# Patient Record
Sex: MALE | Race: WHITE
[De-identification: names, ages, dates, MRNs, and addresses within clinical notes are randomized per-mention and may not be internally consistent; named-entity substitution may affect disease eponyms.]

---

## 2017-07-23 ENCOUNTER — HOSPITAL ENCOUNTER (EMERGENCY)
Dept: HOSPITAL 76 - ED | Age: 57
Discharge: HOME | End: 2017-07-23
Payer: COMMERCIAL

## 2017-07-23 ENCOUNTER — HOSPITAL ENCOUNTER (OUTPATIENT)
Dept: HOSPITAL 76 - EMS | Age: 57
Discharge: ANOTHER HOSPITAL | End: 2017-07-23
Attending: SURGERY
Payer: COMMERCIAL

## 2017-07-23 VITALS — DIASTOLIC BLOOD PRESSURE: 59 MMHG | SYSTOLIC BLOOD PRESSURE: 127 MMHG

## 2017-07-23 DIAGNOSIS — R53.1: ICD-10-CM

## 2017-07-23 DIAGNOSIS — K52.29: Primary | ICD-10-CM

## 2017-07-23 DIAGNOSIS — R50.9: ICD-10-CM

## 2017-07-23 DIAGNOSIS — R50.9: Primary | ICD-10-CM

## 2017-07-23 DIAGNOSIS — Z79.4: ICD-10-CM

## 2017-07-23 DIAGNOSIS — E11.42: ICD-10-CM

## 2017-07-23 DIAGNOSIS — I10: ICD-10-CM

## 2017-07-23 LAB
ALBUMIN/GLOB SERPL: 1 {RATIO} (ref 1–2.2)
ANION GAP SERPL CALCULATED.4IONS-SCNC: 9 MMOL/L (ref 6–13)
BASOPHILS NFR BLD AUTO: 0 10^3/UL (ref 0–0.1)
BASOPHILS NFR BLD AUTO: 0.2 %
BILIRUB BLD-MCNC: 0.6 MG/DL (ref 0.2–1)
BUN SERPL-MCNC: 12 MG/DL (ref 6–20)
CALCIUM UR-MCNC: 8.9 MG/DL (ref 8.5–10.3)
CHLORIDE SERPL-SCNC: 102 MMOL/L (ref 101–111)
CO2 SERPL-SCNC: 25 MMOL/L (ref 21–32)
CREAT SERPLBLD-SCNC: 0.8 MG/DL (ref 0.6–1.2)
CUL URINE ADD CHARGE: (no result)
EOSINOPHIL # BLD AUTO: 0 10^3/UL (ref 0–0.7)
EOSINOPHIL NFR BLD AUTO: 0 %
ERYTHROCYTE [DISTWIDTH] IN BLOOD BY AUTOMATED COUNT: 13.4 % (ref 12–15)
GFRSERPLBLD MDRD-ARVRAT: 99 ML/MIN/{1.73_M2} (ref 89–?)
GLOBULIN SER-MCNC: 3.4 G/DL (ref 2.1–4.2)
GLUCOSE SERPL-MCNC: 156 MG/DL (ref 70–100)
HCT VFR BLD AUTO: 35.7 % (ref 42–52)
HGB UR QL STRIP: 12.3 G/DL (ref 14–18)
LIPASE SERPL-CCNC: 13 U/L (ref 22–51)
LYMPHOCYTES # SPEC AUTO: 0.4 10^3/UL (ref 1.5–3.5)
LYMPHOCYTES NFR BLD AUTO: 4.5 %
MCH RBC QN AUTO: 29.2 PG (ref 27–31)
MCHC RBC AUTO-ENTMCNC: 34.4 G/DL (ref 32–36)
MCV RBC AUTO: 84.7 FL (ref 80–94)
MONOCYTES # BLD AUTO: 0.6 10^3/UL (ref 0–1)
MONOCYTES NFR BLD AUTO: 7.4 %
NEUTROPHILS # BLD AUTO: 7.5 10^3/UL (ref 1.5–6.6)
NEUTROPHILS # SNV AUTO: 8.5 X10^3/UL (ref 4.8–10.8)
NEUTROPHILS NFR BLD AUTO: 87.9 %
NRBC # BLD AUTO: 0 /100WBC
PDW BLD AUTO: 8.6 FL (ref 7.4–11.4)
PH UR STRIP.AUTO: 5.5 PH (ref 5–7.5)
POTASSIUM SERPL-SCNC: 3.3 MMOL/L (ref 3.5–5)
PROT SPEC-MCNC: 6.8 G/DL (ref 6.7–8.2)
RBC MAR: 4.22 10^6/UL (ref 4.7–6.1)
SODIUM SERPLBLD-SCNC: 136 MMOL/L (ref 135–145)
SP GR UR STRIP.AUTO: 1.01 (ref 1–1.03)
UA CHARGE (STRIP ONLY): YES
UA W/ MICROSCOPIC CHARGE: (no result)
UR CULTURE IF IND: (no result)
UROBILINOGEN UR STRIP.AUTO-MCNC: NEGATIVE MG/DL
WBC # BLD: 8.5 X10^3/UL

## 2017-07-23 PROCEDURE — 99284 EMERGENCY DEPT VISIT MOD MDM: CPT

## 2017-07-23 PROCEDURE — 36415 COLL VENOUS BLD VENIPUNCTURE: CPT

## 2017-07-23 PROCEDURE — 81001 URINALYSIS AUTO W/SCOPE: CPT

## 2017-07-23 PROCEDURE — 71010: CPT

## 2017-07-23 PROCEDURE — 87181 SC STD AGAR DILUTION PER AGT: CPT

## 2017-07-23 PROCEDURE — 87040 BLOOD CULTURE FOR BACTERIA: CPT

## 2017-07-23 PROCEDURE — 81003 URINALYSIS AUTO W/O SCOPE: CPT

## 2017-07-23 PROCEDURE — 96375 TX/PRO/DX INJ NEW DRUG ADDON: CPT

## 2017-07-23 PROCEDURE — 96374 THER/PROPH/DIAG INJ IV PUSH: CPT

## 2017-07-23 PROCEDURE — 83690 ASSAY OF LIPASE: CPT

## 2017-07-23 PROCEDURE — 80053 COMPREHEN METABOLIC PANEL: CPT

## 2017-07-23 PROCEDURE — 87077 CULTURE AEROBIC IDENTIFY: CPT

## 2017-07-23 PROCEDURE — 85025 COMPLETE CBC W/AUTO DIFF WBC: CPT

## 2017-07-23 PROCEDURE — 87086 URINE CULTURE/COLONY COUNT: CPT

## 2017-07-23 PROCEDURE — 83605 ASSAY OF LACTIC ACID: CPT

## 2017-07-23 NOTE — ED PHYSICIAN DOCUMENTATION
PD HPI NVD





- Stated complaint


Stated Complaint: FEVER





- Chief complaint


Chief Complaint: Abd Pain





- History obtained from


History obtained from: Patient, Family





- History of Present Illness


Timing - onset: How many days ago (2-3)


Timing - duration: Days (2-3)


Timing - details: Gradual onset


Pain level max: 5


Pain level now: 4


Associated symptoms: No: Fever, Near syncope / syncope, Loss of appetite, 

Weight loss


Contributing factors: Diabetes.  No: Sick contact, Bad food, Travel, Recent 

antibiotics, Alcohol use, Anticoagulated


Improved by: Vomiting


Worsened by: Eating





- Additonal information


Additional information: 





Patient is a 57-year-old male who presents to the emergency department with 

nausea, vomiting, diarrhea as well as generalized weakness for the past 2-3 

days.  He is also diabetic and is being followed by wound care for several 

wounds on the bilateral lower extremity and great toe.  He is unable to 

tolerate p.o. today.  Has had subjective fevers at home.  Mild abdominal 

cramping.  Mild coughing.  Mild rhinorrhea.





Review of Systems


Cardiac: denies: Palpitations


Respiratory: denies: Hemoptysis, Wheezing


GI: reports: Nausea, Vomiting, Diarrhea


Skin: denies: Rash


Musculoskeletal: denies: Neck pain, Back pain


Neurologic: reports: Generalized weakness.  denies: Focal weakness, Numbness, 

Headache





PD PAST MEDICAL HISTORY





- Past Medical History


Past Medical History: Yes


Cardiovascular: Hypertension


Respiratory: None


Neuro: Peripheral neuropathy


Endocrine/Autoimmune: Type 2 diabetes


GI: Chronic diarrhea


: None


HEENT: None


Psych: Depression


Musculoskeletal: None


Derm: None





- Past Surgical History


Past Surgical History: Yes


General: Bowel surgery, Colonoscopy


Ortho: Shoulder arthroplasty, Arthroscopic surgery, Spine surgery





- Present Medications


Home Medications: 


 Ambulatory Orders











 Medication  Instructions  Recorded  Confirmed


 


Insulin Glargine [Lantus Solostar] 30 unit SUBQ HS 05/31/13 07/23/17


 


Citalopram [CeleXA] 20 mg PO DAILY 11/18/14 07/23/17


 


Losartan Potassium 25 mg ORAL DAILY 02/24/15 07/23/17


 


Multivitamin [Multi Vitamin Daily] 1 tab ORAL DAILY 02/24/15 07/23/17


 


Gabapentin 300 mg PO TID 11/24/15 07/23/17


 


Insulin Aspart [Novolog] 20 units SUBQ TID 11/24/15 07/23/17


 


Loperamide [Imodium] 1 cap PO BID 05/10/16 07/23/17


 


Colestipol HCl 2 gm BID 07/23/17 07/23/17


 


Promethazine [Phenergan] 25 mg PO Q6H PRN #10 tab 07/23/17 














- Allergies


Allergies/Adverse Reactions: 


 Allergies











Allergy/AdvReac Type Severity Reaction Status Date / Time


 


No Known Drug Allergies Allergy   Verified 05/09/16 14:54














- Social History


Does the pt smoke?: No


Smoking Status: Never smoker


Does the pt drink ETOH?: Yes


Does the pt have substance abuse?: No





- Immunizations


Immunizations are current?: No


Immunizations: Other immun not current





- POLST


Patient has POLST: No





PD ED PE NORMAL





- Vitals


Vital signs reviewed: Yes





- General


General: Alert and oriented X 3, No acute distress





- HEENT


HEENT: PERRL, Moist mucous membranes





- Neck


Neck: Supple, no meningeal sign





- Cardiac


Cardiac: RRR, Strong equal pulses





- Respiratory


Respiratory: No respiratory distress, Clear bilaterally





- Abdomen


Abdomen: Soft, Non tender, Non distended





- Back


Back: No CVA TTP, No spinal TTP





- Derm


Derm: Warm and dry, No rash





- Extremities


Extremities: Other (Well-healing bilateral lower extremity wounds without signs 

of infection)





- Neuro


Neuro: Alert and oriented X 3





- Psych


Psych: Normal mood, Normal affect





Results





- Vitals


Vitals: 


 Vital Signs - 24 hr











  07/23/17 07/23/17 07/23/17





  18:03 19:12 20:38


 


Temperature 38.1 C H 38.0 C H 38.1 C H


 


Heart Rate 78  80


 


Respiratory 18  16





Rate   


 


Blood Pressure 137/70 H  129/76


 


O2 Saturation 96  98














  07/23/17





  21:43


 


Temperature 37.2 C


 


Heart Rate 64


 


Respiratory 18





Rate 


 


Blood Pressure 127/59 L


 


O2 Saturation 95








 Oxygen











O2 Source                      Room air

















- Labs


Labs: 


 Laboratory Tests











  07/23/17 07/23/17 07/23/17





  18:29 18:29 18:38


 


WBC  8.5  


 


RBC  4.22 L  


 


Hgb  12.3 L  


 


Hct  35.7 L  


 


MCV  84.7  


 


MCH  29.2  


 


MCHC  34.4  


 


RDW  13.4  


 


Plt Count  130  


 


MPV  8.6  


 


Neut #  7.5 H  


 


Lymph #  0.4 L  


 


Mono #  0.6  


 


Eos #  0.0  


 


Baso #  0.0  


 


Absolute Nucleated RBC  0.00  


 


Nucleated RBCs  0.0  


 


Sodium   136 


 


Potassium   3.3 L 


 


Chloride   102 


 


Carbon Dioxide   25 


 


Anion Gap   9.0 


 


BUN   12 


 


Creatinine   0.8 


 


Estimated GFR (MDRD)   99 


 


Glucose   156 H 


 


Lactic Acid    1.7


 


Calcium   8.9 


 


Total Bilirubin   0.6 


 


AST   16 


 


ALT   16 


 


Alkaline Phosphatase   70 


 


Total Protein   6.8 


 


Albumin   3.4 


 


Globulin   3.4 


 


Albumin/Globulin Ratio   1.0 


 


Lipase   13 L 


 


Urine Color   


 


Urine Clarity   


 


Urine pH   


 


Ur Specific Gravity   


 


Urine Protein   


 


Urine Glucose (UA)   


 


Urine Ketones   


 


Urine Occult Blood   


 


Urine Nitrite   


 


Urine Bilirubin   


 


Urine Urobilinogen   


 


Ur Leukocyte Esterase   


 


Ur Microscopic Review   


 


Urine Culture Comments   














  07/23/17





  19:44


 


WBC 


 


RBC 


 


Hgb 


 


Hct 


 


MCV 


 


MCH 


 


MCHC 


 


RDW 


 


Plt Count 


 


MPV 


 


Neut # 


 


Lymph # 


 


Mono # 


 


Eos # 


 


Baso # 


 


Absolute Nucleated RBC 


 


Nucleated RBCs 


 


Sodium 


 


Potassium 


 


Chloride 


 


Carbon Dioxide 


 


Anion Gap 


 


BUN 


 


Creatinine 


 


Estimated GFR (MDRD) 


 


Glucose 


 


Lactic Acid 


 


Calcium 


 


Total Bilirubin 


 


AST 


 


ALT 


 


Alkaline Phosphatase 


 


Total Protein 


 


Albumin 


 


Globulin 


 


Albumin/Globulin Ratio 


 


Lipase 


 


Urine Color  YELLOW


 


Urine Clarity  CLEAR


 


Urine pH  5.5


 


Ur Specific Gravity  1.010


 


Urine Protein  NEGATIVE


 


Urine Glucose (UA)  >=1000 H


 


Urine Ketones  40 H


 


Urine Occult Blood  NEGATIVE


 


Urine Nitrite  NEGATIVE


 


Urine Bilirubin  NEGATIVE


 


Urine Urobilinogen  0.2 (NORMAL)


 


Ur Leukocyte Esterase  NEGATIVE


 


Ur Microscopic Review  NOT INDICATED


 


Urine Culture Comments  NOT INDICATED














- Rads (name of study)


  ** Chest x-ray


Radiology: Prelim report reviewed, EMP read contemporaneously, See rad report (

No acute disease)





PD MEDICAL DECISION MAKING





- ED course


Complexity details: reviewed results, re-evaluated patient, considered 

differential, d/w patient, d/w family


ED course: 





Patient is a 57-year-old male who presents to the emergency department with 

nausea, vomiting, diarrhea for the past several days.  Given IV fluids, Zofran, 

Bentyl.  Feels much better.  Tolerating p.o. without difficulty.  Able to 

ambulate well.  Given Tylenol as well.  No acute findings on chest x-ray, 

laboratory testing or urinalysis.  Appears to be a viral syndrome.  Will 

continue supportive care and follow-up with his PCP.  Patient and family 

counseled regarding signs and symptoms for which I believe and urgent re-

evaluation would be necessary. Patient with good understanding of and agreement 

to plan and is comfortable going home at this time





This document was made in part using voice recognition software. While efforts 

are made to proofread this document, sound alike and grammatical errors may 

occur.





Departure





- Departure


Disposition: 01 Home, Self Care


Clinical Impression: 


 Gastroenteritis





Fever


Qualifiers:


 Fever type: unspecified Qualified Code(s): R50.9 - Fever, unspecified





T2DM (type 2 diabetes mellitus)


Qualifiers:


 Diabetes mellitus complication status: without complication 


Condition: Good


Instructions:  ED Fever Unconf Cause, ED Gastroenteritis Viral


Follow-Up: 


Fredy Padgett MD [Primary Care Provider] - Within 3 Days (for recheck)


Prescriptions: 


Promethazine [Phenergan] 25 mg PO Q6H PRN #10 tab


 PRN Reason: Nausea / Vomiting


Comments: 


Return if you worsen.  Drink plenty of fluids and rest.  This should improve 

over the next 2-3 days.


Discharge Date/Time: 07/23/17 21:43

## 2017-07-23 NOTE — XRAY REPORT
EXAM: 

CHEST RADIOGRAPHY

 

EXAM DATE: 7/23/2017 06:41 PM.

 

CLINICAL HISTORY: Chest pain.

 

COMPARISON: None.

 

TECHNIQUE: 1 view.

 

FINDINGS:

Lungs/Pleura: No focal opacities evident. No pleural effusion. No pneumothorax. Mildly low lung volum
es.

 

Mediastinum: Within exam limitations, cardiomediastinal contour is normal.

 

 

IMPRESSION: No acute cardiopulmonary disease seen.

 

[facility]

Referring Provider Line: [phone]

 

SITE ID: [id number]

## 2017-07-23 NOTE — XRAY PRELIMINARY REPORT
Accession: [id number]

Exam: XR Chest 1 View

 

IMPRESSION: No acute cardiopulmonary disease seen.

 

[facility]

 

SITE ID: [id number]

## 2017-07-24 ENCOUNTER — HOSPITAL ENCOUNTER (INPATIENT)
Dept: HOSPITAL 76 - ED | Age: 57
LOS: 4 days | Discharge: HOME | DRG: 639 | End: 2017-07-28
Attending: INTERNAL MEDICINE | Admitting: INTERNAL MEDICINE
Payer: COMMERCIAL

## 2017-07-24 DIAGNOSIS — E11.628: Primary | ICD-10-CM

## 2017-07-24 DIAGNOSIS — K21.9: ICD-10-CM

## 2017-07-24 DIAGNOSIS — E11.65: ICD-10-CM

## 2017-07-24 DIAGNOSIS — Z87.440: ICD-10-CM

## 2017-07-24 DIAGNOSIS — Z79.899: ICD-10-CM

## 2017-07-24 DIAGNOSIS — Z79.4: ICD-10-CM

## 2017-07-24 DIAGNOSIS — B95.61: ICD-10-CM

## 2017-07-24 DIAGNOSIS — E11.621: ICD-10-CM

## 2017-07-24 DIAGNOSIS — F32.9: ICD-10-CM

## 2017-07-24 DIAGNOSIS — R11.2: ICD-10-CM

## 2017-07-24 DIAGNOSIS — Z85.048: ICD-10-CM

## 2017-07-24 DIAGNOSIS — I10: ICD-10-CM

## 2017-07-24 DIAGNOSIS — L97.519: ICD-10-CM

## 2017-07-24 DIAGNOSIS — L08.9: ICD-10-CM

## 2017-07-24 DIAGNOSIS — R15.9: ICD-10-CM

## 2017-07-24 DIAGNOSIS — E11.42: ICD-10-CM

## 2017-07-24 LAB
ANION GAP SERPL CALCULATED.4IONS-SCNC: 11 MMOL/L (ref 6–13)
BASOPHILS NFR BLD AUTO: 0 10^3/UL (ref 0–0.1)
BASOPHILS NFR BLD AUTO: 0.2 %
BUN SERPL-MCNC: 14 MG/DL (ref 6–20)
CALCIUM UR-MCNC: 8.6 MG/DL (ref 8.5–10.3)
CHLORIDE SERPL-SCNC: 102 MMOL/L (ref 101–111)
CO2 SERPL-SCNC: 23 MMOL/L (ref 21–32)
CREAT SERPLBLD-SCNC: 1 MG/DL (ref 0.6–1.2)
EOSINOPHIL # BLD AUTO: 0 10^3/UL (ref 0–0.7)
EOSINOPHIL NFR BLD AUTO: 0 %
ERYTHROCYTE [DISTWIDTH] IN BLOOD BY AUTOMATED COUNT: 13.6 % (ref 12–15)
EST. AVERAGE GLUCOSE BLD GHB EST-MCNC: 332 MG/DL (ref 70–100)
GFRSERPLBLD MDRD-ARVRAT: 76 ML/MIN/{1.73_M2} (ref 89–?)
GLUCOSE SERPL-MCNC: 352 MG/DL (ref 70–100)
HBA1C BLD-MCNC: 1.61 G/DL
HCT VFR BLD AUTO: 35.5 % (ref 42–52)
HGB UR QL STRIP: 12.2 G/DL (ref 14–18)
LYMPHOCYTES # SPEC AUTO: 0.5 10^3/UL (ref 1.5–3.5)
LYMPHOCYTES NFR BLD AUTO: 4.8 %
MCH RBC QN AUTO: 29.1 PG (ref 27–31)
MCHC RBC AUTO-ENTMCNC: 34.3 G/DL (ref 32–36)
MCV RBC AUTO: 84.8 FL (ref 80–94)
MONOCYTES # BLD AUTO: 0.7 10^3/UL (ref 0–1)
MONOCYTES NFR BLD AUTO: 7.9 %
NEUTROPHILS # BLD AUTO: 8.2 10^3/UL (ref 1.5–6.6)
NEUTROPHILS # SNV AUTO: 9.4 X10^3/UL (ref 4.8–10.8)
NEUTROPHILS NFR BLD AUTO: 87.1 %
NRBC # BLD AUTO: 0 /100WBC
PDW BLD AUTO: 8.6 FL (ref 7.4–11.4)
POTASSIUM SERPL-SCNC: 3.9 MMOL/L (ref 3.5–5)
RBC MAR: 4.18 10^6/UL (ref 4.7–6.1)
SODIUM SERPLBLD-SCNC: 136 MMOL/L (ref 135–145)
WBC # BLD: 9.4 X10^3/UL

## 2017-07-24 PROCEDURE — 74000: CPT

## 2017-07-24 PROCEDURE — 99285 EMERGENCY DEPT VISIT HI MDM: CPT

## 2017-07-24 PROCEDURE — 99283 EMERGENCY DEPT VISIT LOW MDM: CPT

## 2017-07-24 PROCEDURE — 85651 RBC SED RATE NONAUTOMATED: CPT

## 2017-07-24 PROCEDURE — 96365 THER/PROPH/DIAG IV INF INIT: CPT

## 2017-07-24 PROCEDURE — 99284 EMERGENCY DEPT VISIT MOD MDM: CPT

## 2017-07-24 PROCEDURE — 86140 C-REACTIVE PROTEIN: CPT

## 2017-07-24 PROCEDURE — 83605 ASSAY OF LACTIC ACID: CPT

## 2017-07-24 PROCEDURE — 83036 HEMOGLOBIN GLYCOSYLATED A1C: CPT

## 2017-07-24 PROCEDURE — 36415 COLL VENOUS BLD VENIPUNCTURE: CPT

## 2017-07-24 PROCEDURE — 87070 CULTURE OTHR SPECIMN AEROBIC: CPT

## 2017-07-24 PROCEDURE — 51701 INSERT BLADDER CATHETER: CPT

## 2017-07-24 PROCEDURE — 87205 SMEAR GRAM STAIN: CPT

## 2017-07-24 PROCEDURE — 87077 CULTURE AEROBIC IDENTIFY: CPT

## 2017-07-24 PROCEDURE — 96367 TX/PROPH/DG ADDL SEQ IV INF: CPT

## 2017-07-24 PROCEDURE — 80048 BASIC METABOLIC PNL TOTAL CA: CPT

## 2017-07-24 PROCEDURE — 82947 ASSAY GLUCOSE BLOOD QUANT: CPT

## 2017-07-24 PROCEDURE — 80053 COMPREHEN METABOLIC PANEL: CPT

## 2017-07-24 PROCEDURE — 85025 COMPLETE CBC W/AUTO DIFF WBC: CPT

## 2017-07-24 PROCEDURE — 96375 TX/PRO/DX INJ NEW DRUG ADDON: CPT

## 2017-07-24 PROCEDURE — 87040 BLOOD CULTURE FOR BACTERIA: CPT

## 2017-07-24 PROCEDURE — 73660 X-RAY EXAM OF TOE(S): CPT

## 2017-07-24 PROCEDURE — 99212 OFFICE O/P EST SF 10 MIN: CPT

## 2017-07-24 RX ADMIN — INSULIN ASPART SCH UNIT: 100 INJECTION, SOLUTION INTRAVENOUS; SUBCUTANEOUS at 17:19

## 2017-07-24 RX ADMIN — ONDANSETRON PRN MG: 2 INJECTION INTRAMUSCULAR; INTRAVENOUS at 18:35

## 2017-07-24 RX ADMIN — SODIUM CHLORIDE SCH MLS/HR: 9 INJECTION, SOLUTION INTRAVENOUS at 13:00

## 2017-07-24 RX ADMIN — ENOXAPARIN SODIUM SCH MG: 100 INJECTION SUBCUTANEOUS at 15:20

## 2017-07-24 RX ADMIN — SODIUM CHLORIDE, PRESERVATIVE FREE SCH: 5 INJECTION INTRAVENOUS at 16:53

## 2017-07-24 RX ADMIN — SODIUM CHLORIDE SCH MLS/HR: 9 INJECTION, SOLUTION INTRAVENOUS at 13:06

## 2017-07-24 RX ADMIN — HYDROCODONE BITARTRATE AND ACETAMINOPHEN PRN TAB: 5; 325 TABLET ORAL at 18:35

## 2017-07-24 RX ADMIN — GABAPENTIN SCH MG: 100 CAPSULE ORAL at 15:45

## 2017-07-24 RX ADMIN — HYDROCODONE BITARTRATE AND ACETAMINOPHEN PRN TAB: 5; 325 TABLET ORAL at 13:19

## 2017-07-24 RX ADMIN — INSULIN GLARGINE SCH: 100 INJECTION, SOLUTION SUBCUTANEOUS at 15:45

## 2017-07-24 RX ADMIN — LOPERAMIDE HYDROCHLORIDE SCH MG: 2 CAPSULE ORAL at 20:58

## 2017-07-24 RX ADMIN — SODIUM CHLORIDE SCH MLS/HR: 900 INJECTION INTRAVENOUS at 21:00

## 2017-07-24 RX ADMIN — GABAPENTIN SCH MG: 100 CAPSULE ORAL at 20:58

## 2017-07-24 RX ADMIN — INSULIN ASPART SCH UNIT: 100 INJECTION, SOLUTION INTRAVENOUS; SUBCUTANEOUS at 21:05

## 2017-07-24 RX ADMIN — INSULIN GLARGINE SCH UNIT: 100 INJECTION, SOLUTION SUBCUTANEOUS at 21:00

## 2017-07-24 RX ADMIN — SODIUM CHLORIDE, PRESERVATIVE FREE SCH: 5 INJECTION INTRAVENOUS at 23:30

## 2017-07-24 RX ADMIN — INSULIN ASPART SCH UNIT: 100 INJECTION, SOLUTION INTRAVENOUS; SUBCUTANEOUS at 17:20

## 2017-07-24 NOTE — CONSULTATION NOTE
Referring Provider


Name of Referring Provider:: Porter Navarrete MD


Consult Date: 07/24/17





Chief Complaint





- Chief Complaint


Chief Complaint: Diabetic Ulcer, Right Great Toe





History of Present Illness





- Admitted From


Admitted From:: ED





- History Obtained From


Records Reviewed: ED and admission notes.


History obtained from: Patient


Exam Limitations: None





- History of Present Illness


HPI Comment/Other: 


This is a 57-year-old male with a long-standing history of type II diabetes 

mellitus.  He has had a past history of other diabetic foot and toe ulcers.  

These have responded to conservative management with wound care.  The past 

several weeks he has had an ulceration on the bottom of his right great toe 

over the distal phalanx.  Over the last several days he developed a blister on 

the lateral aspect of the distal phalanx of the great toe which opened and 

began draining.  There has not been a significant amount of surrounding 

erythema.





 Patient denies fevers, chills, sweats, or other constitutional symptoms.  He 

states that he has been to diabetic education but only intermittently 

consistently checks his blood sugars.  His blood sugar on admission today was 

352.  He states that he does not know his last hemoglobin A1c.  He states his 

wife does not consistently cook diabetic meals.








Review of Systems





- Constitutional


Constitutional: reports: Malaise





- Gastrointestinal


Gastrointestinal: reports: Diarrhea





- Musculoskeletal


Musculoskeletal: reports: Joint pain





- Neurological


Neurological: reports: Other (diabetic peripheral neuropathy)





- Psychiatric


Psychiatric: reports: Depression





- All Other Systems


All Other Systems: reports: Reviewed and negative





History





- Past Medical History


Cardiovascular: reports: Hypertension


Respiratory: reports: None


Neuro: reports: Peripheral neuropathy, Fainting


Endocrine/Autoimmune: reports: Type 2 diabetes


GI: reports: Chronic diarrhea


: reports: None


HEENT: reports: None


Psych: reports: Depression


Musculoskeletal: reports: None


Derm: reports: None


MRSA Hx?: Yes





- Past Surgical History


General: reports: Bowel surgery, Colonoscopy


Ortho: reports: Shoulder arthroplasty, Arthroscopic surgery, Spine surgery





- Family & Social History


Living arrangement: At home


Living Situation: With spouse/s.o.





- Substance History


Use: Uses substance without health or social issues: NONE





- POLST


Patient has POLST: No


POLST Status: Full Code





Meds/Allgy





- Home Medications


Home Medications: 


 Ambulatory Orders











 Medication  Instructions  Recorded  Confirmed


 


Insulin Glargine [Lantus Solostar] 30 unit SUBQ  05/31/13 07/24/17


 


Citalopram [CeleXA] 20 mg PO DAILY 11/18/14 07/24/17


 


Losartan Potassium 25 mg ORAL DAILY 02/24/15 07/24/17


 


Multivitamin [Multi Vitamin Daily] 1 tab ORAL DAILY 02/24/15 07/24/17


 


Gabapentin 300 mg PO TID 11/24/15 07/24/17


 


Insulin Aspart [Novolog] 20 units SUBQ TID 11/24/15 07/24/17


 


Loperamide [Imodium] 1 cap PO BID 05/10/16 07/24/17


 


Colestipol HCl 2 gm BID 07/23/17 07/24/17


 


Promethazine [Phenergan] 25 mg PO Q6H PRN #10 tab 07/23/17 














- Allergies


Allergies/Adverse Reactions: 


 Allergies











Allergy/AdvReac Type Severity Reaction Status Date / Time


 


No Known Drug Allergies Allergy   Verified 07/24/17 10:50














Exam





- Vital Signs


Reviewed Vital Signs: Yes


Vital Signs: 





 Vital Signs x48h











  Temp Pulse Resp BP


 


 07/24/17 13:05  37.1 C  80  18  140/80 H














- Physical Exam


General Appearance: positive: No acute distress, Alert


Eyes Bilateral: positive: Normal inspection


ENT: positive: ENT inspection nml


Neck: positive: Nml inspection


Respiratory: positive: No respiratory distress, Breath sounds nml


Cardiovascular: positive: Regular rate & rhythm


Peripheral Pulses: positive: 2+


Abdomen: positive: Non-tender, Nml bowel sounds, No distention.  negative: 

Guarding


Back: positive: Nml inspection


Extremities: positive: Nml appearance (Paredes stage I diabetic foot ulcer, 

right great toe on the plantar and lateral aspect of the distal phalanx.  The 

ulcer does not appear to extend beyond the subcutaneous tissue.ssuperficial 

cultures over the past month returned polymicrobial infection.), Other (Paredes 

stage I diabetic foot ulcer, right great toe on the plantar and lateral aspect 

of the distal phalanx.  The ulcer does not appear to extend beyond the 

subcutaneous tissue.).  negative: Calf tenderness, Shirley's sign/cords


Neurologic/Psychiatric: positive: Oriented x3, Motor nml, Mood/affect nml, 

Sensory loss (bilateral stocking distribution hypoesthesia.)





Conclusion/Plan





- Diagnosis


Diagnosis: Paredes stage I diabetic foot ulcer, right great toe.





- Plan


Plan: 





RECOMMEND:


1. daily dressing changes:


   Cleanse wounds with 50:50 mixture of hydrogen peroxide and normal saline.  

Dab dry.


   Redress with silver iazine ointment and 4 x 4 gauze.  Wrap with Alice wrap.


   Postop shoe for ambulation.


2. continue MAC wound care treatment.


3. cover with broad-spectrum antibiotics.


4. diabetic education for patient and his wife.  They need to get serious about 

controlling his hemoglobin A1c and blood sugars.


5. followup with orthopedic evon one week after discharge.





- Lab Results


Fish Bones: 


 07/24/17 10:50





 07/24/17 10:50

## 2017-07-24 NOTE — XRAY PRELIMINARY REPORT
Accession: [id number]

Exam: XR Toe(s) RT

 

Impression: Degenerative changes of the great toe without definite evidence of a fracture. If osteomy
elitis is suspected clinically, a MRI may be beneficial for further characterization.

 

[facility]

 

SITE ID: [id number]

## 2017-07-24 NOTE — XRAY REPORT
EXAM:

RIGHT TOE RADIOGRAPHY

 

EXAM DATE: 7/24/2017 11:49 AM.

 

CLINICAL HISTORY: Infection, big toe.

 

COMPARISON: None.

 

TECHNIQUE: 4 views.

 

FINDINGS: 

There is cortical irregularity involving the distal phalanx of the first toe that most likely rep is 
degenerative changes and less likely a fracture. There is no evidence of cortical disruption to sugge
st the presence of osteomyelitis. Joint space narrowing and osteophyte formation is seen involving th
e first metatarsal-phalangeal joint.

 

Impression: Degenerative changes of the great toe without definite evidence of a fracture. If osteomy
elitis is suspected clinically, a MRI may be beneficial for further characterization.

 

[facility]

Referring Provider Line: [phone]

 

SITE ID: [id number]

## 2017-07-24 NOTE — MRI PRELIMINARY REPORT
Accession: [id number]

Exam: MRI Foot RT W/WO

 

IMPRESSION: 

1. Cellulitis of the first toe with abscess of the lateral subcutaneous tissues. The patient has reac
tive marrow edema/enhancement within the phalanges which does not yet meet criteria for osteomyelitis
.

 

[facility] MUSCULOSKELETAL RADIOLOGY SECTION

 

SITE ID: [id number]

## 2017-07-24 NOTE — ED PHYSICIAN DOCUMENTATION
History of Present Illness





- Stated complaint


Stated Complaint: R BIG TOE INFECTION





- Chief complaint


Chief Complaint: General





- Additonal information


Additional information: 





hx from pt


57 male


[place] [facility] [place]


seen yesterday for fever HA myalgias


had blood work UA CXR and was dced


+ blood cx X 1 gram + cocci cluster and chains - [place] [facility] [place] and 

sent back to ED for further eval


he still feels terrible





PD PAST MEDICAL HISTORY





- Past Medical History


Past Medical History: Yes


Cardiovascular: Hypertension


Respiratory: None


Neuro: Peripheral neuropathy


Endocrine/Autoimmune: Type 2 diabetes


GI: Chronic diarrhea


: None


HEENT: None


Psych: Depression


Musculoskeletal: None


Derm: None





- Past Surgical History


Past Surgical History: Yes


General: Bowel surgery, Colonoscopy


Ortho: Shoulder arthroplasty, Arthroscopic surgery, Spine surgery





- Present Medications


Home Medications: 


 Ambulatory Orders











 Medication  Instructions  Recorded  Confirmed


 


Insulin Glargine [Lantus Solostar] 30 unit SUBQ HS 05/31/13 07/24/17


 


Citalopram [CeleXA] 20 mg PO DAILY 11/18/14 07/24/17


 


Losartan Potassium 25 mg ORAL DAILY 02/24/15 07/24/17


 


Multivitamin [Multi Vitamin Daily] 1 tab ORAL DAILY 02/24/15 07/24/17


 


Gabapentin 300 mg PO TID 11/24/15 07/24/17


 


Insulin Aspart [Novolog] 20 units SUBQ TID 11/24/15 07/24/17


 


Loperamide [Imodium] 1 cap PO BID 05/10/16 07/24/17


 


Colestipol HCl 2 gm BID 07/23/17 07/24/17


 


Promethazine [Phenergan] 25 mg PO Q6H PRN #10 tab 07/23/17 














- Allergies


Allergies/Adverse Reactions: 


 Allergies











Allergy/AdvReac Type Severity Reaction Status Date / Time


 


No Known Drug Allergies Allergy   Verified 07/24/17 10:50














- Social History


Does the pt smoke?: No


Smoking Status: Never smoker


Does the pt drink ETOH?: Yes


Does the pt have substance abuse?: No





- Immunizations


Immunizations are current?: No


Immunizations: Other immun not current





- POLST


Patient has POLST: No





PD ED PE NORMAL





- Vitals


Vital signs reviewed: Yes





- General


General: Other (looks ill)





- HEENT


HEENT: Atraumatic





- Neck


Neck: Supple, no meningeal sign





- Cardiac


Cardiac: RRR





- Respiratory


Respiratory: No respiratory distress, Clear bilaterally





- Abdomen


Abdomen: Soft, Non tender





- Derm


Derm: Normal color





- Extremities


Extremities: Other (R great toe with planater ulver, skin breakdown between 

toes marked erythema and swelling and streaking to distal ant leg, MSV intact)





- Neuro


Neuro: Alert and oriented X 3





Results





- Vitals


Vitals: 


 Vital Signs - 24 hr











  07/24/17





  10:47


 


Temperature 37.4 C


 


Heart Rate 75


 


Respiratory 18





Rate 


 


Blood Pressure 142/59 H


 


O2 Saturation 97








 Oxygen











O2 Source                      Room air

















- Labs


Labs: 


 Laboratory Tests











  07/24/17 07/24/17 07/24/17





  10:50 10:50 12:10


 


WBC  9.4  


 


RBC  4.18 L  


 


Hgb  12.2 L  


 


Hct  35.5 L  


 


MCV  84.8  


 


MCH  29.1  


 


MCHC  34.3  


 


RDW  13.6  


 


Plt Count  123 L  


 


MPV  8.6  


 


Neut #  8.2 H  


 


Lymph #  0.5 L  


 


Mono #  0.7  


 


Eos #  0.0  


 


Baso #  0.0  


 


Absolute Nucleated RBC  0.00  


 


Nucleated RBCs  0.0  


 


Sodium   136 


 


Potassium   3.9 


 


Chloride   102 


 


Carbon Dioxide   23 


 


Anion Gap   11.0 


 


BUN   14 


 


Creatinine   1.0 


 


Estimated GFR (MDRD)   76 L 


 


Glucose   352 H 


 


Lactic Acid    1.3


 


Calcium   8.6 














- Rads (name of study)


  ** toe


Radiology: See rad report (degen changes, no fx, no osteo see, consider MRI)





Departure





- Departure


Disposition: 66 [facility] DC/Xfer


Clinical Impression: 


 Bacteremia





Diabetic toe ulcer


Qualifiers:


 Diabetes mellitus type: other specified (including TITO) Laterality: right Non-

pressure ulcer stage: unspecified non-pressure ulcer stage Qualified Code(s): 

E13.621 - Other specified diabetes mellitus with foot ulcer

## 2017-07-24 NOTE — HISTORY & PHYSICAL EXAMINATION
DATE OF ADMISSION:  2017

 

PRIMARY CARE PROVIDER: Dr. Stone Padgett.

 

CHIEF COMPLAINT: Big toe infection. 

 

IDENTIFYING INFORMATION: The patient is the primary source of history, appears cogent, consistent and
 thorough. Additional information is obtained in the handoff from Dr. Padilla, the emergency departmen
 physician. Personal review of past medical records summarized below were also used as well as genoveva mead in the evaluation of this person and the preparation of this document. 

 

HISTORY OF PRESENT ILLNESS: The patient has been getting wound care at the [facility] for an ulcer on
 the plantar surface of the right great toe. Last Thursday he noted that he did not feel well, had ch
ills, headache. He came to the emergency department, was seen, looked at his toe, he had an x-ray and
 was sent home. Did have blood cultures done which were positive today. He went to the [facility] tojulissa miranda for his usual appointment and red swollen toe was seen. 

 

REVIEW OF SYSTEMS: He has had fevers, chills, sweats. He has had nausea, he has had vomiting. He has 
no cough, no sore throat. He has chronic diarrhea and takes a pill for that. The patient says overall
 he just feels bad. The patient's ________ "terrible." He states he had both nausea and vomiting this
 morning and that complete review of systems is negative except as noted above. 

 

PAST MEDICAL HISTORY:

1. Diabetes.

2. Hypertension.

3. Peripheral neuropathy.

4. Multiple diabetic foot ulcers secondary to diabetes.

5. T3 N1 rectal adenocarcinoma treated with neoadjuvant radiation and subsequent colon resection of t
he sigmoid 2013, colostomy and 1 pouch procedure, he had the colostomy reversed on 2015. He 
has had occasional rectal incontinence as a result of it.

6. Gastrointestinal reflux disease.

7. Recurrent urinary tract infection, urinary retention, requiring self catheterization. He no longer
 has to do this since he had his colostomy reversed.

8. Depression. 

 

ALLERGIES: Unknown. 

 

MEDICATIONS:

1. Insulin 30 units at bedtime.

2. Lexapro 20 mg a day.

3. Losartan 25 mg a day.

4. Multivitamin every day.

5. Gabapentin 300 mg t.i.d.

6. Novolog ________ t.i.d.

7. Loperamide 1 capsule twice a day.

8. Colestipol 2 grams b.i.d.

9. Ondansetron p.r.n. nausea.

 

SOCIAL HISTORY: Lives in [place]. He was born and raised there. He lives with his wife, ________. 
He has been  20 years. Has 2 kids at the age of 17 and 20. The patient is unemployed. He used 
to work for ________ and he lost his job secondary to his illness and has been unable to get a job an
d unable to get disability or social security, and therefore, has been quite stressed as he is having
 a very difficult time with his finances. The patient also says his wife was diagnosed with ovarian c
ancer. 

 

The patient is not a smoker and never has been, rarely drinks alcohol. Denies any illicit drugs. 

 

FAMILY HISTORY: Mom  of metastatic adenocarcinoma unknown primary. Father  at 77 of pancreati
c cancer. Two brothers, one of them had throat cancer. Both children are healthy. There is also diabe
raisa on the father's side of the family. 

 

PHYSICAL EXAMINATION:

VITAL SIGNS: 37.4, 75, 18, 142/59, 97 room air saturation.

EYES: EOM's within normal limits. PERRLA. Not icteric.

GENERAL: The patient appears tired, but no acute distress. Well-developed, well-nourished.

MOUTH AND THROAT: Dry mucous membranes, otherwise no pathology of mouth or pharynx.

NECK: Supple. No lymphadenopathy. No thyromegaly. No tracheal deviation. No JVD or bruits.

CHEST: Chest wall nontender, symmetric.

HEART: Normal sinus rhythm. No murmur, rubs, clicks heard.

LUNGS: Clear with good air movement bilaterally.

ABDOMEN: Soft, nontender, normal bowel sounds. No hepatosplenomegaly noted.

RECTAL/GENITAL: Exam was not done.

EXTREMITIES: No edema. Left foot second toe has some erythema and chronic ulcer. The patient's right 
foot great toe very swollen and very dark pink, has a draining ulcer on the bottom of the foot, macer
ated skin and serous drainage.

NEURO: Cognitive intact. Cranial nerves intact. Motor intact. Sensory not tested. Gait not tested.

SKIN: No lesions or dermatitis except as noted above. 

 

DIAGNOSTICS: The patient had chest x-ray a day ago which was negative. White count is 9.4, 12 and 35 
hemoglobin and hematocrit. Platelets are 123, sodium 136, potassium 3.9, chloride 102, CO2 23, BUN 14
, creatinine is 1.0. The patient's glucose is 352. Calcium is 8.6. 

 

The patient's wound culture done on  showed mixed culture enterococcus faecalis, Enterobacter cl
oacae, staph aureus and in May he had methicillin resistant staph aureus, Enterobacter cloacae and en
terococcus faecalis. 

 

SUMMARY: This is a 57-year-old man with past medical history of diabetes complicated by ________ maxine
pheral neuropathy, multiple diabetic foot ulcers. The patient also had rectal adenocarcinoma treated 
in  through . He has had gastrointestinal reflux disease, hypertension, depression. The patie
nt had an ulcer on his great toe which is now erythematous from the base distally and is admitted to 
the hospital for IV antibiotics for a deep tissue wound infection. 

 

DIAGNOSES:

1. Infected right great toe.

2. Diabetic ulcers.

3. Diabetes out of control.

4. Peripheral neuropathy secondary to diabetes.

5. T3 N1 rectal adenocarcinoma in remission.

6. Depression. 

 

DISCUSSION/DECISION MAKIN. The right toe infection appears to be to this physician to have been a chronic infection slow grow
ing now involving tissues to the base of the toe at the very least. With his past history and the mul
tiple diabetic ulcers, as well as both feet being involved that warrants an MRI to rule out an osteoa
rthritis which would require a different course of treatment.

2. Uncontrolled diabetes. Clearly the patient does not control his diabetes. Will get an A1c to [facility]
ent and he will be on his usual insulin with sliding scale as well and attempt to improve his control
 of his glucose.

3. Neuropathy. The patient will be continued on his gabapentin. No further diagnostics at this time.

4. For patient's foot ulcers, will have a Wound consult and treat all the ulcers. Will also have Orth
opaedic consultation too.

5. The rectal cancer will be no further therapy at this time.

6. Depression. He will continue his regular medications. 

 

HOSPITAL ISSUES:

1. CODE STATUS: HE IS DO NOT RESUSCITATE.

2. VTE which will be Lovenox subcutaneous.

3. Diet will be carb controlled 4 choice diet.

4. Activity will be as tolerated.

5. Tubes and lines: Will just have a peripheral IV at this point.

6. The patient's diagnoses, as well as comorbidities warrants at least 2 nights of treatment, further
 diagnostics in order to assure a safe, appropriate discharge.

7. Estimated length of stay which is 3 nights.

8. Expected disposition is home. 

 

 

 

DD:2017 14:22:00  DT: 2017 16:51  JOB #: [id number]  EXT JOB #:[id number]

## 2017-07-25 LAB
ALBUMIN/GLOB SERPL: 0.8 {RATIO} (ref 1–2.2)
ANION GAP SERPL CALCULATED.4IONS-SCNC: 9 MMOL/L (ref 6–13)
BASOPHILS NFR BLD AUTO: 0 10^3/UL (ref 0–0.1)
BASOPHILS NFR BLD AUTO: 0.3 %
BILIRUB BLD-MCNC: 0.8 MG/DL (ref 0.2–1)
BUN SERPL-MCNC: 13 MG/DL (ref 6–20)
CALCIUM UR-MCNC: 7.7 MG/DL (ref 8.5–10.3)
CHLORIDE SERPL-SCNC: 104 MMOL/L (ref 101–111)
CO2 SERPL-SCNC: 24 MMOL/L (ref 21–32)
CREAT SERPLBLD-SCNC: 1 MG/DL (ref 0.6–1.2)
EOSINOPHIL # BLD AUTO: 0 10^3/UL (ref 0–0.7)
EOSINOPHIL NFR BLD AUTO: 0 %
ERYTHROCYTE [DISTWIDTH] IN BLOOD BY AUTOMATED COUNT: 13.5 % (ref 12–15)
EST. AVERAGE GLUCOSE BLD GHB EST-MCNC: 329 MG/DL (ref 70–100)
EST. AVERAGE GLUCOSE BLD GHB EST-MCNC: 335 MG/DL (ref 70–100)
GFRSERPLBLD MDRD-ARVRAT: 76 ML/MIN/{1.73_M2} (ref 89–?)
GLOBULIN SER-MCNC: 3.2 G/DL (ref 2.1–4.2)
GLUCOSE SERPL-MCNC: 150 MG/DL (ref 70–100)
HBA1C BLD-MCNC: 1.35 G/DL
HBA1C BLD-MCNC: 1.43 G/DL
HCT VFR BLD AUTO: 33.5 % (ref 42–52)
HGB UR QL STRIP: 11.4 G/DL (ref 14–18)
LYMPHOCYTES # SPEC AUTO: 0.4 10^3/UL (ref 1.5–3.5)
LYMPHOCYTES NFR BLD AUTO: 5.3 %
MCH RBC QN AUTO: 28.9 PG (ref 27–31)
MCHC RBC AUTO-ENTMCNC: 33.9 G/DL (ref 32–36)
MCV RBC AUTO: 85.1 FL (ref 80–94)
MONOCYTES # BLD AUTO: 0.6 10^3/UL (ref 0–1)
MONOCYTES NFR BLD AUTO: 9.6 %
NEUTROPHILS # BLD AUTO: 5.6 10^3/UL (ref 1.5–6.6)
NEUTROPHILS # SNV AUTO: 6.6 X10^3/UL (ref 4.8–10.8)
NEUTROPHILS NFR BLD AUTO: 84.8 %
NRBC # BLD AUTO: 0 /100WBC
PDW BLD AUTO: 8.2 FL (ref 7.4–11.4)
POTASSIUM SERPL-SCNC: 3.1 MMOL/L (ref 3.5–5)
PROT SPEC-MCNC: 5.9 G/DL (ref 6.7–8.2)
RBC MAR: 3.94 10^6/UL (ref 4.7–6.1)
SODIUM SERPLBLD-SCNC: 137 MMOL/L (ref 135–145)
WBC # BLD: 6.6 X10^3/UL

## 2017-07-25 RX ADMIN — INSULIN ASPART SCH: 100 INJECTION, SOLUTION INTRAVENOUS; SUBCUTANEOUS at 17:21

## 2017-07-25 RX ADMIN — LOPERAMIDE HYDROCHLORIDE SCH MG: 2 CAPSULE ORAL at 08:45

## 2017-07-25 RX ADMIN — GABAPENTIN SCH MG: 100 CAPSULE ORAL at 22:16

## 2017-07-25 RX ADMIN — POTASSIUM CHLORIDE SCH MLS/HR: 7.46 INJECTION, SOLUTION INTRAVENOUS at 23:54

## 2017-07-25 RX ADMIN — POTASSIUM CHLORIDE SCH MLS/HR: 7.46 INJECTION, SOLUTION INTRAVENOUS at 21:57

## 2017-07-25 RX ADMIN — SODIUM CHLORIDE SCH MLS/HR: 900 INJECTION INTRAVENOUS at 16:39

## 2017-07-25 RX ADMIN — INSULIN ASPART SCH: 100 INJECTION, SOLUTION INTRAVENOUS; SUBCUTANEOUS at 12:21

## 2017-07-25 RX ADMIN — INSULIN ASPART SCH: 100 INJECTION, SOLUTION INTRAVENOUS; SUBCUTANEOUS at 12:30

## 2017-07-25 RX ADMIN — INSULIN ASPART SCH: 100 INJECTION, SOLUTION INTRAVENOUS; SUBCUTANEOUS at 16:59

## 2017-07-25 RX ADMIN — ONDANSETRON PRN MG: 2 INJECTION INTRAMUSCULAR; INTRAVENOUS at 16:52

## 2017-07-25 RX ADMIN — SODIUM CHLORIDE SCH MLS/HR: 9 INJECTION, SOLUTION INTRAVENOUS at 23:54

## 2017-07-25 RX ADMIN — LOSARTAN POTASSIUM SCH MG: 50 TABLET, FILM COATED ORAL at 08:45

## 2017-07-25 RX ADMIN — LOPERAMIDE HYDROCHLORIDE SCH MG: 2 CAPSULE ORAL at 22:17

## 2017-07-25 RX ADMIN — INSULIN ASPART SCH UNIT: 100 INJECTION, SOLUTION INTRAVENOUS; SUBCUTANEOUS at 08:48

## 2017-07-25 RX ADMIN — SODIUM CHLORIDE, PRESERVATIVE FREE SCH ML: 5 INJECTION INTRAVENOUS at 16:39

## 2017-07-25 RX ADMIN — ONDANSETRON PRN MG: 2 INJECTION INTRAMUSCULAR; INTRAVENOUS at 08:39

## 2017-07-25 RX ADMIN — INSULIN ASPART SCH: 100 INJECTION, SOLUTION INTRAVENOUS; SUBCUTANEOUS at 08:26

## 2017-07-25 RX ADMIN — POTASSIUM CHLORIDE SCH MLS/HR: 7.46 INJECTION, SOLUTION INTRAVENOUS at 22:48

## 2017-07-25 RX ADMIN — SODIUM CHLORIDE SCH MLS/HR: 900 INJECTION INTRAVENOUS at 10:45

## 2017-07-25 RX ADMIN — CITALOPRAM HYDROBROMIDE SCH MG: 10 TABLET ORAL at 08:45

## 2017-07-25 RX ADMIN — INSULIN ASPART SCH UNIT: 100 INJECTION, SOLUTION INTRAVENOUS; SUBCUTANEOUS at 21:58

## 2017-07-25 RX ADMIN — SODIUM CHLORIDE, PRESERVATIVE FREE SCH ML: 5 INJECTION INTRAVENOUS at 08:40

## 2017-07-25 RX ADMIN — GABAPENTIN SCH MG: 100 CAPSULE ORAL at 14:39

## 2017-07-25 RX ADMIN — SODIUM CHLORIDE SCH MLS/HR: 900 INJECTION INTRAVENOUS at 03:40

## 2017-07-25 RX ADMIN — SODIUM CHLORIDE SCH MLS/HR: 9 INJECTION, SOLUTION INTRAVENOUS at 05:20

## 2017-07-25 RX ADMIN — HYDROCODONE BITARTRATE AND ACETAMINOPHEN PRN TAB: 10; 325 TABLET ORAL at 08:39

## 2017-07-25 RX ADMIN — ENOXAPARIN SODIUM SCH MG: 100 INJECTION SUBCUTANEOUS at 08:46

## 2017-07-25 RX ADMIN — THERA TABS SCH TAB: TAB at 08:46

## 2017-07-25 RX ADMIN — INSULIN ASPART SCH: 100 INJECTION, SOLUTION INTRAVENOUS; SUBCUTANEOUS at 17:20

## 2017-07-25 RX ADMIN — GABAPENTIN SCH MG: 100 CAPSULE ORAL at 05:46

## 2017-07-25 RX ADMIN — SODIUM CHLORIDE SCH: 9 INJECTION, SOLUTION INTRAVENOUS at 08:53

## 2017-07-25 RX ADMIN — SODIUM CHLORIDE, PRESERVATIVE FREE SCH: 5 INJECTION INTRAVENOUS at 05:40

## 2017-07-25 RX ADMIN — INSULIN GLARGINE SCH UNIT: 100 INJECTION, SOLUTION SUBCUTANEOUS at 21:59

## 2017-07-25 RX ADMIN — POLYETHYLENE GLYCOL 3350 SCH: 17 POWDER, FOR SOLUTION ORAL at 07:39

## 2017-07-25 RX ADMIN — PROCHLORPERAZINE EDISYLATE PRN MG: 5 INJECTION INTRAMUSCULAR; INTRAVENOUS at 21:32

## 2017-07-25 RX ADMIN — SODIUM CHLORIDE SCH MLS/HR: 9 INJECTION, SOLUTION INTRAVENOUS at 16:39

## 2017-07-25 RX ADMIN — SODIUM CHLORIDE, PRESERVATIVE FREE PRN ML: 5 INJECTION INTRAVENOUS at 21:33

## 2017-07-25 RX ADMIN — SODIUM CHLORIDE SCH MLS/HR: 900 INJECTION INTRAVENOUS at 22:03

## 2017-07-25 RX ADMIN — SODIUM CHLORIDE SCH MLS/HR: 9 INJECTION, SOLUTION INTRAVENOUS at 00:34

## 2017-07-25 RX ADMIN — ACETAMINOPHEN PRN MLS/HR: 10 INJECTION, SOLUTION INTRAVENOUS at 21:32

## 2017-07-25 NOTE — MRI REPORT
EXAM:

RIGHT FOREFOOT MRI WITHOUT AND WITH CONTRAST

 

EXAM DATE: 7/24/2017 06:24 PM.

 

CLINICAL HISTORY: Right big toe osteomyelitis. History of diabetes and rectal adenocarcinoma.

 

COMPARISON: 07/24/2017 radiograph.

 

TECHNIQUE: Multiplanar, multisequence T1-weighted and fluid-sensitive sequences of the forefoot befor
e and after administration of intravenous contrast. IV contrast: Gadolinium. Other: None. 

 

FINDINGS: 

Bones: There is mild osteophyte formation in the first metatarsophalangeal joint. There is abnormal e
willow within the distal phalanx of the first toe, but there is no low T1 signal to indicate osteomyeli
tis. Both phalanges demonstrate some mild enhancement postcontrast but again there is no low T1 signa
l. Minimal edema is seen within the second and third distal phalanges without evidence of low T1 sign
al. The patient has a healing fracture of the distal fifth metatarsal.

 

Joints: No subluxations. There is a mild effusion in the first metatarsophalangeal joint. The hallux-
sesamoid-phalangeal complex is unremarkable. The visualized plantar plates are unremarkable.

 

Articular Cartilage: Unremarkable.

 

Ligaments: The visualized collateral ligaments are intact.

 

Tendons: The flexor and extensor tendons are unremarkable.

 

Musculature: No edema or fatty atrophy.

 

Other: No Mortons neuroma. No intermetatarsal bursitis. The patient has a subcutaneous abscess withi
n the medial portion of the first toe which measures 0.9 x 3.0 x 1.4 cm (series 801, image 12). There
 is surrounding cellulitis. Likely there is some superficial ulceration.

 

IMPRESSION: 

1. Cellulitis of the first toe with abscess of the lateral subcutaneous tissues. The patient has reac
tive marrow edema/enhancement within the phalanges which does not yet meet criteria for osteomyelitis
.

 

[facility] MUSCULOSKELETAL RADIOLOGY SECTION

Referring Provider Line: [phone]

 

SITE ID: [id number]

## 2017-07-25 NOTE — PROVIDER PROGRESS NOTE
Assessment/Plan





- Problem List


(1) Bacteremia


Assessment/Plan: 


WBC and Neutrophils decreasing. Continue antibiotics for 10-14 days. Await 

final identification and sens. Will get F/U Wound Care consult. Appreciate 

Ortho input.








(2) Diabetic infection of right foot


Assessment/Plan: 


Toe/foot pain continues and intermittently better with narcotics. Pt nauseated 

today, poss due to narcotics, but had BM and no acute abd findings.








(3) Diabetic toe ulcer


Qualifiers: 


   Diabetes mellitus type: other specified (including TITO)   Laterality: right

   Non-pressure ulcer stage: unspecified non-pressure ulcer stage   Qualified 

Code(s): E13.621 - Other specified diabetes mellitus with foot ulcer; L97.519 - 

Non-pressure chronic ulcer of other part of right foot with unspecified 

severity   


Assessment/Plan: 


Appreciate Ortho input. Continue antibiotics and pain for neuropathy.








(4) Fever


Qualifiers: 


   Fever type: unspecified   Qualified Code(s): R50.9 - Fever, unspecified   


Assessment/Plan: 


Secondary to Diabetic foot/toe infection with bacteremia. Continue plan.








- Current Meds


Current Meds: 





 Current Medications











Generic Name Dose Route Start Last Admin





  Trade Name Freq  PRN Reason Stop Dose Admin


 


Acetaminophen/Hydrocodone Bitart  1 tab 07/24/17 12:33 07/24/17 18:35





  Norco 5/325  PO   1 tab





  Q4HR PRN   Administration





  Pain 5 to 7   


 


Acetaminophen/Hydrocodone Bitart  1 tab 07/24/17 12:33 07/25/17 08:39





  Norco 10 Mg/325 Mg  PO   1 tab





  Q4HR PRN   Administration





  Pain 8 to 10   


 


Citalopram Hydrobromide  20 mg 07/25/17 09:00 07/25/17 08:45





  Celexa  PO   20 mg





  DAILY JEIMY   Administration


 


Enoxaparin Sodium  40 mg 07/24/17 13:00 07/25/17 08:46





  Lovenox  SUBQ   40 mg





  DAILY JEIMY   Administration


 


Gabapentin  300 mg 07/24/17 14:00 07/25/17 14:39





  Neurontin  PO   300 mg





  TID JEIMY   Administration


 


Sodium Chloride  1,000 mls @ 100 mls/hr 07/24/17 13:00 07/25/17 08:53





  Normal Saline 0.9%  IV   Not Given





  .Q10H JEIMY   


 


Vancomycin HCl 1 gm/  500 mls @ 250 mls/hr 07/25/17 01:00 07/25/17 16:39





Vancomycin HCl 500 mg/ Sodium  IV   250 mls/hr





Chloride  Q12H JEIMY   Administration


 


Piperacillin Sod/Tazobactam  100 mls @ 200 mls/hr 07/24/17 22:00 07/25/17 16:39





  Sod 4.5 gm/ Sodium Chloride  IV   200 mls/hr





  Q6H JEIMY   Administration


 


Insulin Aspart  10 unit 07/24/17 17:00 07/25/17 17:21





  Novolog  SUBQ   Not Given





  TIDWM JEIMY   


 


Insulin Aspart  2 - 10 unit 07/25/17 08:00 07/25/17 17:20





  Novolog  SUBQ   Not Given





  0800,1200,1700,2100 [facility]   





  Protocol   


 


Insulin Glargine  30 unit 07/24/17 13:00 07/24/17 21:00





  Lantus Solostar  SUBQ   30 unit





  HS JEMIY   Administration


 


Loperamide HCl  2 mg 07/24/17 21:00 07/25/17 08:45





  Imodium  PO   2 mg





  BID JEIMY   Administration


 


Losartan Potassium  25 mg 07/25/17 09:00 07/25/17 08:45





  Cozaar  PO   25 mg





  DAILY JEIMY   Administration


 


Multivitamins  1 tab 07/25/17 09:00 07/25/17 08:46





  Theragran  PO   1 tab





  DAILY JEIMY   Administration


 


Ondansetron HCl  4 mg 07/24/17 12:33 07/25/17 16:52





  Zofran Inj  IVP   4 mg





  Q4HR PRN   Administration





  Nausea / Vomiting   


 


Polyethylene Glycol  17 gm 07/25/17 09:00 07/25/17 07:39





  Miralax  PO   Not Given





  DAILY JEIMY   


 


Sodium Chloride  10 ml 07/24/17 14:00 07/25/17 16:39





  Normal Saline Flush 0.9%  IVP   10 ml





  Q8HR JEIMY   Administration














- Lab Result


Lab results reviewed: Yes


Fish Bone Diagrams: 


 07/27/17 05:34





 07/27/17 05:34





- Diagnostic Imaging Results


Diagnostic Imaging Results: Final report reviewed





- Additional Planning


Condition/Complexity: Guarded


Consult/Specialty: Other (Wound Care)





Subjective





- Subjective


Patient Reports: Abdominal Pain, Headache, Nausea


Nursing Reports: Nausea, Vomitting (Pt reports he feels this way when "has 

infection".)





Objective


Vital Signs: 





 Vital Signs - 24 hr











  07/24/17 07/24/17 07/25/17





  18:42 20:51 00:35


 


Temperature 36.9 C 36.8 C 37.7 C H


 


Heart Rate [ 98 86 93





Brachial]   


 


Respiratory 16 20 18





Rate   


 


Blood Pressure   





[Left Brachial   





artery]   


 


Blood Pressure 154/69 H 148/64 H 153/73 H





[Right Brachial   





artery]   


 


O2 Saturation 98 94 92














  07/25/17 07/25/17 07/25/17





  04:54 07:47 13:47


 


Temperature 37.4 C 36.9 C 36.9 C


 


Heart Rate [ 84  71





Brachial]   


 


Respiratory 20 16 18





Rate   


 


Blood Pressure   132/63 H





[Left Brachial   





artery]   


 


Blood Pressure 124/63 146/71 H 





[Right Brachial   





artery]   


 


O2 Saturation 94 94 92














  07/25/17





  17:00


 


Temperature 37.4 C


 


Heart Rate [ 75





Brachial] 


 


Respiratory 16





Rate 


 


Blood Pressure 





[Left Brachial 





artery] 


 


Blood Pressure 130/63





[Right Brachial 





artery] 


 


O2 Saturation 95








 Oxygen











O2 Source                      Room air














I&O (Last 24 Hrs): 





 Intake and Output Totals x24h











 07/23/17 07/24/17 07/25/17





 23:59 23:59 23:59


 


Intake Total  2254 2564


 


Output Total   2200


 


Balance  2254 364











General: Alert, Mild distress (from pain and nausea)


HEENT: Mucous membr. moist/pink


Neck: Supple


Neuro: Alert


Cardiovascular: Regular rate, No murmurs


Respiratory: No respiratory distress


Abdomen: Soft, No tenderness (Diminished BS)


Extremities: Other (Bandaged R distal foot)





- Results


Results: 





 Laboratory Results











WBC  6.6 x10^3/uL (4.8-10.8)   07/25/17  05:34    


 


RBC  3.94 10^6/uL (4.70-6.10)  L  07/25/17  05:34    


 


Hgb  11.4 g/dL (14.0-18.0)  L  07/25/17  05:34    


 


Hct  33.5 % (42.0-52.0)  L  07/25/17  05:34    


 


MCV  85.1 fL (80.0-94.0)   07/25/17  05:34    


 


MCH  28.9 pg (27.0-31.0)   07/25/17  05:34    


 


MCHC  33.9 g/dL (32.0-36.0)   07/25/17  05:34    


 


RDW  13.5 % (12.0-15.0)   07/25/17  05:34    


 


Plt Count  136 10^3/uL (130-450)   07/25/17  05:34    


 


MPV  8.2 fL (7.4-11.4)   07/25/17  05:34    


 


Neut #  5.6 10^3/uL (1.5-6.6)   07/25/17  05:34    


 


Lymph #  0.4 10^3/uL (1.5-3.5)  L  07/25/17  05:34    


 


Mono #  0.6 10^3/uL (0.0-1.0)   07/25/17  05:34    


 


Eos #  0.0 10^3/uL (0.0-0.7)   07/25/17  05:34    


 


Baso #  0.0 10^3/uL (0.0-0.1)   07/25/17  05:34    


 


Absolute Nucleated RBC  0.00 x10^3/uL  07/25/17  05:34    


 


Nucleated RBCs  0.0 /100WBC  07/25/17  05:34    


 


ESR  69 mm/Hr (0-20)  H  07/25/17  05:34    


 


Sodium  137 mmol/L (135-145)   07/25/17  05:34    


 


Potassium  3.1 mmol/L (3.5-5.0)  L  07/25/17  05:34    


 


Chloride  104 mmol/L (101-111)   07/25/17  05:34    


 


Carbon Dioxide  24 mmol/L (21-32)   07/25/17  05:34    


 


Anion Gap  9.0  (6-13)   07/25/17  05:34    


 


BUN  13 mg/dL (6-20)   07/25/17  05:34    


 


Creatinine  1.0 mg/dL (0.6-1.2)   07/25/17  05:34    


 


Estimated GFR (MDRD)  76  (>89)  L  07/25/17  05:34    


 


Glucose  150 mg/dL ()  H  07/25/17  05:34    


 


POC Whole Bld Glucose  139 mg/dL (70 - 100)  H  07/25/17  16:45    


 


Glycated Hemoglobin  13.3 % (4.6-6.2)  H  07/25/17  12:48    


 


Estim Average Glucose  335  ()  H  07/25/17  12:48    


 


Lactic Acid  1.3 mmol/L (0.5-2.2)   07/24/17  12:10    


 


Calcium  7.7 mg/dL (8.5-10.3)  L  07/25/17  05:34    


 


Total Bilirubin  0.8 mg/dL (0.2-1.0)   07/25/17  05:34    


 


AST  21 IU/L (10-42)   07/25/17  05:34    


 


ALT  15 IU/L (10-60)   07/25/17  05:34    


 


Alkaline Phosphatase  75 IU/L ()   07/25/17  05:34    


 


C-Reactive Protein  20.2 mg/dL (0-1.0)  H  07/25/17  05:34    


 


Total Protein  5.9 g/dL (6.7-8.2)  L  07/25/17  05:34    


 


Albumin  2.7 g/dL (3.2-5.5)  L  07/25/17  05:34    


 


Globulin  3.2 g/dL (2.1-4.2)   07/25/17  05:34    


 


Albumin/Globulin Ratio  0.8  (1.0-2.2)  L  07/25/17  05:34    














- Procedures


Procedures: 





Procedures





CLOSED ENDOSCOPIC BIOPSY OF LARGE INTESTINE (05/31/13)


COLONOSCOPY (02/24/15)


DIVISION OF RIGHT FOOT TENDON, OPEN APPROACH (05/10/16)


ENDOSC LG BOWEL THROUGH STOMA (02/24/15)


ENDOSC POLYPECTOMY OF LG INTEST (05/31/13)


ESOPHAGOGASTRODUODENOSCOPY [EGD] W/CLOSED BIOPSY (05/31/13)


EXCISION OF RIGHT METATARSAL, OPEN APPROACH (05/10/16)


EXTRACTION OF LEFT FOOT SKIN, EXTERNAL APPROACH (10/01/15)


INJECT/INFUSE NEC (02/24/15)


INSPECTION OF LOWER INTESTINAL TRACT, ENDO (09/29/16)


RELEASE RIGHT TOE PHALANGEAL JOINT, OPEN APPROACH (05/10/16)

## 2017-07-26 LAB
ALBUMIN/GLOB SERPL: 0.8 {RATIO} (ref 1–2.2)
ANION GAP SERPL CALCULATED.4IONS-SCNC: 8 MMOL/L (ref 6–13)
BASOPHILS NFR BLD AUTO: 0 10^3/UL (ref 0–0.1)
BASOPHILS NFR BLD AUTO: 0.2 %
BILIRUB BLD-MCNC: 1.1 MG/DL (ref 0.2–1)
BUN SERPL-MCNC: 14 MG/DL (ref 6–20)
CALCIUM UR-MCNC: 7.7 MG/DL (ref 8.5–10.3)
CHLORIDE SERPL-SCNC: 106 MMOL/L (ref 101–111)
CO2 SERPL-SCNC: 26 MMOL/L (ref 21–32)
CREAT SERPLBLD-SCNC: 1.1 MG/DL (ref 0.6–1.2)
EOSINOPHIL # BLD AUTO: 0 10^3/UL (ref 0–0.7)
EOSINOPHIL NFR BLD AUTO: 0.4 %
ERYTHROCYTE [DISTWIDTH] IN BLOOD BY AUTOMATED COUNT: 13.5 % (ref 12–15)
GFRSERPLBLD MDRD-ARVRAT: 69 ML/MIN/{1.73_M2} (ref 89–?)
GLOBULIN SER-MCNC: 3.2 G/DL (ref 2.1–4.2)
GLUCOSE SERPL-MCNC: 164 MG/DL (ref 70–100)
HCT VFR BLD AUTO: 33.2 % (ref 42–52)
HGB UR QL STRIP: 11.3 G/DL (ref 14–18)
LYMPHOCYTES # SPEC AUTO: 0.4 10^3/UL (ref 1.5–3.5)
LYMPHOCYTES NFR BLD AUTO: 5.9 %
MCH RBC QN AUTO: 29.1 PG (ref 27–31)
MCHC RBC AUTO-ENTMCNC: 33.9 G/DL (ref 32–36)
MCV RBC AUTO: 85.8 FL (ref 80–94)
MONOCYTES # BLD AUTO: 0.7 10^3/UL (ref 0–1)
MONOCYTES NFR BLD AUTO: 9.7 %
NEUTROPHILS # BLD AUTO: 5.9 10^3/UL (ref 1.5–6.6)
NEUTROPHILS # SNV AUTO: 7 X10^3/UL (ref 4.8–10.8)
NEUTROPHILS NFR BLD AUTO: 83.8 %
NRBC # BLD AUTO: 0 /100WBC
PDW BLD AUTO: 7.9 FL (ref 7.4–11.4)
POTASSIUM SERPL-SCNC: 3.5 MMOL/L (ref 3.5–5)
PROT SPEC-MCNC: 5.8 G/DL (ref 6.7–8.2)
RBC MAR: 3.88 10^6/UL (ref 4.7–6.1)
SODIUM SERPLBLD-SCNC: 140 MMOL/L (ref 135–145)
WBC # BLD: 7 X10^3/UL

## 2017-07-26 RX ADMIN — LOPERAMIDE HYDROCHLORIDE SCH MG: 2 CAPSULE ORAL at 22:36

## 2017-07-26 RX ADMIN — HYDROCODONE BITARTRATE AND ACETAMINOPHEN PRN TAB: 10; 325 TABLET ORAL at 22:35

## 2017-07-26 RX ADMIN — POTASSIUM CHLORIDE SCH MLS/HR: 7.46 INJECTION, SOLUTION INTRAVENOUS at 00:53

## 2017-07-26 RX ADMIN — SODIUM CHLORIDE, PRESERVATIVE FREE PRN ML: 5 INJECTION INTRAVENOUS at 17:17

## 2017-07-26 RX ADMIN — SODIUM CHLORIDE SCH MLS/HR: 900 INJECTION INTRAVENOUS at 16:54

## 2017-07-26 RX ADMIN — PROCHLORPERAZINE EDISYLATE PRN MG: 5 INJECTION INTRAMUSCULAR; INTRAVENOUS at 19:34

## 2017-07-26 RX ADMIN — HYDROCODONE BITARTRATE AND ACETAMINOPHEN PRN TAB: 10; 325 TABLET ORAL at 12:10

## 2017-07-26 RX ADMIN — SILVER SULFADIAZINE SCH APPLIC: 10 CREAM TOPICAL at 19:17

## 2017-07-26 RX ADMIN — INSULIN ASPART SCH: 100 INJECTION, SOLUTION INTRAVENOUS; SUBCUTANEOUS at 12:15

## 2017-07-26 RX ADMIN — SODIUM CHLORIDE SCH MLS/HR: 9 INJECTION, SOLUTION INTRAVENOUS at 12:21

## 2017-07-26 RX ADMIN — SODIUM CHLORIDE, PRESERVATIVE FREE SCH: 5 INJECTION INTRAVENOUS at 22:39

## 2017-07-26 RX ADMIN — CITALOPRAM HYDROBROMIDE SCH MG: 10 TABLET ORAL at 09:28

## 2017-07-26 RX ADMIN — SODIUM CHLORIDE SCH: 9 INJECTION, SOLUTION INTRAVENOUS at 04:37

## 2017-07-26 RX ADMIN — ENOXAPARIN SODIUM SCH MG: 100 INJECTION SUBCUTANEOUS at 09:31

## 2017-07-26 RX ADMIN — SODIUM CHLORIDE SCH MLS/HR: 900 INJECTION INTRAVENOUS at 22:34

## 2017-07-26 RX ADMIN — LOPERAMIDE HYDROCHLORIDE SCH MG: 2 CAPSULE ORAL at 09:28

## 2017-07-26 RX ADMIN — SODIUM CHLORIDE SCH MLS/HR: 9 INJECTION, SOLUTION INTRAVENOUS at 02:27

## 2017-07-26 RX ADMIN — ONDANSETRON PRN MG: 2 INJECTION INTRAMUSCULAR; INTRAVENOUS at 17:16

## 2017-07-26 RX ADMIN — HYDROCODONE BITARTRATE AND ACETAMINOPHEN PRN TAB: 5; 325 TABLET ORAL at 09:27

## 2017-07-26 RX ADMIN — INSULIN ASPART SCH: 100 INJECTION, SOLUTION INTRAVENOUS; SUBCUTANEOUS at 21:24

## 2017-07-26 RX ADMIN — INSULIN ASPART SCH UNIT: 100 INJECTION, SOLUTION INTRAVENOUS; SUBCUTANEOUS at 09:29

## 2017-07-26 RX ADMIN — GABAPENTIN SCH MG: 100 CAPSULE ORAL at 06:30

## 2017-07-26 RX ADMIN — HYDROCODONE BITARTRATE AND ACETAMINOPHEN PRN TAB: 10; 325 TABLET ORAL at 16:53

## 2017-07-26 RX ADMIN — INSULIN ASPART SCH: 100 INJECTION, SOLUTION INTRAVENOUS; SUBCUTANEOUS at 21:25

## 2017-07-26 RX ADMIN — SODIUM CHLORIDE, PRESERVATIVE FREE PRN ML: 5 INJECTION INTRAVENOUS at 19:34

## 2017-07-26 RX ADMIN — INSULIN GLARGINE SCH UNIT: 100 INJECTION, SOLUTION SUBCUTANEOUS at 22:38

## 2017-07-26 RX ADMIN — SODIUM CHLORIDE, PRESERVATIVE FREE PRN ML: 5 INJECTION INTRAVENOUS at 09:31

## 2017-07-26 RX ADMIN — POLYETHYLENE GLYCOL 3350 SCH: 17 POWDER, FOR SOLUTION ORAL at 09:31

## 2017-07-26 RX ADMIN — INSULIN ASPART SCH: 100 INJECTION, SOLUTION INTRAVENOUS; SUBCUTANEOUS at 09:30

## 2017-07-26 RX ADMIN — ONDANSETRON PRN MG: 2 INJECTION INTRAMUSCULAR; INTRAVENOUS at 09:31

## 2017-07-26 RX ADMIN — SODIUM CHLORIDE, PRESERVATIVE FREE SCH ML: 5 INJECTION INTRAVENOUS at 13:34

## 2017-07-26 RX ADMIN — SODIUM CHLORIDE SCH MLS/HR: 900 INJECTION INTRAVENOUS at 04:32

## 2017-07-26 RX ADMIN — THERA TABS SCH TAB: TAB at 09:28

## 2017-07-26 RX ADMIN — SODIUM CHLORIDE, PRESERVATIVE FREE SCH: 5 INJECTION INTRAVENOUS at 05:52

## 2017-07-26 RX ADMIN — LOSARTAN POTASSIUM SCH MG: 50 TABLET, FILM COATED ORAL at 09:29

## 2017-07-26 RX ADMIN — GABAPENTIN SCH MG: 100 CAPSULE ORAL at 22:35

## 2017-07-26 RX ADMIN — INSULIN ASPART SCH: 100 INJECTION, SOLUTION INTRAVENOUS; SUBCUTANEOUS at 17:21

## 2017-07-26 RX ADMIN — INSULIN ASPART SCH UNIT: 100 INJECTION, SOLUTION INTRAVENOUS; SUBCUTANEOUS at 12:14

## 2017-07-26 RX ADMIN — SODIUM CHLORIDE SCH MLS/HR: 9 INJECTION, SOLUTION INTRAVENOUS at 13:54

## 2017-07-26 RX ADMIN — SODIUM CHLORIDE SCH MLS/HR: 900 INJECTION INTRAVENOUS at 09:31

## 2017-07-26 RX ADMIN — GABAPENTIN SCH MG: 100 CAPSULE ORAL at 13:34

## 2017-07-26 NOTE — PROVIDER PROGRESS NOTE
Assessment/Plan





- Problem List


(1) Bacteremia


Assessment/Plan: 


Per cultures, Staph aureus, on iv antibiotics.


Will transfer to po antibiotics before [facility]











(3) Diabetic toe ulcer


Qualifiers: 


   Diabetes mellitus type: other specified (including TITO)   Laterality: right

   Non-pressure ulcer stage: unspecified non-pressure ulcer stage   Qualified 

Code(s): E13.621 - Other specified diabetes mellitus with foot ulcer; L97.519 - 

Non-pressure chronic ulcer of other part of right foot with unspecified 

severity   


Assessment/Plan: 


Per Orthopedic consultant, ulcer is developing into an ischemic toe. MRI showed 

no abcess. Pt may need a future amputation.


Will begin topical silvadene dresssing changes daily.








(4) Fever


Qualifiers: 


   Fever type: unspecified   Qualified Code(s): R50.9 - Fever, unspecified   


Assessment/Plan: 


Resolved








- Current Meds


Current Meds: 





 Current Medications











Generic Name Dose Route Start Last Admin





  Trade Name Freq  PRN Reason Stop Dose Admin


 


Acetaminophen/Hydrocodone Bitart  1 tab 07/24/17 12:33 07/26/17 09:27





  Norco 5/325  PO   1 tab





  Q4HR PRN   Administration





  Pain 5 to 7   


 


Acetaminophen/Hydrocodone Bitart  1 tab 07/24/17 12:33 07/26/17 16:53





  Norco 10 Mg/325 Mg  PO   1 tab





  Q4HR PRN   Administration





  Pain 8 to 10   


 


Citalopram Hydrobromide  20 mg 07/25/17 09:00 07/26/17 09:28





  Celexa  PO   20 mg





  DAILY JEIMY   Administration


 


Enoxaparin Sodium  40 mg 07/24/17 13:00 07/26/17 09:31





  Lovenox  SUBQ   40 mg





  DAILY JEIMY   Administration


 


Gabapentin  300 mg 07/24/17 14:00 07/26/17 13:34





  Neurontin  PO   300 mg





  TID JEIMY   Administration


 


Sodium Chloride  1,000 mls @ 100 mls/hr 07/24/17 13:00 07/26/17 12:21





  Normal Saline 0.9%  IV   100 mls/hr





  .Q10H JEIMY   Administration


 


Vancomycin HCl 1 gm/  500 mls @ 250 mls/hr 07/25/17 01:00 07/26/17 13:54





Vancomycin HCl 500 mg/ Sodium  IV   250 mls/hr





Chloride  Q12H JEIMY   Administration


 


Piperacillin Sod/Tazobactam  100 mls @ 200 mls/hr 07/24/17 22:00 07/26/17 16:54





  Sod 4.5 gm/ Sodium Chloride  IV   200 mls/hr





  Q6H JEIMY   Administration


 


Acetaminophen  100 mls @ 400 mls/hr 07/25/17 20:02 07/25/17 21:32





  Ofirmev  IV   400 mls/hr





  Q6HR PRN   Administration





  PAIN   


 


Insulin Aspart  10 unit 07/24/17 17:00 07/26/17 12:14





  Novolog  SUBQ   10 unit





  TIDWM JEIMY   Administration


 


Insulin Aspart  2 - 10 unit 07/25/17 08:00 07/26/17 17:21





  Novolog  SUBQ   Not Given





  0800,1200,1700,2100 [facility]   





  Protocol   


 


Insulin Glargine  30 unit 07/24/17 13:00 07/25/17 21:59





  Lantus Solostar  SUBQ   30 unit





  HS JEIMY   Administration


 


Loperamide HCl  2 mg 07/24/17 21:00 07/26/17 09:28





  Imodium  PO   2 mg





  BID JEIMY   Administration


 


Losartan Potassium  25 mg 07/25/17 09:00 07/26/17 09:29





  Cozaar  PO   25 mg





  DAILY JEIMY   Administration


 


Multivitamins  1 tab 07/25/17 09:00 07/26/17 09:28





  Theragran  PO   1 tab





  DAILY JEIMY   Administration


 


Ondansetron HCl  4 mg 07/24/17 12:33 07/26/17 17:16





  Zofran Inj  IVP   4 mg





  Q4HR PRN   Administration





  Nausea / Vomiting   


 


Polyethylene Glycol  17 gm 07/25/17 09:00 07/26/17 09:31





  Miralax  PO   Not Given





  DAILY JEIMY   


 


Prochlorperazine Edisylate  10 mg 07/25/17 20:02 07/25/17 21:32





  Compazine Inj  IVP   10 mg





  Q4HR PRN   Administration





  Nausea / Vomiting   


 


Sodium Chloride  10 ml 07/24/17 12:33 07/26/17 17:17





  Normal Saline Flush 0.9%  IVP   10 ml





  PRN PRN   Administration





  AS NEEDED PER PROVIDER ORDERS   


 


Sodium Chloride  10 ml 07/24/17 14:00 07/26/17 13:34





  Normal Saline Flush 0.9%  IVP   10 ml





  Q8HR JEIMY   Administration














- Lab Result


Lab results reviewed: Yes


Fish Bone Diagrams: 


 07/26/17 05:25





 07/26/17 05:25





- Additional Planning


My Orders: 





My Active Orders





07/26/17


Consult [Orthopedics Consult] [CONS] Routine 





07/26/17 18:00


Silver Sulfadiazine Cream [Silvadene Cream]   1 applic TOP DAILY 














Subjective





- Subjective


Patient Reports: Feeling Better





Objective


Vital Signs: 





 Vital Signs - 24 hr











  07/25/17 07/26/17 07/26/17





  20:02 00:13 04:36


 


Temperature 37.3 C 36.5 C 36.6 C


 


Heart Rate [ 68 57 L 78





Brachial]   


 


Respiratory 14 18 18





Rate   


 


Blood Pressure 144/66 H 130/67 136/61 H





[Right Brachial   





artery]   


 


O2 Saturation  97 97














  07/26/17 07/26/17 07/26/17





  09:35 12:39 16:10


 


Temperature 37.8 C H 37.5 C 37.2 C


 


Heart Rate [ 73 70 64





Brachial]   


 


Respiratory 18 18 16





Rate   


 


Blood Pressure 140/64 H 120/60 124/63





[Right Brachial   





artery]   


 


O2 Saturation 96 94 94








 Oxygen











O2 Source                      Room air














I&O (Last 24 Hrs): 





 Intake and Output Totals x24h











 07/24/17 07/25/17 07/26/17





 23:59 23:59 23:59


 


Intake Total 2254 3742 2901


 


Output Total  2250 25


 


Balance 2254 9322 2876











General: Alert


HEENT: Atraumatic


Neuro: Alert


Cardiovascular: Regular rate


Respiratory: No respiratory distress


Abdomen: Soft


Extremities: Other (Great toe has increasing redness and larger black zone on 

medial aspect non-tender. Pt has no sensation to light touch,)





- Results


Results: 





 Laboratory Results











WBC  7.0 x10^3/uL (4.8-10.8)   07/26/17  05:25    


 


RBC  3.88 10^6/uL (4.70-6.10)  L  07/26/17  05:25    


 


Hgb  11.3 g/dL (14.0-18.0)  L  07/26/17  05:25    


 


Hct  33.2 % (42.0-52.0)  L  07/26/17  05:25    


 


MCV  85.8 fL (80.0-94.0)   07/26/17  05:25    


 


MCH  29.1 pg (27.0-31.0)   07/26/17  05:25    


 


MCHC  33.9 g/dL (32.0-36.0)   07/26/17  05:25    


 


RDW  13.5 % (12.0-15.0)   07/26/17  05:25    


 


Plt Count  132 10^3/uL (130-450)   07/26/17  05:25    


 


MPV  7.9 fL (7.4-11.4)   07/26/17  05:25    


 


Neut #  5.9 10^3/uL (1.5-6.6)   07/26/17  05:25    


 


Lymph #  0.4 10^3/uL (1.5-3.5)  L  07/26/17  05:25    


 


Mono #  0.7 10^3/uL (0.0-1.0)   07/26/17  05:25    


 


Eos #  0.0 10^3/uL (0.0-0.7)   07/26/17  05:25    


 


Baso #  0.0 10^3/uL (0.0-0.1)   07/26/17  05:25    


 


Absolute Nucleated RBC  0.00 x10^3/uL  07/26/17  05:25    


 


Nucleated RBCs  0.0 /100WBC  07/26/17  05:25    


 


ESR  69 mm/Hr (0-20)  H  07/25/17  05:34    


 


Sodium  140 mmol/L (135-145)   07/26/17  05:25    


 


Potassium  3.5 mmol/L (3.5-5.0)   07/26/17  05:25    


 


Chloride  106 mmol/L (101-111)   07/26/17  05:25    


 


Carbon Dioxide  26 mmol/L (21-32)   07/26/17  05:25    


 


Anion Gap  8.0  (6-13)   07/26/17  05:25    


 


BUN  14 mg/dL (6-20)   07/26/17  05:25    


 


Creatinine  1.1 mg/dL (0.6-1.2)   07/26/17  05:25    


 


Estimated GFR (MDRD)  69  (>89)  L  07/26/17  05:25    


 


Glucose  164 mg/dL ()  H  07/26/17  05:25    


 


POC Whole Bld Glucose  139 mg/dL (70 - 100)  H  07/25/17  16:45    


 


Glycated Hemoglobin  13.3 % (4.6-6.2)  H  07/25/17  12:48    


 


Estim Average Glucose  335  ()  H  07/25/17  12:48    


 


Lactic Acid  1.3 mmol/L (0.5-2.2)   07/24/17  12:10    


 


Calcium  7.7 mg/dL (8.5-10.3)  L  07/26/17  05:25    


 


Total Bilirubin  1.1 mg/dL (0.2-1.0)  H  07/26/17  05:25    


 


AST  22 IU/L (10-42)   07/26/17  05:25    


 


ALT  20 IU/L (10-60)   07/26/17  05:25    


 


Alkaline Phosphatase  91 IU/L ()   07/26/17  05:25    


 


C-Reactive Protein  20.2 mg/dL (0-1.0)  H  07/25/17  05:34    


 


Total Protein  5.8 g/dL (6.7-8.2)  L  07/26/17  05:25    


 


Albumin  2.6 g/dL (3.2-5.5)  L  07/26/17  05:25    


 


Globulin  3.2 g/dL (2.1-4.2)   07/26/17  05:25    


 


Albumin/Globulin Ratio  0.8  (1.0-2.2)  L  07/26/17  05:25    


 


Last Dose Date  UNK   07/26/17  12:24    


 


Last Dose Time  UNK   07/26/17  12:24    


 


Vancomycin Trough  16.5 ug/mL (5.0-15.0)  H  07/26/17  12:24    














- Procedures


Procedures: 





Procedures





CLOSED ENDOSCOPIC BIOPSY OF LARGE INTESTINE (05/31/13)


COLONOSCOPY (02/24/15)


DIVISION OF RIGHT FOOT TENDON, OPEN APPROACH (05/10/16)


ENDOSC LG BOWEL THROUGH STOMA (02/24/15)


ENDOSC POLYPECTOMY OF LG INTEST (05/31/13)


ESOPHAGOGASTRODUODENOSCOPY [EGD] W/CLOSED BIOPSY (05/31/13)


EXCISION OF RIGHT METATARSAL, OPEN APPROACH (05/10/16)


EXTRACTION OF LEFT FOOT SKIN, EXTERNAL APPROACH (10/01/15)


INJECT/INFUSE NEC (02/24/15)


INSPECTION OF LOWER INTESTINAL TRACT, ENDO (09/29/16)


RELEASE RIGHT TOE PHALANGEAL JOINT, OPEN APPROACH (05/10/16)

## 2017-07-27 LAB
ALBUMIN/GLOB SERPL: 0.8 {RATIO} (ref 1–2.2)
ANION GAP SERPL CALCULATED.4IONS-SCNC: 8 MMOL/L (ref 6–13)
BASOPHILS NFR BLD AUTO: 0 10^3/UL (ref 0–0.1)
BASOPHILS NFR BLD AUTO: 0.2 %
BILIRUB BLD-MCNC: 0.9 MG/DL (ref 0.2–1)
BUN SERPL-MCNC: 13 MG/DL (ref 6–20)
CALCIUM UR-MCNC: 7.5 MG/DL (ref 8.5–10.3)
CHLORIDE SERPL-SCNC: 108 MMOL/L (ref 101–111)
CO2 SERPL-SCNC: 25 MMOL/L (ref 21–32)
CREAT SERPLBLD-SCNC: 1.4 MG/DL (ref 0.6–1.2)
EOSINOPHIL # BLD AUTO: 0.1 10^3/UL (ref 0–0.7)
EOSINOPHIL NFR BLD AUTO: 1 %
ERYTHROCYTE [DISTWIDTH] IN BLOOD BY AUTOMATED COUNT: 13.9 % (ref 12–15)
GFRSERPLBLD MDRD-ARVRAT: 52 ML/MIN/{1.73_M2} (ref 89–?)
GLOBULIN SER-MCNC: 3 G/DL (ref 2.1–4.2)
GLUCOSE SERPL-MCNC: 82 MG/DL (ref 70–100)
HCT VFR BLD AUTO: 30.6 % (ref 42–52)
HGB UR QL STRIP: 10.4 G/DL (ref 14–18)
LYMPHOCYTES # SPEC AUTO: 0.4 10^3/UL (ref 1.5–3.5)
LYMPHOCYTES NFR BLD AUTO: 6.4 %
MCH RBC QN AUTO: 29.1 PG (ref 27–31)
MCHC RBC AUTO-ENTMCNC: 33.9 G/DL (ref 32–36)
MCV RBC AUTO: 85.8 FL (ref 80–94)
MONOCYTES # BLD AUTO: 0.6 10^3/UL (ref 0–1)
MONOCYTES NFR BLD AUTO: 9.6 %
NEUTROPHILS # BLD AUTO: 5.3 10^3/UL (ref 1.5–6.6)
NEUTROPHILS # SNV AUTO: 6.4 X10^3/UL (ref 4.8–10.8)
NEUTROPHILS NFR BLD AUTO: 82.8 %
NRBC # BLD AUTO: 0 /100WBC
PDW BLD AUTO: 7.9 FL (ref 7.4–11.4)
POTASSIUM SERPL-SCNC: 3.1 MMOL/L (ref 3.5–5)
PROT SPEC-MCNC: 5.5 G/DL (ref 6.7–8.2)
RBC MAR: 3.57 10^6/UL (ref 4.7–6.1)
SODIUM SERPLBLD-SCNC: 141 MMOL/L (ref 135–145)
WBC # BLD: 6.4 X10^3/UL

## 2017-07-27 RX ADMIN — SODIUM CHLORIDE, PRESERVATIVE FREE SCH: 5 INJECTION INTRAVENOUS at 05:44

## 2017-07-27 RX ADMIN — SODIUM CHLORIDE SCH MLS/HR: 9 INJECTION, SOLUTION INTRAVENOUS at 02:47

## 2017-07-27 RX ADMIN — LOPERAMIDE HYDROCHLORIDE SCH MG: 2 CAPSULE ORAL at 08:16

## 2017-07-27 RX ADMIN — THERA TABS SCH TAB: TAB at 10:07

## 2017-07-27 RX ADMIN — CITALOPRAM HYDROBROMIDE SCH MG: 10 TABLET ORAL at 10:06

## 2017-07-27 RX ADMIN — SODIUM CHLORIDE SCH MLS/HR: 900 INJECTION INTRAVENOUS at 05:29

## 2017-07-27 RX ADMIN — ENOXAPARIN SODIUM SCH: 100 INJECTION SUBCUTANEOUS at 10:08

## 2017-07-27 RX ADMIN — ONDANSETRON PRN MG: 2 INJECTION INTRAMUSCULAR; INTRAVENOUS at 17:51

## 2017-07-27 RX ADMIN — LOSARTAN POTASSIUM SCH MG: 50 TABLET, FILM COATED ORAL at 10:06

## 2017-07-27 RX ADMIN — SODIUM CHLORIDE SCH MLS/HR: 900 INJECTION INTRAVENOUS at 20:22

## 2017-07-27 RX ADMIN — POLYETHYLENE GLYCOL 3350 SCH: 17 POWDER, FOR SOLUTION ORAL at 10:08

## 2017-07-27 RX ADMIN — ONDANSETRON PRN MG: 2 INJECTION INTRAMUSCULAR; INTRAVENOUS at 08:17

## 2017-07-27 RX ADMIN — SODIUM CHLORIDE SCH MLS/HR: 9 INJECTION, SOLUTION INTRAVENOUS at 16:29

## 2017-07-27 RX ADMIN — GABAPENTIN SCH MG: 100 CAPSULE ORAL at 21:05

## 2017-07-27 RX ADMIN — PROCHLORPERAZINE EDISYLATE PRN MG: 5 INJECTION INTRAMUSCULAR; INTRAVENOUS at 16:47

## 2017-07-27 RX ADMIN — LOPERAMIDE HYDROCHLORIDE SCH: 2 CAPSULE ORAL at 20:47

## 2017-07-27 RX ADMIN — SILVER SULFADIAZINE SCH APPLIC: 10 CREAM TOPICAL at 10:08

## 2017-07-27 RX ADMIN — ACETAMINOPHEN PRN MLS/HR: 10 INJECTION, SOLUTION INTRAVENOUS at 17:57

## 2017-07-27 RX ADMIN — INSULIN ASPART SCH UNIT: 100 INJECTION, SOLUTION INTRAVENOUS; SUBCUTANEOUS at 12:17

## 2017-07-27 RX ADMIN — SODIUM CHLORIDE, PRESERVATIVE FREE SCH ML: 5 INJECTION INTRAVENOUS at 17:52

## 2017-07-27 RX ADMIN — GABAPENTIN SCH MG: 100 CAPSULE ORAL at 05:43

## 2017-07-27 RX ADMIN — INSULIN ASPART SCH: 100 INJECTION, SOLUTION INTRAVENOUS; SUBCUTANEOUS at 18:01

## 2017-07-27 RX ADMIN — INSULIN GLARGINE SCH: 100 INJECTION, SOLUTION SUBCUTANEOUS at 23:27

## 2017-07-27 RX ADMIN — INSULIN ASPART SCH UNIT: 100 INJECTION, SOLUTION INTRAVENOUS; SUBCUTANEOUS at 12:18

## 2017-07-27 RX ADMIN — INSULIN ASPART SCH: 100 INJECTION, SOLUTION INTRAVENOUS; SUBCUTANEOUS at 10:07

## 2017-07-27 RX ADMIN — GABAPENTIN SCH MG: 100 CAPSULE ORAL at 14:17

## 2017-07-27 RX ADMIN — ONDANSETRON PRN MG: 2 INJECTION INTRAMUSCULAR; INTRAVENOUS at 14:01

## 2017-07-27 RX ADMIN — INSULIN ASPART SCH: 100 INJECTION, SOLUTION INTRAVENOUS; SUBCUTANEOUS at 10:08

## 2017-07-27 RX ADMIN — INSULIN ASPART SCH: 100 INJECTION, SOLUTION INTRAVENOUS; SUBCUTANEOUS at 23:27

## 2017-07-27 RX ADMIN — SODIUM CHLORIDE, PRESERVATIVE FREE SCH: 5 INJECTION INTRAVENOUS at 14:12

## 2017-07-27 RX ADMIN — SODIUM CHLORIDE SCH MLS/HR: 9 INJECTION, SOLUTION INTRAVENOUS at 02:48

## 2017-07-27 NOTE — XRAY REPORT
EXAM:

ABDOMEN RADIOGRAPHY

 

EXAM DATE: 7/27/2017 07:52 PM.

 

CLINICAL HISTORY: RLQ pain, N/V.

 

COMPARISON: 12/05/2016.

 

TECHNIQUE: 1 view.

 

FINDINGS:

Bowel Gas Pattern: No direct evidence of dilated small bowel.

 

Other: There are right upper quadrant surgical sutures.

 

IMPRESSION: No acute intra-abdominal plain film abnormality.

 

[facility]

Referring Provider Line: [phone]

 

SITE ID: [id number]

## 2017-07-27 NOTE — PROVIDER PROGRESS NOTE
Assessment/Plan





- Problem List








(2) Bacteremia


Assessment/Plan: 


WBC improved, is on antibiotic treatment for MSSA.








(3) Diabetic toe ulcer


Qualifiers: 


   Diabetes mellitus type: other specified (including TITO)   Laterality: right

   Non-pressure ulcer stage: unspecified non-pressure ulcer stage   Qualified 

Code(s): E13.621 - Other specified diabetes mellitus with foot ulcer; L97.519 - 

Non-pressure chronic ulcer of other part of right foot with unspecified 

severity   


Assessment/Plan: 


Pt was told by Ortho that surgical further treatment will depend on antibiotic 

and topical treatment. Will continue dressings. Discussed with Dr Navarrete and 

wound care, [facility].








(4) Fever


Qualifiers: 


   Fever type: unspecified   Qualified Code(s): R50.9 - Fever, unspecified   


Assessment/Plan: 


Resolved








(5) Abdominal pain


Qualifiers: 


   Abdominal location: right lower quadrant   Qualified Code(s): R10.31 - Right 

lower quadrant pain   


Assessment/Plan: 


Pt had several episodes of N/V after 1st po dose of Augmentin. Will obtain KUB, 

give antiemetics, change po antibiotic to iv treatment.








- Current Meds


Current Meds: 





 Current Medications











Generic Name Dose Route Start Last Admin





  Trade Name Freq  PRN Reason Stop Dose Admin


 


Acetaminophen  650 mg 07/24/17 12:33 07/27/17 05:43





  Tylenol  PO   650 mg





  Q4HR PRN   Administration





  Pain 1 to 4   


 


Acetaminophen/Hydrocodone Bitart  1 tab 07/24/17 12:33 07/26/17 09:27





  Norco 5/325  PO   1 tab





  Q4HR PRN   Administration





  Pain 5 to 7   


 


Acetaminophen/Hydrocodone Bitart  1 tab 07/24/17 12:33 07/26/17 22:35





  Norco 10 Mg/325 Mg  PO   1 tab





  Q4HR PRN   Administration





  Pain 8 to 10   


 


Citalopram Hydrobromide  20 mg 07/25/17 09:00 07/27/17 10:06





  Celexa  PO   20 mg





  DAILY JEIMY   Administration


 


Enoxaparin Sodium  40 mg 07/24/17 13:00 07/27/17 10:08





  Lovenox  SUBQ   Not Given





  DAILY JEIMY   


 


Gabapentin  300 mg 07/24/17 14:00 07/27/17 14:17





  Neurontin  PO   300 mg





  TID JEIMY   Administration


 


Sodium Chloride  1,000 mls @ 100 mls/hr 07/24/17 13:00 07/27/17 16:29





  Normal Saline 0.9%  IV   100 mls/hr





  .Q10H JEIMY   Administration


 


Acetaminophen  100 mls @ 400 mls/hr 07/25/17 20:02 07/27/17 17:57





  Ofirmev  IV   400 mls/hr





  Q6HR PRN   Administration





  PAIN   


 


Insulin Aspart  10 unit 07/24/17 17:00 07/27/17 18:01





  Novolog  SUBQ   Not Given





  TIDWM JEIMY   


 


Insulin Aspart  2 - 10 unit 07/25/17 08:00 07/27/17 18:01





  Novolog  SUBQ   Not Given





  0800,1200,1700,[facility]   





  Protocol   


 


Insulin Glargine  30 unit 07/24/17 13:00 07/26/17 22:38





  Lantus Solostar  SUBQ   30 unit





  HS JEIMY   Administration


 


Loperamide HCl  2 mg 07/24/17 21:00 07/27/17 08:16





  Imodium  PO   2 mg





  BID JEIMY   Administration


 


Losartan Potassium  25 mg 07/25/17 09:00 07/27/17 10:06





  Cozaar  PO   25 mg





  DAILY JEIMY   Administration


 


Multivitamins  1 tab 07/25/17 09:00 07/27/17 10:07





  Theragran  PO   1 tab





  DAILY JEIMY   Administration


 


Ondansetron HCl  4 mg 07/24/17 12:33 07/27/17 17:51





  Zofran Inj  IVP   4 mg





  Q4HR PRN   Administration





  Nausea / Vomiting   


 


Polyethylene Glycol  17 gm 07/25/17 09:00 07/27/17 10:08





  Miralax  PO   Not Given





  DAILY JEIMY   


 


Prochlorperazine Edisylate  10 mg 07/25/17 20:02 07/27/17 16:47





  Compazine Inj  IVP   10 mg





  Q4HR PRN   Administration





  Nausea / Vomiting   


 


Silver Sulfadiazine  1 applic 07/26/17 18:00 07/27/17 10:08





  Silvadene Cream  TOP   1 applic





  DAILY JEIMY   Administration


 


Sodium Chloride  10 ml 07/24/17 12:33 07/26/17 19:34





  Normal Saline Flush 0.9%  IVP   10 ml





  PRN PRN   Administration





  AS NEEDED PER PROVIDER ORDERS   


 


Sodium Chloride  10 ml 07/24/17 14:00 07/27/17 17:52





  Normal Saline Flush 0.9%  IVP   10 ml





  Q8HR JEIMY   Administration














- Lab Result


Fish Bone Diagrams: 


 07/27/17 05:34





 07/27/17 05:34





- Additional Planning


My Orders: 





My Active Orders





07/27/17


KUB [Abdomen 1 View] [XR] Stat 





07/27/17 20:00


Piperacillin/Tazobactam [Zosyn] 4.5 gm   Sodium Chloride 0.9% Minibag [Normal 

Saline 0.9% Minibag] 100 ml IV Q6H 


Vancomycin Per Pharmacy [Vancomycin] 1 gm   Sodium Chloride 0.9% [Normal Saline 

0.9%] 250 ml IV Q12H 














Subjective





- Subjective


Patient Reports: Nausea (This afternoon Pt had recurrence of N/V several times 

and RLQ pain. Pt reports no BM in 1 day and no flatus.)


Nursing Reports: Vomitting





Objective


Vital Signs: 





 Vital Signs - 24 hr











  07/26/17 07/27/17 07/27/17





  20:28 00:00 05:25


 


Temperature 36.6 C 37.0 C 36.4 C L


 


Heart Rate [ 61 71 61





Brachial]   


 


Respiratory 16 18 18





Rate   


 


Blood Pressure 122/66 104/65 124/64





[Right Brachial   





artery]   


 


O2 Saturation 97 93 94














  07/27/17 07/27/17 07/27/17





  08:46 11:12 16:06


 


Temperature 36.8 C 36.6 C 37.4 C


 


Heart Rate [ 64 79 68





Brachial]   


 


Respiratory 18 18 18





Rate   


 


Blood Pressure 134/67 H 126/64 145/80 H





[Right Brachial   





artery]   


 


O2 Saturation 97 94 94








 Oxygen











O2 Source                      Room air














I&O (Last 24 Hrs): 





 Intake and Output Totals x24h











 07/25/17 07/26/17 07/27/17





 23:59 23:59 23:59


 


Intake Total 3742 4008 3268


 


Output Total 2250 25 50


 


Balance 1492 3983 3218











General: Alert


HEENT: Atraumatic (Mucosa dry)


Neuro: Alert


Cardiovascular: Regular rate


Respiratory: No respiratory distress


Abdomen: Soft, Other (Tender to deep palpation in RLQ, without guarding or 

rebound. Normal bowel sounds in every quadrant.)


Extremities: Other (Right toes bandaged, had blood tinged drainage this am.)





- Results


Results: 





 Laboratory Results











WBC  6.4 x10^3/uL (4.8-10.8)   07/27/17  05:34    


 


RBC  3.57 10^6/uL (4.70-6.10)  L  07/27/17  05:34    


 


Hgb  10.4 g/dL (14.0-18.0)  L  07/27/17  05:34    


 


Hct  30.6 % (42.0-52.0)  L  07/27/17  05:34    


 


MCV  85.8 fL (80.0-94.0)   07/27/17  05:34    


 


MCH  29.1 pg (27.0-31.0)   07/27/17  05:34    


 


MCHC  33.9 g/dL (32.0-36.0)   07/27/17  05:34    


 


RDW  13.9 % (12.0-15.0)   07/27/17  05:34    


 


Plt Count  147 10^3/uL (130-450)   07/27/17  05:34    


 


MPV  7.9 fL (7.4-11.4)   07/27/17  05:34    


 


Neut #  5.3 10^3/uL (1.5-6.6)   07/27/17  05:34    


 


Lymph #  0.4 10^3/uL (1.5-3.5)  L  07/27/17  05:34    


 


Mono #  0.6 10^3/uL (0.0-1.0)   07/27/17  05:34    


 


Eos #  0.1 10^3/uL (0.0-0.7)   07/27/17  05:34    


 


Baso #  0.0 10^3/uL (0.0-0.1)   07/27/17  05:34    


 


Absolute Nucleated RBC  0.00 x10^3/uL  07/27/17  05:34    


 


Nucleated RBCs  0.0 /100WBC  07/27/17  05:34    


 


ESR  69 mm/Hr (0-20)  H  07/25/17  05:34    


 


Sodium  141 mmol/L (135-145)   07/27/17  05:34    


 


Potassium  3.1 mmol/L (3.5-5.0)  L  07/27/17  05:34    


 


Chloride  108 mmol/L (101-111)   07/27/17  05:34    


 


Carbon Dioxide  25 mmol/L (21-32)   07/27/17  05:34    


 


Anion Gap  8.0  (6-13)   07/27/17  05:34    


 


BUN  13 mg/dL (6-20)   07/27/17  05:34    


 


Creatinine  1.4 mg/dL (0.6-1.2)  H  07/27/17  05:34    


 


Estimated GFR (MDRD)  52  (>89)  L  07/27/17  05:34    


 


Glucose  82 mg/dL ()   07/27/17  05:34    


 


POC Whole Bld Glucose  111 mg/dL (70 - 100)  H  07/27/17  17:01    


 


Glycated Hemoglobin  13.3 % (4.6-6.2)  H  07/25/17  12:48    


 


Estim Average Glucose  335  ()  H  07/25/17  12:48    


 


Lactic Acid  1.3 mmol/L (0.5-2.2)   07/24/17  12:10    


 


Calcium  7.5 mg/dL (8.5-10.3)  L  07/27/17  05:34    


 


Total Bilirubin  0.9 mg/dL (0.2-1.0)   07/27/17  05:34    


 


AST  18 IU/L (10-42)   07/27/17  05:34    


 


ALT  17 IU/L (10-60)   07/27/17  05:34    


 


Alkaline Phosphatase  99 IU/L ()   07/27/17  05:34    


 


C-Reactive Protein  20.2 mg/dL (0-1.0)  H  07/25/17  05:34    


 


Total Protein  5.5 g/dL (6.7-8.2)  L  07/27/17  05:34    


 


Albumin  2.5 g/dL (3.2-5.5)  L  07/27/17  05:34    


 


Globulin  3.0 g/dL (2.1-4.2)   07/27/17  05:34    


 


Albumin/Globulin Ratio  0.8  (1.0-2.2)  L  07/27/17  05:34    


 


Last Dose Date  UNK   07/26/17  12:24    


 


Last Dose Time  K   07/26/17  12:24    


 


Vancomycin Trough  16.5 ug/mL (5.0-15.0)  H  07/26/17  12:24    














- Procedures


Procedures: 





Procedures





CLOSED ENDOSCOPIC BIOPSY OF LARGE INTESTINE (05/31/13)


COLONOSCOPY (02/24/15)


DIVISION OF RIGHT FOOT TENDON, OPEN APPROACH (05/10/16)


ENDOSC LG BOWEL THROUGH STOMA (02/24/15)


ENDOSC POLYPECTOMY OF LG INTEST (05/31/13)


ESOPHAGOGASTRODUODENOSCOPY [EGD] W/CLOSED BIOPSY (05/31/13)


EXCISION OF RIGHT METATARSAL, OPEN APPROACH (05/10/16)


EXTRACTION OF LEFT FOOT SKIN, EXTERNAL APPROACH (10/01/15)


INJECT/INFUSE NEC (02/24/15)


INSPECTION OF LOWER INTESTINAL TRACT, ENDO (09/29/16)


RELEASE RIGHT TOE PHALANGEAL JOINT, OPEN APPROACH (05/10/16)

## 2017-07-27 NOTE — XRAY PRELIMINARY REPORT
Accession: [id number]

Exam: XR Abdomen 1 View

 

IMPRESSION: No acute intra-abdominal plain film abnormality.

 

[facility]

 

SITE ID: [id number]

## 2017-07-28 VITALS — DIASTOLIC BLOOD PRESSURE: 85 MMHG | SYSTOLIC BLOOD PRESSURE: 162 MMHG

## 2017-07-28 RX ADMIN — SODIUM CHLORIDE SCH MLS/HR: 900 INJECTION INTRAVENOUS at 14:08

## 2017-07-28 RX ADMIN — SODIUM CHLORIDE SCH MLS/HR: 900 INJECTION INTRAVENOUS at 01:45

## 2017-07-28 RX ADMIN — INSULIN ASPART SCH: 100 INJECTION, SOLUTION INTRAVENOUS; SUBCUTANEOUS at 08:06

## 2017-07-28 RX ADMIN — INSULIN ASPART SCH: 100 INJECTION, SOLUTION INTRAVENOUS; SUBCUTANEOUS at 08:07

## 2017-07-28 RX ADMIN — POLYETHYLENE GLYCOL 3350 SCH GM: 17 POWDER, FOR SOLUTION ORAL at 08:10

## 2017-07-28 RX ADMIN — SODIUM CHLORIDE SCH MLS/HR: 900 INJECTION INTRAVENOUS at 08:10

## 2017-07-28 RX ADMIN — ENOXAPARIN SODIUM SCH MG: 100 INJECTION SUBCUTANEOUS at 08:10

## 2017-07-28 RX ADMIN — SODIUM CHLORIDE SCH MLS/HR: 9 INJECTION, SOLUTION INTRAVENOUS at 05:48

## 2017-07-28 RX ADMIN — GABAPENTIN SCH MG: 100 CAPSULE ORAL at 14:08

## 2017-07-28 RX ADMIN — LOPERAMIDE HYDROCHLORIDE SCH: 2 CAPSULE ORAL at 08:10

## 2017-07-28 RX ADMIN — SODIUM CHLORIDE SCH: 9 INJECTION, SOLUTION INTRAVENOUS at 08:06

## 2017-07-28 RX ADMIN — THERA TABS SCH TAB: TAB at 08:09

## 2017-07-28 RX ADMIN — SODIUM CHLORIDE, PRESERVATIVE FREE SCH: 5 INJECTION INTRAVENOUS at 05:18

## 2017-07-28 RX ADMIN — INSULIN ASPART SCH: 100 INJECTION, SOLUTION INTRAVENOUS; SUBCUTANEOUS at 11:26

## 2017-07-28 RX ADMIN — PROCHLORPERAZINE EDISYLATE PRN MG: 5 INJECTION INTRAMUSCULAR; INTRAVENOUS at 05:40

## 2017-07-28 RX ADMIN — GABAPENTIN SCH MG: 100 CAPSULE ORAL at 05:46

## 2017-07-28 RX ADMIN — SODIUM CHLORIDE, PRESERVATIVE FREE SCH: 5 INJECTION INTRAVENOUS at 14:08

## 2017-07-28 RX ADMIN — ONDANSETRON PRN MG: 2 INJECTION INTRAMUSCULAR; INTRAVENOUS at 04:13

## 2017-07-28 RX ADMIN — SILVER SULFADIAZINE SCH: 10 CREAM TOPICAL at 09:44

## 2017-07-28 RX ADMIN — LOSARTAN POTASSIUM SCH MG: 50 TABLET, FILM COATED ORAL at 08:09

## 2017-07-28 RX ADMIN — CITALOPRAM HYDROBROMIDE SCH MG: 10 TABLET ORAL at 08:10

## 2017-07-28 NOTE — DISCHARGE PLAN
Discharge Plan


Disposition: 01 Home, Self Care


Prescriptions: 


Amox/Clav 875/125 [Augmentin] 2 each PO Q8H #84 tablet


Silver Sulfadiazine [Silvadene] 25 gm TP DAILY #1 cream..g.


Diet: Diabetic


Activity Restrictions: Activity as Tolerated


Shower Restrictions: No (Unless restricted by Ortho)


Driving Restrictions: No (Unless restricted by Ortho)


Weight Bearing: As per Ortho


Follow-Up Care: [facility] - Wound/Ostomy


No Smoking: If you smoke, Please STOP!  Call [phone] for help.


Follow-up with: 


Fredy Padgett MD [Primary Care Provider] -

## 2017-07-30 NOTE — DISCHARGE SUMMARY
DATE OF ADMISSION:  07/24/2017

 

DATE OF DISCHARGE:  07/28/2017

 

PRIMARY CARE PHYSICIAN:  Stone Padgett MD 

 

HISTORY:  This is a 57-year-old white male with history of insulin-dependent diabetes, hypertension, 
peripheral neuropathy, prior diabetic foot ulcers, adenocarcinoma of the colon with resection, anxiet
y, and depression.  

 

The patient presented with complaints of redness, swelling, and pain of the right great toe.  He had 
been seen in the [facility] for an ulcer on the plantar surface of the right great toe just days prev
iously.  On this day, the blood cultures that had been done were positive, and he was admitted.  

 

MEDICATIONS PRIOR TO ADMISSION 

1. Insulin. 

2. Lexapro. 

3. Losartan. 

4. Multivitamin. 

5. Gabapentin. 

6. Novolog insulin. 

7. Loperamide.

8. Colestipol. 

9. Zofran p.r.n. nausea. 

 

ALLERGIES:  NONE. 

 

FAMILY HISTORY:  Positive for adenocarcinoma in his mother, pancreatic cancer in his father, and diab
etes in the father's family. 

 

SOCIAL HISTORY:  Negative for alcohol or substance abuse.

 

PHYSICAL EXAMINATION

Significant findings of:

VITAL SIGNS:  Afebrile, oxygen saturation 97%, and vital signs stable.

CHEST, ABDOMEN, AND CARDIAC:  Exams were unremarkable.

EXTREMITIES:  No edema.  Left foot 2nd toe had mild erythema of a chronic ulcer.  The right foot and 
right great toe were very swollen with redness and warmth, and a very dark red-black area of a draini
ng ulcer in the medial aspect on the right great toe. 

 

LABORATORY:  White blood count 9.4, hemoglobin 12.  Normal BUN and creatinine.  Glucose 352.  

 

HOSPITAL COURSE:  The patient was placed on a diabetic diet and continuation of his diabetic medicati
ons and management.  The patient had Lovenox treatment for VTE prophylaxis.  The patient was started 
on antibiotics for treatment of the ulcer.  Blood and wound cultures were done.  Blood culture showed
 methicillin-sensitive Staphylococcus aureus.  The patient was seen by Orthopedics in consult.  There
 was no plan for surgery at that time.  The patient was seen in followup by the [facility], who
 advised continued topical treatment.  The patient had intermittent abdominal pain with nausea during
 the admission.  The day prior to admission he was changed from IV to p.o. antibiotics in anticipatio
n of transfer and to assess tolerability of the antibiotic.  His abdominal pain with nausea and vomit
ing worsened, and the p.o. antibiotic was discontinued and returned back to IV antibiotics.  

 

On the following day, his GI complaint subsided.  He was discharged in fair condition on topical marianne
tment with Silvadene for the ulcer, as well as p.o. antibiotics with Augmentin for a total of a 2-wee
k course.  Followup in [facility] wound clinic was arranged. 

 

 

 

DD:07/29/2017 9:46:00  DT: 07/30/2017 02:41  JOB #: [id number]  EXT JOB #:[id number]

## 2017-08-01 ENCOUNTER — HOSPITAL ENCOUNTER (OUTPATIENT)
Dept: HOSPITAL 76 - LAB.WCP | Age: 57
End: 2017-08-01
Attending: PHYSICIAN ASSISTANT
Payer: COMMERCIAL

## 2017-08-01 DIAGNOSIS — L97.519: Primary | ICD-10-CM

## 2017-08-01 PROCEDURE — 87070 CULTURE OTHR SPECIMN AEROBIC: CPT

## 2017-08-01 PROCEDURE — 87205 SMEAR GRAM STAIN: CPT

## 2017-08-30 ENCOUNTER — HOSPITAL ENCOUNTER (OUTPATIENT)
Dept: HOSPITAL 76 - EMS | Age: 57
Discharge: ANOTHER HOSPITAL | End: 2017-08-30
Attending: SURGERY
Payer: COMMERCIAL
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
## 2017-08-30 DIAGNOSIS — R41.0: ICD-10-CM

## 2017-08-30 DIAGNOSIS — R51: Primary | ICD-10-CM

## 2018-02-08 ENCOUNTER — HOSPITAL ENCOUNTER (OUTPATIENT)
Dept: HOSPITAL 76 - LAB.WCP | Age: 58
Discharge: HOME | End: 2018-02-08
Attending: PHYSICIAN ASSISTANT
Payer: COMMERCIAL

## 2018-02-08 DIAGNOSIS — Z12.5: Primary | ICD-10-CM

## 2018-02-08 DIAGNOSIS — R53.83: ICD-10-CM

## 2018-02-08 DIAGNOSIS — E11.65: ICD-10-CM

## 2018-02-08 LAB
ALBUMIN DIAFP-MCNC: 4.5 G/DL (ref 3.2–5.5)
ALBUMIN/GLOB SERPL: 1.4 {RATIO} (ref 1–2.2)
ALP SERPL-CCNC: 90 IU/L (ref 42–121)
ALT SERPL W P-5'-P-CCNC: 12 IU/L (ref 10–60)
ANION GAP SERPL CALCULATED.4IONS-SCNC: 12 MMOL/L (ref 6–13)
AST SERPL W P-5'-P-CCNC: 16 IU/L (ref 10–42)
BASOPHILS NFR BLD AUTO: 0 10^3/UL (ref 0–0.1)
BASOPHILS NFR BLD AUTO: 0.6 %
BILIRUB BLD-MCNC: 0.8 MG/DL (ref 0.2–1)
BUN SERPL-MCNC: 26 MG/DL (ref 6–20)
CALCIUM UR-MCNC: 9.8 MG/DL (ref 8.5–10.3)
CHLORIDE SERPL-SCNC: 98 MMOL/L (ref 101–111)
CHOLEST SERPL-MCNC: 233 MG/DL
CO2 SERPL-SCNC: 26 MMOL/L (ref 21–32)
CREAT SERPLBLD-SCNC: 1.4 MG/DL (ref 0.6–1.2)
EOSINOPHIL # BLD AUTO: 0.1 10^3/UL (ref 0–0.7)
EOSINOPHIL NFR BLD AUTO: 1.6 %
ERYTHROCYTE [DISTWIDTH] IN BLOOD BY AUTOMATED COUNT: 13.6 % (ref 12–15)
EST. AVERAGE GLUCOSE BLD GHB EST-MCNC: 301 MG/DL (ref 70–100)
GFRSERPLBLD MDRD-ARVRAT: 52 ML/MIN/{1.73_M2} (ref 89–?)
GLOBULIN SER-MCNC: 3.2 G/DL (ref 2.1–4.2)
GLUCOSE SERPL-MCNC: 231 MG/DL (ref 70–100)
HB2 TOTAL: 15.5 G/DL
HBA1C BLD-MCNC: 1.69 G/DL
HDLC SERPL-MCNC: 37 MG/DL
HDLC SERPL: 6.3 {RATIO} (ref ?–5)
HEMOGLOBIN A1C %: 12.1 % (ref 4.6–6.2)
HGB UR QL STRIP: 13.5 G/DL (ref 14–18)
LDLC SERPL-MCNC: 129 MG/DL
LDLC/HDLC SERPL: 3.5 {RATIO} (ref ?–3.6)
LYMPHOCYTES # SPEC AUTO: 0.7 10^3/UL (ref 1.5–3.5)
LYMPHOCYTES NFR BLD AUTO: 18.7 %
MCH RBC QN AUTO: 28.2 PG (ref 27–31)
MCHC RBC AUTO-ENTMCNC: 34.1 G/DL (ref 32–36)
MCV RBC AUTO: 82.7 FL (ref 80–94)
MONOCYTES # BLD AUTO: 0.2 10^3/UL (ref 0–1)
MONOCYTES NFR BLD AUTO: 5.6 %
NEUTROPHILS # BLD AUTO: 2.7 10^3/UL (ref 1.5–6.6)
NEUTROPHILS # SNV AUTO: 3.7 X10^3/UL (ref 4.8–10.8)
NEUTROPHILS NFR BLD AUTO: 73.5 %
PDW BLD AUTO: 8.9 FL (ref 7.4–11.4)
PLATELET # BLD: 176 10^3/UL (ref 130–450)
PROT SPEC-MCNC: 7.7 G/DL (ref 6.7–8.2)
RBC MAR: 4.77 10^6/UL (ref 4.7–6.1)
SODIUM SERPLBLD-SCNC: 136 MMOL/L (ref 135–145)

## 2018-02-08 PROCEDURE — 83036 HEMOGLOBIN GLYCOSYLATED A1C: CPT

## 2018-02-08 PROCEDURE — 80061 LIPID PANEL: CPT

## 2018-02-08 PROCEDURE — 85025 COMPLETE CBC W/AUTO DIFF WBC: CPT

## 2018-02-08 PROCEDURE — 84443 ASSAY THYROID STIM HORMONE: CPT

## 2018-02-08 PROCEDURE — 36415 COLL VENOUS BLD VENIPUNCTURE: CPT

## 2018-02-08 PROCEDURE — 83721 ASSAY OF BLOOD LIPOPROTEIN: CPT

## 2018-02-08 PROCEDURE — 80053 COMPREHEN METABOLIC PANEL: CPT

## 2018-02-08 PROCEDURE — 84153 ASSAY OF PSA TOTAL: CPT

## 2018-03-29 ENCOUNTER — HOSPITAL ENCOUNTER (OUTPATIENT)
Dept: HOSPITAL 76 - SDS | Age: 58
Discharge: HOME | End: 2018-03-29
Attending: SURGERY
Payer: COMMERCIAL

## 2018-03-29 VITALS — DIASTOLIC BLOOD PRESSURE: 77 MMHG | SYSTOLIC BLOOD PRESSURE: 119 MMHG

## 2018-03-29 DIAGNOSIS — Z85.038: ICD-10-CM

## 2018-03-29 DIAGNOSIS — E11.40: ICD-10-CM

## 2018-03-29 DIAGNOSIS — K21.9: ICD-10-CM

## 2018-03-29 DIAGNOSIS — K44.9: Primary | ICD-10-CM

## 2018-03-29 PROCEDURE — 43239 EGD BIOPSY SINGLE/MULTIPLE: CPT

## 2018-03-29 PROCEDURE — 0DB68ZX EXCISION OF STOMACH, VIA NATURAL OR ARTIFICIAL OPENING ENDOSCOPIC, DIAGNOSTIC: ICD-10-PCS | Performed by: SURGERY

## 2018-03-29 NOTE — HISTORY & PHYSICAL EXAMINATION
HPI





- History of Present Illness


HPI Comment/Other: 








Patient is here for chronic GERD and findings of thickened distal esophagus on 

CT.





Mr. Solis was diagnosed with rectal cancer in  and underwent surgical 

excision, chemotherapy and radiation.  [place] [facility] [place] [facility] 

[place] Dr. Jett.He recently underwent a CT scan of the chest abdomen and pelvis for 

surveillance purposes and on this CT scan he was noted to have thickening of 

his distal esophagus.Current Meds: 


GLUCOPHAGE  MG ORAL TABLET EXTENDED RELEASE 24 HOUR (METFORMIN HCL) Take 

1 tab by mouth four times daily


OMEPRAZOLE 20 MG ORAL CAPSULE DELAYED RELEASE (OMEPRAZOLE) Take one capsule by 

mouth daily


AMLODIPINE BESYLATE 10 MG ORAL TABLET (AMLODIPINE BESYLATE) Take one tablet by 

mouth daily for high blood pressure


LOSARTAN POTASSIUM 50 MG ORAL TABLET (LOSARTAN POTASSIUM) Take one tablet by 

mouth once daily (reduced 17)


HYDROCHLOROTHIAZIDE 12.5 MG ORAL CAPSULE (HYDROCHLOROTHIAZIDE) Take one tablet 

by mouth every morning


COLESTID 1 GM ORAL TABLET (COLESTIPOL HCL) Take two tablets by mouth twice 

daily to prevent diarrhea


GABAPENTIN 300 MG ORAL CAPSULE (GABAPENTIN) Take one capsule by mouth three 

times daily


CITALOPRAM HYDROBROMIDE 20 MG ORAL TABLET (CITALOPRAM HYDROBROMIDE) Take one 

tablet by mouth daily














Past Medical History:


   Reviewed history from 2018 and no changes required:


      DM


        Diabetic neuropathy, severe


        Frequent diabetic foot ulcers


      Colorectal cancer - --initially treated with diverting colostomy. 

Followed with neoadjuvant chemotherapy therapy and radiation followed by 

definitive resection.


      Depression


      Hiatal hernia


      Shortness of Breath


      Frequent Indigestion


      Heartburn


      Acid Reflux


      





Family History Summary: 


   Reviewed history Last on 10/12/2017 and no changes required:2018


Father (jesús.) - Has a  father - Pancreatic cancer - Entered On: 2014


Mother (biol.) - Has Family History of Other Cancer - Entered On: 2016


Son (biol.) - Has a son who is alive and well - Entered On: 3/31/2017


Mother (biol.) - Has a  mother - Carcinomatosis, ? primary - Entered On

: 10/12/2017


Mother (biol.) - Has Family History of Arthritis - Entered On: 10/12/2017





General Comments - FH:


Father:  of pancreatic ca at , 


Older brother: Healthy


Younger half-brother: Throat ca (no tobacco or EtOH)





Oldest daughter recovering alcoholic (biologic mom alcoholic)


Son: healthy


Daughter: alcoholic (biologic mom's side w alcoholism)


Youngest: healthy











Social History:


   Reviewed history from 2017 and no changes required:


      Patient has never smoked. 


      Alcohol Use - yes


      , wife Ana.


      3 daughters, 1 son


      








Risk Factors: 





Smoked Tobacco Use:  Never smoker


Drug use:  no


Alcohol use:  yes


   Drinks per day:  1 per week


Exercise:  no





Previous Tobacco Use: Signed On - 2018


Smoked Tobacco Use:  Never smoker


Smokeless Tobacco Use:  Never


Passive smoke exposure:  no


Drug use:  no


HIV high-risk behavior:  no


Caffeine use:  1 drinks per day








Physical Exam





General:


    well developed, well nourished, in no acute distress


Lungs:


    clear bilaterally to A & P


Heart:


    regular rate and rhythm, S1, S2 without murmurs, rubs, gallops, or clicks


Abdomen:


    bowel sounds positive; abdomen soft and non-tender without masses, 

organomegaly, or hernias noted


Pulses:


    pulses normal in all 4 extremities


Extremities:


    no clubbing, cyanosis, edema, or deformity noted with normal full range of 

motion of all joints


Cervical Nodes:


    no significant adenopathy


Psych:


    alert and cooperative; normal mood and affect; normal attention span and 

concentration











Impression & Recommendations:





Problem # 1:  GERD, distal esophageal thickening


We will proceed with esophagogastroduodenoscopy.  








PMH/PSH





- Past Medical History


Cardiovascular: positive: Hypertension, High cholesterol, Coronary artery 

disease


Respiratory: positive: None


Neuro: positive: Peripheral neuropathy, Fainting


Endocrine/Autoimmune: positive: Type 2 diabetes


GI: positive: None


: positive: None


HEENT: positive: None


Psych: positive: None


Musculoskeletal: positive: None


Derm: positive: None


MRSA Hx?: No





- Past Surgical History


General: positive: Bowel surgery


Ortho: positive: Rotator cuff repair, Spine surgery





Social & Family Hx





- Social History


Does the pt smoke?: No


Smoking Status: Never smoker


Does the pt drink ETOH?: Yes


Does the pt have substance abuse?: No





- POLST


Patient has POLST: No


POLST Status: Full Code





Meds/Allgy





- Home Medications


Home Medications: 


 Ambulatory Orders











 Medication  Instructions  Recorded  Confirmed


 


Citalopram [CeleXA] 20 mg PO DAILY 14


 


Losartan Potassium 25 mg ORAL DAILY 02/24/15 03/29/18


 


Multivitamin [Multi-Vitamin Daily] 1 tab ORAL DAILY 02/24/15 03/29/18


 


Gabapentin 300 mg PO TID 11/24/15 03/29/18


 


Loperamide [Imodium] 1 cap PO BID 05/10/16 03/29/18


 


Colestipol HCl 2 gm PO BID 17














- Allergies


Allergies/Adverse Reactions: 


 Allergies











Allergy/AdvReac Type Severity Reaction Status Date / Time


 


No Known Drug Allergies Allergy   Verified 10/26/17 08:29














Results





- Lab Results


Other Lab Results: 





 Lab Results x24hrs











  18 Range/Units





  08:26 


 


POC Whole Bld Glucose  277 H  (70 - 100)  mg/dL

## 2018-09-11 ENCOUNTER — HOSPITAL ENCOUNTER (OUTPATIENT)
Dept: HOSPITAL 76 - DI | Age: 58
Discharge: HOME | End: 2018-09-11
Attending: FAMILY MEDICINE
Payer: COMMERCIAL

## 2018-09-11 DIAGNOSIS — R32: Primary | ICD-10-CM

## 2018-09-11 PROCEDURE — 76857 US EXAM PELVIC LIMITED: CPT

## 2018-09-12 NOTE — ULTRASOUND REPORT
Reason:  URINARY INCONTINENCE   RETENTION

Procedure Date:  09/11/2018   

Accession Number:  [id number] / [id number]                    

Procedure:  US  - Bladder CPT Code:  

 

FULL RESULT:

 

 

EXAM:

BLADDER ULTRASOUND

 

EXAM DATE: 09/11/2018 05:50 PM.

 

CLINICAL HISTORY: Urinary incontinence and retention.

 

COMPARISON: Renal ultrasound (including bladder) 01/05/2014.

 

TECHNIQUE: Real-time scanning was performed with static images obtained.

 

FINDINGS: Evidence of mild diffuse bladder wall thickening. Bilateral 

ureteral jets are seen. Prevoid bladder volume 469 cc. Postvoid bladder 

volume 371 cc. Prostate measures approximately 5.0 x 3.7 x 2.6 cm.

 

IMPRESSION:

1. Postvoid bladder residual 371 cc.

2. Mild bladder wall thickening, without denise focal mass or stones 

demonstrated.

3. Prominent prostate.

 

RADIA

## 2019-10-20 ENCOUNTER — HOSPITAL ENCOUNTER (OUTPATIENT)
Dept: HOSPITAL 76 - EMS | Age: 59
Discharge: ANOTHER HOSPITAL | End: 2019-10-20
Attending: SURGERY
Payer: COMMERCIAL

## 2019-10-20 ENCOUNTER — HOSPITAL ENCOUNTER (INPATIENT)
Dept: HOSPITAL 76 - ED | Age: 59
LOS: 3 days | Discharge: NURSING, REHAB OR LONG-TERM CARE | DRG: 65 | End: 2019-10-23
Attending: NURSE PRACTITIONER | Admitting: SPECIALIST
Payer: COMMERCIAL

## 2019-10-20 DIAGNOSIS — R29.810: ICD-10-CM

## 2019-10-20 DIAGNOSIS — I63.9: Primary | ICD-10-CM

## 2019-10-20 DIAGNOSIS — Z89.411: ICD-10-CM

## 2019-10-20 DIAGNOSIS — R47.9: ICD-10-CM

## 2019-10-20 DIAGNOSIS — E11.22: ICD-10-CM

## 2019-10-20 DIAGNOSIS — R13.10: ICD-10-CM

## 2019-10-20 DIAGNOSIS — R29.703: ICD-10-CM

## 2019-10-20 DIAGNOSIS — K44.9: ICD-10-CM

## 2019-10-20 DIAGNOSIS — L97.512: ICD-10-CM

## 2019-10-20 DIAGNOSIS — N39.498: ICD-10-CM

## 2019-10-20 DIAGNOSIS — Z92.3: ICD-10-CM

## 2019-10-20 DIAGNOSIS — Z85.048: ICD-10-CM

## 2019-10-20 DIAGNOSIS — E78.2: ICD-10-CM

## 2019-10-20 DIAGNOSIS — E11.621: ICD-10-CM

## 2019-10-20 DIAGNOSIS — R35.1: ICD-10-CM

## 2019-10-20 DIAGNOSIS — K30: ICD-10-CM

## 2019-10-20 DIAGNOSIS — R35.0: ICD-10-CM

## 2019-10-20 DIAGNOSIS — Z79.84: ICD-10-CM

## 2019-10-20 DIAGNOSIS — N18.3: ICD-10-CM

## 2019-10-20 DIAGNOSIS — Z79.899: ICD-10-CM

## 2019-10-20 DIAGNOSIS — G81.91: ICD-10-CM

## 2019-10-20 DIAGNOSIS — R47.81: ICD-10-CM

## 2019-10-20 DIAGNOSIS — R47.01: ICD-10-CM

## 2019-10-20 DIAGNOSIS — F32.9: ICD-10-CM

## 2019-10-20 DIAGNOSIS — E11.51: ICD-10-CM

## 2019-10-20 DIAGNOSIS — E11.40: ICD-10-CM

## 2019-10-20 DIAGNOSIS — N40.1: ICD-10-CM

## 2019-10-20 DIAGNOSIS — R47.1: ICD-10-CM

## 2019-10-20 DIAGNOSIS — R53.1: Primary | ICD-10-CM

## 2019-10-20 DIAGNOSIS — D63.1: ICD-10-CM

## 2019-10-20 DIAGNOSIS — Z92.21: ICD-10-CM

## 2019-10-20 DIAGNOSIS — I12.9: ICD-10-CM

## 2019-10-20 LAB
ALBUMIN DIAFP-MCNC: 4 G/DL (ref 3.2–5.5)
ALBUMIN/GLOB SERPL: 1.3 {RATIO} (ref 1–2.2)
ALP SERPL-CCNC: 63 IU/L (ref 42–121)
ALT SERPL W P-5'-P-CCNC: 21 IU/L (ref 10–60)
ANION GAP SERPL CALCULATED.4IONS-SCNC: 10 MMOL/L (ref 6–13)
APTT PPP: 30.6 SECS (ref 24.9–33.3)
AST SERPL W P-5'-P-CCNC: 19 IU/L (ref 10–42)
BASOPHILS NFR BLD AUTO: 0 10^3/UL (ref 0–0.1)
BASOPHILS NFR BLD AUTO: 0.2 %
BILIRUB BLD-MCNC: 0.7 MG/DL (ref 0.2–1)
BUN SERPL-MCNC: 26 MG/DL (ref 6–20)
CALCIUM UR-MCNC: 8.9 MG/DL (ref 8.5–10.3)
CHLORIDE SERPL-SCNC: 100 MMOL/L (ref 101–111)
CO2 SERPL-SCNC: 25 MMOL/L (ref 21–32)
CREAT SERPLBLD-SCNC: 1.2 MG/DL (ref 0.6–1.2)
EOSINOPHIL # BLD AUTO: 0.1 10^3/UL (ref 0–0.7)
EOSINOPHIL NFR BLD AUTO: 1.4 %
ERYTHROCYTE [DISTWIDTH] IN BLOOD BY AUTOMATED COUNT: 14.1 % (ref 12–15)
EST. AVERAGE GLUCOSE BLD GHB EST-MCNC: 146 MG/DL (ref 70–100)
GFRSERPLBLD MDRD-ARVRAT: 62 ML/MIN/{1.73_M2} (ref 89–?)
GLOBULIN SER-MCNC: 3.2 G/DL (ref 2.1–4.2)
GLUCOSE SERPL-MCNC: 234 MG/DL (ref 70–100)
HB2 TOTAL: 11.9 G/DL
HBA1C BLD-MCNC: 0.59 G/DL
HEMOGLOBIN A1C %: 6.7 % (ref 4.6–6.2)
HGB UR QL STRIP: 11.3 G/DL (ref 14–18)
INR PPP: 1.2 (ref 0.8–1.2)
LIPASE SERPL-CCNC: 29 U/L (ref 22–51)
LYMPHOCYTES # SPEC AUTO: 0.5 10^3/UL (ref 1.5–3.5)
LYMPHOCYTES NFR BLD AUTO: 11.3 %
MCH RBC QN AUTO: 28.8 PG (ref 27–31)
MCHC RBC AUTO-ENTMCNC: 33.2 G/DL (ref 32–36)
MCV RBC AUTO: 86.7 FL (ref 80–94)
MONOCYTES # BLD AUTO: 0.3 10^3/UL (ref 0–1)
MONOCYTES NFR BLD AUTO: 6.6 %
NEUTROPHILS # BLD AUTO: 3.4 10^3/UL (ref 1.5–6.6)
NEUTROPHILS # SNV AUTO: 4.3 X10^3/UL (ref 4.8–10.8)
NEUTROPHILS NFR BLD AUTO: 80 %
PDW BLD AUTO: 9.7 FL (ref 7.4–11.4)
PLATELET # BLD: 165 10^3/UL (ref 130–450)
PROT SPEC-MCNC: 7.2 G/DL (ref 6.7–8.2)
PROTHROM ACT/NOR PPP: 13.2 SECS (ref 9.9–12.6)
RBC MAR: 3.92 10^6/UL (ref 4.7–6.1)
SODIUM SERPLBLD-SCNC: 135 MMOL/L (ref 135–145)

## 2019-10-20 PROCEDURE — 83036 HEMOGLOBIN GLYCOSYLATED A1C: CPT

## 2019-10-20 PROCEDURE — 97166 OT EVAL MOD COMPLEX 45 MIN: CPT

## 2019-10-20 PROCEDURE — 36415 COLL VENOUS BLD VENIPUNCTURE: CPT

## 2019-10-20 PROCEDURE — 70498 CT ANGIOGRAPHY NECK: CPT

## 2019-10-20 PROCEDURE — 80320 DRUG SCREEN QUANTALCOHOLS: CPT

## 2019-10-20 PROCEDURE — 99285 EMERGENCY DEPT VISIT HI MDM: CPT

## 2019-10-20 PROCEDURE — 87086 URINE CULTURE/COLONY COUNT: CPT

## 2019-10-20 PROCEDURE — 84443 ASSAY THYROID STIM HORMONE: CPT

## 2019-10-20 PROCEDURE — 97530 THERAPEUTIC ACTIVITIES: CPT

## 2019-10-20 PROCEDURE — 84484 ASSAY OF TROPONIN QUANT: CPT

## 2019-10-20 PROCEDURE — 85730 THROMBOPLASTIN TIME PARTIAL: CPT

## 2019-10-20 PROCEDURE — 81001 URINALYSIS AUTO W/SCOPE: CPT

## 2019-10-20 PROCEDURE — 80061 LIPID PANEL: CPT

## 2019-10-20 PROCEDURE — 85610 PROTHROMBIN TIME: CPT

## 2019-10-20 PROCEDURE — 71045 X-RAY EXAM CHEST 1 VIEW: CPT

## 2019-10-20 PROCEDURE — 70450 CT HEAD/BRAIN W/O DYE: CPT

## 2019-10-20 PROCEDURE — 80048 BASIC METABOLIC PNL TOTAL CA: CPT

## 2019-10-20 PROCEDURE — 97110 THERAPEUTIC EXERCISES: CPT

## 2019-10-20 PROCEDURE — 83735 ASSAY OF MAGNESIUM: CPT

## 2019-10-20 PROCEDURE — 92610 EVALUATE SWALLOWING FUNCTION: CPT

## 2019-10-20 PROCEDURE — 93005 ELECTROCARDIOGRAM TRACING: CPT

## 2019-10-20 PROCEDURE — 97162 PT EVAL MOD COMPLEX 30 MIN: CPT

## 2019-10-20 PROCEDURE — 97164 PT RE-EVAL EST PLAN CARE: CPT

## 2019-10-20 PROCEDURE — 85025 COMPLETE CBC W/AUTO DIFF WBC: CPT

## 2019-10-20 PROCEDURE — 83721 ASSAY OF BLOOD LIPOPROTEIN: CPT

## 2019-10-20 PROCEDURE — 99284 EMERGENCY DEPT VISIT MOD MDM: CPT

## 2019-10-20 PROCEDURE — 93306 TTE W/DOPPLER COMPLETE: CPT

## 2019-10-20 PROCEDURE — 70551 MRI BRAIN STEM W/O DYE: CPT

## 2019-10-20 PROCEDURE — 70496 CT ANGIOGRAPHY HEAD: CPT

## 2019-10-20 PROCEDURE — 83690 ASSAY OF LIPASE: CPT

## 2019-10-20 PROCEDURE — 80053 COMPREHEN METABOLIC PANEL: CPT

## 2019-10-20 NOTE — XRAY REPORT
Reason:  chest pain

Procedure Date:  10/20/2019   

Accession Number:  [id number] / [id number]                    

Procedure:  XR  - Chest 1 View X-Ray CPT Code:  91898

 

FULL RESULT:

 

 

EXAM:

CHEST RADIOGRAPHY

 

EXAM DATE: 10/20/2019 09:27 PM.

 

CLINICAL HISTORY: Chest pain. Right-sided weakness.

 

COMPARISON: None.

 

TECHNIQUE: 1 view.

 

FINDINGS:

Lungs/Pleura: Hypoinflated lungs No focal opacities evident. No pleural 

effusion. No pneumothorax.

 

Mediastinum: Within exam limitations, the cardiomediastinal contour is 

normal.

 

Other: None.

 

IMPRESSION: Hypoinflation, otherwise unremarkable single view chest.

 

RADIA

## 2019-10-20 NOTE — CT REPORT
Reason:  Dysarthria, R side weakness

Procedure Date:  10/20/2019   

Accession Number:  [id number] / [id number]                    

Procedure:  CT  - ANGIO NECK W CPT Code:  

 

FULL RESULT:

 

 

EXAM:

CT ANGIOGRAM NECK

 

EXAM DATE: 10/20/2019 09:31 PM.

 

CLINICAL HISTORY: Dysarthria, right side weakness.

 

COMPARISON: None.

 

TECHNIQUE: Routine axial helical imaging was performed from the skull 

base through the aortic arch. Reconstructions: Routine multiplanar 3D MIP 

reconstructions. IV Contrast: OPTI 320 80ML. Evaluation of arterial 

stenosis is based on a NASCET method of measurement.

 

In accordance with CT protocol optimization, one or more of the following 

dose reduction techniques were utilized for this exam: automated exposure 

control, adjustment of mA and/or KV based on patient size, or use of 

iterative reconstructive technique.

 

FINDINGS:

Right Carotid: The common carotid, internal carotid, and external carotid 

arteries are widely patent. There are calcifications of the proximal ICA 

without stenosis.

 

Left Carotid: The common carotid, internal carotid, and external carotid 

arteries are widely patent. There are calcifications of the proximal ICA 

without stenosis.

 

Vertebrals: The vertebrobasilar system shows no stenoses.

 

Intracranial Circulation: Normal. No stenoses or aneurysms of the 

visualized vessels.

 

Other: There are mild degenerative changes of the cervical spine. Lung 

apices clear. Soft tissues of neck unremarkable.

IMPRESSION: No significant carotid or vertebral stenosis.

 

RADIA

 

The critical test notification system was initiated by Dr. Omar Dickinson 

at 09:57 PM on 10/20/2019.

 

The above critical test findings  were discussed with Dr. Olivia by Dr. Omar Dickinson at 09:59 PM on 10/20/2019.

## 2019-10-20 NOTE — CT REPORT
Reason:  Dysarthria, R side weakness

Procedure Date:  10/20/2019   

Accession Number:  [id number] / [id number]                    

Procedure:  CT  - Head W/O Stroke Protocol CPT Code:  

 

FULL RESULT:

 

 

EXAM:

CT HEAD

 

EXAM DATE: 10/20/2019 09:11 PM.

 

CLINICAL HISTORY: 59-year-old presenting with dysarthria and increasing 

right-sided weakness. Evaluate for intracranial pathology.

 

COMPARISON: HEAD ANGIO 10/20/2019 9:11 PM.

 

TECHNIQUE: Multiaxial CT images were obtained from the foramen magnum to 

the vertex. Reformats: Sagittal and coronal. IV contrast: None.

 

In accordance with CT protocol optimization, one or more of the following 

dose reduction techniques were utilized for this exam: automated exposure 

control, adjustment of mA and/or KV based on patient size, or use of 

iterative reconstructive technique.

 

FINDINGS:

Parenchyma: No acute parenchymal hemorrhage, mass, or midline shift. 

There is mild to moderate bilateral areas of white matter attenuation 

seen that are age-indeterminate but appear chronic. There is no 

convincing CT evidence of acute infarct.

 

Extraaxial Spaces: Normal for age. No subdural or epidural collections 

identified.

 

Ventricles: Normal in size and position.

 

Sinuses and Orbits: Orbits appear normal. Moderate right maxillary 

mucosal retention cyst versus polyp. Mastoid air cells and middle ear 

cavities appear clear.

 

Bones: No evidence of fracture or calvarial defect.

 

Other: None.

IMPRESSION:

1. No definite acute infarct seen. If there is clinical concern for acute 

stroke or symptoms persist, an MR brain could be considered to evaluate 

small or subtle pathology.

 

Aspects: 10 right/10 left

 

2. No acute intracranial hemorrhage, mass, hydrocephalus, or midline 

shift.

 

3. Mild to moderate white matter changes seen that appear chronic, 

suggesting potential sequelae of chronic small vessel ischemic disease.

 

RADIA

 

The critical test notification system was initiated by Dr. Randy Cunningham at 09:40 PM on 10/20/2019.

 

The above critical test findings  were discussed with Gustavo Olivia by 

Dr. Randy Cunningham at 09:42 PM on 10/20/2019.

## 2019-10-20 NOTE — HISTORY & PHYSICAL EXAMINATION
Chief Complaint





- Chief Complaint


Chief Complaint: gibberish and right leg weakness





History of Present Illness





- Admitted From


Admitted From:: Home/ER





- History Obtained From


Records Reviewed: [facility]


History obtained from: Patient, ER MD


Exam Limitations: none





- History of Present Illness


HPI Comment/Other: 





59-year-old white male whose risk factors for cerebrovascular disease include 

male sex, uncontrolled diabetes and hyperlipidemia.  His main problems have had 

to do with his diabetes.  He has had diabetic foot ulcers of both feet/toes in 

the past.  So far he has not required amputation.





This morning he began having episodes of expressive aphasia  It was not until he

started having right leg weakness and worsening a aphasia that he decided to 

come into the hospital this evening.  That was because the right leg was getting

so weak he was starting to have his leg crumble underneath him.  He was seen by 

Dr. Rose in the emergency room.  He is afebrile, mildly hypertensive at 

159/95.  In the ER he has gone up to 173/84.  He has a normal respiratory rate 

and is oxygenating well on room air.  When he first presented for his stay in 

the emergency room, he had a fairly severe expressive a aphasia.  That has 

gotten much better as the work-up has ensued.  His right leg weakness has also 

improved. 





He delayed coming in because he thought it was just an allergic reaction to 

having beer.  He just moved into a new home in [place].  Was having friends 

over.  Had a beer last night.  He states that because he had this exact same 

reaction a year ago.  His mouth did not want to move correctly, his tongue felt 

thick, and his right leg was weak.  This was a year ago.  It went away.  It was 

in association with drinking a beer back then.  That is why he delayed coming in

today.  He really had no idea that he could possibly having a stroke.





He has received aspirin.  CT of the head does not show any acute cerebrovascular

event.  CT angiogram of neck and head also shows no lesions.





He is now placed in observation for a TIA since he seems to be improving while 

in the emergency room.





History





- Past Medical History


Cardiovascular: reports: Hypertension, High cholesterol, Other (CAD is written 

down in PMH as a problem and he denies that. Never had MI or angina)


Respiratory: reports: Shortness of breath


Endocrine/Autoimmune: reports: Type 2 diabetes (Glycosylated hemoglobin has been

consistently elevated in the electronic medical record since .  The lowest 

he is been is 7.6% in 2013.  The highest he has been has been 13.3% in 2017.  His last A1c in the EMR was 12.1% 2018.  Complications 

include peripheral vascular disease, diabetic foot ulcers, neuropathy and 

nephropathy.)


GI: reports: Hiatal hernia (with frequent indigestion, EGD with biopsy was done 

2018.  Esophagus without Childress's esophagus.  Class A mild esophagit

is.  Stomach and duodenum normal.  Biopsies negative), Other (colorectal cancer 

diagnosed : T3N1. diverting colostomy followed with neoadjuvant chemo ( 8 

cycles of XELOX), radiation and then definitive resection)


: reports: Benign prostate hypertrophy (with LUTS, Ballder US done 18 w 

 cc, prominent prostate), Other (CKD Stage III with anemia of chronic ki

dney disease)


HEENT: reports: None


Psych: reports: Depression


Musculoskeletal: reports: None


Derm: reports: None


MRSA Hx?: No





- Past Surgical History


General: reports: Bowel surgery


Ortho: reports: Rotator cuff repair, Spine surgery





- Family & Social History


Family History Comment/Other: Father  of pancreatic cancer at the age of 76.

 Mother  in her 70's of an unknown metastatic cancer, she didn't believe in 

doctors.  Brother is healthy.  Half-brother has a history of throat cancer.  3 

daughters and 1 son: 3 daughters are alcoholic, son is healthy


Living arrangement: At home


Living Situation: With spouse/s.o.


Social History Notes: Never smoked. Rarely drinks . No history of recreational 

substance abuse. Just moved into new home here in [place] this last week. 

Retired from  when he was diagosed with the colon 

cancer.  to his second wife and they live in their own home





- Substance History


Use: Uses substance without health or social issues: NONE


Abuse: Recurrent use of substance despite neg consequences: NONE


Dependence: Experiences withdrawal or developed tolerances: NONE





- POLST


Patient has POLST: No


POLST Status: Full Code





Meds/Allgy





- Home Medications


Home Medications: 


                                Ambulatory Orders











 Medication  Instructions  Recorded  Confirmed


 


Citalopram [CeleXA] 20 mg PO DAILY 11/18/14 10/20/19


 


Multivitamin [Multi-Vitamin Daily] 1 tab ORAL DAILY 02/24/15 10/20/19


 


Gabapentin 300 mg PO TID 11/24/15 10/20/19


 


Loperamide [Imodium] 1 cap PO BID 05/10/16 10/20/19


 


Colestipol HCl 2 gm PO BID 07/23/17 10/20/19


 


Glipizide 10 mg PO BID 10/20/19 10/20/19


 


Omeprazole 20 mg PO DAILY 10/20/19 10/20/19


 


metFORMIN [Glucophage] 1,000 mg PO BID 10/20/19 10/20/19














- Allergies


Allergies/Adverse Reactions: 


                                    Allergies











Allergy/AdvReac Type Severity Reaction Status Date / Time


 


No Known Drug Allergies Allergy   Verified 10/20/19 20:54














Review of Systems





- Constitutional


Constitutional: denies: Fatigue, Fever, Chills, Malaise





- Eyes


Eyes: reports: Blurred vision, Vision loss.  denies: Pain, Irritation, 

Amaurosis, Spots in vision, Field loss





- Ears, Nose & Throat


Ears, Nose & Throat: denies: Ear pain, Hearing loss, Hearing aids, Tinnitus, 

Vertigo, Sore throat, Hoarseness





- Cardiovascular


Cariovascular: reports: Exertional dyspnea (since his chemo), Other (years ago 

had a stress test and this was negative, no hx of CAD).  denies: Irregular heart

rate, Palpitations, Chest pain, Edema, Decr. exercise tolerance





- Respiratory


Respiratory: reports: SOB with exertion.  denies: Cough, Sputum production, 

Wheezing, SOB at rest





- Gastrointestinal


Gastrointestinal: reports: Diarrhea (with metformin).  denies: Abdominal pain, 

Abdominal distention, Change in bowel habits, Rectal bleeding, Nausea





- Genitourinary


Genitourinary: reports: Frequency, Incontinence, Nocturia.  denies: Dysuria, 

Urgency, Hematuria, Flank pain, Urethral discharge





- Musculoskeletal


Musculoskeletal: denies: Muscle pain, Back pain, Muscle aches, Stiffness, Joint 

pain





- Integumentary


Integumentary: denies: Rash, Pruritis, Lesions, Dryness





- Neurological


Neurological: reports: Focal weakness, Numbness, Abnormal gait, Slurred speech. 

denies: General weakness, Headache, Memory problems, Pre-existing deficit, 

Seizures





- Psychiatric


Psychiatric: reports: Depression.  denies: Anxiety, Suicidal





- Endocrine


Endocrine: denies: Polyuria, Polydypsia





- Hematologic/Lymphatic


Hematologic/Lymphatic: reports: Anemia.  denies: Bruising, Petechiae, Blood 

clots, Lymphadenopathy, Recurrent infections


Prior Level of Functionality: 


still drives and able to do his ADL's. No DME being used .





Exam





- Vital Signs


Reviewed Vital Signs: Yes


Vital Signs: 





                                Vital Signs x48h











  Temp Pulse Resp BP Pulse Ox


 


 10/20/19 23:00   73  12  173/84 H  99


 


 10/20/19 21:34   78  14  155/90 H  98


 


 10/20/19 20:54  36.9 C  75  16  159/95 H  97














- Physical Exam


General Appearance: positive: No acute distress, Alert, Other (6 ft, 83.5 Kg 

white male with beard, no glasses, who has slurred, slow speech but is able to 

give a lucid hx and answer questions)


Eyes Bilateral: positive: PERRL, EOMI


ENT: positive: Pharynx nml


Neck: positive: No JVD.  negative: Stiff neck, Carotid bruit


Respiratory: positive: Chest non-tender.  negative: Wheezes, Rales, Rhonchi


Cardiovascular: positive: Regular rate & rhythm.  negative: Systolic murmur, 

Gallop/S4, Friction rub


Peripheral Pulses: positive: 0


Abdomen: positive: Non-tender, No organomegaly, Nml bowel sounds, No distention


Skin: positive: Warm, Dry, Other (anterior shins w multiple abrasions, cuts from

where he runs into things.)


Extremities: positive: Non-tender, No pedal edema, Other (right great toe w 

partial amputation and right 2nd toe with  Stage II ulcer on DIP)


Neurologic/Psychiatric: positive: Oriented x3, Slurred/abnml speech (right leg 

3/5. speech is slow, deliberate and sl slurred but the inability to express 

himself has resolved.).  negative: CN's nml (2-12), Motor nml


Reflexes: Bicep (R): 1+, Bicep (L): 1+, Knee (R): 0, Knee (L): 1+, Ankle (R): 0,

Ankle (L): 0


Babinski Reflex: Right: Absent, Left: Down





Conclusion/Plan





- Problem List


(1) TIA (transient ischemic attack)


Conclusion/Plan: 


In a white male who has risk factors of male sex, uncontrolled DM, PVD, 

hyperlipidemia and HTN on exam. CT of head and CA of head and neck are negative 

for treatable causes.





Plan:


ASA daily


statin


Plavix for 3 weeks


MRI of head in am


ECHO in am


Tele for afib monitoring


Neuro checks








(2) Uncontrolled type 2 diabetes mellitus with complication, without long-term 

current use of insulin


Conclusion/Plan: 


complications include hyperglycemia, PVD, neuropathy, nephropathy. He recognizes

that he has never been at goal adn that he has multiple complicatons but "he 

hates insulin".  I asked if he preferred further complications or going on 

insulin.  He thinks he may speak to his PCP and see. 





Plan:


check A1c


resume glipizide


add SS insulin ac











(3) HTN (hypertension)


Conclusion/Plan: 


has been seen on previous isits. On no meds at this time





Plan:


permissive HTN to 180 systolic


ACE after 72 hours


Qualifiers: 


   Hypertension type: essential hypertension   Qualified Code(s): I10 - 

Essential (primary) hypertension   





(4) Hyperlipidemia


Conclusion/Plan: 


check fasting panel in am


Qualifiers: 


   Hyperlipidemia type: mixed hyperlipidemia   Qualified Code(s): E78.2 - Mixed 

hyperlipidemia   





(5) Toe ulcer due to DM


Conclusion/Plan: 


no redness /heat/ drainage


silver alginate


keep dry


Qualifiers: 


   Diabetes mellitus type: type 2   Laterality: right   Non-pressure ulcer 

stage: with fat layer exposed   Qualified Code(s): E11.621 - Type 2 diabetes 

mellitus with foot ulcer; L97.512 - Non-pressure chronic ulcer of other part of 

right foot with fat layer exposed   





- Lab Results


Lab results reviewed: Yes


Fish Bones: 


                                 10/20/19 20:42





                                 10/20/19 20:42





- Diagnostic Imaging Results


Diagnostic Imaging Results: positive: Final report reviewed


Diagnostic Imaging Results Comments: 


CT HEAD 





EXAM DATE: 10/20/2019 09:11 PM. 





CLINICAL HISTORY: 59-year-old presenting with dysarthria and increasing 


right-sided weakness. Evaluate for intracranial pathology. 





COMPARISON: HEAD ANGIO 10/20/2019 9:11 PM. 





TECHNIQUE: Multiaxial CT images were obtained from the foramen magnum to 


the vertex. Reformats: Sagittal and coronal. IV contrast: None. 





In accordance with CT protocol optimization, one or more of the following 


dose reduction techniques were utilized for this exam: automated exposure 


control, adjustment of mA and/or KV based on patient size, or use of 


iterative reconstructive technique. 





FINDINGS: 


Parenchyma: No acute parenchymal hemorrhage, mass, or midline shift. 


There is mild to moderate bilateral areas of white matter attenuation 


seen that are age-indeterminate but appear chronic. There is no 


convincing CT evidence of acute infarct. 





Extraaxial Spaces: Normal for age. No subdural or epidural collections 


identified. 





Ventricles: Normal in size and position. 





Sinuses and Orbits: Orbits appear normal. Moderate right maxillary 


mucosal retention cyst versus polyp. Mastoid air cells and middle ear 


cavities appear clear. 





Bones: No evidence of fracture or calvarial defect. 





Other: None. 


IMPRESSION: 


1. No definite acute infarct seen. If there is clinical concern for acute 


stroke or symptoms persist, an MR brain could be considered to evaluate 


small or subtle pathology. 





CT ANGIOGRAM HEAD. 


CT SCAN OF THE HEAD WITHOUT AND WITH CONTRAST. 





EXAM DATE: 10/20/2019 09:29 PM 





CLINICAL HISTORY: Dysarthria, right side weakness. 





COMPARISON: NECK ANGIO 10/20/2019 9:11 PM. 





TECHNIQUE: 


- CT Scan Head: Using a multidetector scanner, axial images were acquired 


from the foramen magnum to the skull vertex prior to and following 


contrast administration. 


- CT Angiogram: Using a multidetector scanner, high-resolution axial 


images were acquired from the skull base through vertex following rapid 


infusion of intravenous contrast. Reformats: Multiplanar MIP reformats 


were reconstructed. Nascet criteria used for stenosis measurement. 





IV Contrast: OPTI 320 80ML. 





In accordance with CT protocol optimization, one or more of the following 


dose reduction techniques were utilized for this exam: automated exposure 


control, adjustment of mA and/or KV based on patient size, or use of 


iterative reconstructive technique. 





FINDINGS: 


NON-CONTRAST HEAD: 


Parenchyma: No intraparenchymal hemorrhage. No evidence of mass, midline 


shift, or CT findings of infarction. There are areas of low density 


involving white matter of bilateral cerebral hemispheres. 





Extraaxial Spaces: Normal for age. No subdural or epidural collections 


identified. 





Ventricles: Normal in size and position. 





Sinuses and orbits: Imaged paranasal sinuses, orbits, and mastoids show 


no significant abnormality. 





Bones: No evidence of fracture or calvarial defect. 





Other: None. 





POST-CONTRAST HEAD: No abnormal enhancement. 





CT ANGIOGRAM HEAD: 


There is focal calcification with mild narrowing of the right V4 


vertebral segment. Otherwise intracranial vessels normal in caliber. No 


evidence of aneurysm. 





DURAL VENOUS SINUSES AND MAJOR CENTRAL VEINS: Patent. 


IMPRESSION: 


CT Head: No CT evidence of acute intracranial process. Mild microvascular 


white matter disease. No abnormal enhancement. 





CTA Head: No large vessel occlusion. Mild distal right vertebral 


stenosis. No high-grade intracranial stenosis. 








CT ANGIOGRAM NECK 





EXAM DATE: 10/20/2019 09:31 PM. 





CLINICAL HISTORY: Dysarthria, right side weakness. 





COMPARISON: None. 





TECHNIQUE: Routine axial helical imaging was performed from the skull 


base through the aortic arch. Reconstructions: Routine multiplanar 3D MIP 


reconstructions. IV Contrast: OPTI 320 80ML. Evaluation of arterial 


stenosis is based on a NASCET method of measurement. 





In accordance with CT protocol optimization, one or more of the following 


dose reduction techniques were utilized for this exam: automated exposure 


control, adjustment of mA and/or KV based on patient size, or use of 


iterative reconstructive technique. 





FINDINGS: 


Right Carotid: The common carotid, internal carotid, and external carotid 


arteries are widely patent. There are calcifications of the proximal ICA 


without stenosis. 





Left Carotid: The common carotid, internal carotid, and external carotid 


arteries are widely patent. There are calcifications of the proximal ICA 


without stenosis. 





Vertebrals: The vertebrobasilar system shows no stenoses. 





Intracranial Circulation: Normal. No stenoses or aneurysms of the 


visualized vessels. 





Other: There are mild degenerative changes of the cervical spine. Lung 


apices clear. Soft tissues of neck unremarkable. 


IMPRESSION: No significant carotid or vertebral stenosis. 














- EKG Results


EKG Interpreted Independently: No


EKG Comparison: Unchanged from prior EKG


EKG Findings: 





NSR with no acute STTW changes and nml R wave progression, no Q waves.





Core Measures





- Anticipated LOS


I expect patient to be DC'd or transferred within 96 hours.: Yes





- DVT/VTE - Prophylaxis


VTE/DVT Device ordered at admit?: Yes

## 2019-10-20 NOTE — ED PHYSICIAN DOCUMENTATION
History of Present Illness





- Stated complaint


Stated Complaint: WEAK, FEVER, UNABLE TO STAND





- Chief complaint


Chief Complaint: Neuro





- Additonal information


Additional information: 


This is a 59-year-old male With a history of rectal carcinoma treated by 

chemoradiation and surgery, none since 2014, diabetes with diabetic neuropathy 

and past right toe amputation, who presents with difficulty speaking/feeding, 

and white right-sided weakness.  Patient states this began this morning, he is 

unable to specify exactly what time.  He noted when he was eating he had 

difficulty bringing food to his mouth, and he has had some trouble speaking as 

well.  He states that his right leg will give out on him when he walks, and his 

right arm feels slightly weak but not as bad as his right leg.  He denies any 

history of stroke.  He did not fall and hit his head.  No fever - EMS reported 

the patient felt warm to them, however they did not measure fever, and his wife 

and the patient deny fever.  No chest pain or shortness of breath.EMS did not 

find any obvious focal weakness on their exam, glucose was normal.





Patient rise arrives and states that this afternoon his right side was so weak 

that he could not walk, nights of prompted her to call EMS.  He was also 

"speaking gibberish" around the same time, though this has seemed to improve. 

She notes that he has had a slightly lingering cough after a cold recently, 

otherwise has been baseline.








Review of Systems


Constitutional: denies: Fever


Eyes: denies: Loss of vision


Cardiac: denies: Chest pain / pressure


Respiratory: denies: Dyspnea


GI: denies: Abdominal Pain


: denies: Dysuria


Skin: denies: Rash


Neurologic: reports: Focal weakness, Difficulty speaking


Immunocompromised: denies: Immunocompromised





PD PAST MEDICAL HISTORY





- Past Medical History


Cardiovascular: Hypertension, High cholesterol, Coronary artery disease


Respiratory: None


Endocrine/Autoimmune: Type 2 diabetes


GI: None


: None


HEENT: None


Psych: None


Musculoskeletal: None


Derm: None





- Past Surgical History


Past Surgical History: Yes


General: Bowel surgery


Ortho: Rotator cuff repair, Spine surgery





- Present Medications


Home Medications: 


                                Ambulatory Orders











 Medication  Instructions  Recorded  Confirmed


 


Citalopram [CeleXA] 20 mg PO DAILY 11/18/14 10/20/19


 


Multivitamin [Multi-Vitamin Daily] 1 tab ORAL DAILY 02/24/15 10/20/19


 


Gabapentin 300 mg PO TID 11/24/15 10/20/19


 


Loperamide [Imodium] 1 cap PO BID 05/10/16 10/20/19


 


Colestipol HCl 2 gm PO BID 07/23/17 10/20/19


 


Glipizide 10 mg PO BID 10/20/19 10/20/19


 


Omeprazole 20 mg PO DAILY 10/20/19 10/20/19


 


metFORMIN [Glucophage] 1,000 mg PO BID 10/20/19 10/20/19














- Allergies


Allergies/Adverse Reactions: 


                                    Allergies











Allergy/AdvReac Type Severity Reaction Status Date / Time


 


No Known Drug Allergies Allergy   Verified 10/20/19 20:54














- Social History


Does the pt smoke?: No


Smoking Status: Never smoker


Does the pt drink ETOH?: Yes


Does the pt have substance abuse?: No





- Immunizations


Immunizations are current?: No


Immunizations: Other immun not current





- POLST


Patient has POLST: No


POLST Status: Full Code





PD ED PE NORMAL





- Vitals


Vital signs reviewed: Yes





- General


General: Alert and oriented X 3, No acute distress





- HEENT


HEENT: Atraumatic, PERRL





- Neck


Neck: Supple, no meningeal sign





- Cardiac


Cardiac: RRR, No murmur





- Respiratory


Respiratory: No respiratory distress, Clear bilaterally





- Abdomen


Abdomen: Normal bowel sounds, Soft, Non tender, Non distended





- Derm


Derm: Warm and dry





- Extremities


Extremities: No deformity





- Neuro


Neuro: Other (Patient is alert and oriented to self, event, place, and month.He 

has some word finding difficulty and mild word salad/expressive aphasia. He has 

slight drift on the right lower leg, no drift noted on the upper extremities or 

left leg.  Cranial nerves II through XII are intact to testing.  He has chronic 

neuropathy but no new sensory deficit.  Finger-to-nose testing is symmetric 

without dysmetria bilaterally.  He appears to have some mild ataxia with 

heel-to-shin testing on the right lower leg. No neglect)





- Psych


Psych: Normal mood, Normal affect





Results





- Vitals


Vitals: 


                               Vital Signs - 24 hr











  10/20/19 10/20/19 10/20/19





  20:54 21:34 23:00


 


Temperature 36.9 C  


 


Heart Rate 75 78 73


 


Respiratory 16 14 12





Rate   


 


Blood Pressure 159/95 H 155/90 H 173/84 H


 


O2 Saturation 97 98 99








                                     Oxygen











O2 Source                      Room air

















- EKG (time done)


  ** 20:57


Other comments: Other comments (Rate 73, Rhythm sinus, there is no ST segment 

elevation or depression, no abnormal T wave inversions.)





- Labs


Labs: 


                                Laboratory Tests











  10/20/19 10/20/19 10/20/19





  20:42 20:42 20:42


 


WBC  4.3 L  


 


RBC  3.92 L  


 


Hgb  11.3 L  


 


Hct  34.0 L  


 


MCV  86.7  


 


MCH  28.8  


 


MCHC  33.2  


 


RDW  14.1  


 


Plt Count  165  


 


MPV  9.7  


 


Neut # (Auto)  3.4  


 


Lymph # (Auto)  0.5 L  


 


Mono # (Auto)  0.3  


 


Eos # (Auto)  0.1  


 


Baso # (Auto)  0.0  


 


Absolute Nucleated RBC  0.00  


 


Nucleated RBC %  0.0  


 


PT   13.2 H 


 


INR   1.2 


 


APTT   30.6 


 


Sodium    135


 


Potassium    4.5


 


Chloride    100 L


 


Carbon Dioxide    25


 


Anion Gap    10.0


 


BUN    26 H


 


Creatinine    1.2


 


Estimated GFR (MDRD)    62 L


 


Glucose    234 H


 


Glycated Hemoglobin   


 


Estim Average Glucose   


 


Calcium    8.9


 


Total Bilirubin    0.7


 


AST    19


 


ALT    21


 


Alkaline Phosphatase    63


 


Troponin I High Sens   


 


Total Protein    7.2


 


Albumin    4.0


 


Globulin    3.2


 


Albumin/Globulin Ratio    1.3


 


Lipase    29


 


TSH   


 


Ethyl Alcohol    < 5.0














  10/20/19 10/20/19 10/20/19





  20:42 20:42 22:40


 


WBC   


 


RBC   


 


Hgb   


 


Hct   


 


MCV   


 


MCH   


 


MCHC   


 


RDW   


 


Plt Count   


 


MPV   


 


Neut # (Auto)   


 


Lymph # (Auto)   


 


Mono # (Auto)   


 


Eos # (Auto)   


 


Baso # (Auto)   


 


Absolute Nucleated RBC   


 


Nucleated RBC %   


 


PT   


 


INR   


 


APTT   


 


Sodium   


 


Potassium   


 


Chloride   


 


Carbon Dioxide   


 


Anion Gap   


 


BUN   


 


Creatinine   


 


Estimated GFR (MDRD)   


 


Glucose   


 


Glycated Hemoglobin    6.7 H


 


Estim Average Glucose    146 H


 


Calcium   


 


Total Bilirubin   


 


AST   


 


ALT   


 


Alkaline Phosphatase   


 


Troponin I High Sens   3.2 


 


Total Protein   


 


Albumin   


 


Globulin   


 


Albumin/Globulin Ratio   


 


Lipase   


 


TSH  1.18  


 


Ethyl Alcohol   














- Rads (name of study)


  ** CT head WO


Radiology: Other (No acute intracranial abnormality)





  ** CTA head and neck


Radiology: Other (No signficiant stenosis or large vessel occlusion.)





  ** CXR


Radiology: Other (Hyperinflation, otherwise no acute abnormality)





PD MEDICAL DECISION MAKING





- ED course


Complexity details: considered differential (Stroke, TIA, infection, 

encephalopathy, electrolyte abnormality, head bleed)


ED course: 





On exam patient has some mild drift of his right lower extremity and he has 

obvious dysarthria.  He was taken promptly to CT where CT head revealed no acute

intracranial abnormality.  He is out of the window for TPA given that His 

symptoms began this morning, at least 10 hours ago.  Chest x-ray is 

unremarkable, labs are unrevealing for a cause of his symptoms.  CTA of the head

and neck show no large vessel occlusion or significant stenosis.  On repeat 

examination patient's dysarthria is improving, as is his leg weakness.  I spoke 

to patient's wife who states that this evening when his weakness got worse he 

had a time when he was speaking in a complete word salad/gibberish.  His 

presentation is highly concerning for stroke which may be resolving, versus TIA.

 Patient was admitted to the hospital for further evaluation and work-up, please

refer to the admitting services notes for further hospital course. Patient and 

his wife are updated with the plan of care, they are in agreement.





Departure





- Departure


Disposition: ED Place in Observation


Clinical Impression: 


 Stroke-like symptoms





Condition: Good


Discharge Date/Time: 10/20/19 23:38





NIHSS





- Time


Time: 21:05





- Level of Consciousness


Level of consciousness: (0) Alert, Keenly responsive


LOC Questions: (0) Answers both Q's correct


LOC Commands: (0) Performs both correctly





- Gaze


Best Gaze: (0) Normal





- Visual


Visual: (0) No loss





- Facial Palsy


Facial Palsy: (0) Normal, symmetrical movement





- Motor Arms (both separate)


Motor Arm (right): (0) No drift


Motor Arm (left): (0) No drift





- Motor Legs (both separate)


Motor Leg (right): (1) Drift


Motor Leg (left): (0) No drift





- Limb Ataxia


Limb Ataxia: (1) Present in 1 limb





- Sensory


Sensory: (0) Normal





- Best Language


Best Language: (1) mild-to-aphasia





- Dysarthria


Dysarthria: (0) Normal





- Extinction and Inattention (formally neg


Extinction and inattention: (0) No abnormality





- Total Score/Results


Total Score/Result: 3

## 2019-10-20 NOTE — CT REPORT
Reason:  dysarthria, R side weakness

Procedure Date:  10/20/2019   

Accession Number:  [id number] / [id number]                    

Procedure:  CT  - ANGIO HEAD W/WO CPT Code:  

 

FULL RESULT:

 

 

EXAM:

CT ANGIOGRAM HEAD.

CT SCAN OF THE HEAD WITHOUT AND WITH CONTRAST.

 

EXAM DATE: 10/20/2019 09:29 PM

 

CLINICAL HISTORY: Dysarthria, right side weakness.

 

COMPARISON: NECK ANGIO 10/20/2019 9:11 PM.

 

TECHNIQUE:

- CT Scan Head: Using a multidetector scanner, axial images were acquired 

from the foramen magnum to the skull vertex prior to and following 

contrast administration.

- CT Angiogram: Using a multidetector scanner, high-resolution axial 

images were acquired from the skull base through vertex following rapid 

infusion of intravenous contrast. Reformats: Multiplanar MIP reformats 

were reconstructed. Nascet criteria used for stenosis measurement.

 

IV Contrast: OPTI 320 80ML.

 

In accordance with CT protocol optimization, one or more of the following 

dose reduction techniques were utilized for this exam: automated exposure 

control, adjustment of mA and/or KV based on patient size, or use of 

iterative reconstructive technique.

 

FINDINGS:

NON-CONTRAST HEAD:

Parenchyma: No intraparenchymal hemorrhage. No evidence of mass, midline 

shift, or CT findings of infarction. There are areas of low density 

involving white matter of bilateral cerebral hemispheres.

 

Extraaxial Spaces: Normal for age. No subdural or epidural collections 

identified.

 

Ventricles: Normal in size and position.

 

Sinuses and orbits: Imaged paranasal sinuses, orbits, and mastoids show 

no significant abnormality.

 

Bones: No evidence of fracture or calvarial defect.

 

Other: None.

 

POST-CONTRAST HEAD: No abnormal enhancement.

 

CT ANGIOGRAM HEAD:

There is focal calcification with mild narrowing of the right V4 

vertebral segment. Otherwise intracranial vessels normal in caliber. No 

evidence of aneurysm.

 

DURAL VENOUS SINUSES AND MAJOR CENTRAL VEINS: Patent.

IMPRESSION:

CT Head: No CT evidence of acute intracranial process. Mild microvascular 

white matter disease. No abnormal enhancement.

 

CTA Head: No large vessel occlusion. Mild distal right vertebral 

stenosis. No high-grade intracranial stenosis.

 

RADIA

 

The critical test notification system was initiated by Dr. Omar Dickinson 

at 09:57 PM on 10/20/2019.

 

The above critical test findings  were discussed with Dr. Olivia by Dr. Omar Dickinson at 09:59 PM on 10/20/2019.

## 2019-10-21 LAB
ALBUMIN DIAFP-MCNC: 4 G/DL (ref 3.2–5.5)
ALBUMIN/GLOB SERPL: 1.3 {RATIO} (ref 1–2.2)
ALP SERPL-CCNC: 66 IU/L (ref 42–121)
ALT SERPL W P-5'-P-CCNC: 22 IU/L (ref 10–60)
ANION GAP SERPL CALCULATED.4IONS-SCNC: 12 MMOL/L (ref 6–13)
AST SERPL W P-5'-P-CCNC: 19 IU/L (ref 10–42)
BILIRUB BLD-MCNC: 0.5 MG/DL (ref 0.2–1)
BUN SERPL-MCNC: 27 MG/DL (ref 6–20)
CALCIUM UR-MCNC: 8.9 MG/DL (ref 8.5–10.3)
CHLORIDE SERPL-SCNC: 102 MMOL/L (ref 101–111)
CHOLEST SERPL-MCNC: 178 MG/DL
CLARITY UR REFRACT.AUTO: CLEAR
CO2 SERPL-SCNC: 25 MMOL/L (ref 21–32)
CREAT SERPLBLD-SCNC: 1.1 MG/DL (ref 0.6–1.2)
GFRSERPLBLD MDRD-ARVRAT: 68 ML/MIN/{1.73_M2} (ref 89–?)
GLOBULIN SER-MCNC: 3.2 G/DL (ref 2.1–4.2)
GLUCOSE SERPL-MCNC: 211 MG/DL (ref 70–100)
GLUCOSE UR QL STRIP.AUTO: 250 MG/DL
HDLC SERPL-MCNC: 35 MG/DL
HDLC SERPL: 5.1 {RATIO} (ref ?–5)
KETONES UR QL STRIP.AUTO: NEGATIVE MG/DL
LDLC SERPL CALC-MCNC: 91 MG/DL
LDLC/HDLC SERPL: 2.6 {RATIO} (ref ?–3.6)
NITRITE UR QL STRIP.AUTO: NEGATIVE
PH UR STRIP.AUTO: 6.5 PH (ref 5–7.5)
PROT SPEC-MCNC: 7.2 G/DL (ref 6.7–8.2)
PROT UR STRIP.AUTO-MCNC: NEGATIVE MG/DL
RBC # UR STRIP.AUTO: NEGATIVE /UL
RBC # URNS HPF: (no result) /HPF (ref 0–5)
SODIUM SERPLBLD-SCNC: 139 MMOL/L (ref 135–145)
SP GR UR STRIP.AUTO: 1.01 (ref 1–1.03)
SQUAMOUS URNS QL MICRO: (no result)
UROBILINOGEN UR QL STRIP.AUTO: (no result) E.U./DL
UROBILINOGEN UR STRIP.AUTO-MCNC: NEGATIVE MG/DL
VLDLC SERPL-SCNC: 52 MG/DL

## 2019-10-21 RX ADMIN — POLYETHYLENE GLYCOL 3350 SCH: 17 POWDER, FOR SOLUTION ORAL at 10:32

## 2019-10-21 RX ADMIN — ATORVASTATIN CALCIUM SCH MG: 40 TABLET, FILM COATED ORAL at 20:30

## 2019-10-21 RX ADMIN — GABAPENTIN SCH MG: 300 CAPSULE ORAL at 14:06

## 2019-10-21 RX ADMIN — CLOPIDOGREL BISULFATE SCH MG: 75 TABLET ORAL at 11:28

## 2019-10-21 RX ADMIN — SODIUM CHLORIDE, PRESERVATIVE FREE SCH: 5 INJECTION INTRAVENOUS at 00:55

## 2019-10-21 RX ADMIN — ASPIRIN SCH MG: 81 TABLET, COATED ORAL at 08:33

## 2019-10-21 RX ADMIN — SODIUM CHLORIDE, PRESERVATIVE FREE SCH ML: 5 INJECTION INTRAVENOUS at 08:33

## 2019-10-21 RX ADMIN — GABAPENTIN SCH MG: 300 CAPSULE ORAL at 20:30

## 2019-10-21 RX ADMIN — SODIUM CHLORIDE, PRESERVATIVE FREE SCH ML: 5 INJECTION INTRAVENOUS at 16:13

## 2019-10-21 RX ADMIN — ATORVASTATIN CALCIUM SCH MG: 40 TABLET, FILM COATED ORAL at 00:22

## 2019-10-21 RX ADMIN — GABAPENTIN SCH MG: 300 CAPSULE ORAL at 06:41

## 2019-10-21 NOTE — PROVIDER PROGRESS NOTE
Hospitalist Cross-cover Note





- Cross-Cover Note


Cross-Cover Note: 


The patient's MRI shows evidence of a stroke in the left basal ganglia. PT 

evaluated the patient and recommending inpatient rehab at this time. Will allow 

permissive hypertension for 24 hours. He has been started on Lipitor as well as 

Aspirin and Plavix which he will need to take for three weeks. Will hope to 

discharge him tomorrow to an inpatient rehab facility.

## 2019-10-21 NOTE — PROVIDER PROGRESS NOTE
Subjective





- Prog Note Date


Prog Note Date: 10/21/19





- Subjective


Subjective: 


He reports feeling lethargic today. Continues to report right sided lower 

extremity weakness. His speech is not at his baseline and is slurred. He was 

able to work with PT today.








Current Medications





- Current Medications


Current Medications: 





Active Medications





Acetaminophen (Tylenol)  650 mg PO Q4HR PRN


   PRN Reason: Pain 1 to 4


Aspirin (Ecotrin)  81 mg PO DAILY [facility]


   Last Admin: 10/21/19 08:33 Dose:  81 mg


Atorvastatin Calcium (Lipitor)  80 mg PO QPM [facility]


   Last Admin: 10/21/19 00:22 Dose:  80 mg


Clopidogrel Bisulfate (Plavix)  75 mg PO DAILY [facility]


   Last Admin: 10/21/19 11:28 Dose:  75 mg


Gabapentin (Neurontin)  300 mg PO TID [facility]


   Last Admin: 10/21/19 14:06 Dose:  300 mg


Glipizide (Glucotrol)  10 mg PO BID [facility]


   Last Admin: 10/21/19 10:33 Dose:  Not Given


Ondansetron HCl (Zofran Inj)  4 mg IVP Q6HR PRN


   PRN Reason: Nausea / Vomiting


Oxycodone HCl (Roxicodone)  5 mg PO Q4HR PRN


   PRN Reason: Pain 5 to 7


Polyethylene Glycol (Miralax)  17 gm PO DAILY [facility]


   Last Admin: 10/21/19 10:32 Dose:  Not Given


Prochlorperazine Edisylate (Compazine Inj)  10 mg IVP Q6HR PRN


   PRN Reason: Nausea / Vomiting


Sodium Chloride (Normal Saline Flush 0.9%)  10 ml IVP PRN PRN


   PRN Reason: AS NEEDED PER PROVIDER ORDERS


Sodium Chloride (Normal Saline Flush 0.9%)  10 ml IVP 0100,0900,1700 [facility]


   Last Admin: 10/21/19 16:13 Dose:  10 ml





                                        





Citalopram [CeleXA] 20 mg PO DAILY 11/18/14 


Multivitamin [Multi-Vitamin Daily] 1 tab ORAL DAILY 02/24/15 


Gabapentin 300 mg PO TID 11/24/15 


Loperamide [Imodium] 1 cap PO BID 05/10/16 


Colestipol HCl 2 gm PO BID 07/23/17 


Glipizide 10 mg PO BID 10/20/19 


Omeprazole 20 mg PO DAILY 10/20/19 


metFORMIN [Glucophage] 1,000 mg PO BID 10/20/19 











Objective





- Vital Signs/Intake & Output


Reviewed Vital Signs: Yes


Vital Signs: 


                                Vital Signs x48h











  Temp Pulse Resp BP BP Pulse Ox


 


 10/21/19 17:00  36.3 C L  73  18   161/82 H  99


 


 10/21/19 13:00  36.4 C L  72  10 L  154/76 H   97











Intake & Output: 


                                 Intake & Output











 10/18/19 10/19/19 10/20/19 10/21/19





 23:59 23:59 23:59 23:59


 


Intake Total    640


 


Output Total    1325


 


Balance    -685














- Objective


General Appearance: positive: No acute distress, Lethargic


Eyes Bilateral: positive: Normal inspection


ENT: positive: ENT inspection nml


Neck: positive: Nml inspection


Respiratory: positive: No respiratory distress.  negative: Wheezes, Rales, 

Rhonchi


Cardiovascular: positive: Regular rate & rhythm, No murmur.  negative: Systolic 

murmur, Diastolic murmur


Abdomen: positive: Non-tender, No distention


Skin: positive: No rash, Warm, Dry


Neurologic/Psychiatric: positive: Weakness (3/5 motor strength in right lower 

extremity.), Sensory loss (Decreased sensation in lower extremities near the 

feet), Slurred/abnml speech, Depressed mood/affect (Flat affect)





- Lab Results


Fish Bones: 


                                 10/20/19 20:42





                                 10/21/19 05:07


Other Labs: 


                               Lab Results x24hrs











  10/21/19 10/21/19 10/21/19 Range/Units





  13:00 05:07 05:07 


 


WBC     (4.8-10.8)  x10^3/uL


 


RBC     (4.70-6.10)  10^6/uL


 


Hgb     (14.0-18.0)  g/dL


 


Hct     (42.0-52.0)  %


 


MCV     (80.0-94.0)  fL


 


MCH     (27.0-31.0)  pg


 


MCHC     (32.0-36.0)  g/dL


 


RDW     (12.0-15.0)  %


 


Plt Count     (130-450)  10^3/uL


 


MPV     (7.4-11.4)  fL


 


Neut # (Auto)     (1.5-6.6)  10^3/uL


 


Lymph # (Auto)     (1.5-3.5)  10^3/uL


 


Mono # (Auto)     (0.0-1.0)  10^3/uL


 


Eos # (Auto)     (0.0-0.7)  10^3/uL


 


Baso # (Auto)     (0.0-0.1)  10^3/uL


 


Absolute Nucleated RBC     x10^3/uL


 


Nucleated RBC %     /100WBC


 


PT     (9.9-12.6)  secs


 


INR     (0.8-1.2)  


 


APTT     (24.9-33.3)  secs


 


Sodium   139   (135-145)  mmol/L


 


Potassium   3.7   (3.5-5.0)  mmol/L


 


Chloride   102   (101-111)  mmol/L


 


Carbon Dioxide   25   (21-32)  mmol/L


 


Anion Gap   12.0   (6-13)  


 


BUN   27 H   (6-20)  mg/dL


 


Creatinine   1.1   (0.6-1.2)  mg/dL


 


Estimated GFR (MDRD)   68 L   (>89)  


 


Glucose   211 H   ()  mg/dL


 


Glycated Hemoglobin     (4.6-6.2)  %


 


Estim Average Glucose     ()  


 


Calcium   8.9   (8.5-10.3)  mg/dL


 


Total Bilirubin   0.5   (0.2-1.0)  mg/dL


 


AST   19   (10-42)  IU/L


 


ALT   22   (10-60)  IU/L


 


Alkaline Phosphatase   66   ()  IU/L


 


Troponin I High Sens     (2.3-19.7)  pg/mL


 


Total Protein   7.2   (6.7-8.2)  g/dL


 


Albumin   4.0   (3.2-5.5)  g/dL


 


Globulin   3.2   (2.1-4.2)  g/dL


 


Albumin/Globulin Ratio   1.3   (1.0-2.2)  


 


Triglycerides    261 H ( - 149) mg/dL


 


Cholesterol    178 ( - 199) mg/dL


 


LDL Cholesterol, Calc    91 ( - 129) mg/dL


 


VLDL Cholesterol    52  mg/dL


 


HDL Cholesterol    35 L (60 - ) mg/dL


 


LDL/HDL Ratio    2.6  (<3.6)  


 


Cholesterol/HDL Ratio    5.1  (<5.0)  


 


Lipase     (22-51)  U/L


 


TSH     (0.34-5.60)  uIU/mL


 


Urine Color  YELLOW    


 


Urine Clarity  CLEAR    (CLEAR)  


 


Urine pH  6.5    (5.0-7.5)  PH


 


Ur Specific Gravity  1.010    (1.002-1.030)  


 


Urine Protein  NEGATIVE    (NEGATIVE)  mg/dL


 


Urine Glucose (UA)  250 H    (NEGATIVE)  mg/dL


 


Urine Ketones  NEGATIVE    (NEGATIVE)  mg/dL


 


Urine Occult Blood  NEGATIVE    (NEGATIVE)  


 


Urine Nitrite  NEGATIVE    (NEGATIVE)  


 


Urine Bilirubin  NEGATIVE    (NEGATIVE)  


 


Urine Urobilinogen  0.2 (NORMAL)    (NORMAL)  E.U./dL


 


Ur Leukocyte Esterase  NEGATIVE    (NEGATIVE)  


 


Urine RBC  0-5    (0-5)  /HPF


 


Urine WBC  0-3    (0-3)  /HPF


 


Ur Squamous Epith Cells  RARE Squamous    (<= Few)  


 


Urine Bacteria  Rare    (None Seen)  /HPF


 


Urine Culture Comments  NOT INDICATED    


 


Ethyl Alcohol     mg/dL














  10/20/19 10/20/19 10/20/19 Range/Units





  22:40 20:42 20:42 


 


WBC     (4.8-10.8)  x10^3/uL


 


RBC     (4.70-6.10)  10^6/uL


 


Hgb     (14.0-18.0)  g/dL


 


Hct     (42.0-52.0)  %


 


MCV     (80.0-94.0)  fL


 


MCH     (27.0-31.0)  pg


 


MCHC     (32.0-36.0)  g/dL


 


RDW     (12.0-15.0)  %


 


Plt Count     (130-450)  10^3/uL


 


MPV     (7.4-11.4)  fL


 


Neut # (Auto)     (1.5-6.6)  10^3/uL


 


Lymph # (Auto)     (1.5-3.5)  10^3/uL


 


Mono # (Auto)     (0.0-1.0)  10^3/uL


 


Eos # (Auto)     (0.0-0.7)  10^3/uL


 


Baso # (Auto)     (0.0-0.1)  10^3/uL


 


Absolute Nucleated RBC     x10^3/uL


 


Nucleated RBC %     /100WBC


 


PT     (9.9-12.6)  secs


 


INR     (0.8-1.2)  


 


APTT     (24.9-33.3)  secs


 


Sodium     (135-145)  mmol/L


 


Potassium     (3.5-5.0)  mmol/L


 


Chloride     (101-111)  mmol/L


 


Carbon Dioxide     (21-32)  mmol/L


 


Anion Gap     (6-13)  


 


BUN     (6-20)  mg/dL


 


Creatinine     (0.6-1.2)  mg/dL


 


Estimated GFR (MDRD)     (>89)  


 


Glucose     ()  mg/dL


 


Glycated Hemoglobin  6.7 H    (4.6-6.2)  %


 


Estim Average Glucose  146 H    ()  


 


Calcium     (8.5-10.3)  mg/dL


 


Total Bilirubin     (0.2-1.0)  mg/dL


 


AST     (10-42)  IU/L


 


ALT     (10-60)  IU/L


 


Alkaline Phosphatase     ()  IU/L


 


Troponin I High Sens   3.2   (2.3-19.7)  pg/mL


 


Total Protein     (6.7-8.2)  g/dL


 


Albumin     (3.2-5.5)  g/dL


 


Globulin     (2.1-4.2)  g/dL


 


Albumin/Globulin Ratio     (1.0-2.2)  


 


Triglycerides    ( - 149) mg/dL


 


Cholesterol    ( - 199) mg/dL


 


LDL Cholesterol, Calc    ( - 129) mg/dL


 


VLDL Cholesterol     mg/dL


 


HDL Cholesterol    (60 - ) mg/dL


 


LDL/HDL Ratio     (<3.6)  


 


Cholesterol/HDL Ratio     (<5.0)  


 


Lipase     (22-51)  U/L


 


TSH    1.18  (0.34-5.60)  uIU/mL


 


Urine Color     


 


Urine Clarity     (CLEAR)  


 


Urine pH     (5.0-7.5)  PH


 


Ur Specific Gravity     (1.002-1.030)  


 


Urine Protein     (NEGATIVE)  mg/dL


 


Urine Glucose (UA)     (NEGATIVE)  mg/dL


 


Urine Ketones     (NEGATIVE)  mg/dL


 


Urine Occult Blood     (NEGATIVE)  


 


Urine Nitrite     (NEGATIVE)  


 


Urine Bilirubin     (NEGATIVE)  


 


Urine Urobilinogen     (NORMAL)  E.U./dL


 


Ur Leukocyte Esterase     (NEGATIVE)  


 


Urine RBC     (0-5)  /HPF


 


Urine WBC     (0-3)  /HPF


 


Ur Squamous Epith Cells     (<= Few)  


 


Urine Bacteria     (None Seen)  /HPF


 


Urine Culture Comments     


 


Ethyl Alcohol     mg/dL














  10/20/19 10/20/19 10/20/19 Range/Units





  20:42 20:42 20:42 


 


WBC    4.3 L  (4.8-10.8)  x10^3/uL


 


RBC    3.92 L  (4.70-6.10)  10^6/uL


 


Hgb    11.3 L  (14.0-18.0)  g/dL


 


Hct    34.0 L  (42.0-52.0)  %


 


MCV    86.7  (80.0-94.0)  fL


 


MCH    28.8  (27.0-31.0)  pg


 


MCHC    33.2  (32.0-36.0)  g/dL


 


RDW    14.1  (12.0-15.0)  %


 


Plt Count    165  (130-450)  10^3/uL


 


MPV    9.7  (7.4-11.4)  fL


 


Neut # (Auto)    3.4  (1.5-6.6)  10^3/uL


 


Lymph # (Auto)    0.5 L  (1.5-3.5)  10^3/uL


 


Mono # (Auto)    0.3  (0.0-1.0)  10^3/uL


 


Eos # (Auto)    0.1  (0.0-0.7)  10^3/uL


 


Baso # (Auto)    0.0  (0.0-0.1)  10^3/uL


 


Absolute Nucleated RBC    0.00  x10^3/uL


 


Nucleated RBC %    0.0  /100WBC


 


PT   13.2 H   (9.9-12.6)  secs


 


INR   1.2   (0.8-1.2)  


 


APTT   30.6   (24.9-33.3)  secs


 


Sodium  135    (135-145)  mmol/L


 


Potassium  4.5    (3.5-5.0)  mmol/L


 


Chloride  100 L    (101-111)  mmol/L


 


Carbon Dioxide  25    (21-32)  mmol/L


 


Anion Gap  10.0    (6-13)  


 


BUN  26 H    (6-20)  mg/dL


 


Creatinine  1.2    (0.6-1.2)  mg/dL


 


Estimated GFR (MDRD)  62 L    (>89)  


 


Glucose  234 H    ()  mg/dL


 


Glycated Hemoglobin     (4.6-6.2)  %


 


Estim Average Glucose     ()  


 


Calcium  8.9    (8.5-10.3)  mg/dL


 


Total Bilirubin  0.7    (0.2-1.0)  mg/dL


 


AST  19    (10-42)  IU/L


 


ALT  21    (10-60)  IU/L


 


Alkaline Phosphatase  63    ()  IU/L


 


Troponin I High Sens     (2.3-19.7)  pg/mL


 


Total Protein  7.2    (6.7-8.2)  g/dL


 


Albumin  4.0    (3.2-5.5)  g/dL


 


Globulin  3.2    (2.1-4.2)  g/dL


 


Albumin/Globulin Ratio  1.3    (1.0-2.2)  


 


Triglycerides    ( - 149) mg/dL


 


Cholesterol    ( - 199) mg/dL


 


LDL Cholesterol, Calc    ( - 129) mg/dL


 


VLDL Cholesterol     mg/dL


 


HDL Cholesterol    (60 - ) mg/dL


 


LDL/HDL Ratio     (<3.6)  


 


Cholesterol/HDL Ratio     (<5.0)  


 


Lipase  29    (22-51)  U/L


 


TSH     (0.34-5.60)  uIU/mL


 


Urine Color     


 


Urine Clarity     (CLEAR)  


 


Urine pH     (5.0-7.5)  PH


 


Ur Specific Gravity     (1.002-1.030)  


 


Urine Protein     (NEGATIVE)  mg/dL


 


Urine Glucose (UA)     (NEGATIVE)  mg/dL


 


Urine Ketones     (NEGATIVE)  mg/dL


 


Urine Occult Blood     (NEGATIVE)  


 


Urine Nitrite     (NEGATIVE)  


 


Urine Bilirubin     (NEGATIVE)  


 


Urine Urobilinogen     (NORMAL)  E.U./dL


 


Ur Leukocyte Esterase     (NEGATIVE)  


 


Urine RBC     (0-5)  /HPF


 


Urine WBC     (0-3)  /HPF


 


Ur Squamous Epith Cells     (<= Few)  


 


Urine Bacteria     (None Seen)  /HPF


 


Urine Culture Comments     


 


Ethyl Alcohol  < 5.0    mg/dL














ABX Reporting


Has patient been on IV antibiotics over the past 48 hours?: No





Assessment/Plan





- Problem List


(1) Infarction of left basal ganglia


Impression: 


Evident on MRI of the brain. Continues to have right sided deficits. A1c is at 

goal. Has been started on Plavix/Aspirin for 21 and Lipitor 80mg. Echo was 

unremarkable. PT recommended inpatient rehab. Will allow permissive hypertension

 for 24 hours then start on ace inhibitor.








(2) Diabetes mellitus type 2, controlled, with complications


Impression: 


His A1c is at goal and is less than <7. Continue Glipizide while inpatient and 

CC diet. Restart Metformin on discharge. Continue Gabapentin for neuropathy.








(3) Diabetic neuropathy


Impression: 


Stable. Will resume home Gabapentin.

## 2019-10-21 NOTE — MRI REPORT
Reason:  expresive aphasia, R leg weak

Procedure Date:  10/21/2019   

Accession Number:  [id number] / [id number]                    

Procedure:  MRI - Brain W/O CPT Code:  

 

FULL RESULT:

 

 

EXAM:

MRI BRAIN WITHOUT CONTRAST

 

EXAM DATE: 10/21/2019 12:33 PM.

 

CLINICAL HISTORY: Expressive aphasia, right leg weakness.

 

COMPARISON: Prior CT angiogram head and neck 10/20/2019.

 

TECHNIQUE: Multiplanar, multisequence T1-weighted and fluid-sensitive MR 

sequences of the brain were performed. Sequences optimized for routine 

evaluation. Other: None. IV Contrast: None.

 

Findings: Relevant images are indicated (image number, series number).

 

There is left basal ganglia putamen/posterior limb internal capsule 1.9 

cm subacute ischemic stroke. No hemorrhage or mass-effect. Corresponding 

increased signal axial flair, low signal intensity on ADC map. No other 

area of acute/subacute ischemic change. There is no hemosiderin 

deposition present in the brain. There is no hemorrhage, mass or midline 

shift. Orbital contents negative. Small right maxillary sinus mucus 

retention cyst. Moderate scattered periventricular, subcortical white 

matter disease present. No significant cortical atrophy. Mild ventricular 

enlargement. Mild/moderate mid brain atrophy. Craniocervical junction, 

limited upper cervical cord negative.

 

Impressions:

1. Left basal ganglia/left posterior limb internal capsule 1.9 cm 

subacute ischemic stroke. No hemorrhage or mass-effect.

2. Moderate scattered superimposed white matter disease most likely 

related to chronic small vessel ischemic disease.

 

RADIA

 

The call report notification system was initiated by Dr. Andrade Hernandez at 

12:54 PM on 10/21/2019.

## 2019-10-21 NOTE — ADVANCE CARE PLANNING NOTE
Advance Care Planning





- Planning Encounter


Date: 10/21/19


Time: 00:26


Purpose: 


Establish how aggressive he would want a resuscitative effort


Parties in Attendance: 





patient and hospitalist


Decisional Capacity of the Patient: 





intact in spite of expressive aphasia





- Encounter


Subjective/Patient's Story: 


59[place]year[place]   [place] 

second wife and he retired in  when he was diagnosed with colon cancer.  The

chemo and radiation left him so weak that he just could not work anymore.  He is

slowly regained his strength.  Feels like he has a good quality of life.  Not 

very invested in taking care of his glucose because the next step is insulin.  

He hates insulin.  They just bought a new house and moved into it in [place] 

this week.





He is able to do all of his activities of daily living.  Still drives a car.  

While there are stressors from 3 daughters being alcoholics, he feels like he 

has a good family unit.





He had an episode a year ago where he had slurred speech and right leg weakness 

in association with drinking of beer.  He thought he was having an allergic 

reaction to it.  It went away and it never came back so he did not think much of

it.  Last night he had a beer to celebrate the moving in to his new house.  

Friends were over.  So this morning, he thought he was having another allergic 

reaction when he had the expressive a aphasia, thick tongue and then progressive

right leg weakness.  As the day went on and it was not getting any better, his 

wife thought that maybe he should be evaluated.





CODE STATUS is full code.  He says that that would have wife would want as well.

 I asked him if there are any circumstances where he would not want to be 

resuscitated.  He states that he has never thought about it, but he knows that 

if he were ever to become bedbound, completely dependent on someone for bathing,

feeding, etc., he does not want to be resuscitated at that point in time.  He 

never wants to live in a nursing home.  He does not mind going home and being 

plegic, as long as he can still get out of bed.  But if he were to become so 

debilitated he cannot get out of bed, ever, he wants his wife to let him go.





Objective/Medical Story: 





59-year-old white male whose risk factors for cerebrovascular disease include 

male sex, uncontrolled diabetes and hyperlipidemia.  His main problems have had 

to do with his diabetes.  He has had diabetic foot ulcers of both feet/toes in 

the past.  So far he has not required amputation.





This morning he began having episodes of expressive aphasia  It was not until he

started having right leg weakness and worsening a aphasia that he decided to 

come into the hospital this evening.  That was because the right leg was getting

so weak he was starting to have his leg crumble underneath him.  He was seen by 

Dr. Rose in the emergency room.  He is afebrile, mildly hypertensive at 

159/95.  In the ER he has gone up to 173/84.  He has a normal respiratory rate 

and is oxygenating well on room air.  When he first presented for his stay in 

the emergency room, he had a fairly severe expressive a aphasia.  That has 

gotten much better as the work-up has ensued.  His right leg weakness has also 

improved. 





He delayed coming in because he thought it was just an allergic reaction to 

having beer.  He just moved into a new home in [place].  Was having friends 

over.  Had a beer last night.  He states that because he had this exact same 

reaction a year ago.  His mouth did not want to move correctly, his tongue felt 

thick, and his right leg was weak.  This was a year ago.  It went away.  It was 

in association with drinking a beer back then.  That is why he delayed coming in

today.  He really had no idea that he could possibly having a stroke.





He has received aspirin.  CT of the head does not show any acute cerebrovascular

event.  CT angiogram of neck and head also shows no lesions.





He is now placed in observation for a TIA since he seems to be improving while 

in the emergency room.





History





- Past Medical History


Cardiovascular: reports: Hypertension, High cholesterol, Other (CAD is written 

down in PMH as a problem and he denies that. Never had MI or angina)


Respiratory: reports: Shortness of breath


Endocrine/Autoimmune: reports: Type 2 diabetes (Glycosylated hemoglobin has been

consistently elevated in the electronic medical record since .  The lowest 

he is been is 7.6% in .  The highest he has been has been 13.3% in 2017.  His last A1c in the EMR was 12.1% 2018.  Complications 

include peripheral vascular disease, diabetic foot ulcers, neuropathy and 

nephropathy.)


GI: reports: Hiatal hernia (with frequent indigestion, EGD with biopsy was done 

2018.  Esophagus without Childress's esophagus.  Class A mild 

esophagitis.  Stomach and duodenum normal.  Biopsies negative), Other 

(colorectal cancer diagnosed : T3N1. diverting colostomy followed with 

neoadjuvant chemo ( 8 cycles of XELOX), radiation and then definitive resection)


: reports: Benign prostate hypertrophy (with LUTS, Ballder US done 18 w 

 cc, prominent prostate), Other (CKD Stage III with anemia of chronic 

kidney disease)


HEENT: reports: None


Psych: reports: Depression


Musculoskeletal: reports: None


Derm: reports: None


Goals of Care: 


1.  Stay independent living in his own home for as long as possible.  If he can 

no longer live independently in his home, he would prefer to be transition to 

alliative care.





2.  Full CODE STATUS





3.  Needs to get his glucose under controlled and reduce his risk of further 

stroke, heart attack, or diabetic foot infections





Plan: 





1.  Finish work up for TIA and start ASA and Plavix.


2. I've asked him to discuss his wishes with his wife and plan for his eventual 

disabilty as a natural progression of his uncontrolled DM. 


Code Status: Attempt Resuscitation


Time spent on advance care plannin minutes

## 2019-10-22 LAB
ANION GAP SERPL CALCULATED.4IONS-SCNC: 7 MMOL/L (ref 6–13)
BASOPHILS NFR BLD AUTO: 0 10^3/UL (ref 0–0.1)
BASOPHILS NFR BLD AUTO: 0.2 %
BUN SERPL-MCNC: 27 MG/DL (ref 6–20)
CALCIUM UR-MCNC: 8.8 MG/DL (ref 8.5–10.3)
CHLORIDE SERPL-SCNC: 104 MMOL/L (ref 101–111)
CO2 SERPL-SCNC: 28 MMOL/L (ref 21–32)
CREAT SERPLBLD-SCNC: 1.1 MG/DL (ref 0.6–1.2)
EOSINOPHIL # BLD AUTO: 0 10^3/UL (ref 0–0.7)
EOSINOPHIL NFR BLD AUTO: 0.9 %
ERYTHROCYTE [DISTWIDTH] IN BLOOD BY AUTOMATED COUNT: 13.9 % (ref 12–15)
GFRSERPLBLD MDRD-ARVRAT: 68 ML/MIN/{1.73_M2} (ref 89–?)
GLUCOSE SERPL-MCNC: 168 MG/DL (ref 70–100)
HGB UR QL STRIP: 11.5 G/DL (ref 14–18)
LYMPHOCYTES # SPEC AUTO: 0.8 10^3/UL (ref 1.5–3.5)
LYMPHOCYTES NFR BLD AUTO: 17.2 %
MAGNESIUM SERPL-MCNC: 2 MG/DL (ref 1.7–2.8)
MCH RBC QN AUTO: 28.5 PG (ref 27–31)
MCHC RBC AUTO-ENTMCNC: 32.6 G/DL (ref 32–36)
MCV RBC AUTO: 87.6 FL (ref 80–94)
MONOCYTES # BLD AUTO: 0.4 10^3/UL (ref 0–1)
MONOCYTES NFR BLD AUTO: 8 %
NEUTROPHILS # BLD AUTO: 3.2 10^3/UL (ref 1.5–6.6)
NEUTROPHILS # SNV AUTO: 4.4 X10^3/UL (ref 4.8–10.8)
NEUTROPHILS NFR BLD AUTO: 73.5 %
PDW BLD AUTO: 9.7 FL (ref 7.4–11.4)
PLATELET # BLD: 145 10^3/UL (ref 130–450)
RBC MAR: 4.03 10^6/UL (ref 4.7–6.1)
SODIUM SERPLBLD-SCNC: 139 MMOL/L (ref 135–145)

## 2019-10-22 RX ADMIN — ONDANSETRON PRN MG: 2 INJECTION INTRAMUSCULAR; INTRAVENOUS at 11:07

## 2019-10-22 RX ADMIN — CLOPIDOGREL BISULFATE SCH MG: 75 TABLET ORAL at 08:26

## 2019-10-22 RX ADMIN — SODIUM CHLORIDE, PRESERVATIVE FREE SCH ML: 5 INJECTION INTRAVENOUS at 08:28

## 2019-10-22 RX ADMIN — GABAPENTIN SCH MG: 300 CAPSULE ORAL at 05:56

## 2019-10-22 RX ADMIN — SODIUM CHLORIDE, PRESERVATIVE FREE SCH: 5 INJECTION INTRAVENOUS at 05:52

## 2019-10-22 RX ADMIN — CITALOPRAM HYDROBROMIDE SCH MG: 20 TABLET ORAL at 08:26

## 2019-10-22 RX ADMIN — GABAPENTIN SCH MG: 300 CAPSULE ORAL at 20:46

## 2019-10-22 RX ADMIN — ASPIRIN SCH MG: 81 TABLET, COATED ORAL at 08:26

## 2019-10-22 RX ADMIN — POLYETHYLENE GLYCOL 3350 SCH GM: 17 POWDER, FOR SOLUTION ORAL at 08:27

## 2019-10-22 RX ADMIN — SODIUM CHLORIDE, PRESERVATIVE FREE SCH ML: 5 INJECTION INTRAVENOUS at 16:17

## 2019-10-22 RX ADMIN — GABAPENTIN SCH MG: 300 CAPSULE ORAL at 14:04

## 2019-10-22 RX ADMIN — ATORVASTATIN CALCIUM SCH MG: 40 TABLET, FILM COATED ORAL at 20:46

## 2019-10-22 NOTE — PROVIDER PROGRESS NOTE
Subjective





- Prog Note Date


Prog Note Date: 10/22/19





- Subjective


Pt reports feeling: Improved


Subjective: 


pt still present right facial droop, slurred speech. pt report he feels slight 

better. he report he worked with PT/OT already. he still feel right side 

weakness. PT recommended pt may benefit from  inpt rehab. SW is working for 

that. 








Current Medications





- Current Medications


Current Medications: 





Active Medications





Acetaminophen (Tylenol)  650 mg PO Q4HR PRN


   PRN Reason: Pain 1 to 4


Aspirin (Ecotrin)  81 mg PO DAILY [facility]


   Last Admin: 10/22/19 08:26 Dose:  81 mg


Atorvastatin Calcium (Lipitor)  80 mg PO QPM [facility]


   Last Admin: 10/21/19 20:30 Dose:  80 mg


Citalopram Hydrobromide (Celexa)  20 mg PO DAILY [facility]


   Last Admin: 10/22/19 08:26 Dose:  20 mg


Clopidogrel Bisulfate (Plavix)  75 mg PO DAILY [facility]


   Last Admin: 10/22/19 08:26 Dose:  75 mg


Gabapentin (Neurontin)  300 mg PO TID [facility]


   Last Admin: 10/22/19 05:56 Dose:  300 mg


Glipizide (Glucotrol)  10 mg PO BID [facility]


   Last Admin: 10/22/19 08:26 Dose:  10 mg


Ondansetron HCl (Zofran Inj)  4 mg IVP Q6HR PRN


   PRN Reason: Nausea / Vomiting


   Last Admin: 10/22/19 11:07 Dose:  4 mg


Oxycodone HCl (Roxicodone)  5 mg PO Q4HR PRN


   PRN Reason: Pain 5 to 7


Polyethylene Glycol (Miralax)  17 gm PO DAILY [facility]


   Last Admin: 10/22/19 08:27 Dose:  17 gm


Prochlorperazine Edisylate (Compazine Inj)  10 mg IVP Q6HR PRN


   PRN Reason: Nausea / Vomiting


Sodium Chloride (Normal Saline Flush 0.9%)  10 ml IVP PRN PRN


   PRN Reason: AS NEEDED PER PROVIDER ORDERS


   Last Admin: 10/22/19 11:07 Dose:  10 ml


Sodium Chloride (Normal Saline Flush 0.9%)  10 ml IVP 0100,0900,1700 [facility]


   Last Admin: 10/22/19 08:28 Dose:  10 ml





                                        





Citalopram [CeleXA] 20 mg PO DAILY 11/18/14 


Multivitamin [Multi-Vitamin Daily] 1 tab ORAL DAILY 02/24/15 


Gabapentin 300 mg PO TID 11/24/15 


Loperamide [Imodium] 1 cap PO BID 05/10/16 


Colestipol HCl 2 gm PO BID 07/23/17 


Glipizide 10 mg PO BID 10/20/19 


Omeprazole 20 mg PO DAILY 10/20/19 


metFORMIN [Glucophage] 1,000 mg PO BID 10/20/19 











Objective





- Vital Signs/Intake & Output


Reviewed Vital Signs: Yes


Vital Signs: 


                                Vital Signs x48h











  Temp Pulse Resp BP BP Pulse Ox


 


 10/22/19 11:46  36.7 C  65  18   148/73 H  97


 


 10/22/19 05:45  36.7 C  58 L  16  134/83 H   97











Intake & Output: 


                                 Intake & Output











 10/19/19 10/20/19 10/21/19 10/22/19





 23:59 23:59 23:59 23:59


 


Intake Total   880 500


 


Output Total   2275 


 


Balance   -1395 500














- Objective


General Appearance: positive: No acute distress, Alert.  negative: Lethargic


Eyes Bilateral: positive: Normal inspection, PERRL, No lid inflammation, 

Conjunctivae nml


ENT: positive: ENT inspection nml, Pharynx nml, No signs of dehydration.  

negative: Purulent nasal drainage, Dry mucous membranes


Neck: positive: Nml inspection, Thyroid nml, No JVD, Trachea midline.  negative:

Thyromegaly, Lymphadenopathy (R), Lymphadenopathy (L), Stiff neck, 

Swelling/bruising, Tracheal deviation


Respiratory: positive: Chest non-tender, No respiratory distress, Breath sounds 

nml.  negative: Wheezes, Rales, Rhonchi


Cardiovascular: positive: Regular rate & rhythm, No murmur, No gallop.  

negative: Irregularly irregular, Extrasystoles, Tachycardia, Bradycardia, JVD 

present, Systolic murmur, Diastolic murmur


Peripheral Pulses: 2+ Radial (R), 2+ Radial (L), 2+ Dorsalis pedis (R), 2+ 

Dorsalis pedis (L)


Abdomen: positive: Non-tender, No organomegaly, Nml bowel sounds, No distention.

 negative: Tenderness, Guarding, Rebound


Back: positive: Nml inspection.  negative: CVA tenderness (R), CVA tenderness 

(L)


Skin: positive: Color nml, No rash, Warm, Dry.  negative: Cyanosis, Diaphoresis,

Pallor


Extremities: positive: Non-tender, Nml appearance.  negative: Full ROM, Calf 

tenderness, Joint swelling, Shirley's sign/cords


Neurologic/Psychiatric: positive: Oriented x3, Weakness, Sensory loss, Facial 

droop, Slurred/abnml speech.  negative: Motor nml, Sensation nml, Depressed 

mood/affect





- Lab Results


Fish Bones: 


                                 10/22/19 08:05





                                 10/22/19 08:05


Other Labs: 


                               Lab Results x24hrs











  10/22/19 10/22/19 10/21/19 Range/Units





  08:05 08:05 13:00 


 


WBC   4.4 L   (4.8-10.8)  x10^3/uL


 


RBC   4.03 L   (4.70-6.10)  10^6/uL


 


Hgb   11.5 L   (14.0-18.0)  g/dL


 


Hct   35.3 L   (42.0-52.0)  %


 


MCV   87.6   (80.0-94.0)  fL


 


MCH   28.5   (27.0-31.0)  pg


 


MCHC   32.6   (32.0-36.0)  g/dL


 


RDW   13.9   (12.0-15.0)  %


 


Plt Count   145   (130-450)  10^3/uL


 


MPV   9.7   (7.4-11.4)  fL


 


Neut # (Auto)   3.2   (1.5-6.6)  10^3/uL


 


Lymph # (Auto)   0.8 L   (1.5-3.5)  10^3/uL


 


Mono # (Auto)   0.4   (0.0-1.0)  10^3/uL


 


Eos # (Auto)   0.0   (0.0-0.7)  10^3/uL


 


Baso # (Auto)   0.0   (0.0-0.1)  10^3/uL


 


Absolute Nucleated RBC   0.00   x10^3/uL


 


Nucleated RBC %   0.0   /100WBC


 


Sodium  139    (135-145)  mmol/L


 


Potassium  4.0    (3.5-5.0)  mmol/L


 


Chloride  104    (101-111)  mmol/L


 


Carbon Dioxide  28    (21-32)  mmol/L


 


Anion Gap  7.0    (6-13)  


 


BUN  27 H    (6-20)  mg/dL


 


Creatinine  1.1    (0.6-1.2)  mg/dL


 


Estimated GFR (MDRD)  68 L    (>89)  


 


Glucose  168 H    ()  mg/dL


 


Calcium  8.8    (8.5-10.3)  mg/dL


 


Magnesium  2.0    (1.7-2.8)  mg/dL


 


Urine Color    YELLOW  


 


Urine Clarity    CLEAR  (CLEAR)  


 


Urine pH    6.5  (5.0-7.5)  PH


 


Ur Specific Gravity    1.010  (1.002-1.030)  


 


Urine Protein    NEGATIVE  (NEGATIVE)  mg/dL


 


Urine Glucose (UA)    250 H  (NEGATIVE)  mg/dL


 


Urine Ketones    NEGATIVE  (NEGATIVE)  mg/dL


 


Urine Occult Blood    NEGATIVE  (NEGATIVE)  


 


Urine Nitrite    NEGATIVE  (NEGATIVE)  


 


Urine Bilirubin    NEGATIVE  (NEGATIVE)  


 


Urine Urobilinogen    0.2 (NORMAL)  (NORMAL)  E.U./dL


 


Ur Leukocyte Esterase    NEGATIVE  (NEGATIVE)  


 


Urine RBC    0-5  (0-5)  /HPF


 


Urine WBC    0-3  (0-3)  /HPF


 


Ur Squamous Epith Cells    RARE Squamous  (<= Few)  


 


Urine Bacteria    Rare  (None Seen)  /HPF


 


Urine Culture Comments    NOT INDICATED  














ABX Reporting


Has patient been on IV antibiotics over the past 48 hours?: No





Assessment/Plan





- Problem List


(1) Infarction of left basal ganglia


Impression: 





pt present slowly recover, PT recommend pt for inpt rehab. SW is working for 

that.


continue aspirin, Plavix, Lipitor


pt's BP is stable now, continue vital monitor


continue PT/OT while pt is inpt now.


ST recommended for dysphagia diet, continue 








(2) Diabetes mellitus type 2, controlled, with complications


Impression: 


pt's glucose level is controlled. continue current slide scale, ACHS for glucose

check, hypoglycemia protocol





(3) Diabetic neuropathy


Impression: 


Stable. resume home Gabapentin.

## 2019-10-23 VITALS — SYSTOLIC BLOOD PRESSURE: 164 MMHG | DIASTOLIC BLOOD PRESSURE: 72 MMHG

## 2019-10-23 LAB
ANION GAP SERPL CALCULATED.4IONS-SCNC: 8 MMOL/L (ref 6–13)
BASOPHILS NFR BLD AUTO: 0 10^3/UL (ref 0–0.1)
BASOPHILS NFR BLD AUTO: 0.4 %
BUN SERPL-MCNC: 23 MG/DL (ref 6–20)
CALCIUM UR-MCNC: 9.1 MG/DL (ref 8.5–10.3)
CHLORIDE SERPL-SCNC: 104 MMOL/L (ref 101–111)
CO2 SERPL-SCNC: 28 MMOL/L (ref 21–32)
CREAT SERPLBLD-SCNC: 1.1 MG/DL (ref 0.6–1.2)
EOSINOPHIL # BLD AUTO: 0.1 10^3/UL (ref 0–0.7)
EOSINOPHIL NFR BLD AUTO: 1.8 %
ERYTHROCYTE [DISTWIDTH] IN BLOOD BY AUTOMATED COUNT: 13.8 % (ref 12–15)
GFRSERPLBLD MDRD-ARVRAT: 68 ML/MIN/{1.73_M2} (ref 89–?)
GLUCOSE SERPL-MCNC: 150 MG/DL (ref 70–100)
HGB UR QL STRIP: 11.7 G/DL (ref 14–18)
LYMPHOCYTES # SPEC AUTO: 1 10^3/UL (ref 1.5–3.5)
LYMPHOCYTES NFR BLD AUTO: 21.5 %
MCH RBC QN AUTO: 29.7 PG (ref 27–31)
MCHC RBC AUTO-ENTMCNC: 33.4 G/DL (ref 32–36)
MCV RBC AUTO: 88.8 FL (ref 80–94)
MONOCYTES # BLD AUTO: 0.4 10^3/UL (ref 0–1)
MONOCYTES NFR BLD AUTO: 7.8 %
NEUTROPHILS # BLD AUTO: 3 10^3/UL (ref 1.5–6.6)
NEUTROPHILS # SNV AUTO: 4.5 X10^3/UL (ref 4.8–10.8)
NEUTROPHILS NFR BLD AUTO: 68.1 %
PDW BLD AUTO: 9.9 FL (ref 7.4–11.4)
PLATELET # BLD: 147 10^3/UL (ref 130–450)
RBC MAR: 3.94 10^6/UL (ref 4.7–6.1)
SODIUM SERPLBLD-SCNC: 140 MMOL/L (ref 135–145)

## 2019-10-23 RX ADMIN — POLYETHYLENE GLYCOL 3350 SCH: 17 POWDER, FOR SOLUTION ORAL at 09:06

## 2019-10-23 RX ADMIN — SODIUM CHLORIDE, PRESERVATIVE FREE SCH ML: 5 INJECTION INTRAVENOUS at 09:06

## 2019-10-23 RX ADMIN — SODIUM CHLORIDE, PRESERVATIVE FREE SCH ML: 5 INJECTION INTRAVENOUS at 00:29

## 2019-10-23 RX ADMIN — GABAPENTIN SCH MG: 300 CAPSULE ORAL at 05:45

## 2019-10-23 RX ADMIN — ONDANSETRON PRN MG: 2 INJECTION INTRAMUSCULAR; INTRAVENOUS at 14:27

## 2019-10-23 RX ADMIN — ASPIRIN SCH MG: 81 TABLET, COATED ORAL at 09:06

## 2019-10-23 RX ADMIN — GABAPENTIN SCH MG: 300 CAPSULE ORAL at 14:27

## 2019-10-23 RX ADMIN — CITALOPRAM HYDROBROMIDE SCH MG: 20 TABLET ORAL at 09:06

## 2019-10-23 RX ADMIN — CLOPIDOGREL BISULFATE SCH MG: 75 TABLET ORAL at 09:06

## 2019-10-23 NOTE — DISCHARGE PLAN
Discharge Plan


Problem Reviewed?: Yes


Disposition: 02 Transfer Acute Care [facility]


Condition: Stable


Diet: Diabetic (dysphagia diet)


Activity Restrictions: Activity as Tolerated


Shower Restrictions: No (fall precaution, caregiver closely monitor)


No Smoking: If you smoke, Please STOP!  Call [phone] for help.


Follow-up with: 


Gustavo Macias MD [Primary Care Provider] -

## 2019-10-23 NOTE — DISCHARGE SUMMARY
Discharge Summary


Admit Date: 10/20/19


Discharge Date: 10/23/19


Discharging Provider: ALBINA LYONS


Primary Care Provider: Gustavo Stoner


Condition at Discharge: Stable


Discharge Disposition: 02 Transfer Acute Care Hosp


Discharge Facility Name: [facility]





- DIAGNOSES


Admission Diagnoses: 


(1) TIA (transient ischemic attack)





(2) Uncontrolled type 2 diabetes mellitus with complication, without long-term 

current use of insulin





(3) HTN (hypertension)





(4) Hyperlipidemia





(5) Toe ulcer due to DM








Discharge Diagnoses with Status of Each Condition: 


(1) Infarction of left basal ganglia


clinically pt still present slurred speech, right side weakness with special on 

right lower extremity weakness. MRI reveals infarction of left basal ganglia.


pt was treated with meds: Lipitor, aspirin and Plavix. pt was evaluated and 

treated with PT/OT, pt was recommended for inpt rehab for his stroke.


(2) Diabetes mellitus type 2, controlled, with complications


stable, controlled, A1C 6.7


(3) Diabetic neuropathy


stable


(4) Hyperlipidemia


stable








- HPI


History of Present Illness: 


refer from Dr. Nelson's HPI on 10/20/19


59-year-old white male whose risk factors for cerebrovascular disease include 

male sex, uncontrolled diabetes and hyperlipidemia.  His main problems have had 

to do with his diabetes.  He has had diabetic foot ulcers of both feet/toes in 

the past.  So far he has not required amputation.





This morning he began having episodes of expressive aphasia  It was not until he

started having right leg weakness and worsening a aphasia that he decided to 

come into the hospital this evening.  That was because the right leg was getting

so weak he was starting to have his leg crumble underneath him.  He was seen by 

Dr. Rose in the emergency room.  He is afebrile, mildly hypertensive at 

159/95.  In the ER he has gone up to 173/84.  He has a normal respiratory rate 

and is oxygenating well on room air.  When he first presented for his stay in 

the emergency room, he had a fairly severe expressive a aphasia.  That has 

gotten much better as the work-up has ensued.  His right leg weakness has also 

improved. 





He delayed coming in because he thought it was just an allergic reaction to 

having beer.  He just moved into a new home in [place].  Was having friends 

over.  Had a beer last night.  He states that because he had this exact same 

reaction a year ago.  His mouth did not want to move correctly, his tongue felt 

thick, and his right leg was weak.  This was a year ago.  It went away.  It was 

in association with drinking a beer back then.  That is why he delayed coming in

today.  He really had no idea that he could possibly having a stroke.








- HOSPITAL COURSE


Hospital Course: 


pt was admitted for slurred speech, and right side weakness. CT and CTA of head,

CTA of neck, ECHO were unremarkable. pt continued present slurred speech, right 

facial droop, and right side weakness. MRI of brain reveals infarction of left 

basal ganglia. pt was treated with meds: Lipitor, aspirin and Plavix. pt was 

evaluated and treated with PT/OT, pt was recommended for inpt rehab for his 

stroke. pt was also recommended by ST for dysphagia diet. pt denies toe ulcer or

infection. The detail of hospital course is as the below





1) Infarction of left basal ganglia


clinically pt still present slurred speech, right side weakness with special on 

right lower extremity weakness. MRI reveals infarction of left basal ganglia.


pt was treated with meds: Lipitor, aspirin and Plavix. pt was evaluated and 

treated with PT/OT, pt was recommended for inpt rehab for his stroke.


(2) Diabetes mellitus type 2, controlled, with complications


stable, controlled, A1C 6.7


(3) Diabetic neuropathy


stable


(4) Hyperlipidemia


stable








- ALLERGIES


Allergies/Adverse Reactions: 


                                    Allergies











Allergy/AdvReac Type Severity Reaction Status Date / Time


 


No Known Drug Allergies Allergy   Verified 10/20/19 20:54














- MEDICATIONS


Home Medications: 


                                Ambulatory Orders











 Medication  Instructions  Recorded  Confirmed


 


Citalopram [CeleXA] 20 mg PO DAILY 11/18/14 10/20/19


 


Multivitamin [Multi-Vitamin Daily] 1 tab ORAL DAILY 02/24/15 10/20/19


 


Gabapentin 300 mg PO TID 11/24/15 10/20/19


 


Loperamide [Imodium] 1 cap PO BID 05/10/16 10/20/19


 


Colestipol HCl 2 gm PO BID 07/23/17 10/20/19


 


Glipizide 10 mg PO BID 10/20/19 10/20/19


 


Omeprazole 20 mg PO DAILY 10/20/19 10/20/19


 


metFORMIN [Glucophage] 1,000 mg PO BID 10/20/19 10/20/19














- PHYSICAL EXAM AT DISCHARGE


General Appearance: positive: No acute distress, Alert.  negative: Lethargic


Eyes Bilateral: positive: Normal inspection, PERRL, No lid inflammation, 

Conjunctivae nml


ENT: positive: ENT inspection nml, Pharynx nml, No signs of dehydration.  

negative: Purulent nasal drainage


Neck: positive: Nml inspection, Thyroid nml, No JVD, Trachea midline.  negative:

 Thyromegaly, Lymphadenopathy (R), Lymphadenopathy (L), Stiff neck, Tracheal 

deviation


Respiratory: positive: Chest non-tender, No respiratory distress, Breath sounds 

nml.  negative: Wheezes, Rales, Rhonchi


Cardiovascular: positive: Regular rate & rhythm, No murmur, No gallop.  

negative: Irregularly irregular, Extrasystoles, Tachycardia, Bradycardia, JVD 

present, Systolic murmur, Diastolic murmur


Peripheral Pulses: positive: 2+


Abdomen: positive: Non-tender, No organomegaly, Nml bowel sounds, No distention.

  negative: Tenderness, Guarding, Rebound


Back: positive: Nml inspection.  negative: CVA tenderness (R), CVA tenderness 

(L)


Skin: positive: Color nml, No rash, Warm, Dry.  negative: Cyanosis, Diaphoresis,

 Pallor


Extremities: positive: Non-tender.  negative: Calf tenderness, Shirley's 

sign/cords


Neurologic/Psychiatric: positive: Oriented x3, Weakness, Sensory loss, Facial 

droop, Slurred/abnml speech.  negative: Depressed mood/affect





- LABS


Result Diagrams: 


                                 10/23/19 05:05





                                 10/23/19 05:05





- FOLLOW UP


Follow Up: 


[facility] 








- TIME SPENT


Time Spent in Discharge (Minutes): 50

## 2020-05-28 ENCOUNTER — HOSPITAL ENCOUNTER (OUTPATIENT)
Dept: HOSPITAL 76 - DI | Age: 60
Discharge: HOME | End: 2020-05-28
Attending: FAMILY MEDICINE
Payer: COMMERCIAL

## 2020-05-28 DIAGNOSIS — N40.1: Primary | ICD-10-CM

## 2020-05-28 DIAGNOSIS — N39.44: ICD-10-CM

## 2020-05-28 DIAGNOSIS — R35.1: ICD-10-CM

## 2020-05-28 PROCEDURE — 76857 US EXAM PELVIC LIMITED: CPT

## 2020-05-28 NOTE — ULTRASOUND REPORT
Reason:  NOCTURNAL ENURESIS

Procedure Date:  05/28/2020   

Accession Number:  [id number] / [id number]                    

Procedure:  US  - Bladder CPT Code:  

 

***Final Report***

 

 

FULL RESULT:

 

 

EXAM:

PELVIS ULTRASOUND, LIMITED

 

EXAM DATE: 5/28/2020 01:48 PM.

 

CLINICAL HISTORY: Nocturnal enuresis.

 

COMPARISON: [facility] 09/11/2018 5:24 PM.

 

TECHNIQUE: Real-time scanning was performed with static images obtained.

 

FINDINGS:

Bladder: The initial bladder volume was 300 mL. A 276 mL postvoid 

residual bladder volume is noted. No masses or stones. Bilateral ureteral 

jets are seen. Mildly thickened bladder wall.

 

Prostate: 3.5 x 2.6 x 4.9 cm. Prostate volume is 23 cc.

 

Other: None.

IMPRESSION:

 

Postvoid residual of 276 cc.

 

No bladder stone or mass. Mild bladder wall thickening.

 

Mild prostate enlargement.

 

[facility]

## 2020-10-29 ENCOUNTER — HOSPITAL ENCOUNTER (OUTPATIENT)
Dept: HOSPITAL 76 - EMS | Age: 60
End: 2020-10-29
Attending: SURGERY
Payer: COMMERCIAL

## 2020-10-29 DIAGNOSIS — M54.9: Primary | ICD-10-CM

## 2021-01-16 ENCOUNTER — HOSPITAL ENCOUNTER (EMERGENCY)
Dept: HOSPITAL 76 - ED | Age: 61
Discharge: HOME | End: 2021-01-16
Payer: COMMERCIAL

## 2021-01-16 ENCOUNTER — HOSPITAL ENCOUNTER (OUTPATIENT)
Dept: HOSPITAL 76 - EMS | Age: 61
Discharge: ANOTHER HOSPITAL | End: 2021-01-16
Attending: SURGERY
Payer: COMMERCIAL

## 2021-01-16 VITALS — DIASTOLIC BLOOD PRESSURE: 99 MMHG | SYSTOLIC BLOOD PRESSURE: 159 MMHG

## 2021-01-16 DIAGNOSIS — I10: ICD-10-CM

## 2021-01-16 DIAGNOSIS — Z20.822: ICD-10-CM

## 2021-01-16 DIAGNOSIS — Z85.048: ICD-10-CM

## 2021-01-16 DIAGNOSIS — E11.65: ICD-10-CM

## 2021-01-16 DIAGNOSIS — E11.42: ICD-10-CM

## 2021-01-16 DIAGNOSIS — R19.7: ICD-10-CM

## 2021-01-16 DIAGNOSIS — Z79.02: ICD-10-CM

## 2021-01-16 DIAGNOSIS — R10.31: Primary | ICD-10-CM

## 2021-01-16 DIAGNOSIS — R11.2: ICD-10-CM

## 2021-01-16 DIAGNOSIS — N12: Primary | ICD-10-CM

## 2021-01-16 DIAGNOSIS — Z79.84: ICD-10-CM

## 2021-01-16 LAB
ALBUMIN DIAFP-MCNC: 4.2 G/DL (ref 3.2–5.5)
ALBUMIN/GLOB SERPL: 1.3 {RATIO} (ref 1–2.2)
ALP SERPL-CCNC: 77 IU/L (ref 42–121)
ALT SERPL W P-5'-P-CCNC: 25 IU/L (ref 10–60)
ANION GAP SERPL CALCULATED.4IONS-SCNC: 13 MMOL/L (ref 6–13)
AST SERPL W P-5'-P-CCNC: 22 IU/L (ref 10–42)
BASOPHILS NFR BLD AUTO: 0 10^3/UL (ref 0–0.1)
BASOPHILS NFR BLD AUTO: 0.2 %
BILIRUB BLD-MCNC: 1 MG/DL (ref 0.2–1)
BUN SERPL-MCNC: 27 MG/DL (ref 6–20)
C PNEUM DNA NPH QL NAA+NON-PROBE: NOT DETECTED
CALCIUM UR-MCNC: 10 MG/DL (ref 8.5–10.3)
CHLORIDE SERPL-SCNC: 101 MMOL/L (ref 101–111)
CLARITY UR REFRACT.AUTO: (no result)
CO2 SERPL-SCNC: 23 MMOL/L (ref 21–32)
CREAT SERPLBLD-SCNC: 1.2 MG/DL (ref 0.6–1.2)
EOSINOPHIL # BLD AUTO: 0 10^3/UL (ref 0–0.7)
EOSINOPHIL NFR BLD AUTO: 0 %
ERYTHROCYTE [DISTWIDTH] IN BLOOD BY AUTOMATED COUNT: 14.7 % (ref 12–15)
GLOBULIN SER-MCNC: 3.3 G/DL (ref 2.1–4.2)
GLUCOSE SERPL-MCNC: 244 MG/DL (ref 70–100)
GLUCOSE UR QL STRIP.AUTO: NEGATIVE MG/DL
HGB UR QL STRIP: 10.8 G/DL (ref 14–18)
KETONES UR QL STRIP.AUTO: (no result) MG/DL
LIPASE SERPL-CCNC: 26 U/L (ref 22–51)
LYMPHOCYTES # SPEC AUTO: 0.3 10^3/UL (ref 1.5–3.5)
LYMPHOCYTES NFR BLD AUTO: 5.4 %
MCH RBC QN AUTO: 29 PG (ref 27–31)
MCHC RBC AUTO-ENTMCNC: 32.5 G/DL (ref 32–36)
MCV RBC AUTO: 89.2 FL (ref 80–94)
MONOCYTES # BLD AUTO: 0.4 10^3/UL (ref 0–1)
MONOCYTES NFR BLD AUTO: 8.9 %
NEUTROPHILS # BLD AUTO: 4.1 10^3/UL (ref 1.5–6.6)
NEUTROPHILS # SNV AUTO: 4.8 X10^3/UL (ref 4.8–10.8)
NEUTROPHILS NFR BLD AUTO: 85.3 %
NITRITE UR QL STRIP.AUTO: NEGATIVE
PDW BLD AUTO: 9.7 FL (ref 7.4–11.4)
PH UR STRIP.AUTO: 5.5 PH (ref 5–7.5)
PLATELET # BLD: 143 10^3/UL (ref 130–450)
PROT SPEC-MCNC: 7.5 G/DL (ref 6.7–8.2)
PROT UR STRIP.AUTO-MCNC: NEGATIVE MG/DL
RBC # UR STRIP.AUTO: NEGATIVE /UL
RBC # URNS HPF: (no result) /HPF (ref 0–5)
RBC MAR: 3.72 10^6/UL (ref 4.7–6.1)
SODIUM SERPLBLD-SCNC: 137 MMOL/L (ref 135–145)
SP GR UR STRIP.AUTO: 1.02 (ref 1–1.03)
SQUAMOUS URNS QL MICRO: (no result)
UROBILINOGEN UR QL STRIP.AUTO: (no result) E.U./DL
UROBILINOGEN UR STRIP.AUTO-MCNC: NEGATIVE MG/DL

## 2021-01-16 PROCEDURE — 74177 CT ABD & PELVIS W/CONTRAST: CPT

## 2021-01-16 PROCEDURE — 96374 THER/PROPH/DIAG INJ IV PUSH: CPT

## 2021-01-16 PROCEDURE — 85025 COMPLETE CBC W/AUTO DIFF WBC: CPT

## 2021-01-16 PROCEDURE — 0202U NFCT DS 22 TRGT SARS-COV-2: CPT

## 2021-01-16 PROCEDURE — 81001 URINALYSIS AUTO W/SCOPE: CPT

## 2021-01-16 PROCEDURE — 87086 URINE CULTURE/COLONY COUNT: CPT

## 2021-01-16 PROCEDURE — 96376 TX/PRO/DX INJ SAME DRUG ADON: CPT

## 2021-01-16 PROCEDURE — 80053 COMPREHEN METABOLIC PANEL: CPT

## 2021-01-16 PROCEDURE — 36415 COLL VENOUS BLD VENIPUNCTURE: CPT

## 2021-01-16 PROCEDURE — 83690 ASSAY OF LIPASE: CPT

## 2021-01-16 PROCEDURE — 87635 SARS-COV-2 COVID-19 AMP PRB: CPT

## 2021-01-16 PROCEDURE — 87181 SC STD AGAR DILUTION PER AGT: CPT

## 2021-01-16 PROCEDURE — 96361 HYDRATE IV INFUSION ADD-ON: CPT

## 2021-01-16 PROCEDURE — 96375 TX/PRO/DX INJ NEW DRUG ADDON: CPT

## 2021-01-16 PROCEDURE — 87077 CULTURE AEROBIC IDENTIFY: CPT

## 2021-01-16 PROCEDURE — 81003 URINALYSIS AUTO W/O SCOPE: CPT

## 2021-01-16 PROCEDURE — 99284 EMERGENCY DEPT VISIT MOD MDM: CPT

## 2021-01-16 NOTE — CT REPORT
PROCEDURE:  Abdomen/Pelvis W

 

INDICATIONS:  IV and PO if able, abd pain, vomiting, ?sbo

 

CONTRAST:  IV CONTRAST: Optiray 320 ml: 100 PO CONTRAST: Optiray 320 ml50

 

TECHNIQUE:  

After the administration of oral and IV contrast, 5 mm thick sections acquired from the diaphragms to
 the symphysis.  5 mm thick coronal and sagittal reformats were acquired.  For radiation dose reducti
on, the following was used:  automated exposure control, adjustment of mA and/or kV according to rodrigo
ent size.  

 

COMPARISON:  CT abdomen pelvis 12/5/2016

 

FINDINGS:  

Image quality:  Excellent.  

 

ABDOMEN:  

Lung bases:  Lung bases are clear.  Heart size is normal.  

 

Solid organs:  Liver and spleen are normal in size and enhancement.  Gallbladder is unremarkable.  Bi
liary system is non dilated.  Pancreas enhances normally.  No adrenal nodules.  Kidneys demonstrate n
ormal size and enhancement, without hydronephrosis. 3 mm nonobstructing right renal calculus, unchang
ed.

 

Peritoneum and bowel:  Bowel loops demonstrate normal wall thickness and caliber.  No free fluid or a
ir.  Mild scattered stool. Appendix is unremarkable. There is an unchanged appearance of thickening i
n the presacral region possibly secondary to prior surgery and/or treatment.

 

Nodes and vessels:  No retroperitoneal or mesenteric adenopathy by size criteria.  Aorta and inferior
 vena cava are normal in size.  

 

Miscellaneous:  No ventral hernias.  

 

 

PELVIS:  

Genitourinary:  Bladder wall thickness is normal.  

 

Miscellaneous: Fat-containing inguinal hernias are present.

 

Bones: Unchanged right iliac posterior focus of sclerosis, likely bone island. No vertebral body comp
ression fractures.  

 

IMPRESSION:  

 

 

1. No acute intra-abdominal or pelvic process identified.

 

 

2. Mild scattered stool.

 

Reviewed by: Klaudia Gaines MD on 1/16/2021 8:54 PM PST

Approved by: Klaudia Gaines MD on 1/16/2021 8:54 PM PST

 

 

Station ID:  [id number]

## 2021-01-16 NOTE — ED PHYSICIAN DOCUMENTATION
PD HPI ABD PAIN





- Stated complaint


Stated Complaint: ABD PX/N/V/D





- Chief complaint


Chief Complaint: Abd Pain





- History obtained from


History obtained from: Patient, EMS





- Additional information


Additional information: 





60-year-old gentleman with diabetes on Metformin and history of rectal cancer 

status post resection and also history of bowel obstruction presents with 3 to 4

days of right-sided abdominal pain which is moderate to severe associated with 

vomiting and diarrhea, the vomit is blood-tinged.  He denies fevers, sick 

contacts.





Review of Systems


Ten Systems: 10 systems reviewed and negative


Constitutional: reports: Reviewed and negative


Nose: reports: Reviewed and negative


Throat: reports: Reviewed and negative


Cardiac: reports: Reviewed and negative





PD PAST MEDICAL HISTORY





- Past Medical History


Past Medical History: Yes


Cardiovascular: Hypertension, High cholesterol, Coronary artery disease


Respiratory: None


Neuro: Peripheral neuropathy


Endocrine/Autoimmune: Type 2 diabetes


GI: None


: None


HEENT: None


Psych: Depression


Musculoskeletal: None


Derm: None





- Past Surgical History


Past Surgical History: Yes


General: Bowel surgery


Ortho: Rotator cuff repair, Spine surgery





- Present Medications


Home Medications: 


                                Ambulatory Orders











 Medication  Instructions  Recorded  Confirmed


 


Citalopram [CeleXA] 40 mg PO DAILY 11/18/14 01/16/21


 


Multivitamin [Multi-Vitamin Daily] 1 tab ORAL DAILY 02/24/15 01/16/21


 


Gabapentin 300 mg PO BID 11/24/15 01/16/21


 


Glipizide 10 mg PO BID 10/20/19 01/16/21


 


Omeprazole 20 mg PO DAILY 10/20/19 01/16/21


 


metFORMIN [Glucophage] 1,000 mg PO BID 10/20/19 01/16/21


 


Atorvastatin [Lipitor] 40 mg PO DAILY 01/16/21 01/16/21


 


Ciprofloxacin HCl [Cipro] 500 mg PO BID #20 tablet 01/16/21 


 


Clopidogrel [Plavix] 0.5 tab PO DAILY 01/16/21 01/16/21


 


HYDROcod/ACETAM 5/325 [Norco 5/325] 1 - 2 tab PO Q6H PRN #10 tablet 01/16/21 


 


Losartan [Cozaar] 0.5 tab PO DAILY 01/16/21 01/16/21


 


Promethazine [Phenergan] 25 mg PO Q6H PRN #10 tab 01/16/21 


 


Trospium Chloride 1 tab PO BID 01/16/21 01/16/21














- Allergies


Allergies/Adverse Reactions: 


                                    Allergies











Allergy/AdvReac Type Severity Reaction Status Date / Time


 


No Known Drug Allergies Allergy   Verified 01/16/21 17:39














- Social History


Does the pt smoke?: No


Smoking Status: Never smoker


Does the pt drink ETOH?: Yes


Does the pt have substance abuse?: No





- Immunizations


Immunizations are current?: No


Immunizations: Other immun not current





- POLST


Patient has POLST: No


POLST Status: Full Code





PD ED PE NORMAL





- Vitals


Vital signs reviewed: Yes





- General


General: Alert and oriented X 3, No acute distress





- HEENT


HEENT: PERRL, EOMI





- Neck


Neck: Supple, no meningeal sign, No bony TTP





- Cardiac


Cardiac: RRR, No murmur





- Respiratory


Respiratory: No respiratory distress, Clear bilaterally





- Abdomen


Abdomen: Other (I do not appreciate any bowel tones on initial examination.  He 

is nontender though.  He has well-healed midline laparotomy scar and right-sided

 scar c/w ostomy/takedown)





- Back


Back: No CVA TTP, No spinal TTP





- Derm


Derm: Normal color, Warm and dry





- Extremities


Extremities: No edema, No calf tenderness / cord





- Neuro


Neuro: Alert and oriented X 3, Normal speech





Results





- Vitals


Vitals: 


                               Vital Signs - 24 hr











  01/16/21 01/16/21 01/16/21





  17:39 17:46 18:46


 


Temperature 36.7 C  


 


Heart Rate 105 H 106 H 107 H


 


Respiratory 16 22 18





Rate   


 


Blood Pressure 168/94 H  177/89 H


 


O2 Saturation 95 96 92














  01/16/21





  20:00


 


Temperature 


 


Heart Rate 106 H


 


Respiratory 16





Rate 


 


Blood Pressure 159/99 H


 


O2 Saturation 98








                                     Oxygen











O2 Source                      Room air

















- Labs


Labs: 


                                Laboratory Tests











  01/16/21 01/16/21 01/16/21





  17:44 17:44 18:35


 


WBC  4.8  


 


RBC  3.72 L  


 


Hgb  10.8 L  


 


Hct  33.2 L  


 


MCV  89.2  


 


MCH  29.0  


 


MCHC  32.5  


 


RDW  14.7  


 


Plt Count  143  


 


MPV  9.7  


 


Neut # (Auto)  4.1  


 


Lymph # (Auto)  0.3 L  


 


Mono # (Auto)  0.4  


 


Eos # (Auto)  0.0  


 


Baso # (Auto)  0.0  


 


Absolute Nucleated RBC  0.00  


 


Nucleated RBC %  0.0  


 


Sodium   137 


 


Potassium   4.2 


 


Chloride   101 


 


Carbon Dioxide   23 


 


Anion Gap   13.0 


 


BUN   27 H 


 


Creatinine   1.2 


 


Estimated GFR (MDRD)   61 L 


 


Glucose   244 H 


 


Calcium   10.0 


 


Total Bilirubin   1.0 


 


AST   22 


 


ALT   25 


 


Alkaline Phosphatase   77 


 


Total Protein   7.5 


 


Albumin   4.2 


 


Globulin   3.3 


 


Albumin/Globulin Ratio   1.3 


 


Lipase   26 


 


Urine Color   


 


Urine Clarity   


 


Urine pH   


 


Ur Specific Gravity   


 


Urine Protein   


 


Urine Glucose (UA)   


 


Urine Ketones   


 


Urine Occult Blood   


 


Urine Nitrite   


 


Urine Bilirubin   


 


Urine Urobilinogen   


 


Ur Leukocyte Esterase   


 


Urine RBC   


 


Urine WBC   


 


Ur Squamous Epith Cells   


 


Urine Bacteria   


 


Ur Microscopic Review   


 


Urine Culture Comments   


 


Nasal Adenovirus (PCR)    NOT DETECTED


 


Nasal B. parapertussis DNA (PCR)    NOT DETECTED


 


Nasal Coronavir 229E PCR    NOT DETECTED


 


Nasal Coronavir HKU1 PCR    NOT DETECTED


 


Nasal Coronavir NL63 PCR    NOT DETECTED


 


Nasal Coronavir OC43 PCR    NOT DETECTED


 


Nasal Enterovir/Rhinovir PCR    NOT DETECTED


 


Nasal Influenza B PCR    NOT DETECTED


 


Nasal Influenza A PCR    NOT DETECTED


 


Nasal Parainfluen 1 PCR    NOT DETECTED


 


Nasal Parainfluen 2 PCR    NOT DETECTED


 


Nasal Parainfluen 3 PCR    NOT DETECTED


 


Nasal Parainfluen 4 PCR    NOT DETECTED


 


Nasal RSV (PCR)    NOT DETECTED


 


Nasal B.pertussis DNA PCR    NOT DETECTED


 


Nasal C.pneumoniae (PCR)    NOT DETECTED


 


Frank Human Metapneumo PCR    NOT DETECTED


 


Nasal M.pneumoniae (PCR)    NOT DETECTED


 


Nasal SARS-CoV-2 (PCR)    NOT DETECTED














  01/16/21





  21:00


 


WBC 


 


RBC 


 


Hgb 


 


Hct 


 


MCV 


 


MCH 


 


MCHC 


 


RDW 


 


Plt Count 


 


MPV 


 


Neut # (Auto) 


 


Lymph # (Auto) 


 


Mono # (Auto) 


 


Eos # (Auto) 


 


Baso # (Auto) 


 


Absolute Nucleated RBC 


 


Nucleated RBC % 


 


Sodium 


 


Potassium 


 


Chloride 


 


Carbon Dioxide 


 


Anion Gap 


 


BUN 


 


Creatinine 


 


Estimated GFR (MDRD) 


 


Glucose 


 


Calcium 


 


Total Bilirubin 


 


AST 


 


ALT 


 


Alkaline Phosphatase 


 


Total Protein 


 


Albumin 


 


Globulin 


 


Albumin/Globulin Ratio 


 


Lipase 


 


Urine Color  YELLOW


 


Urine Clarity  HAZY


 


Urine pH  5.5


 


Ur Specific Gravity  1.020


 


Urine Protein  NEGATIVE


 


Urine Glucose (UA)  NEGATIVE


 


Urine Ketones  TRACE


 


Urine Occult Blood  NEGATIVE


 


Urine Nitrite  NEGATIVE


 


Urine Bilirubin  NEGATIVE


 


Urine Urobilinogen  0.2 (NORMAL)


 


Ur Leukocyte Esterase  SMALL H


 


Urine RBC  0-5


 


Urine WBC  11-25 H


 


Ur Squamous Epith Cells  NONE SEEN


 


Urine Bacteria  Many H


 


Ur Microscopic Review  INDICATED


 


Urine Culture Comments  INDICATED


 


Nasal Adenovirus (PCR) 


 


Nasal B. parapertussis DNA (PCR) 


 


Nasal Coronavir 229E PCR 


 


Nasal Coronavir HKU1 PCR 


 


Nasal Coronavir NL63 PCR 


 


Nasal Coronavir OC43 PCR 


 


Nasal Enterovir/Rhinovir PCR 


 


Nasal Influenza B PCR 


 


Nasal Influenza A PCR 


 


Nasal Parainfluen 1 PCR 


 


Nasal Parainfluen 2 PCR 


 


Nasal Parainfluen 3 PCR 


 


Nasal Parainfluen 4 PCR 


 


Nasal RSV (PCR) 


 


Nasal B.pertussis DNA PCR 


 


Nasal C.pneumoniae (PCR) 


 


Frank Human Metapneumo PCR 


 


Nasal M.pneumoniae (PCR) 


 


Nasal SARS-CoV-2 (PCR) 














- Rads (name of study)


  ** CT abdomen and pelvis with oral and IV contrast


Radiology: EMP read contemporaneously (Radiology read as no acute intra-

abdominal or pelvic process identified with mild scattered stool.  I do note on 

my opinion that he has a small amount of air in the bladder with a dilated 

urinary bladder.)





PD MEDICAL DECISION MAKING





- ED course


ED course: 





Gentleman presents with abdominal pain, vomiting and diarrhea.  Pain radiates to

 the right back.  Of note he does self catheterize nightly for the last year or 

so for I guess BPH.


After CT he had 1000 mL of urine out and post void residual by bladder scan was 

only 13 mL.


He does have evidence of pyuria and given the back pain And vomiting, this could

 be consistent with pyelonephritis.  Received 1 g of Rocephin here.





Departure





- Departure


Disposition: 01 Home, Self Care


Clinical Impression: 


 Pyelonephritis





T2DM (type 2 diabetes mellitus)


Qualifiers:


 Diabetes mellitus long term insulin use: unspecified long term insulin use 

status Diabetes mellitus complication status: with hyperglycemia Qualified Code

(s): E11.65 - Type 2 diabetes mellitus with hyperglycemia





Condition: Good


Record reviewed to determine appropriate education?: Yes


Instructions:  Pyelonephritis Dc


Prescriptions: 


Ciprofloxacin HCl [Cipro] 500 mg PO BID #20 tablet


HYDROcod/ACETAM 5/325 [Norco 5/325] 1 - 2 tab PO Q6H PRN #10 tablet


 PRN Reason: Pain


Promethazine [Phenergan] 25 mg PO Q6H PRN #10 tab


 PRN Reason: Nausea / Vomiting


Comments: 


Start taking your Metformin again on Monday night.  Return if worsening.  Drink 

plenty of fluids.





We will culture your urine, the results should be done in 48-72 hours.  If an 

antibiotic change is necessary we will call you.  Return if worse in the heath

ntime, especially if you develop increasing flank pain, fevers, or cannot keep 

down the medication.





Followup with your doctor on around Tuesday for recheck.

## 2021-01-19 NOTE — ED PHYSICIAN DOCUMENTATION
ED Addendum





- Addendum


Addendum: 





01/19/21 12:38


Culture results reviewed.  Resistant to ciprofloxacin.  Change to cefdinir 300 

mg by mouth twice daily for 10 days

## 2021-04-10 ENCOUNTER — HOSPITAL ENCOUNTER (EMERGENCY)
Dept: HOSPITAL 76 - ED | Age: 61
LOS: 1 days | Discharge: HOME | End: 2021-04-11
Payer: COMMERCIAL

## 2021-04-10 ENCOUNTER — HOSPITAL ENCOUNTER (OUTPATIENT)
Dept: HOSPITAL 76 - EMS | Age: 61
Discharge: ANOTHER HOSPITAL | End: 2021-04-10
Payer: COMMERCIAL

## 2021-04-10 DIAGNOSIS — K21.9: ICD-10-CM

## 2021-04-10 DIAGNOSIS — R11.10: Primary | ICD-10-CM

## 2021-04-10 DIAGNOSIS — M48.54XA: ICD-10-CM

## 2021-04-10 DIAGNOSIS — N30.80: Primary | ICD-10-CM

## 2021-04-10 DIAGNOSIS — K40.20: ICD-10-CM

## 2021-04-10 DIAGNOSIS — R51.9: ICD-10-CM

## 2021-04-10 DIAGNOSIS — I10: ICD-10-CM

## 2021-04-10 DIAGNOSIS — R53.1: ICD-10-CM

## 2021-04-10 DIAGNOSIS — Z85.038: ICD-10-CM

## 2021-04-10 DIAGNOSIS — E11.42: ICD-10-CM

## 2021-04-10 DIAGNOSIS — M47.816: ICD-10-CM

## 2021-04-10 DIAGNOSIS — Z79.02: ICD-10-CM

## 2021-04-10 DIAGNOSIS — Z79.84: ICD-10-CM

## 2021-04-10 DIAGNOSIS — Z20.822: ICD-10-CM

## 2021-04-10 DIAGNOSIS — K44.9: ICD-10-CM

## 2021-04-10 LAB
BASOPHILS NFR BLD AUTO: 0 10^3/UL (ref 0–0.1)
BASOPHILS NFR BLD AUTO: 0.1 %
EOSINOPHIL # BLD AUTO: 0 10^3/UL (ref 0–0.7)
EOSINOPHIL NFR BLD AUTO: 0.4 %
ERYTHROCYTE [DISTWIDTH] IN BLOOD BY AUTOMATED COUNT: 14 % (ref 12–15)
HCT VFR BLD AUTO: 30.7 % (ref 42–52)
HGB UR QL STRIP: 10.4 G/DL (ref 14–18)
LYMPHOCYTES # SPEC AUTO: 0.3 10^3/UL (ref 1.5–3.5)
LYMPHOCYTES NFR BLD AUTO: 5 %
MCH RBC QN AUTO: 29.6 PG (ref 27–31)
MCHC RBC AUTO-ENTMCNC: 33.9 G/DL (ref 32–36)
MCV RBC AUTO: 87.5 FL (ref 80–94)
MONOCYTES # BLD AUTO: 0.6 10^3/UL (ref 0–1)
MONOCYTES NFR BLD AUTO: 8.7 %
NEUTROPHILS # BLD AUTO: 5.8 10^3/UL (ref 1.5–6.6)
NEUTROPHILS # SNV AUTO: 6.8 X10^3/UL (ref 4.8–10.8)
NEUTROPHILS NFR BLD AUTO: 85.5 %
NRBC # BLD AUTO: 0 /100WBC
NRBC # BLD AUTO: 0 X10^3/UL
PDW BLD AUTO: 10.4 FL (ref 7.4–11.4)
PLATELET # BLD: 135 10^3/UL (ref 130–450)
RBC MAR: 3.51 10^6/UL (ref 4.7–6.1)

## 2021-04-10 PROCEDURE — 96375 TX/PRO/DX INJ NEW DRUG ADDON: CPT

## 2021-04-10 PROCEDURE — 87077 CULTURE AEROBIC IDENTIFY: CPT

## 2021-04-10 PROCEDURE — 83690 ASSAY OF LIPASE: CPT

## 2021-04-10 PROCEDURE — 87040 BLOOD CULTURE FOR BACTERIA: CPT

## 2021-04-10 PROCEDURE — 74177 CT ABD & PELVIS W/CONTRAST: CPT

## 2021-04-10 PROCEDURE — 0202U NFCT DS 22 TRGT SARS-COV-2: CPT

## 2021-04-10 PROCEDURE — 85025 COMPLETE CBC W/AUTO DIFF WBC: CPT

## 2021-04-10 PROCEDURE — 81001 URINALYSIS AUTO W/SCOPE: CPT

## 2021-04-10 PROCEDURE — 87086 URINE CULTURE/COLONY COUNT: CPT

## 2021-04-10 PROCEDURE — 71046 X-RAY EXAM CHEST 2 VIEWS: CPT

## 2021-04-10 PROCEDURE — 99284 EMERGENCY DEPT VISIT MOD MDM: CPT

## 2021-04-10 PROCEDURE — 96374 THER/PROPH/DIAG INJ IV PUSH: CPT

## 2021-04-10 PROCEDURE — 87181 SC STD AGAR DILUTION PER AGT: CPT

## 2021-04-10 PROCEDURE — 83605 ASSAY OF LACTIC ACID: CPT

## 2021-04-10 PROCEDURE — 81003 URINALYSIS AUTO W/O SCOPE: CPT

## 2021-04-10 PROCEDURE — 80053 COMPREHEN METABOLIC PANEL: CPT

## 2021-04-10 PROCEDURE — 36415 COLL VENOUS BLD VENIPUNCTURE: CPT

## 2021-04-10 PROCEDURE — 51701 INSERT BLADDER CATHETER: CPT

## 2021-04-10 NOTE — ED PHYSICIAN DOCUMENTATION
PD HPI NVD





- Stated complaint


Stated Complaint: VOMITING/ HA





- Chief complaint


Chief Complaint: Abd Pain





- History obtained from


History obtained from: Patient





- History of Present Illness


Timing - onset: Today (n/v), Chronic (abdominal cramping)


Pain level now: 3


Associated symptoms: Fever (febrile in ED but patient was unaware of fevers at 

home), Abdominal pain


Contributing factors: Recent antibiotics


Improved by: No: Eating, Laying still, Vomiting, BM, Position, Meds





- Additonal information


Additional information: 





c/o nausea, vomiting since earlier today. noted be febrile in ED but unaware of 

fevers at home. he has been having episodic abdominal cramping over past few 

weeks. he is receiving cipro via RUE PICC x 4 weeks for right middle toe 

infection.





Review of Systems


Constitutional: reports: Fever.  denies: Chills, Fatigue, Sweats


Cardiac: reports: Reviewed and negative


Respiratory: reports: Reviewed and negative


GI: reports: Abdominal Pain, Nausea, Vomiting.  denies: Abdominal Swelling, 

Constipation, Diarrhea


: reports: Incontinent


Skin: denies: Rash


Musculoskeletal: reports: Back pain





PD PAST MEDICAL HISTORY





- Past Medical History


Past Medical History: Yes


Cardiovascular: Hypertension, High cholesterol, Coronary artery disease


Respiratory: None


Neuro: Peripheral neuropathy


Endocrine/Autoimmune: Type 2 diabetes


GI: None


: None


HEENT: None


Psych: Depression


Musculoskeletal: None


Derm: None





- Past Surgical History


Past Surgical History: Yes


General: Bowel surgery


Ortho: Rotator cuff repair, Spine surgery





- Present Medications


Home Medications: 


                                Ambulatory Orders











 Medication  Instructions  Recorded  Confirmed


 


Citalopram [CeleXA] 40 mg PO DAILY 11/18/14 04/10/21


 


Multivitamin [Multi-Vitamin Daily] 1 tab ORAL DAILY 02/24/15 04/10/21


 


Gabapentin 300 mg PO BID 11/24/15 04/10/21


 


Glipizide 10 mg PO BID 10/20/19 04/10/21


 


Omeprazole 20 mg PO DAILY 10/20/19 04/10/21


 


metFORMIN [Glucophage] 1,000 mg PO BID 10/20/19 04/10/21


 


Atorvastatin [Lipitor] 40 mg PO DAILY 01/16/21 04/10/21


 


Ciprofloxacin HCl [Cipro] 500 mg PO BID #20 tablet 01/16/21 04/10/21


 


Clopidogrel [Plavix] 0.5 tab PO DAILY 01/16/21 04/10/21


 


Losartan [Cozaar] 0.5 tab PO DAILY 01/16/21 04/10/21


 


Promethazine [Phenergan] 25 mg PO Q6H PRN #10 tab 01/16/21 04/10/21


 


Trospium Chloride 1 tab PO BID 01/16/21 04/10/21


 


Cefdinir 300 mg PO BID #20 cap 04/11/21 














- Allergies


Allergies/Adverse Reactions: 


                                    Allergies











Allergy/AdvReac Type Severity Reaction Status Date / Time


 


No Known Drug Allergies Allergy   Verified 04/10/21 23:20














- Social History


Does the pt smoke?: No


Smoking Status: Never smoker


Does the pt drink ETOH?: Yes


Does the pt have substance abuse?: No





- Immunizations


Immunizations are current?: No


Immunizations: Other immun not current





- POLST


Patient has POLST: No


POLST Status: Full Code





PD ED PE NORMAL





- Vitals


Vital signs reviewed: Yes





- General


General: Alert and oriented X 3, No acute distress, Well developed/nourished





- Cardiac


Cardiac: RRR, No murmur





- Respiratory


Respiratory: No respiratory distress, Clear bilaterally





- Abdomen


Abdomen: Soft, Non tender, Non distended





- Back


Back: No CVA TTP





- Derm


Derm: Normal color, Warm and dry





- Extremities


Extremities: No edema





- Neuro


Neuro: Alert and oriented X 3





Results





- Vitals


Vitals: 


                                     Oxygen











O2 Source                      Room air

















- Labs


Labs: 


                                  Microbiology











 04/11/21 00:45 Urine Culture - Preliminary





 Urine,Catheterized 


 


 04/10/21 00:06 Blood Culture - Preliminary





 Blood - Left Hand    NO GROWTH AFTER 1 DAY


 


 04/10/21 23:56 Blood Culture - Preliminary





 Blood - Right Hand    NO GROWTH AFTER 1 DAY








                                Laboratory Tests











  04/10/21 04/10/21 04/10/21





  23:18 23:18 23:56


 


WBC  6.8  


 


RBC  3.51 L  


 


Hgb  10.4 L  


 


Hct  30.7 L  


 


MCV  87.5  


 


MCH  29.6  


 


MCHC  33.9  


 


RDW  14.0  


 


Plt Count  135  


 


MPV  10.4  


 


Neut # (Auto)  5.8  


 


Lymph # (Auto)  0.3 L  


 


Mono # (Auto)  0.6  


 


Eos # (Auto)  0.0  


 


Baso # (Auto)  0.0  


 


Absolute Nucleated RBC  0.00  


 


Nucleated RBC %  0.0  


 


Sodium   134 L 


 


Potassium   3.5 


 


Chloride   100 L 


 


Carbon Dioxide   23 


 


Anion Gap   11.0 


 


BUN   28 H 


 


Creatinine   1.2 


 


Estimated GFR (MDRD)   61 L 


 


Glucose   250 H 


 


Lactic Acid    1.0


 


Calcium   9.3 


 


Total Bilirubin   1.0 


 


AST   29 


 


ALT   26 


 


Alkaline Phosphatase   68 


 


Total Protein   7.5 


 


Albumin   4.2 


 


Globulin   3.3 


 


Albumin/Globulin Ratio   1.3 


 


Lipase   24 


 


Urine Color   


 


Urine Clarity   


 


Urine pH   


 


Ur Specific Gravity   


 


Urine Protein   


 


Urine Glucose (UA)   


 


Urine Ketones   


 


Urine Occult Blood   


 


Urine Nitrite   


 


Urine Bilirubin   


 


Urine Urobilinogen   


 


Ur Leukocyte Esterase   


 


Urine RBC   


 


Urine WBC   


 


Ur Squamous Epith Cells   


 


Urine Bacteria   


 


Ur Microscopic Review   


 


Urine Culture Comments   


 


Nasal Adenovirus (PCR)   


 


Nasal B. parapertussis DNA (PCR)   


 


Nasal Coronavir 229E PCR   


 


Nasal Coronavir HKU1 PCR   


 


Nasal Coronavir NL63 PCR   


 


Nasal Coronavir OC43 PCR   


 


Nasal Enterovir/Rhinovir PCR   


 


Nasal Influenza B PCR   


 


Nasal Influenza A PCR   


 


Nasal Parainfluen 1 PCR   


 


Nasal Parainfluen 2 PCR   


 


Nasal Parainfluen 3 PCR   


 


Nasal Parainfluen 4 PCR   


 


Nasal RSV (PCR)   


 


Nasal B.pertussis DNA PCR   


 


Nasal C.pneumoniae (PCR)   


 


Frank Human Metapneumo PCR   


 


Nasal M.pneumoniae (PCR)   


 


Nasal SARS-CoV-2 (PCR)   














  04/11/21 04/11/21





  00:08 00:45


 


WBC  


 


RBC  


 


Hgb  


 


Hct  


 


MCV  


 


MCH  


 


MCHC  


 


RDW  


 


Plt Count  


 


MPV  


 


Neut # (Auto)  


 


Lymph # (Auto)  


 


Mono # (Auto)  


 


Eos # (Auto)  


 


Baso # (Auto)  


 


Absolute Nucleated RBC  


 


Nucleated RBC %  


 


Sodium  


 


Potassium  


 


Chloride  


 


Carbon Dioxide  


 


Anion Gap  


 


BUN  


 


Creatinine  


 


Estimated GFR (MDRD)  


 


Glucose  


 


Lactic Acid  


 


Calcium  


 


Total Bilirubin  


 


AST  


 


ALT  


 


Alkaline Phosphatase  


 


Total Protein  


 


Albumin  


 


Globulin  


 


Albumin/Globulin Ratio  


 


Lipase  


 


Urine Color   YELLOW


 


Urine Clarity   CLEAR


 


Urine pH   5.5


 


Ur Specific Gravity   1.025


 


Urine Protein   NEGATIVE


 


Urine Glucose (UA)   500 H


 


Urine Ketones   NEGATIVE


 


Urine Occult Blood   TRACE-INTA


 


Urine Nitrite   NEGATIVE


 


Urine Bilirubin   NEGATIVE


 


Urine Urobilinogen   0.2 (NORMAL)


 


Ur Leukocyte Esterase   TRACE H


 


Urine RBC   0-5


 


Urine WBC   4-5


 


Ur Squamous Epith Cells   RARE Squamous


 


Urine Bacteria   Many H


 


Ur Microscopic Review   INDICATED


 


Urine Culture Comments   INDICATED


 


Nasal Adenovirus (PCR)  NOT DETECTED 


 


Nasal B. parapertussis DNA (PCR)  NOT DETECTED 


 


Nasal Coronavir 229E PCR  NOT DETECTED 


 


Nasal Coronavir HKU1 PCR  NOT DETECTED 


 


Nasal Coronavir NL63 PCR  NOT DETECTED 


 


Nasal Coronavir OC43 PCR  NOT DETECTED 


 


Nasal Enterovir/Rhinovir PCR  NOT DETECTED 


 


Nasal Influenza B PCR  NOT DETECTED 


 


Nasal Influenza A PCR  NOT DETECTED 


 


Nasal Parainfluen 1 PCR  NOT DETECTED 


 


Nasal Parainfluen 2 PCR  NOT DETECTED 


 


Nasal Parainfluen 3 PCR  NOT DETECTED 


 


Nasal Parainfluen 4 PCR  NOT DETECTED 


 


Nasal RSV (PCR)  NOT DETECTED 


 


Nasal B.pertussis DNA PCR  NOT DETECTED 


 


Nasal C.pneumoniae (PCR)  NOT DETECTED 


 


Frank Human Metapneumo PCR  NOT DETECTED 


 


Nasal M.pneumoniae (PCR)  NOT DETECTED 


 


Nasal SARS-CoV-2 (PCR)  NOT DETECTED 














- Rads (name of study)


  ** chest xray


Radiology: Prelim report reviewed, See rad report





  ** CT A/P


Radiology: Prelim report reviewed, See rad report





PD MEDICAL DECISION MAKING





- ED course


Complexity details: reviewed old records, reviewed results, re-evaluated 

patient, considered differential, d/w patient


ED course: 





low-grade fever when triaged. vague c/o on my HPI, offers mostly his chronic 

problems such as back pain and recurrent abdominal cramping before mentioning 

nausea, vomiting. he is on cipro via PICC for right middle toe infection but 

there is no discharge nor surrounding erythema or swelling. his abdomen is 

nontender but given his fever and GI c/o (abdominal pain, n/v), and considering 

previous surgical GI history (colon CA with resection, colostomy, and then sharla

[place]), CT A/P performed. this has findings c/w GERD as well as cystitis 

(emphasematous cystitis with air in bladder wall; this was also noted on CT on 

ED visit few months ago). other findings on CT are chronic such as thoracic 

compression fractures. He is in NAD on initial eval as well as at time of 

discharge. will add cefdinir to outpatient regimen for cystitis with dose of 

rocephin given in ED. the cefdinir is based on his most recent urine culture 

which grew e.coli with multiple abx resistance including cipro.





Departure





- Departure


Disposition: 01 Home, Self Care


Clinical Impression: 


 Cystitis





Condition: Good


Instructions:  ED UTI Cystitis Male


Follow-Up: 


Gustavo Macias MD [Primary Care Provider] - Within 3 Days


Prescriptions: 


Cefdinir 300 mg PO BID #20 cap


Comments: 


Continue with your intravenous antibiotic. In addition, take the cefdinir for 10

days (this is the antibiotic prescribed tonight). 


Discharge Date/Time: 04/11/21 03:55

## 2021-04-10 NOTE — EXTERNAL MEDICAL SUMMARY RPT
Continuity of Care Document

                            Created on:April 10, 2021



Patient:Bret Esquivel

Sex:Male

:1960

External Reference #:[id number]





Demographics







                          Phone                     Unavailable

 

                          Preferred Language        English

 

                          Marital Status            Unknown

 

                          [employer] Affiliation     Unknown

 

                          Race                      Unknown

 

                          Ethnic Group              Unknown









Author







                          Organization              [facility]

 

                          Address                    [place] [id number]

 

                          Phone                     [phone]









Care Team Providers







                    Name                Role                Phone

 

                    Macias              Unavailable         Unavailable









Problems







                     date                description         facility

 

                     [id number]            Osteomyelitis, unspecified  [facility]

ital

 

                     [id number]            Cellulitis of right toe  [facility]

l

 

                     [id number]            Non-pressure chronic ulcer of other par

t of right foot  [facility]



                                        with                

 

                     [id number]            Other acute osteomyelitis, right ankle 

and foot  [facility]

 

                     [id number]            Type 2 diabetes mellitus with foot [facility]

 

                     [id number]            Osteomyelitis, unspecified  [facility]

ital

 

                     [id number]            Other specified symptoms and signs invo

ing [facility]



                                        circulatory         







Social History







                     date                description         facility

 

                     [id number]

## 2021-04-11 VITALS — SYSTOLIC BLOOD PRESSURE: 135 MMHG | DIASTOLIC BLOOD PRESSURE: 72 MMHG

## 2021-04-11 LAB
ALBUMIN DIAFP-MCNC: 4.2 G/DL (ref 3.2–5.5)
ALBUMIN/GLOB SERPL: 1.3 {RATIO} (ref 1–2.2)
ALP SERPL-CCNC: 68 IU/L (ref 42–121)
ALT SERPL W P-5'-P-CCNC: 26 IU/L (ref 10–60)
ANION GAP SERPL CALCULATED.4IONS-SCNC: 11 MMOL/L (ref 6–13)
AST SERPL W P-5'-P-CCNC: 29 IU/L (ref 10–42)
B PARAPERT DNA SPEC QL NAA+PROBE: NOT DETECTED
B PERT DNA SPEC QL NAA+PROBE: NOT DETECTED
BILIRUB BLD-MCNC: 1 MG/DL (ref 0.2–1)
BUN SERPL-MCNC: 28 MG/DL (ref 6–20)
C PNEUM DNA NPH QL NAA+NON-PROBE: NOT DETECTED
CALCIUM UR-MCNC: 9.3 MG/DL (ref 8.5–10.3)
CHLORIDE SERPL-SCNC: 100 MMOL/L (ref 101–111)
CLARITY UR REFRACT.AUTO: CLEAR
CO2 SERPL-SCNC: 23 MMOL/L (ref 21–32)
CREAT SERPLBLD-SCNC: 1.2 MG/DL (ref 0.6–1.2)
FLUAV RNA RESP QL NAA+PROBE: NOT DETECTED
GFRSERPLBLD MDRD-ARVRAT: 61 ML/MIN/{1.73_M2} (ref 89–?)
GLOBULIN SER-MCNC: 3.3 G/DL (ref 2.1–4.2)
GLUCOSE SERPL-MCNC: 250 MG/DL (ref 70–100)
GLUCOSE UR QL STRIP.AUTO: 500 MG/DL
HAEM INFLU B DNA SPEC QL NAA+PROBE: NOT DETECTED
HCOV 229E RNA SPEC QL NAA+PROBE: NOT DETECTED
HCOV HKU1 RNA UPPER RESP QL NAA+PROBE: NOT DETECTED
HCOV NL63 RNA ASPIRATE QL NAA+PROBE: NOT DETECTED
HCOV OC43 RNA SPEC QL NAA+PROBE: NOT DETECTED
HMPV AG SPEC QL: NOT DETECTED
HPIV1 RNA NPH QL NAA+PROBE: NOT DETECTED
HPIV2 SPEC QL CULT: NOT DETECTED
HPIV3 AB TITR SER CF: NOT DETECTED {TITER}
HPIV4 RNA SPEC QL NAA+PROBE: NOT DETECTED
KETONES UR QL STRIP.AUTO: NEGATIVE MG/DL
LIPASE SERPL-CCNC: 24 U/L (ref 22–51)
M PNEUMO DNA SPEC QL NAA+PROBE: NOT DETECTED
NITRITE UR QL STRIP.AUTO: NEGATIVE
PH UR STRIP.AUTO: 5.5 PH (ref 5–7.5)
POTASSIUM SERPL-SCNC: 3.5 MMOL/L (ref 3.5–5)
PROT SPEC-MCNC: 7.5 G/DL (ref 6.7–8.2)
PROT UR STRIP.AUTO-MCNC: NEGATIVE MG/DL
RBC # UR STRIP.AUTO: (no result) /UL
RBC # URNS HPF: (no result) /HPF (ref 0–5)
RSV RNA RESP QL NAA+PROBE: NOT DETECTED
RV+EV RNA SPEC QL NAA+PROBE: NOT DETECTED
SARS-COV-2 RNA PNL SPEC NAA+PROBE: NOT DETECTED
SODIUM SERPLBLD-SCNC: 134 MMOL/L (ref 135–145)
SP GR UR STRIP.AUTO: 1.02 (ref 1–1.03)
SQUAMOUS URNS QL MICRO: (no result)
UROBILINOGEN UR QL STRIP.AUTO: (no result) E.U./DL
UROBILINOGEN UR STRIP.AUTO-MCNC: NEGATIVE MG/DL
WBC # UR MANUAL: (no result) /HPF (ref 0–3)

## 2021-04-11 NOTE — CT REPORT
PROCEDURE:  Abdomen/Pelvis W

 

INDICATIONS:  abdominal pain, fever

 

CONTRAST:  IV CONTRAST: Isovue 300 ml: 100 PO CONTRAST: *NO PO CONTRAST 

 

TECHNIQUE:  

After the administration of nonionic IV contrast, 5 mm thick sections acquired from the diaphragms to
 the symphysis.  5 mm thick coronal and sagittal reformats were acquired.  For radiation dose reducti
on, the following was used:  automated exposure control, adjustment of mA and/or kV according to rodrigo
ent size.  

 

COMPARISON:  1/16/2021. Correlation is also made with the accompanying history

 

, 4/11/2021.

 

FINDINGS:  

Image quality:  Excellent.  

 

ABDOMEN:  

Lung bases:  Lung bases are clear.  Heart size is normal.  A small hiatal hernia is seen. Fluid mater
ial is seen within a thick-walled distal esophagus.

 

Solid organs: Liver demonstrates normal size and demonstrates no focal lesions. The spleen is mildly 
enlarged, measuring 14.8 cm AP. No focal splenic lesions are seen. Gallbladder wall does not appear t
hickened.    Biliary system is non dilated.  Pancreas enhances normally.  No adrenal nodules.  Kidney
s demonstrate normal size and enhancement, without hydronephrosis.  A nonobstructing right-sided kidn
ey stone is seen measuring 2 mm, as on series 6 image 34.

 

Peritoneum and bowel: Postoperative change is seen of the rectum, with anastomotic staple line. There
 is presacral irregularity and thickening and fluid, which is similar to the prior CT examinations. N
o dilated loops of small bowel are seen. No significant bowel wall thickening can be seen. A normal a
ppendix is incidentally noted.  

 

Nodes and vessels:  No retroperitoneal or mesenteric adenopathy by size criteria.  Aorta and inferior
 vena cava are normal in size.   Incidental note is made of a retroaortic left renal vein.    

 

Miscellaneous:  No ventral hernias.  

 

 

PELVIS:  

Genitourinary: Mild lateral thickening is seen anteriorly. Several small bubbles of gas can be seen w
ithin the bladder wall.

 

Miscellaneous:  No inguinal adenopathy.  Bilateral fat-containing inguinal hernias are seen, right la
rger than left.

 

Bones:  No suspicious bony lesions. Stable T8 and T12 anterior wedge deformities are seen. No acute v
ertebral body compression fractures.  Focal L4-L5 and L5-S1 degenerative change can be seen, with mil
julio cesar degenerative changes elsewhere. Minimal to mild dextroconvex sclerotic curvature is seen.

 

 

 

IMPRESSION:  

 

Bubbles of gas seen within the bladder wall. Emphysematous cystitis is suspected. Similar findings ca
n be seen on the prior CT, however.

 

Small hiatal hernia with associated gastroesophageal reflux. The distal esophagus is thick-walled. 

 

Rectal anastomic supine, with stable presacral fluid and thickening. The appearance is similar to 201
6.

 

 

 

Incidental note is made of:

Mild splenomegaly

Nonobstructive right-sided kidney stone

Normal appendix

Stable T8 and T12 anterior wedge deformities

Focal lower lumbar spine degenerative change

Bilateral fat-containing inguinal hernias

 

 

Note: No significant discrepancy from the preliminary report.

 

Reviewed by: Rc Haas MD on 4/11/2021 6:32 AM BRYCE

Approved by: Rc Haas MD on 4/11/2021 6:32 AM BRYCE

 

 

Station ID:  [id number]

## 2021-04-11 NOTE — XRAY REPORT
PROCEDURE:  Chest 2 View X-Ray

 

INDICATIONS:  fever

 

TECHNIQUE:  2 view(s) of the chest.  

 

COMPARISON:  10/20/2019, 7/23/2017. Prior chest CT, 12/5/2016 Correlation is also made with the accom
panying CT examination, 4/11/2021.

 

FINDINGS:  

 

Surgical changes and devices:  None.  

 

Lungs and pleura:  An incomplete inspiratory result is noted, with low lung volumes and crowding of t
he vascular markings. No focal infiltrates are seen. No large pneumothorax or large pleural effusion 
can be seen.

 

Mediastinum:  Mediastinal contours are normal.  Heart size is at the upper limits of normal.  

 

Bones and chest wall:  No suspicious bony abnormalities.  Soft tissues appear unremarkable.  

 

 

 

IMPRESSION:  Low lung volumes, without a focal infiltrate or other acute abnormality.

 

 

Note: No significant discrepancy from the preliminary report.

 

Reviewed by: Rc Haas MD on 4/11/2021 6:34 AM BRYCE

Approved by: Rc Haas MD on 4/11/2021 6:34 AM BRYCE

 

 

Station ID:  [id number]

## 2021-05-17 ENCOUNTER — HOSPITAL ENCOUNTER (EMERGENCY)
Dept: HOSPITAL 76 - ED | Age: 61
Discharge: HOME | End: 2021-05-17
Payer: COMMERCIAL

## 2021-05-17 ENCOUNTER — HOSPITAL ENCOUNTER (OUTPATIENT)
Dept: HOSPITAL 76 - EMS | Age: 61
Discharge: ANOTHER HOSPITAL | End: 2021-05-17
Payer: COMMERCIAL

## 2021-05-17 VITALS — SYSTOLIC BLOOD PRESSURE: 141 MMHG | DIASTOLIC BLOOD PRESSURE: 81 MMHG

## 2021-05-17 DIAGNOSIS — Z79.84: ICD-10-CM

## 2021-05-17 DIAGNOSIS — Z85.048: ICD-10-CM

## 2021-05-17 DIAGNOSIS — M54.9: ICD-10-CM

## 2021-05-17 DIAGNOSIS — R42: ICD-10-CM

## 2021-05-17 DIAGNOSIS — E11.42: ICD-10-CM

## 2021-05-17 DIAGNOSIS — R53.1: Primary | ICD-10-CM

## 2021-05-17 DIAGNOSIS — N30.00: Primary | ICD-10-CM

## 2021-05-17 DIAGNOSIS — I10: ICD-10-CM

## 2021-05-17 DIAGNOSIS — G89.29: ICD-10-CM

## 2021-05-17 LAB
ALBUMIN DIAFP-MCNC: 4.2 G/DL (ref 3.2–5.5)
ALBUMIN/GLOB SERPL: 1.3 {RATIO} (ref 1–2.2)
ALP SERPL-CCNC: 57 IU/L (ref 42–121)
ALT SERPL W P-5'-P-CCNC: 21 IU/L (ref 10–60)
ANION GAP SERPL CALCULATED.4IONS-SCNC: 11 MMOL/L (ref 6–13)
AST SERPL W P-5'-P-CCNC: 20 IU/L (ref 10–42)
BASOPHILS NFR BLD AUTO: 0 10^3/UL (ref 0–0.1)
BASOPHILS NFR BLD AUTO: 0.1 %
BILIRUB BLD-MCNC: 0.9 MG/DL (ref 0.2–1)
BUN SERPL-MCNC: 27 MG/DL (ref 6–20)
CALCIUM UR-MCNC: 9.1 MG/DL (ref 8.5–10.3)
CHLORIDE SERPL-SCNC: 99 MMOL/L (ref 101–111)
CLARITY UR REFRACT.AUTO: CLEAR
CO2 SERPL-SCNC: 25 MMOL/L (ref 21–32)
CREAT SERPLBLD-SCNC: 1.4 MG/DL (ref 0.6–1.2)
EOSINOPHIL # BLD AUTO: 0 10^3/UL (ref 0–0.7)
EOSINOPHIL NFR BLD AUTO: 0.2 %
ERYTHROCYTE [DISTWIDTH] IN BLOOD BY AUTOMATED COUNT: 14.2 % (ref 12–15)
GFRSERPLBLD MDRD-ARVRAT: 51 ML/MIN/{1.73_M2} (ref 89–?)
GLOBULIN SER-MCNC: 3.3 G/DL (ref 2.1–4.2)
GLUCOSE SERPL-MCNC: 224 MG/DL (ref 70–100)
GLUCOSE UR QL STRIP.AUTO: 250 MG/DL
HCT VFR BLD AUTO: 32.1 % (ref 42–52)
HGB UR QL STRIP: 10.9 G/DL (ref 14–18)
KETONES UR QL STRIP.AUTO: NEGATIVE MG/DL
LYMPHOCYTES # SPEC AUTO: 0.3 10^3/UL (ref 1.5–3.5)
LYMPHOCYTES NFR BLD AUTO: 3 %
MCH RBC QN AUTO: 29.3 PG (ref 27–31)
MCHC RBC AUTO-ENTMCNC: 34 G/DL (ref 32–36)
MCV RBC AUTO: 86.3 FL (ref 80–94)
MONOCYTES # BLD AUTO: 0.7 10^3/UL (ref 0–1)
MONOCYTES NFR BLD AUTO: 7.4 %
NEUTROPHILS # BLD AUTO: 8.2 10^3/UL (ref 1.5–6.6)
NEUTROPHILS # SNV AUTO: 9.4 X10^3/UL (ref 4.8–10.8)
NEUTROPHILS NFR BLD AUTO: 87.7 %
NITRITE UR QL STRIP.AUTO: POSITIVE
NRBC # BLD AUTO: 0 /100WBC
NRBC # BLD AUTO: 0 X10^3/UL
PDW BLD AUTO: 9.9 FL (ref 7.4–11.4)
PH UR STRIP.AUTO: 5 PH (ref 5–7.5)
PLATELET # BLD: 151 10^3/UL (ref 130–450)
POTASSIUM SERPL-SCNC: 3.8 MMOL/L (ref 3.5–5)
PROT SPEC-MCNC: 7.5 G/DL (ref 6.7–8.2)
PROT UR STRIP.AUTO-MCNC: NEGATIVE MG/DL
RBC # UR STRIP.AUTO: NEGATIVE /UL
RBC # URNS HPF: (no result) /HPF (ref 0–5)
RBC MAR: 3.72 10^6/UL (ref 4.7–6.1)
SODIUM SERPLBLD-SCNC: 135 MMOL/L (ref 135–145)
SP GR UR STRIP.AUTO: 1.01 (ref 1–1.03)
SQUAMOUS URNS QL MICRO: (no result)
UROBILINOGEN UR QL STRIP.AUTO: (no result) E.U./DL
UROBILINOGEN UR STRIP.AUTO-MCNC: NEGATIVE MG/DL
WBC # UR MANUAL: (no result) /HPF (ref 0–3)

## 2021-05-17 PROCEDURE — 36415 COLL VENOUS BLD VENIPUNCTURE: CPT

## 2021-05-17 PROCEDURE — 99284 EMERGENCY DEPT VISIT MOD MDM: CPT

## 2021-05-17 PROCEDURE — 85025 COMPLETE CBC W/AUTO DIFF WBC: CPT

## 2021-05-17 PROCEDURE — 96372 THER/PROPH/DIAG INJ SC/IM: CPT

## 2021-05-17 PROCEDURE — 81001 URINALYSIS AUTO W/SCOPE: CPT

## 2021-05-17 PROCEDURE — 81003 URINALYSIS AUTO W/O SCOPE: CPT

## 2021-05-17 PROCEDURE — 87181 SC STD AGAR DILUTION PER AGT: CPT

## 2021-05-17 PROCEDURE — 87086 URINE CULTURE/COLONY COUNT: CPT

## 2021-05-17 PROCEDURE — 87077 CULTURE AEROBIC IDENTIFY: CPT

## 2021-05-17 PROCEDURE — 51701 INSERT BLADDER CATHETER: CPT

## 2021-05-17 PROCEDURE — 80053 COMPREHEN METABOLIC PANEL: CPT

## 2021-05-17 NOTE — ED PHYSICIAN DOCUMENTATION
History of Present Illness





- Stated complaint


Stated Complaint: BACK PX





- Chief complaint


Chief Complaint: Back Pain





- History obtained from


History obtained from: Patient, EMS





- History of Present Illness


Timing: Chronic


Pain level max: 6


Pain level now: 6





- Additonal information


Additional information: 





61-year-old male with a history of rectal cancer presents to the emergency 

department stating that he has felt weaker today than usual.  Wife states that 

this is similar to when he normally gets a UTI.  He has chronic back pain.  

States that this is unchanged.  No fall.  No trauma.  He has residual right-

sided weakness from a prior stroke.  No fevers.  No chills.  No abdominal pain. 

No nausea or vomiting.  No diarrhea.  History of recurrent UTIs. No new 

neurological changes.  No loss of bowel or bladder control





Review of Systems


Ten Systems: 10 systems reviewed and negative


Constitutional: denies: Fever, Chills


Nose: denies: Rhinorrhea / runny nose, Congestion


GI: denies: Vomiting, Diarrhea


: denies: Dysuria, Frequency, Hesitancy, Incontinent


Skin: denies: Rash


Musculoskeletal: reports: Back pain (chronic, unchanged. No loss of bowel or 

bladder control.  No trauma).  denies: Neck pain


Neurologic: denies: Focal weakness, Numbness, Headache





PD PAST MEDICAL HISTORY





- Past Medical History


Cardiovascular: Hypertension, High cholesterol, Coronary artery disease


Respiratory: None


Neuro: Peripheral neuropathy


Endocrine/Autoimmune: Type 2 diabetes


GI: None


: None


HEENT: None


Psych: Depression


Musculoskeletal: None


Derm: None





- Past Surgical History


Past Surgical History: Yes


General: Bowel surgery


Ortho: Rotator cuff repair, Spine surgery





- Present Medications


Home Medications: 


                                Ambulatory Orders











 Medication  Instructions  Recorded  Confirmed


 


Citalopram [CeleXA] 40 mg PO DAILY 11/18/14 04/10/21


 


Multivitamin [Multi-Vitamin Daily] 1 tab ORAL DAILY 02/24/15 04/10/21


 


Gabapentin 300 mg PO BID 11/24/15 04/10/21


 


Glipizide 10 mg PO BID 10/20/19 04/10/21


 


Omeprazole 20 mg PO DAILY 10/20/19 04/10/21


 


metFORMIN [Glucophage] 1,000 mg PO BID 10/20/19 04/10/21


 


Atorvastatin [Lipitor] 40 mg PO DAILY 01/16/21 04/10/21


 


Ciprofloxacin HCl [Cipro] 500 mg PO BID #20 tablet 01/16/21 04/10/21


 


Clopidogrel [Plavix] 0.5 tab PO DAILY 01/16/21 04/10/21


 


Losartan [Cozaar] 0.5 tab PO DAILY 01/16/21 04/10/21


 


Promethazine [Phenergan] 25 mg PO Q6H PRN #10 tab 01/16/21 04/10/21


 


Trospium Chloride 1 tab PO BID 01/16/21 04/10/21


 


Cefdinir 300 mg PO BID #20 cap 04/11/21 


 


Cefdinir 300 mg PO BID #20 cap 05/17/21 














- Allergies


Allergies/Adverse Reactions: 


                                    Allergies











Allergy/AdvReac Type Severity Reaction Status Date / Time


 


No Known Drug Allergies Allergy   Verified 05/17/21 14:37














- Social History


Does the pt smoke?: No


Smoking Status: Never smoker


Does the pt drink ETOH?: Yes


Does the pt have substance abuse?: No





- Immunizations


Immunizations are current?: No


Immunizations: Other immun not current





- POLST


Patient has POLST: No


POLST Status: Full Code





PD ED PE NORMAL





- Vitals


Vital signs reviewed: Yes





- General


General: Alert and oriented X 3, No acute distress, Well developed/nourished





- HEENT


HEENT: PERRL, Moist mucous membranes





- Neck


Neck: Supple, no meningeal sign





- Cardiac


Cardiac: RRR, Strong equal pulses





- Respiratory


Respiratory: No respiratory distress, Clear bilaterally





- Abdomen


Abdomen: Soft, Non tender, Non distended





- Back


Back: No spinal TTP (no step off or deformity.)





- Derm


Derm: Warm and dry





- Extremities


Extremities: No deformity, Normal ROM s pain, Other





- Neuro


Neuro: Alert and oriented X 3, No motor deficit (mild R sided weakness, no 

changes from baseline. ), No sensory deficit, Other (No saddle anesthesia.)





- Psych


Psych: Normal mood, Normal affect





Results





- Vitals


Vitals: 


                               Vital Signs - 24 hr











  05/17/21 05/17/21 05/17/21





  14:37 14:42 16:41


 


Temperature 37.0 C 37.0 C 37.8 C


 


Heart Rate 88 88 80


 


Respiratory 16 16 16





Rate   


 


Blood Pressure 143/63 H 143/63 H 133/77 H


 


O2 Saturation 100 100 98














  05/17/21 05/17/21 05/17/21





  17:32 19:10 19:43


 


Temperature 37.2 C  1930 C H


 


Heart Rate 79 79 97


 


Respiratory 16 18 18





Rate   


 


Blood Pressure 153/79 H 142/80 H 141/81 H


 


O2 Saturation 96 98 98








                                     Oxygen











O2 Source                      Room air

















- Labs


Labs: 


                                Laboratory Tests











  05/17/21 05/17/21 05/17/21





  15:00 15:00 17:35


 


WBC  9.4  


 


RBC  3.72 L  


 


Hgb  10.9 L  


 


Hct  32.1 L  


 


MCV  86.3  


 


MCH  29.3  


 


MCHC  34.0  


 


RDW  14.2  


 


Plt Count  151  


 


MPV  9.9  


 


Neut # (Auto)  8.2 H  


 


Lymph # (Auto)  0.3 L  


 


Mono # (Auto)  0.7  


 


Eos # (Auto)  0.0  


 


Baso # (Auto)  0.0  


 


Absolute Nucleated RBC  0.00  


 


Nucleated RBC %  0.0  


 


Sodium   135 


 


Potassium   3.8 


 


Chloride   99 L 


 


Carbon Dioxide   25 


 


Anion Gap   11.0 


 


BUN   27 H 


 


Creatinine   1.4 H 


 


Estimated GFR (MDRD)   51 L 


 


Glucose   224 H 


 


Calcium   9.1 


 


Total Bilirubin   0.9 


 


AST   20 


 


ALT   21 


 


Alkaline Phosphatase   57 


 


Total Protein   7.5 


 


Albumin   4.2 


 


Globulin   3.3 


 


Albumin/Globulin Ratio   1.3 


 


Urine Color    YELLOW


 


Urine Clarity    CLEAR


 


Urine pH    5.0


 


Ur Specific Gravity    1.015


 


Urine Protein    NEGATIVE


 


Urine Glucose (UA)    250 H


 


Urine Ketones    NEGATIVE


 


Urine Occult Blood    NEGATIVE


 


Urine Nitrite    POSITIVE H


 


Urine Bilirubin    NEGATIVE


 


Urine Urobilinogen    0.2 (NORMAL)


 


Ur Leukocyte Esterase    NEGATIVE


 


Urine RBC    0-5


 


Urine WBC    4-5


 


Ur Squamous Epith Cells    FEW Squamous


 


Urine Bacteria    Moderate H


 


Ur Microscopic Review    INDICATED


 


Urine Culture Comments    INDICATED














PD MEDICAL DECISION MAKING





- ED course


Complexity details: reviewed results, re-evaluated patient, considered 

differential, d/w patient


ED course: 





Back pain improved with oxycodone.  No significant lab abnormalities.  Does have

 a UTI.  Given Rocephin for this male placed on antibiotics for home.  Declines 

pain medications for home.  No cauda equina, no spinal epidural abscess, no 

fracture, no aortic dissection or evidence of aneursym rupture patient counseled

 regarding signs and symptoms for which I believe and urgent re-evaluation would

 be necessary. Patient with good understanding of and agreement to plan and is 

comfortable going home at this time





This document was made in part using voice recognition software. While efforts 

are made to proofread this document, sound alike and grammatical errors may 

occur.





Departure





- Departure


Disposition: 01 Home, Self Care


Clinical Impression: 


UTI (urinary tract infection)


Qualifiers:


 Urinary tract infection type: acute cystitis Hematuria presence: without 

hematuria Qualified Code(s): N30.00 - Acute cystitis without hematuria





Condition: Good


Instructions:  ED UTI Cystitis Male


Follow-Up: 


Gustavo Macias MD [Primary Care Provider] - Within 1 week


Prescriptions: 


Cefdinir 300 mg PO BID #20 cap


Comments: 


Take all antibiotics until gone.  Follow-up with your doctor for further care.  

Return if you worsen.


Discharge Date/Time: 05/17/21 19:43

## 2021-05-17 NOTE — EXTERNAL MEDICAL SUMMARY RPT
Continuity of Care Document

                             Created on:May 17, 2021



Patient:Bret Esquivel

Sex:Male

:1960

External Reference #:[id number]





Demographics







                          Phone                     Unavailable

 

                          Preferred Language        English

 

                          Marital Status            Unknown

 

                          Amish Affiliation     Unknown

 

                          Race                      Unknown

 

                          Ethnic Group              Unknown









Author







                          Organization              [facility]

 

                          Address                    [place] [id number]

 

                          Phone                     [phone]









Care Team Providers







                    Name                Role                Phone

 

                    Macias              Unavailable         Unavailable









Problems







                     date                description         facility

 

                     [id number]            Type 2 diabetes mellitus with unspecifi

ed complications  [facility]

 

                     [id number]            Personal history of transient ischemic 

attack (TIA),  [facility]



                                        and cer             

 

                     [id number]            Other specified abnormalities of plasma

 proteins  [facility]

 

                     [id number]            Encounter for preprocedural laboratory 

examination  [facility]

 

                     [id number]            Contact with and (suspected) exposure t

o COVID-[address] [place] [address]

 

                     [id number]            Other specified symptoms and signs invo

[facility]



                                        circulatory         

 

                     [id number]            Osteomyelitis, unspecified  [facility]

ital

 

                     [id number]            Type 2 diabetes mellitus with foot ulce

r  [facility]

 

                     [id number]            Other acute osteomyelitis, right ankle 

and foot  [facility]

 

                     [id number]            Non-pressure chronic ulcer of other par

t of right foot  [facility]



                                        with                

 

                     [id number]            Cellulitis of right toe  [facility]

l

 

                     [id number]            Osteomyelitis, unspecified  [facility]

ital







Procedures







                     date                description         facility

 

                     [id number]            [facility]

 

                     [id number]            [facility]

 

                     [id number]            [facility]

 

                     [id number]            [facility]

 

                     [id number]            [facility]

 

                     [id number]            [facility]

 

                     [id number]            [facility]

 

                     [id number]            [facility]

 

                     [id number]            [facility]

 

                     [id number]            [facility]

 

                     [id number]            [facility]

 

                     [id number]            [facility]

 

                     [id number]            [facility]

 

                     [id number]            [facility]

 

                     [id number]            [facility]

 

                     [id number]            [facility]

 

                     [id number]            [facility]

 

                     [id number]            [facility]

 

                     [id number]            [facility]

 

                     [id number]            [facility]

 

                     [id number]            [facility]

 

                     [id number]            [facility]

 

                     [id number]            [facility]

 

                     [id number]            [facility]

 

                     [id number]            [facility]

 

                     [id number]            [facility]

 

                     [id number]            [facility]

 

                     [id number]            [facility]

 

                     [id number]            [facility]

 

                     [id number]            [facility]

 

                     [id number]            [facility]

 

                     [id number]            [facility]

 

                     [id number]            [facility]







Vital Signs







                 date            measurement     value           source

 

                 [id number]        temperature_standard  97.6            F

 

                 2021        temperature_metric  36.44           C

 

                 2021        respiration_rate  18              /min

 

                 2021        heart_rate      76              /min

 

                 2021        BP_systolic     145             mm[Hg]

 

                 2021        BP_diastolic    87              mm[Hg]

## 2021-10-01 ENCOUNTER — HOSPITAL ENCOUNTER (EMERGENCY)
Dept: HOSPITAL 76 - ED | Age: 61
Discharge: HOME | End: 2021-10-01
Payer: COMMERCIAL

## 2021-10-01 VITALS — DIASTOLIC BLOOD PRESSURE: 74 MMHG | SYSTOLIC BLOOD PRESSURE: 126 MMHG

## 2021-10-01 DIAGNOSIS — Z79.02: ICD-10-CM

## 2021-10-01 DIAGNOSIS — E11.42: ICD-10-CM

## 2021-10-01 DIAGNOSIS — I10: ICD-10-CM

## 2021-10-01 DIAGNOSIS — Z79.84: ICD-10-CM

## 2021-10-01 DIAGNOSIS — I25.10: ICD-10-CM

## 2021-10-01 DIAGNOSIS — Z90.49: ICD-10-CM

## 2021-10-01 DIAGNOSIS — Z85.038: ICD-10-CM

## 2021-10-01 DIAGNOSIS — N30.00: Primary | ICD-10-CM

## 2021-10-01 LAB
ALBUMIN DIAFP-MCNC: 3.6 G/DL (ref 3.2–5.5)
ALBUMIN/GLOB SERPL: 0.9 {RATIO} (ref 1–2.2)
ALP SERPL-CCNC: 78 IU/L (ref 42–121)
ALT SERPL W P-5'-P-CCNC: 18 IU/L (ref 10–60)
ANION GAP SERPL CALCULATED.4IONS-SCNC: 10 MMOL/L (ref 6–13)
AST SERPL W P-5'-P-CCNC: 14 IU/L (ref 10–42)
BASOPHILS NFR BLD AUTO: 0 10^3/UL (ref 0–0.1)
BASOPHILS NFR BLD AUTO: 0.4 %
BILIRUB BLD-MCNC: 1 MG/DL (ref 0.2–1)
BUN SERPL-MCNC: 24 MG/DL (ref 6–20)
CALCIUM UR-MCNC: 9.3 MG/DL (ref 8.5–10.3)
CHLORIDE SERPL-SCNC: 97 MMOL/L (ref 101–111)
CLARITY UR REFRACT.AUTO: (no result)
CO2 SERPL-SCNC: 29 MMOL/L (ref 21–32)
CREAT SERPLBLD-SCNC: 1.4 MG/DL (ref 0.6–1.2)
EOSINOPHIL # BLD AUTO: 0 10^3/UL (ref 0–0.7)
EOSINOPHIL NFR BLD AUTO: 0.6 %
ERYTHROCYTE [DISTWIDTH] IN BLOOD BY AUTOMATED COUNT: 14.3 % (ref 12–15)
GFRSERPLBLD MDRD-ARVRAT: 51 ML/MIN/{1.73_M2} (ref 89–?)
GLOBULIN SER-MCNC: 3.9 G/DL (ref 2.1–4.2)
GLUCOSE SERPL-MCNC: 185 MG/DL (ref 70–100)
GLUCOSE UR QL STRIP.AUTO: 500 MG/DL
HCT VFR BLD AUTO: 31.7 % (ref 42–52)
HGB UR QL STRIP: 10.3 G/DL (ref 14–18)
KETONES UR QL STRIP.AUTO: NEGATIVE MG/DL
LIPASE SERPL-CCNC: 22 U/L (ref 22–51)
LYMPHOCYTES # SPEC AUTO: 0.4 10^3/UL (ref 1.5–3.5)
LYMPHOCYTES NFR BLD AUTO: 7 %
MCH RBC QN AUTO: 28.5 PG (ref 27–31)
MCHC RBC AUTO-ENTMCNC: 32.5 G/DL (ref 32–36)
MCV RBC AUTO: 87.8 FL (ref 80–94)
MONOCYTES # BLD AUTO: 0.4 10^3/UL (ref 0–1)
MONOCYTES NFR BLD AUTO: 7.7 %
NEUTROPHILS # BLD AUTO: 4.6 10^3/UL (ref 1.5–6.6)
NEUTROPHILS # SNV AUTO: 5.5 X10^3/UL (ref 4.8–10.8)
NEUTROPHILS NFR BLD AUTO: 83.9 %
NITRITE UR QL STRIP.AUTO: NEGATIVE
NRBC # BLD AUTO: 0 /100WBC
NRBC # BLD AUTO: 0 X10^3/UL
PDW BLD AUTO: 9.4 FL (ref 7.4–11.4)
PH UR STRIP.AUTO: 5.5 PH (ref 5–7.5)
PLATELET # BLD: 135 10^3/UL (ref 130–450)
POTASSIUM SERPL-SCNC: 4.1 MMOL/L (ref 3.5–5)
PROT SPEC-MCNC: 7.5 G/DL (ref 6.7–8.2)
PROT UR STRIP.AUTO-MCNC: NEGATIVE MG/DL
RBC # UR STRIP.AUTO: (no result) /UL
RBC # URNS HPF: (no result) /HPF (ref 0–5)
RBC MAR: 3.61 10^6/UL (ref 4.7–6.1)
SODIUM SERPLBLD-SCNC: 136 MMOL/L (ref 135–145)
SP GR UR STRIP.AUTO: 1.01 (ref 1–1.03)
SQUAMOUS URNS QL MICRO: (no result)
UROBILINOGEN UR QL STRIP.AUTO: (no result) E.U./DL
UROBILINOGEN UR STRIP.AUTO-MCNC: NEGATIVE MG/DL
WBC # UR MANUAL: (no result) /HPF (ref 0–3)

## 2021-10-01 PROCEDURE — 96376 TX/PRO/DX INJ SAME DRUG ADON: CPT

## 2021-10-01 PROCEDURE — 81003 URINALYSIS AUTO W/O SCOPE: CPT

## 2021-10-01 PROCEDURE — 96361 HYDRATE IV INFUSION ADD-ON: CPT

## 2021-10-01 PROCEDURE — 85025 COMPLETE CBC W/AUTO DIFF WBC: CPT

## 2021-10-01 PROCEDURE — 74177 CT ABD & PELVIS W/CONTRAST: CPT

## 2021-10-01 PROCEDURE — 51701 INSERT BLADDER CATHETER: CPT

## 2021-10-01 PROCEDURE — 83690 ASSAY OF LIPASE: CPT

## 2021-10-01 PROCEDURE — 99285 EMERGENCY DEPT VISIT HI MDM: CPT

## 2021-10-01 PROCEDURE — 87086 URINE CULTURE/COLONY COUNT: CPT

## 2021-10-01 PROCEDURE — 51798 US URINE CAPACITY MEASURE: CPT

## 2021-10-01 PROCEDURE — 81001 URINALYSIS AUTO W/SCOPE: CPT

## 2021-10-01 PROCEDURE — 87181 SC STD AGAR DILUTION PER AGT: CPT

## 2021-10-01 PROCEDURE — 80053 COMPREHEN METABOLIC PANEL: CPT

## 2021-10-01 PROCEDURE — 87077 CULTURE AEROBIC IDENTIFY: CPT

## 2021-10-01 PROCEDURE — 99284 EMERGENCY DEPT VISIT MOD MDM: CPT

## 2021-10-01 PROCEDURE — 36415 COLL VENOUS BLD VENIPUNCTURE: CPT

## 2021-10-01 PROCEDURE — 96374 THER/PROPH/DIAG INJ IV PUSH: CPT

## 2021-10-01 PROCEDURE — 96375 TX/PRO/DX INJ NEW DRUG ADDON: CPT

## 2021-10-01 NOTE — ED PHYSICIAN DOCUMENTATION
PD HPI ABD PAIN





- Stated complaint


Stated Complaint: MALE 





- History obtained from


History obtained from: Patient





- History of Present Illness


Timing - onset: Today, Yesterday


Timing - duration: Days (2)


Timing - details: Gradual onset, Still present


Quality: Cramping, Aching, Pain


Location: Periumbilical


Radiation: No: Left flank, Right flank


Improved by: No: Vomiting


Associated symptoms: Nausea, Vomiting, Other (he does self caths at home, so is 

not in retention.).  No: Fever, Diarrhea, Constipation, Dysuria


Similar symptoms before: Has not had sx before (has had UTIs with kidney 

infections in the past, but pain was lower and to flank. The mid abd current 

pain is unusual.)


Recently seen: Emergency Dept ([facility] ER visit May 2021 for flank pain due to 

UTI.), Surgery (lumbar surgery over a month ago at [facility] without 

complications. Was not on regular narcotics for it. No infections. Has been back

to light activity.)





Review of Systems


Constitutional: denies: Fever, Chills


Nose: denies: Rhinorrhea / runny nose, Congestion


Throat: denies: Sore throat


Cardiac: denies: Chest pain / pressure


Respiratory: denies: Dyspnea, Cough


GI: reports: Abdominal Pain (mid abd), Nausea, Vomiting.  denies: Abdominal 

Swelling (but feeling his stomach has some distention.), Constipation, Diarrhea


: reports: Unable to Void (so regularly does self cathing at home without 

complications.).  denies: Dysuria, Frequency


Skin: denies: Rash, Lesions


Musculoskeletal: denies: Back pain


Neurologic: denies: Generalized weakness, Near syncope, Altered mental status


Immunocompromised: denies: Immunocompromised





PD PAST MEDICAL HISTORY





- Past Medical History


Cardiovascular: Hypertension, High cholesterol, Coronary artery disease


Respiratory: None


Neuro: Peripheral neuropathy


Endocrine/Autoimmune: Type 2 diabetes


GI: None, Other (colon cancer with surgical resection and is clear of tumor. )


: None


HEENT: None


Psych: Depression


Musculoskeletal: None


Derm: None





- Past Surgical History


Past Surgical History: Yes


General: Bowel surgery


Ortho: Rotator cuff repair, Spine surgery (a month ago lumbar area without 

complications. Healing well. )





- Present Medications


Home Medications: 


                                Ambulatory Orders











 Medication  Instructions  Recorded  Confirmed


 


Citalopram [CeleXA] 40 mg PO DAILY 11/18/14 04/10/21


 


Multivitamin [Multi-Vitamin Daily] 1 tab ORAL DAILY 02/24/15 04/10/21


 


Gabapentin 300 mg PO BID 11/24/15 04/10/21


 


Omeprazole 20 mg PO DAILY 10/20/19 04/10/21


 


glipiZIDE [Glipizide] 10 mg PO BID 10/20/19 04/10/21


 


metFORMIN [Glucophage] 1,000 mg PO BID 10/20/19 04/10/21


 


Atorvastatin [Lipitor] 40 mg PO DAILY 01/16/21 04/10/21


 


Ciprofloxacin HCl [Cipro] 500 mg PO BID #20 tablet 01/16/21 04/10/21


 


Clopidogrel [Plavix] 0.5 tab PO DAILY 01/16/21 04/10/21


 


Losartan [Cozaar] 0.5 tab PO DAILY 01/16/21 04/10/21


 


Promethazine [Phenergan] 25 mg PO Q6H PRN #10 tab 01/16/21 04/10/21


 


Trospium Chloride 1 tab PO BID 01/16/21 04/10/21


 


Cefdinir 300 mg PO BID #20 cap 04/11/21 


 


Cefdinir 300 mg PO BID #20 cap 05/17/21 


 


Docusate Sodium 100Mg Capsule 100 mg PO DAILY #15 cap 10/01/21 





[Colace 100Mg Capsule]   


 


HYDROcod/ACETAM 5/325 [Norco 5/325] 1 ea PO Q6H PRN #14 tablet 10/01/21 


 


Ondansetron Odt [Zofran] 4 mg TL Q6H PRN #10 tablet 10/01/21 


 


cephALEXin [Keflex] 500 mg PO TID #20 cap 10/01/21 














- Allergies


Allergies/Adverse Reactions: 


                                    Allergies











Allergy/AdvReac Type Severity Reaction Status Date / Time


 


No Known Drug Allergies Allergy   Verified 10/01/21 17:17














- Social History


Does the pt smoke?: No


Smoking Status: Never smoker


Does the pt drink ETOH?: Yes


Does the pt have substance abuse?: No





- Immunizations


Immunizations are current?: No


Immunizations: Other immun not current





- POLST


Patient has POLST: No


POLST Status: Full Code





PD ED PE NORMAL





- Vitals


Vital signs reviewed: Yes





- General


General: Alert and oriented X 3, Well developed/nourished, Other (appears 

uncomfortable ue to mid abd pain. )





- HEENT


HEENT: Pharynx benign





- Neck


Neck: Supple, no meningeal sign, No adenopathy





- Cardiac


Cardiac: RRR, No murmur





- Respiratory


Respiratory: Clear bilaterally





- Abdomen


Abdomen: Soft, Non distended, No organomegaly, Other (multiple prior scars. 

Tender mid abdomen to upper abd. No percussion nor rebound tenderness. ).  No: 

Normal bowel sounds (diminished)





- Male 


Male : Deferred





- Rectal


Rectal: Deferred





- Back


Back: No CVA TTP





- Derm


Derm: Normal color, Warm and dry





- Extremities


Extremities: No tenderness to palpate, No edema, No calf tenderness / cord, 

Other (brace on right posterior ankle. )





- Neuro


Neuro: Alert and oriented X 3, No motor deficit, Normal speech





Results





- Vitals


Vitals: 


                               Vital Signs - 24 hr











  10/01/21 10/01/21





  17:17 19:24


 


Temperature 37.0 C 


 


Heart Rate 100 93


 


Respiratory 18 18





Rate  


 


Blood Pressure 164/95 H 151/75 H


 


O2 Saturation 99 94








                                     Oxygen











O2 Source                      Room air

















- Labs


Labs: 


                                Laboratory Tests











  10/01/21 10/01/21 10/01/21





  17:57 17:57 18:12


 


WBC  5.5  


 


RBC  3.61 L  


 


Hgb  10.3 L  


 


Hct  31.7 L  


 


MCV  87.8  


 


MCH  28.5  


 


MCHC  32.5  


 


RDW  14.3  


 


Plt Count  135  


 


MPV  9.4  


 


Neut # (Auto)  4.6  


 


Lymph # (Auto)  0.4 L  


 


Mono # (Auto)  0.4  


 


Eos # (Auto)  0.0  


 


Baso # (Auto)  0.0  


 


Absolute Nucleated RBC  0.00  


 


Nucleated RBC %  0.0  


 


Sodium   136 


 


Potassium   4.1 


 


Chloride   97 L 


 


Carbon Dioxide   29 


 


Anion Gap   10.0 


 


BUN   24 H 


 


Creatinine   1.4 H 


 


Estimated GFR (MDRD)   51 L 


 


Glucose   185 H 


 


Calcium   9.3 


 


Total Bilirubin   1.0 


 


AST   14 


 


ALT   18 


 


Alkaline Phosphatase   78 


 


Total Protein   7.5 


 


Albumin   3.6 


 


Globulin   3.9 


 


Albumin/Globulin Ratio   0.9 L 


 


Lipase   22 


 


Urine Color    YELLOW


 


Urine Clarity    SL. CLOUDY


 


Urine pH    5.5


 


Ur Specific Gravity    1.015


 


Urine Protein    NEGATIVE


 


Urine Glucose (UA)    500 H


 


Urine Ketones    NEGATIVE


 


Urine Occult Blood    TRACE-INTA


 


Urine Nitrite    NEGATIVE


 


Urine Bilirubin    NEGATIVE


 


Urine Urobilinogen    0.2 (NORMAL)


 


Ur Leukocyte Esterase    TRACE H


 


Urine RBC    0-5


 


Urine WBC    0-3


 


Ur Squamous Epith Cells    RARE Squamous


 


Urine Bacteria    Many H


 


Ur Microscopic Review    INDICATED


 


Urine Culture Comments    INDICATED














- Rads (name of study)


  ** abd/pelvic CT


Radiology: Prelim report reviewed (bladder wall thickening c/w infection. No 

intestinal problem. ), See rad report





PD MEDICAL DECISION MAKING





- ED course


Complexity details: reviewed results (UA in and out cath does show likely 

infection. Does not account for mid to upper abd pain. CT scan.), re-evaluated 

patient, considered differential, d/w patient





Departure





- Departure


Disposition: 01 Home, Self Care


Clinical Impression: 


UTI (urinary tract infection)


Qualifiers:


 Urinary tract infection type: acute cystitis Hematuria presence: without 

hematuria Qualified Code(s): N30.00 - Acute cystitis without hematuria





Abdominal pain


Qualifiers:


 Abdominal location: periumbilical Qualified Code(s): R10.33 - Periumbilical 

pain





Condition: Stable


Record reviewed to determine appropriate education?: Yes


Instructions:  ED Abdominal Pain Unkn Cause, ED UTI Cystitis Male


Follow-Up: 


QUINTIN FAULKNER PA-C [Primary Care Provider] - 


Prescriptions: 


Docusate Sodium 100Mg Capsule [Colace 100Mg Capsule] 100 mg PO DAILY #15 cap


cephALEXin [Keflex] 500 mg PO TID #20 cap


HYDROcod/ACETAM 5/325 [Norco 5/325] 1 ea PO Q6H PRN #14 tablet


 PRN Reason: Pain


Ondansetron Odt [Zofran] 4 mg TL Q6H PRN #10 tablet


 PRN Reason: Nausea / Vomiting


Comments: 


Your urine shows some bacteria and white cells and the CT scan shows wall 

inflammation of the bladder both consistent with likely infection.  No signs of 

kidney infection.





Your CT scan and blood test do not show any other obvious process causing your 

mid abdominal pain.  There may be some stretching and irritation of the mid 

intestines.





Continue usual medications.  Add docusate stool softener to decrease stool 

burden.  Even though you are not constipated this can still be helpful for some 

of the intestinal pain.





Add Tylenol or hydrocodone as needed for pain in the short-term over the next 

day or 2.  Ondansetron if needed for nausea.





Cephalexin for the apparent urinary infection.  Urine culture will result in a 

couple of days to decide if that needs changing antibiotics.  The cephalexin 

would be appropriate for your prior several infections.





Recheck if not improved well over the next couple of days and return if 

persistent or worsening.





Your usual pharmacy [facility], closes already this evening.  I transmitted these 

current prescriptions to [facility] in [place].

## 2021-10-01 NOTE — CT REPORT
PROCEDURE:  Abdomen/Pelvis W

 

INDICATIONS:  Abdominal pain, acute, nonlocalized

 

CONTRAST:  IV CONTRAST: Optiray 320 ml: 100 PO CONTRAST: *NO PO CONTRAST 

 

TECHNIQUE:  

After the administration of intravenous contrast, 5 mm thick sections acquired from the diaphragms to
 the symphysis.  5 mm thick coronal and sagittal reformats were acquired.  For radiation dose reducti
on, the following was used:  automated exposure control, adjustment of mA and/or kV according to rodrigo
ent size.  

 

COMPARISON:  4/11/2021, 1/16/2021, 12/5/2016, 11/16/2015, 11/10/2014, 6/8/2013, 1/4/2014.

 

FINDINGS:  

Image quality:  Excellent.  

 

ABDOMEN:  

Lung bases:  Lung bases are clear.  Heart size is normal.  

 

Solid organs:  Liver and spleen are normal in size and enhancement.  Gallbladder is within normal guzman
its  Biliary system is non dilated.  Pancreas enhances normally.  No adrenal nodules.  Kidneys demons
trate normal size and enhancement, without hydronephrosis.  

 

Peritoneum and bowel:  Small hiatal hernia noted. Bowel loops demonstrate normal wall thickness and c
aliber. Anastomotic sutures noted in the rectum, loops of small bowel in the right lower quadrant and
 in right colon which are stable compared to prior examination. Presacral soft tissue thickening with
 central hypodensity possibly related representing fluid is stable compared to prior exams.  No free 
fluid or air. Appendix is normal. 

 

Nodes and vessels:  No retroperitoneal or mesenteric adenopathy by size criteria.  Aorta and inferior
 vena cava are normal in size.  

 

Miscellaneous:  No ventral hernias.  

 

 

PELVIS:  

 

Genitourinary: Bladder wall is diffusely thickened and irregular. Air locule noted in the urinary narcisa
dder lumen which could be related to catheterization, infection with gas-forming organism or fistula 
connection with bowel. 

 

Miscellaneous:  No inguinal adenopathy. Small fat-containing bilateral inguinal hernias. 

 

Bones:  No suspicious bony lesions. L4-L5 fixation hardware is stable. No vertebral body compression 
fractures.  

 

 

IMPRESSION:  

 

1. Diffuse, irregular urinary bladder wall thickening with air in the urinary bladder. Findings per
tible with cystitis possibly related to infection. Recommend correlation with urinalysis data.

 

2. Otherwise, stable examination compared to 4/11/2021.

 

Reviewed by: Qiana Clifton MD, PhD on 10/1/2021 7:28 PM PDT

Approved by: Qiana Clifton MD, PhD on 10/1/2021 7:28 PM PDT

 

 

Station ID:  [id number]

## 2021-10-02 ENCOUNTER — HOSPITAL ENCOUNTER (OUTPATIENT)
Dept: HOSPITAL 76 - EMS | Age: 61
End: 2021-10-02
Payer: COMMERCIAL

## 2021-10-02 DIAGNOSIS — Z04.3: Primary | ICD-10-CM

## 2021-10-02 DIAGNOSIS — R42: ICD-10-CM

## 2021-10-04 ENCOUNTER — HOSPITAL ENCOUNTER (EMERGENCY)
Dept: HOSPITAL 76 - ED | Age: 61
Discharge: HOME | End: 2021-10-04
Payer: COMMERCIAL

## 2021-10-04 VITALS — DIASTOLIC BLOOD PRESSURE: 83 MMHG | SYSTOLIC BLOOD PRESSURE: 161 MMHG

## 2021-10-04 DIAGNOSIS — K44.9: Primary | ICD-10-CM

## 2021-10-04 DIAGNOSIS — N30.00: ICD-10-CM

## 2021-10-04 DIAGNOSIS — K20.90: ICD-10-CM

## 2021-10-04 DIAGNOSIS — E11.42: ICD-10-CM

## 2021-10-04 DIAGNOSIS — Z79.84: ICD-10-CM

## 2021-10-04 DIAGNOSIS — I10: ICD-10-CM

## 2021-10-04 LAB
ALBUMIN DIAFP-MCNC: 3.5 G/DL (ref 3.2–5.5)
ALBUMIN/GLOB SERPL: 1 {RATIO} (ref 1–2.2)
ALP SERPL-CCNC: 82 IU/L (ref 42–121)
ALT SERPL W P-5'-P-CCNC: 24 IU/L (ref 10–60)
ANION GAP SERPL CALCULATED.4IONS-SCNC: 12 MMOL/L (ref 6–13)
AST SERPL W P-5'-P-CCNC: 23 IU/L (ref 10–42)
BASOPHILS NFR BLD AUTO: 0 10^3/UL (ref 0–0.1)
BASOPHILS NFR BLD AUTO: 0.2 %
BILIRUB BLD-MCNC: 1 MG/DL (ref 0.2–1)
BUN SERPL-MCNC: 20 MG/DL (ref 6–20)
CALCIUM UR-MCNC: 8.8 MG/DL (ref 8.5–10.3)
CHLORIDE SERPL-SCNC: 101 MMOL/L (ref 101–111)
CO2 SERPL-SCNC: 27 MMOL/L (ref 21–32)
CREAT SERPLBLD-SCNC: 1.2 MG/DL (ref 0.6–1.2)
EOSINOPHIL # BLD AUTO: 0 10^3/UL (ref 0–0.7)
EOSINOPHIL NFR BLD AUTO: 1 %
ERYTHROCYTE [DISTWIDTH] IN BLOOD BY AUTOMATED COUNT: 13.8 % (ref 12–15)
GFRSERPLBLD MDRD-ARVRAT: 61 ML/MIN/{1.73_M2} (ref 89–?)
GLOBULIN SER-MCNC: 3.6 G/DL (ref 2.1–4.2)
GLUCOSE SERPL-MCNC: 183 MG/DL (ref 70–100)
HCT VFR BLD AUTO: 31.2 % (ref 42–52)
HGB UR QL STRIP: 10.4 G/DL (ref 14–18)
LIPASE SERPL-CCNC: 22 U/L (ref 22–51)
LYMPHOCYTES # SPEC AUTO: 0.6 10^3/UL (ref 1.5–3.5)
LYMPHOCYTES NFR BLD AUTO: 13.9 %
MCH RBC QN AUTO: 28.4 PG (ref 27–31)
MCHC RBC AUTO-ENTMCNC: 33.3 G/DL (ref 32–36)
MCV RBC AUTO: 85.2 FL (ref 80–94)
MONOCYTES # BLD AUTO: 0.4 10^3/UL (ref 0–1)
MONOCYTES NFR BLD AUTO: 10.8 %
NEUTROPHILS # BLD AUTO: 3 10^3/UL (ref 1.5–6.6)
NEUTROPHILS # SNV AUTO: 4.1 X10^3/UL (ref 4.8–10.8)
NEUTROPHILS NFR BLD AUTO: 73.6 %
NRBC # BLD AUTO: 0 /100WBC
NRBC # BLD AUTO: 0 X10^3/UL
PDW BLD AUTO: 9.5 FL (ref 7.4–11.4)
PLATELET # BLD: 184 10^3/UL (ref 130–450)
POTASSIUM SERPL-SCNC: 3.5 MMOL/L (ref 3.5–5)
PROT SPEC-MCNC: 7.1 G/DL (ref 6.7–8.2)
RBC MAR: 3.66 10^6/UL (ref 4.7–6.1)
SODIUM SERPLBLD-SCNC: 140 MMOL/L (ref 135–145)

## 2021-10-04 PROCEDURE — 85025 COMPLETE CBC W/AUTO DIFF WBC: CPT

## 2021-10-04 PROCEDURE — 99284 EMERGENCY DEPT VISIT MOD MDM: CPT

## 2021-10-04 PROCEDURE — 83690 ASSAY OF LIPASE: CPT

## 2021-10-04 PROCEDURE — 96365 THER/PROPH/DIAG IV INF INIT: CPT

## 2021-10-04 PROCEDURE — 36415 COLL VENOUS BLD VENIPUNCTURE: CPT

## 2021-10-04 PROCEDURE — 74177 CT ABD & PELVIS W/CONTRAST: CPT

## 2021-10-04 PROCEDURE — 80053 COMPREHEN METABOLIC PANEL: CPT

## 2021-10-04 PROCEDURE — 96375 TX/PRO/DX INJ NEW DRUG ADDON: CPT

## 2021-10-04 NOTE — ED PHYSICIAN DOCUMENTATION
History of Present Illness





- Stated complaint


Stated Complaint: waist px,vomiting,joint px





- Chief complaint


Chief Complaint: Abd Pain





- History obtained from


History obtained from: Patient





- History of Present Illness


Timing: How many days ago (3)


Pain level max: 5


Pain level now: 5





- Additonal information


Additional information: 





61-year-old male with nausea and vomiting for the past 3 days. Patient was 

recently seen here and started on cephalexin for a UTI.  He states has had 

continued vomiting.  He states that he did injure his back when he fell as well 

and at the wheel of his walker.  Concerned that he may have injured his back.  

No numbness or tingling.  Did have recent spine surgery.  No loss of bowel or 

bladder control.  He does self catheterize.  No fevers but has felt warm.  

Generalized crampy abdominal pain.  Has had several abdominal surgeries in the 

past.





Review of Systems


Ten Systems: 10 systems reviewed and negative


Constitutional: reports: Fever (Subjective).  denies: Chills


Ears: denies: Ear pain


Nose: denies: Rhinorrhea / runny nose, Congestion


Respiratory: denies: Cough


GI: reports: Nausea, Vomiting.  denies: Constipation, Diarrhea


Musculoskeletal: denies: Neck pain, Back pain


Neurologic: denies: Headache





PD PAST MEDICAL HISTORY





- Past Medical History


Cardiovascular: Hypertension, High cholesterol, Coronary artery disease


Respiratory: None


Neuro: Peripheral neuropathy


Endocrine/Autoimmune: Type 2 diabetes


GI: None, Other (colon cancer with surgical resection and is clear of tumor. )


: None


HEENT: None


Psych: Depression


Musculoskeletal: None


Derm: None





- Past Surgical History


Past Surgical History: Yes


General: Bowel surgery


Ortho: Rotator cuff repair, Spine surgery (a month ago lumbar area without 

complications. Healing well. )





- Present Medications


Home Medications: 


                                Ambulatory Orders











 Medication  Instructions  Recorded  Confirmed


 


Citalopram [CeleXA] 40 mg PO DAILY 11/18/14 04/10/21


 


Multivitamin [Multi-Vitamin Daily] 1 tab ORAL DAILY 02/24/15 04/10/21


 


Gabapentin 300 mg PO BID 11/24/15 04/10/21


 


Omeprazole 20 mg PO DAILY 10/20/19 04/10/21


 


glipiZIDE [Glipizide] 10 mg PO BID 10/20/19 04/10/21


 


metFORMIN [Glucophage] 1,000 mg PO BID 10/20/19 04/10/21


 


Atorvastatin [Lipitor] 40 mg PO DAILY 01/16/21 04/10/21


 


Ciprofloxacin HCl [Cipro] 500 mg PO BID #20 tablet 01/16/21 04/10/21


 


Clopidogrel [Plavix] 0.5 tab PO DAILY 01/16/21 04/10/21


 


Losartan [Cozaar] 0.5 tab PO DAILY 01/16/21 04/10/21


 


Promethazine [Phenergan] 25 mg PO Q6H PRN #10 tab 01/16/21 04/10/21


 


Trospium Chloride 1 tab PO BID 01/16/21 04/10/21


 


Cefdinir 300 mg PO BID #20 cap 04/11/21 


 


Cefdinir 300 mg PO BID #20 cap 05/17/21 


 


Docusate Sodium 100Mg Capsule 100 mg PO DAILY #15 cap 10/01/21 





[Colace 100Mg Capsule]   


 


HYDROcod/ACETAM 5/325 [Norco 5/325] 1 ea PO Q6H PRN #14 tablet 10/01/21 


 


Ondansetron Odt [Zofran] 4 mg TL Q6H PRN #10 tablet 10/01/21 


 


cephALEXin [Keflex] 500 mg PO TID #20 cap 10/01/21 


 


Cefdinir 300 mg PO BID #20 cap 10/04/21 


 


Famotidine [Pepcid] 20 mg PO BID #60 tablet 10/04/21 


 


Promethazine [Phenergan] 25 mg PO Q6H PRN #14 tab 10/04/21 














- Allergies


Allergies/Adverse Reactions: 


                                    Allergies











Allergy/AdvReac Type Severity Reaction Status Date / Time


 


No Known Drug Allergies Allergy   Verified 10/04/21 18:22














- Social History


Does the pt smoke?: No


Smoking Status: Never smoker


Does the pt drink ETOH?: Yes


Does the pt have substance abuse?: No





- Immunizations


Immunizations are current?: No


Immunizations: Other immun not current





- POLST


Patient has POLST: No


POLST Status: Full Code





PD ED PE NORMAL





- Vitals


Vital signs reviewed: Yes





- General


General: Alert and oriented X 3, No acute distress





- HEENT


HEENT: Moist mucous membranes





- Neck


Neck: Supple, no meningeal sign





- Cardiac


Cardiac: RRR, Strong equal pulses





- Respiratory


Respiratory: No respiratory distress, Clear bilaterally





- Abdomen


Abdomen: Soft, Non tender, Non distended





- Back


Back: No CVA TTP, No spinal TTP





- Derm


Derm: Warm and dry





- Extremities


Extremities: No edema





- Neuro


Neuro: Alert and oriented X 3





- Psych


Psych: Normal mood, Normal affect





Results





- Vitals


Vitals: 


                               Vital Signs - 24 hr











  10/04/21 10/04/21 10/04/21





  18:20 18:31 19:06


 


Temperature 36.4 C L  


 


Heart Rate 88 83 72


 


Respiratory 20 20 16





Rate   


 


Blood Pressure 132/68 H 155/93 H 174/92 H


 


O2 Saturation 100 100 99














  10/04/21





  21:19


 


Temperature 37.3 C


 


Heart Rate 77


 


Respiratory 13





Rate 


 


Blood Pressure 161/83 H


 


O2 Saturation 97








                                     Oxygen











O2 Source                      Room air

















- Labs


Labs: 


                                Laboratory Tests











  10/04/21 10/04/21





  18:40 18:40


 


WBC  4.1 L 


 


RBC  3.66 L 


 


Hgb  10.4 L 


 


Hct  31.2 L 


 


MCV  85.2 


 


MCH  28.4 


 


MCHC  33.3 


 


RDW  13.8 


 


Plt Count  184 


 


MPV  9.5 


 


Neut # (Auto)  3.0 


 


Lymph # (Auto)  0.6 L 


 


Mono # (Auto)  0.4 


 


Eos # (Auto)  0.0 


 


Baso # (Auto)  0.0 


 


Absolute Nucleated RBC  0.00 


 


Nucleated RBC %  0.0 


 


Sodium   140


 


Potassium   3.5


 


Chloride   101


 


Carbon Dioxide   27


 


Anion Gap   12.0


 


BUN   20


 


Creatinine   1.2


 


Estimated GFR (MDRD)   61 L


 


Glucose   183 H


 


Calcium   8.8


 


Total Bilirubin   1.0


 


AST   23


 


ALT   24


 


Alkaline Phosphatase   82


 


Total Protein   7.1


 


Albumin   3.5


 


Globulin   3.6


 


Albumin/Globulin Ratio   1.0


 


Lipase   22














- Rads (name of study)


  ** CT abdomen pelvis


Radiology: Final report received, EMP read contemporaneously, See rad report





PD MEDICAL DECISION MAKING





- ED course


Complexity details: reviewed old records, reviewed results, re-evaluated 

patient, considered differential, d/w patient


ED course: 





61-year-old male with a small hiatal hernia and a fluid-filled distal esophagus.

 We will start on an H2 blocker for home as this could be contributing to 

symptoms.  Pain well controlled.  Nausea well controlled.  Feels better after IV

fluids.  His urinalysis and urine culture grew out Klebsiella on his last visit,

we will change his antibiotic to cefdinir.  Given Rocephin here.  Patient is 

well-appearing, nontoxic.  Afebrile.  No evidence of sepsis.  Tolerating p.o. 

without difficulty.  Patient counseled regarding signs and symptoms for which I 

believe and urgent re-evaluation would be necessary. Patient with good 

understanding of and agreement to plan and is comfortable going home at this 

time





This document was made in part using voice recognition software. While efforts 

are made to proofread this document, sound alike and grammatical errors may 

occur.











IMPRESSION: 


1. Redemonstration of small hiatal hernia with fluid-filled distal esophagus and

circumferential wall thickening. Findings may be related to gastroesophageal 

reflux/esophagitis. 


2. Interval resolution of air within the urinary bladder. There is persistent 

irregular urinary bladder wall thickening which may be in part due to cystitis 

versus sequela of chronic urinary bladder outlet obstruction given enlarged 

prostate gland. Recommend clinical correlation. 3. Stable postsurgical changes 

involving the distal colon/rectum and right lower quadrant. No evidence for 

bowel obstruction. 4. Normal appendix. 5. Fat-containing bilateral inguinal 

hernias without evidence for acute inflammation. 





Departure





- Departure


Disposition: 01 Home, Self Care


Clinical Impression: 


 Esophagitis, Hiatal hernia





Vomiting


Qualifiers:


 Vomiting type: unspecified Vomiting Intractability: non-intractable Nausea 

presence: with nausea Qualified Code(s): R11.2 - Nausea with vomiting, 

unspecified





UTI (urinary tract infection)


Qualifiers:


 Urinary tract infection type: acute cystitis Hematuria presence: without 

hematuria Qualified Code(s): N30.00 - Acute cystitis without hematuria





Condition: Good


Instructions:  Hiatal Hernia, ED Gastritis, ED UTI Cystitis Male, ED Nausea 

Vomiting


Follow-Up: 


QUINTIN FAULKNER PA-C [Primary Care Provider] - Within 1 week


Prescriptions: 


Cefdinir 300 mg PO BID #20 cap


Famotidine [Pepcid] 20 mg PO BID #60 tablet


Promethazine [Phenergan] 25 mg PO Q6H PRN #14 tab


 PRN Reason: Nausea / Vomiting


Comments: 


Your prescriptions were sent to [facility] in [place].  Take all antibiotics until

 gone.  Follow-up with your doctor for further care.  Return if you worsen.








CT scan


IMPRESSION: 


1. Redemonstration of small hiatal hernia with fluid-filled distal esophagus and

 circumferential wall thickening. Findings may be related to gastroesophageal 

reflux/esophagitis. 


2. Interval resolution of air within the urinary bladder. There is persistent 

irregular urinary bladder wall thickening which may be in part due to cystitis 

versus sequela of chronic urinary bladder outlet obstruction given enlarged 

prostate gland. Recommend clinical correlation. 3. Stable postsurgical changes 

involving the distal colon/rectum and right lower quadrant. No evidence for 

bowel obstruction. . Normal appendix. 5. Fat-containing bilateral inguinal 

hernias without evidence for acute inflammation. 


Discharge Date/Time: 10/04/21 22:24

## 2021-10-04 NOTE — CT REPORT
PROCEDURE:  Abdomen/Pelvis W

 

INDICATIONS:  diffuse abd pain, vomiting

 

CONTRAST:  IV CONTRAST: Optiray 320 ml: 100 PO CONTRAST: *NO PO CONTRAST 

 

TECHNIQUE:  

After the administration of weight appropriate dose of intravenous contrast contrast, 5 mm thick sect
ions acquired from the diaphragms to the symphysis.  5 mm thick coronal and sagittal reformats were a
cquired.  For radiation dose reduction, the following was used:  automated exposure control, adjustme
nt of mA and/or kV according to patient size.  

 

COMPARISON:  10/1/2021 and 4/11/2021

 

FINDINGS:  

Image quality:  Excellent.  

 

ABDOMEN:  

Lung bases:  Lung bases are clear.  Heart size is normal.  Small hiatal hernia with fluid-filled dist
al esophagus and mild circumferential wall thickening.

 

Solid organs:  Liver and spleen are normal in size and enhancement.  Gallbladder is mildly distended.
  Biliary system is non dilated.  Pancreas enhances normally.  No adrenal nodules.  Kidneys demonstra
te normal size and enhancement, without hydronephrosis.  

 

Peritoneum and bowel:  Bowel loops demonstrate normal wall thickness and caliber.  No free fluid or a
ir.  Stable postsurgical changes from prior partial colectomy. Anastomotic suture line noted near the
 rectum. Surgical suture line noted in the right lower quadrant. Appendix is normal.

 

Nodes and vessels:  No retroperitoneal or mesenteric adenopathy by size criteria.  Aorta and inferior
 vena cava are normal in size. 

 

Miscellaneous:  No ventral hernias.  

 

 

PELVIS:  

Genitourinary: Redemonstration of mild circumferential urinary bladder wall irregular thickening. Pre
viously seen small locules of gas have resolved. Suggestion of small urinary bladder wall diverticula
. Prostate is enlarged.

 

Miscellaneous:  No pelvic adenopathy. Small fat-containing bilateral inguinal hernias larger on the r
ight. No acute inflammatory changes. 

 

Bones:  No suspicious bony lesions.  No acute vertebral body compression fractures. Stable postsurgic
al changes of prior L4/L5 fusion. 

 

IMPRESSION:  

1. Redemonstration of small hiatal hernia with fluid-filled distal esophagus and circumferential wall
 thickening. Findings may be related to gastroesophageal reflux/esophagitis.

2. Interval resolution of air within the urinary bladder. There is persistent irregular urinary bladd
er wall thickening which may be in part due to cystitis versus sequela of chronic urinary bladder out
let obstruction given enlarged prostate gland. Recommend clinical correlation.

3. Stable postsurgical changes involving the distal colon/rectum and right lower quadrant. No evidenc
e for bowel obstruction.

4. Normal appendix.

5. Fat-containing bilateral inguinal hernias without evidence for acute inflammation.

 

Reviewed by: Tato Doan MD on 10/4/2021 9:53 PM PDT

Approved by: Tato Doan MD on 10/4/2021 9:53 PM PDT

 

 

Station ID:  [id number]

## 2021-11-03 ENCOUNTER — HOSPITAL ENCOUNTER (EMERGENCY)
Dept: HOSPITAL 76 - ED | Age: 61
Discharge: HOME | End: 2021-11-03
Payer: COMMERCIAL

## 2021-11-03 ENCOUNTER — HOSPITAL ENCOUNTER (OUTPATIENT)
Dept: HOSPITAL 76 - EMS | Age: 61
Discharge: ANOTHER HOSPITAL | End: 2021-11-03
Payer: COMMERCIAL

## 2021-11-03 VITALS — SYSTOLIC BLOOD PRESSURE: 135 MMHG | DIASTOLIC BLOOD PRESSURE: 80 MMHG

## 2021-11-03 DIAGNOSIS — R42: Primary | ICD-10-CM
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
## 2021-11-03 DIAGNOSIS — I10: ICD-10-CM

## 2021-11-03 DIAGNOSIS — R11.2: ICD-10-CM

## 2021-11-03 DIAGNOSIS — N39.0: ICD-10-CM

## 2021-11-03 DIAGNOSIS — S09.90XA: Primary | ICD-10-CM

## 2021-11-03 DIAGNOSIS — N28.9: ICD-10-CM
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
## 2021-11-03 DIAGNOSIS — E11.42: ICD-10-CM

## 2021-11-03 DIAGNOSIS — S00.81XA: ICD-10-CM

## 2021-11-03 DIAGNOSIS — Z79.02: ICD-10-CM

## 2021-11-03 DIAGNOSIS — S00.211A: ICD-10-CM
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
## 2021-11-03 DIAGNOSIS — Z79.84: ICD-10-CM

## 2021-11-03 DIAGNOSIS — M50.30: ICD-10-CM

## 2021-11-03 DIAGNOSIS — E86.0: ICD-10-CM

## 2021-11-03 LAB
ALBUMIN DIAFP-MCNC: 4 G/DL (ref 3.2–5.5)
ALBUMIN/GLOB SERPL: 1.1 {RATIO} (ref 1–2.2)
ALP SERPL-CCNC: 89 IU/L (ref 42–121)
ALT SERPL W P-5'-P-CCNC: 27 IU/L (ref 10–60)
ANION GAP SERPL CALCULATED.4IONS-SCNC: 9 MMOL/L (ref 6–13)
AST SERPL W P-5'-P-CCNC: 22 IU/L (ref 10–42)
BASOPHILS NFR BLD AUTO: 0 10^3/UL (ref 0–0.1)
BASOPHILS NFR BLD AUTO: 0.3 %
BILIRUB BLD-MCNC: 1 MG/DL (ref 0.2–1)
BUN SERPL-MCNC: 35 MG/DL (ref 6–20)
CALCIUM UR-MCNC: 11.7 MG/DL (ref 8.5–10.3)
CHLORIDE SERPL-SCNC: 93 MMOL/L (ref 101–111)
CLARITY UR REFRACT.AUTO: CLEAR
CO2 SERPL-SCNC: 28 MMOL/L (ref 21–32)
CREAT SERPLBLD-SCNC: 1.8 MG/DL (ref 0.6–1.2)
EOSINOPHIL # BLD AUTO: 0 10^3/UL (ref 0–0.7)
EOSINOPHIL NFR BLD AUTO: 0.5 %
ERYTHROCYTE [DISTWIDTH] IN BLOOD BY AUTOMATED COUNT: 14.6 % (ref 12–15)
GFRSERPLBLD MDRD-ARVRAT: 38 ML/MIN/{1.73_M2} (ref 89–?)
GLOBULIN SER-MCNC: 3.7 G/DL (ref 2.1–4.2)
GLUCOSE SERPL-MCNC: 202 MG/DL (ref 70–100)
GLUCOSE UR QL STRIP.AUTO: NEGATIVE MG/DL
HCT VFR BLD AUTO: 32.5 % (ref 42–52)
HGB UR QL STRIP: 10.7 G/DL (ref 14–18)
KETONES UR QL STRIP.AUTO: NEGATIVE MG/DL
LIPASE SERPL-CCNC: 32 U/L (ref 22–51)
LYMPHOCYTES # SPEC AUTO: 0.7 10^3/UL (ref 1.5–3.5)
LYMPHOCYTES NFR BLD AUTO: 11.9 %
MCH RBC QN AUTO: 28.8 PG (ref 27–31)
MCHC RBC AUTO-ENTMCNC: 32.9 G/DL (ref 32–36)
MCV RBC AUTO: 87.4 FL (ref 80–94)
MONOCYTES # BLD AUTO: 0.3 10^3/UL (ref 0–1)
MONOCYTES NFR BLD AUTO: 5.4 %
NEUTROPHILS # BLD AUTO: 5 10^3/UL (ref 1.5–6.6)
NEUTROPHILS # SNV AUTO: 6.2 X10^3/UL (ref 4.8–10.8)
NEUTROPHILS NFR BLD AUTO: 80.8 %
NITRITE UR QL STRIP.AUTO: NEGATIVE
NRBC # BLD AUTO: 0 /100WBC
NRBC # BLD AUTO: 0 X10^3/UL
PDW BLD AUTO: 9.3 FL (ref 7.4–11.4)
PH UR STRIP.AUTO: 6 PH (ref 5–7.5)
PLATELET # BLD: 190 10^3/UL (ref 130–450)
POTASSIUM SERPL-SCNC: 4.8 MMOL/L (ref 3.5–5)
PROT SPEC-MCNC: 7.7 G/DL (ref 6.7–8.2)
PROT UR STRIP.AUTO-MCNC: NEGATIVE MG/DL
RBC # UR STRIP.AUTO: NEGATIVE /UL
RBC MAR: 3.72 10^6/UL (ref 4.7–6.1)
SODIUM SERPLBLD-SCNC: 130 MMOL/L (ref 135–145)
SP GR UR STRIP.AUTO: 1.02 (ref 1–1.03)
SQUAMOUS URNS QL MICRO: (no result)
UROBILINOGEN UR QL STRIP.AUTO: (no result) E.U./DL
UROBILINOGEN UR STRIP.AUTO-MCNC: NEGATIVE MG/DL
WBC # UR MANUAL: (no result) /HPF (ref 0–3)
YEAST URNS QL MICRO: PRESENT

## 2021-11-03 PROCEDURE — 96375 TX/PRO/DX INJ NEW DRUG ADDON: CPT

## 2021-11-03 PROCEDURE — 70486 CT MAXILLOFACIAL W/O DYE: CPT

## 2021-11-03 PROCEDURE — 96361 HYDRATE IV INFUSION ADD-ON: CPT

## 2021-11-03 PROCEDURE — 87086 URINE CULTURE/COLONY COUNT: CPT

## 2021-11-03 PROCEDURE — 81001 URINALYSIS AUTO W/SCOPE: CPT

## 2021-11-03 PROCEDURE — 72125 CT NECK SPINE W/O DYE: CPT

## 2021-11-03 PROCEDURE — 80053 COMPREHEN METABOLIC PANEL: CPT

## 2021-11-03 PROCEDURE — 83690 ASSAY OF LIPASE: CPT

## 2021-11-03 PROCEDURE — 84484 ASSAY OF TROPONIN QUANT: CPT

## 2021-11-03 PROCEDURE — 99284 EMERGENCY DEPT VISIT MOD MDM: CPT

## 2021-11-03 PROCEDURE — 93005 ELECTROCARDIOGRAM TRACING: CPT

## 2021-11-03 PROCEDURE — 70450 CT HEAD/BRAIN W/O DYE: CPT

## 2021-11-03 PROCEDURE — 36415 COLL VENOUS BLD VENIPUNCTURE: CPT

## 2021-11-03 PROCEDURE — 96365 THER/PROPH/DIAG IV INF INIT: CPT

## 2021-11-03 PROCEDURE — 85025 COMPLETE CBC W/AUTO DIFF WBC: CPT

## 2021-11-03 PROCEDURE — 81003 URINALYSIS AUTO W/O SCOPE: CPT

## 2021-11-03 NOTE — CT REPORT
PROCEDURE:  CERVICAL SPINE WO

 

INDICATIONS:  fall, head/neck/face pain

 

TECHNIQUE:  

Noncontrast 3 mm thick sections acquired from the skull base to the T4 level.  Sagittal and coronal r
eformats were then constructed.  For radiation dose reduction, the following was used:  automated exp
osure control, adjustment of mA and/or kV according to patient size.

 

COMPARISON:  None.

 

FINDINGS:  

Image quality:  Excellent.  

 

Bones:  No fractures or dislocations.  Visualized superior ribs are intact.  Spine degenerative disc 
disease and facet arthropathy are noted. 

 

Soft tissues:  Prevertebral soft tissues are normal in thickness.  No paravertebral hematomas.  No ap
ical pneumothoraces.  

 

 

IMPRESSION:  

 

No fracture. No acute osseous lesion. If there is continued clinical concern for pathology, then MRI 
should be considered for further evaluation.

 

Reviewed by: Qiana Clifton MD, PhD on 11/3/2021 4:28 PM AKTRAV[facility]

Approved by: Qiana Clifton MD, PhD on 11/3/2021 4:28 PM AKTRAVIS

 

 

Station ID:  [id number]

## 2021-11-03 NOTE — CT REPORT
PROCEDURE:  MAXILLOFACIAL WO

 

INDICATIONS:  fall, head/neck/face pain

 

TECHNIQUE:  

Noncontrast 1.5 mm thick axial images acquired from the mandible through the frontal sinuses, with co
duc and sagittal reformatting.  For radiation dose reduction, the following was used:  automated ex
posure control, adjustment of mA and/or kV according to patient size.

 

COMPARISON:  None.

 

FINDINGS:  

Image quality:  Excellent.  

 

Bones and teeth:  Orbital walls are intact.  Sinus walls show no fracture or deformity.  Nasal bones 
and septum are intact.  Visualized portions of the mandible demonstrate no fractures or subluxation. 
 Zygomatic arches are intact.  Pterygoid plates are intact.  Visualized portions of the skull base an
d auditory canals are intact.  

 

Sinuses: Polyp noted in the right maxillary sinus. Small mucous retention cyst noted in the right max
illary sinus. Mastoid air cells are aerated.  

 

Soft tissues:  No edema, masses, or fluid collections.  No enlarged lymph nodes.  No soft tissue lace
rations or debris.  

 

Vascular:  Visualized vascular structures appear normal in the absence of contrast.  Bony vascular fo
ramina and canals are intact.  

 

 

IMPRESSION:  

 

No fracture.

 

Reviewed by: Qiana Clifton MD, PhD on 11/3/2021 4:35 PM AKDT

Approved by: Qiana Clifton MD, PhD on 11/3/2021 4:35 PM AKDT

 

 

Station ID:  [id number]

## 2021-11-03 NOTE — ED PHYSICIAN DOCUMENTATION
History of Present Illness





- Stated complaint


Stated Complaint: FALL





- Chief complaint


Chief Complaint: Trauma Hd/Nk





- History obtained from


History obtained from: Patient





- History of Present Illness


Timing: Today


Pain level max: 5


Pain level now: 5





- Additonal information


Additional information: 





61-year-old male presents to the emergency department after a trip fall and head

injury on carpet today.  Placed in a c-collar by EMS.  Patient does not believe 

he lost consciousness.  No neck or back pain.  He states that he has had 

vomiting since starting the Bactrim for a UTI.  No abdominal pain.  Nothing 

makes it better or worse.  No chest pain.  No palpitations.  Denies any 

shoulder, back, hip, knee pain, elbow pain or any other major joint pain. 

Patient states that he is not on blood thinners.





Review of Systems


Ten Systems: 10 systems reviewed and negative


Constitutional: denies: Fever, Chills


Nose: denies: Rhinorrhea / runny nose


Respiratory: denies: Cough


GI: reports: Nausea, Vomiting.  denies: Abdominal Pain, Diarrhea


Skin: denies: Rash


Musculoskeletal: denies: Neck pain, Back pain


Neurologic: denies: Headache





PD PAST MEDICAL HISTORY





- Past Medical History


Past Medical History: Yes


Cardiovascular: Hypertension, High cholesterol, Coronary artery disease


Respiratory: None


Neuro: Peripheral neuropathy


Endocrine/Autoimmune: Type 2 diabetes


GI: None, Other


: None


HEENT: None


Psych: Depression


Musculoskeletal: None


Derm: None





- Past Surgical History


Past Surgical History: Yes


General: Bowel surgery


Ortho: Rotator cuff repair, Spine surgery





- Present Medications


Home Medications: 


                                Ambulatory Orders











 Medication  Instructions  Recorded  Confirmed


 


Citalopram [CeleXA] 40 mg PO DAILY 11/18/14 04/10/21


 


Multivitamin [Multi-Vitamin Daily] 1 tab ORAL DAILY 02/24/15 04/10/21


 


Gabapentin 300 mg PO BID 11/24/15 04/10/21


 


Omeprazole 20 mg PO DAILY 10/20/19 04/10/21


 


glipiZIDE [Glipizide] 10 mg PO BID 10/20/19 04/10/21


 


metFORMIN [Glucophage] 1,000 mg PO BID 10/20/19 04/10/21


 


Atorvastatin [Lipitor] 40 mg PO DAILY 01/16/21 04/10/21


 


Ciprofloxacin HCl [Cipro] 500 mg PO BID #20 tablet 01/16/21 04/10/21


 


Clopidogrel [Plavix] 0.5 tab PO DAILY 01/16/21 04/10/21


 


Losartan [Cozaar] 0.5 tab PO DAILY 01/16/21 04/10/21


 


Promethazine [Phenergan] 25 mg PO Q6H PRN #10 tab 01/16/21 04/10/21


 


Trospium Chloride 1 tab PO BID 01/16/21 04/10/21


 


Cefdinir 300 mg PO BID #20 cap 04/11/21 


 


Cefdinir 300 mg PO BID #20 cap 05/17/21 


 


Docusate Sodium 100Mg Capsule 100 mg PO DAILY #15 cap 10/01/21 





[Colace 100Mg Capsule]   


 


HYDROcod/ACETAM 5/325 [Norco 5/325] 1 ea PO Q6H PRN #14 tablet 10/01/21 


 


Ondansetron Odt [Zofran] 4 mg TL Q6H PRN #10 tablet 10/01/21 


 


cephALEXin [Keflex] 500 mg PO TID #20 cap 10/01/21 


 


Cefdinir 300 mg PO BID #20 cap 10/04/21 


 


Famotidine [Pepcid] 20 mg PO BID #60 tablet 10/04/21 


 


Promethazine [Phenergan] 25 mg PO Q6H PRN #14 tab 10/04/21 


 


Promethazine [Phenergan] 25 mg PO Q6H PRN #10 tab 11/03/21 














- Allergies


Allergies/Adverse Reactions: 


                                    Allergies











Allergy/AdvReac Type Severity Reaction Status Date / Time


 


No Known Drug Allergies Allergy   Verified 11/03/21 15:41














- Social History


Does the pt smoke?: No


Smoking Status: Never smoker


Does the pt drink ETOH?: Yes


Does the pt have substance abuse?: No





- Immunizations


Immunizations are current?: No


Immunizations: Other immun not current





- POLST


Patient has POLST: No


POLST Status: Full Code





PD ED PE NORMAL





- Vitals


Vital signs reviewed: Yes





- General


General: Alert and oriented X 3, No acute distress





- HEENT


HEENT: PERRL, EOMI (No hyphema.), Ears normal (Abrasion to the forehead slight 

swelling to the forehead as well.  No lacerations.  No palpable skull fractures 

or hematomas.  Mild tenderness over the supraorbital ridge, left orbit.), Moist 

mucous membranes





- Neck


Neck: Supple, no meningeal sign, No bony TTP





- Cardiac


Cardiac: RRR, Strong equal pulses





- Respiratory


Respiratory: No respiratory distress, Clear bilaterally





- Abdomen


Abdomen: Soft, Non tender, Non distended





- Back


Back: No spinal TTP





- Derm


Derm: Warm and dry, No rash





- Extremities


Extremities: No edema





- Neuro


Neuro: Alert and oriented X 3, CNs 2-12 intact, No motor deficit, No sensory 

deficit, Normal speech


Eye Opening: Spontaneous


Motor: Obeys Commands


Verbal: Oriented


GCS Score: 15





- Psych


Psych: Normal mood, Normal affect





Results





- Vitals


Vitals: 


                               Vital Signs - 24 hr











  11/03/21 11/03/21 11/03/21





  15:34 16:17 20:25


 


Temperature 36.7 C  


 


Heart Rate 64 70 69


 


Respiratory 14 13 14





Rate   


 


Blood Pressure 135/84 H 135/84 H 135/80 H


 


O2 Saturation 100 95 98








                                     Oxygen











O2 Source                      Room air

















- EKG (time done)


  ** 1601


Rate: Rate (enter#) (65)


Rhythm: NSR


Axis: Normal


Intervals: Normal [place]


QRS: Normal


Ischemia: Normal ST segments





- Labs


Labs: 


                                Laboratory Tests











  11/03/21 11/03/21 11/03/21





  15:56 15:56 15:56


 


WBC  6.2  


 


RBC  3.72 L  


 


Hgb  10.7 L  


 


Hct  32.5 L  


 


MCV  87.4  


 


MCH  28.8  


 


MCHC  32.9  


 


RDW  14.6  


 


Plt Count  190  


 


MPV  9.3  


 


Neut # (Auto)  5.0  


 


Lymph # (Auto)  0.7 L  


 


Mono # (Auto)  0.3  


 


Eos # (Auto)  0.0  


 


Baso # (Auto)  0.0  


 


Absolute Nucleated RBC  0.00  


 


Nucleated RBC %  0.0  


 


Sodium   130 L 


 


Potassium   4.8 


 


Chloride   93 L 


 


Carbon Dioxide   28 


 


Anion Gap   9.0 


 


BUN   35 H 


 


Creatinine   1.8 H 


 


Estimated GFR (MDRD)   38 L 


 


Glucose   202 H 


 


Calcium   11.7 H 


 


Total Bilirubin   1.0 


 


AST   22 


 


ALT   27 


 


Alkaline Phosphatase   89 


 


Troponin I High Sens    4.4


 


Total Protein   7.7 


 


Albumin   4.0 


 


Globulin   3.7 


 


Albumin/Globulin Ratio   1.1 


 


Lipase   32 


 


Urine Color   


 


Urine Clarity   


 


Urine pH   


 


Ur Specific Gravity   


 


Urine Protein   


 


Urine Glucose (UA)   


 


Urine Ketones   


 


Urine Occult Blood   


 


Urine Nitrite   


 


Urine Bilirubin   


 


Urine Urobilinogen   


 


Ur Leukocyte Esterase   


 


Urine RBC   


 


Urine WBC   


 


Ur Squamous Epith Cells   


 


Urine Bacteria   


 


Urine Yeast   


 


Ur Microscopic Review   


 


Urine Culture Comments   














  11/03/21





  19:33


 


WBC 


 


RBC 


 


Hgb 


 


Hct 


 


MCV 


 


MCH 


 


MCHC 


 


RDW 


 


Plt Count 


 


MPV 


 


Neut # (Auto) 


 


Lymph # (Auto) 


 


Mono # (Auto) 


 


Eos # (Auto) 


 


Baso # (Auto) 


 


Absolute Nucleated RBC 


 


Nucleated RBC % 


 


Sodium 


 


Potassium 


 


Chloride 


 


Carbon Dioxide 


 


Anion Gap 


 


BUN 


 


Creatinine 


 


Estimated GFR (MDRD) 


 


Glucose 


 


Calcium 


 


Total Bilirubin 


 


AST 


 


ALT 


 


Alkaline Phosphatase 


 


Troponin I High Sens 


 


Total Protein 


 


Albumin 


 


Globulin 


 


Albumin/Globulin Ratio 


 


Lipase 


 


Urine Color  YELLOW


 


Urine Clarity  CLEAR


 


Urine pH  6.0


 


Ur Specific Gravity  1.020


 


Urine Protein  NEGATIVE


 


Urine Glucose (UA)  NEGATIVE


 


Urine Ketones  NEGATIVE


 


Urine Occult Blood  NEGATIVE


 


Urine Nitrite  NEGATIVE


 


Urine Bilirubin  NEGATIVE


 


Urine Urobilinogen  0.2 (NORMAL)


 


Ur Leukocyte Esterase  TRACE H


 


Urine RBC  None Seen


 


Urine WBC  4-5


 


Ur Squamous Epith Cells  RARE Squamous


 


Urine Bacteria  Rare


 


Urine Yeast  PRESENT


 


Ur Microscopic Review  INDICATED


 


Urine Culture Comments  INDICATED














- Rads (name of study)


  ** Head CT


Radiology: Final report received, EMP read contemporaneously, See rad report





  ** Cervical spine CT


Radiology: Final report received, EMP read contemporaneously, See rad report





  ** Maxillofacial CT


Radiology: Final report received, EMP read contemporaneously, See rad report





PD MEDICAL DECISION MAKING





- ED course


Complexity details: reviewed results, re-evaluated patient, considered 

differential, d/w patient


ED course: 





No significant acute findings on CT scan of the head, maxillofacial or cervical 

spine.  C-collar removed.  Wounds were cleansed and bandaged.  No lacerations to

 repair.  Phenergan given for the nausea and nausea resolved.  Ambulating 

without difficulty here.  IV fluids given for the small acute renal insuffici

ency.  This may be secondary to vomiting and or the Bactrim.  Now that the 

Bactrim has been discontinued, expect the nausea to improve as well.  Will 

prescribe nausea medication for home and have him follow-up with his doctor.  

Patient counseled regarding signs and symptoms for which I believe and urgent 

re-evaluation would be necessary. Patient with good understanding of and 

agreement to plan and is comfortable going home at this time





This document was made in part using voice recognition software. While efforts 

are made to proofread this document, sound alike and grammatical errors may occu

r.





Departure





- Departure


Disposition: 01 Home, Self Care


Clinical Impression: 


 Dehydration





Nausea & vomiting


Qualifiers:


 Vomiting type: unspecified Vomiting Intractability: non-intractable Qualified 

Code(s): R11.2 - Nausea with vomiting, unspecified





Closed head injury


Qualifiers:


 Encounter type: initial encounter Qualified Code(s): S09.90XA - Unspecified 

injury of head, initial encounter





Condition: Good


Instructions:  ED Dehydration, ED Head Injury Closed, ED Nausea Vomiting


Follow-Up: 


QUINTIN FAULKNER PA-C [Primary Care Provider] - 


Prescriptions: 


Promethazine [Phenergan] 25 mg PO Q6H PRN #10 tab


 PRN Reason: Nausea / Vomiting


Comments: 


You are likely experiencing nausea from the Bactrim that you are taking.  Now 

that this has stopped, your nausea should improve.  We have prescribed Phenergan

 for you and sent this to [facility] in [place].  Your CAT scans did not show any 

acute abnormalities today.  Make sure you are drinking plenty of water at home. 

 Follow-up with your doctor for recheck in 3 to 4 days.  Return if you worsen


Discharge Date/Time: 11/03/21 21:02

## 2021-11-03 NOTE — CT REPORT
PROCEDURE:  HEAD WO

 

INDICATIONS:  fall, head/neck/face pain

 

TECHNIQUE:  

Noncontrast 4.5 mm thick angled axial sections acquired from the foramen magnum to the vertex.  For r
adiation dose reduction, the following was used:  automated exposure control, adjustment of mA and/or
 kV according to patient size.

 

COMPARISON:  10/20/2019.

 

FINDINGS:  

Image quality:  Excellent.  

 

CSF spaces:  Basal cisterns are patent.  No extra-axial fluid collections.  The ventricles are symmet
katiana in size and shape.  

 

Brain:  No intracranial bleeds or masses.  There is cerebral volume loss for age, with resultant vent
ricular and sulcal prominence.  There are periventricular and deep white matter chronic small vessel 
ischemic changes. Small chronic left basal ganglia lacunar infarcts. There is intracranial internal c
arotid artery and vertebral artery atherosclerosis.  

 

Skull and face:  Calvarium and visualized facial bones appear intact, without suspicious lesions.  

 

Sinuses: Polyp noted in the right maxillary sinus. Small mucous retention cyst in the right maxillary
 sinus. The mastoids are clear.  

 

 

IMPRESSION: 

 

No acute intracranial disease process.

 

Reviewed by: Qiana Clifton MD, PhD on 11/3/2021 4:08 PM BRYCE

Approved by: Qiana Clifton MD, PhD on 11/3/2021 4:08 PM AKTRAVIS

 

 

Station ID:  [id number]

## 2021-12-08 ENCOUNTER — HOSPITAL ENCOUNTER (EMERGENCY)
Dept: HOSPITAL 76 - ED | Age: 61
Discharge: HOME | End: 2021-12-08
Payer: COMMERCIAL

## 2021-12-08 ENCOUNTER — HOSPITAL ENCOUNTER (OUTPATIENT)
Dept: HOSPITAL 76 - EMS | Age: 61
Discharge: ANOTHER HOSPITAL | End: 2021-12-08
Payer: COMMERCIAL

## 2021-12-08 VITALS — DIASTOLIC BLOOD PRESSURE: 72 MMHG | SYSTOLIC BLOOD PRESSURE: 152 MMHG

## 2021-12-08 DIAGNOSIS — R11.2: Primary | ICD-10-CM

## 2021-12-08 DIAGNOSIS — R53.1: ICD-10-CM

## 2021-12-08 DIAGNOSIS — Z79.84: ICD-10-CM

## 2021-12-08 DIAGNOSIS — R53.1: Primary | ICD-10-CM

## 2021-12-08 DIAGNOSIS — I10: ICD-10-CM

## 2021-12-08 DIAGNOSIS — R19.7: ICD-10-CM

## 2021-12-08 DIAGNOSIS — T50.Z95A: ICD-10-CM

## 2021-12-08 DIAGNOSIS — E11.42: ICD-10-CM

## 2021-12-08 DIAGNOSIS — R11.2: ICD-10-CM

## 2021-12-08 LAB
ALBUMIN DIAFP-MCNC: 3.4 G/DL (ref 3.2–5.5)
ALBUMIN/GLOB SERPL: 0.9 {RATIO} (ref 1–2.2)
ALP SERPL-CCNC: 93 IU/L (ref 42–121)
ALT SERPL W P-5'-P-CCNC: 21 IU/L (ref 10–60)
ANION GAP SERPL CALCULATED.4IONS-SCNC: 14 MMOL/L (ref 6–13)
AST SERPL W P-5'-P-CCNC: 20 IU/L (ref 10–42)
BASOPHILS NFR BLD AUTO: 0 10^3/UL (ref 0–0.1)
BASOPHILS NFR BLD AUTO: 0.2 %
BILIRUB BLD-MCNC: 1.1 MG/DL (ref 0.2–1)
BUN SERPL-MCNC: 24 MG/DL (ref 6–20)
CALCIUM UR-MCNC: 8.7 MG/DL (ref 8.5–10.3)
CHLORIDE SERPL-SCNC: 98 MMOL/L (ref 101–111)
CLARITY UR REFRACT.AUTO: (no result)
CO2 SERPL-SCNC: 25 MMOL/L (ref 21–32)
CREAT SERPLBLD-SCNC: 1.2 MG/DL (ref 0.6–1.2)
EOSINOPHIL # BLD AUTO: 0.1 10^3/UL (ref 0–0.7)
EOSINOPHIL NFR BLD AUTO: 1.2 %
ERYTHROCYTE [DISTWIDTH] IN BLOOD BY AUTOMATED COUNT: 14.6 % (ref 12–15)
GFRSERPLBLD MDRD-ARVRAT: 61 ML/MIN/{1.73_M2} (ref 89–?)
GLOBULIN SER-MCNC: 3.6 G/DL (ref 2.1–4.2)
GLUCOSE SERPL-MCNC: 330 MG/DL (ref 70–100)
GLUCOSE UR QL STRIP.AUTO: >=1000 MG/DL
HCT VFR BLD AUTO: 28.6 % (ref 42–52)
HGB UR QL STRIP: 9.5 G/DL (ref 14–18)
KETONES UR QL STRIP.AUTO: 15 MG/DL
LIPASE SERPL-CCNC: 19 U/L (ref 22–51)
LYMPHOCYTES # SPEC AUTO: 0.2 10^3/UL (ref 1.5–3.5)
LYMPHOCYTES NFR BLD AUTO: 2 %
MAGNESIUM SERPL-MCNC: 1 MG/DL (ref 1.7–2.8)
MCH RBC QN AUTO: 27.9 PG (ref 27–31)
MCHC RBC AUTO-ENTMCNC: 33.2 G/DL (ref 32–36)
MCV RBC AUTO: 84.1 FL (ref 80–94)
MONOCYTES # BLD AUTO: 0.7 10^3/UL (ref 0–1)
MONOCYTES NFR BLD AUTO: 6.1 %
NEUTROPHILS # BLD AUTO: 10.2 10^3/UL (ref 1.5–6.6)
NEUTROPHILS # SNV AUTO: 11.4 X10^3/UL (ref 4.8–10.8)
NEUTROPHILS NFR BLD AUTO: 89.8 %
NITRITE UR QL STRIP.AUTO: NEGATIVE
NRBC # BLD AUTO: 0 /100WBC
NRBC # BLD AUTO: 0 X10^3/UL
PDW BLD AUTO: 9.1 FL (ref 7.4–11.4)
PH UR STRIP.AUTO: 5.5 PH (ref 5–7.5)
PLATELET # BLD: 182 10^3/UL (ref 130–450)
POTASSIUM SERPL-SCNC: 3.8 MMOL/L (ref 3.5–5)
PROT SPEC-MCNC: 7 G/DL (ref 6.7–8.2)
PROT UR STRIP.AUTO-MCNC: NEGATIVE MG/DL
RBC # UR STRIP.AUTO: (no result) /UL
RBC # URNS HPF: (no result) /HPF (ref 0–5)
RBC MAR: 3.4 10^6/UL (ref 4.7–6.1)
SODIUM SERPLBLD-SCNC: 137 MMOL/L (ref 135–145)
SP GR UR STRIP.AUTO: 1.02 (ref 1–1.03)
SQUAMOUS URNS QL MICRO: (no result)
UROBILINOGEN UR QL STRIP.AUTO: (no result) E.U./DL
UROBILINOGEN UR STRIP.AUTO-MCNC: NEGATIVE MG/DL
WBC # UR MANUAL: >25 /HPF (ref 0–3)

## 2021-12-08 PROCEDURE — 81001 URINALYSIS AUTO W/SCOPE: CPT

## 2021-12-08 PROCEDURE — 80053 COMPREHEN METABOLIC PANEL: CPT

## 2021-12-08 PROCEDURE — 87181 SC STD AGAR DILUTION PER AGT: CPT

## 2021-12-08 PROCEDURE — 84484 ASSAY OF TROPONIN QUANT: CPT

## 2021-12-08 PROCEDURE — 74177 CT ABD & PELVIS W/CONTRAST: CPT

## 2021-12-08 PROCEDURE — 87077 CULTURE AEROBIC IDENTIFY: CPT

## 2021-12-08 PROCEDURE — 83735 ASSAY OF MAGNESIUM: CPT

## 2021-12-08 PROCEDURE — 85025 COMPLETE CBC W/AUTO DIFF WBC: CPT

## 2021-12-08 PROCEDURE — 87086 URINE CULTURE/COLONY COUNT: CPT

## 2021-12-08 PROCEDURE — 36415 COLL VENOUS BLD VENIPUNCTURE: CPT

## 2021-12-08 PROCEDURE — 96361 HYDRATE IV INFUSION ADD-ON: CPT

## 2021-12-08 PROCEDURE — 83690 ASSAY OF LIPASE: CPT

## 2021-12-08 PROCEDURE — 93005 ELECTROCARDIOGRAM TRACING: CPT

## 2021-12-08 PROCEDURE — 81003 URINALYSIS AUTO W/O SCOPE: CPT

## 2021-12-08 PROCEDURE — 96375 TX/PRO/DX INJ NEW DRUG ADDON: CPT

## 2021-12-08 PROCEDURE — 96365 THER/PROPH/DIAG IV INF INIT: CPT

## 2021-12-08 PROCEDURE — 99284 EMERGENCY DEPT VISIT MOD MDM: CPT

## 2021-12-08 NOTE — CT REPORT
PROCEDURE:  Abdomen/Pelvis W

 

INDICATIONS:  vomiting/diarrhea, lower abd pain

 

CONTRAST:  IV CONTRAST: Optiray 320 ml: 100 PO CONTRAST: *NO PO CONTRAST 

 

TECHNIQUE:  

After the administration of IV contrast, 5 mm thick sections acquired from the diaphragms to the symp
hysis.  5 mm thick coronal and sagittal reformats were acquired.  For radiation dose reduction, the f
ollowing was used:  automated exposure control, adjustment of mA and/or kV according to patient size.
  

 

COMPARISON:  10/4/2021.

 

FINDINGS:  

Image quality:  Excellent.  

 

ABDOMEN:  

Lung bases: Mild bibasilar dependent atelectasis are seen posteriorly..  Heart size is normal.  No pe
ricardial effusion. Small to moderate size hiatal hernia is seen.

 

Solid organs:  Liver and spleen are normal in size and enhancement.  Gallbladder is within normal guzman
its.  Biliary system is non dilated.  Pancreas enhances normally.  No adrenal nodules.  Kidneys demon
strate normal size and enhancement, without hydronephrosis.  

 

Peritoneum and bowel:  Bowel loops demonstrate normal wall thickness and caliber.  No free fluid or a
ir.  Postsurgical changes are noted in right lower quadrant abdomen. Appendix is surgically absent. P
ostsurgical changes are also seen in rectosigmoid region with intact surgical anastomosis. Stable pre
sacral soft tissue swelling and internal hypodensity is again seen with peripheral calcification like
ly represent chronic post surgical changes.

 

Nodes and vessels:  No retroperitoneal or mesenteric adenopathy by size criteria.  Aorta and inferior
 vena cava are normal in size.  

 

Miscellaneous:  No ventral hernias.  

 

 

PELVIS:  

Genitourinary: There is mild diffuse bladder wall thickening, no discrete bladder wall mass is seen.

 

Miscellaneous: Right greater than left bilateral inguinal hernia is seen containing fat only. No ingu
inal lymphadenopathy.

 

Bones:  No suspicious bony lesions.  No vertebral body compression fractures.  Post changes are again
 seen at L4-5 level unchanged from prior study.

 

IMPRESSION:  

1. Stable postsurgical changes in right lower quadrant and rectosigmoid region with stable postsurgic
al fluid and fibrosis in presacral soft tissue. No evidence of bowel obstruction. No free fluid or fr
ee air. No abnormal bowel wall thickening.

2. Mild diffuse bladder wall thickening concerning for cystitis suggest clinical correlation. No disc
rete bladder wall mass is seen. No renal stone or hydronephrosis.

3. Stable appearing small to moderate size hiatal hernia with distal esophageal/proximal gastric wall
 thickening concerning for esophagitis from possible reflux disease. This is also unchanged from prio
r studies.

 

Reviewed by: Codey Canseco MD on 12/8/2021 12:39 PM PST

Approved by: Codey Canseco MD on 12/8/2021 12:39 PM PST

 

 

Station ID:  [id number]

## 2021-12-08 NOTE — ED PHYSICIAN DOCUMENTATION
PD HPI NVD





- Stated complaint


Stated Complaint: GLF





- Chief complaint


Chief Complaint: Abd Pain





- History obtained from


History obtained from: Patient, EMS





- History of Present Illness


Timing - onset: How many days ago (2)


Timing - duration: Days (2)


Timing - details: Abrupt onset, Still present (he got COVID and Flu vaccines 2 

days ago (MOnday) and had onset of malaise and nausea/vomiting later that day 

and persists into today.)


Associated symptoms: Loss of appetite.  No: Fever, Abdominal pain, Chest pain


Contributing factors: Other (flu and COVID booster vaccines 2 days ago.).  No: 

Sick contact, Bad food


Similar symptoms before: Has not had sx before


Recently seen: Clinic





Review of Systems


Constitutional: reports: Myalgias.  denies: Fever, Chills


Nose: denies: Rhinorrhea / runny nose, Congestion


Throat: denies: Sore throat


Respiratory: denies: Cough


GI: reports: Abdominal Pain, Nausea, Vomiting, Diarrhea


Skin: denies: Rash, Lesions





PD PAST MEDICAL HISTORY





- Past Medical History


Cardiovascular: Hypertension, High cholesterol, Coronary artery disease


Respiratory: None


Neuro: Peripheral neuropathy


Endocrine/Autoimmune: Type 2 diabetes


GI: None, Other


: None


HEENT: None


Psych: Depression


Musculoskeletal: None


Derm: None





- Past Surgical History


Past Surgical History: Yes


General: Bowel surgery


Ortho: Rotator cuff repair, Spine surgery





- Present Medications


Home Medications: 


                                Ambulatory Orders











 Medication  Instructions  Recorded  Confirmed


 


Citalopram [CeleXA] 40 mg PO DAILY 11/18/14 04/10/21


 


Multivitamin [Multi-Vitamin Daily] 1 tab ORAL DAILY 02/24/15 04/10/21


 


Gabapentin 300 mg PO BID 11/24/15 04/10/21


 


Omeprazole 20 mg PO DAILY 10/20/19 04/10/21


 


glipiZIDE [Glipizide] 10 mg PO BID 10/20/19 04/10/21


 


metFORMIN [Glucophage] 1,000 mg PO BID 10/20/19 04/10/21


 


Atorvastatin [Lipitor] 40 mg PO DAILY 01/16/21 04/10/21


 


Ciprofloxacin HCl [Cipro] 500 mg PO BID #20 tablet 01/16/21 04/10/21


 


Clopidogrel [Plavix] 0.5 tab PO DAILY 01/16/21 04/10/21


 


Losartan [Cozaar] 0.5 tab PO DAILY 01/16/21 04/10/21


 


Promethazine [Phenergan] 25 mg PO Q6H PRN #10 tab 01/16/21 04/10/21


 


Trospium Chloride 1 tab PO BID 01/16/21 04/10/21


 


Cefdinir 300 mg PO BID #20 cap 04/11/21 


 


Cefdinir 300 mg PO BID #20 cap 05/17/21 


 


Docusate Sodium 100Mg Capsule 100 mg PO DAILY #15 cap 10/01/21 





[Colace 100Mg Capsule]   


 


HYDROcod/ACETAM 5/325 [Norco 5/325] 1 ea PO Q6H PRN #14 tablet 10/01/21 


 


Ondansetron Odt [Zofran] 4 mg TL Q6H PRN #10 tablet 10/01/21 


 


cephALEXin [Keflex] 500 mg PO TID #20 cap 10/01/21 


 


Cefdinir 300 mg PO BID #20 cap 10/04/21 


 


Famotidine [Pepcid] 20 mg PO BID #60 tablet 10/04/21 


 


Promethazine [Phenergan] 25 mg PO Q6H PRN #14 tab 10/04/21 


 


Promethazine [Phenergan] 25 mg PO Q6H PRN #10 tab 11/03/21 


 


Famotidine [Pepcid] 20 mg PO BID #14 tablet 12/08/21 


 


Ondansetron Odt [Zofran] 4 mg TL Q6H PRN #10 tablet 12/08/21 














- Allergies


Allergies/Adverse Reactions: 


                                    Allergies











Allergy/AdvReac Type Severity Reaction Status Date / Time


 


No Known Drug Allergies Allergy   Verified 12/08/21 10:48














- Social History


Does the pt smoke?: No


Smoking Status: Never smoker


Does the pt drink ETOH?: Yes


Does the pt have substance abuse?: No





- Immunizations


Immunizations are current?: No


Immunizations: Other immun not current





- POLST


Patient has POLST: No


POLST Status: Full Code





PD ED PE NORMAL





- Vitals


Vital signs reviewed: Yes





- General


General: Alert and oriented X 3, No acute distress, Well developed/nourished





- HEENT


HEENT: Pharynx benign.  No: Moist mucous membranes





- Neck


Neck: Supple, no meningeal sign, No adenopathy





- Cardiac


Cardiac: RRR, No murmur





- Respiratory


Respiratory: Clear bilaterally





- Abdomen


Abdomen: Normal bowel sounds, Soft, Non tender, Other (some distended with 

tenderness lower abd/periumbilical. Prior scars noted. )





- Male 


Male : Deferred





- Rectal


Rectal: Deferred





- Back


Back: No CVA TTP





- Derm


Derm: Normal color, Warm and dry





Results





- Vitals


Vitals: 


                               Vital Signs - 24 hr











  12/08/21 12/08/21 12/08/21





  10:45 11:23 13:48


 


Temperature 37.1 C  


 


Heart Rate 93 95 84


 


Respiratory 20 16 14





Rate   


 


Blood Pressure 142/76 H 155/80 H 152/72 H


 


O2 Saturation 99 98 97








                                     Oxygen











O2 Source                      Room air

















- Labs


Labs: 


                                Laboratory Tests











  12/08/21 12/08/21 12/08/21





  11:18 11:18 11:18


 


WBC  11.4 H  


 


RBC  3.40 L  


 


Hgb  9.5 L  


 


Hct  28.6 L  


 


MCV  84.1  


 


MCH  27.9  


 


MCHC  33.2  


 


RDW  14.6  


 


Plt Count  182  


 


MPV  9.1  


 


Neut # (Auto)  10.2 H  


 


Lymph # (Auto)  0.2 L  


 


Mono # (Auto)  0.7  


 


Eos # (Auto)  0.1  


 


Baso # (Auto)  0.0  


 


Absolute Nucleated RBC  0.00  


 


Nucleated RBC %  0.0  


 


Sodium   137 


 


Potassium   3.8 


 


Chloride   98 L 


 


Carbon Dioxide   25 


 


Anion Gap   14.0 H 


 


BUN   24 H 


 


Creatinine   1.2 


 


Estimated GFR (MDRD)   61 L 


 


Glucose   330 H 


 


Calcium   8.7 


 


Magnesium   1.0 L* 


 


Total Bilirubin   1.1 H 


 


AST   20 


 


ALT   21 


 


Alkaline Phosphatase   93 


 


Troponin I High Sens    11.6


 


Total Protein   7.0 


 


Albumin   3.4 


 


Globulin   3.6 


 


Albumin/Globulin Ratio   0.9 L 


 


Lipase   19 L 


 


Urine Color   


 


Urine Clarity   


 


Urine pH   


 


Ur Specific Gravity   


 


Urine Protein   


 


Urine Glucose (UA)   


 


Urine Ketones   


 


Urine Occult Blood   


 


Urine Nitrite   


 


Urine Bilirubin   


 


Urine Urobilinogen   


 


Ur Leukocyte Esterase   


 


Urine RBC   


 


Urine WBC   


 


Ur Squamous Epith Cells   


 


Urine Bacteria   


 


Ur Microscopic Review   


 


Urine Culture Comments   














  12/08/21





  14:00


 


WBC 


 


RBC 


 


Hgb 


 


Hct 


 


MCV 


 


MCH 


 


MCHC 


 


RDW 


 


Plt Count 


 


MPV 


 


Neut # (Auto) 


 


Lymph # (Auto) 


 


Mono # (Auto) 


 


Eos # (Auto) 


 


Baso # (Auto) 


 


Absolute Nucleated RBC 


 


Nucleated RBC % 


 


Sodium 


 


Potassium 


 


Chloride 


 


Carbon Dioxide 


 


Anion Gap 


 


BUN 


 


Creatinine 


 


Estimated GFR (MDRD) 


 


Glucose 


 


Calcium 


 


Magnesium 


 


Total Bilirubin 


 


AST 


 


ALT 


 


Alkaline Phosphatase 


 


Troponin I High Sens 


 


Total Protein 


 


Albumin 


 


Globulin 


 


Albumin/Globulin Ratio 


 


Lipase 


 


Urine Color  YELLOW


 


Urine Clarity  CLOUDY


 


Urine pH  5.5


 


Ur Specific Gravity  1.020


 


Urine Protein  NEGATIVE


 


Urine Glucose (UA)  >=1000 H


 


Urine Ketones  15 H


 


Urine Occult Blood  MODERATE H


 


Urine Nitrite  NEGATIVE


 


Urine Bilirubin  NEGATIVE


 


Urine Urobilinogen  0.2 (NORMAL)


 


Ur Leukocyte Esterase  SMALL H


 


Urine RBC  0-5


 


Urine WBC  >25 H


 


Ur Squamous Epith Cells  RARE Squamous


 


Urine Bacteria  Many H


 


Ur Microscopic Review  INDICATED


 


Urine Culture Comments  INDICATED














- Rads (name of study)


  ** abd/pelvic CT


Radiology: Prelim report reviewed (no acute process. ), See rad report





PD MEDICAL DECISION MAKING





- ED course


Complexity details: reviewed results, considered differential (seems likely side

 effects of vaccine, given close timing. Improving with fhouds and meds here. 

Taking PO afterward.), d/w patient





Departure





- Departure


Disposition: 01 Home, Self Care


Clinical Impression: 


 Nausea vomiting and diarrhea, Generalized weakness





Immunization reaction


Qualifiers:


 Encounter type: initial encounter Qualified Code(s): T50.Z95A - Adverse effect 

of other vaccines and biological substances, initial encounter





Condition: Stable


Instructions:  ED Nausea Vomiting


Follow-Up: 


Gustavo Macias MD [Primary Care Provider] - 


Prescriptions: 


Famotidine [Pepcid] 20 mg PO BID #14 tablet


Ondansetron Odt [Zofran] 4 mg TL Q6H PRN #10 tablet


 PRN Reason: Nausea / Vomiting


Comments: 


More frequent fluids and bland food for the next day or 2.  Ondansetron if 

needed for nausea.  I would suggest an acid reducing medicine such as famotidine

 twice daily for the next week.  Tylenol if needed for pains or aches.





I would anticipate improvement in the next day or 2.  Return if further 

problems.


Discharge Date/Time: 12/08/21 15:56

## 2022-09-01 ENCOUNTER — HOSPITAL ENCOUNTER (EMERGENCY)
Dept: HOSPITAL 76 - ED | Age: 62
LOS: 1 days | Discharge: HOME | End: 2022-09-02
Payer: COMMERCIAL

## 2022-09-01 ENCOUNTER — HOSPITAL ENCOUNTER (OUTPATIENT)
Dept: HOSPITAL 76 - EMS | Age: 62
Discharge: ANOTHER HOSPITAL | End: 2022-09-01
Payer: COMMERCIAL

## 2022-09-01 DIAGNOSIS — D64.9: ICD-10-CM

## 2022-09-01 DIAGNOSIS — E86.0: ICD-10-CM

## 2022-09-01 DIAGNOSIS — Z91.81: ICD-10-CM

## 2022-09-01 DIAGNOSIS — I12.9: ICD-10-CM

## 2022-09-01 DIAGNOSIS — I69.951: ICD-10-CM

## 2022-09-01 DIAGNOSIS — E11.22: ICD-10-CM

## 2022-09-01 DIAGNOSIS — Z79.84: ICD-10-CM

## 2022-09-01 DIAGNOSIS — I25.10: ICD-10-CM

## 2022-09-01 DIAGNOSIS — R53.1: Primary | ICD-10-CM

## 2022-09-01 DIAGNOSIS — E83.42: ICD-10-CM

## 2022-09-01 DIAGNOSIS — Z96.0: ICD-10-CM

## 2022-09-01 DIAGNOSIS — N13.9: ICD-10-CM

## 2022-09-01 DIAGNOSIS — R53.83: ICD-10-CM

## 2022-09-01 DIAGNOSIS — R29.6: ICD-10-CM

## 2022-09-01 DIAGNOSIS — N18.9: ICD-10-CM

## 2022-09-01 DIAGNOSIS — Z79.899: ICD-10-CM

## 2022-09-01 DIAGNOSIS — F32.A: ICD-10-CM

## 2022-09-01 DIAGNOSIS — E78.00: ICD-10-CM

## 2022-09-01 LAB
ALBUMIN DIAFP-MCNC: 4 G/DL (ref 3.2–5.5)
ALBUMIN/GLOB SERPL: 1.2 {RATIO} (ref 1–2.2)
ALP SERPL-CCNC: 82 IU/L (ref 42–121)
ALT SERPL W P-5'-P-CCNC: 26 IU/L (ref 10–60)
ANION GAP SERPL CALCULATED.4IONS-SCNC: 12 MMOL/L (ref 6–13)
AST SERPL W P-5'-P-CCNC: 21 IU/L (ref 10–42)
BASOPHILS NFR BLD AUTO: 0 10^3/UL (ref 0–0.1)
BASOPHILS NFR BLD AUTO: 0.2 %
BILIRUB BLD-MCNC: 0.9 MG/DL (ref 0.2–1)
BUN SERPL-MCNC: 23 MG/DL (ref 6–20)
CALCIUM UR-MCNC: 9.9 MG/DL (ref 8.5–10.3)
CHLORIDE SERPL-SCNC: 95 MMOL/L (ref 101–111)
CLARITY UR REFRACT.AUTO: (no result)
CO2 SERPL-SCNC: 28 MMOL/L (ref 21–32)
CREAT SERPLBLD-SCNC: 1.7 MG/DL (ref 0.6–1.2)
EOSINOPHIL # BLD AUTO: 0.1 10^3/UL (ref 0–0.7)
EOSINOPHIL NFR BLD AUTO: 1.2 %
ERYTHROCYTE [DISTWIDTH] IN BLOOD BY AUTOMATED COUNT: 14.8 % (ref 12–15)
GFRSERPLBLD MDRD-ARVRAT: 41 ML/MIN/{1.73_M2} (ref 89–?)
GLOBULIN SER-MCNC: 3.4 G/DL (ref 2.1–4.2)
GLUCOSE SERPL-MCNC: 156 MG/DL (ref 70–100)
GLUCOSE UR QL STRIP.AUTO: NEGATIVE MG/DL
HCT VFR BLD AUTO: 31.8 % (ref 42–52)
HGB UR QL STRIP: 11 G/DL (ref 14–18)
KETONES UR QL STRIP.AUTO: NEGATIVE MG/DL
LIPASE SERPL-CCNC: 24 U/L (ref 22–51)
LYMPHOCYTES # SPEC AUTO: 0.9 10^3/UL (ref 1.5–3.5)
LYMPHOCYTES NFR BLD AUTO: 17.6 %
MAGNESIUM SERPL-MCNC: 1.1 MG/DL (ref 1.7–2.8)
MCH RBC QN AUTO: 28.1 PG (ref 27–31)
MCHC RBC AUTO-ENTMCNC: 34.6 G/DL (ref 32–36)
MCV RBC AUTO: 81.3 FL (ref 80–94)
MONOCYTES # BLD AUTO: 0.3 10^3/UL (ref 0–1)
MONOCYTES NFR BLD AUTO: 6.6 %
MUCOUS THREADS URNS QL MICRO: (no result)
NEUTROPHILS # BLD AUTO: 3.6 10^3/UL (ref 1.5–6.6)
NEUTROPHILS # SNV AUTO: 4.9 X10^3/UL (ref 4.8–10.8)
NEUTROPHILS NFR BLD AUTO: 74 %
NITRITE UR QL STRIP.AUTO: NEGATIVE
NRBC # BLD AUTO: 0 /100WBC
NRBC # BLD AUTO: 0 X10^3/UL
PDW BLD AUTO: 9.2 FL (ref 7.4–11.4)
PH UR STRIP.AUTO: 6 PH (ref 5–7.5)
PHOSPHATE BLD-MCNC: 3.6 MG/DL (ref 2.5–4.6)
PLATELET # BLD: 182 10^3/UL (ref 130–450)
POTASSIUM SERPL-SCNC: 3.7 MMOL/L (ref 3.5–5)
PROT SPEC-MCNC: 7.4 G/DL (ref 6.7–8.2)
PROT UR STRIP.AUTO-MCNC: 30 MG/DL
RBC # UR STRIP.AUTO: (no result) /UL
RBC # URNS HPF: (no result) /HPF (ref 0–5)
RBC MAR: 3.91 10^6/UL (ref 4.7–6.1)
SODIUM SERPLBLD-SCNC: 135 MMOL/L (ref 135–145)
SP GR UR STRIP.AUTO: 1.01 (ref 1–1.03)
SQUAMOUS URNS QL MICRO: (no result)
UROBILINOGEN UR QL STRIP.AUTO: (no result) E.U./DL
UROBILINOGEN UR STRIP.AUTO-MCNC: NEGATIVE MG/DL
WBC # UR MANUAL: >25 /HPF (ref 0–3)
WBC CLUMPS URNS QL MICRO: PRESENT

## 2022-09-01 PROCEDURE — 84100 ASSAY OF PHOSPHORUS: CPT

## 2022-09-01 PROCEDURE — 96375 TX/PRO/DX INJ NEW DRUG ADDON: CPT

## 2022-09-01 PROCEDURE — 87077 CULTURE AEROBIC IDENTIFY: CPT

## 2022-09-01 PROCEDURE — 99284 EMERGENCY DEPT VISIT MOD MDM: CPT

## 2022-09-01 PROCEDURE — 96365 THER/PROPH/DIAG IV INF INIT: CPT

## 2022-09-01 PROCEDURE — 84443 ASSAY THYROID STIM HORMONE: CPT

## 2022-09-01 PROCEDURE — 83735 ASSAY OF MAGNESIUM: CPT

## 2022-09-01 PROCEDURE — 81003 URINALYSIS AUTO W/O SCOPE: CPT

## 2022-09-01 PROCEDURE — 85025 COMPLETE CBC W/AUTO DIFF WBC: CPT

## 2022-09-01 PROCEDURE — 96361 HYDRATE IV INFUSION ADD-ON: CPT

## 2022-09-01 PROCEDURE — 87181 SC STD AGAR DILUTION PER AGT: CPT

## 2022-09-01 PROCEDURE — 83690 ASSAY OF LIPASE: CPT

## 2022-09-01 PROCEDURE — 87086 URINE CULTURE/COLONY COUNT: CPT

## 2022-09-01 PROCEDURE — 80053 COMPREHEN METABOLIC PANEL: CPT

## 2022-09-01 PROCEDURE — 81001 URINALYSIS AUTO W/SCOPE: CPT

## 2022-09-01 PROCEDURE — 36415 COLL VENOUS BLD VENIPUNCTURE: CPT

## 2022-09-01 NOTE — CT REPORT
PROCEDURE:  Abdomen/Pelvis WO

 

INDICATIONS:  increased general weakness, indwelling trinh, obst

 

TECHNIQUE:  

Noncontrast 5 mm thick sections acquired from the diaphragms to the symphysis.  5 mm coronal and sagi
ttal reformats were then performed.  For radiation dose reduction, the following was used:  automated
 exposure control, adjustment of mA and/or kV according to patient size.

 

COMPARISON:  CT abdomen pelvis 12/8/2021, 10/4/2021.

 

FINDINGS:  

Image quality:  Excellent.  

 

Lung bases: Unremarkable.

Heart: Heart is normal in size. There is mild fluid distention of the visualized distal esophagus.

 

ABDOMEN:

Liver:Noncontrast evaluation of the liver demonstrates no discrete mass.

Gallbladder:The gallbladder is mildly distended without calcified gallstones or wall thickening. The
re is indistinct mildly hyperdense dependent filling defects which may represent biliary sludge or no
ncalcified stones.

Biliary ducts: No biliary ductal dilatation.

Pancreas: Unremarkable.

Spleen: Normal in size.

Adrenal Glands: No adrenal nodules.

Kidneys and Ureters: No hydronephrosis. There is mild nonspecific perinephric stranding bilaterally.


 

Stomach and Bowel: Stomach, small bowel loops, and colon are normal in caliber and wall thickness. T
here are postsurgical changes within the small bowel in the right lower quadrant and within the recto
sigmoid colon. Appendix is normal in appearance. There is moderate fluid distention throughout the co
teto suggestive of constipation or obstipation. 

Peritoneum: No abnormal intraperitoneal fluid. No free air.

 

Ventral Wall:  No hernia.

Abdominal Nodes: No retroperitoneal or mesenteric adenopathy by size criteria.

Vessels: Aorta and inferior vena cava are normal in size.

 

PELVIS:

Pelvic Organs: Unremarkable.

Bladder:There is a Trinh catheter within a partially distended urinary bladder. There is suggestion 
of bladder wall thickening but evaluation is limited due to nondistention..

Pelvic Nodes: No enlarged lymph nodes.

Miscellaneous: Presacral soft tissue thickening and loculated fluid consistent with sequelae of prior
 surgery appear similar to the prior studies. There is a small fat-containing right inguinal hernia.

 

Bones: Visualized osseous structures demonstrate no suspicious focal lesions. Postsurgical changes a
re redemonstrated status post posterior fixation at L4-5.

 

IMPRESSION:  

 

1. Moderate colonic stool distention may reflect constipation or obstipation. No definite evidence of
 bowel obstruction.

 

2. Postsurgical changes of the distal small bowel and sigmoid colon redemonstrated. Presacral soft ti
ssue thickening and loculated fluid consistent with sequelae of prior surgery appears similar to the 
prior studies.

 

3. Partially distended urinary bladder with a Trinh catheter present. Mild concentric bladder wall th
ickening is suggestive of a cystitis and correlation is recommended with urinalysis.

 

Reviewed by: Neptali Parker MD on 9/1/2022 8:12 PM PDT

Approved by: Neptali Parker MD on 9/1/2022 8:12 PM PDT

 

 

Station ID:  [id number]

## 2022-09-01 NOTE — XRAY REPORT
PROCEDURE:  Chest 1 View X-Ray

 

INDICATIONS:  chest pain

 

TECHNIQUE:  One view of the chest was acquired.  

 

COMPARISON:  4/11/2021

 

FINDINGS:  

 

Surgical changes and devices:  None.  

 

Lungs and pleura:  No pleural effusions or pneumothorax.  Lungs are clear.  

 

Mediastinum:  Mediastinal contours appear normal.  Heart size is normal.  

 

Bones and chest wall:  No suspicious bony lesions.  Overlying soft tissues appear unremarkable.  

 

 

IMPRESSION:  

 

1. No acute cardiopulmonary disease.

 

Reviewed by: Neptali Parker MD on 9/1/2022 9:24 PM PDT

Approved by: Neptali Parker MD on 9/1/2022 9:24 PM PDT

 

 

Station ID:  [id number]

## 2022-09-01 NOTE — ED PHYSICIAN DOCUMENTATION
History of Present Illness





- Stated complaint


Stated Complaint: GLF/L ELBOW INJ





- Chief complaint


Chief Complaint: General





- Additonal information


Additional information: 


62-year-old male who has a past medical history that includes hypertension, CVA 

with residual right sided weakness, diabetes and urinary obstruction presents to

the emergency department for evaluation of increased general weakness.  This has

been ongoing now for about 1 month.  He was previously ambulatory but is at this

point unable to get out of bed.  He can only transfer from bed to wheelchair and

is unable to ambulate even short distances.





Patient fell 2 times today while his wife was at work and he was trying to 

transfer out of bed into a wheelchair.





He does have a history of previous CVA in 2019 that did result in some residual 

right arm weakness.  Due to the increasing weakness and difficulty with self-

catheterization he did have an indwelling Trinh catheter placed about 1 month 

ago.  He is followed by Dr. Torres through [facility]. Patient's 

wife reports that his [place] and 

they have been unable to get a timely appointment to establish with a new 

provider.  He had been started on Trulicity a few months ago.  His wife feels 

that the Trulicity may have caused increased weakness.





He has intermittently had some vomiting but no diarrhea.  Denies chest pain or 

dyspnea. No fevers.





Patient reports he fell out of bed this morning trying to transfer to his 

wheelchair.  Did not strike his head or lose consciousness.  He does have a 

simple abrasion on his left elbow














Review of Systems


Constitutional: reports: Fatigue.  denies: Fever, Chills


Eyes: reports: Reviewed and negative


Ears: reports: Reviewed and negative


Nose: reports: Reviewed and negative


Throat: reports: Reviewed and negative


Cardiac: reports: Reviewed and negative


Respiratory: reports: Reviewed and negative


GI: reports: Vomiting.  denies: Abdominal Pain


: reports: Unable to Void, Other (Indwelling Trinh catheter chronic)


Skin: reports: Abrasion (s) (Left elbow abrasion)


Neurologic: reports: Generalized weakness, Focal weakness (Focal weakness right 

arm at baseline)





PD PAST MEDICAL HISTORY





- Past Medical History


Cardiovascular: Hypertension, High cholesterol, Coronary artery disease


Respiratory: None


Neuro: Peripheral neuropathy


Endocrine/Autoimmune: Type 2 diabetes


GI: None, Other


: None


HEENT: None


Psych: Depression


Musculoskeletal: None


Derm: None





- Past Surgical History


Past Surgical History: Yes


General: Bowel surgery


Ortho: Rotator cuff repair, Spine surgery





- Present Medications


Home Medications: 


                                Ambulatory Orders











 Medication  Instructions  Recorded  Confirmed


 


Citalopram [CeleXA] 40 mg PO DAILY 11/18/14 04/10/21


 


Multivitamin [Multi-Vitamin Daily] 1 tab ORAL DAILY 02/24/15 04/10/21


 


Gabapentin 300 mg PO BID 11/24/15 04/10/21


 


Omeprazole 20 mg PO DAILY 10/20/19 04/10/21


 


glipiZIDE [Glipizide] 10 mg PO BID 10/20/19 04/10/21


 


metFORMIN [Glucophage] 1,000 mg PO BID 10/20/19 04/10/21


 


Atorvastatin [Lipitor] 40 mg PO DAILY 01/16/21 04/10/21


 


Ciprofloxacin HCl [Cipro] 500 mg PO BID #20 tablet 01/16/21 04/10/21


 


Clopidogrel [Plavix] 0.5 tab PO DAILY 01/16/21 04/10/21


 


Losartan [Cozaar] 0.5 tab PO DAILY 01/16/21 04/10/21


 


Promethazine [Phenergan] 25 mg PO Q6H PRN #10 tab 01/16/21 04/10/21


 


Trospium Chloride 1 tab PO BID 01/16/21 04/10/21


 


Cefdinir 300 mg PO BID #20 cap 04/11/21 


 


Cefdinir 300 mg PO BID #20 cap 05/17/21 


 


Docusate Sodium 100Mg Capsule 100 mg PO DAILY #15 cap 10/01/21 





[Colace 100Mg Capsule]   


 


HYDROcod/ACETAM 5/325 [Norco 5/325] 1 ea PO Q6H PRN #14 tablet 10/01/21 


 


Ondansetron Odt [Zofran] 4 mg TL Q6H PRN #10 tablet 10/01/21 


 


cephALEXin [Keflex] 500 mg PO TID #20 cap 10/01/21 


 


Cefdinir 300 mg PO BID #20 cap 10/04/21 


 


Famotidine [Pepcid] 20 mg PO BID #60 tablet 10/04/21 


 


Promethazine [Phenergan] 25 mg PO Q6H PRN #14 tab 10/04/21 


 


Promethazine [Phenergan] 25 mg PO Q6H PRN #10 tab 11/03/21 


 


Famotidine [Pepcid] 20 mg PO BID #14 tablet 12/08/21 


 


Ondansetron Odt [Zofran] 4 mg TL Q6H PRN #10 tablet 12/08/21 














- Allergies


Allergies/Adverse Reactions: 


                                    Allergies











Allergy/AdvReac Type Severity Reaction Status Date / Time


 


No Known Drug Allergies Allergy   Verified 09/01/22 18:50














- Social History


Does the pt smoke?: No


Smoking Status: Never smoker


Does the pt drink ETOH?: Yes


Does the pt have substance abuse?: No





- Immunizations


Immunizations are current?: No


Immunizations: Other immun not current





- POLST


Patient has POLST: No


POLST Status: Full Code





PD ED PE EXPANDED





- General


General: Alert, No acute distress, Other (Appears chronically ill and 

debilitated)





- HEENT


HEENT: PERRL





- Cardiac


Cardiac: Regular Rate, Radial strong equal, Pedal strong equal, Cap refill < 2 

sec





- Respiratory


Respiratory: Clear to ausultation diana.  No: Distress, Labored





- Abdomen


Abdomen: Normal Bowel sounds.  No: Tender to palpation





- Neuro


Neuro: Alert and Oriented X 3, CNII-XII intact, Normal speech, Other (Patient is

 able to elevate both legs off the bed against gravity but not to resistance.  5

 of 5 motor strength on the left upper arm.  3-5 on the right arm.  Mild contr

acture noted right hand/wrist)





- GCS


Eye Opening: Spontaneous


Motor: Obeys Commands


Verbal: Oriented


Total: 15





Results





- Vitals


Vitals: 


                               Vital Signs - 24 hr











  09/01/22





  18:50


 


Temperature 36.5 C


 


Heart Rate 71


 


Respiratory 16





Rate 


 


Blood Pressure 164/97 H


 


O2 Saturation 98








                                     Oxygen











O2 Source                      Room air

















- Labs


Labs: 


                                Laboratory Tests











  09/01/22 09/01/22 09/01/22





  19:02 19:02 19:02


 


WBC  4.9  


 


RBC  3.91 L  


 


Hgb  11.0 L  


 


Hct  31.8 L  


 


MCV  81.3  


 


MCH  28.1  


 


MCHC  34.6  


 


RDW  14.8  


 


Plt Count  182  


 


MPV  9.2  


 


Neut # (Auto)  3.6  


 


Lymph # (Auto)  0.9 L  


 


Mono # (Auto)  0.3  


 


Eos # (Auto)  0.1  


 


Baso # (Auto)  0.0  


 


Absolute Nucleated RBC  0.00  


 


Nucleated RBC %  0.0  


 


Sodium   135 


 


Potassium   3.7 


 


Chloride   95 L 


 


Carbon Dioxide   28 


 


Anion Gap   12.0 


 


BUN   23 H 


 


Creatinine   1.7 H 


 


Estimated GFR (MDRD)   41 L 


 


Glucose   156 H 


 


Calcium   9.9 


 


Phosphorus   


 


Magnesium   


 


Total Bilirubin   0.9 


 


AST   21 


 


ALT   26 


 


Alkaline Phosphatase   82 


 


Total Protein   7.4 


 


Albumin   4.0 


 


Globulin   3.4 


 


Albumin/Globulin Ratio   1.2 


 


Lipase   24 


 


TSH    1.65


 


Urine Color   


 


Urine Clarity   


 


Urine pH   


 


Ur Specific Gravity   


 


Urine Protein   


 


Urine Glucose (UA)   


 


Urine Ketones   


 


Urine Occult Blood   


 


Urine Nitrite   


 


Urine Bilirubin   


 


Urine Urobilinogen   


 


Ur Leukocyte Esterase   


 


Urine RBC   


 


Urine WBC   


 


Urine WBC Clumps   


 


Ur Squamous Epith Cells   


 


Urine Bacteria   


 


Urine Mucus   


 


Ur Microscopic Review   


 


Urine Culture Comments   














  09/01/22 09/01/22





  19:02 19:44


 


WBC  


 


RBC  


 


Hgb  


 


Hct  


 


MCV  


 


MCH  


 


MCHC  


 


RDW  


 


Plt Count  


 


MPV  


 


Neut # (Auto)  


 


Lymph # (Auto)  


 


Mono # (Auto)  


 


Eos # (Auto)  


 


Baso # (Auto)  


 


Absolute Nucleated RBC  


 


Nucleated RBC %  


 


Sodium  


 


Potassium  


 


Chloride  


 


Carbon Dioxide  


 


Anion Gap  


 


BUN  


 


Creatinine  


 


Estimated GFR (MDRD)  


 


Glucose  


 


Calcium  


 


Phosphorus  3.6 


 


Magnesium  1.1 L 


 


Total Bilirubin  


 


AST  


 


ALT  


 


Alkaline Phosphatase  


 


Total Protein  


 


Albumin  


 


Globulin  


 


Albumin/Globulin Ratio  


 


Lipase  


 


TSH  


 


Urine Color   YELLOW


 


Urine Clarity   SL. CLOUDY


 


Urine pH   6.0


 


Ur Specific Gravity   1.015


 


Urine Protein   30 H


 


Urine Glucose (UA)   NEGATIVE


 


Urine Ketones   NEGATIVE


 


Urine Occult Blood   SMALL H


 


Urine Nitrite   NEGATIVE


 


Urine Bilirubin   NEGATIVE


 


Urine Urobilinogen   0.2 (NORMAL)


 


Ur Leukocyte Esterase   LARGE H


 


Urine RBC   6-10 H


 


Urine WBC   >25 H


 


Urine WBC Clumps   PRESENT


 


Ur Squamous Epith Cells   NONE SEEN


 


Urine Bacteria   Few


 


Urine Mucus   Moderate Strands


 


Ur Microscopic Review   INDICATED


 


Urine Culture Comments   INDICATED














- Rads (name of study)


  ** CT abd


Radiology: Final report received (Moderate colonic stool distention may reflect 

constipation or obstipation.  Partially distended urinary bladder with Trinh 

catheter present.  Mild concentric bladder wall thickening suggestive of 

cystitis)





  ** CT head


Radiology: Final report received (No acute intracranial abnormality.  Patchy 

white matter hypodensities are nonspecific but likely represent moderate small 

vessel ischemic changes)





  ** cxr


Radiology: Final report received (No acute cardiopulmonary process)





PD MEDICAL DECISION MAKING





- ED course


Complexity details: reviewed results, re-evaluated patient, considered 

differential, d/w patient, d/w family


ED course: 





62-year-old male was brought to the emergency department for evaluation of 

progressive generalized weakness over the last month.  He was previously 

ambulatory but is now unable to get out of bed without assistance.  Even then he

 is very frail and often falls trying to get into the wheelchair.





Does have a history of hypertension, diabetes and previous CVA in 2019 that did 

result in mild right-sided weakness.  He also recently developed some urinary 

obstruction for which she has a chronic indwelling Trinh catheter.





His wife was at work today and when she got home from work he had told her that 

he had fallen twice thus she presents him to the ER for further evaluation.





Laboratory work today included CBC that showed no leukocytosis.  He has a 

baseline but unchanged anemia with a hemoglobin of 7.  His electrolytes show a 

mild dehydration with a mildly elevated BUN and creatinine at 23 and 1.7 

respectively..  He was administered a liter of saline here in the ER.  We also 

note that his magnesium was low and he was given 2 g of magnesium.





CT imaging of the head showed no acute findings.  A CT of the abdomen showed a 

mildly distended bladder with some inflammatory changes likely indicating acute 

cystitis.  Given the presence of an indwelling trinh catheter he was 

administered ceftriaxone.





I did ask nursing staff to attempt a road test with the patient.  He was at 

least 1 person assist in order to sit up in bed and stand.  He was able to only 

take 1 or 2 steps and could go no further.





Patient's wife reports that she is struggling with his care at home.  She would 

like her  admitted for generalized weakness and to receive a PT/OT 

consult that we discussed at that was not an admittable diagnosis.  She is 

interested in obtaining home health and in-home care support therefore the 

patient will board overnight in the emergency department to see social work in 

the morning.  I have also requested a PT/OT consult.  I discussed with the wife 

that once these consults were obtained he would be stable for discharge home.  

She will plan to be present here in the emergency department about 9 AM so that 

she can interact with social work and physical therapy.  





Patient will be signed out to my nighttime colleague Dr. Farias to follow-up on 

any acute overnight events.





Departure





- Departure


Clinical Impression: 


 Weakness generalized





Condition: Stable


Record reviewed to determine appropriate education?: Yes

## 2022-09-01 NOTE — CT REPORT
PROCEDURE:  HEAD WO

 

INDICATIONS:  increased general weakness

 

TECHNIQUE:  

Noncontrast 4.5 mm thick angled axial sections acquired from the foramen magnum to the vertex.  For r
adiation dose reduction, the following was used:  automated exposure control, adjustment of mA and/or
 kV according to patient size.

 

COMPARISON:  11/3/2021.

 

FINDINGS:  

Image quality:  Excellent.  

 

CSF spaces:  There is mild cerebral volume loss with prominence of the ventricles and sulci. Basal ci
sterns are patent.  No extra-axial fluid collections.  

 

Brain:  No intracranial hemorrhage, mass, or mass effect. Gray-white matter interface is preserved. T
here are patchy subcortical and periventricular white matter hypodensities which are nonspecific but 
likely reflect moderate chronic small vessel ischemic changes. 

 

 Skull and face:  Calvarium and visualized facial bones are intact, without suspicious lesions.  

 

Sinuses:  Visualized sinuses and mastoids are clear.  

 

IMPRESSION:  

 

1. No acute intracranial abnormality.

 

2. Patchy white matter hypodensities are nonspecific but likely represent moderate small vessel ische
dominick changes.

 

Reviewed by: Neptali Parker MD on 9/1/2022 8:04 PM PDT

Approved by: Neptali Parker MD on 9/1/2022 8:04 PM PDT

 

 

Station ID:  [id number]

## 2022-09-01 NOTE — EXTERNAL MEDICAL SUMMARY RPT
Continuity of Care Document

                          Created on:2022



Patient:KASSANDRA LEON

Sex:Male

:1960

External Reference #:[id number]





Demographics







                          Address                   [address] [place] [id number]

 

                          Phone                     Unavailable

 

                          Preferred Language        English

 

                          Marital Status            

 

                          Sikh Affiliation     Unknown

 

                          Race                      Unknown

 

                          Ethnic Group              Unknown









Author







                          Organization              [facility]

 

                          Address                   [address] [place] [address]

 

                          Phone                     [phone]









Allergies and Intolerances







                 date            description     facility        type

 

                 (no date)       No Known Drug Allergies  [facility]  (unkn

own)







Encounters

No information.



Functional Status

No information.



Immunizations

No information.



Medications







                     date                description         facility

 

                     [id number]  Ciprofloxacin 500 MG Oral Tablet  [facility]







Problems

No information.



Procedures







                     date                description         facility

 

                     [id number]+0000  [facility]

 

                     [id number]  [facility]

 

                     [id number]+0000  [facility]







Results/Labs







           test      date      author    facility  value     unit      interpret

ation









                                         Result panel 1









           (unknown)  (no       (unknown)  (unknown)  (no value)  (units    (unk

nown)



                    date)                                   unknown)  

 

           (unknown)  (no       (unknown)  (unknown)  (no value)  (units    (unk

nown)



                    date)                                   unknown)  

 

           (unknown)  (no       (unknown)  (unknown)  [facility]  (units    (

unknown)



                    date)                         [id number]     unknown)  

 

           (unknown)  (no       (unknown)  (unknown)  [facility]     (units    (unkno

wn)



                    date)                                   unknown)  

 

           (unknown)  (no       (unknown)  (unknown)  [facility]  (units   

 (unknown)



                    date)                                   unknown)  

 

           (unknown)  (no       (unknown)  (unknown)  Nurse Office  (units    (u

nknown)



                    date)                         Visit     unknown)  

 

           (unknown)  (no       (unknown)  (unknown)  (no value)  (units    (unk

nown)



                    date)                                   unknown)  

 

           (unknown)  (no       (unknown)  (unknown)  **COVID-19**  (units    (u

nknown)



                    date)                                   unknown)  

 

           (unknown)  (no       (unknown)  (unknown)  [id number]   (units    (unkno

wn)



                    date)                                   unknown)  

 

           (unknown)  (no       (unknown)  (unknown)  06/10/22  (units    (unkno

wn)



                    date)                                   unknown)  

 

           (unknown)  (no       (unknown)  (unknown)  Age/Sex: 62 / M  (units   

 (unknown)



                    date)                           Date of unknown)  



                                                  Service:            

 

           (unknown)  (no       (unknown)  (unknown)  Allergies  (units    (unkn

own)



                    date)                                   unknown)  

 

           (unknown)  (no       (unknown)  (unknown)  Attending Dr:  (units    (

unknown)



                    date)                         Gustavo Wilson MD unknown)  

 

           (unknown)  (no       (unknown)  (unknown)  : 1960  (units   

 (unknown)



                    date)                           Acct:[id number] unknown)  

 

           (unknown)  (no       (unknown)  (unknown)  Dept at   (units    (unkno

wn)



                    date)                         [phone] unknown)  

 

           (unknown)  (no       (unknown)  (unknown)  Documented By:  (units    

(unknown)



                    date)                         Gustavo Wilson MD unknown)  



                                                     06/10/22 1053           

 

           (unknown)  (no       (unknown)  (unknown)  Evaluation/Scree  (units  

  (unknown)



                    date)                         sabine for possible unknown)  



                                                  COVID-19            



                                                  completed?: Yes-           



                                                  COVID-19 CPT           

 

           (unknown)  (no       (unknown)  (unknown)  Intake    (units    (unkno

wn)



                    date)                                   unknown)  

 

           (unknown)  (no       (unknown)  (unknown)  Intake Note:  (units    (u

nknown)



                    date)                                   unknown)  

 

           (unknown)  (no       (unknown)  (unknown)  Loc: [facility]  (units    (unkno

wn)



                    date)                                   unknown)  

 

           (unknown)  (no       (unknown)  (unknown)  No Known Drug  (units    (

unknown)



                    date)                         Allergies Allergy unknown)  



                                                  (Verified           



                                                  22 13:40)           

 

           (unknown)  (no       (unknown)  (unknown)  Note      (units    (unkno

wn)



                    date)                                   unknown)  

 

           (unknown)  (no       (unknown)  (unknown)  PRE-COVID TEST.  (units   

 (unknown)



                    date)                         PT. DENIES ANY unknown)  



                                                  SYMPTOMS.  TEST           



                                                  EXPLAINED AND PT.           



                                                  TOLERATED           

 

           (unknown)  (no       (unknown)  (unknown)  Patient:  (units    (unkno

wn)



                    date)                         Kassandra Leon R unknown)  



                                                   MR#: [id number]           

 

           (unknown)  (no       (unknown)  (unknown)  Reason For Visit  (units  

  (unknown)



                    date)                                   unknown)  

 

           (unknown)  (no       (unknown)  (unknown)  Signed By:  (units    (unk

nown)



                    date)                                   unknown)  

 

           (unknown)  (no       (unknown)  (unknown)  Smoking Status:  (units   

 (unknown)



                    date)                         Never smoker unknown)  

 

           (unknown)  (no       (unknown)  (unknown)  This note may  (units    (

unknown)



                    date)                         have been all or unknown)  



                                                  partially           



                                                  generated using           



                                                  voice recognition           

 

           (unknown)  (no       (unknown)  (unknown)  Tobacco Status  (units    

(unknown)



                    date)                                   unknown)  

 

           (unknown)  (no       (unknown)  (unknown)  Visit Reasons:  (units    

(unknown)



                    date)                         [facility]      unknown)  

 

           (unknown)  (no       (unknown)  (unknown)  WELL.     (units    (unkno

wn)



                    date)                                   unknown)  

 

           (unknown)  (no       (unknown)  (unknown)  have occurred.  (units    

(unknown)



                    date)                         If there are any unknown)  



                                                  questions, please           



                                                  contact the           



                                                  Medical Records           

 

           (unknown)  (no       (unknown)  (unknown)  may occur.  (units    (unk

nown)



                    date)                         Occasional unknown)  



                                                  wrong-word or           



                                                  'sound-alike'           



                                                  substitutions may           



                                                  have                

 

           (unknown)  (no       (unknown)  (unknown)  occurred due to  (units   

 (unknown)



                    date)                         the inherent unknown)  



                                                  limitations of           



                                                  voice recognition           



                                                  software. Please           

 

           (unknown)  (no       (unknown)  (unknown)  read the note  (units    (

unknown)



                    date)                         carefully and unknown)  



                                                  recognize, using           



                                                  context, where           



                                                  these               



                                                  substitutions           

 

           (unknown)  (no       (unknown)  (unknown)  software.  (units    (unkn

own)



                    date)                         Although every unknown)  



                                                  effort is made to           



                                                  edit content,           



                                                  transcription           



                                                  errors              









                                         Result panel 2









           (unknown)  (no date)  (unknown)  (unknown)  Negative  (units    (unkn

own)



                                                            unknown)  









                                         Result panel 3









           (unknown)  (no date)  (unknown)  (unknown)  (no value)  (units    226

37-3



                                                            unknown)  

 

           (unknown)  (no date)  (unknown)  (unknown)  (no value)  (units    495

60-6



                                                            unknown)  

 

           (unknown)  (no date)  (unknown)  (unknown)  (no value)  (units    191

39-5



                                                            unknown)  

 

           (unknown)  (no date)  (unknown)  (unknown)  (no value)  (units    226

34-0



                                                            unknown)  

 

           (unknown)  (no date)  (unknown)  (unknown)  (no value)  (units    226

33-2



                                                            unknown)  

 

           (unknown)  (no date)  (unknown)  (unknown)  (no value)  (units    226

35-7



                                                            unknown)  

 

           (unknown)  (no date)  (unknown)  (unknown)  (no value)  (units    527

97-8



                                                            unknown)  









                                         Result panel 4









           (unknown)  (no       (unknown)  (unknown)  (no value)  (units    (unk

nown)



                    date)                                   unknown)  

 

           (unknown)  (no       (unknown)  (unknown)  (no value)  (units    (unk

nown)



                    date)                                   unknown)  

 

           (unknown)  (no       (unknown)  (unknown)  22 1257  (units    (

unknown)



                    date)                                   unknown)  

 

           (unknown)  (no       (unknown)  (unknown)  [facility]  (units    (

unknown)



                    date)                         [id number]     unknown)  

 

           (unknown)  (no       (unknown)  (unknown)  [facility]  (units   

 (unknown)



                    date)                                   unknown)  

 

           (unknown)  (no       (unknown)  (unknown)  Nurse Office  (units    (u

nknown)



                    date)                         Visit     unknown)  

 

           (unknown)  (no       (unknown)  (unknown)  Signed    (units    (unkno

wn)



                    date)                                   unknown)  

 

           (unknown)  (no       (unknown)  (unknown)  (no value)  (units    (unk

nown)



                    date)                                   unknown)  

 

           (unknown)  (no       (unknown)  (unknown)  **COVID-19**  (units    (u

nknown)



                    date)                                   unknown)  

 

           (unknown)  (no       (unknown)  (unknown)  [id number]   (units    (unkno

wn)



                    date)                                   unknown)  

 

           (unknown)  (no       (unknown)  (unknown)  06/10/22  (units    (unkno

wn)



                    date)                                   unknown)  

 

           (unknown)  (no       (unknown)  (unknown)  Age/Sex: 62 / M  (units   

 (unknown)



                    date)                           Date of unknown)  



                                                  Service:            

 

           (unknown)  (no       (unknown)  (unknown)  Allergies  (units    (unkn

own)



                    date)                                   unknown)  

 

           (unknown)  (no       (unknown)  (unknown)  Attending Dr:  (units    (

unknown)



                    date)                         Gustavo Wilson MD unknown)  

 

           (unknown)  (no       (unknown)  (unknown)  : 1960  (units   

 (unknown)



                    date)                           Acct:[id number] unknown)  

 

           (unknown)  (no       (unknown)  (unknown)  Dept at   (units    (unkno

wn)



                    date)                         [phone]. unknown)  

 

           (unknown)  (no       (unknown)  (unknown)  Documented By:  (units    

(unknown)



                    date)                         Gustavo Wilson MD unknown)  



                                                     06/10/22 1053           

 

           (unknown)  (no       (unknown)  (unknown)  Evaluation/Scree  (units  

  (unknown)



                    date)                         sabine for possible unknown)  



                                                  COVID-19            



                                                  completed?: Yes-           



                                                  COVID-19 CPT           

 

           (unknown)  (no       (unknown)  (unknown)  Intake    (units    (unkno

wn)



                    date)                                   unknown)  

 

           (unknown)  (no       (unknown)  (unknown)  Intake Note:  (units    (u

nknown)



                    date)                                   unknown)  

 

           (unknown)  (no       (unknown)  (unknown)  Loc: [facility]  (units    (unkno

wn)



                    date)                                   unknown)  

 

           (unknown)  (no       (unknown)  (unknown)  No Known Drug  (units    (

unknown)



                    date)                         Allergies Allergy unknown)  



                                                  (Verified           



                                                  22 13:40)           

 

           (unknown)  (no       (unknown)  (unknown)  Note      (units    (unkno

wn)



                    date)                                   unknown)  

 

           (unknown)  (no       (unknown)  (unknown)  PRE-COVID TEST.  (units   

 (unknown)



                    date)                         PT. DENIES ANY unknown)  



                                                  SYMPTOMS.  TEST           



                                                  EXPLAINED AND PT.           



                                                  TOLERATED           

 

           (unknown)  (no       (unknown)  (unknown)  Patient:  (units    (unkno

wn)



                    date)                         Kassandra Leon R unknown)  



                                                   MR#: [id number]           

 

           (unknown)  (no       (unknown)  (unknown)  Reason For Visit  (units  

  (unknown)



                    date)                                   unknown)  

 

           (unknown)  (no       (unknown)  (unknown)  Signed By:  (units    (unk

nown)



                    date)                         <Electronically unknown)  



                                                  signed by Gustavo Wilson MD>           

 

           (unknown)  (no       (unknown)  (unknown)  Smoking Status:  (units   

 (unknown)



                    date)                         Never smoker unknown)  

 

           (unknown)  (no       (unknown)  (unknown)  This note may  (units    (

unknown)



                    date)                         have been all or unknown)  



                                                  partially           



                                                  generated using           



                                                  voice recognition           

 

           (unknown)  (no       (unknown)  (unknown)  Tobacco Status  (units    

(unknown)



                    date)                                   unknown)  

 

           (unknown)  (no       (unknown)  (unknown)  Visit Reasons:  (units    

(unknown)



                    date)                         [facility]      unknown)  

 

           (unknown)  (no       (unknown)  (unknown)  WELL.     (units    (unkno

wn)



                    date)                                   unknown)  

 

           (unknown)  (no       (unknown)  (unknown)  have occurred.  (units    

(unknown)



                    date)                         If there are any unknown)  



                                                  questions, please           



                                                  contact the           



                                                  Medical Records           

 

           (unknown)  (no       (unknown)  (unknown)  may occur.  (units    (unk

nown)



                    date)                         Occasional unknown)  



                                                  wrong-word or           



                                                  'sound-alike'           



                                                  substitutions may           



                                                  have                

 

           (unknown)  (no       (unknown)  (unknown)  occurred due to  (units   

 (unknown)



                    date)                         the inherent unknown)  



                                                  limitations of           



                                                  voice recognition           



                                                  software. Please           

 

           (unknown)  (no       (unknown)  (unknown)  read the note  (units    (

unknown)



                    date)                         carefully and unknown)  



                                                  recognize, using           



                                                  context, where           



                                                  these               



                                                  substitutions           

 

           (unknown)  (no       (unknown)  (unknown)  software.  (units    (unkn

own)



                    date)                         Although every unknown)  



                                                  effort is made to           



                                                  edit content,           



                                                  transcription           



                                                  errors              









                                         Result panel 5









           (unknown)  (no       (unknown)  (unknown)  (no value)  (units    (unk

nown)



                    date)                                   unknown)  

 

           (unknown)  (no       (unknown)  (unknown)  (no value)  (units    (unk

nown)



                    date)                                   unknown)  

 

           (unknown)  (no       (unknown)  (unknown)  Date of Service:  (units  

  (unknown)



                    date)                         22  unknown)  

 

           (unknown)  (no       (unknown)  (unknown)  (no value)  (units    (unk

nown)



                    date)                                   unknown)  

 

           (unknown)  (no       (unknown)  (unknown)  -         (units    (unkno

wn)



                    date)                                   unknown)  

 

           (unknown)  (no       (unknown)  (unknown)  22 1426  (units    (

unknown)



                    date)                                   unknown)  

 

           (unknown)  (no       (unknown)  (unknown)  Allergies  (units    (unkn

own)



                    date)                                   unknown)  

 

           (unknown)  (no       (unknown)  (unknown)  History +  (units    (unkn

own)



                    date)                         Physical Report unknown)  

 

           (unknown)  (no       (unknown)  (unknown)  Home Medications  (units  

  (unknown)



                    date)                                   unknown)  

 

           (unknown)  (no       (unknown)  (unknown)  [facility]  (units   

 (unknown)



                    date)                         [address] [place] [address] unknown)  



                                                  [place], [place]           



                                                  [address]               

 

           (unknown)  (no       (unknown)  (unknown)  (no value)  (units    (unk

nown)



                    date)                                   unknown)  

 

           (unknown)  (no       (unknown)  (unknown)  22  (units    (unkno

wn)



                    date)                                   unknown)  

 

           (unknown)  (no       (unknown)  (unknown)  Medication  (units    (unk

nown)



                    date)                         Instructions unknown)  



                                                  Recorded            



                                                  Confirmed  Type           

 

           (unknown)  (no       (unknown)  (unknown)  (Zofran) vomiting  (units 

   (unknown)



                    date)                         #14 tabs  unknown)  

 

           (unknown)  (no       (unknown)  (unknown)  (past 8 hours):  (units   

 (unknown)



                    date)                                   unknown)  

 

           (unknown)  (no       (unknown)  (unknown)  [id number]   (units    (unkno

wn)



                    date)                                   unknown)  

 

           (unknown)  (no       (unknown)  (unknown)  14:13     (units    (unkno

wn)



                    date)                                   unknown)  

 

           (unknown)  (no       (unknown)  (unknown)  2-3 bowel  (units    (unkn

own)



                    date)                         movements per week unknown)  



                                                  they tend to be on           



                                                  the loose side.           



                                                  He denies           

 

           (unknown)  (no       (unknown)  (unknown)  62-year-old male  (units  

  (unknown)



                    date)                         with an iron unknown)  



                                                  deficiency anemia           



                                                  nausea vomiting           



                                                  weight loss           

 

           (unknown)  (no       (unknown)  (unknown)  Age/Sex: 62 / M  (units   

 (unknown)



                    date)                                   unknown)  

 

           (unknown)  (no       (unknown)  (unknown)  Allergy/AdvReac  (units   

 (unknown)



                    date)                         Type Severity unknown)  



                                                  Reaction Status           



                                                  Date / Time           

 

           (unknown)  (no       (unknown)  (unknown)  Appearance:  (units    (un

known)



                    date)                         grossly normal unknown)  

 

           (unknown)  (no       (unknown)  (unknown)  Assessment + Plan  (units 

   (unknown)



                    date)                                   unknown)  

 

           (unknown)  (no       (unknown)  (unknown)  Assessment + Plan  (units 

   (unknown)



                    date)                         narrative: unknown)  

 

           (unknown)  (no       (unknown)  (unknown)  Blood Pressure  (units    

(unknown)



                    date)                         150/75 H  unknown)  

 

           (unknown)  (no       (unknown)  (unknown)  CVA (cerebral  (units    (

unknown)



                    date)                         vascular accident) unknown)  



                                                  (2018)           

 

           (unknown)  (no       (unknown)  (unknown)  Cardio    (units    (unkno

wn)



                    date)                                   unknown)  

 

           (unknown)  (no       (unknown)  (unknown)  Chest     (units    (unkno

wn)



                    date)                                   unknown)  

 

           (unknown)  (no       (unknown)  (unknown)  Chest: normal  (units    (

unknown)



                    date)                         inspection of the unknown)  



                                                  chest               

 

           (unknown)  (no       (unknown)  (unknown)  Chief complaint:  (units  

  (unknown)



                    date)                         SDC       unknown)  

 

           (unknown)  (no       (unknown)  (unknown)  Colon cancer  (units    (u

nknown)



                    date)                         (2013)    unknown)  

 

           (unknown)  (no       (unknown)  (unknown)  Const     (units    (unkno

wn)



                    date)                                   unknown)  

 

           (unknown)  (no       (unknown)  (unknown)  Critical Care  (units    (

unknown)



                    date)                         time:     unknown)  

 

           (unknown)  (no       (unknown)  (unknown)  : 1960  (units   

 (unknown)



                    date)                          Acct:[id number] unknown)  

 

           (unknown)  (no       (unknown)  (unknown)  Date Patient  (units    (u

nknown)



                    date)                         Seen: 22 unknown)  

 

           (unknown)  (no       (unknown)  (unknown)  Depression  (units    (unk

nown)



                    date)                                   unknown)  

 

           (unknown)  (no       (unknown)  (unknown)  Diabetes  (units    (unkno

wn)



                    date)                                   unknown)  

 

           (unknown)  (no       (unknown)  (unknown)  Diabetic  (units    (unkno

wn)



                    date)                         neuropathy unknown)  

 

           (unknown)  (no       (unknown)  (unknown)  Effort +  (units    (unkno

wn)



                    date)                         Inspection: normal unknown)  



                                                  respiratory effort           

 

           (unknown)  (no       (unknown)  (unknown)  Exam      (units    (unkno

wn)



                    date)                                   unknown)  

 

           (unknown)  (no       (unknown)  (unknown)  Extrem    (units    (unkno

wn)



                    date)                                   unknown)  

 

           (unknown)  (no       (unknown)  (unknown)  Eyes      (units    (unkno

wn)



                    date)                                   unknown)  

 

           (unknown)  (no       (unknown)  (unknown)  Family + Social  (units   

 (unknown)



                    date)                         History   unknown)  

 

           (unknown)  (no       (unknown)  (unknown)  Family History  (units    

(unknown)



                    date)                         (Reviewed 22 unknown)  



                                                  @ 14:21 by Will Hansen MD)           

 

           (unknown)  (no       (unknown)  (unknown)  Father     (units 

   (unknown)



                    date)                           Cancer  unknown)  

 

           (unknown)  (no       (unknown)  (unknown)  GERD      (units    (unkno

wn)



                    date)                         (gastroesophageal unknown)  



                                                  reflux disease)           

 

           (unknown)  (no       (unknown)  (unknown)  GI        (units    (unkno

wn)



                    date)                                   unknown)  

 

           (unknown)  (no       (unknown)  (unknown)  General:  (units    (unkno

wn)



                    date)                         appearance normal, unknown)  



                                                  both eyes and all           



                                                  related structures           

 

           (unknown)  (no       (unknown)  (unknown)  General:  (units    (unkno

wn)



                    date)                         cooperative unknown)  

 

           (unknown)  (no       (unknown)  (unknown)  General: no  (units    (un

known)



                    date)                         rashes or lesions unknown)  



                                                  noted               

 

           (unknown)  (no       (unknown)  (unknown)  General: normal  (units   

 (unknown)



                    date)                         to inspection and unknown)  



                                                  no pedal edema           

 

           (unknown)  (no       (unknown)  (unknown)  General: patient  (units  

  (unknown)



                    date)                         alert and patient unknown)  



                                                  awake               

 

           (unknown)  (no       (unknown)  (unknown)  H/O colectomy  (units    (

unknown)



                    date)                                   unknown)  

 

           (unknown)  (no       (unknown)  (unknown)  HENMT     (units    (unkno

wn)



                    date)                                   unknown)  

 

           (unknown)  (no       (unknown)  (unknown)  Head: normal to  (units   

 (unknown)



                    date)                         inspection unknown)  

 

           (unknown)  (no       (unknown)  (unknown)  History of  (units    (unk

nown)



                    date)                         Present Illness unknown)  

 

           (unknown)  (no       (unknown)  (unknown)  History of lumbar  (units 

   (unknown)



                    date)                         surgery () unknown)  

 

           (unknown)  (no       (unknown)  (unknown)  Home Medications  (units  

  (unknown)



                    date)                         and Allergies unknown)  

 

           (unknown)  (no       (unknown)  (unknown)  Hx of foot  (units    (unk

nown)



                    date)                         surgery (2017) unknown)  

 

           (unknown)  (no       (unknown)  (unknown)  Hx of shoulder  (units    

(unknown)



                    date)                         surgery () unknown)  

 

           (unknown)  (no       (unknown)  (unknown)  Hypertension  (units    (u

nknown)



                    date)                                   unknown)  

 

           (unknown)  (no       (unknown)  (unknown) I reviewed the  (units    (

unknown)



                    date)                         note from Yasmine unknownIrena Celaya on May 13,           



                                                  2022.  Patient           



                                                  indicates he has           

 

           (unknown)  (no       (unknown)  (unknown)  I spent a total  (units   

 (unknown)



                    date)                         of [] minutes of unknown)  



                                                  critical care time           



                                                  on this patient's           



                                                  care                

 

           (unknown)  (no       (unknown)  (unknown)  Inspection:  (units    (un

known)



                    date)                         normal to unknown)  



                                                  inspection           

 

           (unknown)  (no       (unknown)  (unknown)  Medical History  (units   

 (unknown)



                    date)                         (Reviewed 22 unknown)  



                                                  @ 14:21 by Will Hansen MD)           

 

           (unknown)  (no       (unknown)  (unknown)  Meds      (units    (unkno

wn)



                    date)                                   unknown)  

 

           (unknown)  (no       (unknown)  (unknown)  Mother     (units 

   (unknown)



                    date)                           Cancer  unknown)  

 

           (unknown)  (no       (unknown)  (unknown)  Narrative:  (units    (unk

nown)



                    date)                                   unknown)  

 

           (unknown)  (no       (unknown)  (unknown)  Neck      (units    (unkno

wn)



                    date)                                   unknown)  

 

           (unknown)  (no       (unknown)  (unknown)  Neck: normal  (units    (u

nknown)



                    date)                         visual inspection unknown)  

 

           (unknown)  (no       (unknown)  (unknown)  Neuro     (units    (unkno

wn)



                    date)                                   unknown)  

 

           (unknown)  (no       (unknown)  (unknown)  No Known Drug  (units    (

unknown)



                    date)                         Allergies Allergy unknown)  



                                                   Verified 22           



                                                  09:54               

 

           (unknown)  (no       (unknown)  (unknown)  Osteoarthritis  (units    

(unknown)



                    date)                                   unknown)  

 

           (unknown)  (no       (unknown)  (unknown)  Oxygen Delivery  (units   

 (unknown)



                    date)                         Method    Room Air unknown)  

 

           (unknown)  (no       (unknown)  (unknown)  Oxygen Delivery  (units   

 (unknown)



                    date)                         Method Room Air unknown)  

 

           (unknown)  (no       (unknown)  (unknown)  Patient History  (units   

 (unknown)



                    date)                                   unknown)  

 

           (unknown)  (no       (unknown)  (unknown)  Patient:  (units    (unkno

wn)



                    date)                         Kassandra Leon unknown)  



                                                  MR#: [id number]            

 

           (unknown)  (no       (unknown)  (unknown)  Postlaminectomy  (units   

 (unknown)



                    date)                         syndrome  unknown)  

 

           (unknown)  (no       (unknown)  (unknown)  Provider:  (units    (unkn

own)



                    date)                         Will Hansen MD unknown)  

 

           (unknown)  (no       (unknown)  (unknown)  Psych     (units    (unkno

wn)



                    date)                                   unknown)  

 

           (unknown)  (no       (unknown)  (unknown)  Pulse Oximetry  (units    

(unknown)



                    date)                         100       unknown)  

 

           (unknown)  (no       (unknown)  (unknown)  Pulse Rate 65  (units    (

unknown)



                    date)                                   unknown)  

 

           (unknown)  (no       (unknown)  (unknown)  ROS: Yes All  (units    (u

nknown)



                    date)                         systems reviewed unknown)  



                                                  with the patient           



                                                  and are negative           



                                                  except as           

 

           (unknown)  (no       (unknown)  (unknown)  Rate: regular  (units    (

unknown)



                    date)                         rate      unknown)  

 

           (unknown)  (no       (unknown)  (unknown)  Resp      (units    (unkno

wn)



                    date)                                   unknown)  

 

           (unknown)  (no       (unknown)  (unknown)  Respiratory Rate  (units  

  (unknown)



                    date)                         12        unknown)  

 

           (unknown)  (no       (unknown)  (unknown)  Review of Systems  (units 

   (unknown)



                    date)                                   unknown)  

 

           (unknown)  (no       (unknown)  (unknown)  Sciatica  (units    (unkno

wn)



                    date)                                   unknown)  

 

           (unknown)  (no       (unknown)  (unknown)  Signed    (units    (unkno

wn)



                    date)                         By:<Electronically unknown)  



                                                  signed by Will Hansen MD>           

 

           (unknown)  (no       (unknown)  (unknown)  Skin      (units    (unkno

wn)



                    date)                                   unknown)  

 

           (unknown)  (no       (unknown)  (unknown)  Smoking Status  (units    

(unknown)



                    date)                         Never smoker unknown)  

 

           (unknown)  (no       (unknown)  (unknown)  Social History:  (units   

 (unknown)



                    date)                                   unknown)  

 

           (unknown)  (no       (unknown)  (unknown)  Spinal stenosis  (units   

 (unknown)



                    date)                                   unknown)  

 

           (unknown)  (no       (unknown)  (unknown)  Substance Use  (units    (

unknown)



                    date)                         Type    does not unknown)  



                                                  use                 

 

           (unknown)  (no       (unknown)  (unknown)  Surgical History  (units  

  (unknown)



                    date)                         (Reviewed 22 unknown)  



                                                  @ 14:21 by Will Hansen MD)           

 

           (unknown)  (no       (unknown)  (unknown)  Temperature 97.8  (units  

  (unknown)



                    date)                         F         unknown)  

 

           (unknown)  (no       (unknown)  (unknown)  Time Patient  (units    (u

nknown)



                    date)                         Seen: 14:19 unknown)  

 

           (unknown)  (no       (unknown)  (unknown)  Time Spent With  (units   

 (unknown)



                    date)                         Patient   unknown)  

 

           (unknown)  (no       (unknown)  (unknown)  Tobacco +  (units    (unkn

own)



                    date)                         Substance use: unknown)  

 

           (unknown)  (no       (unknown)  (unknown)  Vital Signs  (units    (un

known)



                    date)                                   unknown)  

 

           (unknown)  (no       (unknown)  (unknown)  alcohol intake  (units    

(unknown)



                    date)                         current   unknown)  

 

           (unknown)  (no       (unknown)  (unknown)  alcohol intake  (units    

(unknown)



                    date)                         frequency    a few unknown)  



                                                  times a month           

 

           (unknown)  (no       (unknown)  (unknown)  ascorbic acid  (units    (

unknown)



                    date)                         (vitamin C) 500 mg unknown)  



                                                  500 mg PO BID #60           



                                                  tabs 22  Rx           

 

           (unknown)  (no       (unknown)  (unknown)  atorvastatin 80  (units   

 (unknown)



                    date)                         mg tablet 40 mg PO unknown)  



                                                  QPM 20 History           

 

           (unknown)  (no       (unknown)  (unknown)  buspirone 5 mg  (units    

(unknown)



                    date)                         tablet 5 mg PO BID unknown)  



                                                  #60 tabs 22           



                                                  Rx                  

 

           (unknown)  (no       (unknown)  (unknown)  clopidogrel 75 mg  (units 

   (unknown)



                    date)                         tablet 75 mg PO unknown)  



                                                  DAILY 22 History           

 

           (unknown)  (no       (unknown)  (unknown)  constipation.  He  (units 

   (unknown)



                    date)                         had a very unknown)  



                                                  difficult time           



                                                  with the bowel           



                                                  prep.  (nausea           

 

           (unknown)  (no       (unknown)  (unknown)  cyanocobalamin  (units    

(unknown)



                    date)                         (vitamin B-12) 500 unknown)  



                                                  1,000 mcg PO DAILY           



                                                  #60 tabs 22           



                                                  Rx                  

 

           (unknown)  (no       (unknown)  (unknown)  ferrous sulfate  (units   

 (unknown)



                    date)                         325 mg (65 mg 325 unknown)  



                                                  mg PO BIDWM #60           



                                                  tabs 22  Rx           

 

           (unknown)  (no       (unknown)  (unknown)  gabapentin 300 mg  (units 

   (unknown)



                    date)                         capsule 300 mg PO unknown)  



                                                  BID 18 History           

 

           (unknown)  (no       (unknown)  (unknown)  glipizide 10 mg  (units   

 (unknown)



                    date)                         tablet 10 mg PO unknown)  



                                                  BID 18 History           

 

           (unknown)  (no       (unknown)  (unknown)  household members  (units 

   (unknown)



                    date)                            spouse unknown)  

 

           (unknown)  (no       (unknown)  (unknown)  iron) tablet  (units    (u

nknown)



                    date)                                   unknown)  

 

           (unknown)  (no       (unknown)  (unknown)  lives     (units    (unkno

wn)



                    date)                         independently unknown)  



                                                  Yes                 

 

           (unknown)  (no       (unknown)  (unknown)  losartan 50 mg  (units    

(unknown)



                    date)                         tablet 25 mg PO unknown)  



                                                  QPM 20 History           

 

           (unknown)  (no       (unknown)  (unknown)  mcg tablet  (units    (unk

nown)



                    date)                                   unknown)  

 

           (unknown)  (no       (unknown)  (unknown)  mcg tablet  (units    (unk

nown)



                    date)                         (Tab-A-Lucy) unknown)  

 

           (unknown)  (no       (unknown)  (unknown)  metformin 500 mg  (units  

  (unknown)



                    date)                         tablet,extended unknown)  



                                                  500 mg PO BID           



                                                  18           



                                                  History             

 

           (unknown)  (no       (unknown)  (unknown)  metoprolol  (units    (unk

nown)



                    date)                         succinate 25 mg 25 unknown)  



                                                  mg PO DAILY           



                                                  21           



                                                  History             

 

           (unknown)  (no       (unknown)  (unknown)  multivitamin with  (units 

   (unknown)



                    date)                         folic acid 400 1 unknown)  



                                                  tab PO DAILY #90           



                                                  tabs 22  Rx           

 

           (unknown)  (no       (unknown)  (unknown)  ondansetron HCl 4  (units 

   (unknown)



                    date)                         mg tablet 4 mg PO unknown)  



                                                  Q8H PRN nausea and           



                                                  21           



                                                  Rx                  

 

           (unknown)  (no       (unknown)  (unknown)  oral powder  (units    (un

known)



                    date)                         packet    unknown)  

 

           (unknown)  (no       (unknown)  (unknown)  otherwise  (units    (unkn

own)



                    date)                         documented unknown)  

 

           (unknown)  (no       (unknown)  (unknown)  personal history  (units  

  (unknown)



                    date)                         of colon cancer. unknown)  



                                                  EGD and             



                                                  colonoscopy are           



                                                  pursued today.           

 

           (unknown)  (no       (unknown)  (unknown)  polyethylene  (units    (u

nknown)



                    date)                         glycol 3350 17 unknown)  



                                                  gram 17 gm PO           



                                                  DAILY #90 ea           



                                                  22  Rx           

 

           (unknown)  (no       (unknown)  (unknown)  release 24 hr  (units    (

unknown)



                    date)                                   unknown)  

 

           (unknown)  (no       (unknown)  (unknown)  sennosides 8.6 mg  (units 

   (unknown)



                    date)                         tablet (senna) unknown)  



                                                  17.2 mg PO BEDTIME           



                                                  #60 tabs 22           



                                                  Rx                  

 

           (unknown)  (no       (unknown)  (unknown)  tablet (Vitamin  (units   

 (unknown)



                    date)                         C)        unknown)  

 

           (unknown)  (no       (unknown)  (unknown)  tablet,extended  (units   

 (unknown)



                    date)                         release 24 hr unknown)  

 

           (unknown)  (no       (unknown)  (unknown)  today; this time  (units  

  (unknown)



                    date)                         is exclusive of unknown)  



                                                  procedural time.           

 

           (unknown)  (no       (unknown)  (unknown)  trospium 20 mg  (units    

(unknown)



                    date)                         tablet 20 mg PO unknown)  



                                                  BID urinary           



                                                  retention 22 History           

 

           (unknown)  (no       (unknown)  (unknown)  vomiting).  He  (units    

(unknown)



                    date)                         estimates he has unknown)  



                                                  lost about 15 lb           



                                                  in the last 6           



                                                  months.             









                                         Result panel 6









           (unknown)  (no date)  (unknown)  (unknown)  (no value)  (units    (un

known)



                                                            unknown)  

 

           (unknown)  (no date)  (unknown)  (unknown)  Date of   (units    (unkn

own)



                                                  Service:  unknown)  



                                                  22            

 

           (unknown)  (no date)  (unknown)  (unknown)  22 1426  (units    

(unknown)



                                                            unknown)  

 

           (unknown)  (no date)  (unknown)  (unknown)  [place]    (units    (unkn

own)



                                                  [facility] [address] unknown)  



                                                  [address] [place] [address]           



                                                  [id number]               

 

           (unknown)  (no date)  (unknown)  (unknown)  Pre-operative  (units    

(unknown)



                                                  Note      unknown)  

 

           (unknown)  (no date)  (unknown)  (unknown)  (no value)  (units    (un

known)



                                                            unknown)  

 

           (unknown)  (no date)  (unknown)  (unknown)  [id number]   (units    (unkn

own)



                                                            unknown)  

 

           (unknown)  (no date)  (unknown)  (unknown)  ASA Class (for  (units   

 (unknown)



                                                  procedural unknown)  



                                                  sedation): III           

 

           (unknown)  (no date)  (unknown)  (unknown)  Age/Sex: 62 /  (units    

(unknown)



                                                  M         unknown)  

 

           (unknown)  (no date)  (unknown)  (unknown)  COVID-19  (units    (unkn

own)



                                                            unknown)  

 

           (unknown)  (no date)  (unknown)  (unknown)  COVID-19  (units    (unkn

own)



                                                  status:   unknown)  



                                                  Negative            

 

           (unknown)  (no date)  (unknown)  (unknown)  Changes to  (units    (un

known)



                                                  H+P: No   unknown)  

 

           (unknown)  (no date)  (unknown)  (unknown)  Criteria for  (units    (

unknown)



                                                  continued unknown)  



                                                  procedure:           



                                                  Possibility           



                                                  delay results           



                                                  in more complex           

 

           (unknown)  (no date)  (unknown)  (unknown)  :      (units    (unkn

own)



                                                  1960 unknown)  



                                                  Acct:[id number]           

 

           (unknown)  (no date)  (unknown)  (unknown)  History +  (units    (unk

nown)



                                                  Physical  unknown)  



                                                  reviewed/Exam           



                                                  performed by           



                                                  Physician: Yes           

 

           (unknown)  (no date)  (unknown)  (unknown)  Interval Note  (units    

(unknown)



                                                            unknown)  

 

           (unknown)  (no date)  (unknown)  (unknown)  Patient:  (units    (unkn

own)



                                                  Kassandra Leon unknown)  



                                                     MR#: [id number]           

 

           (unknown)  (no date)  (unknown)  (unknown)  Pre-operative  (units    

(unknown)



                                                  Note      unknown)  

 

           (unknown)  (no date)  (unknown)  (unknown)  Provider:  (units    (unk

nown)



                                                  Will Hansen unknown)  



                                                  MD                  

 

           (unknown)  (no date)  (unknown)  (unknown)  Result    (units    (unkn

own)



                                                  date/Date unknown)  



                                                  tested (Pos,           



                                                  Neg/Pending):           



                                                  06/10/22            

 

           (unknown)  (no date)  (unknown)  (unknown)  Signed    (units    (unkn

own)



                                                  By:<Electronica unknown)  



                                                  lly signed by           



                                                  Will Hansen MD>                 

 

           (unknown)  (no date)  (unknown)  (unknown)  future surgery  (units   

 (unknown)



                                                  or treatment unknown)  









                                         Result panel 7









           (unknown)  (no       (unknown)  (unknown)  malignancy.  (units    (un

known)



                    date)                                   unknown)  

 

           (unknown)  (no       (unknown)  (unknown)  Antral mucosa with  (units

    (unknown)



                    date)                         mild chronic unknown)  



                                                  gastritis.           

 

           (unknown)  (no       (unknown)  (unknown)  Duodenal mucosa with  (uni

ts    (unknown)



                    date)                         no diagnostic unknown)  



                                                  abnormality.           

 

           (unknown)  (no       (unknown)  (unknown)  Negative for  (units    (u

nknown)



                    date)                         Helicobacter by unknown)  



                                                  immunohistochemistry.         

  

 

           (unknown)  (no       (unknown)  (unknown)  Negative for active  (unit

s    (unknown)



                    date)                         inflammation, unknown)  



                                                  features of sprue,           



                                                  dysplasia, or           

 

           (unknown)  (no       (unknown)  (unknown)  Negative for  (units    (u

nknown)



                    date)                         dysplasia and unknown)  



                                                  malignancy.           

 

           (unknown)  (no       (unknown)  (unknown)  Negative for  (units    (u

nknown)



                    date)                         intestinal unknown)  



                                                  metaplasia.           

 

           (unknown)  (no       (unknown)  (unknown)  [address] [place] [address]  (units   

 (unknown)



                    date)                         [address], [place], unknown)  



                                                  [place]  [phone]           

 

           (unknown)  (no       (unknown)  (unknown)  [facility]  (units

    (unknown)



                    date)                         Cytology  unknown)  

 

           (unknown)  (no       (unknown)  (unknown)  MD Daniel Toweill MD  (uni

ts    (unknown)



                    date)                         Phone:  [phone] unknown)  

 

           (unknown)  (no       (unknown)  (unknown)  (no value)  (units    (unk

nown)



                    date)                                   unknown)  

 

           (unknown)  (no       (unknown)  (unknown)  [address] [place] [address]  (units  

  (unknown)



                    date)                                   unknown)  

 

           (unknown)  (no       (unknown)  (unknown)  [place] [address]  (unit

s    (unknown)



                    date)                                   unknown)  

 

           (unknown)  (no       (unknown)  (unknown)  [facility]  (units   

 (unknown)



                    date)                                   unknown)  

 

           (unknown)  (no       (unknown)  (unknown)  [facility]  (uni

ts    (unknown)



                    date)                         Report    unknown)  

 

           (unknown)  (no       (unknown)  (unknown)  Signed    (units    (unkno

wn)



                    date)                                   unknown)  

 

           (unknown)  (no       (unknown)  (unknown)  (no value)  (units    (unk

nown)



                    date)                                   unknown)  

 

           (unknown)  (no       (unknown)  (unknown)  * This test was  (units   

 (unknown)



                    date)                         developed and its unknown)  



                                                  performance           



                                                  characteristics           



                                                  determined           

 

           (unknown)  (no       (unknown)  (unknown)  ********************  (uni

ts    (unknown)



                    date)                         ********************* unknown)

  



                                                  *********************         

  



                                                  ********            

 

           (unknown)  (no       (unknown)  (unknown)  ***Performed at:  01  (uni

ts    (unknown)



                    date)                                   unknown)  

 

           (unknown)  (no       (unknown)  (unknown)  .    01   (units    (unkno

wn)



                    date)                                   unknown)  

 

           (unknown)  (no       (unknown)  (unknown)  .         (units    (unkno

wn)



                    date)                                   unknown)  

 

           (unknown)  (no       (unknown)  (unknown)  /CPE  2022  (units  

  (unknown)



                    date)                         0558 Local unknown)  

 

           (unknown)  (no       (unknown)  (unknown)  0.3 x 0.2 x 0.1 cm  (units

    (unknown)



                    date)                         submitted entirely in unknown)

  



                                                  1 cassette(s)           

 

           (unknown)  (no       (unknown)  (unknown)  0.4 x 0.2 x 0.2 cm  (units

    (unknown)



                    date)                         to 0.1 x 0.1 x 0.1 cm unknown)

  



                                                  submitted entirely in         

  



                                                  1                   

 

           (unknown)  (no       (unknown)  (unknown)  [id number], [id number],  (units   

 (unknown)



                    date)                         [id number]    unknown)  

 

           (unknown)  (no       (unknown)  (unknown)  A. Duodenum, Biopsy:  (uni

ts    (unknown)



                    date)                                   unknown)  

 

           (unknown)  (no       (unknown)  (unknown)  Administration. The  (unit

s    (unknown)



                    date)                         FDA has determined unknown)  



                                                  that such clearance           



                                                  or approval is           

 

           (unknown)  (no       (unknown)  (unknown)  B.  An    (units    (unkno

wn)



                    date)                         immunohistochemical unknown)  



                                                  stain was performed           



                                                  to evaluate for           

 

           (unknown)  (no       (unknown)  (unknown)  B. Stomach, Antrum,  (unit

s    (unknown)



                    date)                         Biopsy:   unknown)  

 

           (unknown)  (no       (unknown)  (unknown)  CPT    .  (units    (unkno

wn)



                    date)                                   unknown)  

 

           (unknown)  (no       (unknown)  (unknown)  Diagnosis:  (units    (unk

nown)



                    date)                                   unknown)  

 

           (unknown)  (no       (unknown)  (unknown)  Electronically  (units    

(unknown)



                    date)                         signed:    . unknown)  

 

           (unknown)  (no       (unknown)  (unknown)  Gross description:  (units

    (unknown)



                    date)                          .        unknown)  

 

           (unknown)  (no       (unknown)  (unknown)  Helicobacter  (units    (u

nknown)



                    date)                         organisms and is unknown)  



                                                  negative.  The           



                                                  control stain showed          

 

 

           (unknown)  (no       (unknown)  (unknown)  Kitty Smart,  (uni

ts    (unknown)



                    date)                         MD, Pathologist unknown)  

 

           (unknown)  (no       (unknown)  (unknown)  [facility] Accession  (units    (

unknown)



                    date)                         Number: [id number] unknown)  

 

           (unknown)  (no       (unknown)  (unknown)  MRV  2022  (units   

 (unknown)



                    date)                         1617 Local unknown)  

 

           (unknown)  (no       (unknown)  (unknown)  Material submitted:  (unit

s    (unknown)



                    date)                           .       unknown)  

 

           (unknown)  (no       (unknown)  (unknown)  Microscopic:    .  (units 

   (unknown)



                    date)                                   unknown)  

 

           (unknown)  (no       (unknown)  (unknown)  NPI- [id number]  (units   

 (unknown)



                    date)                                   unknown)  

 

           (unknown)  (no       (unknown)  (unknown)  PART A: duodenum -  (units

    (unknown)



                    date)                         DUODENUM  unknown)  

 

           (unknown)  (no       (unknown)  (unknown)  PART B: stomach -  (units 

   (unknown)



                    date)                         ANTRUM    unknown)  

 

           (unknown)  (no       (unknown)  (unknown)  Part A: DUODENUM:  (units 

   (unknown)



                    date)                                   unknown)  

 

           (unknown)  (no       (unknown)  (unknown)  Part B: ANTRUM:  (units   

 (unknown)



                    date)                                   unknown)  

 

           (unknown)  (no       (unknown)  (unknown)  Pathologist provided  (uni

ts    (unknown)



                    date)                         ICD-10:   unknown)  

 

           (unknown)  (no       (unknown)  (unknown)  R19.7     (units    (unkno

wn)



                    date)                                   unknown)  

 

           (unknown)  (no       (unknown)  (unknown)  Received in formalin  (uni

ts    (unknown)



                    date)                         are 3 fragment(s) of unknown) 

 



                                                  tan, soft tissue           



                                                  measuring           

 

           (unknown)  (no       (unknown)  (unknown)  Received in formalin  (uni

ts    (unknown)



                    date)                         is 1 fragment(s) of unknown)  



                                                  tan, soft tissue           



                                                  measuring           

 

           (unknown)  (no       (unknown)  (unknown)  Specimen Comment: A  (unit

s    (unknown)



                    date)                         courtesy copy of this unknown)

  



                                                  report has been sent          

 



                                                  to [phone]           

 

           (unknown)  (no       (unknown)  (unknown)  appropriate  (units    (un

known)



                    date)                         reactivity. unknown)  

 

           (unknown)  (no       (unknown)  (unknown)  [facility] It has  (units

    (unknown)



                    date)                         not been cleared or unknown)  



                                                  approved by the [place]          

 



                                                  Food and Drug           

 

           (unknown)  (no       (unknown)  (unknown)  cassette(s)  (units    (un

known)



                    date)                                   unknown)  

 

           (unknown)  (no       (unknown)  (unknown)  not necessary. This  (unit

s    (unknown)



                    date)                         test is used for unknown)  



                                                  clinical purposes. It         

  



                                                  should not be           

 

           (unknown)  (no       (unknown)  (unknown)  regarded as  (units    (un

known)



                    date)                         investigational or unknown)  



                                                  for research.           

 

           (unknown)  (no       (unknown)  (unknown)  Collection Date:  (units  

  (unknown)



                    date)                         22  unknown)  

 

           (unknown)  (no       (unknown)  (unknown)  DD/DT: 22 0000  (uni

ts    (unknown)



                    date)                                   unknown)  

 

           (unknown)  (no       (unknown)  (unknown)  Date of Birth:  (units    

(unknown)



                    date)                         1960    Admit unknown)  



                                                  Date: 22           

 

           (unknown)  (no       (unknown)  (unknown)  Dictated By:  (units    (u

nknown)



                    date)                         Kitty Smart MD unknown) 

 

 

           (unknown)  (no       (unknown)  (unknown)  MRN: [id number]  (units   

 (unknown)



                    date)                         Dictating Dr: unknown)  



                                                  Kitty Smart MD          

 

 

           (unknown)  (no       (unknown)  (unknown)  Ordering Physician:  (unit

s    (unknown)



                    date)                         Will Hansen MD unknown)  

 

           (unknown)  (no       (unknown)  (unknown)  Patient name:  (units    (

unknown)



                    date)                         Kassandra Leon unknown)  



                                                  Account#: [id number]          

 

 

           (unknown)  (no       (unknown)  (unknown)  Signed By:  (units    (unk

nown)



                    date)                         22 unknown)  

 

           (unknown)  (no       (unknown)  (unknown)  TD/TT: 22  (uni

ts    (unknown)



                    date)                                   unknown)  









                                         Result panel 8









           (unknown)  (no       (unknown)  (unknown)  (no value)  (units    (unk

nown)



                    date)                                   unknown)  

 

           (unknown)  (no       (unknown)  (unknown)  Date of Service:  (units  

  (unknown)



                    date)                         22  unknown)  

 

           (unknown)  (no       (unknown)  (unknown)  22 1459  (units    (

unknown)



                    date)                                   unknown)  

 

           (unknown)  (no       (unknown)  (unknown)  EGD +     (units    (unkno

wn)



                    date)                         Colonoscopy Note unknown)  

 

           (unknown)  (no       (unknown)  (unknown)  [facility]  (units   

 (unknown)



                    date)                         [address] unknown)  



                                                  [place], [place]           



                                                  [address]               

 

           (unknown)  (no       (unknown)  (unknown)  (no value)  (units    (unk

nown)



                    date)                                   unknown)  

 

           (unknown)  (no       (unknown)  (unknown)  [id number]   (units    (unkno

wn)



                    date)                                   unknown)  

 

           (unknown)  (no       (unknown)  (unknown)  1. Await  (units    (unkno

wn)



                    date)                         histopathology. unknown)  



                                                  2. Continue           



                                                  anti-reflux           



                                                  therapy with           



                                                  over-the-counter           

 

           (unknown)  (no       (unknown)  (unknown)  1. Duodenum:  (units    (u

nknown)



                    date)                         This was visually unknown)  



                                                  unremarkable from           



                                                  the bulb through           



                                                  the 2nd             

 

           (unknown)  (no       (unknown)  (unknown)  1. Mild   (units    (unkno

wn)



                    date)                         gastropathy unknown)  

 

           (unknown)  (no       (unknown)  (unknown)  2. Esophagitis  (units    

(unknown)



                    date)                                   unknown)  

 

           (unknown)  (no       (unknown)  (unknown)  2. Stomach:  No  (units   

 (unknown)



                    date)                         outlet    unknown)  



                                                  obstruction no           



                                                  ulcers no mass           



                                                  lesions.  No           



                                                  retained            

 

           (unknown)  (no       (unknown)  (unknown)  3. Esophagus:  (units    (

unknown)



                    date)                         The       unknown)  



                                                  squamocolumnar           



                                                  junction            



                                                  correlated with           



                                                  the top of the           

 

           (unknown)  (no       (unknown)  (unknown)  3. Patent  (units    (unkn

own)



                    date)                         rectosigmoid unknown)  



                                                  colonic             



                                                  anastomosis           

 

           (unknown)  (no       (unknown)  (unknown)  30 cm after the  (units   

 (unknown)



                    date)                         anastomosis. unknown)  

 

           (unknown)  (no       (unknown)  (unknown)  4. Colon:  There  (units  

  (unknown)



                    date)                         was a side to end unknown)  



                                                  anastomosis at           



                                                  approximately 12           



                                                  cm from the           

 

           (unknown)  (no       (unknown)  (unknown)  4. Suboptimal  (units    (

unknown)



                    date)                         bowel prep unknown)  

 

           (unknown)  (no       (unknown)  (unknown)  Adult     (units    (o

wn)



                    date)                         colonoscope unknown)  

 

           (unknown)  (no       (unknown)  (unknown) After the risks  (units    

(unknown)



                    date)                         and benefits were unknown)  



                                                  explained,           



                                                  written and           



                                                  verbal informed           



                                                  consent             

 

           (unknown)  (no       (unknown)  (unknown)  Age/Sex: 62 / M  (units   

 (unknown)



                    date)                                   unknown)  

 

           (unknown)  (no       (unknown)  (unknown)  Bowel prep poor  (units   

 (unknown)



                    date)                                   unknown)  

 

           (unknown)  (no       (unknown)  (unknown)  Complications:  (units    

(unknown)



                    date)                         none      unknown)  

 

           (unknown)  (no       (unknown)  (unknown)  : 1960  (units   

 (unknown)



                    date)                           Acct:[id number] unknown)  

 

           (unknown)  (no       (unknown)  (unknown)  Date of   (units    (o

wn)



                    date)                         procedure: unknown)  



                                                  20            

 

           (unknown)  (no       (unknown)  (unknown)  Disposition:  (units    (u

nknown)



                    date)                         PACU      unknown)  

 

           (unknown)  (no       (unknown)  (unknown)  EGD with  (units    (o

wn)



                    date)                         biopsies and a unknown)  



                                                  flexible            



                                                  sigmoidoscopy           



                                                  (aborted            



                                                  colonoscopy)           

 

           (unknown)  (no       (unknown)  (unknown)  Endoscopic  (units    (unk

nown)



                    date)                         diagnosis unknown)  

 

           (unknown)  (no       (unknown)  (unknown)  Impression:  (units    (un

known)



                    date)                                   unknown)  

 

           (unknown)  (no       (unknown)  (unknown)  Indications:  (units    (u

nknown)



                    date)                                   unknown)  

 

           (unknown)  (no       (unknown)  (unknown)  Nausea vomiting  (units   

 (unknown)



                    date)                         hematemesis unknown)  



                                                  (2022)           



                                                  iron deficiency           



                                                  anemia altered           



                                                  bowel               

 

           (unknown)  (no       (unknown)  (unknown)  Operative  (units    (unkn

own)



                    date)                         Date/Time/Diagnos unknown)  



                                                  es                  

 

           (unknown)  (no       (unknown)  (unknown)  Patient:  (units    (unkno

wn)



                    date)                         LeonKassandra R unknown)  



                                                   MR#: [id number]           

 

           (unknown)  (no       (unknown)  (unknown)  Plan for  (units    (unkno

wn)



                    date)                         aftercare: unknown)  

 

           (unknown)  (no       (unknown)  (unknown)  Post-op   (units    (unkno

wn)



                    date)                         diagnosis: same unknown)  

 

           (unknown)  (no       (unknown)  (unknown)  Post-procedure  (units    

(unknown)



                    date)                                   unknown)  

 

           (unknown)  (no       (unknown)  (unknown)  Pre-op    (units    (unkno

wn)



                    date)                         diagnosis: Nausea unknown)  



                                                  vomiting            



                                                  hematemesis           



                                                  (2022)           



                                                  iron deficiency           

 

           (unknown)  (no       (unknown)  (unknown)  Procedure +  (units    (un

known)



                    date)                         Clinicians unknown)  

 

           (unknown)  (no       (unknown)  (unknown)  Procedure Notes  (units   

 (unknown)



                    date)                                   unknown)  

 

           (unknown)  (no       (unknown)  (unknown)  Procedure in  (units    (u

nknown)



                    date)                         detail:   unknown)  

 

           (unknown)  (no       (unknown)  (unknown)  Provider:  (units    (unkn

own)



                    date)                         Will Hansen MD unknown)  

 

           (unknown)  (no       (unknown)  (unknown)  SCOAP/Timeout:  (units    

(unknown)



                    date)                         Done      unknown)  

 

           (unknown)  (no       (unknown)  (unknown)  Same procedure  (units    

(unknown)



                    date)                         as scheduled: No unknown)  

 

           (unknown)  (no       (unknown)  (unknown)  Scope withdrawal  (units  

  (unknown)



                    date)                         time: Not unknown)  



                                                  applicable           

 

           (unknown)  (no       (unknown)  (unknown)  Sedation  (units    (unkno

wn)



                    date)                         minutes: 19 unknown)  

 

           (unknown)  (no       (unknown)  (unknown)  Signed    (units    (unkno

wn)



                    date)                         By:<Electronicall unknown)  



                                                  y signed by           



                                                  Will Hansen MD>                 

 

           (unknown)  (no       (unknown)  (unknown)  Study performed:  (units  

  (unknown)



                    date)                                   unknown)  

 

           (unknown)  (no       (unknown)  (unknown)  Surgeon: Will  (units   

 (unknown)



                    date)                         [place]one   unknown)  

 

           (unknown)  (no       (unknown)  (unknown)  The patient was  (units   

 (unknown)



                    date)                         then turned unknown)  



                                                  around a digital           



                                                  rectal              



                                                  examination was           

 

           (unknown)  (no       (unknown)  (unknown)  Time of   (units    (unkno

wn)



                    date)                         procedure: 14:51 unknown)  

 

           (unknown)  (no       (unknown)  (unknown)  accomplished.  (units    (

unknown)



                    date)                         The scope was unknown)  



                                                  introduced into           



                                                  the rectum and           



                                                  advanced past the           

 

           (unknown)  (no       (unknown)  (unknown)  acquired for  (units    (u

nknown)



                    date)                         exclusion of unknown)  



                                                  sprue.              

 

           (unknown)  (no       (unknown)  (unknown) adequate mucosal  (units   

 (unknown)



                    date)                         exam.  I  unknown)  



                                                  abandoned the           



                                                  navigation to           



                                                  cecum by perhaps           



                                                  about 20-           

 

           (unknown)  (no       (unknown)  (unknown)  anal verge.  (units    (un

known)



                    date)                         This was widely unknown)  



                                                  patent and did           



                                                  not demonstrate           



                                                  any obvious           

 

           (unknown)  (no       (unknown)  (unknown)  anemia altered  (units    

(unknown)



                    date)                         bowel habit unknown)  



                                                  personal history           



                                                  of colon cancer           

 

           (unknown)  (no       (unknown)  (unknown)  attempt at  (units    (unk

nown)



                    date)                         colonoscopy unknown)  



                                                  following a 2 day           



                                                  prep in the next           



                                                  month or so.  5.           



                                                  I                   

 

           (unknown)  (no       (unknown)  (unknown)  bezoar.  Minimal  (units  

  (unknown)



                    date)                         gastropathy was unknown)  



                                                  noted.  Random           



                                                  antral biopsy was           



                                                  acquired for           

 

           (unknown)  (no       (unknown)  (unknown)  block and  (units    (unkn

own)



                    date)                         advanced under unknown)  



                                                  direct              



                                                  visualization to           



                                                  the 2nd portion           



                                                  of the              

 

           (unknown)  (no       (unknown)  (unknown)  decompressed  (units    (u

nknown)



                    date)                         scope removed unknown)  



                                                  from the patient           



                                                  who tolerated the           



                                                  procedure well.           

 

           (unknown)  (no       (unknown)  (unknown)  defects or  (units    (unk

nown)



                    date)                         lesions.  unknown)  



                                                  Retroflexed views           



                                                  were accomplished           



                                                  in the stomach.           



                                                  The                 

 

           (unknown)  (no       (unknown)  (unknown) duodenum.  The  (units    (

unknown)



                    date)                         scope was slowly unknown)  



                                                  withdrawn           



                                                  carefully           



                                                  examining the           



                                                  mucosa for any           

 

           (unknown)  (no       (unknown)  (unknown)  esophagus there  (units   

 (unknown)



                    date)                         was streaky areas unknown)  



                                                  of erythema           



                                                  consistent with           



                                                  subtle erosion.           

 

           (unknown)  (no       (unknown)  (unknown) exclusion of  (units    (un

known)



                    date)                         Helicobacter or unknown)  



                                                  other pathology.           



                                                  Retroflexed views           



                                                  of the LES were           

 

           (unknown)  (no       (unknown)  (unknown)  gastric folds.  (units    

(unknown)



                    date)                         GEJ was at unknown)  



                                                  approximately 41           



                                                  cm from the           



                                                  incisors.  At the           



                                                  level               

 

           (unknown)  (no       (unknown)  (unknown)  habit personal  (units    

(unknown)



                    date)                         history of colon unknown)  



                                                  cancer              

 

           (unknown)  (no       (unknown)  (unknown)  irrigation we  (units    (

unknown)



                    date)                         would not have unknown)  



                                                  been able to           



                                                  sufficiently           



                                                  cleanse the colon           



                                                  for an              

 

           (unknown)  (no       (unknown)  (unknown)  mucosal   (units    (unkno

wn)



                    date)                         visualization unknown)  



                                                  impossible in           



                                                  most areas.  The           



                                                  colon was           



                                                  therefore           

 

           (unknown)  (no       (unknown)  (unknown)  neoplasia.  The  (units   

 (unknown)



                    date)                         prep was  unknown)  



                                                  suboptimal as           



                                                  described above           



                                                  and even with           



                                                  copious             

 

           (unknown)  (no       (unknown)  (unknown)  of GEJ there was  (units  

  (unknown)



                    date)                         no evidence of unknown)  



                                                  any esophagitis.           



                                                  However in the           



                                                  mid to distal           

 

           (unknown)  (no       (unknown)  (unknown)  omeprazole 3.  (units    (

unknown)



                    date)                         Okay to resume unknown)  



                                                  Plavix in the           



                                                  next couple of           



                                                  days.  4.  Repeat           

 

           (unknown)  (no       (unknown)  (unknown)  portion.  In the  (units  

  (unknown)



                    date)                         context of an unknown)  



                                                  iron deficiency           



                                                  anemia, random D2           



                                                  biopsies were           

 

           (unknown)  (no       (unknown)  (unknown)  recommend a  (units    (un

known)



                    date)                         daily dose of unknown)  



                                                  MiraLax 17 g           



                                                  moving forward.           

 

           (unknown)  (no       (unknown)  (unknown)  rectosigmoid  (units    (u

nknown)



                    date)                         anastomosis into unknown)  



                                                  the left colon.           



                                                  The prep was           



                                                  inadequate.           



                                                  There               

 

           (unknown)  (no       (unknown)  (unknown)  related diarrhea  (units  

  (unknown)



                    date)                         from fecal unknown)  



                                                  loading and           



                                                  obstipation.  I           



                                                  therefore would           

 

           (unknown)  (no       (unknown)  (unknown)  sedation  (units    (unkno

wn)



                    date)                         details.  The unknown)  



                                                  scope was           



                                                  introduced into           



                                                  the mouth through           



                                                  the bite            

 

           (unknown)  (no       (unknown)  (unknown)  stomach was  (units    (un

known)



                    date)                         decompressed, the unknown)  



                                                  scope was then           



                                                  removed from the           



                                                  patient who           

 

           (unknown)  (no       (unknown)  (unknown)  suspect the  (units    (un

known)



                    date)                         patient's altered unknown)  



                                                  bowel habit is           



                                                  likely a function           



                                                  of overflow           

 

           (unknown)  (no       (unknown)  (unknown)  the left lateral  (units  

  (unknown)



                    date)                         decubitus unknown)  



                                                  position.  Please           



                                                  see nurse           



                                                  anesthetist notes           



                                                  for                 

 

           (unknown)  (no       (unknown)  (unknown)  tolerated the  (units    (

unknown)



                    date)                         procedure well. unknown)  

 

           (unknown)  (no       (unknown)  (unknown)  unremarkable.  (units    (

unknown)



                    date)                                   unknown)  

 

           (unknown)  (no       (unknown)  (unknown)  was copious  (units    (un

known)



                    date)                         amounts of green unknown)  



                                                  formed stool and           



                                                  liquid debris           



                                                  that rendered           

 

           (unknown)  (no       (unknown)  (unknown)  was obtained.  (units    (

unknown)



                    date)                         The patient was unknown)  



                                                  brought into the           



                                                  procedure room           



                                                  and placed into           









                                         Result panel 9









           (unknown)  (no       (unknown)  (unknown)  (no value)  (units    (unk

nown)



                    date)                                   unknown)  

 

           (unknown)  (no       (unknown)  (unknown)  [address] [place] [address]  (units  

  (unknown)



                    date)                                   unknown)  

 

           (unknown)  (no       (unknown)  (unknown)  [place]  (units    (

unknown)



                    date)                         [id number]     unknown)  

 

           (unknown)  (no       (unknown)  (unknown)  [facility]  (units   

 (unknown)



                    date)                                   unknown)  

 

           (unknown)  (no       (unknown)  (unknown)  Signed    (units    (unkno

wn)



                    date)                                   unknown)  

 

           (unknown)  (no       (unknown)  (unknown)  XRay Report  (units    (un

known)



                    date)                                   unknown)  

 

           (unknown)  (no       (unknown)  (unknown)  (no value)  (units    (unk

nown)



                    date)                                   unknown)  

 

           (unknown)  (no       (unknown)  (unknown)  22  (units    (unkno

wn)



                    date)                                   unknown)  

 

           (unknown)  (no       (unknown)  (unknown)  Approved by: Garfield  (units 

   (unknown)



                    date)                         SHANNAN Lopez on unknown)  



                                                  2022 at 18:02           

 

           (unknown)  (no       (unknown)  (unknown)  Bones and chest  (units   

 (unknown)



                    date)                         wall:  No unknown)  



                                                  suspicious bony           



                                                  lesions.            



                                                  Overlying soft           



                                                  tissues             

 

           (unknown)  (no       (unknown)  (unknown)  COMPARISON:  (units    (un

known)



                    date)                         [facility], unknown)  



                                                  CR, XR CHEST 2V,           



                                                  3/18/2021, 10:19.           

 

           (unknown)  (no       (unknown)  (unknown)  FINDINGS:  (units    (unkn

own)



                    date)                                   unknown)  

 

           (unknown)  (no       (unknown)  (unknown)  IMPRESSION:  No  (units   

 (unknown)



                    date)                         acute     unknown)  



                                                  cardiopulmonary           



                                                  findings            

 

           (unknown)  (no       (unknown)  (unknown)  INDICATIONS:  (units    (u

nknown)



                    date)                         chest pain unknown)  

 

           (unknown)  (no       (unknown)  (unknown)  Lungs and pleura:  (units 

   (unknown)



                    date)                          Lungs are clear. unknown)  



                                                  No pleural           



                                                  effusions or           



                                                  pneumothorax.  Low           

 

           (unknown)  (no       (unknown)  (unknown)  Mediastinum:  (units    (u

nknown)



                    date)                         Mediastinal unknown)  



                                                  contours appear           



                                                  normal.  Heart           



                                                  size is normal.           

 

           (unknown)  (no       (unknown)  (unknown)  Surgical changes  (units  

  (unknown)



                    date)                         and devices: unknown)  



                                                  None.               

 

           (unknown)  (no       (unknown)  (unknown)  TECHNIQUE:  One  (units   

 (unknown)



                    date)                         view of the chest unknown)  



                                                  was acquired.           

 

           (unknown)  (no       (unknown)  (unknown)  unremarkable.  (units    (

unknown)



                    date)                                   unknown)  

 

           (unknown)  (no       (unknown)  (unknown)  volumes   (units    (unkno

wn)



                    date)                         accentuate unknown)  



                                                  pulmonary           



                                                  interstitium and           



                                                  heart size.           

 

           (unknown)  (no       (unknown)  (unknown)  [id number]  (units    (unkno

wn)



                    date)                                   unknown)  

 

           (unknown)  (no       (unknown)  (unknown)  Accession Number:  (units 

   (unknown)



                    date)                         [id number] unknown)  

 

           (unknown)  (no       (unknown)  (unknown)  Age/Sex: 62 / M  (units   

 (unknown)



                    date)                          Date of Service: unknown)  

 

           (unknown)  (no       (unknown)  (unknown)  : 1960  (units   

 (unknown)



                    date)                          Acct:[id number] unknown)  

 

           (unknown)  (no       (unknown)  (unknown)  Loc: ED   (units    (unkno

wn)



                    date)                                   unknown)  

 

           (unknown)  (no       (unknown)  (unknown)  Ordering  (units    (unkno

wn)



                    date)                         Provider: unknown)  



                                                  Hue Dubose D.O.                

 

           (unknown)  (no       (unknown)  (unknown)  PROCEDURE:  XR  (units    

(unknown)



                    date)                         CHEST 1V  unknown)  

 

           (unknown)  (no       (unknown)  (unknown)  Patient:  (units    (unkno

wn)



                    date)                         Kassandra Leon R unknown)  



                                                  MR#: [id number]             

 

           (unknown)  (no       (unknown)  (unknown)  Procedure: XR  (units    (

unknown)



                    date)                         chest 1V  unknown)  

 

           (unknown)  (no       (unknown)  (unknown)  appear    (units    (unkno

wn)



                    date)                                   unknown)  

 

           (unknown)  (no       (unknown)  (unknown)  lung      (units    (unkno

wn)



                    date)                                   unknown)  









                                         Result panel 10









           (unknown)  (no date)  (unknown)  (unknown)  0         /uL       (unkn

own)

 

           (unknown)  (no date)  (unknown)  (unknown)  0.2       %         (unkn

own)

 

           (unknown)  (no date)  (unknown)  (unknown)  0.8       %         (unkn

own)

 

           (unknown)  (no date)  (unknown)  (unknown)  100       /uL       (unkn

own)

 

           (unknown)  (no date)  (unknown)  (unknown)  16.1      %         (unkn

own)

 

           (unknown)  (no date)  (unknown)  (unknown)  219       X10 3/uL  (unkn

own)

 

           (unknown)  (no date)  (unknown)  (unknown)  26.7      %         (unkn

own)

 

           (unknown)  (no date)  (unknown)  (unknown)  26.9      PG        (unkn

own)

 

           (unknown)  (no date)  (unknown)  (unknown)  3.37      X10 6/uL  (unkn

own)

 

           (unknown)  (no date)  (unknown)  (unknown)  34.0      %         (unkn

own)

 

           (unknown)  (no date)  (unknown)  (unknown)  400       /uL       (unkn

own)

 

           (unknown)  (no date)  (unknown)  (unknown)  5900      /uL       (unkn

own)

 

           (unknown)  (no date)  (unknown)  (unknown)  6.4       %         (unkn

own)

 

           (unknown)  (no date)  (unknown)  (unknown)  600       /uL       (unkn

own)

 

           (unknown)  (no date)  (unknown)  (unknown)  7.0       X10 3/uL  (unkn

own)

 

           (unknown)  (no date)  (unknown)  (unknown)  7.9       %         (unkn

own)

 

           (unknown)  (no date)  (unknown)  (unknown)  79.1      fL        (unkn

own)

 

           (unknown)  (no date)  (unknown)  (unknown)  84.7      %         (unkn

own)

 

           (unknown)  (no date)  (unknown)  (unknown)  9.1       g/dL      (unkn

own)









                                         Result panel 11









           (unknown)  (no date)  (unknown)  (unknown)  0.6       mg/dL     (unkn

own)

 

           (unknown)  (no date)  (unknown)  (unknown)  1.0       mg/dL     (unkn

own)

 

           (unknown)  (no date)  (unknown)  (unknown)  1.1       (units    (unkn

own)



                                                            unknown)  

 

           (unknown)  (no date)  (unknown)  (unknown)  1.42      mg/dL     (unkn

own)

 

           (unknown)  (no date)  (unknown)  (unknown)  105       U/L       (unkn

own)

 

           (unknown)  (no date)  (unknown)  (unknown)  135       mmol/L    (unkn

own)

 

           (unknown)  (no date)  (unknown)  (unknown)  14.8      (units    (unkn

own)



                                                            unknown)  

 

           (unknown)  (no date)  (unknown)  (unknown)  16        IU/L      (unkn

own)

 

           (unknown)  (no date)  (unknown)  (unknown)  19        IU/L      (unkn

own)

 

           (unknown)  (no date)  (unknown)  (unknown)  21        mg/dL     (unkn

own)

 

           (unknown)  (no date)  (unknown)  (unknown)  262       mg/dL     (unkn

own)

 

           (unknown)  (no date)  (unknown)  (unknown)  29        mmol/L    (unkn

own)

 

           (unknown)  (no date)  (unknown)  (unknown)  3.6       g/dL      (unkn

own)

 

           (unknown)  (no date)  (unknown)  (unknown)  3.7       mmol/L    (unkn

own)

 

           (unknown)  (no date)  (unknown)  (unknown)  3.9       g/dL      (unkn

own)

 

           (unknown)  (no date)  (unknown)  (unknown)  32        U/L       (unkn

own)

 

           (unknown)  (no date)  (unknown)  (unknown)  54        U/L       (unkn

own)

 

           (unknown)  (no date)  (unknown)  (unknown)  55        mL/min    (unkn

own)

 

           (unknown)  (no date)  (unknown)  (unknown)  7.5       g/dL      (unkn

own)

 

           (unknown)  (no date)  (unknown)  (unknown)  9.3       mg/dL     (unkn

own)

 

           (unknown)  (no date)  (unknown)  (unknown)  96        mmol/L    (unkn

own)

 

           (unknown)  (no date)  (unknown)  (unknown)  Test not  %         (unkn

own)



                                                  performed           

 

           (unknown)  (no date)  (unknown)  (unknown)  Test not  ng/mL     (unkn

own)



                                                  performed           









                                         Result panel 12









           (unknown)  (no date)  (unknown)  (unknown)  < 0.012   ng/mL     (unkn

own)

 

           (unknown)  (no date)  (unknown)  (unknown)  0.6       mg/dL     (unkn

own)

 

           (unknown)  (no date)  (unknown)  (unknown)  1.0       mg/dL     (unkn

own)

 

           (unknown)  (no date)  (unknown)  (unknown)  1.1       (units    (unkn

own)



                                                            unknown)  

 

           (unknown)  (no date)  (unknown)  (unknown)  1.42      mg/dL     (unkn

own)

 

           (unknown)  (no date)  (unknown)  (unknown)  105       U/L       (unkn

own)

 

           (unknown)  (no date)  (unknown)  (unknown)  135       mmol/L    (unkn

own)

 

           (unknown)  (no date)  (unknown)  (unknown)  14.8      (units    (unkn

own)



                                                            unknown)  

 

           (unknown)  (no date)  (unknown)  (unknown)  16        IU/L      (unkn

own)

 

           (unknown)  (no date)  (unknown)  (unknown)  19        IU/L      (unkn

own)

 

           (unknown)  (no date)  (unknown)  (unknown)  21        mg/dL     (unkn

own)

 

           (unknown)  (no date)  (unknown)  (unknown)  262       mg/dL     (unkn

own)

 

           (unknown)  (no date)  (unknown)  (unknown)  29        mmol/L    (unkn

own)

 

           (unknown)  (no date)  (unknown)  (unknown)  3.6       g/dL      (unkn

own)

 

           (unknown)  (no date)  (unknown)  (unknown)  3.7       mmol/L    (unkn

own)

 

           (unknown)  (no date)  (unknown)  (unknown)  3.9       g/dL      (unkn

own)

 

           (unknown)  (no date)  (unknown)  (unknown)  32        U/L       (unkn

own)

 

           (unknown)  (no date)  (unknown)  (unknown)  54        U/L       (unkn

own)

 

           (unknown)  (no date)  (unknown)  (unknown)  55        mL/min    (unkn

own)

 

           (unknown)  (no date)  (unknown)  (unknown)  7.5       g/dL      (unkn

own)

 

           (unknown)  (no date)  (unknown)  (unknown)  9.3       mg/dL     (unkn

own)

 

           (unknown)  (no date)  (unknown)  (unknown)  96        mmol/L    (unkn

own)

 

           (unknown)  (no date)  (unknown)  (unknown)  Test not  %         (unkn

own)



                                                  performed           

 

           (unknown)  (no date)  (unknown)  (unknown)  Test not  ng/mL     (unkn

own)



                                                  performed           









                                         Result panel 13









           (unknown)  (no date)  (unknown)  (unknown)  Negative  (units    (unkn

own)



                                                            unknown)  









                                         Result panel 14









           (unknown)  (no       (unknown)  (unknown)  (no value)  (units    (unk

nown)



                    date)                                   unknown)  

 

           (unknown)  (no       (unknown)  (unknown)  (no value)  (units    (unk

nown)



                    date)                                   unknown)  

 

           (unknown)  (no       (unknown)  (unknown)  [address] [place] [address]  (units  

  (unknown)



                    date)                                   unknown)  

 

           (unknown)  (no       (unknown)  (unknown)  [place], [place]  (units    (

unknown)



                    date)                         [id number]     unknown)  

 

           (unknown)  (no       (unknown)  (unknown)  CT Scan Report  (units    

(unknown)



                    date)                                   unknown)  

 

           (unknown)  (no       (unknown)  (unknown)  [facility]  (units   

 (unknown)



                    date)                                   unknown)  

 

           (unknown)  (no       (unknown)  (unknown)  Signed    (units    (unkno

wn)



                    date)                                   unknown)  

 

           (unknown)  (no       (unknown)  (unknown)  (no value)  (units    (unk

nown)



                    date)                                   unknown)  

 

           (unknown)  (no       (unknown)  (unknown)  22  (units    (unkno

wn)



                    date)                                   unknown)  

 

           (unknown)  (no       (unknown)  (unknown)  1. Bilateral  (units    (u

nknown)



                    date)                         heterogeneous unknown)  



                                                  enhancement of the           



                                                  kidneys with           



                                                  perinephric           

 

           (unknown)  (no       (unknown)  (unknown)  2. Multiple short  (units 

   (unknown)



                    date)                         segments of small unknown)  



                                                  bowel               



                                                  intussusception in           



                                                  the left upper           

 

           (unknown)  (no       (unknown)  (unknown)  3. Mild fluid  (units    (

unknown)



                    date)                         distention of the unknown)  



                                                  visualized distal           



                                                  esophagus is           



                                                  nonspecific and           

 

           (unknown)  (no       (unknown)  (unknown)  4.  Gas   (units    (unkno

wn)



                    date)                         demonstrated within unknown)  



                                                  the bladder which           



                                                  may may reflect           



                                                  sequelae of           

 

           (unknown)  (no       (unknown)  (unknown)  ABDOMEN:  (units    (unkno

wn)



                    date)                                   unknown)  

 

           (unknown)  (no       (unknown)  (unknown)  Abdominal Nodes:  (units  

  (unknown)



                    date)                         No retroperitoneal unknown)  



                                                  or mesenteric           



                                                  adenopathy by size           



                                                  criteria.           

 

           (unknown)  (no       (unknown)  (unknown)  Adrenal Glands:  (units   

 (unknown)



                    date)                         No adrenal nodules. unknown)  

 

           (unknown)  (no       (unknown)  (unknown)  After the  (units    (unkn

own)



                    date)                         administration of unknown)  



                                                  IV contrast, axial           



                                                  sections were           



                                                  acquired from the           

 

           (unknown)  (no       (unknown)  (unknown)  Approved by:  (units    (u

nknown)



                    date)                         Neptali Colin unknown)  



                                                  M.D. on 2022           



                                                  at 20:34            

 

           (unknown)  (no       (unknown)  (unknown)  Biliary ducts:  No  (units

    (unknown)



                    date)                         biliary ductal unknown)  



                                                  dilatation.           

 

           (unknown)  (no       (unknown)  (unknown)  Bladder:  The  (units    (

unknown)



                    date)                         urinary bladder is unknown)  



                                                  distended.  There           



                                                  is mild             



                                                  trabeculation of           



                                                  the                 

 

           (unknown)  (no       (unknown)  (unknown)  Bones:  Visualized  (units

    (unknown)



                    date)                         osseous structures unknown)  



                                                  demonstrate no           



                                                  suspicious focal           



                                                  lesions.            

 

           (unknown)  (no       (unknown)  (unknown)  COMPARISON:  [facility]  (units  

  (unknown)



                    date)                         Outside Film, CT, unknown)  



                                                  CT ABDOMEN PELVIS           



                                                  WITH CONTRAST,           



                                                  2021,           

 

           (unknown)  (no       (unknown)  (unknown)  Dictated by:  (units    (u

nknown)



                    date)                         Neptali Colin unknown)  



                                                  M.D. on 2022           



                                                  at 20:23            

 

           (unknown)  (no       (unknown)  (unknown)  FINDINGS:  (units    (unkn

own)



                    date)                                   unknown)  

 

           (unknown)  (no       (unknown)  (unknown)  Gallbladder:  (units    (u

nknown)



                    date)                         Within normal unknown)  



                                                  limits without           



                                                  calcified           



                                                  gallstones.           

 

           (unknown)  (no       (unknown)  (unknown)  Heart:  Heart is  (units  

  (unknown)



                    date)                         normal in size. unknown)  



                                                  There is mild           



                                                  distention of the           



                                                  visualized           

 

           (unknown)  (no       (unknown)  (unknown)  IMPRESSION:  (units    (un

known)



                    date)                                   unknown)  

 

           (unknown)  (no       (unknown)  (unknown)  INDICATIONS:  (units    (u

nknown)



                    date)                         complicated unknown)  



                                                  surgical abd, RLQ           



                                                  pain                

 

           (unknown)  (no       (unknown)  (unknown)  Image quality:  (units    

(unknown)



                    date)                         Excellent. unknown)  

 

           (unknown)  (no       (unknown)  (unknown)  [facility],  (units  

  (unknown)



                    date)                         CT, CT ABDOMEN unknown)  



                                                  [facility] W CON,           



                                                  2020, 22:32.           

 

           (unknown)  (no       (unknown)  (unknown)  Kidneys and  (units    (un

known)



                    date)                         Ureters:  There is unknown)  



                                                  a small             



                                                  nonobstructing           



                                                  stone within the           



                                                  right               

 

           (unknown)  (no       (unknown)  (unknown)  Liver:  No mass  (units   

 (unknown)



                    date)                         lesion.   unknown)  

 

           (unknown)  (no       (unknown)  (unknown)  Lung bases:  There  (units

    (unknown)



                    date)                         is mild dependent unknown)  



                                                  atelectasis           



                                                  bilaterally.           

 

           (unknown)  (no       (unknown)  (unknown)  Miscellaneous:  (units    

(unknown)



                    date)                         There is presacral unknown)  



                                                  soft tissue           



                                                  thickening with a           



                                                  thick-walled           

 

           (unknown)  (no       (unknown)  (unknown)  No discrete  (units    (un

known)



                    date)                         associated mass unknown)  



                                                  identified.  The           



                                                  remainder of the           



                                                  small bowel           

 

           (unknown)  (no       (unknown)  (unknown)  PELVIS:   (units    (unkno

wn)



                    date)                                   unknown)  

 

           (unknown)  (no       (unknown)  (unknown)  Pancreas:  (units    (unkn

own)



                    date)                         Unremarkable. unknown)  

 

           (unknown)  (no       (unknown)  (unknown)  Pelvic Nodes: No  (units  

  (unknown)



                    date)                         enlarged lymph unknown)  



                                                  nodes.              

 

           (unknown)  (no       (unknown)  (unknown)  Pelvic Organs:  (units    

(unknown)



                    date)                         There is moderate unknown)  



                                                  enlargement of the           



                                                  prostate..           

 

           (unknown)  (no       (unknown)  (unknown)  Peritoneum:  No  (units   

 (unknown)



                    date)                         abnormal  unknown)  



                                                  intraperitoneal           



                                                  fluid.  No free           



                                                  air.                

 

           (unknown)  (no       (unknown)  (unknown)  Postsurgical  (units    (u

nknown)



                    date)                         changes are unknown)  



                                                  demonstrated status           



                                                  post posterior           



                                                  fixation at L4-5.           

 

           (unknown)  (no       (unknown)  (unknown)  Spleen:  The  (units    (u

nknown)



                    date)                         spleen is enlarged, unknown)  



                                                  measuring up to           



                                                  15.4 cm.            

 

           (unknown)  (no       (unknown)  (unknown)  Stomach and Bowel:  (units

    (unknown)



                    date)                          There are 3 short unknown)  



                                                  segments of small           



                                                  bowel               



                                                  intussusception           

 

           (unknown)  (no       (unknown)  (unknown)  TECHNIQUE:  (units    (unk

nown)



                    date)                                   unknown)  

 

           (unknown)  (no       (unknown)  (unknown)  Ventral Wall:   No  (units

    (unknown)



                    date)                         hernia.   unknown)  

 

           (unknown)  (no       (unknown)  (unknown)  Vessels:  Aorta  (units   

 (unknown)



                    date)                         and inferior vena unknown)  



                                                  cava are normal in           



                                                  size.               

 

           (unknown)  (no       (unknown)  (unknown)  and/or kV  (units    (unkn

own)



                    date)                         according to unknown)  



                                                  patient size.           

 

           (unknown)  (no       (unknown)  (unknown)  associated  (units    (unk

nown)



                    date)                         perinephric unknown)  



                                                  stranding.  The           



                                                  findings are           



                                                  suggestive of           

 

           (unknown)  (no       (unknown)  (unknown)  catheterization or  (units

    (unknown)



                    date)                         infection from a unknown)  



                                                  gas-forming           



                                                  organism.           

 

           (unknown)  (no       (unknown)  (unknown)  collection  (units    (unk

nown)



                    date)                         measuring unknown)  



                                                  approximately 4.4 x           



                                                  2.1 cm in           



                                                  transverse           



                                                  dimension which           

 

           (unknown)  (no       (unknown)  (unknown)  decreased in size  (units 

   (unknown)



                    date)                         compared to the unknown)  



                                                  prior study.  There           



                                                  are associated           

 

           (unknown)  (no       (unknown)  (unknown)  definite  (units    (unkno

wn)



                    date)                         hydronephrosis. unknown)  

 

           (unknown)  (no       (unknown)  (unknown)  dose reduction,  (units   

 (unknown)



                    date)                         the following was unknown)  



                                                  used:  automated           



                                                  exposure control,           



                                                  adjustment           

 

           (unknown)  (no       (unknown)  (unknown)  esophagus with  (units    

(unknown)



                    date)                         heterogeneous unknown)  



                                                  filling defects           



                                                  likely representing           



                                                  complex fluid.           

 

           (unknown)  (no       (unknown)  (unknown)  intrarenal abscess  (units

    (unknown)



                    date)                         collections.  No unknown)  



                                                  hydronephrosis.           

 

           (unknown)  (no       (unknown)  (unknown)  left upper  (units    (unk

nown)



                    date)                         quadrant involving unknown)  



                                                  the jejunum.  No           



                                                  evidence of           



                                                  associated bowel           

 

           (unknown)  (no       (unknown)  (unknown)  measuring up to  (units   

 (unknown)



                    date)                         0.3 cm.  The unknown)  



                                                  kidneys demonstrate           



                                                  heterogeneous           



                                                  enhancement           

 

           (unknown)  (no       (unknown)  (unknown)  normal caliber and  (units

    (unknown)



                    date)                         wall thickness. unknown)  



                                                  There are surgical           



                                                  sutures within the           



                                                  small               

 

           (unknown)  (no       (unknown)  (unknown)  obstruction.  (units    (u

nknown)



                    date)                         There is a gas unknown)  



                                                  within the urinary           



                                                  bladder consistent           



                                                  with                

 

           (unknown)  (no       (unknown)  (unknown)  perinephric or  (units    

(unknown)



                    date)                         intrarenal abscess unknown)  



                                                  collections.  There           



                                                  is a right           



                                                  extrarenal           

 

           (unknown)  (no       (unknown)  (unknown)  recent    (units    (unkno

wn)



                    date)                         catheterization or unknown)  



                                                  infection from a           



                                                  gas-forming           



                                                  organism.           

 

           (unknown)  (no       (unknown)  (unknown)  reflect sequelae  (units  

  (unknown)



                    date)                         of a stricture at unknown)  



                                                  the                 



                                                  gastroesophageal           



                                                  junction or           

 

           (unknown)  (no       (unknown)  (unknown)  reflux.   (units    (unkno

wn)



                    date)                                   unknown)  

 

           (unknown)  (no       (unknown)  (unknown)  sigmoid colon.  (units    

(unknown)



                    date)                         The appendix is unknown)  



                                                  normal in           



                                                  appearance.           



                                                  Colonic             



                                                  diverticulosis           

 

           (unknown)  (no       (unknown)  (unknown)  suggestive of  (units    (

unknown)



                    date)                         pyelonephritis. unknown)  



                                                  Recommend           



                                                  correlation           



                                                  clinically.  No           

 

           (unknown)  (no       (unknown)  (unknown)  surgery.  There is  (units

    (unknown)



                    date)                         a small   unknown)  



                                                  fat-containing           



                                                  right inguinal           



                                                  hernia.             

 

           (unknown)  (no       (unknown)  (unknown)  the wall of the  (units   

 (unknown)



                    date)                         collection.  The unknown)  



                                                  findings are           



                                                  consistent with           



                                                  scarring related           

 

           (unknown)  (no       (unknown)  (unknown)  to the pubic  (units    (u

nknown)



                    date)                         symphysis.  Coronal unknown)  



                                                  and sagittal           



                                                  reformats were           



                                                  performed.  For           

 

           (unknown)  (no       (unknown)  (unknown)  wall and a few  (units    

(unknown)



                    date)                         small diverticula unknown)  



                                                  compatible with           



                                                  sequelae of chronic           



                                                  bladder             

 

           (unknown)  (no       (unknown)  (unknown)  with indistinct  (units   

 (unknown)



                    date)                         areas of  unknown)  



                                                  hypoenhancement,           



                                                  more pronounced on           



                                                  the left.  There           

 

           (unknown)  (no       (unknown)  (unknown)  without acute  (units    (

unknown)



                    date)                         diverticulitis. unknown)  

 

           (unknown)  (no       (unknown)  (unknown)  without evidence  (units  

  (unknown)



                    date)                         of associated unknown)  



                                                  obstruction or           



                                                  discrete mass.           

 

           (unknown)  (no       (unknown)  (unknown)  [id number]  (units    (unkno

wn)



                    date)                                   unknown)  

 

           (unknown)  (no       (unknown)  (unknown)  20:13.    (units    (unkno

wn)



                    date)                                   unknown)  

 

           (unknown)  (no       (unknown)  (unknown)  Accession Number:  (units 

   (unknown)



                    date)                         [id number] unknown)  

 

           (unknown)  (no       (unknown)  (unknown)  Age/Sex: 62 / M  (units   

 (unknown)



                    date)                         Date of Service: unknown)  

 

           (unknown)  (no       (unknown)  (unknown)  : 1960  (units   

 (unknown)



                    date)                         Acct:[id number] unknown)  

 

           (unknown)  (no       (unknown)  (unknown)  Loc: ED   (units    (unkno

wn)



                    date)                                   unknown)  

 

           (unknown)  (no       (unknown)  (unknown)  Ordering Provider:  (units

    (unknown)



                    date)                         Kitty Costello unknown)  



                                                  ARNP                

 

           (unknown)  (no       (unknown)  (unknown)  PROCEDURE:  CT  (units    

(unknown)



                    date)                         ABDOMEN PELVIS W unknown)  



                                                  CON                 

 

           (unknown)  (no       (unknown)  (unknown)  Patient:  (units    (unkno

wn)



                    date)                         Kassandra Leon R unknown)  



                                                  MR#: [id number]             

 

           (unknown)  (no       (unknown)  (unknown)  Procedure: CT  (units    (

unknown)



                    date)                         abdomen pelvis w unknown)  



                                                  con                 

 

           (unknown)  (no       (unknown)  (unknown)  appears   (units    (unkno

wn)



                    date)                                   unknown)  

 

           (unknown)  (no       (unknown)  (unknown)  bilaterally  (units    (un

known)



                    date)                                   unknown)  

 

           (unknown)  (no       (unknown)  (unknown)  bladder   (units    (unkno

wn)



                    date)                                   unknown)  

 

           (unknown)  (no       (unknown)  (unknown)  bowel and  (units    (unkn

own)



                    date)                                   unknown)  

 

           (unknown)  (no       (unknown)  (unknown)  calcifications  (units    

(unknown)



                    date)                         along     unknown)  

 

           (unknown)  (no       (unknown)  (unknown)  demonstrates  (units    (u

nknown)



                    date)                                   unknown)  

 

           (unknown)  (no       (unknown)  (unknown)  distal    (units    (unkno

wn)



                    date)                                   unknown)  

 

           (unknown)  (no       (unknown)  (unknown)  gastroesophageal  (units  

  (unknown)



                    date)                                   unknown)  

 

           (unknown)  (no       (unknown)  (unknown)  is        (units    (unkno

wn)



                    date)                                   unknown)  

 

           (unknown)  (no       (unknown)  (unknown)  is present  (units    (unk

nown)



                    date)                                   unknown)  

 

           (unknown)  (no       (unknown)  (unknown)  kidney    (units    (unkno

wn)



                    date)                                   unknown)  

 

           (unknown)  (no       (unknown)  (unknown)  loculated  (units    (unkn

own)



                    date)                                   unknown)  

 

           (unknown)  (no       (unknown)  (unknown)  lung bases  (units    (unk

nown)



                    date)                                   unknown)  

 

           (unknown)  (no       (unknown)  (unknown)  may       (units    (unkno

wn)



                    date)                                   unknown)  

 

           (unknown)  (no       (unknown)  (unknown)  obstruction.  (units    (u

nknown)



                    date)                                   unknown)  

 

           (unknown)  (no       (unknown)  (unknown)  of mA     (units    (unkno

wn)



                    date)                                   unknown)  

 

           (unknown)  (no       (unknown)  (unknown)  outlet    (units    (unkno

wn)



                    date)                                   unknown)  

 

           (unknown)  (no       (unknown)  (unknown)  pelvis.  No  (units    (un

known)



                    date)                                   unknown)  

 

           (unknown)  (no       (unknown)  (unknown)  perinephric or  (units    

(unknown)



                    date)                                   unknown)  

 

           (unknown)  (no       (unknown)  (unknown)  pyelonephritis.  (units   

 (unknown)



                    date)                         No        unknown)  

 

           (unknown)  (no       (unknown)  (unknown)  quadrant  (units    (unkno

wn)



                    date)                                   unknown)  

 

           (unknown)  (no       (unknown)  (unknown)  radiation  (units    (unkn

own)



                    date)                                   unknown)  

 

           (unknown)  (no       (unknown)  (unknown)  recent    (units    (unkno

wn)



                    date)                                   unknown)  

 

           (unknown)  (no       (unknown)  (unknown)  sequelae of  (units    (un

known)



                    date)                                   unknown)  

 

           (unknown)  (no       (unknown)  (unknown)  stranding  (units    (unkn

own)



                    date)                                   unknown)  

 

           (unknown)  (no       (unknown)  (unknown)  to prior  (units    (unkno

wn)



                    date)                                   unknown)  

 

           (unknown)  (no       (unknown)  (unknown)  within the  (units    (unk

nown)



                    date)                                   unknown)  









                                         Result panel 15









           (unknown)  (no       (unknown)  (unknown)  (no value)  (units    (unk

nown)



                    date)                                   unknown)  

 

           (unknown)  (no       (unknown)  (unknown)  Date of Service:  (units  

  (unknown)



                    date)                         22  unknown)  

 

           (unknown)  (no       (unknown)  (unknown)  (no value)  (units    (unk

nown)



                    date)                                   unknown)  

 

           (unknown)  (no       (unknown)  (unknown)  22 17:45  (units    

(unknown)



                    date)                                   unknown)  

 

           (unknown)  (no       (unknown)  (unknown)  1 tab PO DAILY  (units    

(unknown)



                    date)                         Qty: 90 0RF unknown)  

 

           (unknown)  (no       (unknown)  (unknown)  1,000 mcg PO DAILY  (units

    (unknown)



                    date)                         Qty: 60 0RF unknown)  

 

           (unknown)  (no       (unknown)  (unknown)  10 mg PO BID  (units    (u

nknown)



                    date)                                   unknown)  

 

           (unknown)  (no       (unknown)  (unknown)  17 gm PO DAILY  (units    

(unknown)



                    date)                         Qty: 90 0RF unknown)  

 

           (unknown)  (no       (unknown)  (unknown)  17.2 mg PO BEDTIME  (units

    (unknown)



                    date)                         Qty: 60 0RF unknown)  

 

           (unknown)  (no       (unknown)  (unknown)  20 mg PO BID  (units    (u

nknown)



                    date)                                   unknown)  

 

           (unknown)  (no       (unknown)  (unknown)  25 mg PO DAILY  (units    

(unknown)



                    date)                                   unknown)  

 

           (unknown)  (no       (unknown)  (unknown)  25 mg PO QPM  (units    (u

nknown)



                    date)                                   unknown)  

 

           (unknown)  (no       (unknown)  (unknown)  300 mg PO BID  (units    (

unknown)



                    date)                                   unknown)  

 

           (unknown)  (no       (unknown)  (unknown)  325 mg PO BIDWM  (units   

 (unknown)



                    date)                         Qty: 60 0RF unknown)  

 

           (unknown)  (no       (unknown)  (unknown)  4 mg PO Q8H PRN  (units   

 (unknown)



                    date)                         (Reason: nausea and unknown)  



                                                  vomiting) Qty: 14           



                                                  0RF                 

 

           (unknown)  (no       (unknown)  (unknown)  40 mg PO QPM  (units    (u

nknown)



                    date)                                   unknown)  

 

           (unknown)  (no       (unknown)  (unknown)  5 mg PO BID Qty:  (units  

  (unknown)



                    date)                         60 0RF    unknown)  

 

           (unknown)  (no       (unknown)  (unknown)  500 mg PO BID  (units    (

unknown)



                    date)                                   unknown)  

 

           (unknown)  (no       (unknown)  (unknown)  500 mg PO BID Qty:  (units

    (unknown)



                    date)                         60 0RF    unknown)  

 

           (unknown)  (no       (unknown)  (unknown)  75 mg PO DAILY  (units    

(unknown)



                    date)                                   unknown)  

 

           (unknown)  (no       (unknown)  (unknown)  Allergies  (units    (unkn

own)



                    date)                                   unknown)  

 

           (unknown)  (no       (unknown)  (unknown)  Documented By: [facility]  (units

    (unknown)



                    date)                                   unknown)  

 

           (unknown)  (no       (unknown)  (unknown)  ED Orders  (units    (unkn

own)



                    date)                                   unknown)  

 

           (unknown)  (no       (unknown)  (unknown)  Emergency Report  (units  

  (unknown)



                    date)                                   unknown)  

 

           (unknown)  (no       (unknown)  (unknown)  Home Medications  (units  

  (unknown)



                    date)                                   unknown)  

 

           (unknown)  (no       (unknown)  (unknown)  [facility]  (units   

 (unknown)



                    date)                         [address] unknown)  



                                                  [place] [address]           

 

           (unknown)  (no       (unknown)  (unknown)  Lab Results  (units    (un

known)



                    date)                                   unknown)  

 

           (unknown)  (no       (unknown)  (unknown)  Label Comments:  (units   

 (unknown)



                    date)                                   unknown)  

 

           (unknown)  (no       (unknown)  (unknown)  Last Admin:  (units    (un

known)



                    date)                         22 18:30 unknown)  



                                                  Dose: 4 mg           

 

           (unknown)  (no       (unknown)  (unknown)  Previous Rx's  (units    (

unknown)



                    date)                                   unknown)  

 

           (unknown)  (no       (unknown)  (unknown)  Stop: 22  (units    

(unknown)



                    date)                         18:16     unknown)  

 

           (unknown)  (no       (unknown)  (unknown)  Stop: 22  (units    

(unknown)



                    date)                         20:25     unknown)  

 

           (unknown)  (no       (unknown)  (unknown)  Stop: 22  (units    

(unknown)



                    date)                         20:27     unknown)  

 

           (unknown)  (no       (unknown)  (unknown)  Vital Signs - 8 hr  (units

    (unknown)



                    date)                                   unknown)  

 

           (unknown)  (no       (unknown)  (unknown)  has not been  (units    (u

nknown)



                    date)                         eating so hasn't unknown)  



                                                  taken recently.           



                                                  spouse states           



                                                  thinks it might           

 

           (unknown)  (no       (unknown)  (unknown)  stopped taking  (units    

(unknown)



                    date)                         prior to back unknown)  



                                                  surgery and did not           



                                                  restart             

 

           (unknown)  (no       (unknown)  (unknown)  (no value)  (units    (unk

nown)



                    date)                                   unknown)  

 

           (unknown)  (no       (unknown)  (unknown)  22  (units 

   (unknown)



                    date)                         22  unknown)  



                                                  Range/Units           

 

           (unknown)  (no       (unknown)  (unknown)  17:40 17:45 17:45  (units 

   (unknown)



                    date)                                   unknown)  

 

           (unknown)  (no       (unknown)  (unknown)  ascorbic acid  (units    (

unknown)



                    date)                         (vitamin C) unknown)  



                                                  [Vitamin C] 500 mg           



                                                  Tablet              

 

           (unknown)  (no       (unknown)  (unknown)  atorvastatin 80 mg  (units

    (unknown)



                    date)                         tablet    unknown)  

 

           (unknown)  (no       (unknown)  (unknown)  buspirone 5 mg  (units    

(unknown)



                    date)                         Tablet    unknown)  

 

           (unknown)  (no       (unknown)  (unknown)  clopidogrel 75 mg  (units 

   (unknown)



                    date)                         tablet    unknown)  

 

           (unknown)  (no       (unknown)  (unknown)  cyanocobalamin  (units    

(unknown)



                    date)                         (vitamin B-12) 500 unknown)  



                                                  mcg Tablet           

 

           (unknown)  (no       (unknown)  (unknown)  ferrous sulfate  (units   

 (unknown)



                    date)                         325 mg (65 mg iron) unknown)  



                                                  Tablet              

 

           (unknown)  (no       (unknown)  (unknown)  gabapentin 300 mg  (units 

   (unknown)



                    date)                         capsule   unknown)  

 

           (unknown)  (no       (unknown)  (unknown)  glipizide 10 mg  (units   

 (unknown)



                    date)                         tablet    unknown)  

 

           (unknown)  (no       (unknown)  (unknown)  losartan 50 mg  (units    

(unknown)



                    date)                         tablet    unknown)  

 

           (unknown)  (no       (unknown)  (unknown)  metformin 500 mg  (units  

  (unknown)



                    date)                         tablet extended unknown)  



                                                  release 24 hr           

 

           (unknown)  (no       (unknown)  (unknown)  metoprolol  (units    (unk

nown)



                    date)                         succinate 25 mg unknown)  



                                                  tablet extended           



                                                  release 24 hr           

 

           (unknown)  (no       (unknown)  (unknown)  multivitamin with  (units 

   (unknown)



                    date)                         folic acid unknown)  



                                                  [Tab-A-Lucy] 400           



                                                  mcg Tablet           

 

           (unknown)  (no       (unknown)  (unknown)  ondansetron HCl  (units   

 (unknown)



                    date)                         [Zofran] 4 mg unknown)  



                                                  tablet              

 

           (unknown)  (no       (unknown)  (unknown)  polyethylene  (units    (u

nknown)



                    date)                         glycol 3350 17 gram unknown)  



                                                  Powder In Packet           

 

           (unknown)  (no       (unknown)  (unknown)  sennosides [senna]  (units

    (unknown)



                    date)                         8.6 mg Tablet unknown)  

 

           (unknown)  (no       (unknown)  (unknown)  trospium 20 mg  (units    

(unknown)



                    date)                         tablet    unknown)  

 

           (unknown)  (no       (unknown)  (unknown)  22  (units    (unkno

wn)



                    date)                                   unknown)  

 

           (unknown)  (no       (unknown)  (unknown)  Medication  (units    (unk

nown)



                    date)                         Instructions unknown)  



                                                  Recorded            

 

           (unknown)  (no       (unknown)  (unknown)  Medication  (units    (unk

nown)



                    date)                         Instructions unknown)  



                                                  Recorded  Confirmed           

 

           (unknown)  (no       (unknown)  (unknown)  (Zofran) vomiting  (units 

   (unknown)



                    date)                         #14 tabs  unknown)  

 

           (unknown)  (no       (unknown)  (unknown)  [id number]   (units    (unkno

wn)



                    date)                                   unknown)  

 

           (unknown)  (no       (unknown)  (unknown)  22 17:39  (units    

(unknown)



                    date)                                   unknown)  

 

           (unknown)  (no       (unknown)  (unknown)  22 17:40  (units    

(unknown)



                    date)                                   unknown)  

 

           (unknown)  (no       (unknown)  (unknown)  22 17:45  (units    

(unknown)



                    date)                                   unknown)  

 

           (unknown)  (no       (unknown)  (unknown)  22 19:26  (units    

(unknown)



                    date)                                   unknown)  

 

           (unknown)  (no       (unknown)  (unknown)  22 19:28  (units    

(unknown)



                    date)                                   unknown)  

 

           (unknown)  (no       (unknown)  (unknown)  17:34     (units    (unkno

wn)



                    date)                                   unknown)  

 

           (unknown)  (no       (unknown)  (unknown) 2018, He does not  (units  

  (unknown)



                    date)                         drink alcohol.? unknown)  



                                                  Patient and his           



                                                  wife state that he           



                                                  was seen in           

 

           (unknown)  (no       (unknown)  (unknown)  62-year-old  (units    (un

known)



                    date)                         gentleman with a unknown)  



                                                  history of multiple           



                                                  sclerosis,           



                                                  diabetes,           

 

           (unknown)  (no       (unknown)  (unknown)  ALT    16  (<50)  (units  

  (unknown)



                    date)                         IU/L      unknown)  

 

           (unknown)  (no       (unknown)  (unknown)  AST    19  (17-59)  (units

    (unknown)



                    date)                          IU/L     unknown)  

 

           (unknown)  (no       (unknown)  (unknown)  Age/Sex: 62 / M  (units   

 (unknown)



                    date)                                   unknown)  

 

           (unknown)  (no       (unknown)  (unknown)  Albumin    3.9  (units    

(unknown)



                    date)                         (3.5-5.0)  g/dL unknown)  

 

           (unknown)  (no       (unknown)  (unknown)  Albumin/Globulin  (units  

  (unknown)



                    date)                         Ratio    1.1 unknown)  



                                                  (1.0-2.8)           

 

           (unknown)  (no       (unknown)  (unknown)  Alkaline  (units    (unkno

wn)



                    date)                         Phosphatase    105 unknown)  



                                                  ()  U/L           

 

           (unknown)  (no       (unknown)  (unknown)  Allergy/AdvReac  (units   

 (unknown)



                    date)                         Type Severity unknown)  



                                                  Reaction Status           



                                                  Date / Time           

 

           (unknown)  (no       (unknown)  (unknown)  BUN    21 H  (units    (un

known)



                    date)                         (9-20)  mg/dL unknown)  

 

           (unknown)  (no       (unknown)  (unknown)  BUN/Creatinine  (units    

(unknown)



                    date)                         Ratio    14.8 unknown)  



                                                  (6-22)              

 

           (unknown)  (no       (unknown)  (unknown)  Baso # (Auto)   0  (units 

   (unknown)



                    date)                          (0-100)  /uL unknown)  

 

           (unknown)  (no       (unknown)  (unknown)  Baso % (Auto)  (units    (

unknown)



                    date)                         0.2   (0-2)  % unknown)  

 

           (unknown)  (no       (unknown)  (unknown)  Blood Pressure  (units    

(unknown)



                    date)                         124/66   22 unknown)  



                                                  17:34               

 

           (unknown)  (no       (unknown)  (unknown)  Blood Pressure  (units    

(unknown)



                    date)                         124/66    unknown)  

 

           (unknown)  (no       (unknown)  (unknown)  CK-MB (CK-2)  (units    (u

nknown)



                    date)                         TNP       unknown)  

 

           (unknown)  (no       (unknown)  (unknown)  CK-MB (CK-2) Rel  (units  

  (unknown)



                    date)                         Index    TNP unknown)  

 

           (unknown)  (no       (unknown)  (unknown)  COVID19 -Nasal  (units    

(unknown)



                    date)                         RAPID/Pre-Proc Stat unknown)  

 

           (unknown)  (no       (unknown)  (unknown)  CT abdomen pelvis  (units 

   (unknown)



                    date)                         w con Stat unknown)  

 

           (unknown)  (no       (unknown)  (unknown)  CVA (cerebral  (units    (

unknown)



                    date)                         vascular accident) unknown)  



                                                  (2018)           

 

           (unknown)  (no       (unknown)  (unknown)  Calcium    9.3  (units    

(unknown)



                    date)                         (8.4-10.2)  mg/dL unknown)  

 

           (unknown)  (no       (unknown)  (unknown)  Carbon Dioxide  (units    

(unknown)



                    date)                         29  (22-32)  mmol/L unknown)  

 

           (unknown)  (no       (unknown)  (unknown)  Chief complaint:  (units  

  (unknown)



                    date)                         Weakness  unknown)  

 

           (unknown)  (no       (unknown)  (unknown)  Chloride    96 L  (units  

  (unknown)



                    date)                         ()  mmol/L unknown)  

 

           (unknown)  (no       (unknown)  (unknown)  Colon cancer  (units    (u

nknown)



                    date)                         ()    unknown)  

 

           (unknown)  (no       (unknown)  (unknown)  Complete Blood  (units    

(unknown)



                    date)                         Count AUTO DIFF unknown)  



                                                  Stat                

 

           (unknown)  (no       (unknown)  (unknown)  Comprehensive  (units    (

unknown)



                    date)                         Metabolic Panel unknown)  



                                                  Stat                

 

           (unknown)  (no       (unknown)  (unknown)  Course    (units    (unkno

wn)



                    date)                                   unknown)  

 

           (unknown)  (no       (unknown)  (unknown)  Creatinine    1.42  (units

    (unknown)



                    date)                         H  (0.66-1.25) unknown)  



                                                  mg/dL               

 

           (unknown)  (no       (unknown)  (unknown)  : 1960  (units   

 (unknown)



                    date)                         Acct:[id number] unknown)  

 

           (unknown)  (no       (unknown)  (unknown)  Departure  (units    (unkn

own)



                    date)                                   unknown)  

 

           (unknown)  (no       (unknown)  (unknown)  Depression  (units    (unk

nown)



                    date)                                   unknown)  

 

           (unknown)  (no       (unknown)  (unknown)  Diabetes  (units    (unkno

wn)



                    date)                                   unknown)  

 

           (unknown)  (no       (unknown)  (unknown)  Diabetic  (units    (unkno

wn)



                    date)                         neuropathy unknown)  

 

           (unknown)  (no       (unknown)  (unknown)  Discharge Plan  (units    

(unknown)



                    date)                                   unknown)  

 

           (unknown)  (no       (unknown)  (unknown)  Discontinued  (units    (u

nknown)



                    date)                         Medications unknown)  

 

           (unknown)  (no       (unknown)  (unknown)  Gustavo Macias MD  (units   

 (unknown)



                    date)                         [Primary Care unknown)  



                                                  Provider] -           

 

           (unknown)  (no       (unknown)  (unknown)  EKG-12 Lead Stat  (units  

  (unknown)



                    date)                                   unknown)  

 

           (unknown)  (no       (unknown)  (unknown)  ER Physician:  (units    (

unknown)



                    date)                         Kitty Costello unknown)  



                                                  ARNP                

 

           (unknown)  (no       (unknown)  (unknown)  Eos # (Auto)   100  (units

    (unknown)



                    date)                           (0-450)  /uL unknown)  

 

           (unknown)  (no       (unknown)  (unknown)  Eos % (Auto)   0.8  (units

    (unknown)



                    date)                         L   (2-4)  % unknown)  

 

           (unknown)  (no       (unknown)  (unknown)  Estimated GFR  (units    (

unknown)



                    date)                         55 L  (>60)  mL/min unknown)  

 

           (unknown)  (no       (unknown)  (unknown)  Exam      (units    (unkno

wn)



                    date)                                   unknown)  

 

           (unknown)  (no       (unknown)  (unknown)  Family History  (units    

(unknown)



                    date)                         (Reviewed 22 unknown)  



                                                  @ 14:21 by Will Hansen MD)           

 

           (unknown)  (no       (unknown)  (unknown)  Father     (units 

   (unknown)



                    date)                          Cancer   unknown)  

 

           (unknown)  (no       (unknown)  (unknown)  GERD      (units    (unkno

wn)



                    date)                         (gastroesophageal unknown)  



                                                  reflux disease)           

 

           (unknown)  (no       (unknown)  (unknown)  General   (units    (unkno

wn)



                    date)                                   unknown)  

 

           (unknown)  (no       (unknown)  (unknown)  GenericComposite[P  (units

    (unknown)



                    date)                         lt Count   219 unknown)  



                                                  (150-400)  X10^3/uL           



                                                   ]                  

 

           (unknown)  (no       (unknown)  (unknown)  GenericComposite[R  (units

    (unknown)



                    date)                         BC   3.37 L unknown)  



                                                  (4.5-5.9)  X10^6/uL           



                                                   ]                  

 

           (unknown)  (no       (unknown)  (unknown)  GenericComposite[W  (units

    (unknown)



                    date)                         BC   7.0  unknown)  



                                                  (4.5-11.0)           



                                                  X10^3/uL  ]           

 

           (unknown)  (no       (unknown)  (unknown)  Globulin    3.6  (units   

 (unknown)



                    date)                         (1.7-4.1)  g/dL unknown)  

 

           (unknown)  (no       (unknown)  (unknown)  Glucose    262 H  (units  

  (unknown)



                    date)                         ()  mg/dL unknown)  

 

           (unknown)  (no       (unknown)  (unknown)  H/O colectomy  (units    (

unknown)



                    date)                                   unknown)  

 

           (unknown)  (no       (unknown)  (unknown)  HPI - Weakness  (units    

(unknown)



                    date)                                   unknown)  

 

           (unknown)  (no       (unknown)  (unknown)  HPI Narrative:  (units    

(unknown)



                    date)                                   unknown)  

 

           (unknown)  (no       (unknown)  (unknown)  Hct   26.7 L  (units    (u

nknown)



                    date)                         (41-53)  % unknown)  

 

           (unknown)  (no       (unknown)  (unknown)  Hgb   9.1 L  (units    (un

known)



                    date)                         (13.5-17.5)  g/dL unknown)  

 

           (unknown)  (no       (unknown)  (unknown)  History of Present  (units

    (unknown)



                    date)                         Illness   unknown)  

 

           (unknown)  (no       (unknown)  (unknown)  History of lumbar  (units 

   (unknown)



                    date)                         surgery () unknown)  

 

           (unknown)  (no       (unknown)  (unknown)  Hx of foot surgery  (units

    (unknown)



                    date)                         (2017)    unknown)  

 

           (unknown)  (no       (unknown)  (unknown)  Hx of shoulder  (units    

(unknown)



                    date)                         surgery () unknown)  

 

           (unknown)  (no       (unknown)  (unknown)  Hypertension  (units    (u

nknown)



                    date)                                   unknown)  

 

           (unknown)  (no       (unknown)  (unknown)  Initial Vital  (units    (

unknown)



                    date)                         Signs     unknown)  

 

           (unknown)  (no       (unknown)  (unknown)  Initial Vital  (units    (

unknown)



                    date)                         Signs:    unknown)  

 

           (unknown)  (no       (unknown)  (unknown)  Lab Data  (units    (unkno

wn)



                    date)                                   unknown)  

 

           (unknown)  (no       (unknown)  (unknown)  Labs:     (units    (unkno

wn)



                    date)                                   unknown)  

 

           (unknown)  (no       (unknown)  (unknown)  Lactate (Lactic  (units   

 (unknown)



                    date)                         Acid) Stat unknown)  

 

           (unknown)  (no       (unknown)  (unknown)  KIMMIE Rocha  (units    (

unknown)



                    date)                         there.  He reports unknown)  



                                                  that he had an           



                                                  upper endoscopy           



                                                  completed here           

 

           (unknown)  (no       (unknown)  (unknown)  Lipase    32  (units    (u

nknown)



                    date)                         ()  U/L unknown)  

 

           (unknown)  (no       (unknown)  (unknown)  Lipase Stat  (units    (un

known)



                    date)                                   unknown)  

 

           (unknown)  (no       (unknown)  (unknown)  Lymph # (Auto)  (units    

(unknown)



                    date)                         400 L   (9928-6127) unknown)  



                                                   /uL                

 

           (unknown)  (no       (unknown)  (unknown)  Lymph % (Auto)  (units    

(unknown)



                    date)                         6.4 L   (25-40)  % unknown)  

 

           (unknown)  (no       (unknown)  (unknown)  MCH   26.9  (units    (unk

nown)



                    date)                         (26-34)  PG unknown)  

 

           (unknown)  (no       (unknown)  (unknown)  MCHC   34.0  (units    (un

known)



                    date)                         (30-36)  % unknown)  

 

           (unknown)  (no       (unknown)  (unknown)  MCV   79.1 L  (units    (u

nknown)



                    date)                         ()  fL unknown)  

 

           (unknown)  (no       (unknown)  (unknown)  MDM - Weakness  (units    

(unknown)



                    date)                                   unknown)  

 

           (unknown)  (no       (unknown)  (unknown)  Magnesium    1.0 L  (units

    (unknown)



                    date)                          (1.6-2.3)  mg/dL unknown)  

 

           (unknown)  (no       (unknown)  (unknown)  Magnesium Stat  (units    

(unknown)



                    date)                                   unknown)  

 

           (unknown)  (no       (unknown)  (unknown)  Magnesium Sulfate  (units 

   (unknown)



                    date)                         (Magnesium Sulfate) unknown)  



                                                   2 gm in 50 mls @           



                                                  50 mls/hr IV NOW           



                                                  ONE                 

 

           (unknown)  (no       (unknown)  (unknown)  Medical History  (units   

 (unknown)



                    date)                         (Reviewed 22 unknown)  



                                                  @ 14:21 by Will Hansen MD)           

 

           (unknown)  (no       (unknown)  (unknown)  Mode of arrival:  (units  

  (unknown)



                    date)                         Wheelchair unknown)  

 

           (unknown)  (no       (unknown)  (unknown)  Mono # (Auto)  (units    (

unknown)



                    date)                         600   (0-900)  /uL unknown)  

 

           (unknown)  (no       (unknown)  (unknown)  Mono % (Auto)  (units    (

unknown)



                    date)                         7.9   (3-14)  % unknown)  

 

           (unknown)  (no       (unknown)  (unknown)  Mother     (units 

   (unknown)



                    date)                          Cancer   unknown)  

 

           (unknown)  (no       (unknown)  (unknown)  Neut # (Auto)  (units    (

unknown)



                    date)                         5900   (6002-8600) unknown)  



                                                  /uL                 

 

           (unknown)  (no       (unknown)  (unknown)  Neut % (Auto)  (units    (

unknown)



                    date)                         84.7 H   (50-75)  % unknown)  

 

           (unknown)  (no       (unknown)  (unknown)  No Action  (units    (unkn

own)



                    date)                                   unknown)  

 

           (unknown)  (no       (unknown)  (unknown)  No Known Drug  (units    (

unknown)



                    date)                         Allergies Allergy unknown)  



                                                  Verified 22           



                                                  17:38               

 

           (unknown)  (no       (unknown)  (unknown)  Ondansetron HCl  (units   

 (unknown)



                    date)                         (Ondansetron 4 Mg/2 unknown)  



                                                  Ml Inj)  4 mg IV           



                                                  NOW ONE             

 

           (unknown)  (no       (unknown)  (unknown)  Ordered:  (units    (unkno

wn)



                    date)                                   unknown)  

 

           (unknown)  (no       (unknown)  (unknown)  Orders    (units    (unkno

wn)



                    date)                                   unknown)  

 

           (unknown)  (no       (unknown)  (unknown)  Osteoarthritis  (units    

(unknown)



                    date)                                   unknown)  

 

           (unknown)  (no       (unknown)  (unknown)  Oxygen Delivery  (units   

 (unknown)



                    date)                         Method    22 unknown)  



                                                  17:34               

 

           (unknown)  (no       (unknown)  (unknown)  Oxygen Delivery  (units   

 (unknown)



                    date)                         Method Room Air unknown)  

 

           (unknown)  (no       (unknown)  (unknown)  Patient History  (units   

 (unknown)



                    date)                                   unknown)  

 

           (unknown)  (no       (unknown)  (unknown)  Patient:  (units    (unkno

wn)



                    date)                         AlvinKassandra SANTOS unknown)  



                                                  MR#: [id number]            

 

           (unknown)  (no       (unknown)  (unknown)  Postlaminectomy  (units   

 (unknown)



                    date)                         syndrome  unknown)  

 

           (unknown)  (no       (unknown)  (unknown)  Potassium    3.7  (units  

  (unknown)



                    date)                         (3.4-5.1)  mmol/L unknown)  

 

           (unknown)  (no       (unknown)  (unknown)  Prescriptions:  (units    

(unknown)



                    date)                                   unknown)  

 

           (unknown)  (no       (unknown)  (unknown)  Procalcitonin Stat  (units

    (unknown)



                    date)                                   unknown)  

 

           (unknown)  (no       (unknown)  (unknown)  Pulse Oximetry  98  (units

    (unknown)



                    date)                           22 17:34 unknown)  

 

           (unknown)  (no       (unknown)  (unknown)  Pulse Oximetry 98  (units 

   (unknown)



                    date)                                   unknown)  

 

           (unknown)  (no       (unknown)  (unknown)  Pulse Rate  77  (units    

(unknown)



                    date)                         22 17:34 unknown)  

 

           (unknown)  (no       (unknown)  (unknown)  Pulse Rate 77  (units    (

unknown)



                    date)                                   unknown)  

 

           (unknown)  (no       (unknown)  (unknown)  RDW   16.1 H  (units    (u

nknown)



                    date)                         (11.6-14.8)  % unknown)  

 

           (unknown)  (no       (unknown)  (unknown)  Referrals:  (units    (unk

nown)



                    date)                                   unknown)  

 

           (unknown)  (no       (unknown)  (unknown)  Related Data  (units    (u

nknown)



                    date)                                   unknown)  

 

           (unknown)  (no       (unknown)  (unknown)  Respiratory Rate  (units  

  (unknown)



                    date)                         18   22 17:34 unknown)  

 

           (unknown)  (no       (unknown)  (unknown)  Respiratory Rate  (units  

  (unknown)



                    date)                         18        unknown)  

 

           (unknown)  (no       (unknown)  (unknown)  Result diagrams:  (units  

  (unknown)



                    date)                                   unknown)  

 

           (unknown)  (no       (unknown)  (unknown)  SARS-CoV-2 (PCR)  (units  

  (unknown)



                    date)                         Negative  unknown)  



                                                  (Negative)           

 

           (unknown)  (no       (unknown)  (unknown)  Sciatica  (units    (unkno

wn)



                    date)                                   unknown)  

 

           (unknown)  (no       (unknown)  (unknown)  Signed By:  (units    (unk

nown)



                    date)                                   unknown)  

 

           (unknown)  (no       (unknown)  (unknown)  Smoking Status:  (units   

 (unknown)



                    date)                         Never smoker unknown)  

 

           (unknown)  (no       (unknown)  (unknown)  Smoking Status:  (units   

 (unknown)



                    date)                         Never smoker unknown)  

 

           (unknown)  (no       (unknown)  (unknown)  Social History  (units    

(unknown)



                    date)                         (Reviewed 22 unknown)  



                                                  @ 13:31 by Tanya Rocha MD)           

 

           (unknown)  (no       (unknown)  (unknown)  Sodium    135 L  (units   

 (unknown)



                    date)                         (137-145)  mmol/L unknown)  

 

           (unknown)  (no       (unknown)  (unknown)  Sodium Chloride  (units   

 (unknown)



                    date)                         (Normal Saline unknown)  



                                                  0.9%)  1,000 mls @           



                                                  1,000 mls/hr IV           



                                                  BOLUS ONE           

 

           (unknown)  (no       (unknown)  (unknown)  Source: patient  (units   

 (unknown)



                    date)                         and family unknown)  

 

           (unknown)  (no       (unknown)  (unknown)  Spinal stenosis  (units   

 (unknown)



                    date)                                   unknown)  

 

           (unknown)  (no       (unknown)  (unknown)  Stated complaint:  (units 

   (unknown)



                    date)                         WEAKNESS UNABLE TO unknown)  



                                                  HOLD ANYTHING DOWN           

 

           (unknown)  (no       (unknown)  (unknown)  Substance Use  (units    (

unknown)



                    date)                         Type: does not use unknown)  

 

           (unknown)  (no       (unknown)  (unknown)  Surgical History  (units  

  (unknown)



                    date)                         (Reviewed 22 unknown)  



                                                  @ 14:21 by Will Hansen MD)           

 

           (unknown)  (no       (unknown)  (unknown)  Temperature  98.3  (units 

   (unknown)



                    date)                         F   22 17:34 unknown)  

 

           (unknown)  (no       (unknown)  (unknown)  Temperature 98.3 F  (units

    (unknown)



                    date)                                   unknown)  

 

           (unknown)  (no       (unknown)  (unknown)  Time Seen by  (units    (u

nknown)



                    date)                         Provider: 22 unknown)  



                                                  19:11               

 

           (unknown)  (no       (unknown)  (unknown)  Total Bilirubin  (units   

 (unknown)



                    date)                         0.6  (0.2-1.3) unknown)  



                                                  mg/dL               

 

           (unknown)  (no       (unknown)  (unknown)  Total Creatine  (units    

(unknown)



                    date)                         Kinase    54 L unknown)  



                                                  ()  U/L           

 

           (unknown)  (no       (unknown)  (unknown)  Total Protein  (units    (

unknown)



                    date)                         7.5  (6.3-8.2) unknown)  



                                                  g/dL                

 

           (unknown)  (no       (unknown)  (unknown)  Troponin + CK  (units    (

unknown)



                    date)                         Cardiac Panel Stat unknown)  

 

           (unknown)  (no       (unknown)  (unknown)  Troponin I    <  (units   

 (unknown)



                    date)                         0.012  (0.01-0.034) unknown)  



                                                   ng/mL              

 

           (unknown)  (no       (unknown)  (unknown)  Urine Microscopic  (units 

   (unknown)



                    date)                         Stat      unknown)  

 

           (unknown)  (no       (unknown)  (unknown)  Vital Signs  (units    (un

known)



                    date)                                   unknown)  

 

           (unknown)  (no       (unknown)  (unknown)  Vital signs:  (units    (u

nknown)



                    date)                                   unknown)  

 

           (unknown)  (no       (unknown)  (unknown)  XR chest 1V Stat  (units  

  (unknown)



                    date)                                   unknown)  

 

           (unknown)  (no       (unknown)  (unknown)  [Embedded Image  (units   

 (unknown)



                    date)                         Not Available] unknown)  

 

           (unknown)  (no       (unknown)  (unknown)  admitted, went to  (units 

   (unknown)



                    date)                         sound view for unknown)  



                                                  rehab following his           



                                                  admission and has           



                                                  followed            

 

           (unknown)  (no       (unknown)  (unknown)  alcohol intake  (units    

(unknown)



                    date)                         frequency: a few unknown)  



                                                  times a month           

 

           (unknown)  (no       (unknown)  (unknown)  alcohol intake:  (units   

 (unknown)



                    date)                         current   unknown)  

 

           (unknown)  (no       (unknown)  (unknown)  ascorbic acid  (units    (

unknown)



                    date)                         (vitamin C) 500 mg unknown)  



                                                  500 mg PO BID #60           



                                                  tabs 22           

 

           (unknown)  (no       (unknown)  (unknown)  at this hospital  (units  

  (unknown)



                    date)                                   unknown)  

 

           (unknown)  (no       (unknown)  (unknown)  atorvastatin 80 mg  (units

    (unknown)



                    date)                         tablet 40 mg PO QPM unknown)  



                                                  20           

 

           (unknown)  (no       (unknown)  (unknown)  be causing a rash  (units 

   (unknown)



                    date)                                   unknown)  

 

           (unknown)  (no       (unknown)  (unknown)  buspirone 5 mg  (units    

(unknown)



                    date)                         tablet 5 mg PO BID unknown)  



                                                  #60 tabs 22           

 

           (unknown)  (no       (unknown)  (unknown)  clopidogrel 75 mg  (units 

   (unknown)



                    date)                         tablet 75 mg PO unknown)  



                                                  DAILY 22            

 

           (unknown)  (no       (unknown)  (unknown)  cyanocobalamin  (units    

(unknown)



                    date)                         (vitamin B-12) 500 unknown)  



                                                  1,000 mcg PO DAILY           



                                                  #60 tabs 22           

 

           (unknown)  (no       (unknown)  (unknown)  ferrous sulfate  (units   

 (unknown)



                    date)                         325 mg (65 mg 325 unknown)  



                                                  mg PO BIDWM #60           



                                                  tabs 22           

 

           (unknown)  (no       (unknown)  (unknown)  gabapentin 300 mg  (units 

   (unknown)



                    date)                         capsule 300 mg PO unknown)  



                                                  BID 18            

 

           (unknown)  (no       (unknown)  (unknown)  glipizide 10 mg  (units   

 (unknown)



                    date)                         tablet 10 mg PO BID unknown)  



                                                  18           

 

           (unknown)  (no       (unknown)  (unknown)  household members:  (units

    (unknown)



                    date)                          spouse   unknown)  

 

           (unknown)  (no       (unknown)  (unknown)  hypertension,  (units    (

unknown)



                    date)                         colorectal cancer unknown)  



                                                  in 2013 with a           



                                                  reverse             



                                                  colonoscopy, a           



                                                  stroke in           

 

           (unknown)  (no       (unknown)  (unknown)  iron) tablet  (units    (u

nknown)



                    date)                                   unknown)  

 

           (unknown)  (no       (unknown)  (unknown)  lives     (units    (unkno

wn)



                    date)                         independently:  Yes unknown)  

 

           (unknown)  (no       (unknown)  (unknown)  losartan 50 mg  (units    

(unknown)



                    date)                         tablet 25 mg PO QPM unknown)  



                                                  20           

 

           (unknown)  (no       (unknown)  (unknown)  mcg tablet  (units    (unk

nown)



                    date)                                   unknown)  

 

           (unknown)  (no       (unknown)  (unknown)  mcg tablet  (units    (unk

nown)



                    date)                         (Tab-A-Lucy) unknown)  

 

           (unknown)  (no       (unknown)  (unknown)  metformin 500 mg  (units  

  (unknown)



                    date)                         tablet,extended 500 unknown)  



                                                  mg PO BID 18            

 

           (unknown)  (no       (unknown)  (unknown)  metoprolol  (units    (unk

nown)



                    date)                         succinate 25 mg 25 unknown)  



                                                  mg PO DAILY           



                                                  21           

 

           (unknown)  (no       (unknown)  (unknown)  multivitamin with  (units 

   (unknown)



                    date)                         folic acid 400 1 unknown)  



                                                  tab PO DAILY #90           



                                                  tabs 22           

 

           (unknown)  (no       (unknown)  (unknown)  ondansetron HCl 4  (units 

   (unknown)



                    date)                         mg tablet 4 mg PO unknown)  



                                                  Q8H PRN nausea and           



                                                  21            

 

           (unknown)  (no       (unknown)  (unknown)  oral powder packet  (units

    (unknown)



                    date)                                   unknown)  

 

           (unknown)  (no       (unknown)  (unknown)  polyethylene  (units    (u

nknown)



                    date)                         glycol 3350 17 gram unknown)  



                                                  17 gm PO DAILY #90           



                                                  ea 22           

 

           (unknown)  (no       (unknown)  (unknown)  release 24 hr  (units    (

unknown)



                    date)                                   unknown)  

 

           (unknown)  (no       (unknown)  (unknown)  sennosides 8.6 mg  (units 

   (unknown)



                    date)                         tablet (senna) 17.2 unknown)  



                                                  mg PO BEDTIME #60           



                                                  tabs 22           

 

           (unknown)  (no       (unknown)  (unknown)  substance use  (units    (

unknown)



                    date)                         type:  does not use unknown)  

 

           (unknown)  (no       (unknown)  (unknown)  tablet (Vitamin C)  (units

    (unknown)



                    date)                                   unknown)  

 

           (unknown)  (no       (unknown)  (unknown)  tablet,extended  (units   

 (unknown)



                    date)                         release 24 hr unknown)  

 

           (unknown)  (no       (unknown)  (unknown)  the emergency  (units    (

unknown)



                    date)                         department in unknown)  



                                                  January and           



                                                  diagnosed with a GI           



                                                  bleed, he was           

 

           (unknown)  (no       (unknown)  (unknown)  trospium 20 mg  (units    

(unknown)



                    date)                         tablet 20 mg PO BID unknown)  



                                                  urinary retention           



                                                  22           

 

           (unknown)  (no       (unknown)  (unknown)  up with   (units    (unkno

wn)



                    date)                         Gastroenterology unknown)  



                                                  from [place],           



                                                  patient reports           



                                                  that he sees Yasmine           









                                         Result panel 16









           (unknown)  (no       (unknown)  (unknown)  (no value)  (units    (unk

nown)



                    date)                                   unknown)  

 

           (unknown)  (no       (unknown)  (unknown)  Radiologist  (units    (un

known)



                    date)                         Impression: unknown)  

 

           (unknown)  (no       (unknown)  (unknown)  Date of Service:  (units  

  (unknown)



                    date)                         22  unknown)  

 

           (unknown)  (no       (unknown)  (unknown)  (no value)  (units    (unk

nown)



                    date)                                   unknown)  

 

           (unknown)  (no       (unknown)  (unknown)  22 17:45  (units    

(unknown)



                    date)                                   unknown)  

 

           (unknown)  (no       (unknown)  (unknown)  1 tab PO DAILY  (units    

(unknown)



                    date)                         Qty: 90 0RF unknown)  

 

           (unknown)  (no       (unknown)  (unknown)  1,000 mcg PO DAILY  (units

    (unknown)



                    date)                         Qty: 60 0RF unknown)  

 

           (unknown)  (no       (unknown)  (unknown)  10 mg PO BID  (units    (u

nknown)



                    date)                                   unknown)  

 

           (unknown)  (no       (unknown)  (unknown)  17 gm PO DAILY  (units    

(unknown)



                    date)                         Qty: 90 0RF unknown)  

 

           (unknown)  (no       (unknown)  (unknown)  17.2 mg PO BEDTIME  (units

    (unknown)



                    date)                         Qty: 60 0RF unknown)  

 

           (unknown)  (no       (unknown)  (unknown)  20 mg PO BID  (units    (u

nknown)



                    date)                                   unknown)  

 

           (unknown)  (no       (unknown)  (unknown)  25 mg PO DAILY  (units    

(unknown)



                    date)                                   unknown)  

 

           (unknown)  (no       (unknown)  (unknown)  25 mg PO QPM  (units    (u

nknown)



                    date)                                   unknown)  

 

           (unknown)  (no       (unknown)  (unknown)  300 mg PO BID  (units    (

unknown)



                    date)                                   unknown)  

 

           (unknown)  (no       (unknown)  (unknown)  325 mg PO BIDWM  (units   

 (unknown)



                    date)                         Qty: 60 0RF unknown)  

 

           (unknown)  (no       (unknown)  (unknown)  4 mg PO Q8H PRN  (units   

 (unknown)



                    date)                         (Reason: nausea and unknown)  



                                                  vomiting) Qty: 14           



                                                  0RF                 

 

           (unknown)  (no       (unknown)  (unknown)  40 mg PO QPM  (units    (u

nknown)



                    date)                                   unknown)  

 

           (unknown)  (no       (unknown)  (unknown)  5 mg PO BID Qty:  (units  

  (unknown)



                    date)                         60 0RF    unknown)  

 

           (unknown)  (no       (unknown)  (unknown)  500 mg PO BID  (units    (

unknown)



                    date)                                   unknown)  

 

           (unknown)  (no       (unknown)  (unknown)  500 mg PO BID Qty:  (units

    (unknown)



                    date)                         60 0RF    unknown)  

 

           (unknown)  (no       (unknown)  (unknown)  75 mg PO DAILY  (units    

(unknown)



                    date)                                   unknown)  

 

           (unknown)  (no       (unknown)  (unknown)  Allergies  (units    (unkn

own)



                    date)                                   unknown)  

 

           (unknown)  (no       (unknown)  (unknown)  Documented By: [facility]  (units

    (unknown)



                    date)                                   unknown)  

 

           (unknown)  (no       (unknown)  (unknown)  ED Orders  (units    (unkn

own)



                    date)                                   unknown)  

 

           (unknown)  (no       (unknown)  (unknown)  Emergency Report  (units  

  (unknown)



                    date)                                   unknown)  

 

           (unknown)  (no       (unknown)  (unknown)  Home Medications  (units  

  (unknown)



                    date)                                   unknown)  

 

           (unknown)  (no       (unknown)  (unknown)  [facility]  (units   

 (unknown)



                    date)                         [address] unknown)  



                                                  [place] [address]           

 

           (unknown)  (no       (unknown)  (unknown)  Lab Results  (units    (un

known)



                    date)                                   unknown)  

 

           (unknown)  (no       (unknown)  (unknown)  Label Comments:  (units   

 (unknown)



                    date)                                   unknown)  

 

           (unknown)  (no       (unknown)  (unknown)  Last Admin:  (units    (un

known)



                    date)                         22 18:30 unknown)  



                                                  Dose: 4 mg           

 

           (unknown)  (no       (unknown)  (unknown)  Previous Rx's  (units    (

unknown)



                    date)                                   unknown)  

 

           (unknown)  (no       (unknown)  (unknown)  Stop: 22  (units    

(unknown)



                    date)                         18:16     unknown)  

 

           (unknown)  (no       (unknown)  (unknown)  Stop: 22  (units    

(unknown)



                    date)                         20:25     unknown)  

 

           (unknown)  (no       (unknown)  (unknown)  Stop: 22  (units    

(unknown)



                    date)                         20:27     unknown)  

 

           (unknown)  (no       (unknown)  (unknown)  Vital Signs - 8 hr  (units

    (unknown)



                    date)                                   unknown)  

 

           (unknown)  (no       (unknown)  (unknown)  has not been  (units    (u

nknown)



                    date)                         eating so hasn't unknown)  



                                                  taken recently.           



                                                  spouse states           



                                                  thinks it might           

 

           (unknown)  (no       (unknown)  (unknown)  stopped taking  (units    

(unknown)



                    date)                         prior to back unknown)  



                                                  surgery and did not           



                                                  restart             

 

           (unknown)  (no       (unknown)  (unknown)  (no value)  (units    (unk

nown)



                    date)                                   unknown)  

 

           (unknown)  (no       (unknown)  (unknown)  22  (units 

   (unknown)



                    date)                         22  unknown)  



                                                  Range/Units           

 

           (unknown)  (no       (unknown)  (unknown)  17:40 17:45 17:45  (units 

   (unknown)



                    date)                                   unknown)  

 

           (unknown)  (no       (unknown)  (unknown)  ascorbic acid  (units    (

unknown)



                    date)                         (vitamin C) unknown)  



                                                  [Vitamin C] 500 mg           



                                                  Tablet              

 

           (unknown)  (no       (unknown)  (unknown)  atorvastatin 80 mg  (units

    (unknown)



                    date)                         tablet    unknown)  

 

           (unknown)  (no       (unknown)  (unknown)  buspirone 5 mg  (units    

(unknown)



                    date)                         Tablet    unknown)  

 

           (unknown)  (no       (unknown)  (unknown)  clopidogrel 75 mg  (units 

   (unknown)



                    date)                         tablet    unknown)  

 

           (unknown)  (no       (unknown)  (unknown)  cyanocobalamin  (units    

(unknown)



                    date)                         (vitamin B-12) 500 unknown)  



                                                  mcg Tablet           

 

           (unknown)  (no       (unknown)  (unknown)  ferrous sulfate  (units   

 (unknown)



                    date)                         325 mg (65 mg iron) unknown)  



                                                  Tablet              

 

           (unknown)  (no       (unknown)  (unknown)  gabapentin 300 mg  (units 

   (unknown)



                    date)                         capsule   unknown)  

 

           (unknown)  (no       (unknown)  (unknown)  glipizide 10 mg  (units   

 (unknown)



                    date)                         tablet    unknown)  

 

           (unknown)  (no       (unknown)  (unknown)  losartan 50 mg  (units    

(unknown)



                    date)                         tablet    unknown)  

 

           (unknown)  (no       (unknown)  (unknown)  metformin 500 mg  (units  

  (unknown)



                    date)                         tablet extended unknown)  



                                                  release 24 hr           

 

           (unknown)  (no       (unknown)  (unknown)  metoprolol  (units    (unk

nown)



                    date)                         succinate 25 mg unknown)  



                                                  tablet extended           



                                                  release 24 hr           

 

           (unknown)  (no       (unknown)  (unknown)  multivitamin with  (units 

   (unknown)



                    date)                         folic acid unknown)  



                                                  [Tab-A-Lucy] 400           



                                                  mcg Tablet           

 

           (unknown)  (no       (unknown)  (unknown)  ondansetron HCl  (units   

 (unknown)



                    date)                         [Zofran] 4 mg unknown)  



                                                  tablet              

 

           (unknown)  (no       (unknown)  (unknown)  polyethylene  (units    (u

nknown)



                    date)                         glycol 3350 17 gram unknown)  



                                                  Powder In Packet           

 

           (unknown)  (no       (unknown)  (unknown)  sennosides [senna]  (units

    (unknown)



                    date)                         8.6 mg Tablet unknown)  

 

           (unknown)  (no       (unknown)  (unknown)  trospium 20 mg  (units    

(unknown)



                    date)                         tablet    unknown)  

 

           (unknown)  (no       (unknown)  (unknown)  22  (units    (unkno

wn)



                    date)                                   unknown)  

 

           (unknown)  (no       (unknown)  (unknown)  Medication  (units    (unk

nown)



                    date)                         Instructions unknown)  



                                                  Recorded            

 

           (unknown)  (no       (unknown)  (unknown)  Medication  (units    (unk

nown)



                    date)                         Instructions unknown)  



                                                  Recorded  Confirmed           

 

           (unknown)  (no       (unknown)  (unknown)  (Zofran) vomiting  (units 

   (unknown)



                    date)                         #14 tabs  unknown)  

 

           (unknown)  (no       (unknown)  (unknown)  [id number]   (units    (unkno

wn)



                    date)                                   unknown)  

 

           (unknown)  (no       (unknown)  (unknown)  2022.  (units    (un

known)



                    date)                         Patient's preop unknown)  



                                                  diagnosis was           



                                                  nausea and vomiting           



                                                  from hematemesis           

 

           (unknown)  (no       (unknown)  (unknown)  22 17:39  (units    

(unknown)



                    date)                                   unknown)  

 

           (unknown)  (no       (unknown)  (unknown)  22 17:40  (units    

(unknown)



                    date)                                   unknown)  

 

           (unknown)  (no       (unknown)  (unknown)  22 17:45  (units    

(unknown)



                    date)                                   unknown)  

 

           (unknown)  (no       (unknown)  (unknown)  22 19:26  (units    

(unknown)



                    date)                                   unknown)  

 

           (unknown)  (no       (unknown)  (unknown)  22 19:28  (units    

(unknown)



                    date)                                   unknown)  

 

           (unknown)  (no       (unknown)  (unknown)  17:34     (units    (unkno

wn)



                    date)                                   unknown)  

 

           (unknown)  (no       (unknown)  (unknown) 2018, He does not  (units  

  (unknown)



                    date)                         drink alcohol.? unknown)  



                                                  Patient and his           



                                                  wife state that he           



                                                  was seen in           

 

           (unknown)  (no       (unknown)  (unknown)  62-year-old  (units    (un

known)



                    date)                         gentleman with a unknown)  



                                                  history of multiple           



                                                  sclerosis,           



                                                  diabetes,           

 

           (unknown)  (no       (unknown)  (unknown)  ?         (units    (unkno

wn)



                    date)                                   unknown)  

 

           (unknown)  (no       (unknown)  (unknown)  ALT    16  (<50)  (units  

  (unknown)



                    date)                         IU/L      unknown)  

 

           (unknown)  (no       (unknown)  (unknown)  AST    19  (17-59)  (units

    (unknown)



                    date)                          IU/L     unknown)  

 

           (unknown)  (no       (unknown)  (unknown)  Age/Sex: 62 / M  (units   

 (unknown)



                    date)                                   unknown)  

 

           (unknown)  (no       (unknown)  (unknown)  Albumin    3.9  (units    

(unknown)



                    date)                         (3.5-5.0)  g/dL unknown)  

 

           (unknown)  (no       (unknown)  (unknown)  Albumin/Globulin  (units  

  (unknown)



                    date)                         Ratio    1.1 unknown)  



                                                  (1.0-2.8)           

 

           (unknown)  (no       (unknown)  (unknown)  Alkaline  (units    (unkno

wn)



                    date)                         Phosphatase    105 unknown)  



                                                  ()  U/L           

 

           (unknown)  (no       (unknown)  (unknown)  Allergy/AdvReac  (units   

 (unknown)



                    date)                         Type Severity unknown)  



                                                  Reaction Status           



                                                  Date / Time           

 

           (unknown)  (no       (unknown)  (unknown)  Approved by: [place]  (units 

   (unknown)



                    date)                         SHANNAN Lopez on unknown)  



                                                  2022 at 18:02?           

 

           (unknown)  (no       (unknown)  (unknown)  BUN    21 H  (units    (un

known)



                    date)                         (9-20)  mg/dL unknown)  

 

           (unknown)  (no       (unknown)  (unknown)  BUN/Creatinine  (units    

(unknown)



                    date)                         Ratio    14.8 unknown)  



                                                  (6-22)              

 

           (unknown)  (no       (unknown)  (unknown)  Baso # (Auto)   0  (units 

   (unknown)



                    date)                          (0-100)  /uL unknown)  

 

           (unknown)  (no       (unknown)  (unknown)  Baso % (Auto)  (units    (

unknown)



                    date)                         0.2   (0-2)  % unknown)  

 

           (unknown)  (no       (unknown)  (unknown)  Blood Pressure  (units    

(unknown)



                    date)                         124/66   22 unknown)  



                                                  17:34               

 

           (unknown)  (no       (unknown)  (unknown)  Blood Pressure  (units    

(unknown)



                    date)                         124/66    unknown)  

 

           (unknown)  (no       (unknown)  (unknown)  Bones and chest  (units   

 (unknown)



                    date)                         wall:? No unknown)  



                                                  suspicious bony           



                                                  lesions.? Overlying           



                                                  soft tissues           

 

           (unknown)  (no       (unknown)  (unknown)  CK-MB (CK-2)  (units    (u

nknown)



                    date)                         TNP       unknown)  

 

           (unknown)  (no       (unknown)  (unknown)  CK-MB (CK-2) Rel  (units  

  (unknown)



                    date)                         Index    TNP unknown)  

 

           (unknown)  (no       (unknown)  (unknown)  COMPARISON:?  (units    (u

nknown)



                    date)                         [facility], unknown)  



                                                  CR, XR CHEST 2V,           



                                                  3/18/2021, 10:19.           

 

           (unknown)  (no       (unknown)  (unknown)  COVID19 -Nasal  (units    

(unknown)



                    date)                         RAPID/Pre-Proc Stat unknown)  

 

           (unknown)  (no       (unknown)  (unknown)  CT abdomen pelvis  (units 

   (unknown)



                    date)                         w con Stat unknown)  

 

           (unknown)  (no       (unknown)  (unknown)  CVA (cerebral  (units    (

unknown)



                    date)                         vascular accident) unknown)  



                                                  (2018)           

 

           (unknown)  (no       (unknown)  (unknown)  Calcium    9.3  (units    

(unknown)



                    date)                         (8.4-10.2)  mg/dL unknown)  

 

           (unknown)  (no       (unknown)  (unknown)  Carbon Dioxide  (units    

(unknown)



                    date)                         29  (22-32)  mmol/L unknown)  

 

           (unknown)  (no       (unknown)  (unknown)  Cardio: denies  (units    

(unknown)



                    date)                         chest pain, unknown)  



                                                  palpitations           

 

           (unknown)  (no       (unknown)  (unknown)  Chest x-ray:  (units    (u

nknown)



                    date)                                   unknown)  

 

           (unknown)  (no       (unknown)  (unknown)  Chief complaint:  (units  

  (unknown)



                    date)                         Weakness  unknown)  

 

           (unknown)  (no       (unknown)  (unknown)  Chloride    96 L  (units  

  (unknown)



                    date)                         ()  mmol/L unknown)  

 

           (unknown)  (no       (unknown)  (unknown)  Colon cancer  (units    (u

nknown)



                    date)                         (2013)    unknown)  

 

           (unknown)  (no       (unknown)  (unknown)  Complete Blood  (units    

(unknown)



                    date)                         Count AUTO DIFF unknown)  



                                                  Stat                

 

           (unknown)  (no       (unknown)  (unknown)  Comprehensive  (units    (

unknown)



                    date)                         Metabolic Panel unknown)  



                                                  Stat                

 

           (unknown)  (no       (unknown)  (unknown)  Course    (units    (unkno

wn)



                    date)                                   unknown)  

 

           (unknown)  (no       (unknown)  (unknown)  Creatinine    1.42  (units

    (unknown)



                    date)                         H  (0.66-1.25) unknown)  



                                                  mg/dL               

 

           (unknown)  (no       (unknown)  (unknown)  : 1960  (units   

 (unknown)



                    date)                         Acct:[id number] unknown)  

 

           (unknown)  (no       (unknown)  (unknown)  Departure  (units    (unkn

own)



                    date)                                   unknown)  

 

           (unknown)  (no       (unknown)  (unknown)  Depression  (units    (unk

nown)



                    date)                                   unknown)  

 

           (unknown)  (no       (unknown)  (unknown)  Diabetes  (units    (unkno

wn)



                    date)                                   unknown)  

 

           (unknown)  (no       (unknown)  (unknown)  Diabetic  (units    (unkno

wn)



                    date)                         neuropathy unknown)  

 

           (unknown)  (no       (unknown)  (unknown)  Discharge Plan  (units    

(unknown)



                    date)                                   unknown)  

 

           (unknown)  (no       (unknown)  (unknown)  Discontinued  (units    (u

nknown)



                    date)                         Medications unknown)  

 

           (unknown)  (no       (unknown)  (unknown)  Gustavo Macias MD  (units   

 (unknown)



                    date)                         [[facility] Care unknown)  



                                                  Provider] -           

 

           (unknown)  (no       (unknown)  (unknown)  EKG-12 Lead Stat  (units  

  (unknown)



                    date)                                   unknown)  

 

           (unknown)  (no       (unknown)  (unknown)  ER Physician:  (units    (

unknown)



                    date)                         Kitty Costello unknown)  



                                                  ARNP                

 

           (unknown)  (no       (unknown)  (unknown)  Eos # (Auto)   100  (units

    (unknown)



                    date)                           (0-450)  /uL unknown)  

 

           (unknown)  (no       (unknown)  (unknown)  Eos % (Auto)   0.8  (units

    (unknown)



                    date)                         L   (2-4)  % unknown)  

 

           (unknown)  (no       (unknown)  (unknown)  Estimated GFR  (units    (

unknown)



                    date)                         55 L  (>60)  mL/min unknown)  

 

           (unknown)  (no       (unknown)  (unknown)  Exam      (units    (unkno

wn)



                    date)                                   unknown)  

 

           (unknown)  (no       (unknown)  (unknown)  Eyes: denies  (units    (u

nknown)



                    date)                         visual changes, eye unknown)  



                                                  pain                

 

           (unknown)  (no       (unknown)  (unknown)  FINDINGS:?  (units    (unk

nown)



                    date)                                   unknown)  

 

           (unknown)  (no       (unknown)  (unknown)  Family History  (units    

(unknown)



                    date)                         (Reviewed 22 unknown)  



                                                  @ 14:21 by Will Hansen MD)           

 

           (unknown)  (no       (unknown)  (unknown)  Father     (units 

   (unknown)



                    date)                          Cancer   unknown)  

 

           (unknown)  (no       (unknown)  (unknown)  GERD      (units    (unkno

wn)



                    date)                         (gastroesophageal unknown)  



                                                  reflux disease)           

 

           (unknown)  (no       (unknown)  (unknown)  GI: denies  (units    (unk

nown)



                    date)                         abdominal pain, unknown)  



                                                  nausea, vomiting,           



                                                  or diarrhea           

 

           (unknown)  (no       (unknown)  (unknown)  : denies  (units    (unk

nown)



                    date)                         dysuria, hematuria unknown)  



                                                  or flank pain           

 

           (unknown)  (no       (unknown)  (unknown)  General   (units    (unkno

wn)



                    date)                                   unknown)  

 

           (unknown)  (no       (unknown)  (unknown)  General: denies  (units   

 (unknown)



                    date)                         fever, chills, unknown)  



                                                  endorses weakness,           



                                                  right upper           



                                                  quadrant pain           

 

           (unknown)  (no       (unknown)  (unknown)  GenericComposite[P  (units

    (unknown)



                    date)                         lt Count   219 unknown)  



                                                  (150-400)  X10^3/uL           



                                                   ]                  

 

           (unknown)  (no       (unknown)  (unknown)  GenericComposite[R  (units

    (unknown)



                    date)                         BC   3.37 L unknown)  



                                                  (4.5-5.9)  X10^6/uL           



                                                   ]                  

 

           (unknown)  (no       (unknown)  (unknown)  GenericComposite[W  (units

    (unknown)



                    date)                         BC   7.0  unknown)  



                                                  (4.5-11.0)           



                                                  X10^3/uL  ]           

 

           (unknown)  (no       (unknown)  (unknown)  Globulin    3.6  (units   

 (unknown)



                    date)                         (1.7-4.1)  g/dL unknown)  

 

           (unknown)  (no       (unknown)  (unknown)  Glucose    262 H  (units  

  (unknown)



                    date)                         ()  mg/dL unknown)  

 

           (unknown)  (no       (unknown)  (unknown)  H/O colectomy  (units    (

unknown)



                    date)                                   unknown)  

 

           (unknown)  (no       (unknown)  (unknown)  HPI - Weakness  (units    

(unknown)



                    date)                                   unknown)  

 

           (unknown)  (no       (unknown)  (unknown)  HPI Narrative:  (units    

(unknown)



                    date)                                   unknown)  

 

           (unknown)  (no       (unknown)  (unknown)  Hct   26.7 L  (units    (u

nknown)



                    date)                         (41-53)  % unknown)  

 

           (unknown)  (no       (unknown)  (unknown)  Head/Neck: denies  (units 

   (unknown)



                    date)                         headache, neck pain unknown)  

 

           (unknown)  (no       (unknown)  (unknown)  Hgb   9.1 L  (units    (un

known)



                    date)                         (13.5-17.5)  g/dL unknown)  

 

           (unknown)  (no       (unknown)  (unknown)  History of Present  (units

    (unknown)



                    date)                         Illness   unknown)  

 

           (unknown)  (no       (unknown)  (unknown)  History of lumbar  (units 

   (unknown)



                    date)                         surgery () unknown)  

 

           (unknown)  (no       (unknown)  (unknown)  Hx of foot surgery  (units

    (unknown)



                    date)                         (2017)    unknown)  

 

           (unknown)  (no       (unknown)  (unknown)  Hx of shoulder  (units    

(unknown)



                    date)                         surgery (2010) unknown)  

 

           (unknown)  (no       (unknown)  (unknown)  Hypertension  (units    (u

nknown)



                    date)                                   unknown)  

 

           (unknown)  (no       (unknown)  (unknown)  IMPRESSION:? No  (units   

 (unknown)



                    date)                         acute     unknown)  



                                                  cardiopulmonary           



                                                  findings            

 

           (unknown)  (no       (unknown)  (unknown)  INDICATIONS:?  (units    (

unknown)



                    date)                         chest pain unknown)  

 

           (unknown)  (no       (unknown)  (unknown)  Imaging Data  (units    (u

nknown)



                    date)                                   unknown)  

 

           (unknown)  (no       (unknown)  (unknown)  Initial Vital  (units    (

unknown)



                    date)                         Signs     unknown)  

 

           (unknown)  (no       (unknown)  (unknown)  Initial Vital  (units    (

unknown)



                    date)                         Signs:    unknown)  

 

           (unknown)  (no       (unknown)  (unknown)  Lab Data  (units    (unkno

wn)



                    date)                                   unknown)  

 

           (unknown)  (no       (unknown)  (unknown)  Labs:     (units    (unkno

wn)



                    date)                                   unknown)  

 

           (unknown)  (no       (unknown)  (unknown)  Lactate (Lactic  (units   

 (unknown)



                    date)                         Acid) Stat unknown)  

 

           (unknown)  (no       (unknown)  (unknown)  KIMMIE Rocha  (units    (

unknown)



                    date)                         there.  He reports unknown)  



                                                  that he had an           



                                                  upper endoscopy           



                                                  completed here           

 

           (unknown)  (no       (unknown)  (unknown)  Lipase    32  (units    (u

nknown)



                    date)                         ()  U/L unknown)  

 

           (unknown)  (no       (unknown)  (unknown)  Lipase Stat  (units    (un

known)



                    date)                                   unknown)  

 

           (unknown)  (no       (unknown)  (unknown)  Lungs and pleura:?  (units

    (unknown)



                    date)                         Lungs are clear.? unknown)  



                                                  No pleural           



                                                  effusions or           



                                                  pneumothorax.? Low           

 

           (unknown)  (no       (unknown)  (unknown)  Lymph # (Auto)  (units    

(unknown)



                    date)                         400 L   (4847-2002) unknown)  



                                                   /uL                

 

           (unknown)  (no       (unknown)  (unknown)  Lymph % (Auto)  (units    

(unknown)



                    date)                         6.4 L   (25-40)  % unknown)  

 

           (unknown)  (no       (unknown)  (unknown)  MCH   26.9  (units    (unk

nown)



                    date)                         (26-34)  PG unknown)  

 

           (unknown)  (no       (unknown)  (unknown)  MCHC   34.0  (units    (un

known)



                    date)                         (30-36)  % unknown)  

 

           (unknown)  (no       (unknown)  (unknown)  MCV   79.1 L  (units    (u

nknown)



                    date)                         ()  fL unknown)  

 

           (unknown)  (no       (unknown)  (unknown)  MDM - Weakness  (units    

(unknown)



                    date)                                   unknown)  

 

           (unknown)  (no       (unknown)  (unknown)  MSK: denies new  (units   

 (unknown)



                    date)                         joint pain, muscle unknown)  



                                                  weakness or           



                                                  swelling            

 

           (unknown)  (no       (unknown)  (unknown)  Magnesium    1.0 L  (units

    (unknown)



                    date)                          (1.6-2.3)  mg/dL unknown)  

 

           (unknown)  (no       (unknown)  (unknown)  Magnesium Stat  (units    

(unknown)



                    date)                                   unknown)  

 

           (unknown)  (no       (unknown)  (unknown)  Magnesium Sulfate  (units 

   (unknown)



                    date)                         (Magnesium Sulfate) unknown)  



                                                   2 gm in 50 mls @           



                                                  50 mls/hr IV NOW           



                                                  ONE                 

 

           (unknown)  (no       (unknown)  (unknown)  Mediastinum:?  (units    (

unknown)



                    date)                         Mediastinal unknown)  



                                                  contours appear           



                                                  normal.? Heart size           



                                                  is normal.?           

 

           (unknown)  (no       (unknown)  (unknown)  Medical History  (units   

 (unknown)



                    date)                         (Reviewed 22 unknown)  



                                                  @ 14:21 by Will Hansen MD)           

 

           (unknown)  (no       (unknown)  (unknown)  Mode of arrival:  (units  

  (unknown)



                    date)                         Wheelchair unknown)  

 

           (unknown)  (no       (unknown)  (unknown)  Mono # (Auto)  (units    (

unknown)



                    date)                         600   (0-900)  /uL unknown)  

 

           (unknown)  (no       (unknown)  (unknown)  Mono % (Auto)  (units    (

unknown)



                    date)                         7.9   (3-14)  % unknown)  

 

           (unknown)  (no       (unknown)  (unknown)  Mother     (units 

   (unknown)



                    date)                          Cancer   unknown)  

 

           (unknown)  (no       (unknown)  (unknown)  Narrative:  (units    (unk

nown)



                    date)                                   unknown)  

 

           (unknown)  (no       (unknown)  (unknown)  Neuro: denies  (units    (

unknown)



                    date)                         numbness, tingling, unknown)  



                                                  dizziness           

 

           (unknown)  (no       (unknown)  (unknown)  Neut # (Auto)  (units    (

unknown)



                    date)                         5900   (2827-6463) unknown)  



                                                  /uL                 

 

           (unknown)  (no       (unknown)  (unknown)  Neut % (Auto)  (units    (

unknown)



                    date)                         84.7 H   (50-75)  % unknown)  

 

           (unknown)  (no       (unknown)  (unknown)  No Action  (units    (unkn

own)



                    date)                                   unknown)  

 

           (unknown)  (no       (unknown)  (unknown)  No Known Drug  (units    (

unknown)



                    date)                         Allergies Allergy unknown)  



                                                  Verified 22           



                                                  17:38               

 

           (unknown)  (no       (unknown)  (unknown)  Ondansetron HCl  (units   

 (unknown)



                    date)                         (Ondansetron 4 Mg/2 unknown)  



                                                  Ml Inj)  4 mg IV           



                                                  NOW ONE             

 

           (unknown)  (no       (unknown)  (unknown)  Ordered:  (units    (unkno

wn)



                    date)                                   unknown)  

 

           (unknown)  (no       (unknown)  (unknown)  Orders    (units    (unkno

wn)



                    date)                                   unknown)  

 

           (unknown)  (no       (unknown)  (unknown)  Osteoarthritis  (units    

(unknown)



                    date)                                   unknown)  

 

           (unknown)  (no       (unknown)  (unknown)  Oxygen Delivery  (units   

 (unknown)



                    date)                         Method    22 unknown)  



                                                  17:34               

 

           (unknown)  (no       (unknown)  (unknown)  Oxygen Delivery  (units   

 (unknown)



                    date)                         Method Room Air unknown)  

 

           (unknown)  (no       (unknown)  (unknown)  PROCEDURE:? XR  (units    

(unknown)



                    date)                         CHEST 1V  unknown)  

 

           (unknown)  (no       (unknown)  (unknown)  Patient History  (units   

 (unknown)



                    date)                                   unknown)  

 

           (unknown)  (no       (unknown)  (unknown)  Patient is  (units    (unk

nown)



                    date)                         currently unknown)  



                                                  anticoagulated on           



                                                  Plavix 37.5 mg           



                                                  daily.  Dr. Gómez           

 

           (unknown)  (no       (unknown)  (unknown) Patient reports  (units    

(unknown)



                    date)                         that when he has a unknown)  



                                                  bowel movement, he           



                                                  typically has hard           



                                                  pellets             

 

           (unknown)  (no       (unknown)  (unknown)  Patient:  (units    (unkno

wn)



                    date)                         Kassandra Leon R unknown)  



                                                  MR#: [id number]            

 

           (unknown)  (no       (unknown)  (unknown)  Postlaminectomy  (units   

 (unknown)



                    date)                         syndrome  unknown)  

 

           (unknown)  (no       (unknown)  (unknown)  Potassium    3.7  (units  

  (unknown)



                    date)                         (3.4-5.1)  mmol/L unknown)  

 

           (unknown)  (no       (unknown)  (unknown)  Prescriptions:  (units    

(unknown)



                    date)                                   unknown)  

 

           (unknown)  (no       (unknown)  (unknown)  Procalcitonin Stat  (units

    (unknown)



                    date)                                   unknown)  

 

           (unknown)  (no       (unknown)  (unknown)  Pulse Oximetry  98  (units

    (unknown)



                    date)                           22 17:34 unknown)  

 

           (unknown)  (no       (unknown)  (unknown)  Pulse Oximetry 98  (units 

   (unknown)



                    date)                                   unknown)  

 

           (unknown)  (no       (unknown)  (unknown)  Pulse Rate  77  (units    

(unknown)



                    date)                         22 17:34 unknown)  

 

           (unknown)  (no       (unknown)  (unknown)  Pulse Rate 77  (units    (

unknown)



                    date)                                   unknown)  

 

           (unknown)  (no       (unknown)  (unknown)  RDW   16.1 H  (units    (u

nknown)



                    date)                         (11.6-14.8)  % unknown)  

 

           (unknown)  (no       (unknown)  (unknown)  Referrals:  (units    (unk

nown)



                    date)                                   unknown)  

 

           (unknown)  (no       (unknown)  (unknown)  Related Data  (units    (u

nknown)



                    date)                                   unknown)  

 

           (unknown)  (no       (unknown)  (unknown)  Respiratory Rate  (units  

  (unknown)



                    date)                         18   22 17:34 unknown)  

 

           (unknown)  (no       (unknown)  (unknown)  Respiratory Rate  (units  

  (unknown)



                    date)                         18        unknown)  

 

           (unknown)  (no       (unknown)  (unknown)  Respiratory:  (units    (u

nknown)



                    date)                         denies shortness of unknown)  



                                                  breath, cough           

 

           (unknown)  (no       (unknown)  (unknown)  Result diagrams:  (units  

  (unknown)



                    date)                                   unknown)  

 

           (unknown)  (no       (unknown)  (unknown)  Review of Systems  (units 

   (unknown)



                    date)                                   unknown)  

 

           (unknown)  (no       (unknown)  (unknown)  SARS-CoV-2 (PCR)  (units  

  (unknown)



                    date)                         Negative  unknown)  



                                                  (Negative)           

 

           (unknown)  (no       (unknown)  (unknown)  Sciatica  (units    (unkno

wn)



                    date)                                   unknown)  

 

           (unknown)  (no       (unknown)  (unknown)  Signed By:  (units    (unk

nown)



                    date)                                   unknown)  

 

           (unknown)  (no       (unknown)  (unknown)  Skin: denies rash,  (units

    (unknown)



                    date)                         itching or wound unknown)  

 

           (unknown)  (no       (unknown)  (unknown)  Smoking Status:  (units   

 (unknown)



                    date)                         Never smoker unknown)  

 

           (unknown)  (no       (unknown)  (unknown)  Smoking Status:  (units   

 (unknown)



                    date)                         Never smoker unknown)  

 

           (unknown)  (no       (unknown)  (unknown)  Social History  (units    

(unknown)



                    date)                         (Reviewed 22 unknown)  



                                                  @ 13:31 by Tanya Rocha MD)           

 

           (unknown)  (no       (unknown)  (unknown)  Sodium    135 L  (units   

 (unknown)



                    date)                         (137-145)  mmol/L unknown)  

 

           (unknown)  (no       (unknown)  (unknown)  Sodium Chloride  (units   

 (unknown)



                    date)                         (Normal Saline unknown)  



                                                  0.9%)  1,000 mls @           



                                                  1,000 mls/hr IV           



                                                  BOLUS ONE           

 

           (unknown)  (no       (unknown)  (unknown)  Source: patient  (units   

 (unknown)



                    date)                         and family unknown)  

 

           (unknown)  (no       (unknown)  (unknown)  Spinal stenosis  (units   

 (unknown)



                    date)                                   unknown)  

 

           (unknown)  (no       (unknown)  (unknown)  Stated complaint:  (units 

   (unknown)



                    date)                         WEAKNESS UNABLE TO unknown)  



                                                  HOLD ANYTHING DOWN           

 

           (unknown)  (no       (unknown)  (unknown)  Substance Use  (units    (

unknown)



                    date)                         Type: does not use unknown)  

 

           (unknown)  (no       (unknown)  (unknown)  Surgical History  (units  

  (unknown)



                    date)                         (Reviewed 22 unknown)  



                                                  @ 14:21 by Will Hansen MD)           

 

           (unknown)  (no       (unknown)  (unknown)  Surgical changes  (units  

  (unknown)



                    date)                         and devices:? unknown)  



                                                  None.?              

 

           (unknown)  (no       (unknown)  (unknown)  TECHNIQUE:? One  (units   

 (unknown)



                    date)                         view of the chest unknown)  



                                                  was acquired.?           

 

           (unknown)  (no       (unknown)  (unknown)  Temperature  98.3  (units 

   (unknown)



                    date)                         F   22 17:34 unknown)  

 

           (unknown)  (no       (unknown)  (unknown)  Temperature 98.3 F  (units

    (unknown)



                    date)                                   unknown)  

 

           (unknown)  (no       (unknown)  (unknown)  Time Seen by  (units    (u

nknown)



                    date)                         Provider: 22 unknown)  



                                                  19:11               

 

           (unknown)  (no       (unknown)  (unknown)  Total Bilirubin  (units   

 (unknown)



                    date)                         0.6  (0.2-1.3) unknown)  



                                                  mg/dL               

 

           (unknown)  (no       (unknown)  (unknown)  Total Creatine  (units    

(unknown)



                    date)                         Kinase    54 L unknown)  



                                                  ()  U/L           

 

           (unknown)  (no       (unknown)  (unknown)  Total Protein  (units    (

unknown)



                    date)                         7.5  (6.3-8.2) unknown)  



                                                  g/dL                

 

           (unknown)  (no       (unknown)  (unknown)  Troponin + CK  (units    (

unknown)



                    date)                         Cardiac Panel Stat unknown)  

 

           (unknown)  (no       (unknown)  (unknown)  Troponin I    <  (units   

 (unknown)



                    date)                         0.012  (0.01-0.034) unknown)  



                                                   ng/mL              

 

           (unknown)  (no       (unknown)  (unknown)  Urine Microscopic  (units 

   (unknown)



                    date)                         Stat      unknown)  

 

           (unknown)  (no       (unknown)  (unknown)  Vital Signs  (units    (un

known)



                    date)                                   unknown)  

 

           (unknown)  (no       (unknown)  (unknown)  Vital signs:  (units    (u

nknown)



                    date)                                   unknown)  

 

           (unknown)  (no       (unknown)  (unknown)  XR chest 1V Stat  (units  

  (unknown)



                    date)                                   unknown)  

 

           (unknown)  (no       (unknown)  (unknown)  [Embedded Image  (units   

 (unknown)



                    date)                         Not Available] unknown)  

 

           (unknown)  (no       (unknown)  (unknown)  admitted, went to  (units 

   (unknown)



                    date)                         sound view for unknown)  



                                                  rehab following his           



                                                  admission and has           



                                                  followed            

 

           (unknown)  (no       (unknown)  (unknown)  alcohol intake  (units    

(unknown)



                    date)                         frequency: a few unknown)  



                                                  times a month           

 

           (unknown)  (no       (unknown)  (unknown)  alcohol intake:  (units   

 (unknown)



                    date)                         current   unknown)  

 

           (unknown)  (no       (unknown)  (unknown)  appear    (units    (unkno

wn)



                    date)                                   unknown)  

 

           (unknown)  (no       (unknown)  (unknown)  ascorbic acid  (units    (

unknown)



                    date)                         (vitamin C) 500 mg unknown)  



                                                  500 mg PO BID #60           



                                                  tabs 22           

 

           (unknown)  (no       (unknown)  (unknown)  at this hospital  (units  

  (unknown)



                    date)                         and on chart review unknown)  



                                                  it appears that it           



                                                  was completed on           

 

           (unknown)  (no       (unknown)  (unknown)  atorvastatin 80 mg  (units

    (unknown)



                    date)                         tablet 40 mg PO QPM unknown)  



                                                  20           

 

           (unknown)  (no       (unknown)  (unknown)  be causing a rash  (units 

   (unknown)



                    date)                                   unknown)  

 

           (unknown)  (no       (unknown)  (unknown)  buspirone 5 mg  (units    

(unknown)



                    date)                         tablet 5 mg PO BID unknown)  



                                                  #60 tabs 22           

 

           (unknown)  (no       (unknown)  (unknown)  clopidogrel 75 mg  (units 

   (unknown)



                    date)                         tablet 75 mg PO unknown)  



                                                  DAILY 22            

 

           (unknown)  (no       (unknown)  (unknown)  cyanocobalamin  (units    

(unknown)



                    date)                         (vitamin B-12) 500 unknown)  



                                                  1,000 mcg PO DAILY           



                                                  #60 tabs 22           

 

           (unknown)  (no       (unknown)  (unknown)  ferrous sulfate  (units   

 (unknown)



                    date)                         325 mg (65 mg 325 unknown)  



                                                  mg PO BIDWM #60           



                                                  tabs 22           

 

           (unknown)  (no       (unknown)  (unknown)  followed by loose  (units 

   (unknown)



                    date)                         stool.    unknown)  

 

           (unknown)  (no       (unknown)  (unknown)  gabapentin 300 mg  (units 

   (unknown)



                    date)                         capsule 300 mg PO unknown)  



                                                  BID 18            

 

           (unknown)  (no       (unknown)  (unknown)  glipizide 10 mg  (units   

 (unknown)



                    date)                         tablet 10 mg PO BID unknown)  



                                                  18           

 

           (unknown)  (no       (unknown)  (unknown)  history of colon  (units  

  (unknown)



                    date)                         cancer.  Postop unknown)  



                                                  diagnosis from this           



                                                  upper endoscopy was           



                                                  the                 

 

           (unknown)  (no       (unknown)  (unknown)  household members:  (units

    (unknown)



                    date)                          spouse   unknown)  

 

           (unknown)  (no       (unknown)  (unknown)  hypertension,  (units    (

unknown)



                    date)                         colorectal cancer unknown)  



                                                  in  with a           



                                                  reverse             



                                                  colonoscopy, a           



                                                  stroke in           

 

           (unknown)  (no       (unknown)  (unknown)  in 2022  (units   

 (unknown)



                    date)                         with iron unknown)  



                                                  deficiency anemia,           



                                                  altered bowel           



                                                  habit, personal           

 

           (unknown)  (no       (unknown)  (unknown)  iron) tablet  (units    (u

nknown)



                    date)                                   unknown)  

 

           (unknown)  (no       (unknown)  (unknown)  lives     (units    (unkno

wn)



                    date)                         independently:  Yes unknown)  

 

           (unknown)  (no       (unknown)  (unknown)  losartan 50 mg  (units    

(unknown)



                    date)                         tablet 25 mg PO QPM unknown)  



                                                  20           

 

           (unknown)  (no       (unknown)  (unknown)  lung      (units    (unkno

wn)



                    date)                                   unknown)  

 

           (unknown)  (no       (unknown)  (unknown)  mcg tablet  (units    (unk

nown)



                    date)                                   unknown)  

 

           (unknown)  (no       (unknown)  (unknown)  mcg tablet  (units    (unk

nown)



                    date)                         (Tab-A-Lucy) unknown)  

 

           (unknown)  (no       (unknown)  (unknown)  metformin 500 mg  (units  

  (unknown)



                    date)                         tablet,extended 500 unknown)  



                                                  mg PO BID 18            

 

           (unknown)  (no       (unknown)  (unknown)  metoprolol  (units    (unk

nown)



                    date)                         succinate 25 mg 25 unknown)  



                                                  mg PO DAILY           



                                                  21           

 

           (unknown)  (no       (unknown)  (unknown)  moving forward.  (units   

 (unknown)



                    date)                                   unknown)  

 

           (unknown)  (no       (unknown)  (unknown)  multivitamin with  (units 

   (unknown)



                    date)                         folic acid 400 1 unknown)  



                                                  tab PO DAILY #90           



                                                  tabs 22           

 

           (unknown)  (no       (unknown)  (unknown)  ondansetron HCl 4  (units 

   (unknown)



                    date)                         mg tablet 4 mg PO unknown)  



                                                  Q8H PRN nausea and           



                                                  21            

 

           (unknown)  (no       (unknown)  (unknown)  oral powder packet  (units

    (unknown)



                    date)                                   unknown)  

 

           (unknown)  (no       (unknown)  (unknown)  polyethylene  (units    (u

nknown)



                    date)                         glycol 3350 17 gram unknown)  



                                                  17 gm PO DAILY #90           



                                                  ea 22           

 

           (unknown)  (no       (unknown)  (unknown)  related diarrhea  (units  

  (unknown)



                    date)                         from fecal loading unknown)  



                                                  and obstipation.           



                                                  Recommended MiraLax           



                                                  17 g                

 

           (unknown)  (no       (unknown)  (unknown)  release 24 hr  (units    (

unknown)



                    date)                                   unknown)  

 

           (unknown)  (no       (unknown)  (unknown)  reported that  (units    (

unknown)



                    date)                         patient's altered unknown)  



                                                  bowel habit is           



                                                  likely a function           



                                                  of overflow           

 

           (unknown)  (no       (unknown)  (unknown)  retrosigmoid  (units    (u

nknown)



                    date)                         colonic   unknown)  



                                                  anastomosis, and a           



                                                  suboptimal bowel           



                                                  prep.               

 

           (unknown)  (no       (unknown)  (unknown)  same.  His  (units    (unk

nown)



                    date)                         endoscopic unknown)  



                                                  diagnosis includes           



                                                  mild gastropathy,           



                                                  esophagitis, patent           

 

           (unknown)  (no       (unknown)  (unknown)  sennosides 8.6 mg  (units 

   (unknown)



                    date)                         tablet (senna) 17.2 unknown)  



                                                  mg PO BEDTIME #60           



                                                  tabs 22           

 

           (unknown)  (no       (unknown)  (unknown)  substance use  (units    (

unknown)



                    date)                         type:  does not use unknown)  

 

           (unknown)  (no       (unknown)  (unknown)  tablet (Vitamin C)  (units

    (unknown)



                    date)                                   unknown)  

 

           (unknown)  (no       (unknown)  (unknown)  tablet,extended  (units   

 (unknown)



                    date)                         release 24 hr unknown)  

 

           (unknown)  (no       (unknown)  (unknown)  the emergency  (units    (

unknown)



                    date)                         department in unknown)  



                                                  January and           



                                                  diagnosed with a GI           



                                                  bleed, he was           

 

           (unknown)  (no       (unknown)  (unknown)  trospium 20 mg  (units    

(unknown)



                    date)                         tablet 20 mg PO BID unknown)  



                                                  urinary retention           



                                                  22           

 

           (unknown)  (no       (unknown)  (unknown)  unremarkable.?  (units    

(unknown)



                    date)                                   unknown)  

 

           (unknown)  (no       (unknown)  (unknown)  up with   (units    (unkno

wn)



                    date)                         Gastroenterology unknown)  



                                                  from [place],           



                                                  patient reports           



                                                  that he sees Yasmine           

 

           (unknown)  (no       (unknown)  (unknown)  volumes accentuate  (units

    (unknown)



                    date)                         pulmonary unknown)  



                                                  interstitium and           



                                                  heart size.           









                                         Result panel 17









           (unknown)  (no date)  (unknown)  (unknown)  2.6       mmol/L    (unkn

own)









                                         Result panel 18









           (unknown)  (no       (unknown)  (unknown)  (no value)  (units    (unk

nown)



                    date)                                   unknown)  

 

           (unknown)  (no       (unknown)  (unknown)  Radiologist  (units    (un

known)



                    date)                         Impression: unknown)  

 

           (unknown)  (no       (unknown)  (unknown)  Date of Service:  (units  

  (unknown)



                    date)                         22  unknown)  

 

           (unknown)  (no       (unknown)  (unknown)  (no value)  (units    (unk

nown)



                    date)                                   unknown)  

 

           (unknown)  (no       (unknown)  (unknown)  22 17:45  (units    

(unknown)



                    date)                                   unknown)  

 

           (unknown)  (no       (unknown)  (unknown)  1 tab PO DAILY  (units    

(unknown)



                    date)                         Qty: 90 0RF unknown)  

 

           (unknown)  (no       (unknown)  (unknown)  1,000 mcg PO DAILY  (units

    (unknown)



                    date)                         Qty: 60 0RF unknown)  

 

           (unknown)  (no       (unknown)  (unknown)  10 mg PO BID  (units    (u

nknown)



                    date)                                   unknown)  

 

           (unknown)  (no       (unknown)  (unknown)  17 gm PO DAILY  (units    

(unknown)



                    date)                         Qty: 90 0RF unknown)  

 

           (unknown)  (no       (unknown)  (unknown)  17.2 mg PO BEDTIME  (units

    (unknown)



                    date)                         Qty: 60 0RF unknown)  

 

           (unknown)  (no       (unknown)  (unknown)  20 mg PO BID  (units    (u

nknown)



                    date)                                   unknown)  

 

           (unknown)  (no       (unknown)  (unknown)  25 mg PO DAILY  (units    

(unknown)



                    date)                                   unknown)  

 

           (unknown)  (no       (unknown)  (unknown)  25 mg PO QPM  (units    (u

nknown)



                    date)                                   unknown)  

 

           (unknown)  (no       (unknown)  (unknown)  300 mg PO BID  (units    (

unknown)



                    date)                                   unknown)  

 

           (unknown)  (no       (unknown)  (unknown)  325 mg PO BIDWM  (units   

 (unknown)



                    date)                         Qty: 60 0RF unknown)  

 

           (unknown)  (no       (unknown)  (unknown)  4 mg PO Q8H PRN  (units   

 (unknown)



                    date)                         (Reason: nausea and unknown)  



                                                  vomiting) Qty: 14           



                                                  0RF                 

 

           (unknown)  (no       (unknown)  (unknown)  40 mg PO QPM  (units    (u

nknown)



                    date)                                   unknown)  

 

           (unknown)  (no       (unknown)  (unknown)  5 mg PO BID Qty:  (units  

  (unknown)



                    date)                         60 0RF    unknown)  

 

           (unknown)  (no       (unknown)  (unknown)  500 mg PO BID  (units    (

unknown)



                    date)                                   unknown)  

 

           (unknown)  (no       (unknown)  (unknown)  500 mg PO BID Qty:  (units

    (unknown)



                    date)                         60 0RF    unknown)  

 

           (unknown)  (no       (unknown)  (unknown)  75 mg PO DAILY  (units    

(unknown)



                    date)                                   unknown)  

 

           (unknown)  (no       (unknown)  (unknown)  Allergies  (units    (unkn

own)



                    date)                                   unknown)  

 

           (unknown)  (no       (unknown)  (unknown)  Documented By: [facility]  (units

    (unknown)



                    date)                                   unknown)  

 

           (unknown)  (no       (unknown)  (unknown)  ED Orders  (units    (unkn

own)



                    date)                                   unknown)  

 

           (unknown)  (no       (unknown)  (unknown)  Emergency Report  (units  

  (unknown)



                    date)                                   unknown)  

 

           (unknown)  (no       (unknown)  (unknown)  Home Medications  (units  

  (unknown)



                    date)                                   unknown)  

 

           (unknown)  (no       (unknown)  (unknown)  [facility]  (units   

 (unknown)



                    date)                         [address] unknown)  



                                                  [place] [address]           

 

           (unknown)  (no       (unknown)  (unknown)  Lab Results  (units    (un

known)



                    date)                                   unknown)  

 

           (unknown)  (no       (unknown)  (unknown)  Label Comments:  (units   

 (unknown)



                    date)                                   unknown)  

 

           (unknown)  (no       (unknown)  (unknown)  Last Admin:  (units    (un

known)



                    date)                         22 18:30 unknown)  



                                                  Dose: 4 mg           

 

           (unknown)  (no       (unknown)  (unknown)  Previous Rx's  (units    (

unknown)



                    date)                                   unknown)  

 

           (unknown)  (no       (unknown)  (unknown)  Stop: 22  (units    

(unknown)



                    date)                         18:16     unknown)  

 

           (unknown)  (no       (unknown)  (unknown)  Stop: 22  (units    

(unknown)



                    date)                         20:25     unknown)  

 

           (unknown)  (no       (unknown)  (unknown)  Stop: 22  (units    

(unknown)



                    date)                         20:27     unknown)  

 

           (unknown)  (no       (unknown)  (unknown)  Vital Signs - 8 hr  (units

    (unknown)



                    date)                                   unknown)  

 

           (unknown)  (no       (unknown)  (unknown)  has not been  (units    (u

nknown)



                    date)                         eating so hasn't unknown)  



                                                  taken recently.           



                                                  spouse states           



                                                  thinks it might           

 

           (unknown)  (no       (unknown)  (unknown)  stopped taking  (units    

(unknown)



                    date)                         prior to back unknown)  



                                                  surgery and did not           



                                                  restart             

 

           (unknown)  (no       (unknown)  (unknown)  (no value)  (units    (unk

nown)



                    date)                                   unknown)  

 

           (unknown)  (no       (unknown)  (unknown)  22  (units 

   (unknown)



                    date)                         22  unknown)  



                                                  Range/Units           

 

           (unknown)  (no       (unknown)  (unknown)  17:40 17:45 17:45  (units 

   (unknown)



                    date)                                   unknown)  

 

           (unknown)  (no       (unknown)  (unknown)  ascorbic acid  (units    (

unknown)



                    date)                         (vitamin C) unknown)  



                                                  [Vitamin C] 500 mg           



                                                  Tablet              

 

           (unknown)  (no       (unknown)  (unknown)  atorvastatin 80 mg  (units

    (unknown)



                    date)                         tablet    unknown)  

 

           (unknown)  (no       (unknown)  (unknown)  buspirone 5 mg  (units    

(unknown)



                    date)                         Tablet    unknown)  

 

           (unknown)  (no       (unknown)  (unknown)  clopidogrel 75 mg  (units 

   (unknown)



                    date)                         tablet    unknown)  

 

           (unknown)  (no       (unknown)  (unknown)  cyanocobalamin  (units    

(unknown)



                    date)                         (vitamin B-12) 500 unknown)  



                                                  mcg Tablet           

 

           (unknown)  (no       (unknown)  (unknown)  ferrous sulfate  (units   

 (unknown)



                    date)                         325 mg (65 mg iron) unknown)  



                                                  Tablet              

 

           (unknown)  (no       (unknown)  (unknown)  gabapentin 300 mg  (units 

   (unknown)



                    date)                         capsule   unknown)  

 

           (unknown)  (no       (unknown)  (unknown)  glipizide 10 mg  (units   

 (unknown)



                    date)                         tablet    unknown)  

 

           (unknown)  (no       (unknown)  (unknown)  losartan 50 mg  (units    

(unknown)



                    date)                         tablet    unknown)  

 

           (unknown)  (no       (unknown)  (unknown)  metformin 500 mg  (units  

  (unknown)



                    date)                         tablet extended unknown)  



                                                  release 24 hr           

 

           (unknown)  (no       (unknown)  (unknown)  metoprolol  (units    (unk

nown)



                    date)                         succinate 25 mg unknown)  



                                                  tablet extended           



                                                  release 24 hr           

 

           (unknown)  (no       (unknown)  (unknown)  multivitamin with  (units 

   (unknown)



                    date)                         folic acid unknown)  



                                                  [Tab-A-Lucy] 400           



                                                  mcg Tablet           

 

           (unknown)  (no       (unknown)  (unknown)  ondansetron HCl  (units   

 (unknown)



                    date)                         [Zofran] 4 mg unknown)  



                                                  tablet              

 

           (unknown)  (no       (unknown)  (unknown)  polyethylene  (units    (u

nknown)



                    date)                         glycol 3350 17 gram unknown)  



                                                  Powder In Packet           

 

           (unknown)  (no       (unknown)  (unknown)  sennosides [senna]  (units

    (unknown)



                    date)                         8.6 mg Tablet unknown)  

 

           (unknown)  (no       (unknown)  (unknown)  trospium 20 mg  (units    

(unknown)



                    date)                         tablet    unknown)  

 

           (unknown)  (no       (unknown)  (unknown)  22  (units    (unkno

wn)



                    date)                                   unknown)  

 

           (unknown)  (no       (unknown)  (unknown)  Medication  (units    (unk

nown)



                    date)                         Instructions unknown)  



                                                  Recorded            

 

           (unknown)  (no       (unknown)  (unknown)  Medication  (units    (unk

nown)



                    date)                         Instructions unknown)  



                                                  Recorded  Confirmed           

 

           (unknown)  (no       (unknown)  (unknown)  (Zofran) vomiting  (units 

   (unknown)



                    date)                         #14 tabs  unknown)  

 

           (unknown)  (no       (unknown)  (unknown)  [id number]   (units    (unkno

wn)



                    date)                                   unknown)  

 

           (unknown)  (no       (unknown)  (unknown)  22 17:39  (units    

(unknown)



                    date)                                   unknown)  

 

           (unknown)  (no       (unknown)  (unknown)  22 17:40  (units    

(unknown)



                    date)                                   unknown)  

 

           (unknown)  (no       (unknown)  (unknown)  22 17:45  (units    

(unknown)



                    date)                                   unknown)  

 

           (unknown)  (no       (unknown)  (unknown)  22 19:26  (units    

(unknown)



                    date)                                   unknown)  

 

           (unknown)  (no       (unknown)  (unknown)  22 19:28  (units    

(unknown)



                    date)                                   unknown)  

 

           (unknown)  (no       (unknown)  (unknown)  17:34     (units    (unkno

wn)



                    date)                                   unknown)  

 

           (unknown)  (no       (unknown)  (unknown)  with iron  (units    (

unknown)



                    date)                         deficiency anemia, unknown)  



                                                  altered bowel           



                                                  habit, personal           



                                                  history of colon           

 

           (unknown)  (no       (unknown)  (unknown)  62-year-old  (units    (un

known)



                    date)                         gentleman with a unknown)  



                                                  history of multiple           



                                                  sclerosis, CVA in           



                                                  2019,               

 

           (unknown)  (no       (unknown)  (unknown)  ?         (units    (unkno

wn)



                    date)                                   unknown)  

 

           (unknown)  (no       (unknown)  (unknown)  ALT    16  (<50)  (units  

  (unknown)



                    date)                         IU/L      unknown)  

 

           (unknown)  (no       (unknown)  (unknown)  AST    19  (17-59)  (units

    (unknown)



                    date)                          IU/L     unknown)  

 

           (unknown)  (no       (unknown)  (unknown)  Age/Sex: 62 / M  (units   

 (unknown)



                    date)                                   unknown)  

 

           (unknown)  (no       (unknown)  (unknown)  Albumin    3.9  (units    

(unknown)



                    date)                         (3.5-5.0)  g/dL unknown)  

 

           (unknown)  (no       (unknown)  (unknown)  Albumin/Globulin  (units  

  (unknown)



                    date)                         Ratio    1.1 unknown)  



                                                  (1.0-2.8)           

 

           (unknown)  (no       (unknown)  (unknown)  Alkaline  (units    (unkno

wn)



                    date)                         Phosphatase    105 unknown)  



                                                  ()  U/L           

 

           (unknown)  (no       (unknown)  (unknown)  Allergy/AdvReac  (units   

 (unknown)



                    date)                         Type Severity unknown)  



                                                  Reaction Status           



                                                  Date / Time           

 

           (unknown)  (no       (unknown)  (unknown)  Approved by: Garfield Mcdanielsunits 

   (unknown)



                    date)                         Lopez, M.D. on unknown)  



                                                  2022 at 18:02?           

 

           (unknown)  (no       (unknown)  (unknown)  BUN    21 H  (units    (un

known)



                    date)                         (9-20)  mg/dL unknown)  

 

           (unknown)  (no       (unknown)  (unknown)  BUN/Creatinine  (units    

(unknown)



                    date)                         Ratio    14.8 unknown)  



                                                  (6-22)              

 

           (unknown)  (no       (unknown)  (unknown)  Baso # (Auto)   0  (units 

   (unknown)



                    date)                          (0-100)  /uL unknown)  

 

           (unknown)  (no       (unknown)  (unknown)  Baso % (Auto)  (units    (

unknown)



                    date)                         0.2   (0-2)  % unknown)  

 

           (unknown)  (no       (unknown)  (unknown)  Blood Pressure  (units    

(unknown)



                    date)                         124/66   22 unknown)  



                                                  17:34               

 

           (unknown)  (no       (unknown)  (unknown)  Blood Pressure  (units    

(unknown)



                    date)                         124    unknown)  

 

           (unknown)  (no       (unknown)  (unknown)  Bones and chest  (units   

 (unknown)



                    date)                         wall:? No unknown)  



                                                  suspicious bony           



                                                  lesions.? Overlying           



                                                  soft tissues           

 

           (unknown)  (no       (unknown)  (unknown)  CK-MB (CK-2)  (units    (u

nknown)



                    date)                         TNP       unknown)  

 

           (unknown)  (no       (unknown)  (unknown)  CK-MB (CK-2) Rel  (units  

  (unknown)



                    date)                         Index    TNP unknown)  

 

           (unknown)  (no       (unknown)  (unknown)  COMPARISON:?  (units    (u

nknown)



                    date)                         [facility], unknown)  



                                                  CR, XR CHEST 2V,           



                                                  3/18/2021, 10:19.           

 

           (unknown)  (no       (unknown)  (unknown)  COVID19 -Nasal  (units    

(unknown)



                    date)                         RAPID/Pre-Proc Stat unknown)  

 

           (unknown)  (no       (unknown)  (unknown)  CT abdomen pelvis  (units 

   (unknown)



                    date)                         w con Stat unknown)  

 

           (unknown)  (no       (unknown)  (unknown)  CVA (cerebral  (units    (

unknown)



                    date)                         vascular accident) unknown)  



                                                  (2018)           

 

           (unknown)  (no       (unknown)  (unknown)  Calcium    9.3  (units    

(unknown)



                    date)                         (8.4-10.2)  mg/dL unknown)  

 

           (unknown)  (no       (unknown)  (unknown)  Carbon Dioxide  (units    

(unknown)



                    date)                         29  (22-32)  mmol/L unknown)  

 

           (unknown)  (no       (unknown)  (unknown)  Cardio: denies  (units    

(unknown)



                    date)                         chest pain, unknown)  



                                                  palpitations           

 

           (unknown)  (no       (unknown)  (unknown)  Cardiovascular:  (units   

 (unknown)



                    date)                         regular rate and unknown)  



                                                  rhythm, no           



                                                  peripheral edema,           



                                                  warm extremities           

 

           (unknown)  (no       (unknown)  (unknown)  Chest x-ray:  (units    (u

nknown)



                    date)                                   unknown)  

 

           (unknown)  (no       (unknown)  (unknown)  Chief complaint:  (units  

  (unknown)



                    date)                         Weakness  unknown)  

 

           (unknown)  (no       (unknown)  (unknown)  Chloride    96 L  (units  

  (unknown)



                    date)                         ()  mmol/L unknown)  

 

           (unknown)  (no       (unknown)  (unknown)  Colon cancer  (units    (u

nknown)



                    date)                         (2013)    unknown)  

 

           (unknown)  (no       (unknown)  (unknown)  Complete Blood  (units    

(unknown)



                    date)                         Count AUTO DIFF unknown)  



                                                  Stat                

 

           (unknown)  (no       (unknown)  (unknown)  Comprehensive  (units    (

unknown)



                    date)                         Metabolic Panel unknown)  



                                                  Stat                

 

           (unknown)  (no       (unknown)  (unknown)  Course    (units    (unkno

wn)



                    date)                                   unknown)  

 

           (unknown)  (no       (unknown)  (unknown)  Creatinine    1.42  (units

    (unknown)



                    date)                         H  (0.66-1.25) unknown)  



                                                  mg/dL               

 

           (unknown)  (no       (unknown)  (unknown)  : 1960  (units   

 (unknown)



                    date)                         Acct:[id number] unknown)  

 

           (unknown)  (no       (unknown)  (unknown)  Departure  (units    (unkn

own)



                    date)                                   unknown)  

 

           (unknown)  (no       (unknown)  (unknown)  Depression  (units    (unk

nown)



                    date)                                   unknown)  

 

           (unknown)  (no       (unknown)  (unknown)  Diabetes  (units    (unkno

wn)



                    date)                                   unknown)  

 

           (unknown)  (no       (unknown)  (unknown)  Diabetic  (units    (unkno

wn)



                    date)                         neuropathy unknown)  

 

           (unknown)  (no       (unknown)  (unknown)  Discharge Plan  (units    

(unknown)



                    date)                                   unknown)  

 

           (unknown)  (no       (unknown)  (unknown)  Discontinued  (units    (u

nknown)



                    date)                         Medications unknown)  

 

           (unknown)  (no       (unknown)  (unknown)  Gustavo Macias MD  (units   

 (unknown)



                    date)                         [Primary Care unknown)  



                                                  Provider] -           

 

           (unknown)  (no       (unknown)  (unknown)  EKG-12 Lead Stat  (units  

  (unknown)



                    date)                                   unknown)  

 

           (unknown)  (no       (unknown)  (unknown)  ER Physician:  (units    (

unknown)



                    date)                         Kitty Costello unknown)  



                                                  ARNP                

 

           (unknown)  (no       (unknown)  (unknown)  Eos # (Auto)   100  (units

    (unknown)



                    date)                           (0-450)  /uL unknown)  

 

           (unknown)  (no       (unknown)  (unknown)  Eos % (Auto)   0.8  (units

    (unknown)



                    date)                         L   (2-4)  % unknown)  

 

           (unknown)  (no       (unknown)  (unknown)  Estimated GFR  (units    (

unknown)



                    date)                         55 L  (>60)  mL/min unknown)  

 

           (unknown)  (no       (unknown)  (unknown)  Exam      (units    (unkno

wn)



                    date)                                   unknown)  

 

           (unknown)  (no       (unknown)  (unknown)  Exam Narrative:  (units   

 (unknown)



                    date)                                   unknown)  

 

           (unknown)  (no       (unknown)  (unknown)  Eyes: denies  (units    (u

nknown)



                    date)                         visual changes, eye unknown)  



                                                  pain                

 

           (unknown)  (no       (unknown)  (unknown)  Eyes: equal round  (units 

   (unknown)



                    date)                         and reactive, EOMI, unknown)  



                                                  conjunctiva normal           

 

           (unknown)  (no       (unknown)  (unknown)  FINDINGS:?  (units    (unk

nown)



                    date)                                   unknown)  

 

           (unknown)  (no       (unknown)  (unknown)  Family History  (units    

(unknown)



                    date)                         (Reviewed 22 unknown)  



                                                  @ 19:57 by ZAIRA Amezcua)           

 

           (unknown)  (no       (unknown)  (unknown)  Father     (units 

   (unknown)



                    date)                          Cancer   unknown)  

 

           (unknown)  (no       (unknown)  (unknown)  GERD      (units    (unkno

wn)



                    date)                         (gastroesophageal unknown)  



                                                  reflux disease)           

 

           (unknown)  (no       (unknown)  (unknown)  GI:  Right lower  (units  

  (unknown)



                    date)                         quadrant tenderness unknown)  



                                                  to palpation           

 

           (unknown)  (no       (unknown)  (unknown)  GI: abdomen mildly  (units

    (unknown)



                    date)                         guarded, tender to unknown)  



                                                  palpation in the           



                                                  right lower           



                                                  quadrant,           

 

           (unknown)  (no       (unknown)  (unknown)  :  Straight cath  (units

    (unknown)



                    date)                         for urine, no rash unknown)  



                                                  ingrown, no penile           



                                                  discharge, no           



                                                  scrotal             

 

           (unknown)  (no       (unknown)  (unknown)  : denies  (units    (unk

nown)



                    date)                         dysuria, hematuria unknown)  



                                                  or flank pain,           



                                                  reports oliguria,           



                                                  patient self           

 

           (unknown)  (no       (unknown)  (unknown)  Gastroenterology  (units  

  (unknown)



                    date)                         from [place], unknown)  



                                                  patient reports           



                                                  that he sees Yasmine Rocha PA-C           

 

           (unknown)  (no       (unknown)  (unknown)  General   (units    (unkno

wn)



                    date)                                   unknown)  

 

           (unknown)  (no       (unknown)  (unknown)  General:  (units    (unkno

wn)



                    date)                         cooperative, unknown)  



                                                  comfortable, in no           



                                                  acute distress,           



                                                  well groomed,           

 

           (unknown)  (no       (unknown)  (unknown)  General: denies  (units   

 (unknown)



                    date)                         fever, chills, unknown)  



                                                  endorses weakness,           



                                                  right lower           



                                                  quadrant pain,           

 

           (unknown)  (no       (unknown)  (unknown)  GenericComposite[P  (units

    (unknown)



                    date)                         lt Count   219 unknown)  



                                                  (150-400)  X10^3/uL           



                                                   ]                  

 

           (unknown)  (no       (unknown)  (unknown)  GenericComposite[R  (units

    (unknown)



                    date)                         BC   3.37 L unknown)  



                                                  (4.5-5.9)  X10^6/uL           



                                                   ]                  

 

           (unknown)  (no       (unknown)  (unknown)  GenericComposite[W  (units

    (unknown)



                    date)                         BC   7.0  unknown)  



                                                  (4.5-11.0)           



                                                  X10^3/uL  ]           

 

           (unknown)  (no       (unknown)  (unknown)  Globulin    3.6  (units   

 (unknown)



                    date)                         (1.7-4.1)  g/dL unknown)  

 

           (unknown)  (no       (unknown)  (unknown)  Glucose    262 H  (units  

  (unknown)



                    date)                         ()  mg/dL unknown)  

 

           (unknown)  (no       (unknown)  (unknown)  Guaiac stool:  (units    (

unknown)



                    date)                                   unknown)  

 

           (unknown)  (no       (unknown)  (unknown)  H/O colectomy  (units    (

unknown)



                    date)                                   unknown)  

 

           (unknown)  (no       (unknown)  (unknown)  HPI - Weakness  (units    

(unknown)



                    date)                                   unknown)  

 

           (unknown)  (no       (unknown)  (unknown)  HPI Narrative:  (units    

(unknown)



                    date)                                   unknown)  

 

           (unknown)  (no       (unknown)  (unknown)  Hct   26.7 L  (units    (u

nknown)



                    date)                         (41-53)  % unknown)  

 

           (unknown)  (no       (unknown)  (unknown)  Head/Neck:  (units    (unk

nown)



                    date)                         Endorses having a unknown)  



                                                  headache, denies           



                                                  any neck pain           

 

           (unknown)  (no       (unknown)  (unknown)  Head: atraumatic,  (units 

   (unknown)



                    date)                         symmetrical facial unknown)  



                                                  expressions           

 

           (unknown)  (no       (unknown)  (unknown)  Hgb   9.1 L  (units    (un

known)



                    date)                         (13.5-17.5)  g/dL unknown)  

 

           (unknown)  (no       (unknown)  (unknown)  History of Present  (units

    (unknown)



                    date)                         Illness   unknown)  

 

           (unknown)  (no       (unknown)  (unknown)  History of lumbar  (units 

   (unknown)



                    date)                         surgery () unknown)  

 

           (unknown)  (no       (unknown)  (unknown)  Hx of foot surgery  (units

    (unknown)



                    date)                         (2017)    unknown)  

 

           (unknown)  (no       (unknown)  (unknown)  Hx of shoulder  (units    

(unknown)



                    date)                         surgery () unknown)  

 

           (unknown)  (no       (unknown)  (unknown)  Hypertension  (units    (u

nknown)



                    date)                                   unknown)  

 

           (unknown)  (no       (unknown)  (unknown)  IMPRESSION:? No  (units   

 (unknown)



                    date)                         acute     unknown)  



                                                  cardiopulmonary           



                                                  findings            

 

           (unknown)  (no       (unknown)  (unknown)  INDICATIONS:?  (units    (

unknown)



                    date)                         chest pain unknown)  

 

           (unknown)  (no       (unknown)  (unknown)  Imaging Data  (units    (u

nknown)



                    date)                                   unknown)  

 

           (unknown)  (no       (unknown)  (unknown)  Independently  (units    (

unknown)



                    date)                         reviewed vitals unknown)  



                                                  signs and nursing           



                                                  notes.              

 

           (unknown)  (no       (unknown)  (unknown)  Initial Vital  (units    (

unknown)



                    date)                         Signs     unknown)  

 

           (unknown)  (no       (unknown)  (unknown)  Initial Vital  (units    (

unknown)



                    date)                         Signs:    unknown)  

 

           (unknown)  (no       (unknown)  (unknown)  Lab Data  (units    (unkno

wn)



                    date)                                   unknown)  

 

           (unknown)  (no       (unknown)  (unknown)  Labs:     (units    (unkno

wn)



                    date)                                   unknown)  

 

           (unknown)  (no       (unknown)  (unknown)  Lactate (Lactic  (units   

 (unknown)



                    date)                         Acid) Stat unknown)  

 

           (unknown)  (no       (unknown)  (unknown)  Lipase    32  (units    (u

nknown)



                    date)                         ()  U/L unknown)  

 

           (unknown)  (no       (unknown)  (unknown)  Lipase Stat  (units    (un

known)



                    date)                                   unknown)  

 

           (unknown)  (no       (unknown)  (unknown)  Lungs and pleura:?  (units

    (unknown)



                    date)                         Lungs are clear.? unknown)  



                                                  No pleural           



                                                  effusions or           



                                                  pneumothorax.? Low           

 

           (unknown)  (no       (unknown)  (unknown)  Lymph # (Auto)  (units    

(unknown)



                    date)                         400 L   (2931-4030) unknown)  



                                                   /uL                

 

           (unknown)  (no       (unknown)  (unknown)  Lymph % (Auto)  (units    

(unknown)



                    date)                         6.4 L   (25-40)  % unknown)  

 

           (unknown)  (no       (unknown)  (unknown)  MCH   26.9  (units    (unk

nown)



                    date)                         (26-34)  PG unknown)  

 

           (unknown)  (no       (unknown)  (unknown)  MCHC   34.0  (units    (un

known)



                    date)                         (30-36)  % unknown)  

 

           (unknown)  (no       (unknown)  (unknown)  MCV   79.1 L  (units    (u

nknown)



                    date)                         ()  fL unknown)  

 

           (unknown)  (no       (unknown)  (unknown)  MDM - Weakness  (units    

(unknown)



                    date)                                   unknown)  

 

           (unknown)  (no       (unknown)  (unknown)  MSK: denies new  (units   

 (unknown)



                    date)                         joint pain, muscle unknown)  



                                                  weakness or           



                                                  swelling            

 

           (unknown)  (no       (unknown)  (unknown) MSK: moves all  (units    (

unknown)



                    date)                         extremities, unknown)  



                                                  neurovascularly           



                                                  intact, generalized           



                                                  weakness, normal           

 

           (unknown)  (no       (unknown)  (unknown)  Magnesium    1.0 L  (units

    (unknown)



                    date)                          (1.6-2.3)  mg/dL unknown)  

 

           (unknown)  (no       (unknown)  (unknown)  Magnesium Stat  (units    

(unknown)



                    date)                                   unknown)  

 

           (unknown)  (no       (unknown)  (unknown)  Magnesium Sulfate  (units 

   (unknown)



                    date)                         (Magnesium Sulfate) unknown)  



                                                   2 gm in 50 mls @           



                                                  50 mls/hr IV NOW           



                                                  ONE                 

 

           (unknown)  (no       (unknown)  (unknown)  Mediastinum:?  (units    (

unknown)



                    date)                         Mediastinal unknown)  



                                                  contours appear           



                                                  normal.? Heart size           



                                                  is normal.?           

 

           (unknown)  (no       (unknown)  (unknown)  Medical History  (units   

 (unknown)



                    date)                         (Reviewed 22 unknown)  



                                                  @ 19:57 by Kitty Costello [facility])           

 

           (unknown)  (no       (unknown)  (unknown)  MiraL[employer] since this  (units

    (unknown)



                    date)                         happened. unknown)  

 

           (unknown)  (no       (unknown)  (unknown)  Mode of arrival:  (units  

  (unknown)



                    date)                         Wheelchair unknown)  

 

           (unknown)  (no       (unknown)  (unknown)  Mono # (Auto)  (units    (

unknown)



                    date)                         600   (0-900)  /uL unknown)  

 

           (unknown)  (no       (unknown)  (unknown)  Mono % (Auto)  (units    (

unknown)



                    date)                         7.9   (3-14)  % unknown)  

 

           (unknown)  (no       (unknown)  (unknown)  Mother     (units 

   (unknown)



                    date)                          Cancer   unknown)  

 

           (unknown)  (no       (unknown)  (unknown)  Mouth/Throat:  (units    (

unknown)



                    date)                         moist mucus unknown)  



                                                  membranes           

 

           (unknown)  (no       (unknown)  (unknown)  Narrative  (units    (unkn

own)



                    date)                                   unknown)  

 

           (unknown)  (no       (unknown)  (unknown)  Narrative:  (units    (unk

nown)



                    date)                                   unknown)  

 

           (unknown)  (no       (unknown)  (unknown)  Neck: supple  (units    (u

nknown)



                    date)                                   unknown)  

 

           (unknown)  (no       (unknown)  (unknown)  Neuro: denies  (units    (

unknown)



                    date)                         numbness, tingling, unknown)  



                                                  dizziness           

 

           (unknown)  (no       (unknown)  (unknown)  Neuro: normal  (units    (

unknown)



                    date)                         speech and unknown)  



                                                  cognition, A+O x3           

 

           (unknown)  (no       (unknown)  (unknown)  Neut # (Auto)  (units    (

unknown)



                    date)                         5900   (1265-7503) unknown)  



                                                  /uL                 

 

           (unknown)  (no       (unknown)  (unknown)  Neut % (Auto)  (units    (

unknown)



                    date)                         84.7 H   (50-75)  % unknown)  

 

           (unknown)  (no       (unknown)  (unknown)  No Action  (units    (unkn

own)



                    date)                                   unknown)  

 

           (unknown)  (no       (unknown)  (unknown)  No Known Drug  (units    (

unknown)



                    date)                         Allergies Allergy unknown)  



                                                  Verified 22           



                                                  17:38               

 

           (unknown)  (no       (unknown)  (unknown)  Nose: nares  (units    (un

known)



                    date)                         patent, no unknown)  



                                                  rhinorrhea           

 

           (unknown)  (no       (unknown)  (unknown)  Ondansetron HCl  (units   

 (unknown)



                    date)                         (Ondansetron 4 Mg/2 unknown)  



                                                  Ml Inj)  4 mg IV           



                                                  NOW ONE             

 

           (unknown)  (no       (unknown)  (unknown)  Ordered:  (units    (unkno

wn)



                    date)                                   unknown)  

 

           (unknown)  (no       (unknown)  (unknown)  Orders    (units    (unkno

wn)



                    date)                                   unknown)  

 

           (unknown)  (no       (unknown)  (unknown)  Osteoarthritis  (units    

(unknown)



                    date)                                   unknown)  

 

           (unknown)  (no       (unknown)  (unknown)  Oxygen Delivery  (units   

 (unknown)



                    date)                         Method    22 unknown)  



                                                  17:34               

 

           (unknown)  (no       (unknown)  (unknown)  Oxygen Delivery  (units   

 (unknown)



                    date)                         Method Room Air unknown)  

 

           (unknown)  (no       (unknown)  (unknown)  PROCEDURE:? XR  (units    

(unknown)



                    date)                         CHEST 1V  unknown)  

 

           (unknown)  (no       (unknown)  (unknown)  Patient History  (units   

 (unknown)



                    date)                                   unknown)  

 

           (unknown)  (no       (unknown)  (unknown)  Patient is  (units    (unk

nown)



                    date)                         currently unknown)  



                                                  anticoagulated on           



                                                  Plavix 37.5 mg           



                                                  daily.  Dr. Gómez           

 

           (unknown)  (no       (unknown)  (unknown) Patient reports  (units    

(unknown)



                    date)                         that when he has a unknown)  



                                                  bowel movement, he           



                                                  typically has hard           



                                                  pellets             

 

           (unknown)  (no       (unknown)  (unknown)  Patient self caths  (units

    (unknown)



                    date)                         for urine output, unknown)  



                                                  reports that he has           



                                                  not had much urine           

 

           (unknown)  (no       (unknown)  (unknown) Patient's  (units    (unkno

wn)



                    date)                         colorectal cancer unknown)  



                                                  oncologist was Dr. Jett, patient was           



                                                  referred to him           

 

           (unknown)  (no       (unknown)  (unknown)  Patient's preop  (units   

 (unknown)



                    date)                         diagnosis was unknown)  



                                                  nausea and vomiting           



                                                  from hematemesis in           



                                                  January             

 

           (unknown)  (no       (unknown)  (unknown)  Patient:  (units    (unkno

wn)



                    date)                         Kassandra Leon R unknown)  



                                                  MR#: [id number]            

 

           (unknown)  (no       (unknown)  (unknown)  Postlaminectomy  (units   

 (unknown)



                    date)                         syndrome  unknown)  

 

           (unknown)  (no       (unknown)  (unknown)  Potassium    3.7  (units  

  (unknown)



                    date)                         (3.4-5.1)  mmol/L unknown)  

 

           (unknown)  (no       (unknown)  (unknown)  Prescriptions:  (units    

(unknown)



                    date)                                   unknown)  

 

           (unknown)  (no       (unknown)  (unknown)  Procalcitonin Stat  (units

    (unknown)



                    date)                                   unknown)  

 

           (unknown)  (no       (unknown)  (unknown)  Psych: mental  (units    (

unknown)



                    date)                         status is grossly unknown)  



                                                  normal, congruent           



                                                  mood, normal           



                                                  affect, pleasant           

 

           (unknown)  (no       (unknown)  (unknown)  Pulse Oximetry  98  (units

    (unknown)



                    date)                           22 17:34 unknown)  

 

           (unknown)  (no       (unknown)  (unknown)  Pulse Oximetry 98  (units 

   (unknown)



                    date)                                   unknown)  

 

           (unknown)  (no       (unknown)  (unknown)  Pulse Rate  77  (units    

(unknown)



                    date)                         22 17:34 unknown)  

 

           (unknown)  (no       (unknown)  (unknown)  Pulse Rate 77  (units    (

unknown)



                    date)                                   unknown)  

 

           (unknown)  (no       (unknown)  (unknown)  RDW   16.1 H  (units    (u

nknown)



                    date)                         (11.6-14.8)  % unknown)  

 

           (unknown)  (no       (unknown)  (unknown)  Referrals:  (units    (unk

nown)



                    date)                                   unknown)  

 

           (unknown)  (no       (unknown)  (unknown)  Related Data  (units    (u

nknown)



                    date)                                   unknown)  

 

           (unknown)  (no       (unknown)  (unknown)  Respiratory Rate  (units  

  (unknown)



                    date)                         18   22 17:34 unknown)  

 

           (unknown)  (no       (unknown)  (unknown)  Respiratory Rate  (units  

  (unknown)



                    date)                         18        unknown)  

 

           (unknown)  (no       (unknown)  (unknown)  Respiratory:  (units    (u

nknown)



                    date)                         denies shortness of unknown)  



                                                  breath, or new           



                                                  cough               

 

           (unknown)  (no       (unknown)  (unknown)  Respiratory:  (units    (u

nknown)



                    date)                         normal effort, able unknown)  



                                                  to speak in           



                                                  complete sentences,           



                                                  no audible           

 

           (unknown)  (no       (unknown)  (unknown)  Result diagrams:  (units  

  (unknown)



                    date)                                   unknown)  

 

           (unknown)  (no       (unknown)  (unknown)  Review of Systems  (units 

   (unknown)



                    date)                                   unknown)  

 

           (unknown)  (no       (unknown)  (unknown)  SARS-CoV-2 (PCR)  (units  

  (unknown)



                    date)                         Negative  unknown)  



                                                  (Negative)           

 

           (unknown)  (no       (unknown)  (unknown)  Sciatica  (units    (unkno

wn)



                    date)                                   unknown)  

 

           (unknown)  (no       (unknown)  (unknown)  Signed By:  (units    (unk

nown)



                    date)                                   unknown)  

 

           (unknown)  (no       (unknown)  (unknown)  Skin: brisk  (units    (un

known)



                    date)                         capillary refill, unknown)  



                                                  no rash, no           



                                                  erythema            

 

           (unknown)  (no       (unknown)  (unknown)  Skin: denies rash,  (units

    (unknown)



                    date)                         itching or wound unknown)  

 

           (unknown)  (no       (unknown)  (unknown)  Smoking Status:  (units   

 (unknown)



                    date)                         Never smoker unknown)  

 

           (unknown)  (no       (unknown)  (unknown)  Smoking Status:  (units   

 (unknown)



                    date)                         Never smoker unknown)  

 

           (unknown)  (no       (unknown)  (unknown)  Social History  (units    

(unknown)



                    date)                         (Reviewed 22 unknown)  



                                                  @ 19:57 by ZAIRA Amezcua)           

 

           (unknown)  (no       (unknown)  (unknown)  Sodium    135 L  (units   

 (unknown)



                    date)                         (137-145)  mmol/L unknown)  

 

           (unknown)  (no       (unknown)  (unknown)  Sodium Chloride  (units   

 (unknown)



                    date)                         (Normal Saline unknown)  



                                                  0.9%)  1,000 mls @           



                                                  1,000 mls/hr IV           



                                                  BOLUS ONE           

 

           (unknown)  (no       (unknown)  (unknown)  Source: patient  (units   

 (unknown)



                    date)                         and family unknown)  

 

           (unknown)  (no       (unknown)  (unknown)  Spinal stenosis  (units   

 (unknown)



                    date)                                   unknown)  

 

           (unknown)  (no       (unknown)  (unknown)  Stated complaint:  (units 

   (unknown)



                    date)                         WEAKNESS UNABLE TO unknown)  



                                                  HOLD ANYTHING DOWN           

 

           (unknown)  (no       (unknown)  (unknown)  Substance Use  (units    (

unknown)



                    date)                         Type: does not use unknown)  

 

           (unknown)  (no       (unknown)  (unknown)  Surgical History  (units  

  (unknown)



                    date)                         (Reviewed 22 unknown)  



                                                  @ 19:57 by ZAIRA Amezcua)           

 

           (unknown)  (no       (unknown)  (unknown)  Surgical changes  (units  

  (unknown)



                    date)                         and devices:? unknown)  



                                                  None.?              

 

           (unknown)  (no       (unknown)  (unknown)  TECHNIQUE:? One  (units   

 (unknown)



                    date)                         view of the chest unknown)  



                                                  was acquired.?           

 

           (unknown)  (no       (unknown)  (unknown)  Temperature  98.3  (units 

   (unknown)



                    date)                         F   22 17:34 unknown)  

 

           (unknown)  (no       (unknown)  (unknown)  Temperature 98.3 F  (units

    (unknown)



                    date)                                   unknown)  

 

           (unknown)  (no       (unknown)  (unknown)  Time Seen by  (units    (u

nknown)



                    date)                         Provider: 22 unknown)  



                                                  19:11               

 

           (unknown)  (no       (unknown)  (unknown)  Total Bilirubin  (units   

 (unknown)



                    date)                         0.6  (0.2-1.3) unknown)  



                                                  mg/dL               

 

           (unknown)  (no       (unknown)  (unknown)  Total Creatine  (units    

(unknown)



                    date)                         Kinase    54 L unknown)  



                                                  ()  U/L           

 

           (unknown)  (no       (unknown)  (unknown)  Total Protein  (units    (

unknown)



                    date)                         7.5  (6.3-8.2) unknown)  



                                                  g/dL                

 

           (unknown)  (no       (unknown)  (unknown)  Troponin + CK  (units    (

unknown)



                    date)                         Cardiac Panel Stat unknown)  

 

           (unknown)  (no       (unknown)  (unknown)  Troponin I    <  (units   

 (unknown)



                    date)                         0.012  (0.01-0.034) unknown)  



                                                   ng/mL              

 

           (unknown)  (no       (unknown)  (unknown)  Urine Microscopic  (units 

   (unknown)



                    date)                         Stat      unknown)  

 

           (unknown)  (no       (unknown)  (unknown)  Vital Signs  (units    (un

known)



                    date)                                   unknown)  

 

           (unknown)  (no       (unknown)  (unknown)  Vital signs:  (units    (u

nknown)



                    date)                                   unknown)  

 

           (unknown)  (no       (unknown)  (unknown)  Linette Jett MD  (units  

  (unknown)



                    date)                         [Physician] - As unknown)  



                                                  soon as possible           

 

           (unknown)  (no       (unknown)  (unknown)  XR chest 1V Stat  (units  

  (unknown)



                    date)                                   unknown)  

 

           (unknown)  (no       (unknown)  (unknown)  [Embedded Image  (units   

 (unknown)



                    date)                         Not Available] unknown)  

 

           (unknown)  (no       (unknown)  (unknown)  alcohol intake  (units    

(unknown)



                    date)                         frequency: a few unknown)  



                                                  times a month           

 

           (unknown)  (no       (unknown)  (unknown)  alcohol intake:  (units   

 (unknown)



                    date)                         current   unknown)  

 

           (unknown)  (no       (unknown)  (unknown)  and cooperative  (units   

 (unknown)



                    date)                                   unknown)  

 

           (unknown)  (no       (unknown)  (unknown)  appear    (units    (unkno

wn)



                    date)                                   unknown)  

 

           (unknown)  (no       (unknown)  (unknown)  ascorbic acid  (units    (

unknown)



                    date)                         (vitamin C) 500 mg unknown)  



                                                  500 mg PO BID #60           



                                                  tabs 22           

 

           (unknown)  (no       (unknown)  (unknown)  atorvastatin 80 mg  (units

    (unknown)



                    date)                         tablet 40 mg PO QPM unknown)  



                                                  20           

 

           (unknown)  (no       (unknown)  (unknown)  be causing a rash  (units 

   (unknown)



                    date)                                   unknown)  

 

           (unknown)  (no       (unknown)  (unknown)  blood in his  (units    (u

nknown)



                    date)                         emesis or in his unknown)  



                                                  stool.  He reports           



                                                  that he did a stool           



                                                  test one            

 

           (unknown)  (no       (unknown)  (unknown)  buspirone 5 mg  (units    

(unknown)



                    date)                         tablet 5 mg PO BID unknown)  



                                                  #60 tabs 22           

 

           (unknown)  (no       (unknown)  (unknown)  cancer.  Postop  (units   

 (unknown)



                    date)                         diagnosis from this unknown)  



                                                  upper endoscopy was           



                                                  the same.  His           

 

           (unknown)  (no       (unknown)  (unknown)  caths at baseline  (units 

   (unknown)



                    date)                                   unknown)  

 

           (unknown)  (no       (unknown)  (unknown)  clopidogrel 75 mg  (units 

   (unknown)



                    date)                         tablet 75 mg PO unknown)  



                                                  DAILY 22            

 

           (unknown)  (no       (unknown)  (unknown)  colonic   (units    (unkno

wn)



                    date)                         anastomosis, and a unknown)  



                                                  suboptimal bowel           



                                                  prep.               

 

           (unknown)  (no       (unknown)  (unknown)  cyanocobalamin  (units    

(unknown)



                    date)                         (vitamin B-12) 500 unknown)  



                                                  1,000 mcg PO DAILY           



                                                  #60 tabs 22           

 

           (unknown)  (no       (unknown)  (unknown)  diabetes,  (units    (unkn

own)



                    date)                         hypertension, unknown)  



                                                  colorectal cancer           



                                                  in  with a           



                                                  reverse colectomy,           



                                                  He                  

 

           (unknown)  (no       (unknown)  (unknown)  does not drink  (units    

(unknown)



                    date)                         alcohol.? Patient unknown)  



                                                  and his wife state           



                                                  that he was seen in           



                                                  the                 

 

           (unknown)  (no       (unknown)  (unknown)  edema     (units    (unkno

wn)



                    date)                                   unknown)  

 

           (unknown)  (no       (unknown)  (unknown)  emergency  (units    (unkn

own)



                    date)                         department in unknown)  



                                                  January and           



                                                  diagnosed with a GI           



                                                  bleed, he was           



                                                  admitted,           

 

           (unknown)  (no       (unknown)  (unknown)  endorses fatigue  (units  

  (unknown)



                    date)                                   unknown)  

 

           (unknown)  (no       (unknown)  (unknown) endoscopic  (units    (unkn

own)



                    date)                         diagnosis includes unknown)  



                                                  mild gastropathy,           



                                                  esophagitis, patent           



                                                  retrosigmoid           

 

           (unknown)  (no       (unknown)  (unknown)  ferrous sulfate  (units   

 (unknown)



                    date)                         325 mg (65 mg 325 unknown)  



                                                  mg PO BIDWM #60           



                                                  tabs 22           

 

           (unknown)  (no       (unknown)  (unknown)  followed by loose  (units 

   (unknown)



                    date)                         stool.    unknown)  

 

           (unknown)  (no       (unknown)  (unknown)  gabapentin 300 mg  (units 

   (unknown)



                    date)                         capsule 300 mg PO unknown)  



                                                  BID 18            

 

           (unknown)  (no       (unknown)  (unknown)  generalized  (units    (un

known)



                    date)                         weakness and unknown)  



                                                  fatigue             

 

           (unknown)  (no       (unknown)  (unknown)  glipizide 10 mg  (units   

 (unknown)



                    date)                         tablet 10 mg PO BID unknown)  



                                                  18           

 

           (unknown)  (no       (unknown)  (unknown)  hospital and on  (units   

 (unknown)



                    date)                         chart review it unknown)  



                                                  appears that it was           



                                                  completed on           



                                                  2022.           

 

           (unknown)  (no       (unknown)  (unknown)  household members:  (units

    (unknown)



                    date)                          spouse   unknown)  

 

           (unknown)  (no       (unknown)  (unknown)  iron) tablet  (units    (u

nknown)



                    date)                                   unknown)  

 

           (unknown)  (no       (unknown)  (unknown)  lives     (units    (unkno

wn)



                    date)                         independently:  Yes unknown)  

 

           (unknown)  (no       (unknown)  (unknown)  losartan 50 mg  (units    

(unknown)



                    date)                         tablet 25 mg PO QPM unknown)  



                                                  20           

 

           (unknown)  (no       (unknown)  (unknown)  lung      (units    (unkno

wn)



                    date)                                   unknown)  

 

           (unknown)  (no       (unknown)  (unknown)  mcg tablet  (units    (unk

nown)



                    date)                                   unknown)  

 

           (unknown)  (no       (unknown)  (unknown)  mcg tablet  (units    (unk

nown)



                    date)                         (Tab-A-Lucy) unknown)  

 

           (unknown)  (no       (unknown)  (unknown)  metformin 500 mg  (units  

  (unknown)



                    date)                         tablet,extended 500 unknown)  



                                                  mg PO BID 18            

 

           (unknown)  (no       (unknown)  (unknown)  metoprolol  (units    (unk

nown)



                    date)                         succinate 25 mg 25 unknown)  



                                                  mg PO DAILY           



                                                  21           

 

           (unknown)  (no       (unknown)  (unknown)  moving forward.  (units   

 (unknown)



                    date)                         Patient reports unknown)  



                                                  that he has been           



                                                  taking fiber but           



                                                  not any             

 

           (unknown)  (no       (unknown)  (unknown)  multivitamin with  (units 

   (unknown)



                    date)                         folic acid 400 1 unknown)  



                                                  tab PO DAILY #90           



                                                  tabs 22           

 

           (unknown)  (no       (unknown)  (unknown)  nondistended, no  (units  

  (unknown)



                    date)                         masses, no unknown)  



                                                  exquisite           



                                                  tenderness with           



                                                  exam, no rebound           

 

           (unknown)  (no       (unknown)  (unknown)  not heard it was  (units  

  (unknown)



                    date)                         positive or not. unknown)  



                                                  Patient reports           



                                                  that his primary           



                                                  care                

 

           (unknown)  (no       (unknown)  (unknown)  ondansetron HCl 4  (units 

   (unknown)



                    date)                         mg tablet 4 mg PO unknown)  



                                                  Q8H PRN nausea and           



                                                  21            

 

           (unknown)  (no       (unknown)  (unknown)  oral powder packet  (units

    (unknown)



                    date)                                   unknown)  

 

           (unknown)  (no       (unknown)  (unknown)  output for three  (units  

  (unknown)



                    date)                         days, three days unknown)  



                                                  ago he had nausea           



                                                  and vomiting,           



                                                  denies any           

 

           (unknown)  (no       (unknown)  (unknown)  polyethylene  (units    (u

nknown)



                    date)                         glycol 3350 17 gram unknown)  



                                                  17 gm PO DAILY #90           



                                                  ea 22           

 

           (unknown)  (no       (unknown)  (unknown)  provider has left,  (units

    (unknown)



                    date)                         he reports that he unknown)  



                                                  has seen Dr. Macias           



                                                  recently as well.           

 

           (unknown)  (no       (unknown)  (unknown)  recently for his  (units  

  (unknown)



                    date)                         anemia.   unknown)  

 

           (unknown)  (no       (unknown)  (unknown)  related diarrhea  (units  

  (unknown)



                    date)                         from fecal loading unknown)  



                                                  and obstipation.           



                                                  Recommended MiraLax           



                                                  17 g                

 

           (unknown)  (no       (unknown)  (unknown)  release 24 hr  (units    (

unknown)



                    date)                                   unknown)  

 

           (unknown)  (no       (unknown)  (unknown)  reported that  (units    (

unknown)



                    date)                         patient's altered unknown)  



                                                  bowel habit is           



                                                  likely a function           



                                                  of overflow           

 

           (unknown)  (no       (unknown)  (unknown)  sennosides 8.6 mg  (units 

   (unknown)



                    date)                         tablet (senna) 17.2 unknown)  



                                                  mg PO BEDTIME #60           



                                                  tabs 22           

 

           (unknown)  (no       (unknown)  (unknown)  substance use  (units    (

unknown)



                    date)                         type:  does not use unknown)  

 

           (unknown)  (no       (unknown)  (unknown)  tablet (Vitamin C)  (units

    (unknown)



                    date)                                   unknown)  

 

           (unknown)  (no       (unknown)  (unknown)  tablet,extended  (units   

 (unknown)



                    date)                         release 24 hr unknown)  

 

           (unknown)  (no       (unknown)  (unknown)  tenderness  (units    (unk

nown)



                    date)                                   unknown)  

 

           (unknown)  (no       (unknown)  (unknown)  there.  He reports  (units

    (unknown)



                    date)                         that he had an unknown)  



                                                  upper endoscopy           



                                                  completed here at           



                                                  this                

 

           (unknown)  (no       (unknown)  (unknown)  tone      (units    (unkno

wn)



                    date)                                   unknown)  

 

           (unknown)  (no       (unknown)  (unknown)  trospium 20 mg  (units    

(unknown)



                    date)                         tablet 20 mg PO BID unknown)  



                                                  urinary retention           



                                                  22           

 

           (unknown)  (no       (unknown)  (unknown)  unremarkable.?  (units    

(unknown)



                    date)                                   unknown)  

 

           (unknown)  (no       (unknown)  (unknown)  volumes accentuate  (units

    (unknown)



                    date)                         pulmonary unknown)  



                                                  interstitium and           



                                                  heart size.           

 

           (unknown)  (no       (unknown)  (unknown)  week ago and  (units    (u

nknown)



                    date)                         dropped it off at unknown)  



                                                  [facility] which was           



                                                  testing for blood           



                                                  but he has           

 

           (unknown)  (no       (unknown)  (unknown)  went to [facility]  (units 

   (unknown)



                    date)                         for rehab following unknown)  



                                                  his admission and           



                                                  has followed up           



                                                  with                

 

           (unknown)  (no       (unknown)  (unknown)  wheezing, stridor,  (units

    (unknown)



                    date)                         or rales. No unknown)  



                                                  retractions or           



                                                  tachypnea.           









                                         Result panel 19









           (unknown)  (no date)  (unknown)  (unknown)  0.72      ng/mL     (unkn

own)









                                         Result panel 20









           (unknown)  (no       (unknown)  (unknown)  (no value)  (units    (unk

nown)



                    date)                                   unknown)  

 

           (unknown)  (no       (unknown)  (unknown)  Radiologist  (units    (un

known)



                    date)                         Impression: unknown)  

 

           (unknown)  (no       (unknown)  (unknown)  Date of Service:  (units  

  (unknown)



                    date)                         22  unknown)  

 

           (unknown)  (no       (unknown)  (unknown)  (no value)  (units    (unk

nown)



                    date)                                   unknown)  

 

           (unknown)  (no       (unknown)  (unknown)  22 17:45  (units    

(unknown)



                    date)                                   unknown)  

 

           (unknown)  (no       (unknown)  (unknown)  1 tab PO DAILY  (units    

(unknown)



                    date)                         Qty: 90 0RF unknown)  

 

           (unknown)  (no       (unknown)  (unknown)  1,000 mcg PO DAILY  (units

    (unknown)



                    date)                         Qty: 60 0RF unknown)  

 

           (unknown)  (no       (unknown)  (unknown)  10 mg PO BID  (units    (u

nknown)



                    date)                                   unknown)  

 

           (unknown)  (no       (unknown)  (unknown)  17 gm PO DAILY  (units    

(unknown)



                    date)                         Qty: 90 0RF unknown)  

 

           (unknown)  (no       (unknown)  (unknown)  17.2 mg PO BEDTIME  (units

    (unknown)



                    date)                         Qty: 60 0RF unknown)  

 

           (unknown)  (no       (unknown)  (unknown)  20 mg PO BID  (units    (u

nknown)



                    date)                                   unknown)  

 

           (unknown)  (no       (unknown)  (unknown)  25 mg PO DAILY  (units    

(unknown)



                    date)                                   unknown)  

 

           (unknown)  (no       (unknown)  (unknown)  25 mg PO QPM  (units    (u

nknown)



                    date)                                   unknown)  

 

           (unknown)  (no       (unknown)  (unknown)  300 mg PO BID  (units    (

unknown)



                    date)                                   unknown)  

 

           (unknown)  (no       (unknown)  (unknown)  325 mg PO BIDWM  (units   

 (unknown)



                    date)                         Qty: 60 0RF unknown)  

 

           (unknown)  (no       (unknown)  (unknown)  4 mg PO Q8H PRN  (units   

 (unknown)



                    date)                         (Reason: nausea and unknown)  



                                                  vomiting) Qty: 14           



                                                  0RF                 

 

           (unknown)  (no       (unknown)  (unknown)  40 mg PO QPM  (units    (u

nknown)



                    date)                                   unknown)  

 

           (unknown)  (no       (unknown)  (unknown)  5 mg PO BID Qty:  (units  

  (unknown)



                    date)                         60 0RF    unknown)  

 

           (unknown)  (no       (unknown)  (unknown)  500 mg PO BID  (units    (

unknown)



                    date)                                   unknown)  

 

           (unknown)  (no       (unknown)  (unknown)  500 mg PO BID Qty:  (units

    (unknown)



                    date)                         60 0RF    unknown)  

 

           (unknown)  (no       (unknown)  (unknown)  75 mg PO DAILY  (units    

(unknown)



                    date)                                   unknown)  

 

           (unknown)  (no       (unknown)  (unknown)  Allergies  (units    (unkn

own)



                    date)                                   unknown)  

 

           (unknown)  (no       (unknown)  (unknown)  Documented By: [facility]  (units

    (unknown)



                    date)                                   unknown)  

 

           (unknown)  (no       (unknown)  (unknown)  ED Orders  (units    (unkn

own)



                    date)                                   unknown)  

 

           (unknown)  (no       (unknown)  (unknown)  Emergency Report  (units  

  (unknown)



                    date)                                   unknown)  

 

           (unknown)  (no       (unknown)  (unknown)  Home Medications  (units  

  (unknown)



                    date)                                   unknown)  

 

           (unknown)  (no       (unknown)  (unknown)  [facility]  (units   

 (unknown)



                    date)                         [address] unknown)  



                                                  [place] [address]           

 

           (unknown)  (no       (unknown)  (unknown)  Lab Results  (units    (un

known)



                    date)                                   unknown)  

 

           (unknown)  (no       (unknown)  (unknown)  Label Comments:  (units   

 (unknown)



                    date)                                   unknown)  

 

           (unknown)  (no       (unknown)  (unknown)  Last Admin:  (units    (un

known)



                    date)                         22 18:30 unknown)  



                                                  Dose: 4 mg           

 

           (unknown)  (no       (unknown)  (unknown)  Previous Rx's  (units    (

unknown)



                    date)                                   unknown)  

 

           (unknown)  (no       (unknown)  (unknown)  Stop: 22  (units    

(unknown)



                    date)                         18:16     unknown)  

 

           (unknown)  (no       (unknown)  (unknown)  Stop: 22  (units    

(unknown)



                    date)                         20:25     unknown)  

 

           (unknown)  (no       (unknown)  (unknown)  Stop: 22  (units    

(unknown)



                    date)                         20:27     unknown)  

 

           (unknown)  (no       (unknown)  (unknown)  Vital Signs - 8 hr  (units

    (unknown)



                    date)                                   unknown)  

 

           (unknown)  (no       (unknown)  (unknown)  has not been  (units    (u

nknown)



                    date)                         eating so hasn't unknown)  



                                                  taken recently.           



                                                  spouse states           



                                                  thinks it might           

 

           (unknown)  (no       (unknown)  (unknown)  stopped taking  (units    

(unknown)



                    date)                         prior to back unknown)  



                                                  surgery and did not           



                                                  restart             

 

           (unknown)  (no       (unknown)  (unknown)  (no value)  (units    (unk

nown)



                    date)                                   unknown)  

 

           (unknown)  (no       (unknown)  (unknown)  22  (units 

   (unknown)



                    date)                         22  unknown)  



                                                  Range/Units           

 

           (unknown)  (no       (unknown)  (unknown)  17:40 17:45 17:45  (units 

   (unknown)



                    date)                                   unknown)  

 

           (unknown)  (no       (unknown)  (unknown)  ascorbic acid  (units    (

unknown)



                    date)                         (vitamin C) unknown)  



                                                  [Vitamin C] 500 mg           



                                                  Tablet              

 

           (unknown)  (no       (unknown)  (unknown)  atorvastatin 80 mg  (units

    (unknown)



                    date)                         tablet    unknown)  

 

           (unknown)  (no       (unknown)  (unknown)  buspirone 5 mg  (units    

(unknown)



                    date)                         Tablet    unknown)  

 

           (unknown)  (no       (unknown)  (unknown)  clopidogrel 75 mg  (units 

   (unknown)



                    date)                         tablet    unknown)  

 

           (unknown)  (no       (unknown)  (unknown)  cyanocobalamin  (units    

(unknown)



                    date)                         (vitamin B-12) 500 unknown)  



                                                  mcg Tablet           

 

           (unknown)  (no       (unknown)  (unknown)  ferrous sulfate  (units   

 (unknown)



                    date)                         325 mg (65 mg iron) unknown)  



                                                  Tablet              

 

           (unknown)  (no       (unknown)  (unknown)  gabapentin 300 mg  (units 

   (unknown)



                    date)                         capsule   unknown)  

 

           (unknown)  (no       (unknown)  (unknown)  glipizide 10 mg  (units   

 (unknown)



                    date)                         tablet    unknown)  

 

           (unknown)  (no       (unknown)  (unknown)  losartan 50 mg  (units    

(unknown)



                    date)                         tablet    unknown)  

 

           (unknown)  (no       (unknown)  (unknown)  metformin 500 mg  (units  

  (unknown)



                    date)                         tablet extended unknown)  



                                                  release 24 hr           

 

           (unknown)  (no       (unknown)  (unknown)  metoprolol  (units    (unk

nown)



                    date)                         succinate 25 mg unknown)  



                                                  tablet extended           



                                                  release 24 hr           

 

           (unknown)  (no       (unknown)  (unknown)  multivitamin with  (units 

   (unknown)



                    date)                         folic acid unknown)  



                                                  [Tab-A-Lucy] 400           



                                                  mcg Tablet           

 

           (unknown)  (no       (unknown)  (unknown)  ondansetron HCl  (units   

 (unknown)



                    date)                         [Zofran] 4 mg unknown)  



                                                  tablet              

 

           (unknown)  (no       (unknown)  (unknown)  polyethylene  (units    (u

nknown)



                    date)                         glycol 3350 17 gram unknown)  



                                                  Powder In Packet           

 

           (unknown)  (no       (unknown)  (unknown)  sennosides [senna]  (units

    (unknown)



                    date)                         8.6 mg Tablet unknown)  

 

           (unknown)  (no       (unknown)  (unknown)  trospium 20 mg  (units    

(unknown)



                    date)                         tablet    unknown)  

 

           (unknown)  (no       (unknown)  (unknown)  22  (units    (unkno

wn)



                    date)                                   unknown)  

 

           (unknown)  (no       (unknown)  (unknown)  Medication  (units    (unk

nown)



                    date)                         Instructions unknown)  



                                                  Recorded            

 

           (unknown)  (no       (unknown)  (unknown)  Medication  (units    (unk

nown)



                    date)                         Instructions unknown)  



                                                  Recorded  Confirmed           

 

           (unknown)  (no       (unknown)  (unknown)  (Zofran) vomiting  (units 

   (unknown)



                    date)                         #14 tabs  unknown)  

 

           (unknown)  (no       (unknown)  (unknown)  [id number]   (units    (unkno

wn)



                    date)                                   unknown)  

 

           (unknown)  (no       (unknown)  (unknown)  22 17:39  (units    

(unknown)



                    date)                                   unknown)  

 

           (unknown)  (no       (unknown)  (unknown)  22 17:40  (units    

(unknown)



                    date)                                   unknown)  

 

           (unknown)  (no       (unknown)  (unknown)  22 17:45  (units    

(unknown)



                    date)                                   unknown)  

 

           (unknown)  (no       (unknown)  (unknown)  22 19:26  (units    

(unknown)



                    date)                                   unknown)  

 

           (unknown)  (no       (unknown)  (unknown)  22 19:28  (units    

(unknown)



                    date)                                   unknown)  

 

           (unknown)  (no       (unknown)  (unknown)  17:34     (units    (unkno

wn)



                    date)                                   unknown)  

 

           (unknown)  (no       (unknown)  (unknown)  with iron  (units    (

unknown)



                    date)                         deficiency anemia, unknown)  



                                                  altered bowel           



                                                  habit, personal           



                                                  history of colon           

 

           (unknown)  (no       (unknown)  (unknown)  62-year-old  (units    (un

known)



                    date)                         gentleman with a unknown)  



                                                  history of multiple           



                                                  sclerosis, CVA in           



                                                  2019,               

 

           (unknown)  (no       (unknown)  (unknown)  ?         (units    (unkno

wn)



                    date)                                   unknown)  

 

           (unknown)  (no       (unknown)  (unknown)  ALT    16  (<50)  (units  

  (unknown)



                    date)                         IU/L      unknown)  

 

           (unknown)  (no       (unknown)  (unknown)  AST    19  (17-59)  (units

    (unknown)



                    date)                          IU/L     unknown)  

 

           (unknown)  (no       (unknown)  (unknown)  Age/Sex: 62 / M  (units   

 (unknown)



                    date)                                   unknown)  

 

           (unknown)  (no       (unknown)  (unknown)  Albumin    3.9  (units    

(unknown)



                    date)                         (3.5-5.0)  g/dL unknown)  

 

           (unknown)  (no       (unknown)  (unknown)  Albumin/Globulin  (units  

  (unknown)



                    date)                         Ratio    1.1 unknown)  



                                                  (1.0-2.8)           

 

           (unknown)  (no       (unknown)  (unknown)  Alkaline  (units    (unkno

wn)



                    date)                         Phosphatase    105 unknown)  



                                                  ()  U/L           

 

           (unknown)  (no       (unknown)  (unknown)  Allergy/AdvReac  (units   

 (unknown)



                    date)                         Type Severity unknown)  



                                                  Reaction Status           



                                                  Date / Time           

 

           (unknown)  (no       (unknown)  (unknown)  Approved by: Garfield Mcdanielsunits 

   (unknown)



                    date)                         SHANNAN Lopez on unknown)  



                                                  2022 at 18:02?           

 

           (unknown)  (no       (unknown)  (unknown)  BUN    21 H  (units    (un

known)



                    date)                         (9-20)  mg/dL unknown)  

 

           (unknown)  (no       (unknown)  (unknown)  BUN/Creatinine  (units    

(unknown)



                    date)                         Ratio    14.8 unknown)  



                                                  (6-22)              

 

           (unknown)  (no       (unknown)  (unknown)  Baso # (Auto)   0  (units 

   (unknown)



                    date)                          (0-100)  /uL unknown)  

 

           (unknown)  (no       (unknown)  (unknown)  Baso % (Auto)  (units    (

unknown)



                    date)                         0.2   (0-2)  % unknown)  

 

           (unknown)  (no       (unknown)  (unknown)  Blood Pressure  (units    

(unknown)



                    date)                         124/66   22 unknown)  



                                                  17:34               

 

           (unknown)  (no       (unknown)  (unknown)  Blood Pressure  (units    

(unknown)



                    date)                         124    unknown)  

 

           (unknown)  (no       (unknown)  (unknown)  Bones and chest  (units   

 (unknown)



                    date)                         wall:? No unknown)  



                                                  suspicious bony           



                                                  lesions.? Overlying           



                                                  soft tissues           

 

           (unknown)  (no       (unknown)  (unknown)  CK-MB (CK-2)  (units    (u

nknown)



                    date)                         TNP       unknown)  

 

           (unknown)  (no       (unknown)  (unknown)  CK-MB (CK-2) Rel  (units  

  (unknown)



                    date)                         Index    TNP unknown)  

 

           (unknown)  (no       (unknown)  (unknown)  COMPARISON:?  (units    (u

nknown)



                    date)                         [facility], unknown)  



                                                  CR, XR CHEST 2V,           



                                                  3/18/2021, 10:19.           

 

           (unknown)  (no       (unknown)  (unknown)  COVID19 -Nasal  (units    

(unknown)



                    date)                         RAPID/Pre-Proc Stat unknown)  

 

           (unknown)  (no       (unknown)  (unknown)  CT abdomen pelvis  (units 

   (unknown)



                    date)                         w con Stat unknown)  

 

           (unknown)  (no       (unknown)  (unknown)  CVA (cerebral  (units    (

unknown)



                    date)                         vascular accident) unknown)  



                                                  (2018)           

 

           (unknown)  (no       (unknown)  (unknown)  Calcium    9.3  (units    

(unknown)



                    date)                         (8.4-10.2)  mg/dL unknown)  

 

           (unknown)  (no       (unknown)  (unknown)  Carbon Dioxide  (units    

(unknown)



                    date)                         29  (22-32)  mmol/L unknown)  

 

           (unknown)  (no       (unknown)  (unknown)  Cardio: denies  (units    

(unknown)



                    date)                         chest pain, unknown)  



                                                  palpitations           

 

           (unknown)  (no       (unknown)  (unknown)  Cardiovascular:  (units   

 (unknown)



                    date)                         regular rate and unknown)  



                                                  rhythm, no           



                                                  peripheral edema,           



                                                  warm extremities           

 

           (unknown)  (no       (unknown)  (unknown)  Chest x-ray:  (units    (u

nknown)



                    date)                                   unknown)  

 

           (unknown)  (no       (unknown)  (unknown)  Chief complaint:  (units  

  (unknown)



                    date)                         Weakness  unknown)  

 

           (unknown)  (no       (unknown)  (unknown)  Chloride    96 L  (units  

  (unknown)



                    date)                         ()  mmol/L unknown)  

 

           (unknown)  (no       (unknown)  (unknown)  Colon cancer  (units    (u

nknown)



                    date)                         ()    unknown)  

 

           (unknown)  (no       (unknown)  (unknown)  Complete Blood  (units    

(unknown)



                    date)                         Count AUTO DIFF unknown)  



                                                  Stat                

 

           (unknown)  (no       (unknown)  (unknown)  Comprehensive  (units    (

unknown)



                    date)                         Metabolic Panel unknown)  



                                                  Stat                

 

           (unknown)  (no       (unknown)  (unknown)  Course    (units    (unkno

wn)



                    date)                                   unknown)  

 

           (unknown)  (no       (unknown)  (unknown)  Creatinine    1.42  (units

    (unknown)



                    date)                         H  (0.66-1.25) unknown)  



                                                  mg/dL               

 

           (unknown)  (no       (unknown)  (unknown)  : 1960  (units   

 (unknown)



                    date)                         Acct:[id number] unknown)  

 

           (unknown)  (no       (unknown)  (unknown)  Departure  (units    (unkn

own)



                    date)                                   unknown)  

 

           (unknown)  (no       (unknown)  (unknown)  Depression  (units    (unk

nown)



                    date)                                   unknown)  

 

           (unknown)  (no       (unknown)  (unknown)  Diabetes  (units    (unkno

wn)



                    date)                                   unknown)  

 

           (unknown)  (no       (unknown)  (unknown)  Diabetic  (units    (unkno

wn)



                    date)                         neuropathy unknown)  

 

           (unknown)  (no       (unknown)  (unknown)  Discharge Plan  (units    

(unknown)



                    date)                                   unknown)  

 

           (unknown)  (no       (unknown)  (unknown)  Discontinued  (units    (u

nknown)



                    date)                         Medications unknown)  

 

           (unknown)  (no       (unknown)  (unknown)  Gustavo Macias MD  (units   

 (unknown)



                    date)                         [Primary Care unknown)  



                                                  Provider] -           

 

           (unknown)  (no       (unknown)  (unknown)  EKG-12 Lead Stat  (units  

  (unknown)



                    date)                                   unknown)  

 

           (unknown)  (no       (unknown)  (unknown)  ER Physician:  (units    (

unknown)



                    date)                         Kitty Costello unknown)  



                                                  ARNP                

 

           (unknown)  (no       (unknown)  (unknown)  Eos # (Auto)   100  (units

    (unknown)



                    date)                           (0-450)  /uL unknown)  

 

           (unknown)  (no       (unknown)  (unknown)  Eos % (Auto)   0.8  (units

    (unknown)



                    date)                         L   (2-4)  % unknown)  

 

           (unknown)  (no       (unknown)  (unknown)  Estimated GFR  (units    (

unknown)



                    date)                         55 L  (>60)  mL/min unknown)  

 

           (unknown)  (no       (unknown)  (unknown)  Exam      (units    (unkno

wn)



                    date)                                   unknown)  

 

           (unknown)  (no       (unknown)  (unknown)  Exam Narrative:  (units   

 (unknown)



                    date)                                   unknown)  

 

           (unknown)  (no       (unknown)  (unknown)  Eyes: denies  (units    (u

nknown)



                    date)                         visual changes, eye unknown)  



                                                  pain                

 

           (unknown)  (no       (unknown)  (unknown)  Eyes: equal round  (units 

   (unknown)



                    date)                         and reactive, EOMI, unknown)  



                                                  conjunctiva normal           

 

           (unknown)  (no       (unknown)  (unknown)  FINDINGS:?  (units    (unk

nown)



                    date)                                   unknown)  

 

           (unknown)  (no       (unknown)  (unknown)  Family History  (units    

(unknown)



                    date)                         (Reviewed 22 unknown)  



                                                  @ 19:57 by Kitty Costello [facility])           

 

           (unknown)  (no       (unknown)  (unknown)  Father     (units 

   (unknown)



                    date)                          Cancer   unknown)  

 

           (unknown)  (no       (unknown)  (unknown)  GERD      (units    (unkno

wn)



                    date)                         (gastroesophageal unknown)  



                                                  reflux disease)           

 

           (unknown)  (no       (unknown)  (unknown)  GI:  Right lower  (units  

  (unknown)



                    date)                         quadrant tenderness unknown)  



                                                  to palpation           

 

           (unknown)  (no       (unknown)  (unknown)  GI: abdomen mildly  (units

    (unknown)



                    date)                         guarded, tender to unknown)  



                                                  palpation in the           



                                                  right lower           



                                                  quadrant,           

 

           (unknown)  (no       (unknown)  (unknown)  :  Straight cath  (units

    (unknown)



                    date)                         for urine, no rash unknown)  



                                                  ingrown, no penile           



                                                  discharge, no           



                                                  scrotal             

 

           (unknown)  (no       (unknown)  (unknown)  : denies  (units    (unk

nown)



                    date)                         dysuria, hematuria unknown)  



                                                  or flank pain,           



                                                  reports oliguria,           



                                                  patient self           

 

           (unknown)  (no       (unknown)  (unknown)  Gastroenterology  (units  

  (unknown)



                    date)                         from [place], unknown)  



                                                  patient reports           



                                                  that he sees Yasmine Rocha PA-C           

 

           (unknown)  (no       (unknown)  (unknown)  General   (units    (unkno

wn)



                    date)                                   unknown)  

 

           (unknown)  (no       (unknown)  (unknown)  General:  (units    (unkno

wn)



                    date)                         cooperative, unknown)  



                                                  comfortable, in no           



                                                  acute distress,           



                                                  well groomed,           

 

           (unknown)  (no       (unknown)  (unknown)  General: denies  (units   

 (unknown)



                    date)                         fever, chills, unknown)  



                                                  endorses weakness,           



                                                  right lower           



                                                  quadrant pain,           

 

           (unknown)  (no       (unknown)  (unknown)  GenericComposite[P  (units

    (unknown)



                    date)                         lt Count   219 unknown)  



                                                  (150-400)  X10^3/uL           



                                                   ]                  

 

           (unknown)  (no       (unknown)  (unknown)  GenericComposite[R  (units

    (unknown)



                    date)                         BC   3.37 L unknown)  



                                                  (4.5-5.9)  X10^6/uL           



                                                   ]                  

 

           (unknown)  (no       (unknown)  (unknown)  GenericComposite[W  (units

    (unknown)



                    date)                         BC   7.0  unknown)  



                                                  (4.5-11.0)           



                                                  X10^3/uL  ]           

 

           (unknown)  (no       (unknown)  (unknown)  Globulin    3.6  (units   

 (unknown)



                    date)                         (1.7-4.1)  g/dL unknown)  

 

           (unknown)  (no       (unknown)  (unknown)  Glucose    262 H  (units  

  (unknown)



                    date)                         ()  mg/dL unknown)  

 

           (unknown)  (no       (unknown)  (unknown)  Guaiac stool:  (units    (

unknown)



                    date)                                   unknown)  

 

           (unknown)  (no       (unknown)  (unknown)  H/O colectomy  (units    (

unknown)



                    date)                                   unknown)  

 

           (unknown)  (no       (unknown)  (unknown)  HPI - Weakness  (units    

(unknown)



                    date)                                   unknown)  

 

           (unknown)  (no       (unknown)  (unknown)  HPI Narrative:  (units    

(unknown)



                    date)                                   unknown)  

 

           (unknown)  (no       (unknown)  (unknown)  Hct   26.7 L  (units    (u

nknown)



                    date)                         (41-53)  % unknown)  

 

           (unknown)  (no       (unknown)  (unknown)  Head/Neck:  (units    (unk

nown)



                    date)                         Endorses having a unknown)  



                                                  headache, denies           



                                                  any neck pain           

 

           (unknown)  (no       (unknown)  (unknown)  Head: atraumatic,  (units 

   (unknown)



                    date)                         symmetrical facial unknown)  



                                                  expressions           

 

           (unknown)  (no       (unknown)  (unknown)  Hgb   9.1 L  (units    (un

known)



                    date)                         (13.5-17.5)  g/dL unknown)  

 

           (unknown)  (no       (unknown)  (unknown)  History of Present  (units

    (unknown)



                    date)                         Illness   unknown)  

 

           (unknown)  (no       (unknown)  (unknown)  History of lumbar  (units 

   (unknown)



                    date)                         surgery () unknown)  

 

           (unknown)  (no       (unknown)  (unknown)  Hx of foot surgery  (units

    (unknown)



                    date)                         (2017)    unknown)  

 

           (unknown)  (no       (unknown)  (unknown)  Hx of shoulder  (units    

(unknown)



                    date)                         surgery (2010) unknown)  

 

           (unknown)  (no       (unknown)  (unknown)  Hypertension  (units    (u

nknown)



                    date)                                   unknown)  

 

           (unknown)  (no       (unknown)  (unknown)  IMPRESSION:? No  (units   

 (unknown)



                    date)                         acute     unknown)  



                                                  cardiopulmonary           



                                                  findings            

 

           (unknown)  (no       (unknown)  (unknown)  INDICATIONS:?  (units    (

unknown)



                    date)                         chest pain unknown)  

 

           (unknown)  (no       (unknown)  (unknown)  Imaging Data  (units    (u

nknown)



                    date)                                   unknown)  

 

           (unknown)  (no       (unknown)  (unknown)  Independently  (units    (

unknown)



                    date)                         reviewed vitals unknown)  



                                                  signs and nursing           



                                                  notes.              

 

           (unknown)  (no       (unknown)  (unknown)  Initial Vital  (units    (

unknown)



                    date)                         Signs     unknown)  

 

           (unknown)  (no       (unknown)  (unknown)  Initial Vital  (units    (

unknown)



                    date)                         Signs:    unknown)  

 

           (unknown)  (no       (unknown)  (unknown)  Lab Data  (units    (unkno

wn)



                    date)                                   unknown)  

 

           (unknown)  (no       (unknown)  (unknown)  Labs:     (units    (unkno

wn)



                    date)                                   unknown)  

 

           (unknown)  (no       (unknown)  (unknown)  Lactate (Lactic  (units   

 (unknown)



                    date)                         Acid) Stat unknown)  

 

           (unknown)  (no       (unknown)  (unknown)  Lipase    32  (units    (u

nknown)



                    date)                         ()  U/L unknown)  

 

           (unknown)  (no       (unknown)  (unknown)  Lipase Stat  (units    (un

known)



                    date)                                   unknown)  

 

           (unknown)  (no       (unknown)  (unknown)  Lungs and pleura:?  (units

    (unknown)



                    date)                         Lungs are clear.? unknown)  



                                                  No pleural           



                                                  effusions or           



                                                  pneumothorax.? Low           

 

           (unknown)  (no       (unknown)  (unknown)  Lymph # (Auto)  (units    

(unknown)



                    date)                         400 L   (5626-3196) unknown)  



                                                   /uL                

 

           (unknown)  (no       (unknown)  (unknown)  Lymph % (Auto)  (units    

(unknown)



                    date)                         6.4 L   (25-40)  % unknown)  

 

           (unknown)  (no       (unknown)  (unknown)  MCH   26.9  (units    (unk

nown)



                    date)                         (26-34)  PG unknown)  

 

           (unknown)  (no       (unknown)  (unknown)  MCHC   34.0  (units    (un

known)



                    date)                         (30-36)  % unknown)  

 

           (unknown)  (no       (unknown)  (unknown)  MCV   79.1 L  (units    (u

nknown)



                    date)                         ()  fL unknown)  

 

           (unknown)  (no       (unknown)  (unknown)  MDM - Weakness  (units    

(unknown)



                    date)                                   unknown)  

 

           (unknown)  (no       (unknown)  (unknown)  MSK: denies new  (units   

 (unknown)



                    date)                         joint pain, muscle unknown)  



                                                  weakness or           



                                                  swelling            

 

           (unknown)  (no       (unknown)  (unknown) MSK: moves all  (units    (

unknown)



                    date)                         extremities, unknown)  



                                                  neurovascularly           



                                                  intact, generalized           



                                                  weakness, normal           

 

           (unknown)  (no       (unknown)  (unknown)  Magnesium    1.0 L  (units

    (unknown)



                    date)                          (1.6-2.3)  mg/dL unknown)  

 

           (unknown)  (no       (unknown)  (unknown)  Magnesium Stat  (units    

(unknown)



                    date)                                   unknown)  

 

           (unknown)  (no       (unknown)  (unknown)  Magnesium Sulfate  (units 

   (unknown)



                    date)                         (Magnesium Sulfate) unknown)  



                                                   2 gm in 50 mls @           



                                                  50 mls/hr IV NOW           



                                                  ONE                 

 

           (unknown)  (no       (unknown)  (unknown)  Mediastinum:?  (units    (

unknown)



                    date)                         Mediastinal unknown)  



                                                  contours appear           



                                                  normal.? Heart size           



                                                  is normal.?           

 

           (unknown)  (no       (unknown)  (unknown)  Medical History  (units   

 (unknown)



                    date)                         (Reviewed 22 unknown)  



                                                  @ 19:57 by Kitty Costello [facility])           

 

           (unknown)  (no       (unknown)  (unknown)  MiraLax since this  (units

    (unknown)



                    date)                         happened. unknown)  

 

           (unknown)  (no       (unknown)  (unknown)  Mode of arrival:  (units  

  (unknown)



                    date)                         Wheelchair unknown)  

 

           (unknown)  (no       (unknown)  (unknown)  Mono # (Auto)  (units    (

unknown)



                    date)                         600   (0-900)  /uL unknown)  

 

           (unknown)  (no       (unknown)  (unknown)  Mono % (Auto)  (units    (

unknown)



                    date)                         7.9   (3-14)  % unknown)  

 

           (unknown)  (no       (unknown)  (unknown)  Mother     (units 

   (unknown)



                    date)                          Cancer   unknown)  

 

           (unknown)  (no       (unknown)  (unknown)  Mouth/Throat:  (units    (

unknown)



                    date)                         moist mucus unknown)  



                                                  membranes           

 

           (unknown)  (no       (unknown)  (unknown)  Narrative  (units    (unkn

own)



                    date)                                   unknown)  

 

           (unknown)  (no       (unknown)  (unknown)  Narrative:  (units    (unk

nown)



                    date)                                   unknown)  

 

           (unknown)  (no       (unknown)  (unknown)  Neck: supple  (units    (u

nknown)



                    date)                                   unknown)  

 

           (unknown)  (no       (unknown)  (unknown)  Neuro: denies  (units    (

unknown)



                    date)                         numbness, tingling, unknown)  



                                                  dizziness           

 

           (unknown)  (no       (unknown)  (unknown)  Neuro: normal  (units    (

unknown)



                    date)                         speech and unknown)  



                                                  cognition, A+O x3           

 

           (unknown)  (no       (unknown)  (unknown)  Neut # (Auto)  (units    (

unknown)



                    date)                         5900   (9762-3509) unknown)  



                                                  /uL                 

 

           (unknown)  (no       (unknown)  (unknown)  Neut % (Auto)  (units    (

unknown)



                    date)                         84.7 H   (50-75)  % unknown)  

 

           (unknown)  (no       (unknown)  (unknown)  No Action  (units    (unkn

own)



                    date)                                   unknown)  

 

           (unknown)  (no       (unknown)  (unknown)  No Known Drug  (units    (

unknown)



                    date)                         Allergies Allergy unknown)  



                                                  Verified 22           



                                                  17:38               

 

           (unknown)  (no       (unknown)  (unknown)  Nose: nares  (units    (un

known)



                    date)                         patent, no unknown)  



                                                  rhinorrhea           

 

           (unknown)  (no       (unknown)  (unknown)  Ondansetron HCl  (units   

 (unknown)



                    date)                         (Ondansetron 4 Mg/2 unknown)  



                                                  Ml Inj)  4 mg IV           



                                                  NOW ONE             

 

           (unknown)  (no       (unknown)  (unknown)  Ordered:  (units    (unkno

wn)



                    date)                                   unknown)  

 

           (unknown)  (no       (unknown)  (unknown)  Orders    (units    (unkno

wn)



                    date)                                   unknown)  

 

           (unknown)  (no       (unknown)  (unknown)  Osteoarthritis  (units    

(unknown)



                    date)                                   unknown)  

 

           (unknown)  (no       (unknown)  (unknown)  Oxygen Delivery  (units   

 (unknown)



                    date)                         Method    22 unknown)  



                                                  17:34               

 

           (unknown)  (no       (unknown)  (unknown)  Oxygen Delivery  (units   

 (unknown)



                    date)                         Method Room Air unknown)  

 

           (unknown)  (no       (unknown)  (unknown)  PROCEDURE:? XR  (units    

(unknown)



                    date)                         CHEST 1V  unknown)  

 

           (unknown)  (no       (unknown)  (unknown)  Patient History  (units   

 (unknown)



                    date)                                   unknown)  

 

           (unknown)  (no       (unknown)  (unknown)  Patient is  (units    (unk

nown)



                    date)                         currently unknown)  



                                                  anticoagulated on           



                                                  Plavix 37.5 mg           



                                                  daily.  Dr. Gómez           

 

           (unknown)  (no       (unknown)  (unknown) Patient reports  (units    

(unknown)



                    date)                         that when he has a unknown)  



                                                  bowel movement, he           



                                                  typically has hard           



                                                  pellets             

 

           (unknown)  (no       (unknown)  (unknown)  Patient self caths  (units

    (unknown)



                    date)                         for urine output, unknown)  



                                                  reports that he has           



                                                  not had much urine           

 

           (unknown)  (no       (unknown)  (unknown) Patient's  (units    (unkno

wn)



                    date)                         colorectal cancer unknown)  



                                                  oncologist was Dr. Jett, patient was           



                                                  referred to him           

 

           (unknown)  (no       (unknown)  (unknown)  Patient's preop  (units   

 (unknown)



                    date)                         diagnosis was unknown)  



                                                  nausea and vomiting           



                                                  from hematemesis in           



                                                  January             

 

           (unknown)  (no       (unknown)  (unknown)  Patient:  (units    (unkno

wn)



                    date)                         Kassandra Leon unknown)  



                                                  MR#: [id number]            

 

           (unknown)  (no       (unknown)  (unknown)  Postlaminectomy  (units   

 (unknown)



                    date)                         syndrome  unknown)  

 

           (unknown)  (no       (unknown)  (unknown)  Potassium    3.7  (units  

  (unknown)



                    date)                         (3.4-5.1)  mmol/L unknown)  

 

           (unknown)  (no       (unknown)  (unknown)  Prescriptions:  (units    

(unknown)



                    date)                                   unknown)  

 

           (unknown)  (no       (unknown)  (unknown)  Procalcitonin Stat  (units

    (unknown)



                    date)                                   unknown)  

 

           (unknown)  (no       (unknown)  (unknown)  Psych: mental  (units    (

unknown)



                    date)                         status is grossly unknown)  



                                                  normal, congruent           



                                                  mood, normal           



                                                  affect, pleasant           

 

           (unknown)  (no       (unknown)  (unknown)  Pulse Oximetry  98  (units

    (unknown)



                    date)                           22 17:34 unknown)  

 

           (unknown)  (no       (unknown)  (unknown)  Pulse Oximetry 98  (units 

   (unknown)



                    date)                                   unknown)  

 

           (unknown)  (no       (unknown)  (unknown)  Pulse Rate  77  (units    

(unknown)



                    date)                         22 17:34 unknown)  

 

           (unknown)  (no       (unknown)  (unknown)  Pulse Rate 77  (units    (

unknown)



                    date)                                   unknown)  

 

           (unknown)  (no       (unknown)  (unknown)  RDW   16.1 H  (units    (u

nknown)



                    date)                         (11.6-14.8)  % unknown)  

 

           (unknown)  (no       (unknown)  (unknown)  Referrals:  (units    (unk

nown)



                    date)                                   unknown)  

 

           (unknown)  (no       (unknown)  (unknown)  Related Data  (units    (u

nknown)



                    date)                                   unknown)  

 

           (unknown)  (no       (unknown)  (unknown)  Respiratory Rate  (units  

  (unknown)



                    date)                         18   22 17:34 unknown)  

 

           (unknown)  (no       (unknown)  (unknown)  Respiratory Rate  (units  

  (unknown)



                    date)                         18        unknown)  

 

           (unknown)  (no       (unknown)  (unknown)  Respiratory:  (units    (u

nknown)



                    date)                         denies shortness of unknown)  



                                                  breath, or new           



                                                  cough               

 

           (unknown)  (no       (unknown)  (unknown)  Respiratory:  (units    (u

nknown)



                    date)                         normal effort, able unknown)  



                                                  to speak in           



                                                  complete sentences,           



                                                  no audible           

 

           (unknown)  (no       (unknown)  (unknown)  Result diagrams:  (units  

  (unknown)



                    date)                                   unknown)  

 

           (unknown)  (no       (unknown)  (unknown)  Review of Systems  (units 

   (unknown)



                    date)                                   unknown)  

 

           (unknown)  (no       (unknown)  (unknown)  SARS-CoV-2 (PCR)  (units  

  (unknown)



                    date)                         Negative  unknown)  



                                                  (Negative)           

 

           (unknown)  (no       (unknown)  (unknown)  Sciatica  (units    (unkno

wn)



                    date)                                   unknown)  

 

           (unknown)  (no       (unknown)  (unknown)  Signed By:  (units    (unk

nown)



                    date)                                   unknown)  

 

           (unknown)  (no       (unknown)  (unknown)  Skin: brisk  (units    (un

known)



                    date)                         capillary refill, unknown)  



                                                  no rash, no           



                                                  erythema            

 

           (unknown)  (no       (unknown)  (unknown)  Skin: denies rash,  (units

    (unknown)



                    date)                         itching or wound unknown)  

 

           (unknown)  (no       (unknown)  (unknown)  Smoking Status:  (units   

 (unknown)



                    date)                         Never smoker unknown)  

 

           (unknown)  (no       (unknown)  (unknown)  Smoking Status:  (units   

 (unknown)



                    date)                         Never smoker unknown)  

 

           (unknown)  (no       (unknown)  (unknown)  Social History  (units    

(unknown)



                    date)                         (Reviewed 22 unknown)  



                                                  @ 19:57 by Kitty Costello [facility])           

 

           (unknown)  (no       (unknown)  (unknown)  Sodium    135 L  (units   

 (unknown)



                    date)                         (137-145)  mmol/L unknown)  

 

           (unknown)  (no       (unknown)  (unknown)  Sodium Chloride  (units   

 (unknown)



                    date)                         (Normal Saline unknown)  



                                                  0.9%)  1,000 mls @           



                                                  1,000 mls/hr IV           



                                                  BOLUS ONE           

 

           (unknown)  (no       (unknown)  (unknown)  Source: patient  (units   

 (unknown)



                    date)                         and family unknown)  

 

           (unknown)  (no       (unknown)  (unknown)  Spinal stenosis  (units   

 (unknown)



                    date)                                   unknown)  

 

           (unknown)  (no       (unknown)  (unknown)  Stated complaint:  (units 

   (unknown)



                    date)                         WEAKNESS UNABLE TO unknown)  



                                                  HOLD ANYTHING DOWN           

 

           (unknown)  (no       (unknown)  (unknown)  Substance Use  (units    (

unknown)



                    date)                         Type: does not use unknown)  

 

           (unknown)  (no       (unknown)  (unknown)  Surgical History  (units  

  (unknown)



                    date)                         (Reviewed 22 unknown)  



                                                  @ 19:57 by Kitty Costello [facility])           

 

           (unknown)  (no       (unknown)  (unknown)  Surgical changes  (units  

  (unknown)



                    date)                         and devices:? unknown)  



                                                  None.?              

 

           (unknown)  (no       (unknown)  (unknown)  TECHNIQUE:? One  (units   

 (unknown)



                    date)                         view of the chest unknown)  



                                                  was acquired.?           

 

           (unknown)  (no       (unknown)  (unknown)  Temperature  98.3  (units 

   (unknown)



                    date)                         F   22 17:34 unknown)  

 

           (unknown)  (no       (unknown)  (unknown)  Temperature 98.3 F  (units

    (unknown)



                    date)                                   unknown)  

 

           (unknown)  (no       (unknown)  (unknown)  Time Seen by  (units    (u

nknown)



                    date)                         Provider: 22 unknown)  



                                                  19:11               

 

           (unknown)  (no       (unknown)  (unknown)  Total Bilirubin  (units   

 (unknown)



                    date)                         0.6  (0.2-1.3) unknown)  



                                                  mg/dL               

 

           (unknown)  (no       (unknown)  (unknown)  Total Creatine  (units    

(unknown)



                    date)                         Kinase    54 L unknown)  



                                                  ()  U/L           

 

           (unknown)  (no       (unknown)  (unknown)  Total Protein  (units    (

unknown)



                    date)                         7.5  (6.3-8.2) unknown)  



                                                  g/dL                

 

           (unknown)  (no       (unknown)  (unknown)  Troponin + CK  (units    (

unknown)



                    date)                         Cardiac Panel Stat unknown)  

 

           (unknown)  (no       (unknown)  (unknown)  Troponin I    <  (units   

 (unknown)



                    date)                         0.012  (0.01-0.034) unknown)  



                                                   ng/mL              

 

           (unknown)  (no       (unknown)  (unknown)  Urine Microscopic  (units 

   (unknown)



                    date)                         Stat      unknown)  

 

           (unknown)  (no       (unknown)  (unknown)  Vital Signs  (units    (un

known)



                    date)                                   unknown)  

 

           (unknown)  (no       (unknown)  (unknown)  Vital signs:  (units    (u

nknown)



                    date)                                   unknown)  

 

           (unknown)  (no       (unknown)  (unknown)  Linette Jett MD  (units  

  (unknown)



                    date)                         [Physician] - As unknown)  



                                                  soon as possible           

 

           (unknown)  (no       (unknown)  (unknown)  XR chest 1V Stat  (units  

  (unknown)



                    date)                                   unknown)  

 

           (unknown)  (no       (unknown)  (unknown)  [Embedded Image  (units   

 (unknown)



                    date)                         Not Available] unknown)  

 

           (unknown)  (no       (unknown)  (unknown)  alcohol intake  (units    

(unknown)



                    date)                         frequency: a few unknown)  



                                                  times a month           

 

           (unknown)  (no       (unknown)  (unknown)  alcohol intake:  (units   

 (unknown)



                    date)                         current   unknown)  

 

           (unknown)  (no       (unknown)  (unknown)  and cooperative  (units   

 (unknown)



                    date)                                   unknown)  

 

           (unknown)  (no       (unknown)  (unknown)  appear    (units    (unkno

wn)



                    date)                                   unknown)  

 

           (unknown)  (no       (unknown)  (unknown)  ascorbic acid  (units    (

unknown)



                    date)                         (vitamin C) 500 mg unknown)  



                                                  500 mg PO BID #60           



                                                  tabs 22           

 

           (unknown)  (no       (unknown)  (unknown)  atorvastatin 80 mg  (units

    (unknown)



                    date)                         tablet 40 mg PO QPM unknown)  



                                                  20           

 

           (unknown)  (no       (unknown)  (unknown)  be causing a rash  (units 

   (unknown)



                    date)                                   unknown)  

 

           (unknown)  (no       (unknown)  (unknown)  blood in his  (units    (u

nknown)



                    date)                         emesis or in his unknown)  



                                                  stool.  He reports           



                                                  that he did a stool           



                                                  test one            

 

           (unknown)  (no       (unknown)  (unknown)  buspirone 5 mg  (units    

(unknown)



                    date)                         tablet 5 mg PO BID unknown)  



                                                  #60 tabs 22           

 

           (unknown)  (no       (unknown)  (unknown)  cancer.  Postop  (units   

 (unknown)



                    date)                         diagnosis from this unknown)  



                                                  upper endoscopy was           



                                                  the same.  His           

 

           (unknown)  (no       (unknown)  (unknown)  caths at baseline  (units 

   (unknown)



                    date)                                   unknown)  

 

           (unknown)  (no       (unknown)  (unknown)  clopidogrel 75 mg  (units 

   (unknown)



                    date)                         tablet 75 mg PO unknown)  



                                                  DAILY 22            

 

           (unknown)  (no       (unknown)  (unknown)  colonic   (units    (unkno

wn)



                    date)                         anastomosis, and a unknown)  



                                                  suboptimal bowel           



                                                  prep.               

 

           (unknown)  (no       (unknown)  (unknown)  cyanocobalamin  (units    

(unknown)



                    date)                         (vitamin B-12) 500 unknown)  



                                                  1,000 mcg PO DAILY           



                                                  #60 tabs 22           

 

           (unknown)  (no       (unknown)  (unknown)  diabetes,  (units    (unkn

own)



                    date)                         hypertension, unknown)  



                                                  colorectal cancer           



                                                  in  with a           



                                                  reverse colectomy,           



                                                  He                  

 

           (unknown)  (no       (unknown)  (unknown)  does not drink  (units    

(unknown)



                    date)                         alcohol.? Patient unknown)  



                                                  and his wife state           



                                                  that he was seen in           



                                                  the                 

 

           (unknown)  (no       (unknown)  (unknown)  edema     (units    (unkno

wn)



                    date)                                   unknown)  

 

           (unknown)  (no       (unknown)  (unknown)  emergency  (units    (unkn

own)



                    date)                         department in unknown)  



                                                  January and           



                                                  diagnosed with a GI           



                                                  bleed, he was           



                                                  admitted,           

 

           (unknown)  (no       (unknown)  (unknown)  endorses fatigue  (units  

  (unknown)



                    date)                                   unknown)  

 

           (unknown)  (no       (unknown)  (unknown) endoscopic  (units    (unkn

own)



                    date)                         diagnosis includes unknown)  



                                                  mild gastropathy,           



                                                  esophagitis, patent           



                                                  retrosigmoid           

 

           (unknown)  (no       (unknown)  (unknown)  ferrous sulfate  (units   

 (unknown)



                    date)                         325 mg (65 mg 325 unknown)  



                                                  mg PO BIDWM #60           



                                                  tabs 22           

 

           (unknown)  (no       (unknown)  (unknown)  followed by loose  (units 

   (unknown)



                    date)                         stool.    unknown)  

 

           (unknown)  (no       (unknown)  (unknown)  gabapentin 300 mg  (units 

   (unknown)



                    date)                         capsule 300 mg PO unknown)  



                                                  BID 18            

 

           (unknown)  (no       (unknown)  (unknown)  generalized  (units    (un

known)



                    date)                         weakness and unknown)  



                                                  fatigue             

 

           (unknown)  (no       (unknown)  (unknown)  glipizide 10 mg  (units   

 (unknown)



                    date)                         tablet 10 mg PO BID unknown)  



                                                  18           

 

           (unknown)  (no       (unknown)  (unknown)  hospital and on  (units   

 (unknown)



                    date)                         chart review it unknown)  



                                                  appears that it was           



                                                  completed on           



                                                  2022.           

 

           (unknown)  (no       (unknown)  (unknown)  household members:  (units

    (unknown)



                    date)                          spouse   unknown)  

 

           (unknown)  (no       (unknown)  (unknown)  iron) tablet  (units    (u

nknown)



                    date)                                   unknown)  

 

           (unknown)  (no       (unknown)  (unknown)  lives     (units    (unkno

wn)



                    date)                         independently:  Yes unknown)  

 

           (unknown)  (no       (unknown)  (unknown)  losartan 50 mg  (units    

(unknown)



                    date)                         tablet 25 mg PO QPM unknown)  



                                                  20           

 

           (unknown)  (no       (unknown)  (unknown)  lung      (units    (unkno

wn)



                    date)                                   unknown)  

 

           (unknown)  (no       (unknown)  (unknown)  mcg tablet  (units    (unk

nown)



                    date)                                   unknown)  

 

           (unknown)  (no       (unknown)  (unknown)  mcg tablet  (units    (unk

nown)



                    date)                         (Tab-A-Lucy) unknown)  

 

           (unknown)  (no       (unknown)  (unknown)  metformin 500 mg  (units  

  (unknown)



                    date)                         tablet,extended 500 unknown)  



                                                  mg PO BID 18            

 

           (unknown)  (no       (unknown)  (unknown)  metoprolol  (units    (unk

nown)



                    date)                         succinate 25 mg 25 unknown)  



                                                  mg PO DAILY           



                                                  21           

 

           (unknown)  (no       (unknown)  (unknown)  moving forward.  (units   

 (unknown)



                    date)                         Patient reports unknown)  



                                                  that he has been           



                                                  taking fiber but           



                                                  not any             

 

           (unknown)  (no       (unknown)  (unknown)  multivitamin with  (units 

   (unknown)



                    date)                         folic acid 400 1 unknown)  



                                                  tab PO DAILY #90           



                                                  tabs 22           

 

           (unknown)  (no       (unknown)  (unknown)  nondistended, no  (units  

  (unknown)



                    date)                         masses, no unknown)  



                                                  exquisite           



                                                  tenderness with           



                                                  exam, no rebound           

 

           (unknown)  (no       (unknown)  (unknown)  not heard it was  (units  

  (unknown)



                    date)                         positive or not. unknown)  



                                                  Patient reports           



                                                  that his primary           



                                                  care                

 

           (unknown)  (no       (unknown)  (unknown)  ondansetron HCl 4  (units 

   (unknown)



                    date)                         mg tablet 4 mg PO unknown)  



                                                  Q8H PRN nausea and           



                                                  21            

 

           (unknown)  (no       (unknown)  (unknown)  oral powder packet  (units

    (unknown)



                    date)                                   unknown)  

 

           (unknown)  (no       (unknown)  (unknown)  output for three  (units  

  (unknown)



                    date)                         days, three days unknown)  



                                                  ago he had nausea           



                                                  and vomiting,           



                                                  denies any           

 

           (unknown)  (no       (unknown)  (unknown)  polyethylene  (units    (u

nknown)



                    date)                         glycol 3350 17 gram unknown)  



                                                  17 gm PO DAILY #90           



                                                  ea 22           

 

           (unknown)  (no       (unknown)  (unknown)  provider has left,  (units

    (unknown)



                    date)                         he reports that he unknown)  



                                                  has seen Dr. Macias           



                                                  recently as well.           

 

           (unknown)  (no       (unknown)  (unknown)  recently for his  (units  

  (unknown)



                    date)                         anemia.   unknown)  

 

           (unknown)  (no       (unknown)  (unknown)  related diarrhea  (units  

  (unknown)



                    date)                         from fecal loading unknown)  



                                                  and obstipation.           



                                                  Recommended MiraLax           



                                                  17 g                

 

           (unknown)  (no       (unknown)  (unknown)  release 24 hr  (units    (

unknown)



                    date)                                   unknown)  

 

           (unknown)  (no       (unknown)  (unknown)  reported that  (units    (

unknown)



                    date)                         patient's altered unknown)  



                                                  bowel habit is           



                                                  likely a function           



                                                  of overflow           

 

           (unknown)  (no       (unknown)  (unknown)  sennosides 8.6 mg  (units 

   (unknown)



                    date)                         tablet (senna) 17.2 unknown)  



                                                  mg PO BEDTIME #60           



                                                  tabs 22           

 

           (unknown)  (no       (unknown)  (unknown)  substance use  (units    (

unknown)



                    date)                         type:  does not use unknown)  

 

           (unknown)  (no       (unknown)  (unknown)  tablet (Vitamin C)  (units

    (unknown)



                    date)                                   unknown)  

 

           (unknown)  (no       (unknown)  (unknown)  tablet,extended  (units   

 (unknown)



                    date)                         release 24 hr unknown)  

 

           (unknown)  (no       (unknown)  (unknown)  tenderness  (units    (unk

nown)



                    date)                                   unknown)  

 

           (unknown)  (no       (unknown)  (unknown)  there.  He reports  (units

    (unknown)



                    date)                         that he had an unknown)  



                                                  upper endoscopy           



                                                  completed here at           



                                                  this                

 

           (unknown)  (no       (unknown)  (unknown)  tone      (units    (unkno

wn)



                    date)                                   unknown)  

 

           (unknown)  (no       (unknown)  (unknown)  trospium 20 mg  (units    

(unknown)



                    date)                         tablet 20 mg PO BID unknown)  



                                                  urinary retention           



                                                  22           

 

           (unknown)  (no       (unknown)  (unknown)  unremarkable.?  (units    

(unknown)



                    date)                                   unknown)  

 

           (unknown)  (no       (unknown)  (unknown)  volumes accentuate  (units

    (unknown)



                    date)                         pulmonary unknown)  



                                                  interstitium and           



                                                  heart size.           

 

           (unknown)  (no       (unknown)  (unknown)  week ago and  (units    (u

nknown)



                    date)                         dropped it off at unknown)  



                                                  [facility] which was           



                                                  testing for blood           



                                                  but he has           

 

           (unknown)  (no       (unknown)  (unknown)  went to [facility]  (units 

   (unknown)



                    date)                         for rehab following unknown)  



                                                  his admission and           



                                                  has followed up           



                                                  with                

 

           (unknown)  (no       (unknown)  (unknown)  wheezing, stridor,  (units

    (unknown)



                    date)                         or rales. No unknown)  



                                                  retractions or           



                                                  tachypnea.           









                                         Result panel 21









           (unknown)  (no date)  (unknown)  (unknown)  1-5/HPF   (units    (unkn

own)



                                                            unknown)  

 

           (unknown)  (no date)  (unknown)  (unknown)  2+        (units    (unkn

own)



                                                            unknown)  

 

           (unknown)  (no date)  (unknown)  (unknown)  5-10/HPF  (units    (unkn

own)



                                                            unknown)  

 

           (unknown)  (no date)  (unknown)  (unknown)  Few (2-10)  (units    (un

known)



                                                            unknown)  

 

           (unknown)  (no date)  (unknown)  (unknown)  Specimen  (units    (unkn

own)



                                                  Cultured  unknown)  









                                         Result panel 22









           (unknown)  (no       (unknown)  (unknown)  (no value)  (units    (unk

nown)



                    date)                                   unknown)  

 

           (unknown)  (no       (unknown)  (unknown)  Radiologist  (units    (un

known)



                    date)                         Impression: unknown)  

 

           (unknown)  (no       (unknown)  (unknown)  Date of Service:  (units  

  (unknown)



                    date)                         22  unknown)  

 

           (unknown)  (no       (unknown)  (unknown)  (no value)  (units    (unk

nown)



                    date)                                   unknown)  

 

           (unknown)  (no       (unknown)  (unknown)  22 17:45  (units    

(unknown)



                    date)                                   unknown)  

 

           (unknown)  (no       (unknown)  (unknown)  1 tab PO DAILY  (units    

(unknown)



                    date)                         Qty: 90 0RF unknown)  

 

           (unknown)  (no       (unknown)  (unknown)  1,000 mcg PO DAILY  (units

    (unknown)



                    date)                         Qty: 60 0RF unknown)  

 

           (unknown)  (no       (unknown)  (unknown)  10 mg PO BID  (units    (u

nknown)



                    date)                                   unknown)  

 

           (unknown)  (no       (unknown)  (unknown)  17 gm PO DAILY  (units    

(unknown)



                    date)                         Qty: 90 0RF unknown)  

 

           (unknown)  (no       (unknown)  (unknown)  17.2 mg PO BEDTIME  (units

    (unknown)



                    date)                         Qty: 60 0RF unknown)  

 

           (unknown)  (no       (unknown)  (unknown)  20 mg PO BID  (units    (u

nknown)



                    date)                                   unknown)  

 

           (unknown)  (no       (unknown)  (unknown)  25 mg PO DAILY  (units    

(unknown)



                    date)                                   unknown)  

 

           (unknown)  (no       (unknown)  (unknown)  25 mg PO QPM  (units    (u

nknown)



                    date)                                   unknown)  

 

           (unknown)  (no       (unknown)  (unknown)  300 mg PO BID  (units    (

unknown)



                    date)                                   unknown)  

 

           (unknown)  (no       (unknown)  (unknown)  325 mg PO BIDWM  (units   

 (unknown)



                    date)                         Qty: 60 0RF unknown)  

 

           (unknown)  (no       (unknown)  (unknown)  4 mg PO Q8H PRN  (units   

 (unknown)



                    date)                         (Reason: nausea and unknown)  



                                                  vomiting) Qty: 14           



                                                  0RF                 

 

           (unknown)  (no       (unknown)  (unknown)  40 mg PO QPM  (units    (u

nknown)



                    date)                                   unknown)  

 

           (unknown)  (no       (unknown)  (unknown)  5 mg PO BID Qty:  (units  

  (unknown)



                    date)                         60 0RF    unknown)  

 

           (unknown)  (no       (unknown)  (unknown)  500 mg PO BID  (units    (

unknown)



                    date)                                   unknown)  

 

           (unknown)  (no       (unknown)  (unknown)  500 mg PO BID Qty:  (units

    (unknown)



                    date)                         60 0RF    unknown)  

 

           (unknown)  (no       (unknown)  (unknown)  75 mg PO DAILY  (units    

(unknown)



                    date)                                   unknown)  

 

           (unknown)  (no       (unknown)  (unknown)  Allergies  (units    (unkn

own)



                    date)                                   unknown)  

 

           (unknown)  (no       (unknown)  (unknown)  Documented By: [facility]  (units

    (unknown)



                    date)                                   unknown)  

 

           (unknown)  (no       (unknown)  (unknown)  ED Orders  (units    (unkn

own)



                    date)                                   unknown)  

 

           (unknown)  (no       (unknown)  (unknown)  Emergency Report  (units  

  (unknown)



                    date)                                   unknown)  

 

           (unknown)  (no       (unknown)  (unknown)  Home Medications  (units  

  (unknown)



                    date)                                   unknown)  

 

           (unknown)  (no       (unknown)  (unknown)  [facility]  (units   

 (unknown)



                    date)                         [address] unknown)  



                                                  [place] [address]           

 

           (unknown)  (no       (unknown)  (unknown)  Lab Results  (units    (un

known)



                    date)                                   unknown)  

 

           (unknown)  (no       (unknown)  (unknown)  Label Comments:  (units   

 (unknown)



                    date)                                   unknown)  

 

           (unknown)  (no       (unknown)  (unknown)  Last Admin:  (units    (un

known)



                    date)                         22 18:30 unknown)  



                                                  Dose: 4 mg           

 

           (unknown)  (no       (unknown)  (unknown)  Previous Rx's  (units    (

unknown)



                    date)                                   unknown)  

 

           (unknown)  (no       (unknown)  (unknown)  Stop: 22  (units    

(unknown)



                    date)                         18:16     unknown)  

 

           (unknown)  (no       (unknown)  (unknown)  Stop: 22  (units    

(unknown)



                    date)                         20:25     unknown)  

 

           (unknown)  (no       (unknown)  (unknown)  Stop: 22  (units    

(unknown)



                    date)                         20:27     unknown)  

 

           (unknown)  (no       (unknown)  (unknown)  Vital Signs - 8 hr  (units

    (unknown)



                    date)                                   unknown)  

 

           (unknown)  (no       (unknown)  (unknown)  has not been  (units    (u

nknown)



                    date)                         eating so hasn't unknown)  



                                                  taken recently.           



                                                  spouse states           



                                                  thinks it might           

 

           (unknown)  (no       (unknown)  (unknown)  stopped taking  (units    

(unknown)



                    date)                         prior to back unknown)  



                                                  surgery and did not           



                                                  restart             

 

           (unknown)  (no       (unknown)  (unknown)  (no value)  (units    (unk

nown)



                    date)                                   unknown)  

 

           (unknown)  (no       (unknown)  (unknown)  22  (units 

   (unknown)



                    date)                         22  unknown)  



                                                  Range/Units           

 

           (unknown)  (no       (unknown)  (unknown)  17:40 17:45 17:45  (units 

   (unknown)



                    date)                                   unknown)  

 

           (unknown)  (no       (unknown)  (unknown)  ascorbic acid  (units    (

unknown)



                    date)                         (vitamin C) unknown)  



                                                  [Vitamin C] 500 mg           



                                                  Tablet              

 

           (unknown)  (no       (unknown)  (unknown)  atorvastatin 80 mg  (units

    (unknown)



                    date)                         tablet    unknown)  

 

           (unknown)  (no       (unknown)  (unknown)  buspirone 5 mg  (units    

(unknown)



                    date)                         Tablet    unknown)  

 

           (unknown)  (no       (unknown)  (unknown)  clopidogrel 75 mg  (units 

   (unknown)



                    date)                         tablet    unknown)  

 

           (unknown)  (no       (unknown)  (unknown)  cyanocobalamin  (units    

(unknown)



                    date)                         (vitamin B-12) 500 unknown)  



                                                  mcg Tablet           

 

           (unknown)  (no       (unknown)  (unknown)  ferrous sulfate  (units   

 (unknown)



                    date)                         325 mg (65 mg iron) unknown)  



                                                  Tablet              

 

           (unknown)  (no       (unknown)  (unknown)  gabapentin 300 mg  (units 

   (unknown)



                    date)                         capsule   unknown)  

 

           (unknown)  (no       (unknown)  (unknown)  glipizide 10 mg  (units   

 (unknown)



                    date)                         tablet    unknown)  

 

           (unknown)  (no       (unknown)  (unknown)  losartan 50 mg  (units    

(unknown)



                    date)                         tablet    unknown)  

 

           (unknown)  (no       (unknown)  (unknown)  metformin 500 mg  (units  

  (unknown)



                    date)                         tablet extended unknown)  



                                                  release 24 hr           

 

           (unknown)  (no       (unknown)  (unknown)  metoprolol  (units    (unk

nown)



                    date)                         succinate 25 mg unknown)  



                                                  tablet extended           



                                                  release 24 hr           

 

           (unknown)  (no       (unknown)  (unknown)  multivitamin with  (units 

   (unknown)



                    date)                         folic acid unknown)  



                                                  [Tab-A-Lucy] 400           



                                                  mcg Tablet           

 

           (unknown)  (no       (unknown)  (unknown)  ondansetron HCl  (units   

 (unknown)



                    date)                         [Zofran] 4 mg unknown)  



                                                  tablet              

 

           (unknown)  (no       (unknown)  (unknown)  polyethylene  (units    (u

nknown)



                    date)                         glycol 3350 17 gram unknown)  



                                                  Powder In Packet           

 

           (unknown)  (no       (unknown)  (unknown)  sennosides [senna]  (units

    (unknown)



                    date)                         8.6 mg Tablet unknown)  

 

           (unknown)  (no       (unknown)  (unknown)  trospium 20 mg  (units    

(unknown)



                    date)                         tablet    unknown)  

 

           (unknown)  (no       (unknown)  (unknown)  22  (units    (unkno

wn)



                    date)                                   unknown)  

 

           (unknown)  (no       (unknown)  (unknown)  1.07 in January.  (units  

  (unknown)



                    date)                         No elevation in his unknown)  



                                                  troponin,           



                                                  procalcitonin is           



                                                  elevated at           

 

           (unknown)  (no       (unknown)  (unknown)  Acute cystitis  (units    

(unknown)



                    date)                         with hematuria, unknown)  



                                                  Anemia,             



                                                  Hypomagnesemia           

 

           (unknown)  (no       (unknown)  (unknown)  Medication  (units    (unk

nown)



                    date)                         Instructions unknown)  



                                                  Recorded            

 

           (unknown)  (no       (unknown)  (unknown)  Medication  (units    (unk

nown)



                    date)                         Instructions unknown)  



                                                  Recorded  Confirmed           

 

           (unknown)  (no       (unknown)  (unknown)  sodium 135,  (units    (un

known)



                    date)                         potassium 3.7, unknown)  



                                                  creatinine 1.42           



                                                  which is increased           



                                                  from his prior of           

 

           (unknown)  (no       (unknown)  (unknown)  with regular axis  (units 

   (unknown)



                    date)                         and intervals.  No unknown)  



                                                  STEMI, ST segment           



                                                  changes,            



                                                  arrhythmia, or           

 

           (unknown)  (no       (unknown)  (unknown)  work is   (units    (unkno

wn)



                    date)                         significant for unknown)  



                                                  anemia, hemoglobin           



                                                  is 9.1 today, down           



                                                  from 9.6 in           

 

           (unknown)  (no       (unknown)  (unknown)  (Zofran) vomiting  (units 

   (unknown)



                    date)                         #14 tabs  unknown)  

 

           (unknown)  (no       (unknown)  (unknown)  0.72, his lactate  (units 

   (unknown)



                    date)                         is also elevated at unknown)  



                                                  2.6, magnesium is           



                                                  low at 1.0.  His           

 

           (unknown)  (no       (unknown)  (unknown)  [id number]   (units    (unkno

wn)



                    date)                                   unknown)  

 

           (unknown)  (no       (unknown)  (unknown)  22 17:39  (units    

(unknown)



                    date)                                   unknown)  

 

           (unknown)  (no       (unknown)  (unknown)  22 17:40  (units    

(unknown)



                    date)                                   unknown)  

 

           (unknown)  (no       (unknown)  (unknown)  22 17:45  (units    

(unknown)



                    date)                                   unknown)  

 

           (unknown)  (no       (unknown)  (unknown)  22 19:26  (units    

(unknown)



                    date)                                   unknown)  

 

           (unknown)  (no       (unknown)  (unknown)  22 19:28  (units    

(unknown)



                    date)                                   unknown)  

 

           (unknown)  (no       (unknown)  (unknown)  17:34     (units    (unkno

wn)



                    date)                                   unknown)  

 

           (unknown)  (no       (unknown)  (unknown)  with iron  (units    (

unknown)



                    date)                         deficiency anemia, unknown)  



                                                  altered bowel           



                                                  habit, personal           



                                                  history of colon           

 

           (unknown)  (no       (unknown)  (unknown)  62-year-old  (units    (un

known)



                    date)                         gentleman with a unknown)  



                                                  history of multiple           



                                                  sclerosis, CVA in           



                                                  2019,               

 

           (unknown)  (no       (unknown)  (unknown)  ?         (units    (unkno

wn)



                    date)                                   unknown)  

 

           (unknown)  (no       (unknown)  (unknown)  ALT    16  (<50)  (units  

  (unknown)



                    date)                         IU/L      unknown)  

 

           (unknown)  (no       (unknown)  (unknown)  AST    19  (17-59)  (units

    (unknown)



                    date)                          IU/L     unknown)  

 

           (unknown)  (no       (unknown)  (unknown)  Age/Sex: 62 / M  (units   

 (unknown)



                    date)                                   unknown)  

 

           (unknown)  (no       (unknown)  (unknown)  Albumin    3.9  (units    

(unknown)



                    date)                         (3.5-5.0)  g/dL unknown)  

 

           (unknown)  (no       (unknown)  (unknown)  Albumin/Globulin  (units  

  (unknown)



                    date)                         Ratio    1.1 unknown)  



                                                  (1.0-2.8)           

 

           (unknown)  (no       (unknown)  (unknown)  Alkaline  (units    (unkno

wn)



                    date)                         Phosphatase    105 unknown)  



                                                  ()  U/L           

 

           (unknown)  (no       (unknown)  (unknown)  Allergy/AdvReac  (units   

 (unknown)



                    date)                         Type Severity unknown)  



                                                  Reaction Status           



                                                  Date / Time           

 

           (unknown)  (no       (unknown)  (unknown)  Approved by: Garfield Mcdanielsunits 

   (unknown)



                    date)                         SHANNAN Lopez on unknown)  



                                                  2022 at 18:02?           

 

           (unknown)  (no       (unknown)  (unknown)  BUN    21 H  (units    (un

known)



                    date)                         (9-20)  mg/dL unknown)  

 

           (unknown)  (no       (unknown)  (unknown)  BUN/Creatinine  (units    

(unknown)



                    date)                         Ratio    14.8 unknown)  



                                                  (6-22)              

 

           (unknown)  (no       (unknown)  (unknown)  Baso # (Auto)   0  (units 

   (unknown)



                    date)                          (0-100)  /uL unknown)  

 

           (unknown)  (no       (unknown)  (unknown)  Baso % (Auto)  (units    (

unknown)



                    date)                         0.2   (0-2)  % unknown)  

 

           (unknown)  (no       (unknown)  (unknown)  Blood Pressure  (units    

(unknown)



                    date)                         124/66   22 unknown)  



                                                  17:34               

 

           (unknown)  (no       (unknown)  (unknown)  Blood Pressure  (units    

(unknown)



                    date)                         124    unknown)  

 

           (unknown)  (no       (unknown)  (unknown)  Bones and chest  (units   

 (unknown)



                    date)                         wall:? No unknown)  



                                                  suspicious bony           



                                                  lesions.? Overlying           



                                                  soft tissues           

 

           (unknown)  (no       (unknown)  (unknown)  CK-MB (CK-2)  (units    (u

nknown)



                    date)                         TNP       unknown)  

 

           (unknown)  (no       (unknown)  (unknown)  CK-MB (CK-2) Rel  (units  

  (unknown)



                    date)                         Index    TNP unknown)  

 

           (unknown)  (no       (unknown)  (unknown)  COMPARISON:?  (units    (u

nknown)



                    date)                         [facility], unknown)  



                                                  CR, XR CHEST 2V,           



                                                  3/18/2021, 10:19.           

 

           (unknown)  (no       (unknown)  (unknown)  COVID19 -Nasal  (units    

(unknown)



                    date)                         RAPID/Pre-Proc Stat unknown)  

 

           (unknown)  (no       (unknown)  (unknown)  CT abdomen pelvis  (units 

   (unknown)



                    date)                         w con Stat unknown)  

 

           (unknown)  (no       (unknown)  (unknown)  CT abdomen pelvis  (units 

   (unknown)



                    date)                         was ordered for unknown)  



                                                  patient has           



                                                  tenderness to his           



                                                  right lower           

 

           (unknown)  (no       (unknown)  (unknown)  CVA (cerebral  (units    (

unknown)



                    date)                         vascular accident) unknown)  



                                                  (2018)           

 

           (unknown)  (no       (unknown)  (unknown)  Calcium    9.3  (units    

(unknown)



                    date)                         (8.4-10.2)  mg/dL unknown)  

 

           (unknown)  (no       (unknown)  (unknown)  Carbon Dioxide  (units    

(unknown)



                    date)                         29  (22-32)  mmol/L unknown)  

 

           (unknown)  (no       (unknown)  (unknown)  Cardio: denies  (units    

(unknown)



                    date)                         chest pain, unknown)  



                                                  palpitations           

 

           (unknown)  (no       (unknown)  (unknown)  Cardiovascular:  (units   

 (unknown)



                    date)                         regular rate and unknown)  



                                                  rhythm, no           



                                                  peripheral edema,           



                                                  warm extremities           

 

           (unknown)  (no       (unknown)  (unknown)  Chest x-ray:  (units    (u

nknown)



                    date)                                   unknown)  

 

           (unknown)  (no       (unknown)  (unknown)  Chief complaint:  (units  

  (unknown)



                    date)                         Weakness  unknown)  

 

           (unknown)  (no       (unknown)  (unknown)  Chloride    96 L  (units  

  (unknown)



                    date)                         ()  mmol/L unknown)  

 

           (unknown)  (no       (unknown)  (unknown)  Clinical  (units    (unkno

wn)



                    date)                         Impression: unknown)  

 

           (unknown)  (no       (unknown)  (unknown)  Colon cancer  (units    (u

nknown)



                    date)                         ()    unknown)  

 

           (unknown)  (no       (unknown)  (unknown)  Complete Blood  (units    

(unknown)



                    date)                         Count AUTO DIFF unknown)  



                                                  Stat                

 

           (unknown)  (no       (unknown)  (unknown)  Comprehensive  (units    (

unknown)



                    date)                         Metabolic Panel unknown)  



                                                  Stat                

 

           (unknown)  (no       (unknown)  (unknown)  Course    (units    (unkno

wn)



                    date)                                   unknown)  

 

           (unknown)  (no       (unknown)  (unknown)  Creatinine    1.42  (units

    (unknown)



                    date)                         H  (0.66-1.25) unknown)  



                                                  mg/dL               

 

           (unknown)  (no       (unknown)  (unknown)  : 1960  (units   

 (unknown)



                    date)                         Acct:[id number] unknown)  

 

           (unknown)  (no       (unknown)  (unknown)  Departure  (units    (unkn

own)



                    date)                                   unknown)  

 

           (unknown)  (no       (unknown)  (unknown)  Depression  (units    (unk

nown)



                    date)                                   unknown)  

 

           (unknown)  (no       (unknown)  (unknown)  Diabetes  (units    (unkno

wn)



                    date)                                   unknown)  

 

           (unknown)  (no       (unknown)  (unknown)  Diabetic  (units    (unkno

wn)



                    date)                         neuropathy unknown)  

 

           (unknown)  (no       (unknown)  (unknown)  Discharge Plan  (units    

(unknown)



                    date)                                   unknown)  

 

           (unknown)  (no       (unknown)  (unknown)  Discontinued  (units    (u

nknown)



                    date)                         Medications unknown)  

 

           (unknown)  (no       (unknown)  (unknown)  Gustavo Macias MD  (units   

 (unknown)



                    date)                         [Primary Care unknown)  



                                                  Provider] -           

 

           (unknown)  (no       (unknown)  (unknown)  ECG Data  (units    (unkno

wn)



                    date)                                   unknown)  

 

           (unknown)  (no       (unknown)  (unknown) EKG independently  (units  

  (unknown)



                    date)                         reviewed by Dr. mejia)  



                                                  Dinesh and reveals           



                                                  normal sinus rhythm           



                                                  at 72 bpm           

 

           (unknown)  (no       (unknown)  (unknown)  EKG-12 Lead Stat  (units  

  (unknown)



                    date)                                   unknown)  

 

           (unknown)  (no       (unknown)  (unknown)  ER Physician:  (units    (

unknown)



                    date)                         Kitty Costello unknown)  



                                                  ARNP                

 

           (unknown)  (no       (unknown)  (unknown)  Eos # (Auto)   100  (units

    (unknown)



                    date)                           (0-450)  /uL unknown)  

 

           (unknown)  (no       (unknown)  (unknown)  Eos % (Auto)   0.8  (units

    (unknown)



                    date)                         L   (2-4)  % unknown)  

 

           (unknown)  (no       (unknown)  (unknown)  Estimated GFR  (units    (

unknown)



                    date)                         55 L  (>60)  mL/min unknown)  

 

           (unknown)  (no       (unknown)  (unknown)  Exam      (units    (unkno

wn)



                    date)                                   unknown)  

 

           (unknown)  (no       (unknown)  (unknown)  Exam Narrative:  (units   

 (unknown)



                    date)                                   unknown)  

 

           (unknown)  (no       (unknown)  (unknown)  Eyes: denies  (units    (u

nknown)



                    date)                         visual changes, eye unknown)  



                                                  pain                

 

           (unknown)  (no       (unknown)  (unknown)  Eyes: equal round  (units 

   (unknown)



                    date)                         and reactive, EOMI, unknown)  



                                                  conjunctiva normal           

 

           (unknown)  (no       (unknown)  (unknown)  FINDINGS:?  (units    (unk

nown)



                    date)                                   unknown)  

 

           (unknown)  (no       (unknown)  (unknown)  Family History  (units    

(unknown)



                    date)                         (Reviewed 22 unknown)  



                                                  @ 19:57 by Kitty Costello [facility])           

 

           (unknown)  (no       (unknown)  (unknown)  Father     (units 

   (unknown)



                    date)                          Cancer   unknown)  

 

           (unknown)  (no       (unknown)  (unknown)  GERD      (units    (unkno

wn)



                    date)                         (gastroesophageal unknown)  



                                                  reflux disease)           

 

           (unknown)  (no       (unknown)  (unknown)  GI:  Right lower  (units  

  (unknown)



                    date)                         quadrant tenderness unknown)  



                                                  to palpation           

 

           (unknown)  (no       (unknown)  (unknown)  GI: abdomen mildly  (units

    (unknown)



                    date)                         guarded, tender to unknown)  



                                                  palpation in the           



                                                  right lower           



                                                  quadrant,           

 

           (unknown)  (no       (unknown)  (unknown)  :  Straight cath  (units

    (unknown)



                    date)                         for urine completed unknown)  



                                                  by myself with 16           



                                                  [place] coude           



                                                  catheter,           

 

           (unknown)  (no       (unknown)  (unknown)  : denies  (units    (unk

nown)



                    date)                         dysuria, hematuria unknown)  



                                                  or flank pain,           



                                                  reports oliguria,           



                                                  patient self           

 

           (unknown)  (no       (unknown)  (unknown)  Gastroenterology  (units  

  (unknown)



                    date)                         from [place], unknown)  



                                                  patient reports           



                                                  that he sees Yasmine Rocha PA-C           

 

           (unknown)  (no       (unknown)  (unknown)  General   (units    (unkno

wn)



                    date)                                   unknown)  

 

           (unknown)  (no       (unknown)  (unknown)  General:  (units    (unkno

wn)



                    date)                         cooperative, unknown)  



                                                  comfortable, in no           



                                                  acute distress,           



                                                  well groomed,           

 

           (unknown)  (no       (unknown)  (unknown)  General: denies  (units   

 (unknown)



                    date)                         fever, chills, unknown)  



                                                  endorses weakness,           



                                                  right lower           



                                                  quadrant pain,           

 

           (unknown)  (no       (unknown)  (unknown)  GenericComposite[P  (units

    (unknown)



                    date)                         lt Count   219 unknown)  



                                                  (150-400)  X10^3/uL           



                                                   ]                  

 

           (unknown)  (no       (unknown)  (unknown)  GenericComposite[R  (units

    (unknown)



                    date)                         BC   3.37 L unknown)  



                                                  (4.5-5.9)  X10^6/uL           



                                                   ]                  

 

           (unknown)  (no       (unknown)  (unknown)  GenericComposite[W  (units

    (unknown)



                    date)                         BC   7.0  unknown)  



                                                  (4.5-11.0)           



                                                  X10^3/uL  ]           

 

           (unknown)  (no       (unknown)  (unknown)  Globulin    3.6  (units   

 (unknown)



                    date)                         (1.7-4.1)  g/dL unknown)  

 

           (unknown)  (no       (unknown)  (unknown)  Glucose    262 H  (units  

  (unknown)



                    date)                         ()  mg/dL unknown)  

 

           (unknown)  (no       (unknown)  (unknown)  Guaiac stool:  (units    (

unknown)



                    date)                         Negative, good unknown)  



                                                  stool result           

 

           (unknown)  (no       (unknown)  (unknown)  H/O colectomy  (units    (

unknown)



                    date)                                   unknown)  

 

           (unknown)  (no       (unknown)  (unknown)  HPI - Weakness  (units    

(unknown)



                    date)                                   unknown)  

 

           (unknown)  (no       (unknown)  (unknown)  HPI Narrative:  (units    

(unknown)



                    date)                                   unknown)  

 

           (unknown)  (no       (unknown)  (unknown)  Hct   26.7 L  (units    (u

nknown)



                    date)                         (41-53)  % unknown)  

 

           (unknown)  (no       (unknown)  (unknown)  He does not have  (units  

  (unknown)



                    date)                         any mental status unknown)  



                                                  changes, GCS is 15.           



                                                   Guaiac stool is           

 

           (unknown)  (no       (unknown)  (unknown)  Head/Neck:  (units    (unk

nown)



                    date)                         Endorses having a unknown)  



                                                  headache, denies           



                                                  any neck pain           

 

           (unknown)  (no       (unknown)  (unknown)  Head: atraumatic,  (units 

   (unknown)



                    date)                         symmetrical facial unknown)  



                                                  expressions           

 

           (unknown)  (no       (unknown)  (unknown)  Hgb   9.1 L  (units    (un

known)



                    date)                         (13.5-17.5)  g/dL unknown)  

 

           (unknown)  (no       (unknown)  (unknown)  History of Present  (units

    (unknown)



                    date)                         Illness   unknown)  

 

           (unknown)  (no       (unknown)  (unknown)  History of lumbar  (units 

   (unknown)



                    date)                         surgery () unknown)  

 

           (unknown)  (no       (unknown)  (unknown)  Hx of foot surgery  (units

    (unknown)



                    date)                         (2017)    unknown)  

 

           (unknown)  (no       (unknown)  (unknown)  Hx of shoulder  (units    

(unknown)



                    date)                         surgery (2010) unknown)  

 

           (unknown)  (no       (unknown)  (unknown)  Hypertension  (units    (u

nknown)



                    date)                                   unknown)  

 

           (unknown)  (no       (unknown)  (unknown)  IMPRESSION:? No  (units   

 (unknown)



                    date)                         acute     unknown)  



                                                  cardiopulmonary           



                                                  findings            

 

           (unknown)  (no       (unknown)  (unknown)  INDICATIONS:?  (units    (

unknown)



                    date)                         chest pain unknown)  

 

           (unknown)  (no       (unknown)  (unknown)  Imaging Data  (units    (u

nknown)



                    date)                                   unknown)  

 

           (unknown)  (no       (unknown)  (unknown)  Independently  (units    (

unknown)



                    date)                         reviewed vitals unknown)  



                                                  signs and nursing           



                                                  notes.              

 

           (unknown)  (no       (unknown)  (unknown)  Initial Vital  (units    (

unknown)



                    date)                         Signs     unknown)  

 

           (unknown)  (no       (unknown)  (unknown)  Initial Vital  (units    (

unknown)



                    date)                         Signs:    unknown)  

 

           (unknown)  (no       (unknown)  (unknown)  Instructions:  (units    (

unknown)



                    date)                         Acute Cystitis, unknown)  



                                                  Anemia              

 

           (unknown)  (no       (unknown)  (unknown)  Interpretation:  (units   

 (unknown)



                    date)                                   unknown)  

 

           (unknown)  (no       (unknown)  (unknown) January, hematocrit  (units

    (unknown)



                    date)                         is 26.7, down from unknown)  



                                                  27.8 also in           



                                                  January, platelet           



                                                  count 219,           

 

           (unknown)  (no       (unknown)  (unknown) Klebsiella  (units    (unkn

own)



                    date)                         pneumoniae which unknown)  



                                                  was resistant only           



                                                  to ampicillin and           



                                                  nitrofurantoin.           

 

           (unknown)  (no       (unknown)  (unknown)  Lab Data  (units    (unkno

wn)



                    date)                                   unknown)  

 

           (unknown)  (no       (unknown)  (unknown)  Labs:     (units    (unkno

wn)



                    date)                                   unknown)  

 

           (unknown)  (no       (unknown)  (unknown)  Lactate (Lactic  (units   

 (unknown)



                    date)                         Acid) Stat unknown)  

 

           (unknown)  (no       (unknown)  (unknown)  Lipase    32  (units    (u

nknown)



                    date)                         ()  U/L unknown)  

 

           (unknown)  (no       (unknown)  (unknown)  Lipase Stat  (units    (un

known)



                    date)                                   unknown)  

 

           (unknown)  (no       (unknown)  (unknown)  Lungs and pleura:?  (units

    (unknown)



                    date)                         Lungs are clear.? unknown)  



                                                  No pleural           



                                                  effusions or           



                                                  pneumothorax.? Low           

 

           (unknown)  (no       (unknown)  (unknown)  Lymph # (Auto)  (units    

(unknown)



                    date)                         400 L   (0526-2048) unknown)  



                                                   /uL                

 

           (unknown)  (no       (unknown)  (unknown)  Lymph % (Auto)  (units    

(unknown)



                    date)                         6.4 L   (25-40)  % unknown)  

 

           (unknown)  (no       (unknown)  (unknown)  MCH   26.9  (units    (unk

nown)



                    date)                         (26-34)  PG unknown)  

 

           (unknown)  (no       (unknown)  (unknown)  MCHC   34.0  (units    (un

known)



                    date)                         (30-36)  % unknown)  

 

           (unknown)  (no       (unknown)  (unknown)  MCV   79.1 L  (units    (u

nknown)



                    date)                         ()  fL unknown)  

 

           (unknown)  (no       (unknown)  (unknown)  MDM - Weakness  (units    

(unknown)



                    date)                                   unknown)  

 

           (unknown)  (no       (unknown)  (unknown)  MDM Narrative  (units    (

unknown)



                    date)                                   unknown)  

 

           (unknown)  (no       (unknown)  (unknown)  MSK: denies new  (units   

 (unknown)



                    date)                         joint pain, muscle unknown)  



                                                  weakness or           



                                                  swelling            

 

           (unknown)  (no       (unknown)  (unknown) MSK: moves all  (units    (

unknown)



                    date)                         extremities, unknown)  



                                                  neurovascularly           



                                                  intact, generalized           



                                                  weakness, normal           

 

           (unknown)  (no       (unknown)  (unknown)  Magnesium    1.0 L  (units

    (unknown)



                    date)                          (1.6-2.3)  mg/dL unknown)  

 

           (unknown)  (no       (unknown)  (unknown)  Magnesium Stat  (units    

(unknown)



                    date)                                   unknown)  

 

           (unknown)  (no       (unknown)  (unknown)  Magnesium Sulfate  (units 

   (unknown)



                    date)                         (Magnesium Sulfate) unknown)  



                                                   2 gm in 50 mls @           



                                                  50 mls/hr IV NOW           



                                                  ONE                 

 

           (unknown)  (no       (unknown)  (unknown)  Mediastinum:?  (units    (

unknown)



                    date)                         Mediastinal unknown)  



                                                  contours appear           



                                                  normal.? Heart size           



                                                  is normal.?           

 

           (unknown)  (no       (unknown)  (unknown)  Medical History  (units   

 (unknown)



                    date)                         (Reviewed 22 unknown)  



                                                  @ 19:57 by Kitty Costello ARNP)           

 

           (unknown)  (no       (unknown)  (unknown)  Medical decision  (units  

  (unknown)



                    date)                         making narrative: unknown)  

 

           (unknown)  (no       (unknown)  (unknown)  [employer] since this  (units

    (unknown)



                    date)                         happened. unknown)  

 

           (unknown)  (no       (unknown)  (unknown)  Mode of arrival:  (units  

  (unknown)



                    date)                         Wheelchair unknown)  

 

           (unknown)  (no       (unknown)  (unknown)  Mono # (Auto)  (units    (

unknown)



                    date)                         600   (0-900)  /uL unknown)  

 

           (unknown)  (no       (unknown)  (unknown)  Mono % (Auto)  (units    (

unknown)



                    date)                         7.9   (3-14)  % unknown)  

 

           (unknown)  (no       (unknown)  (unknown)  Mother     (units 

   (unknown)



                    date)                          Cancer   unknown)  

 

           (unknown)  (no       (unknown)  (unknown)  Mouth/Throat:  (units    (

unknown)



                    date)                         moist mucus unknown)  



                                                  membranes           

 

           (unknown)  (no       (unknown)  (unknown)  Narrative  (units    (unkn

own)



                    date)                                   unknown)  

 

           (unknown)  (no       (unknown)  (unknown)  Narrative:  (units    (unk

nown)



                    date)                                   unknown)  

 

           (unknown)  (no       (unknown)  (unknown)  Neck: supple  (units    (u

nknown)



                    date)                                   unknown)  

 

           (unknown)  (no       (unknown)  (unknown)  Neuro: denies  (units    (

unknown)



                    date)                         numbness, tingling, unknown)  



                                                  dizziness           

 

           (unknown)  (no       (unknown)  (unknown)  Neuro: normal  (units    (

unknown)



                    date)                         speech and unknown)  



                                                  cognition, A+O x3           

 

           (unknown)  (no       (unknown)  (unknown)  Neut # (Auto)  (units    (

unknown)



                    date)                         5900   (3477-3249) unknown)  



                                                  /uL                 

 

           (unknown)  (no       (unknown)  (unknown)  Neut % (Auto)  (units    (

unknown)



                    date)                         84.7 H   (50-75)  % unknown)  

 

           (unknown)  (no       (unknown)  (unknown)  No Action  (units    (unkn

own)



                    date)                                   unknown)  

 

           (unknown)  (no       (unknown)  (unknown)  No Known Drug  (units    (

unknown)



                    date)                         Allergies Allergy unknown)  



                                                  Verified 22           



                                                  17:38               

 

           (unknown)  (no       (unknown)  (unknown)  Nose: nares  (units    (un

known)



                    date)                         patent, no unknown)  



                                                  rhinorrhea           

 

           (unknown)  (no       (unknown)  (unknown)  Notable on  (units    (unk

nown)



                    date)                         patient's prior unknown)  



                                                  urine cultures,           



                                                  urine from           



                                                  2022 grew out           

 

           (unknown)  (no       (unknown)  (unknown)  Ondansetron HCl  (units   

 (unknown)



                    date)                         (Ondansetron 4 Mg/2 unknown)  



                                                  Ml Inj)  4 mg IV           



                                                  NOW ONE             

 

           (unknown)  (no       (unknown)  (unknown)  Ordered:  (units    (unkno

wn)



                    date)                                   unknown)  

 

           (unknown)  (no       (unknown)  (unknown)  Orders    (units    (unkno

wn)



                    date)                                   unknown)  

 

           (unknown)  (no       (unknown)  (unknown)  Osteoarthritis  (units    

(unknown)



                    date)                                   unknown)  

 

           (unknown)  (no       (unknown)  (unknown)  Oxygen Delivery  (units   

 (unknown)



                    date)                         Method    22 unknown)  



                                                  17:34               

 

           (unknown)  (no       (unknown)  (unknown)  Oxygen Delivery  (units   

 (unknown)



                    date)                         Method Room Air unknown)  

 

           (unknown)  (no       (unknown)  (unknown)  PROCEDURE:? XR  (units    

(unknown)



                    date)                         CHEST 1V  unknown)  

 

           (unknown)  (no       (unknown)  (unknown)  Patient   (units    (unkno

wn)



                    date)                         Disposition: Home unknown)  

 

           (unknown)  (no       (unknown)  (unknown)  Patient History  (units   

 (unknown)



                    date)                                   unknown)  

 

           (unknown)  (no       (unknown)  (unknown)  Patient is  (units    (unk

nown)



                    date)                         currently unknown)  



                                                  anticoagulated on           



                                                  Plavix 37.5 mg           



                                                  daily.  Dr. Gómez           

 

           (unknown)  (no       (unknown)  (unknown) Patient reports  (units    

(unknown)



                    date)                         that when he has a unknown)  



                                                  bowel movement, he           



                                                  typically has hard           



                                                  pellets             

 

           (unknown)  (no       (unknown)  (unknown)  Patient self caths  (units

    (unknown)



                    date)                         for urine output, unknown)  



                                                  reports that he has           



                                                  not had much urine           

 

           (unknown)  (no       (unknown)  (unknown)  Patient was  (units    (un

known)



                    date)                         treated in the unknown)  



                                                  emergency           



                                                  department with 1 g           



                                                  of ceftriaxone.           

 

           (unknown)  (no       (unknown)  (unknown) Patient's  (units    (unkno

wn)



                    date)                         colorectal cancer unknown)  



                                                  oncologist was Dr. Jett, patient was           



                                                  referred to him           

 

           (unknown)  (no       (unknown)  (unknown)  Patient's preop  (units   

 (unknown)



                    date)                         diagnosis was unknown)  



                                                  nausea and vomiting           



                                                  from hematemesis in           



                                                  January             

 

           (unknown)  (no       (unknown)  (unknown)  Patient:  (units    (unkno

wn)



                    date)                         Kassandra Leon R unknown)  



                                                  MR#: [id number]            

 

           (unknown)  (no       (unknown)  (unknown)  Postlaminectomy  (units   

 (unknown)



                    date)                         syndrome  unknown)  

 

           (unknown)  (no       (unknown)  (unknown)  Potassium    3.7  (units  

  (unknown)



                    date)                         (3.4-5.1)  mmol/L unknown)  

 

           (unknown)  (no       (unknown)  (unknown)  Prescriptions:  (units    

(unknown)



                    date)                                   unknown)  

 

           (unknown)  (no       (unknown)  (unknown)  Procalcitonin Stat  (units

    (unknown)



                    date)                                   unknown)  

 

           (unknown)  (no       (unknown)  (unknown)  Psych: mental  (units    (

unknown)



                    date)                         status is grossly unknown)  



                                                  normal, congruent           



                                                  mood, normal           



                                                  affect, pleasant           

 

           (unknown)  (no       (unknown)  (unknown)  Pulse Oximetry  98  (units

    (unknown)



                    date)                           22 17:34 unknown)  

 

           (unknown)  (no       (unknown)  (unknown)  Pulse Oximetry 98  (units 

   (unknown)



                    date)                                   unknown)  

 

           (unknown)  (no       (unknown)  (unknown)  Pulse Rate  77  (units    

(unknown)



                    date)                         22 17:34 unknown)  

 

           (unknown)  (no       (unknown)  (unknown)  Pulse Rate 77  (units    (

unknown)



                    date)                                   unknown)  

 

           (unknown)  (no       (unknown)  (unknown)  RDW   16.1 H  (units    (u

nknown)



                    date)                         (11.6-14.8)  % unknown)  

 

           (unknown)  (no       (unknown)  (unknown)  Referrals:  (units    (unk

nown)



                    date)                                   unknown)  

 

           (unknown)  (no       (unknown)  (unknown)  Related Data  (units    (u

nknown)



                    date)                                   unknown)  

 

           (unknown)  (no       (unknown)  (unknown)  Respiratory Rate  (units  

  (unknown)



                    date)                         18   22 17:34 unknown)  

 

           (unknown)  (no       (unknown)  (unknown)  Respiratory Rate  (units  

  (unknown)



                    date)                         18        unknown)  

 

           (unknown)  (no       (unknown)  (unknown)  Respiratory:  (units    (u

nknown)



                    date)                         denies shortness of unknown)  



                                                  breath, or new           



                                                  cough               

 

           (unknown)  (no       (unknown)  (unknown)  Respiratory:  (units    (u

nknown)



                    date)                         normal effort, able unknown)  



                                                  to speak in           



                                                  complete sentences,           



                                                  no audible           

 

           (unknown)  (no       (unknown)  (unknown)  Result diagrams:  (units  

  (unknown)



                    date)                                   unknown)  

 

           (unknown)  (no       (unknown)  (unknown)  Review of Systems  (units 

   (unknown)



                    date)                                   unknown)  

 

           (unknown)  (no       (unknown)  (unknown)  SARS-CoV-2 (PCR)  (units  

  (unknown)



                    date)                         Negative  unknown)  



                                                  (Negative)           

 

           (unknown)  (no       (unknown)  (unknown)  Sciatica  (units    (unkno

wn)



                    date)                                   unknown)  

 

           (unknown)  (no       (unknown)  (unknown)  Signed By:  (units    (unk

nown)



                    date)                                   unknown)  

 

           (unknown)  (no       (unknown)  (unknown)  Skin: brisk  (units    (un

known)



                    date)                         capillary refill, unknown)  



                                                  no rash, no           



                                                  erythema            

 

           (unknown)  (no       (unknown)  (unknown)  Skin: denies rash,  (units

    (unknown)



                    date)                         itching or wound unknown)  

 

           (unknown)  (no       (unknown)  (unknown)  Smoking Status:  (units   

 (unknown)



                    date)                         Never smoker unknown)  

 

           (unknown)  (no       (unknown)  (unknown)  Smoking Status:  (units   

 (unknown)



                    date)                         Never smoker unknown)  

 

           (unknown)  (no       (unknown)  (unknown)  Social History  (units    

(unknown)



                    date)                         (Reviewed 22 unknown)  



                                                  @ 19:57 by Kitty Costello [facility])           

 

           (unknown)  (no       (unknown)  (unknown)  Sodium    135 L  (units   

 (unknown)



                    date)                         (137-145)  mmol/L unknown)  

 

           (unknown)  (no       (unknown)  (unknown)  Sodium Chloride  (units   

 (unknown)



                    date)                         (Normal Saline unknown)  



                                                  0.9%)  1,000 mls @           



                                                  1,000 mls/hr IV           



                                                  BOLUS ONE           

 

           (unknown)  (no       (unknown)  (unknown)  Source: patient  (units   

 (unknown)



                    date)                         and family unknown)  

 

           (unknown)  (no       (unknown)  (unknown)  Spinal stenosis  (units   

 (unknown)



                    date)                                   unknown)  

 

           (unknown)  (no       (unknown)  (unknown)  Stated complaint:  (units 

   (unknown)



                    date)                         WEAKNESS UNABLE TO unknown)  



                                                  HOLD ANYTHING DOWN           

 

           (unknown)  (no       (unknown)  (unknown)  Substance Use  (units    (

unknown)



                    date)                         Type: does not use unknown)  

 

           (unknown)  (no       (unknown)  (unknown)  Surgical History  (units  

  (unknown)



                    date)                         (Reviewed 22 unknown)  



                                                  @ 19:57 by Kitty Costello [facility])           

 

           (unknown)  (no       (unknown)  (unknown)  Surgical changes  (units  

  (unknown)



                    date)                         and devices:? unknown)  



                                                  None.?              

 

           (unknown)  (no       (unknown)  (unknown)  TECHNIQUE:? One  (units   

 (unknown)



                    date)                         view of the chest unknown)  



                                                  was acquired.?           

 

           (unknown)  (no       (unknown)  (unknown)  Temperature  98.3  (units 

   (unknown)



                    date)                         F   22 17:34 unknown)  

 

           (unknown)  (no       (unknown)  (unknown)  Temperature 98.3 F  (units

    (unknown)



                    date)                                   unknown)  

 

           (unknown)  (no       (unknown)  (unknown)  This is a  (units    (unkn

own)



                    date)                         62-year-old male unknown)  



                                                  with history of           



                                                  multiple sclerosis,           



                                                  CVA in 2019,           

 

           (unknown)  (no       (unknown)  (unknown)  Time Seen by  (units    (u

nknown)



                    date)                         Provider: 22 unknown)  



                                                  19:11               

 

           (unknown)  (no       (unknown)  (unknown)  Total Bilirubin  (units   

 (unknown)



                    date)                         0.6  (0.2-1.3) unknown)  



                                                  mg/dL               

 

           (unknown)  (no       (unknown)  (unknown)  Total Creatine  (units    

(unknown)



                    date)                         Kinase    54 L unknown)  



                                                  ()  U/L           

 

           (unknown)  (no       (unknown)  (unknown)  Total Protein  (units    (

unknown)



                    date)                         7.5  (6.3-8.2) unknown)  



                                                  g/dL                

 

           (unknown)  (no       (unknown)  (unknown)  Troponin + CK  (units    (

unknown)



                    date)                         Cardiac Panel Stat unknown)  

 

           (unknown)  (no       (unknown)  (unknown)  Troponin I    <  (units   

 (unknown)



                    date)                         0.012  (0.01-0.034) unknown)  



                                                   ng/mL              

 

           (unknown)  (no       (unknown)  (unknown)  Urine Microscopic  (units 

   (unknown)



                    date)                         Stat      unknown)  

 

           (unknown)  (no       (unknown)  (unknown)  Vital Signs  (units    (un

known)



                    date)                                   unknown)  

 

           (unknown)  (no       (unknown)  (unknown)  Vital signs:  (units    (u

nknown)



                    date)                                   unknown)  

 

           (unknown)  (no       (unknown)  (unknown)  Linette Jett MD  (units  

  (unknown)



                    date)                         [Physician] - As unknown)  



                                                  soon as possible           

 

           (unknown)  (no       (unknown)  (unknown)  XR chest 1V Stat  (units  

  (unknown)



                    date)                                   unknown)  

 

           (unknown)  (no       (unknown)  (unknown)  [Embedded Image  (units   

 (unknown)



                    date)                         Not Available] unknown)  

 

           (unknown)  (no       (unknown)  (unknown)  acute ischemic  (units    

(unknown)



                    date)                         changes.  unknown)  

 

           (unknown)  (no       (unknown)  (unknown)  alcohol intake  (units    

(unknown)



                    date)                         frequency: a few unknown)  



                                                  times a month           

 

           (unknown)  (no       (unknown)  (unknown)  alcohol intake:  (units   

 (unknown)



                    date)                         current   unknown)  

 

           (unknown)  (no       (unknown)  (unknown)  and cooperative  (units   

 (unknown)



                    date)                                   unknown)  

 

           (unknown)  (no       (unknown)  (unknown)  and generalized  (units   

 (unknown)



                    date)                         weakness.  Patient unknown)  



                                                  self catheterizes           



                                                  his bladder at           



                                                  baseline,           

 

           (unknown)  (no       (unknown)  (unknown)  appear    (units    (unkno

wn)



                    date)                                   unknown)  

 

           (unknown)  (no       (unknown)  (unknown)  ascorbic acid  (units    (

unknown)



                    date)                         (vitamin C) 500 mg unknown)  



                                                  500 mg PO BID #[id number]           



                                                  tabs 22           

 

           (unknown)  (no       (unknown)  (unknown)  atorvastatin 80 mg  (units

    (unknown)



                    date)                         tablet 40 mg PO QPM unknown)  



                                                  20           

 

           (unknown)  (no       (unknown)  (unknown)  be causing a rash  (units 

   (unknown)



                    date)                                   unknown)  

 

           (unknown)  (no       (unknown)  (unknown) bleeding.  Guaiac  (units  

  (unknown)



                    date)                         stool in the unknown)  



                                                  emergency           



                                                  department was           



                                                  negative.           



                                                  Patient's lab           

 

           (unknown)  (no       (unknown)  (unknown)  blood in his  (units    (u

nknown)



                    date)                         emesis or in his unknown)  



                                                  stool.  He reports           



                                                  that he did a stool           



                                                  test one            

 

           (unknown)  (no       (unknown)  (unknown)  buspirone 5 mg  (units    

(unknown)



                    date)                         tablet 5 mg PO BID unknown)  



                                                  #60 tabs 22           

 

           (unknown)  (no       (unknown)  (unknown)  cancer.  Postop  (units   

 (unknown)



                    date)                         diagnosis from this unknown)  



                                                  upper endoscopy was           



                                                  the same.  His           

 

           (unknown)  (no       (unknown)  (unknown)  caths at baseline  (units 

   (unknown)



                    date)                                   unknown)  

 

           (unknown)  (no       (unknown)  (unknown)  clopidogrel 75 mg  (units 

   (unknown)



                    date)                         tablet 75 mg PO unknown)  



                                                  DAILY 22            

 

           (unknown)  (no       (unknown)  (unknown)  colonic   (units    (unkno

wn)



                    date)                         anastomosis, and a unknown)  



                                                  suboptimal bowel           



                                                  prep.               

 

           (unknown)  (no       (unknown)  (unknown)  cyanocobalamin  (units    

(unknown)



                    date)                         (vitamin B-12) 500 unknown)  



                                                  1,000 mcg PO DAILY           



                                                  #60 tabs 22           

 

           (unknown)  (no       (unknown)  (unknown)  diabetes,  (units    (unkn

own)



                    date)                         hypertension, unknown)  



                                                  colorectal cancer           



                                                  in  with a           



                                                  reverse colectomy           



                                                  who                 

 

           (unknown)  (no       (unknown)  (unknown)  diabetes,  (units    (unkn

own)



                    date)                         hypertension, unknown)  



                                                  colorectal cancer           



                                                  in  with a           



                                                  reverse colectomy,           



                                                  He                  

 

           (unknown)  (no       (unknown)  (unknown)  does not drink  (units    

(unknown)



                    date)                         alcohol.? Patient unknown)  



                                                  and his wife state           



                                                  that he was seen in           



                                                  the                 

 

           (unknown)  (no       (unknown)  (unknown)  elevated lactate.  (units 

   (unknown)



                    date)                         His heart rate and unknown)  



                                                  blood pressure are           



                                                  within normal           



                                                  ranges.             

 

           (unknown)  (no       (unknown)  (unknown)  emergency  (units    (unkn

own)



                    date)                         department in unknown)  



                                                  January and           



                                                  diagnosed with a GI           



                                                  bleed, he was           



                                                  admitted,           

 

           (unknown)  (no       (unknown)  (unknown)  endorses fatigue  (units  

  (unknown)



                    date)                                   unknown)  

 

           (unknown)  (no       (unknown)  (unknown) endoscopic  (units    (unkn

own)



                    date)                         diagnosis includes unknown)  



                                                  mild gastropathy,           



                                                  esophagitis, patent           



                                                  retrosigmoid           

 

           (unknown)  (no       (unknown)  (unknown)  ferrous sulfate  (units   

 (unknown)



                    date)                         325 mg (65 mg 325 unknown)  



                                                  mg PO BIDWM #60           



                                                  tabs 22           

 

           (unknown)  (no       (unknown)  (unknown)  followed by loose  (units 

   (unknown)



                    date)                         stool.    unknown)  

 

           (unknown)  (no       (unknown)  (unknown)  gabapentin 300 mg  (units 

   (unknown)



                    date)                         capsule 300 mg PO unknown)  



                                                  BID 18            

 

           (unknown)  (no       (unknown)  (unknown)  generalized  (units    (un

known)



                    date)                         weakness and unknown)  



                                                  fatigue             

 

           (unknown)  (no       (unknown)  (unknown)  glipizide 10 mg  (units   

 (unknown)



                    date)                         tablet 10 mg PO BID unknown)  



                                                  18           

 

           (unknown)  (no       (unknown)  (unknown)  had a recent  (units    (u

nknown)



                    date)                         endoscopy that did unknown)  



                                                  not show any           



                                                  bleeding polyps or           



                                                  acute GI            

 

           (unknown)  (no       (unknown)  (unknown)  hospital and on  (units   

 (unknown)



                    date)                         chart review it unknown)  



                                                  appears that it was           



                                                  completed on           



                                                  2022.           

 

           (unknown)  (no       (unknown)  (unknown)  household members:  (units

    (unknown)



                    date)                          spouse   unknown)  

 

           (unknown)  (no       (unknown)  (unknown)  ingrown, no penile  (units

    (unknown)



                    date)                         discharge, no unknown)  



                                                  scrotal edema           

 

           (unknown)  (no       (unknown)  (unknown)  iron) tablet  (units    (u

nknown)



                    date)                                   unknown)  

 

           (unknown)  (no       (unknown)  (unknown)  lives     (units    (unkno

wn)



                    date)                         independently:  Yes unknown)  

 

           (unknown)  (no       (unknown)  (unknown)  losartan 50 mg  (units    

(unknown)



                    date)                         tablet 25 mg PO QPM unknown)  



                                                  20           

 

           (unknown)  (no       (unknown)  (unknown)  lung      (units    (unkno

wn)



                    date)                                   unknown)  

 

           (unknown)  (no       (unknown)  (unknown)  magnesium was  (units    (

unknown)



                    date)                         replaced with 2 g, unknown)  



                                                  normal saline 1000           



                                                  mL was given for           



                                                  his                 

 

           (unknown)  (no       (unknown)  (unknown)  mcg tablet  (units    (unk

nown)



                    date)                                   unknown)  

 

           (unknown)  (no       (unknown)  (unknown)  mcg tablet  (units    (unk

nown)



                    date)                         (Tab-A-Lucy) unknown)  

 

           (unknown)  (no       (unknown)  (unknown)  metformin 500 mg  (units  

  (unknown)



                    date)                         tablet,extended 500 unknown)  



                                                  mg PO BID 18            

 

           (unknown)  (no       (unknown)  (unknown)  metoprolol  (units    (unk

nown)



                    date)                         succinate 25 mg 25 unknown)  



                                                  mg PO DAILY           



                                                  21           

 

           (unknown)  (no       (unknown)  (unknown)  microscopy shows  (units  

  (unknown)



                    date)                                   unknown)  

 

           (unknown)  (no       (unknown)  (unknown)  moving forward.  (units   

 (unknown)



                    date)                         Patient reports unknown)  



                                                  that he has been           



                                                  taking fiber but           



                                                  not any             

 

           (unknown)  (no       (unknown)  (unknown)  multivitamin with  (units 

   (unknown)



                    date)                         folic acid 400 1 unknown)  



                                                  tab PO DAILY #90           



                                                  tabs 22           

 

           (unknown)  (no       (unknown)  (unknown)  negative.  I  (units    (u

nknown)



                    date)                         completed his unknown)  



                                                  urinary             



                                                  catheterization           



                                                  with a coude           



                                                  catheter, urine           

 

           (unknown)  (no       (unknown)  (unknown)  nondistended, no  (units  

  (unknown)



                    date)                         masses, no unknown)  



                                                  exquisite           



                                                  tenderness with           



                                                  exam, no rebound           

 

           (unknown)  (no       (unknown)  (unknown)  not heard it was  (units  

  (unknown)



                    date)                         positive or not. unknown)  



                                                  Patient reports           



                                                  that his primary           



                                                  care                

 

           (unknown)  (no       (unknown)  (unknown)  ondansetron HCl 4  (units 

   (unknown)



                    date)                         mg tablet 4 mg PO unknown)  



                                                  Q8H PRN nausea and           



                                                  21            

 

           (unknown)  (no       (unknown)  (unknown)  oral powder packet  (units

    (unknown)



                    date)                                   unknown)  

 

           (unknown)  (no       (unknown)  (unknown)  output for three  (units  

  (unknown)



                    date)                         days, three days unknown)  



                                                  ago he had nausea           



                                                  and vomiting,           



                                                  denies any           

 

           (unknown)  (no       (unknown)  (unknown)  polyethylene  (units    (u

nknown)



                    date)                         glycol 3350 17 gram unknown)  



                                                  17 gm PO DAILY #90           



                                                  ea 22           

 

           (unknown)  (no       (unknown)  (unknown)  presents to the  (units   

 (unknown)



                    date)                         emergency unknown)  



                                                  department           



                                                  complaining of           



                                                  ongoing fatigue,           



                                                  oliguria,           

 

           (unknown)  (no       (unknown)  (unknown)  provider has left,  (units

    (unknown)



                    date)                         he reports that he unknown)  



                                                  has seen Dr. Macias           



                                                  recently as well.           

 

           (unknown)  (no       (unknown)  (unknown)  quadrant with a  (units   

 (unknown)



                    date)                         complicated unknown)  



                                                  surgical history of           



                                                  his abdomen.  This           



                                                  shows               

 

           (unknown)  (no       (unknown)  (unknown)  recently for his  (units  

  (unknown)



                    date)                         anemia.   unknown)  

 

           (unknown)  (no       (unknown)  (unknown)  related diarrhea  (units  

  (unknown)



                    date)                         from fecal loading unknown)  



                                                  and obstipation.           



                                                  Recommended MiraLax           



                                                  17 g                

 

           (unknown)  (no       (unknown)  (unknown)  release 24 hr  (units    (

unknown)



                    date)                                   unknown)  

 

           (unknown)  (no       (unknown)  (unknown)  reported that  (units    (

unknown)



                    date)                         patient's altered unknown)  



                                                  bowel habit is           



                                                  likely a function           



                                                  of overflow           

 

           (unknown)  (no       (unknown)  (unknown)  sennosides 8.6 mg  (units 

   (unknown)



                    date)                         tablet (senna) 17.2 unknown)  



                                                  mg PO BEDTIME #60           



                                                  tabs 22           

 

           (unknown)  (no       (unknown)  (unknown)  substance use  (units    (

unknown)



                    date)                         type:  does not use unknown)  

 

           (unknown)  (no       (unknown)  (unknown)  tablet (Vitamin C)  (units

    (unknown)



                    date)                                   unknown)  

 

           (unknown)  (no       (unknown)  (unknown)  tablet,extended  (units   

 (unknown)



                    date)                         release 24 hr unknown)  

 

           (unknown)  (no       (unknown)  (unknown)  tenderness  (units    (unk

nown)



                    date)                                   unknown)  

 

           (unknown)  (no       (unknown)  (unknown)  there.  He reports  (units

    (unknown)



                    date)                         that he had an unknown)  



                                                  upper endoscopy           



                                                  completed here at           



                                                  this                

 

           (unknown)  (no       (unknown)  (unknown)  tone      (units    (unkno

wn)



                    date)                                   unknown)  

 

           (unknown)  (no       (unknown)  (unknown)  trospium 20 mg  (units    

(unknown)



                    date)                         tablet 20 mg PO BID unknown)  



                                                  urinary retention           



                                                  22           

 

           (unknown)  (no       (unknown)  (unknown)  unremarkable.?  (units    

(unknown)



                    date)                                   unknown)  

 

           (unknown)  (no       (unknown)  (unknown)  urine dip showed  (units  

  (unknown)



                    date)                         leukocytes, unknown)  



                                                  ketones, wbc's and           



                                                  did not show           



                                                  nitrites.  Urine           

 

           (unknown)  (no       (unknown)  (unknown)  urine is cloudy,  (units  

  (unknown)



                    date)                         yellow,   unknown)  



                                                  approximately 600           



                                                  mL in bladder and           



                                                  drained, no rash           

 

           (unknown)  (no       (unknown)  (unknown)  volumes accentuate  (units

    (unknown)



                    date)                         pulmonary unknown)  



                                                  interstitium and           



                                                  heart size.           

 

           (unknown)  (no       (unknown)  (unknown)  was cloudy yellow  (units 

   (unknown)



                    date)                         urine and 600 mL unknown)  



                                                  was drained.           



                                                  Patient tolerated           



                                                  well.  His           

 

           (unknown)  (no       (unknown)  (unknown)  week ago and  (units    (u

nknown)



                    date)                         dropped it off at unknown)  



                                                  [facility] which was           



                                                  testing for blood           



                                                  but he has           

 

           (unknown)  (no       (unknown)  (unknown)  went to [facility]  (units 

   (unknown)



                    date)                         for rehab following unknown)  



                                                  his admission and           



                                                  has followed up           



                                                  with                

 

           (unknown)  (no       (unknown)  (unknown)  wheezing, stridor,  (units

    (unknown)



                    date)                         or rales. No unknown)  



                                                  retractions or           



                                                  tachypnea.           









                                         Result panel 23









           (unknown)  (no       (unknown)  (unknown)  (no value)  (units    (unk

nown)



                    date)                                   unknown)  

 

           (unknown)  (no       (unknown)  (unknown)  Radiologist  (units    (un

known)



                    date)                         Impression: unknown)  

 

           (unknown)  (no       (unknown)  (unknown)  (no value)  (units    (unk

nown)



                    date)                                   unknown)  

 

           (unknown)  (no       (unknown)  (unknown)  Date of Service:  (units  

  (unknown)



                    date)                         22  unknown)  

 

           (unknown)  (no       (unknown)  (unknown)  (no value)  (units    (unk

nown)



                    date)                                   unknown)  

 

           (unknown)  (no       (unknown)  (unknown)  22 17:45  (units    

(unknown)



                    date)                                   unknown)  

 

           (unknown)  (no       (unknown)  (unknown)  1 tab PO DAILY  (units    

(unknown)



                    date)                         Qty: 90 0RF unknown)  

 

           (unknown)  (no       (unknown)  (unknown)  1,000 mcg PO DAILY  (units

    (unknown)



                    date)                         Qty: 60 0RF unknown)  

 

           (unknown)  (no       (unknown)  (unknown)  10 mg PO BID  (units    (u

nknown)



                    date)                                   unknown)  

 

           (unknown)  (no       (unknown)  (unknown)  17 gm PO DAILY  (units    

(unknown)



                    date)                         Qty: 90 0RF unknown)  

 

           (unknown)  (no       (unknown)  (unknown)  17.2 mg PO BEDTIME  (units

    (unknown)



                    date)                         Qty: 60 0RF unknown)  

 

           (unknown)  (no       (unknown)  (unknown)  20 mg PO BID  (units    (u

nknown)



                    date)                                   unknown)  

 

           (unknown)  (no       (unknown)  (unknown)  25 mg PO DAILY  (units    

(unknown)



                    date)                                   unknown)  

 

           (unknown)  (no       (unknown)  (unknown)  25 mg PO QPM  (units    (u

nknown)



                    date)                                   unknown)  

 

           (unknown)  (no       (unknown)  (unknown)  300 mg PO BID  (units    (

unknown)



                    date)                                   unknown)  

 

           (unknown)  (no       (unknown)  (unknown)  325 mg PO BIDWM  (units   

 (unknown)



                    date)                         Qty: 60 0RF unknown)  

 

           (unknown)  (no       (unknown)  (unknown)  4 mg PO Q8H PRN  (units   

 (unknown)



                    date)                         (Reason: nausea and unknown)  



                                                  vomiting) Qty: 14           



                                                  0RF                 

 

           (unknown)  (no       (unknown)  (unknown)  40 mg PO QPM  (units    (u

nknown)



                    date)                                   unknown)  

 

           (unknown)  (no       (unknown)  (unknown)  5 mg PO BID Qty:  (units  

  (unknown)



                    date)                         60 0RF    unknown)  

 

           (unknown)  (no       (unknown)  (unknown)  500 mg PO BID  (units    (

unknown)



                    date)                                   unknown)  

 

           (unknown)  (no       (unknown)  (unknown)  500 mg PO BID Qty:  (units

    (unknown)



                    date)                         60 0RF    unknown)  

 

           (unknown)  (no       (unknown)  (unknown)  75 mg PO DAILY  (units    

(unknown)



                    date)                                   unknown)  

 

           (unknown)  (no       (unknown)  (unknown)  Allergies  (units    (unkn

own)



                    date)                                   unknown)  

 

           (unknown)  (no       (unknown)  (unknown)  Documented By: [facility]  (units

    (unknown)



                    date)                                   unknown)  

 

           (unknown)  (no       (unknown)  (unknown)  ED Orders  (units    (unkn

own)



                    date)                                   unknown)  

 

           (unknown)  (no       (unknown)  (unknown)  Emergency Report  (units  

  (unknown)



                    date)                                   unknown)  

 

           (unknown)  (no       (unknown)  (unknown)  Home Medications  (units  

  (unknown)



                    date)                                   unknown)  

 

           (unknown)  (no       (unknown)  (unknown)  [facility]  (units   

 (unknown)



                    date)                         [address] [place] [address] unknown)  



                                                  [place] [address]           

 

           (unknown)  (no       (unknown)  (unknown)  Lab Results  (units    (un

known)



                    date)                                   unknown)  

 

           (unknown)  (no       (unknown)  (unknown)  Label Comments:  (units   

 (unknown)



                    date)                                   unknown)  

 

           (unknown)  (no       (unknown)  (unknown)  Last Admin:  (units    (un

known)



                    date)                         22 18:30 unknown)  



                                                  Dose: 4 mg           

 

           (unknown)  (no       (unknown)  (unknown)  Last Admin:  (units    (un

known)



                    date)                         22 20:18 unknown)  



                                                  Dose:  1,000 mls/hr           

 

           (unknown)  (no       (unknown)  (unknown)  Last Admin:  (units    (un

known)



                    date)                         22 20:19 unknown)  



                                                  Dose:  50 mls/hr           

 

           (unknown)  (no       (unknown)  (unknown)  Previous Rx's  (units    (

unknown)



                    date)                                   unknown)  

 

           (unknown)  (no       (unknown)  (unknown)  Stop: 22  (units    

(unknown)



                    date)                         18:16     unknown)  

 

           (unknown)  (no       (unknown)  (unknown)  Stop: 22  (units    

(unknown)



                    date)                         20:15     unknown)  

 

           (unknown)  (no       (unknown)  (unknown)  Stop: 22  (units    

(unknown)



                    date)                         20:25     unknown)  

 

           (unknown)  (no       (unknown)  (unknown)  Stop: 22  (units    

(unknown)



                    date)                         20:27     unknown)  

 

           (unknown)  (no       (unknown)  (unknown)  Urine Dip  (units    (unkn

own)



                    date)                                   unknown)  

 

           (unknown)  (no       (unknown)  (unknown)  Vital Signs - 8 hr  (units

    (unknown)



                    date)                                   unknown)  

 

           (unknown)  (no       (unknown)  (unknown)  has not been  (units    (u

nknown)



                    date)                         eating so hasn't unknown)  



                                                  taken recently.           



                                                  spouse states           



                                                  thinks it might           

 

           (unknown)  (no       (unknown)  (unknown)  stopped taking  (units    

(unknown)



                    date)                         prior to back unknown)  



                                                  surgery and did not           



                                                  restart             

 

           (unknown)  (no       (unknown)  (unknown)  (no value)  (units    (unk

nown)



                    date)                                   unknown)  

 

           (unknown)  (no       (unknown)  (unknown)  22  (units 

   (unknown)



                    date)                         22  unknown)  



                                                  Range/Units           

 

           (unknown)  (no       (unknown)  (unknown)  17:40 17:45 17:45  (units 

   (unknown)



                    date)                                   unknown)  

 

           (unknown)  (no       (unknown)  (unknown)  17:45 17:45 20:09  (units 

   (unknown)



                    date)                                   unknown)  

 

           (unknown)  (no       (unknown)  (unknown)  ascorbic acid  (units    (

unknown)



                    date)                         (vitamin C) unknown)  



                                                  [Vitamin C] 500 mg           



                                                  Tablet              

 

           (unknown)  (no       (unknown)  (unknown)  atorvastatin 80 mg  (units

    (unknown)



                    date)                         tablet    unknown)  

 

           (unknown)  (no       (unknown)  (unknown)  buspirone 5 mg  (units    

(unknown)



                    date)                         Tablet    unknown)  

 

           (unknown)  (no       (unknown)  (unknown)  clopidogrel 75 mg  (units 

   (unknown)



                    date)                         tablet    unknown)  

 

           (unknown)  (no       (unknown)  (unknown)  cyanocobalamin  (units    

(unknown)



                    date)                         (vitamin B-12) 500 unknown)  



                                                  mcg Tablet           

 

           (unknown)  (no       (unknown)  (unknown)  ferrous sulfate  (units   

 (unknown)



                    date)                         325 mg (65 mg iron) unknown)  



                                                  Tablet              

 

           (unknown)  (no       (unknown)  (unknown)  gabapentin 300 mg  (units 

   (unknown)



                    date)                         capsule   unknown)  

 

           (unknown)  (no       (unknown)  (unknown)  glipizide 10 mg  (units   

 (unknown)



                    date)                         tablet    unknown)  

 

           (unknown)  (no       (unknown)  (unknown)  losartan 50 mg  (units    

(unknown)



                    date)                         tablet    unknown)  

 

           (unknown)  (no       (unknown)  (unknown)  metformin 500 mg  (units  

  (unknown)



                    date)                         tablet extended unknown)  



                                                  release 24 hr           

 

           (unknown)  (no       (unknown)  (unknown)  metoprolol  (units    (unk

nown)



                    date)                         succinate 25 mg unknown)  



                                                  tablet extended           



                                                  release 24 hr           

 

           (unknown)  (no       (unknown)  (unknown)  multivitamin with  (units 

   (unknown)



                    date)                         folic acid unknown)  



                                                  [Tab-A-Lucy] 400           



                                                  mcg Tablet           

 

           (unknown)  (no       (unknown)  (unknown)  ondansetron HCl  (units   

 (unknown)



                    date)                         [Zofran] 4 mg unknown)  



                                                  tablet              

 

           (unknown)  (no       (unknown)  (unknown)  polyethylene  (units    (u

nknown)



                    date)                         glycol 3350 17 gram unknown)  



                                                  Powder In Packet           

 

           (unknown)  (no       (unknown)  (unknown)  sennosides [senna]  (units

    (unknown)



                    date)                         8.6 mg Tablet unknown)  

 

           (unknown)  (no       (unknown)  (unknown)  trospium 20 mg  (units    

(unknown)



                    date)                         tablet    unknown)  

 

           (unknown)  (no       (unknown)  (unknown)  22  (units    (unkno

wn)



                    date)                                   unknown)  

 

           (unknown)  (no       (unknown)  (unknown)  1.07 in January.  (units  

  (unknown)



                    date)                         No elevation in his unknown)  



                                                  troponin,           



                                                  procalcitonin is           



                                                  elevated at           

 

           (unknown)  (no       (unknown)  (unknown)  Acute cystitis  (units    

(unknown)



                    date)                         with hematuria, unknown)  



                                                  Anemia,             



                                                  Hypomagnesemia           

 

           (unknown)  (no       (unknown)  (unknown)  Medication  (units    (unk

nown)



                    date)                         Instructions unknown)  



                                                  Recorded            

 

           (unknown)  (no       (unknown)  (unknown)  Medication  (units    (unk

nown)



                    date)                         Instructions unknown)  



                                                  Recorded  Confirmed           

 

           (unknown)  (no       (unknown)  (unknown)  sodium 135,  (units    (un

known)



                    date)                         potassium 3.7, unknown)  



                                                  creatinine 1.42           



                                                  which is increased           



                                                  from his prior of           

 

           (unknown)  (no       (unknown)  (unknown)  with regular axis  (units 

   (unknown)



                    date)                         and intervals.  No unknown)  



                                                  STEMI, ST segment           



                                                  changes,            



                                                  arrhythmia, or           

 

           (unknown)  (no       (unknown)  (unknown)  work is   (units    (unkno

wn)



                    date)                         significant for unknown)  



                                                  anemia, hemoglobin           



                                                  is 9.1 today, down           



                                                  from 9.6 in           

 

           (unknown)  (no       (unknown)  (unknown)  (Zofran) vomiting  (units 

   (unknown)



                    date)                         #14 tabs  unknown)  

 

           (unknown)  (no       (unknown)  (unknown)  0.72, his lactate  (units 

   (unknown)



                    date)                         is also elevated at unknown)  



                                                  2.6, magnesium is           



                                                  low at 1.0.  His           

 

           (unknown)  (no       (unknown)  (unknown)  [id number]   (units    (unkno

wn)



                    date)                                   unknown)  

 

           (unknown)  (no       (unknown)  (unknown)  22 17:39  (units    

(unknown)



                    date)                                   unknown)  

 

           (unknown)  (no       (unknown)  (unknown)  22 17:40  (units    

(unknown)



                    date)                                   unknown)  

 

           (unknown)  (no       (unknown)  (unknown)  22 17:45  (units    

(unknown)



                    date)                                   unknown)  

 

           (unknown)  (no       (unknown)  (unknown)  22 19:26  (units    

(unknown)



                    date)                                   unknown)  

 

           (unknown)  (no       (unknown)  (unknown)  22 20:09  (units    

(unknown)



                    date)                                   unknown)  

 

           (unknown)  (no       (unknown)  (unknown)  22 20:29  (units    

(unknown)



                    date)                                   unknown)  

 

           (unknown)  (no       (unknown)  (unknown)  17:34     (units    (unkno

wn)



                    date)                                   unknown)  

 

           (unknown)  (no       (unknown)  (unknown)  with iron  (units    (

unknown)



                    date)                         deficiency anemia, unknown)  



                                                  altered bowel           



                                                  habit, personal           



                                                  history of colon           

 

           (unknown)  (no       (unknown)  (unknown)  62-year-old  (units    (un

known)



                    date)                         gentleman with a unknown)  



                                                  history of multiple           



                                                  sclerosis, CVA in           



                                                  2019,               

 

           (unknown)  (no       (unknown)  (unknown)  ?         (units    (unkno

wn)



                    date)                                   unknown)  

 

           (unknown)  (no       (unknown)  (unknown)  ALT     (<50)  (units    (

unknown)



                    date)                         IU/L      unknown)  

 

           (unknown)  (no       (unknown)  (unknown)  ALT    16  (<50)  (units  

  (unknown)



                    date)                         IU/L      unknown)  

 

           (unknown)  (no       (unknown)  (unknown)  AST     (17-59)  (units   

 (unknown)



                    date)                         IU/L      unknown)  

 

           (unknown)  (no       (unknown)  (unknown)  AST    19  (17-59)  (units

    (unknown)



                    date)                          IU/L     unknown)  

 

           (unknown)  (no       (unknown)  (unknown)  Age/Sex: 62 / M  (units   

 (unknown)



                    date)                                   unknown)  

 

           (unknown)  (no       (unknown)  (unknown)  Albumin   (units    (unkno

wn)



                    date)                         (3.5-5.0)  g/dL unknown)  

 

           (unknown)  (no       (unknown)  (unknown)  Albumin    3.9  (units    

(unknown)



                    date)                         (3.5-5.0)  g/dL unknown)  

 

           (unknown)  (no       (unknown)  (unknown)  Albumin/Globulin  (units  

  (unknown)



                    date)                         Ratio     (1.0-2.8) unknown)  

 

           (unknown)  (no       (unknown)  (unknown)  Albumin/Globulin  (units  

  (unknown)



                    date)                         Ratio    1.1 unknown)  



                                                  (1.0-2.8)           

 

           (unknown)  (no       (unknown)  (unknown)  Alkaline  (units    (unkno

wn)



                    date)                         Phosphatase unknown)  



                                                  ()  U/L           

 

           (unknown)  (no       (unknown)  (unknown)  Alkaline  (units    (unkno

wn)



                    date)                         Phosphatase    105 unknown)  



                                                  ()  U/L           

 

           (unknown)  (no       (unknown)  (unknown)  Allergy/AdvReac  (units   

 (unknown)



                    date)                         Type Severity unknown)  



                                                  Reaction Status           



                                                  Date / Time           

 

           (unknown)  (no       (unknown)  (unknown)  Amorphous Sediment  (units

    (unknown)



                    date)                                   unknown)  

 

           (unknown)  (no       (unknown)  (unknown)  Amorphous Sediment  (units

    (unknown)



                    date)                            2+     unknown)  

 

           (unknown)  (no       (unknown)  (unknown)  Approved by: Garfield Mcdanielsunits 

   (unknown)



                    dateIrena Lopez M.D. on unknown)  



                                                  2022 at 18:02?           

 

           (unknown)  (no       (unknown)  (unknown)  BUN     (9-20)  (units    

(unknown)



                    date)                         mg/dL     unknown)  

 

           (unknown)  (no       (unknown)  (unknown)  BUN    21 H  (units    (un

known)



                    date)                         (9-20)  mg/dL unknown)  

 

           (unknown)  (no       (unknown)  (unknown)  BUN/Creatinine  (units    

(unknown)



                    date)                         Ratio     (6-22) unknown)  

 

           (unknown)  (no       (unknown)  (unknown)  BUN/Creatinine  (units    

(unknown)



                    date)                         Ratio    14.8 unknown)  



                                                  (6-22)              

 

           (unknown)  (no       (unknown)  (unknown)  Baso # (Auto)  (units    (

unknown)



                    date)                         (0-100)  /uL unknown)  

 

           (unknown)  (no       (unknown)  (unknown)  Baso # (Auto)   0  (units 

   (unknown)



                    date)                          (0-100)  /uL unknown)  

 

           (unknown)  (no       (unknown)  (unknown)  Baso % (Auto)  (units    (

unknown)



                    date)                         (0-2)  %  unknown)  

 

           (unknown)  (no       (unknown)  (unknown)  Baso % (Auto)  (units    (

unknown)



                    date)                         0.2   (0-2)  % unknown)  

 

           (unknown)  (no       (unknown)  (unknown)  Bedside Urine  (units    (

unknown)



                    date)                         Bilirubin        - unknown)  



                                                  Negative            

 

           (unknown)  (no       (unknown)  (unknown)  Bedside Urine  (units    (

unknown)



                    date)                         Glucose    Negative unknown)  

 

           (unknown)  (no       (unknown)  (unknown)  Bedside Urine  (units    (

unknown)



                    date)                         Ketone    - unknown)  



                                                  Negative            

 

           (unknown)  (no       (unknown)  (unknown)  Bedside Urine  (units    (

unknown)



                    date)                         Leukocytes       + unknown)  



                                                  70                  

 

           (unknown)  (no       (unknown)  (unknown)  Bedside Urine  (units    (

unknown)



                    date)                         Nitrite    - unknown)  



                                                  Negative            

 

           (unknown)  (no       (unknown)  (unknown)  Bedside Urine  (units    (

unknown)



                    date)                         Occult Blood     ++ unknown)  

 

           (unknown)  (no       (unknown)  (unknown)  Bedside Urine  (units    (

unknown)



                    date)                         Protein    + 30 unknown)  

 

           (unknown)  (no       (unknown)  (unknown)  Bedside Urine  (units    (

unknown)



                    date)                         Urobilinogen     - unknown)  



                                                  Negative            

 

           (unknown)  (no       (unknown)  (unknown)  Bedside Urine pH  (units  

  (unknown)



                    date)                          6.0      unknown)  

 

           (unknown)  (no       (unknown)  (unknown)  Blood Culture Stat  (units

    (unknown)



                    date)                                   unknown)  

 

           (unknown)  (no       (unknown)  (unknown)  Blood Pressure  (units    

(unknown)



                    date)                         124/66   22 unknown)  



                                                  17:34               

 

           (unknown)  (no       (unknown)  (unknown)  Blood Pressure  (units    

(unknown)



                    date)                         124/66    unknown)  

 

           (unknown)  (no       (unknown)  (unknown)  Bones and chest  (units   

 (unknown)



                    date)                         wall:? No unknown)  



                                                  suspicious bony           



                                                  lesions.? Overlying           



                                                  soft tissues           

 

           (unknown)  (no       (unknown)  (unknown)  CK-MB (CK-2)  (units    (u

nknown)



                    date)                                   unknown)  

 

           (unknown)  (no       (unknown)  (unknown)  CK-MB (CK-2)  (units    (u

nknown)



                    date)                         TNP       unknown)  

 

           (unknown)  (no       (unknown)  (unknown)  CK-MB (CK-2) Rel  (units  

  (unknown)



                    date)                         Index     unknown)  

 

           (unknown)  (no       (unknown)  (unknown)  CK-MB (CK-2) Rel  (units  

  (unknown)



                    date)                         Index    TNP unknown)  

 

           (unknown)  (no       (unknown)  (unknown)  COMPARISON:?  (units    (u

nknown)



                    date)                         [facility], unknown)  



                                                  CR, XR CHEST 2V,           



                                                  3/18/2021, 10:19.           

 

           (unknown)  (no       (unknown)  (unknown)  COVID19 -Nasal  (units    

(unknown)



                    date)                         RAPID/Pre-Proc Stat unknown)  

 

           (unknown)  (no       (unknown)  (unknown)  CT abdomen pelvis  (units 

   (unknown)



                    date)                         w con Stat unknown)  

 

           (unknown)  (no       (unknown)  (unknown)  CT abdomen pelvis  (units 

   (unknown)



                    date)                         was ordered for unknown)  



                                                  patient has           



                                                  tenderness to his           



                                                  right lower           

 

           (unknown)  (no       (unknown)  (unknown)  CVA (cerebral  (units    (

unknown)



                    date)                         vascular accident) unknown)  



                                                  (2018)           

 

           (unknown)  (no       (unknown)  (unknown)  Calcium   (units    (unkno

wn)



                    date)                         (8.4-10.2)  mg/dL unknown)  

 

           (unknown)  (no       (unknown)  (unknown)  Calcium    9.3  (units    

(unknown)



                    date)                         (8.4-10.2)  mg/dL unknown)  

 

           (unknown)  (no       (unknown)  (unknown)  Carbon Dioxide  (units    

(unknown)



                    date)                         (22-32)  mmol/L unknown)  

 

           (unknown)  (no       (unknown)  (unknown)  Carbon Dioxide  (units    

(unknown)



                    date)                         29  (22-32)  mmol/L unknown)  

 

           (unknown)  (no       (unknown)  (unknown)  Cardio: denies  (units    

(unknown)



                    date)                         chest pain, unknown)  



                                                  palpitations           

 

           (unknown)  (no       (unknown)  (unknown)  Cardiovascular:  (units   

 (unknown)



                    date)                         regular rate and unknown)  



                                                  rhythm, no           



                                                  peripheral edema,           



                                                  warm extremities           

 

           (unknown)  (no       (unknown)  (unknown)  Ceftriaxone Sodium  (units

    (unknown)



                    date)                         1,000 mg/ (Sodium unknown)  



                                                  Chloride)  100 mls           



                                                  @ 200 mls/hr IV NOW           



                                                  ONE                 

 

           (unknown)  (no       (unknown)  (unknown)  Chest x-ray:  (units    (u

nknown)



                    date)                                   unknown)  

 

           (unknown)  (no       (unknown)  (unknown)  Chief complaint:  (units  

  (unknown)



                    date)                         Weakness  unknown)  

 

           (unknown)  (no       (unknown)  (unknown)  Chloride  (units    (unkno

wn)



                    date)                         ()  mmol/L unknown)  

 

           (unknown)  (no       (unknown)  (unknown)  Chloride    96 L  (units  

  (unknown)



                    date)                         ()  mmol/L unknown)  

 

           (unknown)  (no       (unknown)  (unknown)  Clinical  (units    (o

wn)



                    date)                         Impression: unknown)  

 

           (unknown)  (no       (unknown)  (unknown)  Colon cancer  (units    (u

nknown)



                    date)                         (2013)    unknown)  

 

           (unknown)  (no       (unknown)  (unknown)  Complete Blood  (units    

(unknown)



                    date)                         Count AUTO DIFF unknown)  



                                                  Stat                

 

           (unknown)  (no       (unknown)  (unknown)  Comprehensive  (units    (

unknown)



                    date)                         Metabolic Panel unknown)  



                                                  Stat                

 

           (unknown)  (no       (unknown)  (unknown)  Course    (units    (o

wn)



                    date)                                   unknown)  

 

           (unknown)  (no       (unknown)  (unknown)  Creatinine  (units    (unk

nown)



                    date)                         (0.66-1.25)  mg/dL unknown)  

 

           (unknown)  (no       (unknown)  (unknown)  Creatinine    1.42  (units

    (unknown)



                    date)                         H  (0.66-1.25) unknown)  



                                                  mg/dL               

 

           (unknown)  (no       (unknown)  (unknown)  : 1960  (units   

 (unknown)



                    date)                         Acct:[id number] unknown)  

 

           (unknown)  (no       (unknown)  (unknown)  Departure  (units    (unkn

own)



                    date)                                   unknown)  

 

           (unknown)  (no       (unknown)  (unknown)  Depression  (units    (unk

nown)



                    date)                                   unknown)  

 

           (unknown)  (no       (unknown)  (unknown)  Diabetes  (units    (unkno

wn)



                    date)                                   unknown)  

 

           (unknown)  (no       (unknown)  (unknown)  Diabetic  (units    (unkno

wn)



                    date)                         neuropathy unknown)  

 

           (unknown)  (no       (unknown)  (unknown)  Discharge Plan  (units    

(unknown)



                    date)                                   unknown)  

 

           (unknown)  (no       (unknown)  (unknown)  Discontinued  (units    (u

nknown)



                    date)                         Medications unknown)  

 

           (unknown)  (no       (unknown)  (unknown)  Gustavo Macias MD  (units   

 (unknown)



                    date)                         [Primary Care unknown)  



                                                  Provider] -           

 

           (unknown)  (no       (unknown)  (unknown)  ECG Data  (units    (unkno

wn)



                    date)                                   unknown)  

 

           (unknown)  (no       (unknown)  (unknown) EKG independently  (units  

  (unknown)



                    date)                         reviewed by Dr. mejia)  



                                                  Dinesh and reveals           



                                                  normal sinus rhythm           



                                                  at 72 bpm           

 

           (unknown)  (no       (unknown)  (unknown)  EKG-12 Lead Stat  (units  

  (unknown)



                    date)                                   unknown)  

 

           (unknown)  (no       (unknown)  (unknown)  ER Physician:  (units    (

unknown)



                    date)                         Kitty Costello unknown)  



                                                  ARNP                

 

           (unknown)  (no       (unknown)  (unknown)  Eos # (Auto)  (units    (u

nknown)



                    date)                         (0-450)  /uL unknown)  

 

           (unknown)  (no       (unknown)  (unknown)  Eos # (Auto)   100  (units

    (unknown)



                    date)                           (0-450)  /uL unknown)  

 

           (unknown)  (no       (unknown)  (unknown)  Eos % (Auto)  (units    (u

nknown)



                    date)                         (2-4)  %  unknown)  

 

           (unknown)  (no       (unknown)  (unknown)  Eos % (Auto)   0.8  (units

    (unknown)



                    date)                         L   (2-4)  % unknown)  

 

           (unknown)  (no       (unknown)  (unknown)  Esterase  (units    (unkno

wn)



                    date)                                   unknown)  

 

           (unknown)  (no       (unknown)  (unknown)  Estimated GFR  (units    (

unknown)



                    date)                         (>60)  mL/min unknown)  

 

           (unknown)  (no       (unknown)  (unknown)  Estimated GFR  (units    (

unknown)



                    date)                         55 L  (>60)  mL/min unknown)  

 

           (unknown)  (no       (unknown)  (unknown)  Exam      (units    (unkno

wn)



                    date)                                   unknown)  

 

           (unknown)  (no       (unknown)  (unknown)  Exam Narrative:  (units   

 (unknown)



                    date)                                   unknown)  

 

           (unknown)  (no       (unknown)  (unknown)  Eyes: denies  (units    (u

nknown)



                    date)                         visual changes, eye unknown)  



                                                  pain                

 

           (unknown)  (no       (unknown)  (unknown)  Eyes: equal round  (units 

   (unknown)



                    date)                         and reactive, EOMI, unknown)  



                                                  conjunctiva normal           

 

           (unknown)  (no       (unknown)  (unknown)  FINDINGS:?  (units    (unk

nown)



                    date)                                   unknown)  

 

           (unknown)  (no       (unknown)  (unknown)  Family History  (units    

(unknown)



                    date)                         (Reviewed 22 unknown)  



                                                  @ 19:57 by Kitty Costello [facility])           

 

           (unknown)  (no       (unknown)  (unknown)  Father     (units 

   (unknown)



                    date)                          Cancer   unknown)  

 

           (unknown)  (no       (unknown)  (unknown)  GERD      (units    (unkno

wn)



                    date)                         (gastroesophageal unknown)  



                                                  reflux disease)           

 

           (unknown)  (no       (unknown)  (unknown)  GI:  Right lower  (units  

  (unknown)



                    date)                         quadrant tenderness unknown)  



                                                  to palpation           

 

           (unknown)  (no       (unknown)  (unknown)  GI: abdomen mildly  (units

    (unknown)



                    date)                         guarded, tender to unknown)  



                                                  palpation in the           



                                                  right lower           



                                                  quadrant,           

 

           (unknown)  (no       (unknown)  (unknown)  :  Straight cath  (units

    (unknown)



                    date)                         for urine completed unknown)  



                                                  by myself with 16           



                                                  [place] coude           



                                                  catheter,           

 

           (unknown)  (no       (unknown)  (unknown)  : denies  (units    (unk

nown)



                    date)                         dysuria, hematuria unknown)  



                                                  or flank pain,           



                                                  reports oliguria,           



                                                  patient self           

 

           (unknown)  (no       (unknown)  (unknown)  Gastroenterology  (units  

  (unknown)



                    date)                         from [place], unknown)  



                                                  patient reports           



                                                  that he sees Yasmine Rocha PA-C           

 

           (unknown)  (no       (unknown)  (unknown)  General   (units    (unkno

wn)



                    date)                                   unknown)  

 

           (unknown)  (no       (unknown)  (unknown)  General:  (units    (unkno

wn)



                    date)                         cooperative, unknown)  



                                                  comfortable, in no           



                                                  acute distress,           



                                                  well groomed,           

 

           (unknown)  (no       (unknown)  (unknown)  General: denies  (units   

 (unknown)



                    date)                         fever, chills, unknown)  



                                                  endorses weakness,           



                                                  right lower           



                                                  quadrant pain,           

 

           (unknown)  (no       (unknown)  (unknown)  GenericComposite[P  (units

    (unknown)



                    date)                         lt Count  unknown)  



                                                  (150-400)  X10^3/uL           



                                                   ]                  

 

           (unknown)  (no       (unknown)  (unknown)  GenericComposite[P  (units

    (unknown)



                    date)                         lt Count   219 unknown)  



                                                  (150-400)  X10^3/uL           



                                                   ]                  

 

           (unknown)  (no       (unknown)  (unknown)  GenericComposite[R  (units

    (unknown)



                    date)                         BC     (4.5-5.9) unknown)  



                                                  X10^6/uL  ]           

 

           (unknown)  (no       (unknown)  (unknown)  GenericComposite[R  (units

    (unknown)



                    date)                         BC   3.37 L unknown)  



                                                  (4.5-5.9)  X10^6/uL           



                                                   ]                  

 

           (unknown)  (no       (unknown)  (unknown)  GenericComposite[W  (units

    (unknown)



                    date)                         BC     (4.5-11.0) unknown)  



                                                  X10^3/uL  ]           

 

           (unknown)  (no       (unknown)  (unknown)  GenericComposite[W  (units

    (unknown)



                    date)                         BC   7.0  unknown)  



                                                  (4.5-11.0)           



                                                  X10^3/uL  ]           

 

           (unknown)  (no       (unknown)  (unknown)  Globulin  (units    (unkno

wn)



                    date)                         (1.7-4.1)  g/dL unknown)  

 

           (unknown)  (no       (unknown)  (unknown)  Globulin    3.6  (units   

 (unknown)



                    date)                         (1.7-4.1)  g/dL unknown)  

 

           (unknown)  (no       (unknown)  (unknown)  Glucose   (units    (unkno

wn)



                    date)                         ()  mg/dL unknown)  

 

           (unknown)  (no       (unknown)  (unknown)  Glucose    262 H  (units  

  (unknown)



                    date)                         ()  mg/dL unknown)  

 

           (unknown)  (no       (unknown)  (unknown)  Guaiac stool:  (units    (

unknown)



                    date)                         Negative, good unknown)  



                                                  stool result           

 

           (unknown)  (no       (unknown)  (unknown)  H/O colectomy  (units    (

unknown)



                    date)                                   unknown)  

 

           (unknown)  (no       (unknown)  (unknown)  HPI - Weakness  (units    

(unknown)



                    date)                                   unknown)  

 

           (unknown)  (no       (unknown)  (unknown)  HPI Narrative:  (units    

(unknown)



                    date)                                   unknown)  

 

           (unknown)  (no       (unknown)  (unknown)  Hct     (41-53)  %  (units

    (unknown)



                    date)                                   unknown)  

 

           (unknown)  (no       (unknown)  (unknown)  Hct   26.7 L  (units    (u

nknown)



                    date)                         (41-53)  % unknown)  

 

           (unknown)  (no       (unknown)  (unknown)  He does not have  (units  

  (unknown)



                    date)                         any mental status unknown)  



                                                  changes, GCS is 15.           



                                                   Guaiac stool is           

 

           (unknown)  (no       (unknown)  (unknown)  Head/Neck:  (units    (unk

nown)



                    date)                         Endorses having a unknown)  



                                                  headache, denies           



                                                  any neck pain           

 

           (unknown)  (no       (unknown)  (unknown)  Head: atraumatic,  (units 

   (unknown)



                    date)                         symmetrical facial unknown)  



                                                  expressions           

 

           (unknown)  (no       (unknown)  (unknown)  Hgb       (units    (unkno

wn)



                    date)                         (13.5-17.5)  g/dL unknown)  

 

           (unknown)  (no       (unknown)  (unknown)  Hgb   9.1 L  (units    (un

known)



                    date)                         (13.5-17.5)  g/dL unknown)  

 

           (unknown)  (no       (unknown)  (unknown)  History of Present  (units

    (unknown)



                    date)                         Illness   unknown)  

 

           (unknown)  (no       (unknown)  (unknown)  History of lumbar  (units 

   (unknown)



                    date)                         surgery () unknown)  

 

           (unknown)  (no       (unknown)  (unknown)  Hx of foot surgery  (units

    (unknown)



                    date)                         (2017)    unknown)  

 

           (unknown)  (no       (unknown)  (unknown)  Hx of shoulder  (units    

(unknown)



                    date)                         surgery (2010) unknown)  

 

           (unknown)  (no       (unknown)  (unknown)  Hypertension  (units    (u

nknown)



                    date)                                   unknown)  

 

           (unknown)  (no       (unknown)  (unknown)  IMPRESSION:? No  (units   

 (unknown)



                    date)                         acute     unknown)  



                                                  cardiopulmonary           



                                                  findings            

 

           (unknown)  (no       (unknown)  (unknown)  INDICATIONS:?  (units    (

unknown)



                    date)                         chest pain unknown)  

 

           (unknown)  (no       (unknown)  (unknown)  Imaging Data  (units    (u

nknown)



                    date)                                   unknown)  

 

           (unknown)  (no       (unknown)  (unknown)  Independently  (units    (

unknown)



                    date)                         reviewed vitals unknown)  



                                                  signs and nursing           



                                                  notes.              

 

           (unknown)  (no       (unknown)  (unknown)  Initial Vital  (units    (

unknown)



                    date)                         Signs     unknown)  

 

           (unknown)  (no       (unknown)  (unknown)  Initial Vital  (units    (

unknown)



                    date)                         Signs:    unknown)  

 

           (unknown)  (no       (unknown)  (unknown)  Instructions:  (units    (

unknown)



                    date)                         Acute Cystitis, unknown)  



                                                  Anemia              

 

           (unknown)  (no       (unknown)  (unknown)  Interpretation:  (units   

 (unknown)



                    date)                                   unknown)  

 

           (unknown)  (no       (unknown)  (unknown) January, hematocrit  (units

    (unknown)



                    date)                         is 26.7, down from unknown)  



                                                  27.8 also in           



                                                  January, platelet           



                                                  count 219,           

 

           (unknown)  (no       (unknown)  (unknown) Klebsiella  (units    (unkn

own)



                    date)                         pneumoniae which unknown)  



                                                  was resistant only           



                                                  to ampicillin and           



                                                  nitrofurantoin.           

 

           (unknown)  (no       (unknown)  (unknown)  Lab Data  (units    (unkno

wn)



                    date)                                   unknown)  

 

           (unknown)  (no       (unknown)  (unknown)  Labs:     (units    (unkno

wn)



                    date)                                   unknown)  

 

           (unknown)  (no       (unknown)  (unknown)  Lactate   (units    (unkno

wn)



                    date)                         (0.7-2.1)  mmol/L unknown)  

 

           (unknown)  (no       (unknown)  (unknown)  Lactate   2.6 H  (units   

 (unknown)



                    date)                         (0.7-2.1)  mmol/L unknown)  

 

           (unknown)  (no       (unknown)  (unknown)  Lactate (Lactic  (units   

 (unknown)



                    date)                         Acid) Stat unknown)  

 

           (unknown)  (no       (unknown)  (unknown)  Lipase    (units    (unkno

wn)



                    date)                         ()  U/L unknown)  

 

           (unknown)  (no       (unknown)  (unknown)  Lipase    32  (units    (u

nknown)



                    date)                         ()  U/L unknown)  

 

           (unknown)  (no       (unknown)  (unknown)  Lipase Stat  (units    (un

known)



                    date)                                   unknown)  

 

           (unknown)  (no       (unknown)  (unknown)  Lungs and pleura:?  (units

    (unknown)



                    date)                         Lungs are clear.? unknown)  



                                                  No pleural           



                                                  effusions or           



                                                  pneumothorax.? Low           

 

           (unknown)  (no       (unknown)  (unknown)  Lymph # (Auto)  (units    

(unknown)



                    date)                         (1766-7784)  /uL unknown)  

 

           (unknown)  (no       (unknown)  (unknown)  Lymph # (Auto)  (units    

(unknown)



                    date)                         400 L   (2121-1340) unknown)  



                                                   /uL                

 

           (unknown)  (no       (unknown)  (unknown)  Lymph % (Auto)  (units    

(unknown)



                    date)                         (25-40)  % unknown)  

 

           (unknown)  (no       (unknown)  (unknown)  Lymph % (Auto)  (units    

(unknown)



                    date)                         6.4 L   (25-40)  % unknown)  

 

           (unknown)  (no       (unknown)  (unknown)  MCH     (26-34)  (units   

 (unknown)



                    date)                         PG        unknown)  

 

           (unknown)  (no       (unknown)  (unknown)  MCH   26.9  (units    (unk

nown)



                    date)                         (26-34)  PG unknown)  

 

           (unknown)  (no       (unknown)  (unknown)  MCHC     (30-36)  (units  

  (unknown)



                    date)                         %         unknown)  

 

           (unknown)  (no       (unknown)  (unknown)  MCHC   34.0  (units    (un

known)



                    date)                         (30-36)  % unknown)  

 

           (unknown)  (no       (unknown)  (unknown)  MCV     ()  (units  

  (unknown)



                    date)                         fL        unknown)  

 

           (unknown)  (no       (unknown)  (unknown)  MCV   79.1 L  (units    (u

nknown)



                    date)                         ()  fL unknown)  

 

           (unknown)  (no       (unknown)  (unknown)  MDM - Weakness  (units    

(unknown)



                    date)                                   unknown)  

 

           (unknown)  (no       (unknown)  (unknown)  MDM Narrative  (units    (

unknown)



                    date)                                   unknown)  

 

           (unknown)  (no       (unknown)  (unknown)  MSK: denies new  (units   

 (unknown)



                    date)                         joint pain, muscle unknown)  



                                                  weakness or           



                                                  swelling            

 

           (unknown)  (no       (unknown)  (unknown) MSK: moves all  (units    (

unknown)



                    date)                         extremities, unknown)  



                                                  neurovascularly           



                                                  intact, generalized           



                                                  weakness, normal           

 

           (unknown)  (no       (unknown)  (unknown)  Magnesium  (units    (unkn

own)



                    date)                         (1.6-2.3)  mg/dL unknown)  

 

           (unknown)  (no       (unknown)  (unknown)  Magnesium    1.0 L  (units

    (unknown)



                    date)                          (1.6-2.3)  mg/dL unknown)  

 

           (unknown)  (no       (unknown)  (unknown)  Magnesium Stat  (units    

(unknown)



                    date)                                   unknown)  

 

           (unknown)  (no       (unknown)  (unknown)  Magnesium Sulfate  (units 

   (unknown)



                    date)                         (Magnesium Sulfate) unknown)  



                                                   2 gm in 50 mls @           



                                                  50 mls/hr IV NOW           



                                                  ONE                 

 

           (unknown)  (no       (unknown)  (unknown)  Mediastinum:?  (units    (

unknown)



                    date)                         Mediastinal unknown)  



                                                  contours appear           



                                                  normal.? Heart size           



                                                  is normal.?           

 

           (unknown)  (no       (unknown)  (unknown)  Medical History  (units   

 (unknown)



                    date)                         (Reviewed 22 unknown)  



                                                  @ 19:57 by Kitty Costello [facility])           

 

           (unknown)  (no       (unknown)  (unknown)  Medical decision  (units  

  (unknown)



                    date)                         making narrative: unknown)  

 

           (unknown)  (no       (unknown)  (unknown)  [employer] since this  (units

    (unknown)



                    date)                         happened. unknown)  

 

           (unknown)  (no       (unknown)  (unknown)  Mode of arrival:  (units  

  (unknown)



                    date)                         Wheelchair unknown)  

 

           (unknown)  (no       (unknown)  (unknown)  Mono # (Auto)  (units    (

unknown)



                    date)                         (0-900)  /uL unknown)  

 

           (unknown)  (no       (unknown)  (unknown)  Mono # (Auto)  (units    (

unknown)



                    date)                         600   (0-900)  /uL unknown)  

 

           (unknown)  (no       (unknown)  (unknown)  Mono % (Auto)  (units    (

unknown)



                    date)                         (3-14)  % unknown)  

 

           (unknown)  (no       (unknown)  (unknown)  Mono % (Auto)  (units    (

unknown)



                    date)                         7.9   (3-14)  % unknown)  

 

           (unknown)  (no       (unknown)  (unknown)  Mother     (units 

   (unknown)



                    date)                          Cancer   unknown)  

 

           (unknown)  (no       (unknown)  (unknown)  Mouth/Throat:  (units    (

unknown)



                    date)                         moist mucus unknown)  



                                                  membranes           

 

           (unknown)  (no       (unknown)  (unknown)  Narrative  (units    (unkn

own)



                    date)                                   unknown)  

 

           (unknown)  (no       (unknown)  (unknown)  Narrative:  (units    (unk

nown)



                    date)                                   unknown)  

 

           (unknown)  (no       (unknown)  (unknown)  Neck: supple  (units    (u

nknown)



                    date)                                   unknown)  

 

           (unknown)  (no       (unknown)  (unknown)  Neuro: denies  (units    (

unknown)



                    date)                         numbness, tingling, unknown)  



                                                  dizziness           

 

           (unknown)  (no       (unknown)  (unknown)  Neuro: normal  (units    (

unknown)



                    date)                         speech and unknown)  



                                                  cognition, A+O x3           

 

           (unknown)  (no       (unknown)  (unknown)  Neut # (Auto)  (units    (

unknown)



                    date)                         (4314-2254)  /uL unknown)  

 

           (unknown)  (no       (unknown)  (unknown)  Neut # (Auto)  (units    (

unknown)



                    date)                         5900   (6311-2427) unknown)  



                                                  /uL                 

 

           (unknown)  (no       (unknown)  (unknown)  Neut % (Auto)  (units    (

unknown)



                    date)                         (50-75)  % unknown)  

 

           (unknown)  (no       (unknown)  (unknown)  Neut % (Auto)  (units    (

unknown)



                    date)                         84.7 H   (50-75)  % unknown)  

 

           (unknown)  (no       (unknown)  (unknown)  No Action  (units    (unkn

own)



                    date)                                   unknown)  

 

           (unknown)  (no       (unknown)  (unknown)  No Known Drug  (units    (

unknown)



                    date)                         Allergies Allergy unknown)  



                                                  Verified 22           



                                                  17:38               

 

           (unknown)  (no       (unknown)  (unknown)  Nose: nares  (units    (un

known)



                    date)                         patent, no unknown)  



                                                  rhinorrhea           

 

           (unknown)  (no       (unknown)  (unknown)  Notable on  (units    (unk

nown)



                    date)                         patient's prior unknown)  



                                                  urine cultures,           



                                                  urine from           



                                                  2022 grew out           

 

           (unknown)  (no       (unknown)  (unknown)  Ondansetron HCl  (units   

 (unknown)



                    date)                         (Ondansetron 4 Mg/2 unknown)  



                                                  Ml Inj)  4 mg IV           



                                                  NOW ONE             

 

           (unknown)  (no       (unknown)  (unknown)  Ordered:  (units    (unkno

wn)



                    date)                                   unknown)  

 

           (unknown)  (no       (unknown)  (unknown)  Orders    (units    (unkno

wn)



                    date)                                   unknown)  

 

           (unknown)  (no       (unknown)  (unknown)  Osteoarthritis  (units    

(unknown)



                    date)                                   unknown)  

 

           (unknown)  (no       (unknown)  (unknown)  Oxygen Delivery  (units   

 (unknown)



                    date)                         Method    22 unknown)  



                                                  17:34               

 

           (unknown)  (no       (unknown)  (unknown)  Oxygen Delivery  (units   

 (unknown)



                    date)                         Method Room Air unknown)  

 

           (unknown)  (no       (unknown)  (unknown)  PROCEDURE:? XR  (units    

(unknown)



                    date)                         CHEST 1V  unknown)  

 

           (unknown)  (no       (unknown)  (unknown)  Patient   (units    (unkno

wn)



                    date)                         Disposition: Home unknown)  

 

           (unknown)  (no       (unknown)  (unknown)  Patient History  (units   

 (unknown)



                    date)                                   unknown)  

 

           (unknown)  (no       (unknown)  (unknown)  Patient is  (units    (unk

nown)



                    date)                         currently unknown)  



                                                  anticoagulated on           



                                                  Plavix 37.5 mg           



                                                  daily.  Dr. Gómez           

 

           (unknown)  (no       (unknown)  (unknown) Patient reports  (units    

(unknown)



                    date)                         that when he has a unknown)  



                                                  bowel movement, he           



                                                  typically has hard           



                                                  pellets             

 

           (unknown)  (no       (unknown)  (unknown)  Patient self caths  (units

    (unknown)



                    date)                         for urine output, unknown)  



                                                  reports that he has           



                                                  not had much urine           

 

           (unknown)  (no       (unknown)  (unknown)  Patient was  (units    (un

known)



                    date)                         treated in the unknown)  



                                                  emergency           



                                                  department with 1 g           



                                                  of ceftriaxone.           

 

           (unknown)  (no       (unknown)  (unknown) Patient's  (units    (unkno

wn)



                    date)                         colorectal cancer unknown)  



                                                  oncologist was Dr. Jett, patient was           



                                                  referred to him           

 

           (unknown)  (no       (unknown)  (unknown)  Patient's preop  (units   

 (unknown)



                    date)                         diagnosis was unknown)  



                                                  nausea and vomiting           



                                                  from hematemesis in           



                                                  January             

 

           (unknown)  (no       (unknown)  (unknown)  Patient:  (units    (unkno

wn)



                    date)                         Kassandra Leon unknown)  



                                                  MR#: [id number]            

 

           (unknown)  (no       (unknown)  (unknown)  Postlaminectomy  (units   

 (unknown)



                    date)                         syndrome  unknown)  

 

           (unknown)  (no       (unknown)  (unknown)  Potassium  (units    (unkn

own)



                    date)                         (3.4-5.1)  mmol/L unknown)  

 

           (unknown)  (no       (unknown)  (unknown)  Potassium    3.7  (units  

  (unknown)



                    date)                         (3.4-5.1)  mmol/L unknown)  

 

           (unknown)  (no       (unknown)  (unknown)  Prescriptions:  (units    

(unknown)



                    date)                                   unknown)  

 

           (unknown)  (no       (unknown)  (unknown)  Procalcitonin  (units    (

unknown)



                    date)                         (<0.5)  ng/mL unknown)  

 

           (unknown)  (no       (unknown)  (unknown)  Procalcitonin  (units    (

unknown)



                    date)                         0.72 H    (<0.5) unknown)  



                                                  ng/mL               

 

           (unknown)  (no       (unknown)  (unknown)  Procalcitonin Stat  (units

    (unknown)



                    date)                                   unknown)  

 

           (unknown)  (no       (unknown)  (unknown)  Psych: mental  (units    (

unknown)



                    date)                         status is grossly unknown)  



                                                  normal, congruent           



                                                  mood, normal           



                                                  affect, pleasant           

 

           (unknown)  (no       (unknown)  (unknown)  Pulse Oximetry  98  (units

    (unknown)



                    date)                           22 17:34 unknown)  

 

           (unknown)  (no       (unknown)  (unknown)  Pulse Oximetry 98  (units 

   (unknown)



                    date)                                   unknown)  

 

           (unknown)  (no       (unknown)  (unknown)  Pulse Rate  77  (units    

(unknown)



                    date)                         22 17:34 unknown)  

 

           (unknown)  (no       (unknown)  (unknown)  Pulse Rate 77  (units    (

unknown)



                    date)                                   unknown)  

 

           (unknown)  (no       (unknown)  (unknown)  RDW       (units    (unkno

wn)



                    date)                         (11.6-14.8)  % unknown)  

 

           (unknown)  (no       (unknown)  (unknown)  RDW   16.1 H  (units    (u

nknown)



                    date)                         (11.6-14.8)  % unknown)  

 

           (unknown)  (no       (unknown)  (unknown)  Referrals:  (units    (unk

nown)



                    date)                                   unknown)  

 

           (unknown)  (no       (unknown)  (unknown)  Related Data  (units    (u

nknown)



                    date)                                   unknown)  

 

           (unknown)  (no       (unknown)  (unknown)  Respiratory Rate  (units  

  (unknown)



                    date)                         18   22 17:34 unknown)  

 

           (unknown)  (no       (unknown)  (unknown)  Respiratory Rate  (units  

  (unknown)



                    date)                         18        unknown)  

 

           (unknown)  (no       (unknown)  (unknown)  Respiratory:  (units    (u

nknown)



                    date)                         denies shortness of unknown)  



                                                  breath, or new           



                                                  cough               

 

           (unknown)  (no       (unknown)  (unknown)  Respiratory:  (units    (u

nknown)



                    date)                         normal effort, able unknown)  



                                                  to speak in           



                                                  complete sentences,           



                                                  no audible           

 

           (unknown)  (no       (unknown)  (unknown)  Result diagrams:  (units  

  (unknown)



                    date)                                   unknown)  

 

           (unknown)  (no       (unknown)  (unknown)  Review of Systems  (units 

   (unknown)



                    date)                                   unknown)  

 

           (unknown)  (no       (unknown)  (unknown)  SARS-CoV-2 (PCR)  (units  

  (unknown)



                    date)                           (Negative) unknown)  

 

           (unknown)  (no       (unknown)  (unknown)  SARS-CoV-2 (PCR)  (units  

  (unknown)



                    date)                         Negative  unknown)  



                                                  (Negative)           

 

           (unknown)  (no       (unknown)  (unknown)  Sciatica  (units    (unkno

wn)



                    date)                                   unknown)  

 

           (unknown)  (no       (unknown)  (unknown)  Signed By:  (units    (unk

nown)



                    date)                                   unknown)  

 

           (unknown)  (no       (unknown)  (unknown)  Skin: brisk  (units    (un

known)



                    date)                         capillary refill, unknown)  



                                                  no rash, no           



                                                  erythema            

 

           (unknown)  (no       (unknown)  (unknown)  Skin: denies rash,  (units

    (unknown)



                    date)                         itching or wound unknown)  

 

           (unknown)  (no       (unknown)  (unknown)  Smoking Status:  (units   

 (unknown)



                    date)                         Never smoker unknown)  

 

           (unknown)  (no       (unknown)  (unknown)  Smoking Status:  (units   

 (unknown)



                    date)                         Never smoker unknown)  

 

           (unknown)  (no       (unknown)  (unknown)  Social History  (units    

(unknown)



                    date)                         (Reviewed 22 unknown)  



                                                  @ 19:57 by ZAIRA Amezcua)           

 

           (unknown)  (no       (unknown)  (unknown)  Sodium    (units    (unkno

wn)



                    date)                         (137-145)  mmol/L unknown)  

 

           (unknown)  (no       (unknown)  (unknown)  Sodium    135 L  (units   

 (unknown)



                    date)                         (137-145)  mmol/L unknown)  

 

           (unknown)  (no       (unknown)  (unknown)  Sodium Chloride  (units   

 (unknown)



                    date)                         (Normal Saline unknown)  



                                                  0.9%)  1,000 mls @           



                                                  1,000 mls/hr IV           



                                                  BOLUS ONE           

 

           (unknown)  (no       (unknown)  (unknown)  Source: patient  (units   

 (unknown)



                    date)                         and family unknown)  

 

           (unknown)  (no       (unknown)  (unknown)  Spinal stenosis  (units   

 (unknown)



                    date)                                   unknown)  

 

           (unknown)  (no       (unknown)  (unknown)  Stated complaint:  (units 

   (unknown)



                    date)                         WEAKNESS UNABLE TO unknown)  



                                                  HOLD ANYTHING DOWN           

 

           (unknown)  (no       (unknown)  (unknown)  Substance Use  (units    (

unknown)



                    date)                         Type: does not use unknown)  

 

           (unknown)  (no       (unknown)  (unknown)  Surgical History  (units  

  (unknown)



                    date)                         (Reviewed 22 unknown)  



                                                  @ 19:57 by ZAIRA Amezcua)           

 

           (unknown)  (no       (unknown)  (unknown)  Surgical changes  (units  

  (unknown)



                    date)                         and devices:? unknown)  



                                                  None.?              

 

           (unknown)  (no       (unknown)  (unknown)  TECHNIQUE:? One  (units   

 (unknown)



                    date)                         view of the chest unknown)  



                                                  was acquired.?           

 

           (unknown)  (no       (unknown)  (unknown)  Temperature  98.3  (units 

   (unknown)



                    date)                         F   22 17:34 unknown)  

 

           (unknown)  (no       (unknown)  (unknown)  Temperature 98.3 F  (units

    (unknown)



                    date)                                   unknown)  

 

           (unknown)  (no       (unknown)  (unknown)  This is a  (units    (unkn

own)



                    date)                         62-year-old male unknown)  



                                                  with history of           



                                                  multiple sclerosis,           



                                                  CVA in 2019,           

 

           (unknown)  (no       (unknown)  (unknown)  Time Seen by  (units    (u

nknown)



                    date)                         Provider: 22 unknown)  



                                                  19:11               

 

           (unknown)  (no       (unknown)  (unknown)  Total Bilirubin  (units   

 (unknown)



                    date)                          (0.2-1.3)  mg/dL unknown)  

 

           (unknown)  (no       (unknown)  (unknown)  Total Bilirubin  (units   

 (unknown)



                    date)                         0.6  (0.2-1.3) unknown)  



                                                  mg/dL               

 

           (unknown)  (no       (unknown)  (unknown)  Total Creatine  (units    

(unknown)



                    date)                         Kinase     () unknown)  



                                                   U/L                

 

           (unknown)  (no       (unknown)  (unknown)  Total Creatine  (units    

(unknown)



                    date)                         Kinase    54 L unknown)  



                                                  ()  U/L           

 

           (unknown)  (no       (unknown)  (unknown)  Total Protein  (units    (

unknown)



                    date)                         (6.3-8.2)  g/dL unknown)  

 

           (unknown)  (no       (unknown)  (unknown)  Total Protein  (units    (

unknown)



                    date)                         7.5  (6.3-8.2) unknown)  



                                                  g/dL                

 

           (unknown)  (no       (unknown)  (unknown)  Troponin + CK  (units    (

unknown)



                    date)                         Cardiac Panel Stat unknown)  

 

           (unknown)  (no       (unknown)  (unknown)  Troponin I  (units    (unk

nown)



                    date)                         (0.01-0.034)  ng/mL unknown)  

 

           (unknown)  (no       (unknown)  (unknown)  Troponin I    <  (units   

 (unknown)



                    date)                         0.012  (0.01-0.034) unknown)  



                                                   ng/mL              

 

           (unknown)  (no       (unknown)  (unknown)  Ur Culture  (units    (unk

nown)



                    date)                         Indicated? unknown)  

 

           (unknown)  (no       (unknown)  (unknown)  Ur Culture  (units    (unk

nown)



                    date)                         Indicated? unknown)  



                                                  Specimen cultured           

 

           (unknown)  (no       (unknown)  (unknown)  Urine Bacteria  (units    

(unknown)



                    date)                         (None)    unknown)  

 

           (unknown)  (no       (unknown)  (unknown)  Urine Bacteria  (units    

(unknown)



                    date)                         Few (2-10) H unknown)  



                                                  (None)              

 

           (unknown)  (no       (unknown)  (unknown)  Urine Culture Stat  (units

    (unknown)



                    date)                                   unknown)  

 

           (unknown)  (no       (unknown)  (unknown)  Urine Microscopic  (units 

   (unknown)



                    date)                         Stat      unknown)  

 

           (unknown)  (no       (unknown)  (unknown)  Urine RBC  (units    (unkn

own)



                    date)                         (0-5/HPF) unknown)  

 

           (unknown)  (no       (unknown)  (unknown)  Urine RBC  (units    (unkn

own)



                    date)                         1-5/hpf  (0-5/HPF) unknown)  

 

           (unknown)  (no       (unknown)  (unknown)  Urine Specific  (units    

(unknown)



                    date)                         Gravity    1.015 unknown)  

 

           (unknown)  (no       (unknown)  (unknown)  Urine WBC  (units    (unkn

own)



                    date)                         (0-5/HPF) unknown)  

 

           (unknown)  (no       (unknown)  (unknown)  Urine WBC  (units    (unkn

own)



                    date)                         5-10/hpf H unknown)  



                                                  (0-5/HPF)           

 

           (unknown)  (no       (unknown)  (unknown)  Vital Signs  (units    (un

known)



                    date)                                   unknown)  

 

           (unknown)  (no       (unknown)  (unknown)  Vital signs:  (units    (u

nknown)



                    date)                                   unknown)  

 

           (unknown)  (no       (unknown)  (unknown)  Linette Jett MD  (units  

  (unknown)



                    date)                         [Physician] - As unknown)  



                                                  soon as possible           

 

           (unknown)  (no       (unknown)  (unknown)  XR chest 1V Stat  (units  

  (unknown)



                    date)                                   unknown)  

 

           (unknown)  (no       (unknown)  (unknown)  [Embedded Image  (units   

 (unknown)



                    date)                         Not Available] unknown)  

 

           (unknown)  (no       (unknown)  (unknown)  acute ischemic  (units    

(unknown)



                    date)                         changes.  unknown)  

 

           (unknown)  (no       (unknown)  (unknown)  alcohol intake  (units    

(unknown)



                    date)                         frequency: a few unknown)  



                                                  times a month           

 

           (unknown)  (no       (unknown)  (unknown)  alcohol intake:  (units   

 (unknown)



                    date)                         current   unknown)  

 

           (unknown)  (no       (unknown)  (unknown)  and cooperative  (units   

 (unknown)



                    date)                                   unknown)  

 

           (unknown)  (no       (unknown)  (unknown)  and generalized  (units   

 (unknown)



                    date)                         weakness.  Patient unknown)  



                                                  self catheterizes           



                                                  his bladder at           



                                                  baseline,           

 

           (unknown)  (no       (unknown)  (unknown)  appear    (units    (unkno

wn)



                    date)                                   unknown)  

 

           (unknown)  (no       (unknown)  (unknown)  ascorbic acid  (units    (

unknown)



                    date)                         (vitamin C) 500 mg unknown)  



                                                  500 mg PO BID #60           



                                                  tabs 22           

 

           (unknown)  (no       (unknown)  (unknown)  atorvastatin 80 mg  (units

    (unknown)



                    date)                         tablet 40 mg PO QPM unknown)  



                                                  20           

 

           (unknown)  (no       (unknown)  (unknown)  be causing a rash  (units 

   (unknown)



                    date)                                   unknown)  

 

           (unknown)  (no       (unknown)  (unknown) bleeding.  Guaiac  (units  

  (unknown)



                    date)                         stool in the unknown)  



                                                  emergency           



                                                  department was           



                                                  negative.           



                                                  Patient's lab           

 

           (unknown)  (no       (unknown)  (unknown)  blood in his  (units    (u

nknown)



                    date)                         emesis or in his unknown)  



                                                  stool.  He reports           



                                                  that he did a stool           



                                                  test one            

 

           (unknown)  (no       (unknown)  (unknown)  buspirone 5 mg  (units    

(unknown)



                    date)                         tablet 5 mg PO BID unknown)  



                                                  #60 tabs 22           

 

           (unknown)  (no       (unknown)  (unknown)  cancer.  Postop  (units   

 (unknown)



                    date)                         diagnosis from this unknown)  



                                                  upper endoscopy was           



                                                  the same.  His           

 

           (unknown)  (no       (unknown)  (unknown)  caths at baseline  (units 

   (unknown)



                    date)                                   unknown)  

 

           (unknown)  (no       (unknown)  (unknown)  clopidogrel 75 mg  (units 

   (unknown)



                    date)                         tablet 75 mg PO unknown)  



                                                  DAILY 22            

 

           (unknown)  (no       (unknown)  (unknown)  colonic   (units    (unkno

wn)



                    date)                         anastomosis, and a unknown)  



                                                  suboptimal bowel           



                                                  prep.               

 

           (unknown)  (no       (unknown)  (unknown)  cultures were  (units    (

unknown)



                    date)                         drawn prior to unknown)  



                                                  receiving           



                                                  antibiotics Urine           



                                                  microscopy shows           

 

           (unknown)  (no       (unknown)  (unknown)  cyanocobalamin  (units    

(unknown)



                    date)                         (vitamin B-12) 500 unknown)  



                                                  1,000 mcg PO DAILY           



                                                  #60 tabs 22           

 

           (unknown)  (no       (unknown)  (unknown)  diabetes,  (units    (unkn

own)



                    date)                         hypertension, unknown)  



                                                  colorectal cancer           



                                                  in  with a           



                                                  reverse colectomy           



                                                  who                 

 

           (unknown)  (no       (unknown)  (unknown)  diabetes,  (units    (unkn

own)



                    date)                         hypertension, unknown)  



                                                  colorectal cancer           



                                                  in  with a           



                                                  reverse colectomy,           



                                                  He                  

 

           (unknown)  (no       (unknown)  (unknown)  does not drink  (units    

(unknown)



                    date)                         alcohol.? Patient unknown)  



                                                  and his wife state           



                                                  that he was seen in           



                                                  the                 

 

           (unknown)  (no       (unknown)  (unknown)  elevated lactate.  (units 

   (unknown)



                    date)                         His heart rate and unknown)  



                                                  blood pressure are           



                                                  within normal           



                                                  ranges.             

 

           (unknown)  (no       (unknown)  (unknown)  emergency  (units    (unkn

own)



                    date)                         department in unknown)  



                                                  January and           



                                                  diagnosed with a GI           



                                                  bleed, he was           



                                                  admitted,           

 

           (unknown)  (no       (unknown)  (unknown)  endorses fatigue  (units  

  (unknown)



                    date)                                   unknown)  

 

           (unknown)  (no       (unknown)  (unknown) endoscopic  (units    (unkn

own)



                    date)                         diagnosis includes unknown)  



                                                  mild gastropathy,           



                                                  esophagitis, patent           



                                                  retrosigmoid           

 

           (unknown)  (no       (unknown)  (unknown)  ferrous sulfate  (units   

 (unknown)



                    date)                         325 mg (65 mg 325 unknown)  



                                                  mg PO BIDWM #60           



                                                  tabs 22           

 

           (unknown)  (no       (unknown)  (unknown)  followed by loose  (units 

   (unknown)



                    date)                         stool.    unknown)  

 

           (unknown)  (no       (unknown)  (unknown)  gabapentin 300 mg  (units 

   (unknown)



                    date)                         capsule 300 mg PO unknown)  



                                                  BID 18            

 

           (unknown)  (no       (unknown)  (unknown)  generalized  (units    (un

known)



                    date)                         weakness and unknown)  



                                                  fatigue             

 

           (unknown)  (no       (unknown)  (unknown)  glipizide 10 mg  (units   

 (unknown)



                    date)                         tablet 10 mg PO BID unknown)  



                                                  18           

 

           (unknown)  (no       (unknown)  (unknown)  had a recent  (units    (u

nknown)



                    date)                         endoscopy that did unknown)  



                                                  not show any           



                                                  bleeding polyps or           



                                                  acute GI            

 

           (unknown)  (no       (unknown)  (unknown)  hospital and on  (units   

 (unknown)



                    date)                         chart review it unknown)  



                                                  appears that it was           



                                                  completed on           



                                                  2022.           

 

           (unknown)  (no       (unknown)  (unknown)  household members:  (units

    (unknown)



                    date)                          spouse   unknown)  

 

           (unknown)  (no       (unknown)  (unknown)  ingrown, no penile  (units

    (unknown)



                    date)                         discharge, no unknown)  



                                                  scrotal edema           

 

           (unknown)  (no       (unknown)  (unknown)  iron) tablet  (units    (u

nknown)



                    date)                                   unknown)  

 

           (unknown)  (no       (unknown)  (unknown)  lives     (units    (unkno

wn)



                    date)                         independently:  Yes unknown)  

 

           (unknown)  (no       (unknown)  (unknown)  losartan 50 mg  (units    

(unknown)



                    date)                         tablet 25 mg PO QPM unknown)  



                                                  20           

 

           (unknown)  (no       (unknown)  (unknown)  lung      (units    (unkno

wn)



                    date)                                   unknown)  

 

           (unknown)  (no       (unknown)  (unknown)  magnesium was  (units    (

unknown)



                    date)                         replaced with 2 g, unknown)  



                                                  normal saline 1000           



                                                  mL was given for           



                                                  his                 

 

           (unknown)  (no       (unknown)  (unknown)  mcg tablet  (units    (unk

nown)



                    date)                                   unknown)  

 

           (unknown)  (no       (unknown)  (unknown)  mcg tablet  (units    (unk

nown)



                    date)                         (Tab-A-Lucy) unknown)  

 

           (unknown)  (no       (unknown)  (unknown)  metformin 500 mg  (units  

  (unknown)



                    date)                         tablet,extended 500 unknown)  



                                                  mg PO BID 18            

 

           (unknown)  (no       (unknown)  (unknown)  metoprolol  (units    (unk

nown)



                    date)                         succinate 25 mg 25 unknown)  



                                                  mg PO DAILY           



                                                  21           

 

           (unknown)  (no       (unknown)  (unknown)  moving forward.  (units   

 (unknown)



                    date)                         Patient reports unknown)  



                                                  that he has been           



                                                  taking fiber but           



                                                  not any             

 

           (unknown)  (no       (unknown)  (unknown)  multivitamin with  (units 

   (unknown)



                    date)                         folic acid 400 1 unknown)  



                                                  tab PO DAILY #90           



                                                  tabs 22           

 

           (unknown)  (no       (unknown)  (unknown)  negative.  I  (units    (u

nknown)



                    date)                         completed his unknown)  



                                                  urinary             



                                                  catheterization           



                                                  with a coude           



                                                  catheter, urine           

 

           (unknown)  (no       (unknown)  (unknown)  nondistended, no  (units  

  (unknown)



                    date)                         masses, no unknown)  



                                                  exquisite           



                                                  tenderness with           



                                                  exam, no rebound           

 

           (unknown)  (no       (unknown)  (unknown)  not heard it was  (units  

  (unknown)



                    date)                         positive or not. unknown)  



                                                  Patient reports           



                                                  that his primary           



                                                  care                

 

           (unknown)  (no       (unknown)  (unknown)  ondansetron HCl 4  (units 

   (unknown)



                    date)                         mg tablet 4 mg PO unknown)  



                                                  Q8H PRN nausea and           



                                                  21            

 

           (unknown)  (no       (unknown)  (unknown)  oral powder packet  (units

    (unknown)



                    date)                                   unknown)  

 

           (unknown)  (no       (unknown)  (unknown)  output for three  (units  

  (unknown)



                    date)                         days, three days unknown)  



                                                  ago he had nausea           



                                                  and vomiting,           



                                                  denies any           

 

           (unknown)  (no       (unknown)  (unknown)  polyethylene  (units    (u

nknown)



                    date)                         glycol 3350 17 gram unknown)  



                                                  17 gm PO DAILY #90           



                                                  ea 22           

 

           (unknown)  (no       (unknown)  (unknown)  presents to the  (units   

 (unknown)



                    date)                         emergency unknown)  



                                                  department           



                                                  complaining of           



                                                  ongoing fatigue,           



                                                  oliguria,           

 

           (unknown)  (no       (unknown)  (unknown)  provider has left,  (units

    (unknown)



                    date)                         he reports that he unknown)  



                                                  has seen Dr. Macias           



                                                  recently as well.           

 

           (unknown)  (no       (unknown)  (unknown)  quadrant with a  (units   

 (unknown)



                    date)                         complicated unknown)  



                                                  surgical history of           



                                                  his abdomen.  This           



                                                  shows               

 

           (unknown)  (no       (unknown)  (unknown)  recently for his  (units  

  (unknown)



                    date)                         anemia.   unknown)  

 

           (unknown)  (no       (unknown)  (unknown)  related diarrhea  (units  

  (unknown)



                    date)                         from fecal loading unknown)  



                                                  and obstipation.           



                                                  Recommended MiraLax           



                                                  17 g                

 

           (unknown)  (no       (unknown)  (unknown)  release 24 hr  (units    (

unknown)



                    date)                                   unknown)  

 

           (unknown)  (no       (unknown)  (unknown)  reported that  (units    (

unknown)



                    date)                         patient's altered unknown)  



                                                  bowel habit is           



                                                  likely a function           



                                                  of overflow           

 

           (unknown)  (no       (unknown)  (unknown)  sennosides 8.6 mg  (units 

   (unknown)



                    date)                         tablet (senna) 17.2 unknown)  



                                                  mg PO BEDTIME #60           



                                                  tabs 22           

 

           (unknown)  (no       (unknown)  (unknown)  substance use  (units    (

unknown)



                    date)                         type:  does not use unknown)  

 

           (unknown)  (no       (unknown)  (unknown)  tablet (Vitamin C)  (units

    (unknown)



                    date)                                   unknown)  

 

           (unknown)  (no       (unknown)  (unknown)  tablet,extended  (units   

 (unknown)



                    date)                         release 24 hr unknown)  

 

           (unknown)  (no       (unknown)  (unknown)  tenderness  (units    (unk

nown)



                    date)                                   unknown)  

 

           (unknown)  (no       (unknown)  (unknown)  there.  He reports  (units

    (unknown)



                    date)                         that he had an unknown)  



                                                  upper endoscopy           



                                                  completed here at           



                                                  this                

 

           (unknown)  (no       (unknown)  (unknown)  tone      (units    (unkno

wn)



                    date)                                   unknown)  

 

           (unknown)  (no       (unknown)  (unknown)  trospium 20 mg  (units    

(unknown)



                    date)                         tablet 20 mg PO BID unknown)  



                                                  urinary retention           



                                                  22           

 

           (unknown)  (no       (unknown)  (unknown)  unremarkable.?  (units    

(unknown)



                    date)                                   unknown)  

 

           (unknown)  (no       (unknown)  (unknown)  urine dip showed  (units  

  (unknown)



                    date)                         leukocytes, unknown)  



                                                  ketones, wbc's and           



                                                  did not show           



                                                  nitrites.  Blood           

 

           (unknown)  (no       (unknown)  (unknown)  urine is cloudy,  (units  

  (unknown)



                    date)                         yellow,   unknown)  



                                                  approximately 600           



                                                  mL in bladder and           



                                                  drained, no rash           

 

           (unknown)  (no       (unknown)  (unknown)  volumes accentuate  (units

    (unknown)



                    date)                         pulmonary unknown)  



                                                  interstitium and           



                                                  heart size.           

 

           (unknown)  (no       (unknown)  (unknown)  was cloudy yellow  (units 

   (unknown)



                    date)                         urine and 600 mL unknown)  



                                                  was drained.           



                                                  Patient tolerated           



                                                  well.  His           

 

           (unknown)  (no       (unknown)  (unknown)  week ago and  (units    (u

nknown)



                    date)                         dropped it off at unknown)  



                                                  [facility] which was           



                                                  testing for blood           



                                                  but he has           

 

           (unknown)  (no       (unknown)  (unknown)  went to [facility]  (units 

   (unknown)



                    date)                         for rehab following unknown)  



                                                  his admission and           



                                                  has followed up           



                                                  with                

 

           (unknown)  (no       (unknown)  (unknown)  wheezing, stridor,  (units

    (unknown)



                    date)                         or rales. No unknown)  



                                                  retractions or           



                                                  tachypnea.           









                                         Result panel 24









           (unknown)  (no       (unknown)  (unknown)  (no value)  (units    (unk

nown)



                    date)                                   unknown)  

 

           (unknown)  (no       (unknown)  (unknown)  Radiologist  (units    (un

known)



                    date)                         Impression: unknown)  

 

           (unknown)  (no       (unknown)  (unknown)  Date of Service:  (units  

  (unknown)



                    date)                         22  unknown)  

 

           (unknown)  (no       (unknown)  (unknown)  (no value)  (units    (unk

nown)



                    date)                                   unknown)  

 

           (unknown)  (no       (unknown)  (unknown)  22 17:45  (units    

(unknown)



                    date)                                   unknown)  

 

           (unknown)  (no       (unknown)  (unknown)  1 tab PO DAILY  (units    

(unknown)



                    date)                         Qty: 90 0RF unknown)  

 

           (unknown)  (no       (unknown)  (unknown)  1,000 mcg PO DAILY  (units

    (unknown)



                    date)                         Qty: 60 0RF unknown)  

 

           (unknown)  (no       (unknown)  (unknown)  10 mg PO BID  (units    (u

nknown)



                    date)                                   unknown)  

 

           (unknown)  (no       (unknown)  (unknown)  17 gm PO DAILY  (units    

(unknown)



                    date)                         Qty: 90 0RF unknown)  

 

           (unknown)  (no       (unknown)  (unknown)  17.2 mg PO BEDTIME  (units

    (unknown)



                    date)                         Qty: 60 0RF unknown)  

 

           (unknown)  (no       (unknown)  (unknown)  20 mg PO BID  (units    (u

nknown)



                    date)                                   unknown)  

 

           (unknown)  (no       (unknown)  (unknown)  25 mg PO DAILY  (units    

(unknown)



                    date)                                   unknown)  

 

           (unknown)  (no       (unknown)  (unknown)  25 mg PO QPM  (units    (u

nknown)



                    date)                                   unknown)  

 

           (unknown)  (no       (unknown)  (unknown)  300 mg PO BID  (units    (

unknown)



                    date)                                   unknown)  

 

           (unknown)  (no       (unknown)  (unknown)  325 mg PO BIDWM  (units   

 (unknown)



                    date)                         Qty: 60 0RF unknown)  

 

           (unknown)  (no       (unknown)  (unknown)  4 mg PO Q8H PRN  (units   

 (unknown)



                    date)                         (Reason: nausea and unknown)  



                                                  vomiting) Qty: 14           



                                                  0RF                 

 

           (unknown)  (no       (unknown)  (unknown)  40 mg PO QPM  (units    (u

nknown)



                    date)                                   unknown)  

 

           (unknown)  (no       (unknown)  (unknown)  5 mg PO BID Qty:  (units  

  (unknown)



                    date)                         60 0RF    unknown)  

 

           (unknown)  (no       (unknown)  (unknown)  500 mg PO BID  (units    (

unknown)



                    date)                                   unknown)  

 

           (unknown)  (no       (unknown)  (unknown)  500 mg PO BID Qty:  (units

    (unknown)



                    date)                         60 0RF    unknown)  

 

           (unknown)  (no       (unknown)  (unknown)  75 mg PO DAILY  (units    

(unknown)



                    date)                                   unknown)  

 

           (unknown)  (no       (unknown)  (unknown)  Admin: 22  (units   

 (unknown)



                    date)                         20:19  Dose: 50 unknown)  



                                                  mls/hr              

 

           (unknown)  (no       (unknown)  (unknown)  Admin: 22  (units   

 (unknown)



                    date)                         21:06  Dose: 200 unknown)  



                                                  mls/hr              

 

           (unknown)  (no       (unknown)  (unknown)  Allergies  (units    (unkn

own)



                    date)                                   unknown)  

 

           (unknown)  (no       (unknown)  (unknown)  Documented By: [facility]  (units

    (unknown)



                    date)                                   unknown)  

 

           (unknown)  (no       (unknown)  (unknown)  Documented By: AT  (units 

   (unknown)



                    date)                          Co-signed By: TORREY unknown)  

 

           (unknown)  (no       (unknown)  (unknown)  Documented By: AT  (units 

   (unknown)



                    date)                                   unknown)  

 

           (unknown)  (no       (unknown)  (unknown)  Documented By: MLM  (units

    (unknown)



                    date)                           Co-signed By: [facility] unknown)  

 

           (unknown)  (no       (unknown)  (unknown)  Documented By: MLM  (units

    (unknown)



                    date)                                   unknown)  

 

           (unknown)  (no       (unknown)  (unknown)  ED Orders  (units    (unkn

own)



                    date)                                   unknown)  

 

           (unknown)  (no       (unknown)  (unknown)  Emergency Report  (units  

  (unknown)



                    date)                                   unknown)  

 

           (unknown)  (no       (unknown)  (unknown)  Home Medications  (units  

  (unknown)



                    date)                                   unknown)  

 

           (unknown)  (no       (unknown)  (unknown)  [facility]  (units   

 (unknown)



                    date)                         [address] [place] [address] unknown)  



                                                  [place] [address]           

 

           (unknown)  (no       (unknown)  (unknown)  Lab Results  (units    (un

known)



                    date)                                   unknown)  

 

           (unknown)  (no       (unknown)  (unknown)  Label Comments:  (units   

 (unknown)



                    date)                                   unknown)  

 

           (unknown)  (no       (unknown)  (unknown)  Last Admin:  (units    (un

known)



                    date)                         22 18:30 unknown)  



                                                  Dose: 4 mg           

 

           (unknown)  (no       (unknown)  (unknown)  Last Admin:  (units    (un

known)



                    date)                         22 20:18 unknown)  



                                                  Dose: 1,000 mls/hr           

 

           (unknown)  (no       (unknown)  (unknown)  Last Infusion:  (units    

(unknown)



                    date)                         22 21:10 unknown)  



                                                  Dose: 0 mls/hr           

 

           (unknown)  (no       (unknown)  (unknown)  Last Infusion:  (units    

(unknown)



                    date)                         22 21:28 unknown)  



                                                  Dose: 0 mls/hr           

 

           (unknown)  (no       (unknown)  (unknown)  Previous Rx's  (units    (

unknown)



                    date)                                   unknown)  

 

           (unknown)  (no       (unknown)  (unknown)  Stop: 22  (units    

(unknown)



                    date)                         18:16     unknown)  

 

           (unknown)  (no       (unknown)  (unknown)  Stop: 22  (units    

(unknown)



                    date)                         20:15     unknown)  

 

           (unknown)  (no       (unknown)  (unknown)  Stop: 22  (units    

(unknown)



                    date)                         20:25     unknown)  

 

           (unknown)  (no       (unknown)  (unknown)  Stop: 22  (units    

(unknown)



                    date)                         20:27     unknown)  

 

           (unknown)  (no       (unknown)  (unknown)  Urine Dip  (units    (unkn

own)



                    date)                                   unknown)  

 

           (unknown)  (no       (unknown)  (unknown)  Vital Signs - 8 hr  (units

    (unknown)



                    date)                                   unknown)  

 

           (unknown)  (no       (unknown)  (unknown)  has not been  (units    (u

nknown)



                    date)                         eating so hasn't unknown)  



                                                  taken recently.           



                                                  spouse states           



                                                  thinks it might           

 

           (unknown)  (no       (unknown)  (unknown)  stopped taking  (units    

(unknown)



                    date)                         prior to back unknown)  



                                                  surgery and did not           



                                                  restart             

 

           (unknown)  (no       (unknown)  (unknown)  (no value)  (units    (unk

nown)



                    date)                                   unknown)  

 

           (unknown)  (no       (unknown)  (unknown)  22  (units 

   (unknown)



                    date)                         22  unknown)  



                                                  Range/Units           

 

           (unknown)  (no       (unknown)  (unknown)  17:40 17:45 17:45  (units 

   (unknown)



                    date)                                   unknown)  

 

           (unknown)  (no       (unknown)  (unknown)  17:45 17:45 20:09  (units 

   (unknown)



                    date)                                   unknown)  

 

           (unknown)  (no       (unknown)  (unknown)  ascorbic acid  (units    (

unknown)



                    date)                         (vitamin C) unknown)  



                                                  [Vitamin C] 500 mg           



                                                  Tablet              

 

           (unknown)  (no       (unknown)  (unknown)  atorvastatin 80 mg  (units

    (unknown)



                    date)                         tablet    unknown)  

 

           (unknown)  (no       (unknown)  (unknown)  buspirone 5 mg  (units    

(unknown)



                    date)                         Tablet    unknown)  

 

           (unknown)  (no       (unknown)  (unknown)  clopidogrel 75 mg  (units 

   (unknown)



                    date)                         tablet    unknown)  

 

           (unknown)  (no       (unknown)  (unknown)  cyanocobalamin  (units    

(unknown)



                    date)                         (vitamin B-12) 500 unknown)  



                                                  mcg Tablet           

 

           (unknown)  (no       (unknown)  (unknown)  ferrous sulfate  (units   

 (unknown)



                    date)                         325 mg (65 mg iron) unknown)  



                                                  Tablet              

 

           (unknown)  (no       (unknown)  (unknown)  gabapentin 300 mg  (units 

   (unknown)



                    date)                         capsule   unknown)  

 

           (unknown)  (no       (unknown)  (unknown)  glipizide 10 mg  (units   

 (unknown)



                    date)                         tablet    unknown)  

 

           (unknown)  (no       (unknown)  (unknown)  losartan 50 mg  (units    

(unknown)



                    date)                         tablet    unknown)  

 

           (unknown)  (no       (unknown)  (unknown)  metformin 500 mg  (units  

  (unknown)



                    date)                         tablet extended unknown)  



                                                  release 24 hr           

 

           (unknown)  (no       (unknown)  (unknown)  metoprolol  (units    (unk

nown)



                    date)                         succinate 25 mg unknown)  



                                                  tablet extended           



                                                  release 24 hr           

 

           (unknown)  (no       (unknown)  (unknown)  multivitamin with  (units 

   (unknown)



                    date)                         folic acid unknown)  



                                                  [Tab-A-Lucy] 400           



                                                  mcg Tablet           

 

           (unknown)  (no       (unknown)  (unknown)  ondansetron HCl  (units   

 (unknown)



                    date)                         [Zofran] 4 mg unknown)  



                                                  tablet              

 

           (unknown)  (no       (unknown)  (unknown)  polyethylene  (units    (u

nknown)



                    date)                         glycol 3350 17 gram unknown)  



                                                  Powder In Packet           

 

           (unknown)  (no       (unknown)  (unknown)  sennosides [senna]  (units

    (unknown)



                    date)                         8.6 mg Tablet unknown)  

 

           (unknown)  (no       (unknown)  (unknown)  trospium 20 mg  (units    

(unknown)



                    date)                         tablet    unknown)  

 

           (unknown)  (no       (unknown)  (unknown)  22  (units    (unkno

wn)



                    date)                                   unknown)  

 

           (unknown)  (no       (unknown)  (unknown)  1.07 in January.  (units  

  (unknown)



                    date)                         No elevation in his unknown)  



                                                  troponin,           



                                                  procalcitonin is           



                                                  elevated at           

 

           (unknown)  (no       (unknown)  (unknown)  Anemia,   (units    (unkno

wn)



                    date)                         Hypomagnesemia, unknown)  



                                                  Pyelonephritis           

 

           (unknown)  (no       (unknown)  (unknown)  Medication  (units    (unk

nown)



                    date)                         Instructions unknown)  



                                                  Recorded            

 

           (unknown)  (no       (unknown)  (unknown)  Medication  (units    (unk

nown)



                    date)                         Instructions unknown)  



                                                  Recorded  Confirmed           

 

           (unknown)  (no       (unknown)  (unknown)  sodium 135,  (units    (un

known)



                    date)                         potassium 3.7, unknown)  



                                                  creatinine 1.42           



                                                  which is increased           



                                                  from his prior of           

 

           (unknown)  (no       (unknown)  (unknown)  with regular axis  (units 

   (unknown)



                    date)                         and intervals.  No unknown)  



                                                  STEMI, ST segment           



                                                  changes,            



                                                  arrhythmia, or           

 

           (unknown)  (no       (unknown)  (unknown)  work is   (units    (unkno

wn)



                    date)                         significant for unknown)  



                                                  anemia, hemoglobin           



                                                  is 9.1 today, down           



                                                  from 9.6 in           

 

           (unknown)  (no       (unknown)  (unknown)  (Zofran) vomiting  (units 

   (unknown)



                    date)                         #14 tabs  unknown)  

 

           (unknown)  (no       (unknown)  (unknown)  0.72, his lactate  (units 

   (unknown)



                    date)                         is also elevated at unknown)  



                                                  2.6, magnesium is           



                                                  low at 1.0.  His           

 

           (unknown)  (no       (unknown)  (unknown)  [id number]   (units    (unkno

wn)



                    date)                                   unknown)  

 

           (unknown)  (no       (unknown)  (unknown)  22 17:39  (units    

(unknown)



                    date)                                   unknown)  

 

           (unknown)  (no       (unknown)  (unknown)  22 17:40  (units    

(unknown)



                    date)                                   unknown)  

 

           (unknown)  (no       (unknown)  (unknown)  22 17:45  (units    

(unknown)



                    date)                                   unknown)  

 

           (unknown)  (no       (unknown)  (unknown)  22 19:26  (units    

(unknown)



                    date)                                   unknown)  

 

           (unknown)  (no       (unknown)  (unknown)  22 20:09  (units    

(unknown)



                    date)                                   unknown)  

 

           (unknown)  (no       (unknown)  (unknown)  22 20:55  (units    

(unknown)



                    date)                                   unknown)  

 

           (unknown)  (no       (unknown)  (unknown)  17:34     (units    (unkno

wn)



                    date)                                   unknown)  

 

           (unknown)  (no       (unknown)  (unknown)  with iron  (units    (

unknown)



                    date)                         deficiency anemia, unknown)  



                                                  altered bowel           



                                                  habit, personal           



                                                  history of colon           

 

           (unknown)  (no       (unknown)  (unknown)  62-year-old  (units    (un

known)



                    date)                         gentleman with a unknown)  



                                                  history of multiple           



                                                  sclerosis, CVA in           



                                                  2019,               

 

           (unknown)  (no       (unknown)  (unknown)  ?         (units    (unkno

wn)



                    date)                                   unknown)  

 

           (unknown)  (no       (unknown)  (unknown)  ALT     (<50)  (units    (

unknown)



                    date)                         IU/L      unknown)  

 

           (unknown)  (no       (unknown)  (unknown)  ALT    16  (<50)  (units  

  (unknown)



                    date)                         IU/L      unknown)  

 

           (unknown)  (no       (unknown)  (unknown)  AST     (17-59)  (units   

 (unknown)



                    date)                         IU/L      unknown)  

 

           (unknown)  (no       (unknown)  (unknown)  AST    19  (17-59)  (units

    (unknown)



                    date)                          IU/L     unknown)  

 

           (unknown)  (no       (unknown)  (unknown)  Age/Sex: 62 / M  (units   

 (unknown)



                    date)                                   unknown)  

 

           (unknown)  (no       (unknown)  (unknown)  Albumin   (units    (unkno

wn)



                    date)                         (3.5-5.0)  g/dL unknown)  

 

           (unknown)  (no       (unknown)  (unknown)  Albumin    3.9  (units    

(unknown)



                    date)                         (3.5-5.0)  g/dL unknown)  

 

           (unknown)  (no       (unknown)  (unknown)  Albumin/Globulin  (units  

  (unknown)



                    date)                         Ratio     (1.0-2.8) unknown)  

 

           (unknown)  (no       (unknown)  (unknown)  Albumin/Globulin  (units  

  (unknown)



                    date)                         Ratio    1.1 unknown)  



                                                  (1.0-2.8)           

 

           (unknown)  (no       (unknown)  (unknown)  Alkaline  (units    (unkno

wn)



                    date)                         Phosphatase unknown)  



                                                  ()  U/L           

 

           (unknown)  (no       (unknown)  (unknown)  Alkaline  (units    (unkno

wn)



                    date)                         Phosphatase    105 unknown)  



                                                  ()  U/L           

 

           (unknown)  (no       (unknown)  (unknown)  Allergy/AdvReac  (units   

 (unknown)



                    date)                         Type Severity unknown)  



                                                  Reaction Status           



                                                  Date / Time           

 

           (unknown)  (no       (unknown)  (unknown)  Amorphous Sediment  (units

    (unknown)



                    date)                                   unknown)  

 

           (unknown)  (no       (unknown)  (unknown)  Amorphous Sediment  (units

    (unknown)



                    date)                            2+     unknown)  

 

           (unknown)  (no       (unknown)  (unknown)  Approved by: Garfield Mcdanielsunits 

   (unknown)



                    date)                         SHANNAN Lopez on unknown)  



                                                  2022 at 18:02?           

 

           (unknown)  (no       (unknown)  (unknown)  BUN     (9-20)  (units    

(unknown)



                    date)                         mg/dL     unknown)  

 

           (unknown)  (no       (unknown)  (unknown)  BUN    21 H  (units    (un

known)



                    date)                         (9-20)  mg/dL unknown)  

 

           (unknown)  (no       (unknown)  (unknown)  BUN/Creatinine  (units    

(unknown)



                    date)                         Ratio     (6-22) unknown)  

 

           (unknown)  (no       (unknown)  (unknown)  BUN/Creatinine  (units    

(unknown)



                    date)                         Ratio    14.8 unknown)  



                                                  (6-22)              

 

           (unknown)  (no       (unknown)  (unknown)  Baso # (Auto)  (units    (

unknown)



                    date)                         (0-100)  /uL unknown)  

 

           (unknown)  (no       (unknown)  (unknown)  Baso # (Auto)   0  (units 

   (unknown)



                    date)                          (0-100)  /uL unknown)  

 

           (unknown)  (no       (unknown)  (unknown)  Baso % (Auto)  (units    (

unknown)



                    date)                         (0-2)  %  unknown)  

 

           (unknown)  (no       (unknown)  (unknown)  Baso % (Auto)  (units    (

unknown)



                    date)                         0.2   (0-2)  % unknown)  

 

           (unknown)  (no       (unknown)  (unknown)  Bedside Urine  (units    (

unknown)



                    date)                         Bilirubin        - unknown)  



                                                  Negative            

 

           (unknown)  (no       (unknown)  (unknown)  Bedside Urine  (units    (

unknown)



                    date)                         Glucose    Negative unknown)  

 

           (unknown)  (no       (unknown)  (unknown)  Bedside Urine  (units    (

unknown)



                    date)                         Ketone    - unknown)  



                                                  Negative            

 

           (unknown)  (no       (unknown)  (unknown)  Bedside Urine  (units    (

unknown)



                    date)                         Leukocytes       + unknown)  



                                                  70                  

 

           (unknown)  (no       (unknown)  (unknown)  Bedside Urine  (units    (

unknown)



                    date)                         Nitrite    - unknown)  



                                                  Negative            

 

           (unknown)  (no       (unknown)  (unknown)  Bedside Urine  (units    (

unknown)



                    date)                         Occult Blood     ++ unknown)  

 

           (unknown)  (no       (unknown)  (unknown)  Bedside Urine  (units    (

unknown)



                    date)                         Protein    + 30 unknown)  

 

           (unknown)  (no       (unknown)  (unknown)  Bedside Urine  (units    (

unknown)



                    date)                         Urobilinogen     - unknown)  



                                                  Negative            

 

           (unknown)  (no       (unknown)  (unknown)  Bedside Urine pH  (units  

  (unknown)



                    date)                          6.0      unknown)  

 

           (unknown)  (no       (unknown)  (unknown)  Blood Culture Stat  (units

    (unknown)



                    date)                                   unknown)  

 

           (unknown)  (no       (unknown)  (unknown)  Blood Pressure  (units    

(unknown)



                    date)                         124/66   22 unknown)  



                                                  17:34               

 

           (unknown)  (no       (unknown)  (unknown)  Blood Pressure  (units    

(unknown)



                    date)                         124/66    unknown)  

 

           (unknown)  (no       (unknown)  (unknown)  Bones and chest  (units   

 (unknown)



                    date)                         wall:? No unknown)  



                                                  suspicious bony           



                                                  lesions.? Overlying           



                                                  soft tissues           

 

           (unknown)  (no       (unknown)  (unknown)  CK-MB (CK-2)  (units    (u

nknown)



                    date)                                   unknown)  

 

           (unknown)  (no       (unknown)  (unknown)  CK-MB (CK-2)  (units    (u

nknown)



                    date)                         TNP       unknown)  

 

           (unknown)  (no       (unknown)  (unknown)  CK-MB (CK-2) Rel  (units  

  (unknown)



                    date)                         Index     unknown)  

 

           (unknown)  (no       (unknown)  (unknown)  CK-MB (CK-2) Rel  (units  

  (unknown)



                    date)                         Index    TNP unknown)  

 

           (unknown)  (no       (unknown)  (unknown)  COMPARISON:?  (units    (u

nknown)



                    date)                         [facility], unknown)  



                                                  CR, XR CHEST 2V,           



                                                  3/18/2021, 10:19.           

 

           (unknown)  (no       (unknown)  (unknown)  COVID19 -Nasal  (units    

(unknown)



                    date)                         RAPID/Pre-Proc Stat unknown)  

 

           (unknown)  (no       (unknown)  (unknown)  CT abdomen pelvis  (units 

   (unknown)



                    date)                         w con Stat unknown)  

 

           (unknown)  (no       (unknown)  (unknown)  CT abdomen pelvis  (units 

   (unknown)



                    date)                         was ordered for unknown)  



                                                  patient has           



                                                  tenderness to his           



                                                  right lower           

 

           (unknown)  (no       (unknown)  (unknown)  CVA (cerebral  (units    (

unknown)



                    date)                         vascular accident) unknown)  



                                                  (2018)           

 

           (unknown)  (no       (unknown)  (unknown)  Calcium   (units    (unkno

wn)



                    date)                         (8.4-10.2)  mg/dL unknown)  

 

           (unknown)  (no       (unknown)  (unknown)  Calcium    9.3  (units    

(unknown)



                    date)                         (8.4-10.2)  mg/dL unknown)  

 

           (unknown)  (no       (unknown)  (unknown)  Carbon Dioxide  (units    

(unknown)



                    date)                         (22-32)  mmol/L unknown)  

 

           (unknown)  (no       (unknown)  (unknown)  Carbon Dioxide  (units    

(unknown)



                    date)                         29  (22-32)  mmol/L unknown)  

 

           (unknown)  (no       (unknown)  (unknown)  Cardio: denies  (units    

(unknown)



                    date)                         chest pain, unknown)  



                                                  palpitations           

 

           (unknown)  (no       (unknown)  (unknown)  Cardiovascular:  (units   

 (unknown)



                    date)                         regular rate and unknown)  



                                                  rhythm, no           



                                                  peripheral edema,           



                                                  warm extremities           

 

           (unknown)  (no       (unknown)  (unknown)  Ceftriaxone Sodium  (units

    (unknown)



                    date)                         1,000 mg/ (Sodium unknown)  



                                                  Chloride)  100 mls           



                                                  @ 200 mls/hr IV NOW           



                                                  ONE                 

 

           (unknown)  (no       (unknown)  (unknown)  Chest x-ray:  (units    (u

nknown)



                    date)                                   unknown)  

 

           (unknown)  (no       (unknown)  (unknown)  Chief complaint:  (units  

  (unknown)



                    date)                         Weakness  unknown)  

 

           (unknown)  (no       (unknown)  (unknown)  Chloride  (units    (unkno

wn)



                    date)                         ()  mmol/L unknown)  

 

           (unknown)  (no       (unknown)  (unknown)  Chloride    96 L  (units  

  (unknown)



                    date)                         ()  mmol/L unknown)  

 

           (unknown)  (no       (unknown)  (unknown)  Clinical  (units    (o

wn)



                    date)                         Impression: unknown)  

 

           (unknown)  (no       (unknown)  (unknown)  Colon cancer  (units    (u

nknown)



                    date)                         (2013)    unknown)  

 

           (unknown)  (no       (unknown)  (unknown)  Complete Blood  (units    

(unknown)



                    date)                         Count AUTO DIFF unknown)  



                                                  Stat                

 

           (unknown)  (no       (unknown)  (unknown)  Comprehensive  (units    (

unknown)



                    date)                         Metabolic Panel unknown)  



                                                  Stat                

 

           (unknown)  (no       (unknown)  (unknown)  Course    (units    (o

wn)



                    date)                                   unknown)  

 

           (unknown)  (no       (unknown)  (unknown)  Creatinine  (units    (unk

nown)



                    date)                         (0.66-1.25)  mg/dL unknown)  

 

           (unknown)  (no       (unknown)  (unknown)  Creatinine    1.42  (units

    (unknown)



                    date)                         H  (0.66-1.25) unknown)  



                                                  mg/dL               

 

           (unknown)  (no       (unknown)  (unknown)  : 1960  (units   

 (unknown)



                    date)                         Acct:[id number] unknown)  

 

           (unknown)  (no       (unknown)  (unknown)  Departure  (units    (unkn

own)



                    date)                                   unknown)  

 

           (unknown)  (no       (unknown)  (unknown)  Depression  (units    (unk

nown)



                    date)                                   unknown)  

 

           (unknown)  (no       (unknown)  (unknown)  Diabetes  (units    (unkno

wn)



                    date)                                   unknown)  

 

           (unknown)  (no       (unknown)  (unknown)  Diabetic  (units    (unkno

wn)



                    date)                         neuropathy unknown)  

 

           (unknown)  (no       (unknown)  (unknown)  Discharge Plan  (units    

(unknown)



                    date)                                   unknown)  

 

           (unknown)  (no       (unknown)  (unknown)  Discontinued  (units    (u

nknown)



                    date)                         Medications unknown)  

 

           (unknown)  (no       (unknown)  (unknown)  Gustavo Macias MD  (units   

 (unknown)



                    date)                         [Primary Care unknown)  



                                                  Provider] -           

 

           (unknown)  (no       (unknown)  (unknown)  ECG Data  (units    (unkno

wn)



                    date)                                   unknown)  

 

           (unknown)  (no       (unknown)  (unknown) EKG independently  (units  

  (unknown)



                    date)                         reviewed by Dr. mejia)  



                                                  Dinesh and reveals           



                                                  normal sinus rhythm           



                                                  at 72 bpm           

 

           (unknown)  (no       (unknown)  (unknown)  EKG-12 Lead Stat  (units  

  (unknown)



                    date)                                   unknown)  

 

           (unknown)  (no       (unknown)  (unknown)  ER Physician:  (units    (

unknown)



                    date)                         Allegra Montiel D.O. unknown)  

 

           (unknown)  (no       (unknown)  (unknown)  Eos # (Auto)  (units    (u

nknown)



                    date)                         (0-450)  /uL unknown)  

 

           (unknown)  (no       (unknown)  (unknown)  Eos # (Auto)   100  (units

    (unknown)



                    date)                           (0-450)  /uL unknown)  

 

           (unknown)  (no       (unknown)  (unknown)  Eos % (Auto)  (units    (u

nknown)



                    date)                         (2-4)  %  unknown)  

 

           (unknown)  (no       (unknown)  (unknown)  Eos % (Auto)   0.8  (units

    (unknown)



                    date)                         L   (2-4)  % unknown)  

 

           (unknown)  (no       (unknown)  (unknown)  Esterase  (units    (unkno

wn)



                    date)                                   unknown)  

 

           (unknown)  (no       (unknown)  (unknown)  Estimated GFR  (units    (

unknown)



                    date)                         (>60)  mL/min unknown)  

 

           (unknown)  (no       (unknown)  (unknown)  Estimated GFR  (units    (

unknown)



                    date)                         55 L  (>60)  mL/min unknown)  

 

           (unknown)  (no       (unknown)  (unknown)  Exam      (units    (unkno

wn)



                    date)                                   unknown)  

 

           (unknown)  (no       (unknown)  (unknown)  Exam Narrative:  (units   

 (unknown)



                    date)                                   unknown)  

 

           (unknown)  (no       (unknown)  (unknown)  Eyes: denies  (units    (u

nknown)



                    date)                         visual changes, eye unknown)  



                                                  pain                

 

           (unknown)  (no       (unknown)  (unknown)  Eyes: equal round  (units 

   (unknown)



                    date)                         and reactive, EOMI, unknown)  



                                                  conjunctiva normal           

 

           (unknown)  (no       (unknown)  (unknown)  FINDINGS:?  (units    (unk

nown)



                    date)                                   unknown)  

 

           (unknown)  (no       (unknown)  (unknown)  Family History  (units    

(unknown)



                    date)                         (Reviewed 22 unknown)  



                                                  @ 19:57 by Kitty Costello [facility])           

 

           (unknown)  (no       (unknown)  (unknown)  Father     (units 

   (unknown)



                    date)                          Cancer   unknown)  

 

           (unknown)  (no       (unknown)  (unknown)  GERD      (units    (unkno

wn)



                    date)                         (gastroesophageal unknown)  



                                                  reflux disease)           

 

           (unknown)  (no       (unknown)  (unknown)  GI:  Right lower  (units  

  (unknown)



                    date)                         quadrant tenderness unknown)  



                                                  to palpation           

 

           (unknown)  (no       (unknown)  (unknown)  GI: abdomen mildly  (units

    (unknown)



                    date)                         guarded, tender to unknown)  



                                                  palpation in the           



                                                  right lower           



                                                  quadrant,           

 

           (unknown)  (no       (unknown)  (unknown)  :  Straight cath  (units

    (unknown)



                    date)                         for urine completed unknown)  



                                                  by myself with 16           



                                                  [place] coude           



                                                  catheter,           

 

           (unknown)  (no       (unknown)  (unknown)  : denies  (units    (unk

nown)



                    date)                         dysuria, hematuria unknown)  



                                                  or flank pain,           



                                                  reports oliguria,           



                                                  patient self           

 

           (unknown)  (no       (unknown)  (unknown)  Gastroenterology  (units  

  (unknown)



                    date)                         from [place], unknown)  



                                                  patient reports           



                                                  that he sees Yasmine Rocha PA-C           

 

           (unknown)  (no       (unknown)  (unknown)  General   (units    (unkno

wn)



                    date)                                   unknown)  

 

           (unknown)  (no       (unknown)  (unknown)  General:  (units    (unkno

wn)



                    date)                         cooperative, unknown)  



                                                  comfortable, in no           



                                                  acute distress,           



                                                  well groomed,           

 

           (unknown)  (no       (unknown)  (unknown)  General: denies  (units   

 (unknown)



                    date)                         fever, chills, unknown)  



                                                  endorses weakness,           



                                                  right lower           



                                                  quadrant pain,           

 

           (unknown)  (no       (unknown)  (unknown)  GenericComposite[P  (units

    (unknown)



                    date)                         lt Count  unknown)  



                                                  (150-400)  X10^3/uL           



                                                   ]                  

 

           (unknown)  (no       (unknown)  (unknown)  GenericComposite[P  (units

    (unknown)



                    date)                         lt Count   219 unknown)  



                                                  (150-400)  X10^3/uL           



                                                   ]                  

 

           (unknown)  (no       (unknown)  (unknown)  GenericComposite[R  (units

    (unknown)



                    date)                         BC     (4.5-5.9) unknown)  



                                                  X10^6/uL  ]           

 

           (unknown)  (no       (unknown)  (unknown)  GenericComposite[R  (units

    (unknown)



                    date)                         BC   3.37 L unknown)  



                                                  (4.5-5.9)  X10^6/uL           



                                                   ]                  

 

           (unknown)  (no       (unknown)  (unknown)  GenericComposite[W  (units

    (unknown)



                    date)                         BC     (4.5-11.0) unknown)  



                                                  X10^3/uL  ]           

 

           (unknown)  (no       (unknown)  (unknown)  GenericComposite[W  (units

    (unknown)



                    date)                         BC   7.0  unknown)  



                                                  (4.5-11.0)           



                                                  X10^3/uL  ]           

 

           (unknown)  (no       (unknown)  (unknown)  Globulin  (units    (unkno

wn)



                    date)                         (1.7-4.1)  g/dL unknown)  

 

           (unknown)  (no       (unknown)  (unknown)  Globulin    3.6  (units   

 (unknown)



                    date)                         (1.7-4.1)  g/dL unknown)  

 

           (unknown)  (no       (unknown)  (unknown)  Glucose   (units    (unkno

wn)



                    date)                         ()  mg/dL unknown)  

 

           (unknown)  (no       (unknown)  (unknown)  Glucose    262 H  (units  

  (unknown)



                    date)                         ()  mg/dL unknown)  

 

           (unknown)  (no       (unknown)  (unknown)  Guaiac stool:  (units    (

unknown)



                    date)                         Negative, good unknown)  



                                                  stool result           

 

           (unknown)  (no       (unknown)  (unknown)  H/O colectomy  (units    (

unknown)



                    date)                                   unknown)  

 

           (unknown)  (no       (unknown)  (unknown)  HPI - Weakness  (units    

(unknown)



                    date)                                   unknown)  

 

           (unknown)  (no       (unknown)  (unknown)  HPI Narrative:  (units    

(unknown)



                    date)                                   unknown)  

 

           (unknown)  (no       (unknown)  (unknown)  Hct     (41-53)  %  (units

    (unknown)



                    date)                                   unknown)  

 

           (unknown)  (no       (unknown)  (unknown)  Hct   26.7 L  (units    (u

nknown)



                    date)                         (41-53)  % unknown)  

 

           (unknown)  (no       (unknown)  (unknown)  He does not have  (units  

  (unknown)



                    date)                         any mental status unknown)  



                                                  changes, GCS is 15.           



                                                   Guaiac stool is           

 

           (unknown)  (no       (unknown)  (unknown)  Head/Neck:  (units    (unk

nown)



                    date)                         Endorses having a unknown)  



                                                  headache, denies           



                                                  any neck pain           

 

           (unknown)  (no       (unknown)  (unknown)  Head: atraumatic,  (units 

   (unknown)



                    date)                         symmetrical facial unknown)  



                                                  expressions           

 

           (unknown)  (no       (unknown)  (unknown)  Hgb       (units    (unkno

wn)



                    date)                         (13.5-17.5)  g/dL unknown)  

 

           (unknown)  (no       (unknown)  (unknown)  Hgb   9.1 L  (units    (un

known)



                    date)                         (13.5-17.5)  g/dL unknown)  

 

           (unknown)  (no       (unknown)  (unknown)  History of Present  (units

    (unknown)



                    date)                         Illness   unknown)  

 

           (unknown)  (no       (unknown)  (unknown)  History of lumbar  (units 

   (unknown)



                    date)                         surgery () unknown)  

 

           (unknown)  (no       (unknown)  (unknown)  Hx of foot surgery  (units

    (unknown)



                    date)                         (2017)    unknown)  

 

           (unknown)  (no       (unknown)  (unknown)  Hx of shoulder  (units    

(unknown)



                    date)                         surgery () unknown)  

 

           (unknown)  (no       (unknown)  (unknown)  Hypertension  (units    (u

nknown)



                    date)                                   unknown)  

 

           (unknown)  (no       (unknown)  (unknown)  IMPRESSION:? No  (units   

 (unknown)



                    date)                         acute     unknown)  



                                                  cardiopulmonary           



                                                  findings            

 

           (unknown)  (no       (unknown)  (unknown)  INDICATIONS:?  (units    (

unknown)



                    date)                         chest pain unknown)  

 

           (unknown)  (no       (unknown)  (unknown)  Imaging Data  (units    (u

nknown)



                    date)                                   unknown)  

 

           (unknown)  (no       (unknown)  (unknown)  Independently  (units    (

unknown)



                    date)                         reviewed vitals unknown)  



                                                  signs and nursing           



                                                  notes.              

 

           (unknown)  (no       (unknown)  (unknown)  Initial Vital  (units    (

unknown)



                    date)                         Signs     unknown)  

 

           (unknown)  (no       (unknown)  (unknown)  Initial Vital  (units    (

unknown)



                    date)                         Signs:    unknown)  

 

           (unknown)  (no       (unknown)  (unknown)  Instructions:  (units    (

unknown)



                    date)                         Acute Cystitis, unknown)  



                                                  Anemia              

 

           (unknown)  (no       (unknown)  (unknown)  Interpretation:  (units   

 (unknown)



                    date)                                   unknown)  

 

           (unknown)  (no       (unknown)  (unknown) January, hematocrit  (units

    (unknown)



                    date)                         is 26.7, down from unknown)  



                                                  27.8 also in           



                                                  January, platelet           



                                                  count 219,           

 

           (unknown)  (no       (unknown)  (unknown) Klebsiella  (units    (unkn

own)



                    date)                         pneumoniae which unknown)  



                                                  was resistant only           



                                                  to ampicillin and           



                                                  nitrofurantoin.           

 

           (unknown)  (no       (unknown)  (unknown)  Lab Data  (units    (unkno

wn)



                    date)                                   unknown)  

 

           (unknown)  (no       (unknown)  (unknown)  Labs:     (units    (unkno

wn)



                    date)                                   unknown)  

 

           (unknown)  (no       (unknown)  (unknown)  Lactate   (units    (unkno

wn)



                    date)                         (0.7-2.1)  mmol/L unknown)  

 

           (unknown)  (no       (unknown)  (unknown)  Lactate   2.6 H  (units   

 (unknown)



                    date)                         (0.7-2.1)  mmol/L unknown)  

 

           (unknown)  (no       (unknown)  (unknown)  Lactate (Lactic  (units   

 (unknown)



                    date)                         Acid) Stat unknown)  

 

           (unknown)  (no       (unknown)  (unknown)  Lipase    (units    (unkno

wn)



                    date)                         ()  U/L unknown)  

 

           (unknown)  (no       (unknown)  (unknown)  Lipase    32  (units    (u

nknown)



                    date)                         ()  U/L unknown)  

 

           (unknown)  (no       (unknown)  (unknown)  Lipase Stat  (units    (un

known)



                    date)                                   unknown)  

 

           (unknown)  (no       (unknown)  (unknown)  Lungs and pleura:?  (units

    (unknown)



                    date)                         Lungs are clear.? unknown)  



                                                  No pleural           



                                                  effusions or           



                                                  pneumothorax.? Low           

 

           (unknown)  (no       (unknown)  (unknown)  Lymph # (Auto)  (units    

(unknown)



                    date)                         (3531-4118)  /uL unknown)  

 

           (unknown)  (no       (unknown)  (unknown)  Lymph # (Auto)  (units    

(unknown)



                    date)                         400 L   (7866-8410) unknown)  



                                                   /uL                

 

           (unknown)  (no       (unknown)  (unknown)  Lymph % (Auto)  (units    

(unknown)



                    date)                         (25-40)  % unknown)  

 

           (unknown)  (no       (unknown)  (unknown)  Lymph % (Auto)  (units    

(unknown)



                    date)                         6.4 L   (25-40)  % unknown)  

 

           (unknown)  (no       (unknown)  (unknown)  MCH     (26-34)  (units   

 (unknown)



                    date)                         PG        unknown)  

 

           (unknown)  (no       (unknown)  (unknown)  MCH   26.9  (units    (unk

nown)



                    date)                         (26-34)  PG unknown)  

 

           (unknown)  (no       (unknown)  (unknown)  MCHC     (30-36)  (units  

  (unknown)



                    date)                         %         unknown)  

 

           (unknown)  (no       (unknown)  (unknown)  MCHC   34.0  (units    (un

known)



                    date)                         (30-36)  % unknown)  

 

           (unknown)  (no       (unknown)  (unknown)  MCV     ()  (units  

  (unknown)



                    date)                         fL        unknown)  

 

           (unknown)  (no       (unknown)  (unknown)  MCV   79.1 L  (units    (u

nknown)



                    date)                         ()  fL unknown)  

 

           (unknown)  (no       (unknown)  (unknown)  MDM - Weakness  (units    

(unknown)



                    date)                                   unknown)  

 

           (unknown)  (no       (unknown)  (unknown)  MDM Narrative  (units    (

unknown)



                    date)                                   unknown)  

 

           (unknown)  (no       (unknown)  (unknown)  MSK: denies new  (units   

 (unknown)



                    date)                         joint pain, muscle unknown)  



                                                  weakness or           



                                                  swelling            

 

           (unknown)  (no       (unknown)  (unknown) MSK: moves all  (units    (

unknown)



                    date)                         extremities, unknown)  



                                                  neurovascularly           



                                                  intact, generalized           



                                                  weakness, normal           

 

           (unknown)  (no       (unknown)  (unknown)  Magnesium  (units    (unkn

own)



                    date)                         (1.6-2.3)  mg/dL unknown)  

 

           (unknown)  (no       (unknown)  (unknown)  Magnesium    1.0 L  (units

    (unknown)



                    date)                          (1.6-2.3)  mg/dL unknown)  

 

           (unknown)  (no       (unknown)  (unknown)  Magnesium Stat  (units    

(unknown)



                    date)                                   unknown)  

 

           (unknown)  (no       (unknown)  (unknown)  Magnesium Sulfate  (units 

   (unknown)



                    date)                         (Magnesium Sulfate) unknown)  



                                                   2 gm in 50 mls @           



                                                  50 mls/hr IV NOW           



                                                  ONE                 

 

           (unknown)  (no       (unknown)  (unknown)  Mediastinum:?  (units    (

unknown)



                    date)                         Mediastinal unknown)  



                                                  contours appear           



                                                  normal.? Heart size           



                                                  is normal.?           

 

           (unknown)  (no       (unknown)  (unknown)  Medical History  (units   

 (unknown)



                    date)                         (Reviewed 22 unknown)  



                                                  @ 19:57 by Kitty Costello [facility])           

 

           (unknown)  (no       (unknown)  (unknown)  Medical decision  (units  

  (unknown)



                    date)                         making narrative: unknown)  

 

           (unknown)  (no       (unknown)  (unknown)  MiraLax since this  (units

    (unknown)



                    date)                         happened. unknown)  

 

           (unknown)  (no       (unknown)  (unknown)  Mode of arrival:  (units  

  (unknown)



                    date)                         Wheelchair unknown)  

 

           (unknown)  (no       (unknown)  (unknown)  Mono # (Auto)  (units    (

unknown)



                    date)                         (0-900)  /uL unknown)  

 

           (unknown)  (no       (unknown)  (unknown)  Mono # (Auto)  (units    (

unknown)



                    date)                         600   (0-900)  /uL unknown)  

 

           (unknown)  (no       (unknown)  (unknown)  Mono % (Auto)  (units    (

unknown)



                    date)                         (3-14)  % unknown)  

 

           (unknown)  (no       (unknown)  (unknown)  Mono % (Auto)  (units    (

unknown)



                    date)                         7.9   (3-14)  % unknown)  

 

           (unknown)  (no       (unknown)  (unknown)  Mother     (units 

   (unknown)



                    date)                          Cancer   unknown)  

 

           (unknown)  (no       (unknown)  (unknown)  Mouth/Throat:  (units    (

unknown)



                    date)                         moist mucus unknown)  



                                                  membranes           

 

           (unknown)  (no       (unknown)  (unknown)  Narrative  (units    (unkn

own)



                    date)                                   unknown)  

 

           (unknown)  (no       (unknown)  (unknown)  Narrative:  (units    (unk

nown)



                    date)                                   unknown)  

 

           (unknown)  (no       (unknown)  (unknown)  Neck: supple  (units    (u

nknown)



                    date)                                   unknown)  

 

           (unknown)  (no       (unknown)  (unknown)  Neuro: denies  (units    (

unknown)



                    date)                         numbness, tingling, unknown)  



                                                  dizziness           

 

           (unknown)  (no       (unknown)  (unknown)  Neuro: normal  (units    (

unknown)



                    date)                         speech and unknown)  



                                                  cognition, A+O x3           

 

           (unknown)  (no       (unknown)  (unknown)  Neut # (Auto)  (units    (

unknown)



                    date)                         (7810-7570)  /uL unknown)  

 

           (unknown)  (no       (unknown)  (unknown)  Neut # (Auto)  (units    (

unknown)



                    date)                         5900   (9692-6155) unknown)  



                                                  /uL                 

 

           (unknown)  (no       (unknown)  (unknown)  Neut % (Auto)  (units    (

unknown)



                    date)                         (50-75)  % unknown)  

 

           (unknown)  (no       (unknown)  (unknown)  Neut % (Auto)  (units    (

unknown)



                    date)                         84.7 H   (50-75)  % unknown)  

 

           (unknown)  (no       (unknown)  (unknown)  No Action  (units    (unkn

own)



                    date)                                   unknown)  

 

           (unknown)  (no       (unknown)  (unknown)  No Known Drug  (units    (

unknown)



                    date)                         Allergies Allergy unknown)  



                                                  Verified 22           



                                                  17:38               

 

           (unknown)  (no       (unknown)  (unknown)  Nose: nares  (units    (un

known)



                    date)                         patent, no unknown)  



                                                  rhinorrhea           

 

           (unknown)  (no       (unknown)  (unknown)  Notable on  (units    (unk

nown)



                    date)                         patient's prior unknown)  



                                                  urine cultures,           



                                                  urine from           



                                                  2022 grew out           

 

           (unknown)  (no       (unknown)  (unknown)  Ondansetron HCl  (units   

 (unknown)



                    date)                         (Ondansetron 4 Mg/2 unknown)  



                                                  Ml Inj)  4 mg IV           



                                                  NOW ONE             

 

           (unknown)  (no       (unknown)  (unknown)  Ordered:  (units    (unkno

wn)



                    date)                                   unknown)  

 

           (unknown)  (no       (unknown)  (unknown)  Orders    (units    (unkno

wn)



                    date)                                   unknown)  

 

           (unknown)  (no       (unknown)  (unknown)  Osteoarthritis  (units    

(unknown)



                    date)                                   unknown)  

 

           (unknown)  (no       (unknown)  (unknown)  Oxygen Delivery  (units   

 (unknown)



                    date)                         Method    22 unknown)  



                                                  17:34               

 

           (unknown)  (no       (unknown)  (unknown)  Oxygen Delivery  (units   

 (unknown)



                    date)                         Method Room Air unknown)  

 

           (unknown)  (no       (unknown)  (unknown)  PROCEDURE:? XR  (units    

(unknown)



                    date)                         CHEST 1V  unknown)  

 

           (unknown)  (no       (unknown)  (unknown)  Patient   (units    (unkno

wn)



                    date)                         Disposition: Home unknown)  

 

           (unknown)  (no       (unknown)  (unknown)  Patient History  (units   

 (unknown)



                    date)                                   unknown)  

 

           (unknown)  (no       (unknown)  (unknown)  Patient is  (units    (unk

nown)



                    date)                         currently unknown)  



                                                  anticoagulated on           



                                                  Plavix 37.5 mg           



                                                  daily.  Dr. Gómez           

 

           (unknown)  (no       (unknown)  (unknown) Patient reports  (units    

(unknown)



                    date)                         that when he has a unknown)  



                                                  bowel movement, he           



                                                  typically has hard           



                                                  pellets             

 

           (unknown)  (no       (unknown)  (unknown)  Patient self caths  (units

    (unknown)



                    date)                         for urine output, unknown)  



                                                  reports that he has           



                                                  not had much urine           

 

           (unknown)  (no       (unknown)  (unknown)  Patient was  (units    (un

known)



                    date)                         treated in the unknown)  



                                                  emergency           



                                                  department with 1 g           



                                                  of ceftriaxone.           

 

           (unknown)  (no       (unknown)  (unknown) Patient's  (units    (unkno

wn)



                    date)                         colorectal cancer unknown)  



                                                  oncologist was Dr. Jett, patient was           



                                                  referred to him           

 

           (unknown)  (no       (unknown)  (unknown)  Patient's preop  (units   

 (unknown)



                    date)                         diagnosis was unknown)  



                                                  nausea and vomiting           



                                                  from hematemesis in           



                                                  January             

 

           (unknown)  (no       (unknown)  (unknown)  Patient:  (units    (unkno

wn)



                    date)                         Kassandra Leon unknown)  



                                                  MR#: [id number]            

 

           (unknown)  (no       (unknown)  (unknown)  Postlaminectomy  (units   

 (unknown)



                    date)                         syndrome  unknown)  

 

           (unknown)  (no       (unknown)  (unknown)  Potassium  (units    (unkn

own)



                    date)                         (3.4-5.1)  mmol/L unknown)  

 

           (unknown)  (no       (unknown)  (unknown)  Potassium    3.7  (units  

  (unknown)



                    date)                         (3.4-5.1)  mmol/L unknown)  

 

           (unknown)  (no       (unknown)  (unknown)  Prescriptions:  (units    

(unknown)



                    date)                                   unknown)  

 

           (unknown)  (no       (unknown)  (unknown)  Procalcitonin  (units    (

unknown)



                    date)                         (<0.5)  ng/mL unknown)  

 

           (unknown)  (no       (unknown)  (unknown)  Procalcitonin  (units    (

unknown)



                    date)                         0.72 H    (<0.5) unknown)  



                                                  ng/mL               

 

           (unknown)  (no       (unknown)  (unknown)  Procalcitonin Stat  (units

    (unknown)



                    date)                                   unknown)  

 

           (unknown)  (no       (unknown)  (unknown)  Psych: mental  (units    (

unknown)



                    date)                         status is grossly unknown)  



                                                  normal, congruent           



                                                  mood, normal           



                                                  affect, pleasant           

 

           (unknown)  (no       (unknown)  (unknown)  Pulse Oximetry  98  (units

    (unknown)



                    date)                           22 17:34 unknown)  

 

           (unknown)  (no       (unknown)  (unknown)  Pulse Oximetry 98  (units 

   (unknown)



                    date)                                   unknown)  

 

           (unknown)  (no       (unknown)  (unknown)  Pulse Rate  77  (units    

(unknown)



                    date)                         22 17:34 unknown)  

 

           (unknown)  (no       (unknown)  (unknown)  Pulse Rate 77  (units    (

unknown)



                    date)                                   unknown)  

 

           (unknown)  (no       (unknown)  (unknown)  RDW       (units    (unkno

wn)



                    date)                         (11.6-14.8)  % unknown)  

 

           (unknown)  (no       (unknown)  (unknown)  RDW   16.1 H  (units    (u

nknown)



                    date)                         (11.6-14.8)  % unknown)  

 

           (unknown)  (no       (unknown)  (unknown)  Referrals:  (units    (unk

nown)



                    date)                                   unknown)  

 

           (unknown)  (no       (unknown)  (unknown)  Related Data  (units    (u

nknown)



                    date)                                   unknown)  

 

           (unknown)  (no       (unknown)  (unknown)  Respiratory Rate  (units  

  (unknown)



                    date)                         18   22 17:34 unknown)  

 

           (unknown)  (no       (unknown)  (unknown)  Respiratory Rate  (units  

  (unknown)



                    date)                         18        unknown)  

 

           (unknown)  (no       (unknown)  (unknown)  Respiratory:  (units    (u

nknown)



                    date)                         denies shortness of unknown)  



                                                  breath, or new           



                                                  cough               

 

           (unknown)  (no       (unknown)  (unknown)  Respiratory:  (units    (u

nknown)



                    date)                         normal effort, able unknown)  



                                                  to speak in           



                                                  complete sentences,           



                                                  no audible           

 

           (unknown)  (no       (unknown)  (unknown)  Result diagrams:  (units  

  (unknown)



                    date)                                   unknown)  

 

           (unknown)  (no       (unknown)  (unknown)  Review of Systems  (units 

   (unknown)



                    date)                                   unknown)  

 

           (unknown)  (no       (unknown)  (unknown)  SARS-CoV-2 (PCR)  (units  

  (unknown)



                    date)                           (Negative) unknown)  

 

           (unknown)  (no       (unknown)  (unknown)  SARS-CoV-2 (PCR)  (units  

  (unknown)



                    date)                         Negative  unknown)  



                                                  (Negative)           

 

           (unknown)  (no       (unknown)  (unknown)  Sciatica  (units    (unkno

wn)



                    date)                                   unknown)  

 

           (unknown)  (no       (unknown)  (unknown)  Signed By:  (units    (unk

nown)



                    date)                                   unknown)  

 

           (unknown)  (no       (unknown)  (unknown)  Skin: brisk  (units    (un

known)



                    date)                         capillary refill, unknown)  



                                                  no rash, no           



                                                  erythema            

 

           (unknown)  (no       (unknown)  (unknown)  Skin: denies rash,  (units

    (unknown)



                    date)                         itching or wound unknown)  

 

           (unknown)  (no       (unknown)  (unknown)  Smoking Status:  (units   

 (unknown)



                    date)                         Never smoker unknown)  

 

           (unknown)  (no       (unknown)  (unknown)  Smoking Status:  (units   

 (unknown)



                    date)                         Never smoker unknown)  

 

           (unknown)  (no       (unknown)  (unknown)  Social History  (units    

(unknown)



                    date)                         (Reviewed 22 unknown)  



                                                  @ 19:57 by ZAIRA Amezcua)           

 

           (unknown)  (no       (unknown)  (unknown)  Sodium    (units    (unkno

wn)



                    date)                         (137-145)  mmol/L unknown)  

 

           (unknown)  (no       (unknown)  (unknown)  Sodium    135 L  (units   

 (unknown)



                    date)                         (137-145)  mmol/L unknown)  

 

           (unknown)  (no       (unknown)  (unknown)  Sodium Chloride  (units   

 (unknown)



                    date)                         (Normal Saline unknown)  



                                                  0.9%)  1,000 mls @           



                                                  1,000 mls/hr IV           



                                                  BOLUS ONE           

 

           (unknown)  (no       (unknown)  (unknown)  Source: patient  (units   

 (unknown)



                    date)                         and family unknown)  

 

           (unknown)  (no       (unknown)  (unknown)  Spinal stenosis  (units   

 (unknown)



                    date)                                   unknown)  

 

           (unknown)  (no       (unknown)  (unknown)  Stated complaint:  (units 

   (unknown)



                    date)                         WEAKNESS UNABLE TO unknown)  



                                                  HOLD ANYTHING DOWN           

 

           (unknown)  (no       (unknown)  (unknown)  Substance Use  (units    (

unknown)



                    date)                         Type: does not use unknown)  

 

           (unknown)  (no       (unknown)  (unknown)  Surgical History  (units  

  (unknown)



                    date)                         (Reviewed 22 unknown)  



                                                  @ 19:57 by ZAIRA Amezcua)           

 

           (unknown)  (no       (unknown)  (unknown)  Surgical changes  (units  

  (unknown)



                    date)                         and devices:? unknown)  



                                                  None.?              

 

           (unknown)  (no       (unknown)  (unknown)  TECHNIQUE:? One  (units   

 (unknown)



                    date)                         view of the chest unknown)  



                                                  was acquired.?           

 

           (unknown)  (no       (unknown)  (unknown)  Temperature  98.3  (units 

   (unknown)



                    date)                         F   22 17:34 unknown)  

 

           (unknown)  (no       (unknown)  (unknown)  Temperature 98.3 F  (units

    (unknown)



                    date)                                   unknown)  

 

           (unknown)  (no       (unknown)  (unknown)  This is a  (units    (unkn

own)



                    date)                         62-year-old male unknown)  



                                                  with history of           



                                                  multiple sclerosis,           



                                                  CVA in 2019,           

 

           (unknown)  (no       (unknown)  (unknown)  Time Seen by  (units    (u

nknown)



                    date)                         Provider: 22 unknown)  



                                                  19:11               

 

           (unknown)  (no       (unknown)  (unknown)  Total Bilirubin  (units   

 (unknown)



                    date)                          (0.2-1.3)  mg/dL unknown)  

 

           (unknown)  (no       (unknown)  (unknown)  Total Bilirubin  (units   

 (unknown)



                    date)                         0.6  (0.2-1.3) unknown)  



                                                  mg/dL               

 

           (unknown)  (no       (unknown)  (unknown)  Total Creatine  (units    

(unknown)



                    date)                         Kinase     () unknown)  



                                                   U/L                

 

           (unknown)  (no       (unknown)  (unknown)  Total Creatine  (units    

(unknown)



                    date)                         Kinase    54 L unknown)  



                                                  ()  U/L           

 

           (unknown)  (no       (unknown)  (unknown)  Total Protein  (units    (

unknown)



                    date)                         (6.3-8.2)  g/dL unknown)  

 

           (unknown)  (no       (unknown)  (unknown)  Total Protein  (units    (

unknown)



                    date)                         7.5  (6.3-8.2) unknown)  



                                                  g/dL                

 

           (unknown)  (no       (unknown)  (unknown)  Troponin + CK  (units    (

unknown)



                    date)                         Cardiac Panel Stat unknown)  

 

           (unknown)  (no       (unknown)  (unknown)  Troponin I  (units    (unk

nown)



                    date)                         (0.01-0.034)  ng/mL unknown)  

 

           (unknown)  (no       (unknown)  (unknown)  Troponin I    <  (units   

 (unknown)



                    date)                         0.012  (0.01-0.034) unknown)  



                                                   ng/mL              

 

           (unknown)  (no       (unknown)  (unknown)  Ur Culture  (units    (unk

nown)



                    date)                         Indicated? unknown)  

 

           (unknown)  (no       (unknown)  (unknown)  Ur Culture  (units    (unk

nown)



                    date)                         Indicated? unknown)  



                                                  Specimen cultured           

 

           (unknown)  (no       (unknown)  (unknown)  Urine Bacteria  (units    

(unknown)



                    date)                         (None)    unknown)  

 

           (unknown)  (no       (unknown)  (unknown)  Urine Bacteria  (units    

(unknown)



                    date)                         Few (2-10) H unknown)  



                                                  (None)              

 

           (unknown)  (no       (unknown)  (unknown)  Urine Culture Stat  (units

    (unknown)



                    date)                                   unknown)  

 

           (unknown)  (no       (unknown)  (unknown)  Urine Microscopic  (units 

   (unknown)



                    date)                         Stat      unknown)  

 

           (unknown)  (no       (unknown)  (unknown)  Urine RBC  (units    (unkn

own)



                    date)                         (0-5/HPF) unknown)  

 

           (unknown)  (no       (unknown)  (unknown)  Urine RBC  (units    (unkn

own)



                    date)                         1-5/hpf  (0-5/HPF) unknown)  

 

           (unknown)  (no       (unknown)  (unknown)  Urine Specific  (units    

(unknown)



                    date)                         Gravity    1.015 unknown)  

 

           (unknown)  (no       (unknown)  (unknown)  Urine WBC  (units    (unkn

own)



                    date)                         (0-5/HPF) unknown)  

 

           (unknown)  (no       (unknown)  (unknown)  Urine WBC  (units    (unkn

own)



                    date)                         5-10/hpf H unknown)  



                                                  (0-5/HPF)           

 

           (unknown)  (no       (unknown)  (unknown)  Vital Signs  (units    (un

known)



                    date)                                   unknown)  

 

           (unknown)  (no       (unknown)  (unknown)  Vital signs:  (units    (u

nknown)



                    date)                                   unknown)  

 

           (unknown)  (no       (unknown)  (unknown)  Linette Jett MD  (units  

  (unknown)



                    date)                         [Physician] - As unknown)  



                                                  soon as possible           

 

           (unknown)  (no       (unknown)  (unknown)  XR chest 1V Stat  (units  

  (unknown)



                    date)                                   unknown)  

 

           (unknown)  (no       (unknown)  (unknown)  [Embedded Image  (units   

 (unknown)



                    date)                         Not Available] unknown)  

 

           (unknown)  (no       (unknown)  (unknown)  acute ischemic  (units    

(unknown)



                    date)                         changes.  unknown)  

 

           (unknown)  (no       (unknown)  (unknown)  alcohol intake  (units    

(unknown)



                    date)                         frequency: a few unknown)  



                                                  times a month           

 

           (unknown)  (no       (unknown)  (unknown)  alcohol intake:  (units   

 (unknown)



                    date)                         current   unknown)  

 

           (unknown)  (no       (unknown)  (unknown)  and cooperative  (units   

 (unknown)



                    date)                                   unknown)  

 

           (unknown)  (no       (unknown)  (unknown)  and generalized  (units   

 (unknown)



                    date)                         weakness.  Patient unknown)  



                                                  self catheterizes           



                                                  his bladder at           



                                                  baseline,           

 

           (unknown)  (no       (unknown)  (unknown)  appear    (units    (unkno

wn)



                    date)                                   unknown)  

 

           (unknown)  (no       (unknown)  (unknown)  ascorbic acid  (units    (

unknown)



                    date)                         (vitamin C) 500 mg unknown)  



                                                  500 mg PO BID #60           



                                                  tabs 22           

 

           (unknown)  (no       (unknown)  (unknown)  atorvastatin 80 mg  (units

    (unknown)



                    date)                         tablet 40 mg PO QPM unknown)  



                                                  20           

 

           (unknown)  (no       (unknown)  (unknown)  be causing a rash  (units 

   (unknown)



                    date)                                   unknown)  

 

           (unknown)  (no       (unknown)  (unknown) bleeding.  Guaiac  (units  

  (unknown)



                    date)                         stool in the unknown)  



                                                  emergency           



                                                  department was           



                                                  negative.           



                                                  Patient's lab           

 

           (unknown)  (no       (unknown)  (unknown)  blood in his  (units    (u

nknown)



                    date)                         emesis or in his unknown)  



                                                  stool.  He reports           



                                                  that he did a stool           



                                                  test one            

 

           (unknown)  (no       (unknown)  (unknown)  buspirone 5 mg  (units    

(unknown)



                    date)                         tablet 5 mg PO BID unknown)  



                                                  #60 tabs 22           

 

           (unknown)  (no       (unknown)  (unknown)  cancer.  Postop  (units   

 (unknown)



                    date)                         diagnosis from this unknown)  



                                                  upper endoscopy was           



                                                  the same.  His           

 

           (unknown)  (no       (unknown)  (unknown)  caths at baseline  (units 

   (unknown)



                    date)                                   unknown)  

 

           (unknown)  (no       (unknown)  (unknown)  clopidogrel 75 mg  (units 

   (unknown)



                    date)                         tablet 75 mg PO unknown)  



                                                  DAILY 22            

 

           (unknown)  (no       (unknown)  (unknown)  colonic   (units    (unkno

wn)



                    date)                         anastomosis, and a unknown)  



                                                  suboptimal bowel           



                                                  prep.               

 

           (unknown)  (no       (unknown)  (unknown)  cultures were  (units    (

unknown)



                    date)                         drawn prior to unknown)  



                                                  receiving           



                                                  antibiotics Urine           



                                                  microscopy shows           

 

           (unknown)  (no       (unknown)  (unknown)  cyanocobalamin  (units    

(unknown)



                    date)                         (vitamin B-12) 500 unknown)  



                                                  1,000 mcg PO DAILY           



                                                  #60 tabs 22           

 

           (unknown)  (no       (unknown)  (unknown)  diabetes,  (units    (unkn

own)



                    date)                         hypertension, unknown)  



                                                  colorectal cancer           



                                                  in  with a           



                                                  reverse colectomy           



                                                  who                 

 

           (unknown)  (no       (unknown)  (unknown)  diabetes,  (units    (unkn

own)



                    date)                         hypertension, unknown)  



                                                  colorectal cancer           



                                                  in  with a           



                                                  reverse colectomy,           



                                                  He                  

 

           (unknown)  (no       (unknown)  (unknown)  does not drink  (units    

(unknown)



                    date)                         alcohol.? Patient unknown)  



                                                  and his wife state           



                                                  that he was seen in           



                                                  the                 

 

           (unknown)  (no       (unknown)  (unknown)  elevated lactate.  (units 

   (unknown)



                    date)                         His heart rate and unknown)  



                                                  blood pressure are           



                                                  within normal           



                                                  ranges.             

 

           (unknown)  (no       (unknown)  (unknown)  emergency  (units    (unkn

own)



                    date)                         department in unknown)  



                                                  January and           



                                                  diagnosed with a GI           



                                                  bleed, he was           



                                                  admitted,           

 

           (unknown)  (no       (unknown)  (unknown)  endorses fatigue  (units  

  (unknown)



                    date)                                   unknown)  

 

           (unknown)  (no       (unknown)  (unknown) endoscopic  (units    (unkn

own)



                    date)                         diagnosis includes unknown)  



                                                  mild gastropathy,           



                                                  esophagitis, patent           



                                                  retrosigmoid           

 

           (unknown)  (no       (unknown)  (unknown)  ferrous sulfate  (units   

 (unknown)



                    date)                         325 mg (65 mg 325 unknown)  



                                                  mg PO BIDWM #60           



                                                  tabs 22           

 

           (unknown)  (no       (unknown)  (unknown)  followed by loose  (units 

   (unknown)



                    date)                         stool.    unknown)  

 

           (unknown)  (no       (unknown)  (unknown)  gabapentin 300 mg  (units 

   (unknown)



                    date)                         capsule 300 mg PO unknown)  



                                                  BID 18            

 

           (unknown)  (no       (unknown)  (unknown)  generalized  (units    (un

known)



                    date)                         weakness and unknown)  



                                                  fatigue             

 

           (unknown)  (no       (unknown)  (unknown)  glipizide 10 mg  (units   

 (unknown)



                    date)                         tablet 10 mg PO BID unknown)  



                                                  18           

 

           (unknown)  (no       (unknown)  (unknown)  had a recent  (units    (u

nknown)



                    date)                         endoscopy that did unknown)  



                                                  not show any           



                                                  bleeding polyps or           



                                                  acute GI            

 

           (unknown)  (no       (unknown)  (unknown)  hospital and on  (units   

 (unknown)



                    date)                         chart review it unknown)  



                                                  appears that it was           



                                                  completed on           



                                                  2022.           

 

           (unknown)  (no       (unknown)  (unknown)  household members:  (units

    (unknown)



                    date)                          spouse   unknown)  

 

           (unknown)  (no       (unknown)  (unknown)  ingrown, no penile  (units

    (unknown)



                    date)                         discharge, no unknown)  



                                                  scrotal edema           

 

           (unknown)  (no       (unknown)  (unknown)  iron) tablet  (units    (u

nknown)



                    date)                                   unknown)  

 

           (unknown)  (no       (unknown)  (unknown)  lives     (units    (unkno

wn)



                    date)                         independently:  Yes unknown)  

 

           (unknown)  (no       (unknown)  (unknown)  losartan 50 mg  (units    

(unknown)



                    date)                         tablet 25 mg PO QPM unknown)  



                                                  20           

 

           (unknown)  (no       (unknown)  (unknown)  lung      (units    (unkno

wn)



                    date)                                   unknown)  

 

           (unknown)  (no       (unknown)  (unknown)  magnesium was  (units    (

unknown)



                    date)                         replaced with 2 g, unknown)  



                                                  normal saline 1000           



                                                  mL was given for           



                                                  his                 

 

           (unknown)  (no       (unknown)  (unknown)  mcg tablet  (units    (unk

nown)



                    date)                                   unknown)  

 

           (unknown)  (no       (unknown)  (unknown)  mcg tablet  (units    (unk

nown)



                    date)                         (Tab-A-Lucy) unknown)  

 

           (unknown)  (no       (unknown)  (unknown)  metformin 500 mg  (units  

  (unknown)



                    date)                         tablet,extended 500 unknown)  



                                                  mg PO BID 18            

 

           (unknown)  (no       (unknown)  (unknown)  metoprolol  (units    (unk

nown)



                    date)                         succinate 25 mg 25 unknown)  



                                                  mg PO DAILY           



                                                  21           

 

           (unknown)  (no       (unknown)  (unknown)  moving forward.  (units   

 (unknown)



                    date)                         Patient reports unknown)  



                                                  that he has been           



                                                  taking fiber but           



                                                  not any             

 

           (unknown)  (no       (unknown)  (unknown)  multivitamin with  (units 

   (unknown)



                    date)                         folic acid 400 1 unknown)  



                                                  tab PO DAILY #90           



                                                  tabs 22           

 

           (unknown)  (no       (unknown)  (unknown)  negative.  I  (units    (u

nknown)



                    date)                         completed his unknown)  



                                                  urinary             



                                                  catheterization           



                                                  with a coude           



                                                  catheter, urine           

 

           (unknown)  (no       (unknown)  (unknown)  nondistended, no  (units  

  (unknown)



                    date)                         masses, no unknown)  



                                                  exquisite           



                                                  tenderness with           



                                                  exam, no rebound           

 

           (unknown)  (no       (unknown)  (unknown)  not heard it was  (units  

  (unknown)



                    date)                         positive or not. unknown)  



                                                  Patient reports           



                                                  that his primary           



                                                  care                

 

           (unknown)  (no       (unknown)  (unknown)  ondansetron HCl 4  (units 

   (unknown)



                    date)                         mg tablet 4 mg PO unknown)  



                                                  Q8H PRN nausea and           



                                                  21            

 

           (unknown)  (no       (unknown)  (unknown)  oral powder packet  (units

    (unknown)



                    date)                                   unknown)  

 

           (unknown)  (no       (unknown)  (unknown)  output for three  (units  

  (unknown)



                    date)                         days, three days unknown)  



                                                  ago he had nausea           



                                                  and vomiting,           



                                                  denies any           

 

           (unknown)  (no       (unknown)  (unknown)  polyethylene  (units    (u

nknown)



                    date)                         glycol 3350 17 gram unknown)  



                                                  17 gm PO DAILY #90           



                                                  ea 22           

 

           (unknown)  (no       (unknown)  (unknown)  presents to the  (units   

 (unknown)



                    date)                         emergency unknown)  



                                                  department           



                                                  complaining of           



                                                  ongoing fatigue,           



                                                  oliguria,           

 

           (unknown)  (no       (unknown)  (unknown)  provider has left,  (units

    (unknown)



                    date)                         he reports that he unknown)  



                                                  has seen Dr. Macias           



                                                  recently as well.           

 

           (unknown)  (no       (unknown)  (unknown)  quadrant with a  (units   

 (unknown)



                    date)                         complicated unknown)  



                                                  surgical history of           



                                                  his abdomen.  This           



                                                  shows               

 

           (unknown)  (no       (unknown)  (unknown)  recently for his  (units  

  (unknown)



                    date)                         anemia.   unknown)  

 

           (unknown)  (no       (unknown)  (unknown)  related diarrhea  (units  

  (unknown)



                    date)                         from fecal loading unknown)  



                                                  and obstipation.           



                                                  Recommended MiraLax           



                                                  17 g                

 

           (unknown)  (no       (unknown)  (unknown)  release 24 hr  (units    (

unknown)



                    date)                                   unknown)  

 

           (unknown)  (no       (unknown)  (unknown)  reported that  (units    (

unknown)



                    date)                         patient's altered unknown)  



                                                  bowel habit is           



                                                  likely a function           



                                                  of overflow           

 

           (unknown)  (no       (unknown)  (unknown)  sennosides 8.6 mg  (units 

   (unknown)



                    date)                         tablet (senna) 17.2 unknown)  



                                                  mg PO BEDTIME #60           



                                                  tabs 22           

 

           (unknown)  (no       (unknown)  (unknown)  substance use  (units    (

unknown)



                    date)                         type:  does not use unknown)  

 

           (unknown)  (no       (unknown)  (unknown)  tablet (Vitamin C)  (units

    (unknown)



                    date)                                   unknown)  

 

           (unknown)  (no       (unknown)  (unknown)  tablet,extended  (units   

 (unknown)



                    date)                         release 24 hr unknown)  

 

           (unknown)  (no       (unknown)  (unknown)  tenderness  (units    (unk

nown)



                    date)                                   unknown)  

 

           (unknown)  (no       (unknown)  (unknown)  there.  He reports  (units

    (unknown)



                    date)                         that he had an unknown)  



                                                  upper endoscopy           



                                                  completed here at           



                                                  this                

 

           (unknown)  (no       (unknown)  (unknown)  tone      (units    (unkno

wn)



                    date)                                   unknown)  

 

           (unknown)  (no       (unknown)  (unknown)  trospium 20 mg  (units    

(unknown)



                    date)                         tablet 20 mg PO BID unknown)  



                                                  urinary retention           



                                                  22           

 

           (unknown)  (no       (unknown)  (unknown)  unremarkable.?  (units    

(unknown)



                    date)                                   unknown)  

 

           (unknown)  (no       (unknown)  (unknown)  urine dip showed  (units  

  (unknown)



                    date)                         leukocytes, unknown)  



                                                  ketones, wbc's and           



                                                  did not show           



                                                  nitrites.  Blood           

 

           (unknown)  (no       (unknown)  (unknown)  urine is cloudy,  (units  

  (unknown)



                    date)                         yellow,   unknown)  



                                                  approximately 600           



                                                  mL in bladder and           



                                                  drained, no rash           

 

           (unknown)  (no       (unknown)  (unknown)  volumes accentuate  (units

    (unknown)



                    date)                         pulmonary unknown)  



                                                  interstitium and           



                                                  heart size.           

 

           (unknown)  (no       (unknown)  (unknown)  was cloudy yellow  (units 

   (unknown)



                    date)                         urine and 600 mL unknown)  



                                                  was drained.           



                                                  Patient tolerated           



                                                  well.  His           

 

           (unknown)  (no       (unknown)  (unknown)  week ago and  (units    (u

nknown)



                    date)                         dropped it off at unknown)  



                                                  [facility] which was           



                                                  testing for blood           



                                                  but he has           

 

           (unknown)  (no       (unknown)  (unknown)  went to [facility]  (units 

   (unknown)



                    date)                         for rehab following unknown)  



                                                  his admission and           



                                                  has followed up           



                                                  with                

 

           (unknown)  (no       (unknown)  (unknown)  wheezing, stridor,  (units

    (unknown)



                    date)                         or rales. No unknown)  



                                                  retractions or           



                                                  tachypnea.           









                                         Result panel 25









           (unknown)  (no       (unknown)  (unknown)  (no value)  (units    (unk

nown)



                    date)                                   unknown)  

 

           (unknown)  (no       (unknown)  (unknown)  Radiologist  (units    (un

known)



                    date)                         Impression: unknown)  

 

           (unknown)  (no       (unknown)  (unknown)  Date of Service:  (units  

  (unknown)



                    date)                         22  unknown)  

 

           (unknown)  (no       (unknown)  (unknown)  (no value)  (units    (unk

nown)



                    date)                                   unknown)  

 

           (unknown)  (no       (unknown)  (unknown)  22 17:45  (units    

(unknown)



                    date)                                   unknown)  

 

           (unknown)  (no       (unknown)  (unknown)  1 tab PO DAILY  (units    

(unknown)



                    date)                         Qty: 90 0RF unknown)  

 

           (unknown)  (no       (unknown)  (unknown)  1,000 mcg PO DAILY  (units

    (unknown)



                    date)                         Qty: 60 0RF unknown)  

 

           (unknown)  (no       (unknown)  (unknown)  10 mg PO BID  (units    (u

nknown)



                    date)                                   unknown)  

 

           (unknown)  (no       (unknown)  (unknown)  17 gm PO DAILY  (units    

(unknown)



                    date)                         Qty: 90 0RF unknown)  

 

           (unknown)  (no       (unknown)  (unknown)  17.2 mg PO BEDTIME  (units

    (unknown)



                    date)                         Qty: 60 0RF unknown)  

 

           (unknown)  (no       (unknown)  (unknown)  20 mg PO BID  (units    (u

nknown)



                    date)                                   unknown)  

 

           (unknown)  (no       (unknown)  (unknown)  25 mg PO DAILY  (units    

(unknown)



                    date)                                   unknown)  

 

           (unknown)  (no       (unknown)  (unknown)  25 mg PO QPM  (units    (u

nknown)



                    date)                                   unknown)  

 

           (unknown)  (no       (unknown)  (unknown)  300 mg PO BID  (units    (

unknown)



                    date)                                   unknown)  

 

           (unknown)  (no       (unknown)  (unknown)  325 mg PO BIDWM  (units   

 (unknown)



                    date)                         Qty: 60 0RF unknown)  

 

           (unknown)  (no       (unknown)  (unknown)  4 mg PO Q8H PRN  (units   

 (unknown)



                    date)                         (Reason: nausea and unknown)  



                                                  vomiting) Qty: 14           



                                                  0RF                 

 

           (unknown)  (no       (unknown)  (unknown)  40 mg PO QPM  (units    (u

nknown)



                    date)                                   unknown)  

 

           (unknown)  (no       (unknown)  (unknown)  5 mg PO BID Qty:  (units  

  (unknown)



                    date)                         60 0RF    unknown)  

 

           (unknown)  (no       (unknown)  (unknown)  500 mg PO BID  (units    (

unknown)



                    date)                                   unknown)  

 

           (unknown)  (no       (unknown)  (unknown)  500 mg PO BID Qty:  (units

    (unknown)



                    date)                         60 0RF    unknown)  

 

           (unknown)  (no       (unknown)  (unknown)  75 mg PO DAILY  (units    

(unknown)



                    date)                                   unknown)  

 

           (unknown)  (no       (unknown)  (unknown)  Admin: 22  (units   

 (unknown)



                    date)                         20:19  Dose: 50 unknown)  



                                                  mls/hr              

 

           (unknown)  (no       (unknown)  (unknown)  Admin: 22  (units   

 (unknown)



                    date)                         21:06  Dose: 200 unknown)  



                                                  mls/hr              

 

           (unknown)  (no       (unknown)  (unknown)  Allergies  (units    (unkn

own)



                    date)                                   unknown)  

 

           (unknown)  (no       (unknown)  (unknown)  Documented By: [facility]  (units

    (unknown)



                    date)                                   unknown)  

 

           (unknown)  (no       (unknown)  (unknown)  Documented By: AT  (units 

   (unknown)



                    date)                          Co-signed By: TORREY unknown)  

 

           (unknown)  (no       (unknown)  (unknown)  Documented By: AT  (units 

   (unknown)



                    date)                                   unknown)  

 

           (unknown)  (no       (unknown)  (unknown)  Documented By: TORREY  (units

    (unknown)



                    date)                           Co-signed By: [facility] unknown)  

 

           (unknown)  (no       (unknown)  (unknown)  Documented By: MLM  (units

    (unknown)



                    date)                                   unknown)  

 

           (unknown)  (no       (unknown)  (unknown)  ED Orders  (units    (unkn

own)



                    date)                                   unknown)  

 

           (unknown)  (no       (unknown)  (unknown)  Emergency Report  (units  

  (unknown)



                    date)                                   unknown)  

 

           (unknown)  (no       (unknown)  (unknown)  Home Medications  (units  

  (unknown)



                    date)                                   unknown)  

 

           (unknown)  (no       (unknown)  (unknown)  [facility]  (units   

 (unknown)



                    date)                         [address] [place] [address] unknown)  



                                                  [place] [address]           

 

           (unknown)  (no       (unknown)  (unknown)  Lab Results  (units    (un

known)



                    date)                                   unknown)  

 

           (unknown)  (no       (unknown)  (unknown)  Label Comments:  (units   

 (unknown)



                    date)                                   unknown)  

 

           (unknown)  (no       (unknown)  (unknown)  Last Admin:  (units    (un

known)



                    date)                         22 18:30 unknown)  



                                                  Dose: 4 mg           

 

           (unknown)  (no       (unknown)  (unknown)  Last Admin:  (units    (un

known)



                    date)                         22 20:18 unknown)  



                                                  Dose: 1,000 mls/hr           

 

           (unknown)  (no       (unknown)  (unknown)  Last Infusion:  (units    

(unknown)



                    date)                         22 21:10 unknown)  



                                                  Dose: 0 mls/hr           

 

           (unknown)  (no       (unknown)  (unknown)  Last Infusion:  (units    

(unknown)



                    date)                         22 21:28 unknown)  



                                                  Dose: 0 mls/hr           

 

           (unknown)  (no       (unknown)  (unknown)  Previous Rx's  (units    (

unknown)



                    date)                                   unknown)  

 

           (unknown)  (no       (unknown)  (unknown)  Stop: 22  (units    

(unknown)



                    date)                         18:16     unknown)  

 

           (unknown)  (no       (unknown)  (unknown)  Stop: 22  (units    

(unknown)



                    date)                         20:15     unknown)  

 

           (unknown)  (no       (unknown)  (unknown)  Stop: 22  (units    

(unknown)



                    date)                         20:25     unknown)  

 

           (unknown)  (no       (unknown)  (unknown)  Stop: 22  (units    

(unknown)



                    date)                         20:27     unknown)  

 

           (unknown)  (no       (unknown)  (unknown)  Urine Dip  (units    (unkn

own)



                    date)                                   unknown)  

 

           (unknown)  (no       (unknown)  (unknown)  Vital Signs - 8 hr  (units

    (unknown)



                    date)                                   unknown)  

 

           (unknown)  (no       (unknown)  (unknown)  has not been  (units    (u

nknown)



                    date)                         eating so hasn't unknown)  



                                                  taken recently.           



                                                  spouse states           



                                                  thinks it might           

 

           (unknown)  (no       (unknown)  (unknown)  stopped taking  (units    

(unknown)



                    date)                         prior to back unknown)  



                                                  surgery and did not           



                                                  restart             

 

           (unknown)  (no       (unknown)  (unknown)  (no value)  (units    (unk

nown)



                    date)                                   unknown)  

 

           (unknown)  (no       (unknown)  (unknown)  22  (units 

   (unknown)



                    date)                         22  unknown)  



                                                  Range/Units           

 

           (unknown)  (no       (unknown)  (unknown)  17:40 17:45 17:45  (units 

   (unknown)



                    date)                                   unknown)  

 

           (unknown)  (no       (unknown)  (unknown)  17:45 17:45 20:09  (units 

   (unknown)



                    date)                                   unknown)  

 

           (unknown)  (no       (unknown)  (unknown)  ascorbic acid  (units    (

unknown)



                    date)                         (vitamin C) unknown)  



                                                  [Vitamin C] 500 mg           



                                                  Tablet              

 

           (unknown)  (no       (unknown)  (unknown)  atorvastatin 80 mg  (units

    (unknown)



                    date)                         tablet    unknown)  

 

           (unknown)  (no       (unknown)  (unknown)  buspirone 5 mg  (units    

(unknown)



                    date)                         Tablet    unknown)  

 

           (unknown)  (no       (unknown)  (unknown)  clopidogrel 75 mg  (units 

   (unknown)



                    date)                         tablet    unknown)  

 

           (unknown)  (no       (unknown)  (unknown)  cyanocobalamin  (units    

(unknown)



                    date)                         (vitamin B-12) 500 unknown)  



                                                  mcg Tablet           

 

           (unknown)  (no       (unknown)  (unknown)  ferrous sulfate  (units   

 (unknown)



                    date)                         325 mg (65 mg iron) unknown)  



                                                  Tablet              

 

           (unknown)  (no       (unknown)  (unknown)  gabapentin 300 mg  (units 

   (unknown)



                    date)                         capsule   unknown)  

 

           (unknown)  (no       (unknown)  (unknown)  glipizide 10 mg  (units   

 (unknown)



                    date)                         tablet    unknown)  

 

           (unknown)  (no       (unknown)  (unknown)  losartan 50 mg  (units    

(unknown)



                    date)                         tablet    unknown)  

 

           (unknown)  (no       (unknown)  (unknown)  metformin 500 mg  (units  

  (unknown)



                    date)                         tablet extended unknown)  



                                                  release 24 hr           

 

           (unknown)  (no       (unknown)  (unknown)  metoprolol  (units    (unk

nown)



                    date)                         succinate 25 mg unknown)  



                                                  tablet extended           



                                                  release 24 hr           

 

           (unknown)  (no       (unknown)  (unknown)  multivitamin with  (units 

   (unknown)



                    date)                         folic acid unknown)  



                                                  [Tab-A-Lucy] 400           



                                                  mcg Tablet           

 

           (unknown)  (no       (unknown)  (unknown)  ondansetron HCl  (units   

 (unknown)



                    date)                         [Zofran] 4 mg unknown)  



                                                  tablet              

 

           (unknown)  (no       (unknown)  (unknown)  polyethylene  (units    (u

nknown)



                    date)                         glycol 3350 17 gram unknown)  



                                                  Powder In Packet           

 

           (unknown)  (no       (unknown)  (unknown)  sennosides [senna]  (units

    (unknown)



                    date)                         8.6 mg Tablet unknown)  

 

           (unknown)  (no       (unknown)  (unknown)  trospium 20 mg  (units    

(unknown)



                    date)                         tablet    unknown)  

 

           (unknown)  (no       (unknown)  (unknown)  22  (units    (unkno

wn)



                    date)                                   unknown)  

 

           (unknown)  (no       (unknown)  (unknown)  1.07 in January.  (units  

  (unknown)



                    date)                         No elevation in his unknown)  



                                                  troponin,           



                                                  procalcitonin is           



                                                  elevated at           

 

           (unknown)  (no       (unknown)  (unknown)  Anemia,   (units    (unkno

wn)



                    date)                         Hypomagnesemia, unknown)  



                                                  Pyelonephritis           

 

           (unknown)  (no       (unknown)  (unknown)  Medication  (units    (unk

nown)



                    date)                         Instructions unknown)  



                                                  Recorded            

 

           (unknown)  (no       (unknown)  (unknown)  Medication  (units    (unk

nown)



                    date)                         Instructions unknown)  



                                                  Recorded  Confirmed           

 

           (unknown)  (no       (unknown)  (unknown)  sodium 135,  (units    (un

known)



                    date)                         potassium 3.7, unknown)  



                                                  creatinine 1.42           



                                                  which is increased           



                                                  from his prior of           

 

           (unknown)  (no       (unknown)  (unknown)  with regular axis  (units 

   (unknown)



                    date)                         and intervals.  No unknown)  



                                                  STEMI, ST segment           



                                                  changes,            



                                                  arrhythmia, or           

 

           (unknown)  (no       (unknown)  (unknown)  work is   (units    (unkno

wn)



                    date)                         significant for unknown)  



                                                  anemia, hemoglobin           



                                                  is 9.1 today, down           



                                                  from 9.6 in           

 

           (unknown)  (no       (unknown)  (unknown)  (Zofran) vomiting  (units 

   (unknown)



                    date)                         #14 tabs  unknown)  

 

           (unknown)  (no       (unknown)  (unknown)  0.72, his lactate  (units 

   (unknown)



                    date)                         is also elevated at unknown)  



                                                  2.6, magnesium is           



                                                  low at 1.0.  His           

 

           (unknown)  (no       (unknown)  (unknown)  [id number]   (units    (unkno

wn)



                    date)                                   unknown)  

 

           (unknown)  (no       (unknown)  (unknown)  22 17:39  (units    

(unknown)



                    date)                                   unknown)  

 

           (unknown)  (no       (unknown)  (unknown)  22 17:40  (units    

(unknown)



                    date)                                   unknown)  

 

           (unknown)  (no       (unknown)  (unknown)  22 17:45  (units    

(unknown)



                    date)                                   unknown)  

 

           (unknown)  (no       (unknown)  (unknown)  22 19:26  (units    

(unknown)



                    date)                                   unknown)  

 

           (unknown)  (no       (unknown)  (unknown)  22 20:09  (units    

(unknown)



                    date)                                   unknown)  

 

           (unknown)  (no       (unknown)  (unknown)  22 20:55  (units    

(unknown)



                    date)                                   unknown)  

 

           (unknown)  (no       (unknown)  (unknown)  17:34     (units    (unkno

wn)



                    date)                                   unknown)  

 

           (unknown)  (no       (unknown)  (unknown)  with iron  (units    (

unknown)



                    date)                         deficiency anemia, unknown)  



                                                  altered bowel           



                                                  habit, personal           



                                                  history of colon           

 

           (unknown)  (no       (unknown)  (unknown)  62-year-old  (units    (un

known)



                    date)                         gentleman with a unknown)  



                                                  history of multiple           



                                                  sclerosis, CVA in           



                                                  2019,               

 

           (unknown)  (no       (unknown)  (unknown)  ?         (units    (unkno

wn)



                    date)                                   unknown)  

 

           (unknown)  (no       (unknown)  (unknown)  ALT     (<50)  (units    (

unknown)



                    date)                         IU/L      unknown)  

 

           (unknown)  (no       (unknown)  (unknown)  ALT    16  (<50)  (units  

  (unknown)



                    date)                         IU/L      unknown)  

 

           (unknown)  (no       (unknown)  (unknown)  AST     (17-59)  (units   

 (unknown)



                    date)                         IU/L      unknown)  

 

           (unknown)  (no       (unknown)  (unknown)  AST    19  (17-59)  (units

    (unknown)



                    date)                          IU/L     unknown)  

 

           (unknown)  (no       (unknown)  (unknown)  Age/Sex: 62 / M  (units   

 (unknown)



                    date)                                   unknown)  

 

           (unknown)  (no       (unknown)  (unknown)  Albumin   (units    (unkno

wn)



                    date)                         (3.5-5.0)  g/dL unknown)  

 

           (unknown)  (no       (unknown)  (unknown)  Albumin    3.9  (units    

(unknown)



                    date)                         (3.5-5.0)  g/dL unknown)  

 

           (unknown)  (no       (unknown)  (unknown)  Albumin/Globulin  (units  

  (unknown)



                    date)                         Ratio     (1.0-2.8) unknown)  

 

           (unknown)  (no       (unknown)  (unknown)  Albumin/Globulin  (units  

  (unknown)



                    date)                         Ratio    1.1 unknown)  



                                                  (1.0-2.8)           

 

           (unknown)  (no       (unknown)  (unknown)  Alkaline  (units    (unkno

wn)



                    date)                         Phosphatase unknown)  



                                                  ()  U/L           

 

           (unknown)  (no       (unknown)  (unknown)  Alkaline  (units    (unkno

wn)



                    date)                         Phosphatase    105 unknown)  



                                                  ()  U/L           

 

           (unknown)  (no       (unknown)  (unknown)  Allergy/AdvReac  (units   

 (unknown)



                    date)                         Type Severity unknown)  



                                                  Reaction Status           



                                                  Date / Time           

 

           (unknown)  (no       (unknown)  (unknown)  Amorphous Sediment  (units

    (unknown)



                    date)                                   unknown)  

 

           (unknown)  (no       (unknown)  (unknown)  Amorphous Sediment  (units

    (unknown)



                    date)                            2+     unknown)  

 

           (unknown)  (no       (unknown)  (unknown)  Approved by: Garfield Mcdanielsunits 

   (unknown)



                    date)                         SHANNAN Lopez on unknown)  



                                                  2022 at 18:02?           

 

           (unknown)  (no       (unknown)  (unknown)  BUN     (9-20)  (units    

(unknown)



                    date)                         mg/dL     unknown)  

 

           (unknown)  (no       (unknown)  (unknown)  BUN    21 H  (units    (un

known)



                    date)                         (9-20)  mg/dL unknown)  

 

           (unknown)  (no       (unknown)  (unknown)  BUN/Creatinine  (units    

(unknown)



                    date)                         Ratio     (6-22) unknown)  

 

           (unknown)  (no       (unknown)  (unknown)  BUN/Creatinine  (units    

(unknown)



                    date)                         Ratio    14.8 unknown)  



                                                  (6-22)              

 

           (unknown)  (no       (unknown)  (unknown)  Baso # (Auto)  (units    (

unknown)



                    date)                         (0-100)  /uL unknown)  

 

           (unknown)  (no       (unknown)  (unknown)  Baso # (Auto)   0  (units 

   (unknown)



                    date)                          (0-100)  /uL unknown)  

 

           (unknown)  (no       (unknown)  (unknown)  Baso % (Auto)  (units    (

unknown)



                    date)                         (0-2)  %  unknown)  

 

           (unknown)  (no       (unknown)  (unknown)  Baso % (Auto)  (units    (

unknown)



                    date)                         0.2   (0-2)  % unknown)  

 

           (unknown)  (no       (unknown)  (unknown)  Bedside Urine  (units    (

unknown)



                    date)                         Bilirubin        - unknown)  



                                                  Negative            

 

           (unknown)  (no       (unknown)  (unknown)  Bedside Urine  (units    (

unknown)



                    date)                         Glucose    Negative unknown)  

 

           (unknown)  (no       (unknown)  (unknown)  Bedside Urine  (units    (

unknown)



                    date)                         Ketone    - unknown)  



                                                  Negative            

 

           (unknown)  (no       (unknown)  (unknown)  Bedside Urine  (units    (

unknown)



                    date)                         Leukocytes       + unknown)  



                                                  70                  

 

           (unknown)  (no       (unknown)  (unknown)  Bedside Urine  (units    (

unknown)



                    date)                         Nitrite    - unknown)  



                                                  Negative            

 

           (unknown)  (no       (unknown)  (unknown)  Bedside Urine  (units    (

unknown)



                    date)                         Occult Blood     ++ unknown)  

 

           (unknown)  (no       (unknown)  (unknown)  Bedside Urine  (units    (

unknown)



                    date)                         Protein    + 30 unknown)  

 

           (unknown)  (no       (unknown)  (unknown)  Bedside Urine  (units    (

unknown)



                    date)                         Urobilinogen     - unknown)  



                                                  Negative            

 

           (unknown)  (no       (unknown)  (unknown)  Bedside Urine pH  (units  

  (unknown)



                    date)                          6.0      unknown)  

 

           (unknown)  (no       (unknown)  (unknown)  Blood Culture Stat  (units

    (unknown)



                    date)                                   unknown)  

 

           (unknown)  (no       (unknown)  (unknown)  Blood Pressure  (units    

(unknown)



                    date)                         124/66   18/ unknown)  



                                                  17:34               

 

           (unknown)  (no       (unknown)  (unknown)  Blood Pressure  (units    

(unknown)



                    date)                         124/66    unknown)  

 

           (unknown)  (no       (unknown)  (unknown)  Bones and chest  (units   

 (unknown)



                    date)                         wall:? No unknown)  



                                                  suspicious bony           



                                                  lesions.? Overlying           



                                                  soft tissues           

 

           (unknown)  (no       (unknown)  (unknown)  CK-MB (CK-2)  (units    (u

nknown)



                    date)                                   unknown)  

 

           (unknown)  (no       (unknown)  (unknown)  CK-MB (CK-2)  (units    (u

nknown)



                    date)                         TNP       unknown)  

 

           (unknown)  (no       (unknown)  (unknown)  CK-MB (CK-2) Rel  (units  

  (unknown)



                    date)                         Index     unknown)  

 

           (unknown)  (no       (unknown)  (unknown)  CK-MB (CK-2) Rel  (units  

  (unknown)



                    date)                         Index    TNP unknown)  

 

           (unknown)  (no       (unknown)  (unknown)  COMPARISON:?  (units    (u

nknown)



                    date)                         [facility], unknown)  



                                                  CR, XR CHEST 2V,           



                                                  3/18/2021, 10:19.           

 

           (unknown)  (no       (unknown)  (unknown)  COVID19 -Nasal  (units    

(unknown)



                    date)                         RAPID/Pre-Proc Stat unknown)  

 

           (unknown)  (no       (unknown)  (unknown)  CT abdomen pelvis  (units 

   (unknown)



                    date)                         w con Stat unknown)  

 

           (unknown)  (no       (unknown)  (unknown)  CT abdomen pelvis  (units 

   (unknown)



                    date)                         was ordered for unknown)  



                                                  patient has           



                                                  tenderness to his           



                                                  right lower           

 

           (unknown)  (no       (unknown)  (unknown)  CVA (cerebral  (units    (

unknown)



                    date)                         vascular accident) unknown)  



                                                  (2018)           

 

           (unknown)  (no       (unknown)  (unknown)  Calcium   (units    (unkno

wn)



                    date)                         (8.4-10.2)  mg/dL unknown)  

 

           (unknown)  (no       (unknown)  (unknown)  Calcium    9.3  (units    

(unknown)



                    date)                         (8.4-10.2)  mg/dL unknown)  

 

           (unknown)  (no       (unknown)  (unknown)  Carbon Dioxide  (units    

(unknown)



                    date)                         (22-32)  mmol/L unknown)  

 

           (unknown)  (no       (unknown)  (unknown)  Carbon Dioxide  (units    

(unknown)



                    date)                         29  (22-32)  mmol/L unknown)  

 

           (unknown)  (no       (unknown)  (unknown)  Cardio: denies  (units    

(unknown)



                    date)                         chest pain, unknown)  



                                                  palpitations           

 

           (unknown)  (no       (unknown)  (unknown)  Cardiovascular:  (units   

 (unknown)



                    date)                         regular rate and unknown)  



                                                  rhythm, no           



                                                  peripheral edema,           



                                                  warm extremities           

 

           (unknown)  (no       (unknown)  (unknown)  Ceftriaxone Sodium  (units

    (unknown)



                    date)                         1,000 mg/ (Sodium unknown)  



                                                  Chloride)  100 mls           



                                                  @ 200 mls/hr IV NOW           



                                                  ONE                 

 

           (unknown)  (no       (unknown)  (unknown)  Chest x-ray:  (units    (u

nknown)



                    date)                                   unknown)  

 

           (unknown)  (no       (unknown)  (unknown)  Chief complaint:  (units  

  (unknown)



                    date)                         Weakness  unknown)  

 

           (unknown)  (no       (unknown)  (unknown)  Chloride  (units    (unkno

wn)



                    date)                         ()  mmol/L unknown)  

 

           (unknown)  (no       (unknown)  (unknown)  Chloride    96 L  (units  

  (unknown)



                    date)                         ()  mmol/L unknown)  

 

           (unknown)  (no       (unknown)  (unknown)  Clinical  (units    (o

wn)



                    date)                         Impression: unknown)  

 

           (unknown)  (no       (unknown)  (unknown)  Colon cancer  (units    (u

nknown)



                    date)                         (2013)    unknown)  

 

           (unknown)  (no       (unknown)  (unknown)  Complete Blood  (units    

(unknown)



                    date)                         Count AUTO DIFF unknown)  



                                                  Stat                

 

           (unknown)  (no       (unknown)  (unknown)  Comprehensive  (units    (

unknown)



                    date)                         Metabolic Panel unknown)  



                                                  Stat                

 

           (unknown)  (no       (unknown)  (unknown)  Course    (units    (unkno

wn)



                    date)                                   unknown)  

 

           (unknown)  (no       (unknown)  (unknown)  Creatinine  (units    (unk

nown)



                    date)                         (0.66-1.25)  mg/dL unknown)  

 

           (unknown)  (no       (unknown)  (unknown)  Creatinine    1.42  (units

    (unknown)



                    date)                         H  (0.66-1.25) unknown)  



                                                  mg/dL               

 

           (unknown)  (no       (unknown)  (unknown)  : 1960  (units   

 (unknown)



                    date)                         Acct:[id number] unknown)  

 

           (unknown)  (no       (unknown)  (unknown)  Departure  (units    (unkn

own)



                    date)                                   unknown)  

 

           (unknown)  (no       (unknown)  (unknown)  Depression  (units    (unk

nown)



                    date)                                   unknown)  

 

           (unknown)  (no       (unknown)  (unknown)  Diabetes  (units    (unkno

wn)



                    date)                                   unknown)  

 

           (unknown)  (no       (unknown)  (unknown)  Diabetic  (units    (unkno

wn)



                    date)                         neuropathy unknown)  

 

           (unknown)  (no       (unknown)  (unknown)  Discharge Plan  (units    

(unknown)



                    date)                                   unknown)  

 

           (unknown)  (no       (unknown)  (unknown)  Discontinued  (units    (u

nknown)



                    date)                         Medications unknown)  

 

           (unknown)  (no       (unknown)  (unknown)  Gustavo Macias MD  (units   

 (unknown)



                    date)                         [Primary Care unknown)  



                                                  Provider] -           

 

           (unknown)  (no       (unknown)  (unknown)  ECG Data  (units    (unkno

wn)



                    date)                                   unknown)  

 

           (unknown)  (no       (unknown)  (unknown) EKG independently  (units  

  (unknown)



                    date)                         reviewed by Dr. mejia)  



                                                  Dinesh and reveals           



                                                  normal sinus rhythm           



                                                  at 72 bpm           

 

           (unknown)  (no       (unknown)  (unknown)  EKG-12 Lead Stat  (units  

  (unknown)



                    date)                                   unknown)  

 

           (unknown)  (no       (unknown)  (unknown)  ER Physician:  (units    (

unknown)



                    date)                         Allegra Montiel D.O. unknown)  

 

           (unknown)  (no       (unknown)  (unknown)  Eos # (Auto)  (units    (u

nknown)



                    date)                         (0-450)  /uL unknown)  

 

           (unknown)  (no       (unknown)  (unknown)  Eos # (Auto)   100  (units

    (unknown)



                    date)                           (0-450)  /uL unknown)  

 

           (unknown)  (no       (unknown)  (unknown)  Eos % (Auto)  (units    (u

nknown)



                    date)                         (2-4)  %  unknown)  

 

           (unknown)  (no       (unknown)  (unknown)  Eos % (Auto)   0.8  (units

    (unknown)



                    date)                         L   (2-4)  % unknown)  

 

           (unknown)  (no       (unknown)  (unknown)  Esterase  (units    (unkno

wn)



                    date)                                   unknown)  

 

           (unknown)  (no       (unknown)  (unknown)  Estimated GFR  (units    (

unknown)



                    date)                         (>60)  mL/min unknown)  

 

           (unknown)  (no       (unknown)  (unknown)  Estimated GFR  (units    (

unknown)



                    date)                         55 L  (>60)  mL/min unknown)  

 

           (unknown)  (no       (unknown)  (unknown)  Exam      (units    (unkno

wn)



                    date)                                   unknown)  

 

           (unknown)  (no       (unknown)  (unknown)  Exam Narrative:  (units   

 (unknown)



                    date)                                   unknown)  

 

           (unknown)  (no       (unknown)  (unknown)  Eyes: denies  (units    (u

nknown)



                    date)                         visual changes, eye unknown)  



                                                  pain                

 

           (unknown)  (no       (unknown)  (unknown)  Eyes: equal round  (units 

   (unknown)



                    date)                         and reactive, EOMI, unknown)  



                                                  conjunctiva normal           

 

           (unknown)  (no       (unknown)  (unknown)  FINDINGS:?  (units    (unk

nown)



                    date)                                   unknown)  

 

           (unknown)  (no       (unknown)  (unknown)  Family History  (units    

(unknown)



                    date)                         (Reviewed 22 unknown)  



                                                  @ 19:57 by Kitty Costello [facility])           

 

           (unknown)  (no       (unknown)  (unknown)  Father     (units 

   (unknown)



                    date)                          Cancer   unknown)  

 

           (unknown)  (no       (unknown)  (unknown)  GERD      (units    (unkno

wn)



                    date)                         (gastroesophageal unknown)  



                                                  reflux disease)           

 

           (unknown)  (no       (unknown)  (unknown)  GI:  Right lower  (units  

  (unknown)



                    date)                         quadrant tenderness unknown)  



                                                  to palpation           

 

           (unknown)  (no       (unknown)  (unknown)  GI: abdomen mildly  (units

    (unknown)



                    date)                         guarded, tender to unknown)  



                                                  palpation in the           



                                                  right lower           



                                                  quadrant,           

 

           (unknown)  (no       (unknown)  (unknown)  :  Straight cath  (units

    (unknown)



                    date)                         for urine completed unknown)  



                                                  by myself with 16           



                                                  [place] coude           



                                                  catheter,           

 

           (unknown)  (no       (unknown)  (unknown)  : denies  (units    (unk

nown)



                    date)                         dysuria, hematuria unknown)  



                                                  or flank pain,           



                                                  reports oliguria,           



                                                  patient self           

 

           (unknown)  (no       (unknown)  (unknown)  Gastroenterology  (units  

  (unknown)



                    date)                         from [place], unknown)  



                                                  patient reports           



                                                  that he sees Yasmine Rocha PA-C           

 

           (unknown)  (no       (unknown)  (unknown)  General   (units    (unkno

wn)



                    date)                                   unknown)  

 

           (unknown)  (no       (unknown)  (unknown)  General:  (units    (unkno

wn)



                    date)                         cooperative, unknown)  



                                                  comfortable, in no           



                                                  acute distress,           



                                                  well groomed,           

 

           (unknown)  (no       (unknown)  (unknown)  General: denies  (units   

 (unknown)



                    date)                         fever, chills, unknown)  



                                                  endorses weakness,           



                                                  right lower           



                                                  quadrant pain,           

 

           (unknown)  (no       (unknown)  (unknown)  GenericComposite[P  (units

    (unknown)



                    date)                         lt Count  unknown)  



                                                  (150-400)  X10^3/uL           



                                                   ]                  

 

           (unknown)  (no       (unknown)  (unknown)  GenericComposite[P  (units

    (unknown)



                    date)                         lt Count   219 unknown)  



                                                  (150-400)  X10^3/uL           



                                                   ]                  

 

           (unknown)  (no       (unknown)  (unknown)  GenericComposite[R  (units

    (unknown)



                    date)                         BC     (4.5-5.9) unknown)  



                                                  X10^6/uL  ]           

 

           (unknown)  (no       (unknown)  (unknown)  GenericComposite[R  (units

    (unknown)



                    date)                         BC   3.37 L unknown)  



                                                  (4.5-5.9)  X10^6/uL           



                                                   ]                  

 

           (unknown)  (no       (unknown)  (unknown)  GenericComposite[W  (units

    (unknown)



                    date)                         BC     (4.5-11.0) unknown)  



                                                  X10^3/uL  ]           

 

           (unknown)  (no       (unknown)  (unknown)  GenericComposite[W  (units

    (unknown)



                    date)                         BC   7.0  unknown)  



                                                  (4.5-11.0)           



                                                  X10^3/uL  ]           

 

           (unknown)  (no       (unknown)  (unknown)  Globulin  (units    (unkno

wn)



                    date)                         (1.7-4.1)  g/dL unknown)  

 

           (unknown)  (no       (unknown)  (unknown)  Globulin    3.6  (units   

 (unknown)



                    date)                         (1.7-4.1)  g/dL unknown)  

 

           (unknown)  (no       (unknown)  (unknown)  Glucose   (units    (unkno

wn)



                    date)                         ()  mg/dL unknown)  

 

           (unknown)  (no       (unknown)  (unknown)  Glucose    262 H  (units  

  (unknown)



                    date)                         ()  mg/dL unknown)  

 

           (unknown)  (no       (unknown)  (unknown)  Guaiac stool:  (units    (

unknown)



                    date)                         Negative, good unknown)  



                                                  stool result           

 

           (unknown)  (no       (unknown)  (unknown)  H/O colectomy  (units    (

unknown)



                    date)                                   unknown)  

 

           (unknown)  (no       (unknown)  (unknown)  HPI - Weakness  (units    

(unknown)



                    date)                                   unknown)  

 

           (unknown)  (no       (unknown)  (unknown)  HPI Narrative:  (units    

(unknown)



                    date)                                   unknown)  

 

           (unknown)  (no       (unknown)  (unknown)  Hct     (41-53)  %  (units

    (unknown)



                    date)                                   unknown)  

 

           (unknown)  (no       (unknown)  (unknown)  Hct   26.7 L  (units    (u

nknown)



                    date)                         (41-53)  % unknown)  

 

           (unknown)  (no       (unknown)  (unknown)  He does not have  (units  

  (unknown)



                    date)                         any mental status unknown)  



                                                  changes, GCS is 15.           



                                                   Guaiac stool is           

 

           (unknown)  (no       (unknown)  (unknown)  Head/Neck:  (units    (unk

nown)



                    date)                         Endorses having a unknown)  



                                                  headache, denies           



                                                  any neck pain           

 

           (unknown)  (no       (unknown)  (unknown)  Head: atraumatic,  (units 

   (unknown)



                    date)                         symmetrical facial unknown)  



                                                  expressions           

 

           (unknown)  (no       (unknown)  (unknown)  Hgb       (units    (unkno

wn)



                    date)                         (13.5-17.5)  g/dL unknown)  

 

           (unknown)  (no       (unknown)  (unknown)  Hgb   9.1 L  (units    (un

known)



                    date)                         (13.5-17.5)  g/dL unknown)  

 

           (unknown)  (no       (unknown)  (unknown)  History of Present  (units

    (unknown)



                    date)                         Illness   unknown)  

 

           (unknown)  (no       (unknown)  (unknown)  History of lumbar  (units 

   (unknown)



                    date)                         surgery () unknown)  

 

           (unknown)  (no       (unknown)  (unknown)  Hx of foot surgery  (units

    (unknown)



                    date)                         (2017)    unknown)  

 

           (unknown)  (no       (unknown)  (unknown)  Hx of shoulder  (units    

(unknown)



                    date)                         surgery (2010) unknown)  

 

           (unknown)  (no       (unknown)  (unknown)  Hypertension  (units    (u

nknown)



                    date)                                   unknown)  

 

           (unknown)  (no       (unknown)  (unknown)  IMPRESSION:? No  (units   

 (unknown)



                    date)                         acute     unknown)  



                                                  cardiopulmonary           



                                                  findings            

 

           (unknown)  (no       (unknown)  (unknown)  INDICATIONS:?  (units    (

unknown)



                    date)                         chest pain unknown)  

 

           (unknown)  (no       (unknown)  (unknown)  Imaging Data  (units    (u

nknown)



                    date)                                   unknown)  

 

           (unknown)  (no       (unknown)  (unknown)  Independently  (units    (

unknown)



                    date)                         reviewed vitals unknown)  



                                                  signs and nursing           



                                                  notes.              

 

           (unknown)  (no       (unknown)  (unknown)  Initial Vital  (units    (

unknown)



                    date)                         Signs     unknown)  

 

           (unknown)  (no       (unknown)  (unknown)  Initial Vital  (units    (

unknown)



                    date)                         Signs:    unknown)  

 

           (unknown)  (no       (unknown)  (unknown)  Instructions:  (units    (

unknown)



                    date)                         Acute Cystitis, unknown)  



                                                  Anemia              

 

           (unknown)  (no       (unknown)  (unknown)  Interpretation:  (units   

 (unknown)



                    date)                                   unknown)  

 

           (unknown)  (no       (unknown)  (unknown) January, hematocrit  (units

    (unknown)



                    date)                         is 26.7, down from unknown)  



                                                  27.8 also in           



                                                  January, platelet           



                                                  count 219,           

 

           (unknown)  (no       (unknown)  (unknown) Klebsiella  (units    (unkn

own)



                    date)                         pneumoniae which unknown)  



                                                  was resistant only           



                                                  to ampicillin and           



                                                  nitrofurantoin.           

 

           (unknown)  (no       (unknown)  (unknown)  Lab Data  (units    (unkno

wn)



                    date)                                   unknown)  

 

           (unknown)  (no       (unknown)  (unknown)  Labs:     (units    (unkno

wn)



                    date)                                   unknown)  

 

           (unknown)  (no       (unknown)  (unknown)  Lactate   (units    (unkno

wn)



                    date)                         (0.7-2.1)  mmol/L unknown)  

 

           (unknown)  (no       (unknown)  (unknown)  Lactate   2.6 H  (units   

 (unknown)



                    date)                         (0.7-2.1)  mmol/L unknown)  

 

           (unknown)  (no       (unknown)  (unknown)  Lactate (Lactic  (units   

 (unknown)



                    date)                         Acid) Stat unknown)  

 

           (unknown)  (no       (unknown)  (unknown)  Lipase    (units    (unkno

wn)



                    date)                         ()  U/L unknown)  

 

           (unknown)  (no       (unknown)  (unknown)  Lipase    32  (units    (u

nknown)



                    date)                         ()  U/L unknown)  

 

           (unknown)  (no       (unknown)  (unknown)  Lipase Stat  (units    (un

known)



                    date)                                   unknown)  

 

           (unknown)  (no       (unknown)  (unknown)  Lungs and pleura:?  (units

    (unknown)



                    date)                         Lungs are clear.? unknown)  



                                                  No pleural           



                                                  effusions or           



                                                  pneumothorax.? Low           

 

           (unknown)  (no       (unknown)  (unknown)  Lymph # (Auto)  (units    

(unknown)



                    date)                         (5347-5147)  /uL unknown)  

 

           (unknown)  (no       (unknown)  (unknown)  Lymph # (Auto)  (units    

(unknown)



                    date)                         400 L   (9565-1892) unknown)  



                                                   /uL                

 

           (unknown)  (no       (unknown)  (unknown)  Lymph % (Auto)  (units    

(unknown)



                    date)                         (25-40)  % unknown)  

 

           (unknown)  (no       (unknown)  (unknown)  Lymph % (Auto)  (units    

(unknown)



                    date)                         6.4 L   (25-40)  % unknown)  

 

           (unknown)  (no       (unknown)  (unknown)  MCH     (26-34)  (units   

 (unknown)



                    date)                         PG        unknown)  

 

           (unknown)  (no       (unknown)  (unknown)  MCH   26.9  (units    (unk

nown)



                    date)                         (26-34)  PG unknown)  

 

           (unknown)  (no       (unknown)  (unknown)  MCHC     (30-36)  (units  

  (unknown)



                    date)                         %         unknown)  

 

           (unknown)  (no       (unknown)  (unknown)  MCHC   34.0  (units    (un

known)



                    date)                         (30-36)  % unknown)  

 

           (unknown)  (no       (unknown)  (unknown)  MCV     ()  (units  

  (unknown)



                    date)                         fL        unknown)  

 

           (unknown)  (no       (unknown)  (unknown)  MCV   79.1 L  (units    (u

nknown)



                    date)                         ()  fL unknown)  

 

           (unknown)  (no       (unknown)  (unknown)  MDM - Weakness  (units    

(unknown)



                    date)                                   unknown)  

 

           (unknown)  (no       (unknown)  (unknown)  MDM Narrative  (units    (

unknown)



                    date)                                   unknown)  

 

           (unknown)  (no       (unknown)  (unknown)  MSK: denies new  (units   

 (unknown)



                    date)                         joint pain, muscle unknown)  



                                                  weakness or           



                                                  swelling            

 

           (unknown)  (no       (unknown)  (unknown) MSK: moves all  (units    (

unknown)



                    date)                         extremities, unknown)  



                                                  neurovascularly           



                                                  intact, generalized           



                                                  weakness, normal           

 

           (unknown)  (no       (unknown)  (unknown)  Magnesium  (units    (unkn

own)



                    date)                         (1.6-2.3)  mg/dL unknown)  

 

           (unknown)  (no       (unknown)  (unknown)  Magnesium    1.0 L  (units

    (unknown)



                    date)                          (1.6-2.3)  mg/dL unknown)  

 

           (unknown)  (no       (unknown)  (unknown)  Magnesium Stat  (units    

(unknown)



                    date)                                   unknown)  

 

           (unknown)  (no       (unknown)  (unknown)  Magnesium Sulfate  (units 

   (unknown)



                    date)                         (Magnesium Sulfate) unknown)  



                                                   2 gm in 50 mls @           



                                                  50 mls/hr IV NOW           



                                                  ONE                 

 

           (unknown)  (no       (unknown)  (unknown)  Mediastinum:?  (units    (

unknown)



                    date)                         Mediastinal unknown)  



                                                  contours appear           



                                                  normal.? Heart size           



                                                  is normal.?           

 

           (unknown)  (no       (unknown)  (unknown)  Medical History  (units   

 (unknown)



                    date)                         (Reviewed 22 unknown)  



                                                  @ 19:57 by Kitty Costello [facility])           

 

           (unknown)  (no       (unknown)  (unknown)  Medical decision  (units  

  (unknown)



                    date)                         making narrative: unknown)  

 

           (unknown)  (no       (unknown)  (unknown)  MiraL[employer] since this  (units

    (unknown)



                    date)                         happened. unknown)  

 

           (unknown)  (no       (unknown)  (unknown)  Mode of arrival:  (units  

  (unknown)



                    date)                         Wheelchair unknown)  

 

           (unknown)  (no       (unknown)  (unknown)  Mono # (Auto)  (units    (

unknown)



                    date)                         (0-900)  /uL unknown)  

 

           (unknown)  (no       (unknown)  (unknown)  Mono # (Auto)  (units    (

unknown)



                    date)                         600   (0-900)  /uL unknown)  

 

           (unknown)  (no       (unknown)  (unknown)  Mono % (Auto)  (units    (

unknown)



                    date)                         (3-14)  % unknown)  

 

           (unknown)  (no       (unknown)  (unknown)  Mono % (Auto)  (units    (

unknown)



                    date)                         7.9   (3-14)  % unknown)  

 

           (unknown)  (no       (unknown)  (unknown)  Mother     (units 

   (unknown)



                    date)                          Cancer   unknown)  

 

           (unknown)  (no       (unknown)  (unknown)  Mouth/Throat:  (units    (

unknown)



                    date)                         moist mucus unknown)  



                                                  membranes           

 

           (unknown)  (no       (unknown)  (unknown)  Narrative  (units    (unkn

own)



                    date)                                   unknown)  

 

           (unknown)  (no       (unknown)  (unknown)  Narrative:  (units    (unk

nown)



                    date)                                   unknown)  

 

           (unknown)  (no       (unknown)  (unknown)  Neck: supple  (units    (u

nknown)



                    date)                                   unknown)  

 

           (unknown)  (no       (unknown)  (unknown)  Neuro: denies  (units    (

unknown)



                    date)                         numbness, tingling, unknown)  



                                                  dizziness           

 

           (unknown)  (no       (unknown)  (unknown)  Neuro: normal  (units    (

unknown)



                    date)                         speech and unknown)  



                                                  cognition, A+O x3           

 

           (unknown)  (no       (unknown)  (unknown)  Neut # (Auto)  (units    (

unknown)



                    date)                         (7275-2716)  /uL unknown)  

 

           (unknown)  (no       (unknown)  (unknown)  Neut # (Auto)  (units    (

unknown)



                    date)                         5900   (3973-6359) unknown)  



                                                  /uL                 

 

           (unknown)  (no       (unknown)  (unknown)  Neut % (Auto)  (units    (

unknown)



                    date)                         (50-75)  % unknown)  

 

           (unknown)  (no       (unknown)  (unknown)  Neut % (Auto)  (units    (

unknown)



                    date)                         84.7 H   (50-75)  % unknown)  

 

           (unknown)  (no       (unknown)  (unknown)  No Action  (units    (unkn

own)



                    date)                                   unknown)  

 

           (unknown)  (no       (unknown)  (unknown)  No Known Drug  (units    (

unknown)



                    date)                         Allergies Allergy unknown)  



                                                  Verified 22           



                                                  17:38               

 

           (unknown)  (no       (unknown)  (unknown)  Nose: nares  (units    (un

known)



                    date)                         patent, no unknown)  



                                                  rhinorrhea           

 

           (unknown)  (no       (unknown)  (unknown)  Notable on  (units    (unk

nown)



                    date)                         patient's prior unknown)  



                                                  urine cultures,           



                                                  urine from           



                                                  2022 grew out           

 

           (unknown)  (no       (unknown)  (unknown)  Ondansetron HCl  (units   

 (unknown)



                    date)                         (Ondansetron 4 Mg/2 unknown)  



                                                  Ml Inj)  4 mg IV           



                                                  NOW ONE             

 

           (unknown)  (no       (unknown)  (unknown)  Ordered:  (units    (unkno

wn)



                    date)                                   unknown)  

 

           (unknown)  (no       (unknown)  (unknown)  Orders    (units    (unkno

wn)



                    date)                                   unknown)  

 

           (unknown)  (no       (unknown)  (unknown)  Osteoarthritis  (units    

(unknown)



                    date)                                   unknown)  

 

           (unknown)  (no       (unknown)  (unknown)  Oxygen Delivery  (units   

 (unknown)



                    date)                         Method    22 unknown)  



                                                  17:34               

 

           (unknown)  (no       (unknown)  (unknown)  Oxygen Delivery  (units   

 (unknown)



                    date)                         Method Room Air unknown)  

 

           (unknown)  (no       (unknown)  (unknown)  PROCEDURE:? XR  (units    

(unknown)



                    date)                         CHEST 1V  unknown)  

 

           (unknown)  (no       (unknown)  (unknown)  Patient   (units    (unkno

wn)



                    date)                         Disposition: Home unknown)  

 

           (unknown)  (no       (unknown)  (unknown)  Patient History  (units   

 (unknown)



                    date)                                   unknown)  

 

           (unknown)  (no       (unknown)  (unknown)  Patient is  (units    (unk

nown)



                    date)                         currently unknown)  



                                                  anticoagulated on           



                                                  Plavix 37.5 mg           



                                                  daily.  Dr. Gómez           

 

           (unknown)  (no       (unknown)  (unknown) Patient reports  (units    

(unknown)



                    date)                         that when he has a unknown)  



                                                  bowel movement, he           



                                                  typically has hard           



                                                  pellets             

 

           (unknown)  (no       (unknown)  (unknown)  Patient self caths  (units

    (unknown)



                    date)                         for urine output, unknown)  



                                                  reports that he has           



                                                  not had much urine           

 

           (unknown)  (no       (unknown)  (unknown)  Patient was  (units    (un

known)



                    date)                         treated in the unknown)  



                                                  emergency           



                                                  department with 1 g           



                                                  of ceftriaxone.           

 

           (unknown)  (no       (unknown)  (unknown) Patient's  (units    (unkno

wn)



                    date)                         colorectal cancer unknown)  



                                                  oncologist was Dr. Jett, patient was           



                                                  referred to him           

 

           (unknown)  (no       (unknown)  (unknown)  Patient's preop  (units   

 (unknown)



                    date)                         diagnosis was unknown)  



                                                  nausea and vomiting           



                                                  from hematemesis in           



                                                  January             

 

           (unknown)  (no       (unknown)  (unknown)  Patient:  (units    (unkno

wn)



                    date)                         Kassandra Leon unknown)  



                                                  MR#: [id number]            

 

           (unknown)  (no       (unknown)  (unknown)  Postlaminectomy  (units   

 (unknown)



                    date)                         syndrome  unknown)  

 

           (unknown)  (no       (unknown)  (unknown)  Potassium  (units    (unkn

own)



                    date)                         (3.4-5.1)  mmol/L unknown)  

 

           (unknown)  (no       (unknown)  (unknown)  Potassium    3.7  (units  

  (unknown)



                    date)                         (3.4-5.1)  mmol/L unknown)  

 

           (unknown)  (no       (unknown)  (unknown)  Prescriptions:  (units    

(unknown)



                    date)                                   unknown)  

 

           (unknown)  (no       (unknown)  (unknown)  Procalcitonin  (units    (

unknown)



                    date)                         (<0.5)  ng/mL unknown)  

 

           (unknown)  (no       (unknown)  (unknown)  Procalcitonin  (units    (

unknown)



                    date)                         0.72 H    (<0.5) unknown)  



                                                  ng/mL               

 

           (unknown)  (no       (unknown)  (unknown)  Procalcitonin Stat  (units

    (unknown)



                    date)                                   unknown)  

 

           (unknown)  (no       (unknown)  (unknown)  Psych: mental  (units    (

unknown)



                    date)                         status is grossly unknown)  



                                                  normal, congruent           



                                                  mood, normal           



                                                  affect, pleasant           

 

           (unknown)  (no       (unknown)  (unknown)  Pulse Oximetry  98  (units

    (unknown)



                    date)                           22 17:34 unknown)  

 

           (unknown)  (no       (unknown)  (unknown)  Pulse Oximetry 98  (units 

   (unknown)



                    date)                                   unknown)  

 

           (unknown)  (no       (unknown)  (unknown)  Pulse Rate  77  (units    

(unknown)



                    date)                         22 17:34 unknown)  

 

           (unknown)  (no       (unknown)  (unknown)  Pulse Rate 77  (units    (

unknown)



                    date)                                   unknown)  

 

           (unknown)  (no       (unknown)  (unknown)  RDW       (units    (unkno

wn)



                    date)                         (11.6-14.8)  % unknown)  

 

           (unknown)  (no       (unknown)  (unknown)  RDW   16.1 H  (units    (u

nknown)



                    date)                         (11.6-14.8)  % unknown)  

 

           (unknown)  (no       (unknown)  (unknown)  Referrals:  (units    (unk

nown)



                    date)                                   unknown)  

 

           (unknown)  (no       (unknown)  (unknown)  Related Data  (units    (u

nknown)



                    date)                                   unknown)  

 

           (unknown)  (no       (unknown)  (unknown)  Respiratory Rate  (units  

  (unknown)



                    date)                         18   22 17:34 unknown)  

 

           (unknown)  (no       (unknown)  (unknown)  Respiratory Rate  (units  

  (unknown)



                    date)                         18        unknown)  

 

           (unknown)  (no       (unknown)  (unknown)  Respiratory:  (units    (u

nknown)



                    date)                         denies shortness of unknown)  



                                                  breath, or new           



                                                  cough               

 

           (unknown)  (no       (unknown)  (unknown)  Respiratory:  (units    (u

nknown)



                    date)                         normal effort, able unknown)  



                                                  to speak in           



                                                  complete sentences,           



                                                  no audible           

 

           (unknown)  (no       (unknown)  (unknown)  Result diagrams:  (units  

  (unknown)



                    date)                                   unknown)  

 

           (unknown)  (no       (unknown)  (unknown)  Review of Systems  (units 

   (unknown)



                    date)                                   unknown)  

 

           (unknown)  (no       (unknown)  (unknown)  SARS-CoV-2 (PCR)  (units  

  (unknown)



                    date)                           (Negative) unknown)  

 

           (unknown)  (no       (unknown)  (unknown)  SARS-CoV-2 (PCR)  (units  

  (unknown)



                    date)                         Negative  unknown)  



                                                  (Negative)           

 

           (unknown)  (no       (unknown)  (unknown)  Sciatica  (units    (unkno

wn)



                    date)                                   unknown)  

 

           (unknown)  (no       (unknown)  (unknown)  Signed By:  (units    (unk

nown)



                    date)                                   unknown)  

 

           (unknown)  (no       (unknown)  (unknown)  Skin: brisk  (units    (un

known)



                    date)                         capillary refill, unknown)  



                                                  no rash, no           



                                                  erythema            

 

           (unknown)  (no       (unknown)  (unknown)  Skin: denies rash,  (units

    (unknown)



                    date)                         itching or wound unknown)  

 

           (unknown)  (no       (unknown)  (unknown)  Smoking Status:  (units   

 (unknown)



                    date)                         Never smoker unknown)  

 

           (unknown)  (no       (unknown)  (unknown)  Smoking Status:  (units   

 (unknown)



                    date)                         Never smoker unknown)  

 

           (unknown)  (no       (unknown)  (unknown)  Social History  (units    

(unknown)



                    date)                         (Reviewed 22 unknown)  



                                                  @ 19:57 by ZAIRA Amezcua)           

 

           (unknown)  (no       (unknown)  (unknown)  Sodium    (units    (unkno

wn)



                    date)                         (137-145)  mmol/L unknown)  

 

           (unknown)  (no       (unknown)  (unknown)  Sodium    135 L  (units   

 (unknown)



                    date)                         (137-145)  mmol/L unknown)  

 

           (unknown)  (no       (unknown)  (unknown)  Sodium Chloride  (units   

 (unknown)



                    date)                         (Normal Saline unknown)  



                                                  0.9%)  1,000 mls @           



                                                  1,000 mls/hr IV           



                                                  BOLUS ONE           

 

           (unknown)  (no       (unknown)  (unknown)  Source: patient  (units   

 (unknown)



                    date)                         and family unknown)  

 

           (unknown)  (no       (unknown)  (unknown)  Spinal stenosis  (units   

 (unknown)



                    date)                                   unknown)  

 

           (unknown)  (no       (unknown)  (unknown)  Stated complaint:  (units 

   (unknown)



                    date)                         WEAKNESS UNABLE TO unknown)  



                                                  HOLD ANYTHING DOWN           

 

           (unknown)  (no       (unknown)  (unknown)  Substance Use  (units    (

unknown)



                    date)                         Type: does not use unknown)  

 

           (unknown)  (no       (unknown)  (unknown)  Surgical History  (units  

  (unknown)



                    date)                         (Reviewed 22 unknown)  



                                                  @ 19:57 by ZAIRA mAezcua)           

 

           (unknown)  (no       (unknown)  (unknown)  Surgical changes  (units  

  (unknown)



                    date)                         and devices:? unknown)  



                                                  None.?              

 

           (unknown)  (no       (unknown)  (unknown)  TECHNIQUE:? One  (units   

 (unknown)



                    date)                         view of the chest unknown)  



                                                  was acquired.?           

 

           (unknown)  (no       (unknown)  (unknown)  Temperature  98.3  (units 

   (unknown)



                    date)                         F   22 17:34 unknown)  

 

           (unknown)  (no       (unknown)  (unknown)  Temperature 98.3 F  (units

    (unknown)



                    date)                                   unknown)  

 

           (unknown)  (no       (unknown)  (unknown)  This is a  (units    (unkn

own)



                    date)                         62-year-old male unknown)  



                                                  with history of           



                                                  multiple sclerosis,           



                                                  CVA in 2019,           

 

           (unknown)  (no       (unknown)  (unknown)  Time Seen by  (units    (u

nknown)



                    date)                         Provider: 22 unknown)  



                                                  19:11               

 

           (unknown)  (no       (unknown)  (unknown)  Total Bilirubin  (units   

 (unknown)



                    date)                          (0.2-1.3)  mg/dL unknown)  

 

           (unknown)  (no       (unknown)  (unknown)  Total Bilirubin  (units   

 (unknown)



                    date)                         0.6  (0.2-1.3) unknown)  



                                                  mg/dL               

 

           (unknown)  (no       (unknown)  (unknown)  Total Creatine  (units    

(unknown)



                    date)                         Kinase     () unknown)  



                                                   U/L                

 

           (unknown)  (no       (unknown)  (unknown)  Total Creatine  (units    

(unknown)



                    date)                         Kinase    54 L unknown)  



                                                  ()  U/L           

 

           (unknown)  (no       (unknown)  (unknown)  Total Protein  (units    (

unknown)



                    date)                         (6.3-8.2)  g/dL unknown)  

 

           (unknown)  (no       (unknown)  (unknown)  Total Protein  (units    (

unknown)



                    date)                         7.5  (6.3-8.2) unknown)  



                                                  g/dL                

 

           (unknown)  (no       (unknown)  (unknown)  Troponin + CK  (units    (

unknown)



                    date)                         Cardiac Panel Stat unknown)  

 

           (unknown)  (no       (unknown)  (unknown)  Troponin I  (units    (unk

nown)



                    date)                         (0.01-0.034)  ng/mL unknown)  

 

           (unknown)  (no       (unknown)  (unknown)  Troponin I    <  (units   

 (unknown)



                    date)                         0.012  (0.01-0.034) unknown)  



                                                   ng/mL              

 

           (unknown)  (no       (unknown)  (unknown)  Ur Culture  (units    (unk

nown)



                    date)                         Indicated? unknown)  

 

           (unknown)  (no       (unknown)  (unknown)  Ur Culture  (units    (unk

nown)



                    date)                         Indicated? unknown)  



                                                  Specimen cultured           

 

           (unknown)  (no       (unknown)  (unknown)  Urine Bacteria  (units    

(unknown)



                    date)                         (None)    unknown)  

 

           (unknown)  (no       (unknown)  (unknown)  Urine Bacteria  (units    

(unknown)



                    date)                         Few (2-10) H unknown)  



                                                  (None)              

 

           (unknown)  (no       (unknown)  (unknown)  Urine Culture Stat  (units

    (unknown)



                    date)                                   unknown)  

 

           (unknown)  (no       (unknown)  (unknown)  Urine Microscopic  (units 

   (unknown)



                    date)                         Stat      unknown)  

 

           (unknown)  (no       (unknown)  (unknown)  Urine RBC  (units    (unkn

own)



                    date)                         (0-5/HPF) unknown)  

 

           (unknown)  (no       (unknown)  (unknown)  Urine RBC  (units    (unkn

own)



                    date)                         1-5/hpf  (0-5/HPF) unknown)  

 

           (unknown)  (no       (unknown)  (unknown)  Urine Specific  (units    

(unknown)



                    date)                         Gravity    1.015 unknown)  

 

           (unknown)  (no       (unknown)  (unknown)  Urine WBC  (units    (unkn

own)



                    date)                         (0-5/HPF) unknown)  

 

           (unknown)  (no       (unknown)  (unknown)  Urine WBC  (units    (unkn

own)



                    date)                         5-10/hpf H unknown)  



                                                  (0-5/HPF)           

 

           (unknown)  (no       (unknown)  (unknown)  Vital Signs  (units    (un

known)



                    date)                                   unknown)  

 

           (unknown)  (no       (unknown)  (unknown)  Vital signs:  (units    (u

nknown)



                    date)                                   unknown)  

 

           (unknown)  (no       (unknown)  (unknown)  Linette Jett MD  (units  

  (unknown)



                    date)                         [Physician] - As unknown)  



                                                  soon as possible           

 

           (unknown)  (no       (unknown)  (unknown)  XR chest 1V Stat  (units  

  (unknown)



                    date)                                   unknown)  

 

           (unknown)  (no       (unknown)  (unknown)  [Embedded Image  (units   

 (unknown)



                    date)                         Not Available] unknown)  

 

           (unknown)  (no       (unknown)  (unknown)  acute ischemic  (units    

(unknown)



                    date)                         changes.  unknown)  

 

           (unknown)  (no       (unknown)  (unknown)  alcohol intake  (units    

(unknown)



                    date)                         frequency: a few unknown)  



                                                  times a month           

 

           (unknown)  (no       (unknown)  (unknown)  alcohol intake:  (units   

 (unknown)



                    date)                         current   unknown)  

 

           (unknown)  (no       (unknown)  (unknown)  and cooperative  (units   

 (unknown)



                    date)                                   unknown)  

 

           (unknown)  (no       (unknown)  (unknown)  and generalized  (units   

 (unknown)



                    date)                         weakness.  Patient unknown)  



                                                  self catheterizes           



                                                  his bladder at           



                                                  baseline,           

 

           (unknown)  (no       (unknown)  (unknown)  appear    (units    (unkno

wn)



                    date)                                   unknown)  

 

           (unknown)  (no       (unknown)  (unknown)  ascorbic acid  (units    (

unknown)



                    date)                         (vitamin C) 500 mg unknown)  



                                                  500 mg PO BID #60           



                                                  tabs 22           

 

           (unknown)  (no       (unknown)  (unknown)  atorvastatin 80 mg  (units

    (unknown)



                    date)                         tablet 40 mg PO QPM unknown)  



                                                  20           

 

           (unknown)  (no       (unknown)  (unknown)  be causing a rash  (units 

   (unknown)



                    date)                                   unknown)  

 

           (unknown)  (no       (unknown)  (unknown) bleeding.  Guaiac  (units  

  (unknown)



                    date)                         stool in the unknown)  



                                                  emergency           



                                                  department was           



                                                  negative.           



                                                  Patient's lab           

 

           (unknown)  (no       (unknown)  (unknown)  blood in his  (units    (u

nknown)



                    date)                         emesis or in his unknown)  



                                                  stool.  He reports           



                                                  that he did a stool           



                                                  test one            

 

           (unknown)  (no       (unknown)  (unknown)  buspirone 5 mg  (units    

(unknown)



                    date)                         tablet 5 mg PO BID unknown)  



                                                  #60 tabs 22           

 

           (unknown)  (no       (unknown)  (unknown)  cancer.  Postop  (units   

 (unknown)



                    date)                         diagnosis from this unknown)  



                                                  upper endoscopy was           



                                                  the same.  His           

 

           (unknown)  (no       (unknown)  (unknown)  caths at baseline  (units 

   (unknown)



                    date)                                   unknown)  

 

           (unknown)  (no       (unknown)  (unknown)  clopidogrel 75 mg  (units 

   (unknown)



                    date)                         tablet 75 mg PO unknown)  



                                                  DAILY 22            

 

           (unknown)  (no       (unknown)  (unknown)  colonic   (units    (unkno

wn)



                    date)                         anastomosis, and a unknown)  



                                                  suboptimal bowel           



                                                  prep.               

 

           (unknown)  (no       (unknown)  (unknown)  cultures were  (units    (

unknown)



                    date)                         drawn prior to unknown)  



                                                  receiving           



                                                  antibiotics Urine           



                                                  microscopy shows           

 

           (unknown)  (no       (unknown)  (unknown)  cyanocobalamin  (units    

(unknown)



                    date)                         (vitamin B-12) 500 unknown)  



                                                  1,000 mcg PO DAILY           



                                                  #60 tabs 22           

 

           (unknown)  (no       (unknown)  (unknown)  diabetes,  (units    (unkn

own)



                    date)                         hypertension, unknown)  



                                                  colorectal cancer           



                                                  in  with a           



                                                  reverse colectomy           



                                                  who                 

 

           (unknown)  (no       (unknown)  (unknown)  diabetes,  (units    (unkn

own)



                    date)                         hypertension, unknown)  



                                                  colorectal cancer           



                                                  in  with a           



                                                  reverse colectomy,           



                                                  He                  

 

           (unknown)  (no       (unknown)  (unknown)  does not drink  (units    

(unknown)



                    date)                         alcohol.? Patient unknown)  



                                                  and his wife state           



                                                  that he was seen in           



                                                  the                 

 

           (unknown)  (no       (unknown)  (unknown)  elevated lactate.  (units 

   (unknown)



                    date)                         His heart rate and unknown)  



                                                  blood pressure are           



                                                  within normal           



                                                  ranges.             

 

           (unknown)  (no       (unknown)  (unknown)  emergency  (units    (unkn

own)



                    date)                         department in unknown)  



                                                  January and           



                                                  diagnosed with a GI           



                                                  bleed, he was           



                                                  admitted,           

 

           (unknown)  (no       (unknown)  (unknown)  endorses fatigue  (units  

  (unknown)



                    date)                                   unknown)  

 

           (unknown)  (no       (unknown)  (unknown) endoscopic  (units    (unkn

own)



                    date)                         diagnosis includes unknown)  



                                                  mild gastropathy,           



                                                  esophagitis, patent           



                                                  retrosigmoid           

 

           (unknown)  (no       (unknown)  (unknown)  ferrous sulfate  (units   

 (unknown)



                    date)                         325 mg (65 mg 325 unknown)  



                                                  mg PO BIDWM #60           



                                                  tabs 22           

 

           (unknown)  (no       (unknown)  (unknown)  followed by loose  (units 

   (unknown)



                    date)                         stool.    unknown)  

 

           (unknown)  (no       (unknown)  (unknown)  gabapentin 300 mg  (units 

   (unknown)



                    date)                         capsule 300 mg PO unknown)  



                                                  BID 18            

 

           (unknown)  (no       (unknown)  (unknown)  generalized  (units    (un

known)



                    date)                         weakness and unknown)  



                                                  fatigue             

 

           (unknown)  (no       (unknown)  (unknown)  glipizide 10 mg  (units   

 (unknown)



                    date)                         tablet 10 mg PO BID unknown)  



                                                  18           

 

           (unknown)  (no       (unknown)  (unknown)  had a recent  (units    (u

nknown)



                    date)                         endoscopy that did unknown)  



                                                  not show any           



                                                  bleeding polyps or           



                                                  acute GI            

 

           (unknown)  (no       (unknown)  (unknown)  hospital and on  (units   

 (unknown)



                    date)                         chart review it unknown)  



                                                  appears that it was           



                                                  completed on           



                                                  2022.           

 

           (unknown)  (no       (unknown)  (unknown)  household members:  (units

    (unknown)



                    date)                          spouse   unknown)  

 

           (unknown)  (no       (unknown)  (unknown)  ingrown, no penile  (units

    (unknown)



                    date)                         discharge, no unknown)  



                                                  scrotal edema           

 

           (unknown)  (no       (unknown)  (unknown)  iron) tablet  (units    (u

nknown)



                    date)                                   unknown)  

 

           (unknown)  (no       (unknown)  (unknown)  lives     (units    (unkno

wn)



                    date)                         independently:  Yes unknown)  

 

           (unknown)  (no       (unknown)  (unknown)  losartan 50 mg  (units    

(unknown)



                    date)                         tablet 25 mg PO QPM unknown)  



                                                  20           

 

           (unknown)  (no       (unknown)  (unknown)  lung      (units    (unkno

wn)



                    date)                                   unknown)  

 

           (unknown)  (no       (unknown)  (unknown)  magnesium was  (units    (

unknown)



                    date)                         replaced with 2 g, unknown)  



                                                  normal saline 1000           



                                                  mL was given for           



                                                  his                 

 

           (unknown)  (no       (unknown)  (unknown)  mcg tablet  (units    (unk

nown)



                    date)                                   unknown)  

 

           (unknown)  (no       (unknown)  (unknown)  mcg tablet  (units    (unk

nown)



                    date)                         (Tab-A-Lucy) unknown)  

 

           (unknown)  (no       (unknown)  (unknown)  metformin 500 mg  (units  

  (unknown)



                    date)                         tablet,extended 500 unknown)  



                                                  mg PO BID 18            

 

           (unknown)  (no       (unknown)  (unknown)  metoprolol  (units    (unk

nown)



                    date)                         succinate 25 mg 25 unknown)  



                                                  mg PO DAILY           



                                                  21           

 

           (unknown)  (no       (unknown)  (unknown)  moving forward.  (units   

 (unknown)



                    date)                         Patient reports unknown)  



                                                  that he has been           



                                                  taking fiber but           



                                                  not any             

 

           (unknown)  (no       (unknown)  (unknown)  multivitamin with  (units 

   (unknown)



                    date)                         folic acid 400 1 unknown)  



                                                  tab PO DAILY #90           



                                                  tabs 22           

 

           (unknown)  (no       (unknown)  (unknown)  negative.  I  (units    (u

nknown)



                    date)                         completed his unknown)  



                                                  urinary             



                                                  catheterization           



                                                  with a coude           



                                                  catheter, urine           

 

           (unknown)  (no       (unknown)  (unknown)  nondistended, no  (units  

  (unknown)



                    date)                         masses, no unknown)  



                                                  exquisite           



                                                  tenderness with           



                                                  exam, no rebound           

 

           (unknown)  (no       (unknown)  (unknown)  not heard it was  (units  

  (unknown)



                    date)                         positive or not. unknown)  



                                                  Patient reports           



                                                  that his primary           



                                                  care                

 

           (unknown)  (no       (unknown)  (unknown)  ondansetron HCl 4  (units 

   (unknown)



                    date)                         mg tablet 4 mg PO unknown)  



                                                  Q8H PRN nausea and           



                                                  21            

 

           (unknown)  (no       (unknown)  (unknown)  oral powder packet  (units

    (unknown)



                    date)                                   unknown)  

 

           (unknown)  (no       (unknown)  (unknown)  output for three  (units  

  (unknown)



                    date)                         days, three days unknown)  



                                                  ago he had nausea           



                                                  and vomiting,           



                                                  denies any           

 

           (unknown)  (no       (unknown)  (unknown)  polyethylene  (units    (u

nknown)



                    date)                         glycol 3350 17 gram unknown)  



                                                  17 gm PO DAILY #90           



                                                  ea 22           

 

           (unknown)  (no       (unknown)  (unknown)  presents to the  (units   

 (unknown)



                    date)                         emergency unknown)  



                                                  department           



                                                  complaining of           



                                                  ongoing fatigue,           



                                                  oliguria,           

 

           (unknown)  (no       (unknown)  (unknown)  provider has left,  (units

    (unknown)



                    date)                         he reports that he unknown)  



                                                  has seen Dr. Macias           



                                                  recently as well.           

 

           (unknown)  (no       (unknown)  (unknown)  quadrant with a  (units   

 (unknown)



                    date)                         complicated unknown)  



                                                  surgical history of           



                                                  his abdomen.  This           



                                                  shows               

 

           (unknown)  (no       (unknown)  (unknown)  recently for his  (units  

  (unknown)



                    date)                         anemia.   unknown)  

 

           (unknown)  (no       (unknown)  (unknown)  related diarrhea  (units  

  (unknown)



                    date)                         from fecal loading unknown)  



                                                  and obstipation.           



                                                  Recommended MiraLax           



                                                  17 g                

 

           (unknown)  (no       (unknown)  (unknown)  release 24 hr  (units    (

unknown)



                    date)                                   unknown)  

 

           (unknown)  (no       (unknown)  (unknown)  reported that  (units    (

unknown)



                    date)                         patient's altered unknown)  



                                                  bowel habit is           



                                                  likely a function           



                                                  of overflow           

 

           (unknown)  (no       (unknown)  (unknown)  sennosides 8.6 mg  (units 

   (unknown)



                    date)                         tablet (senna) 17.2 unknown)  



                                                  mg PO BEDTIME #60           



                                                  tabs 22           

 

           (unknown)  (no       (unknown)  (unknown)  substance use  (units    (

unknown)



                    date)                         type:  does not use unknown)  

 

           (unknown)  (no       (unknown)  (unknown)  tablet (Vitamin C)  (units

    (unknown)



                    date)                                   unknown)  

 

           (unknown)  (no       (unknown)  (unknown)  tablet,extended  (units   

 (unknown)



                    date)                         release 24 hr unknown)  

 

           (unknown)  (no       (unknown)  (unknown)  tenderness  (units    (unk

nown)



                    date)                                   unknown)  

 

           (unknown)  (no       (unknown)  (unknown)  there.  He reports  (units

    (unknown)



                    date)                         that he had an unknown)  



                                                  upper endoscopy           



                                                  completed here at           



                                                  this                

 

           (unknown)  (no       (unknown)  (unknown)  tone      (units    (unkno

wn)



                    date)                                   unknown)  

 

           (unknown)  (no       (unknown)  (unknown)  trospium 20 mg  (units    

(unknown)



                    date)                         tablet 20 mg PO BID unknown)  



                                                  urinary retention           



                                                  22           

 

           (unknown)  (no       (unknown)  (unknown)  unremarkable.?  (units    

(unknown)



                    date)                                   unknown)  

 

           (unknown)  (no       (unknown)  (unknown)  urine dip showed  (units  

  (unknown)



                    date)                         leukocytes, unknown)  



                                                  ketones, wbc's and           



                                                  did not show           



                                                  nitrites.  Blood           

 

           (unknown)  (no       (unknown)  (unknown)  urine is cloudy,  (units  

  (unknown)



                    date)                         yellow,   unknown)  



                                                  approximately 600           



                                                  mL in bladder and           



                                                  drained, no rash           

 

           (unknown)  (no       (unknown)  (unknown)  volumes accentuate  (units

    (unknown)



                    date)                         pulmonary unknown)  



                                                  interstitium and           



                                                  heart size.           

 

           (unknown)  (no       (unknown)  (unknown)  was cloudy yellow  (units 

   (unknown)



                    date)                         urine and 600 mL unknown)  



                                                  was drained.           



                                                  Patient tolerated           



                                                  well.  His           

 

           (unknown)  (no       (unknown)  (unknown)  week ago and  (units    (u

nknown)



                    date)                         dropped it off at unknown)  



                                                  [facility] which was           



                                                  testing for blood           



                                                  but he has           

 

           (unknown)  (no       (unknown)  (unknown)  went to [facility]  (units 

   (unknown)



                    date)                         for rehab following unknown)  



                                                  his admission and           



                                                  has followed up           



                                                  with                

 

           (unknown)  (no       (unknown)  (unknown)  wheezing, stridor,  (units

    (unknown)



                    date)                         or rales. No unknown)  



                                                  retractions or           



                                                  tachypnea.           









                                         Result panel 26









           (unknown)  (no date)  (unknown)  (unknown)  1.1       mmol/L    (unkn

own)









                                         Result panel 27









           (unknown)  (no       (unknown)  (unknown)  (no value)  (units    (unk

nown)



                    date)                                   unknown)  

 

           (unknown)  (no       (unknown)  (unknown)  Radiologist  (units    (un

known)



                    date)                         Impression: unknown)  

 

           (unknown)  (no       (unknown)  (unknown)  Date of Service:  (units  

  (unknown)



                    date)                         22  unknown)  

 

           (unknown)  (no       (unknown)  (unknown)  (no value)  (units    (unk

nown)



                    date)                                   unknown)  

 

           (unknown)  (no       (unknown)  (unknown)  22 17:45  (units    

(unknown)



                    date)                                   unknown)  

 

           (unknown)  (no       (unknown)  (unknown)  1 tab PO DAILY  (units    

(unknown)



                    date)                         Qty: 90 0RF unknown)  

 

           (unknown)  (no       (unknown)  (unknown)  1,000 mcg PO DAILY  (units

    (unknown)



                    date)                         Qty: 60 0RF unknown)  

 

           (unknown)  (no       (unknown)  (unknown)  10 mg PO BID  (units    (u

nknown)



                    date)                                   unknown)  

 

           (unknown)  (no       (unknown)  (unknown)  17 gm PO DAILY  (units    

(unknown)



                    date)                         Qty: 90 0RF unknown)  

 

           (unknown)  (no       (unknown)  (unknown)  17.2 mg PO BEDTIME  (units

    (unknown)



                    date)                         Qty: 60 0RF unknown)  

 

           (unknown)  (no       (unknown)  (unknown)  20 mg PO BID  (units    (u

nknown)



                    date)                                   unknown)  

 

           (unknown)  (no       (unknown)  (unknown)  25 mg PO DAILY  (units    

(unknown)



                    date)                                   unknown)  

 

           (unknown)  (no       (unknown)  (unknown)  25 mg PO QPM  (units    (u

nknown)



                    date)                                   unknown)  

 

           (unknown)  (no       (unknown)  (unknown)  300 mg PO BID  (units    (

unknown)



                    date)                                   unknown)  

 

           (unknown)  (no       (unknown)  (unknown)  325 mg PO BIDWM  (units   

 (unknown)



                    date)                         Qty: 60 0RF unknown)  

 

           (unknown)  (no       (unknown)  (unknown)  4 mg PO Q8H PRN  (units   

 (unknown)



                    date)                         (Reason: nausea and unknown)  



                                                  vomiting) Qty: 14           



                                                  0RF                 

 

           (unknown)  (no       (unknown)  (unknown)  40 mg PO QPM  (units    (u

nknown)



                    date)                                   unknown)  

 

           (unknown)  (no       (unknown)  (unknown)  5 mg PO BID Qty:  (units  

  (unknown)



                    date)                         60 0RF    unknown)  

 

           (unknown)  (no       (unknown)  (unknown)  500 mg PO BID  (units    (

unknown)



                    date)                                   unknown)  

 

           (unknown)  (no       (unknown)  (unknown)  500 mg PO BID Qty:  (units

    (unknown)



                    date)                         20 0RF    unknown)  

 

           (unknown)  (no       (unknown)  (unknown)  500 mg PO BID Qty:  (units

    (unknown)



                    date)                         60 0RF    unknown)  

 

           (unknown)  (no       (unknown)  (unknown)  75 mg PO DAILY  (units    

(unknown)



                    date)                                   unknown)  

 

           (unknown)  (no       (unknown)  (unknown)  Admin: 22  (units   

 (unknown)



                    date)                         20:19  Dose: 50 unknown)  



                                                  mls/hr              

 

           (unknown)  (no       (unknown)  (unknown)  Admin: 22  (units   

 (unknown)



                    date)                         21:06  Dose: 200 unknown)  



                                                  mls/hr              

 

           (unknown)  (no       (unknown)  (unknown)  Allergies  (units    (unkn

own)



                    date)                                   unknown)  

 

           (unknown)  (no       (unknown)  (unknown)  Documented By: [facility]  (units

    (unknown)



                    date)                                   unknown)  

 

           (unknown)  (no       (unknown)  (unknown)  Documented By: AT  (units 

   (unknown)



                    date)                          Co-signed By: MLSTEVE unknown)  

 

           (unknown)  (no       (unknown)  (unknown)  Documented By: AT  (units 

   (unknown)



                    date)                                   unknown)  

 

           (unknown)  (no       (unknown)  (unknown)  Documented By: MLSTEVE  (units

    (unknown)



                    date)                           Co-signed By: [facility] unknown)  

 

           (unknown)  (no       (unknown)  (unknown)  Documented By: MLM  (units

    (unknown)



                    date)                                   unknown)  

 

           (unknown)  (no       (unknown)  (unknown)  ED Orders  (units    (unkn

own)



                    date)                                   unknown)  

 

           (unknown)  (no       (unknown)  (unknown)  Emergency Report  (units  

  (unknown)



                    date)                                   unknown)  

 

           (unknown)  (no       (unknown)  (unknown)  Home Medications  (units  

  (unknown)



                    date)                                   unknown)  

 

           (unknown)  (no       (unknown)  (unknown)  [facility]  (units   

 (unknown)



                    date)                         [address] [place] [address] unknown)  



                                                  [place] [address]           

 

           (unknown)  (no       (unknown)  (unknown)  Lab Results  (units    (un

known)



                    date)                                   unknown)  

 

           (unknown)  (no       (unknown)  (unknown)  Label Comments:  (units   

 (unknown)



                    date)                                   unknown)  

 

           (unknown)  (no       (unknown)  (unknown)  Last Admin:  (units    (un

known)



                    date)                         22 18:30 unknown)  



                                                  Dose: 4 mg           

 

           (unknown)  (no       (unknown)  (unknown)  Last Admin:  (units    (un

known)



                    date)                         22 20:18 unknown)  



                                                  Dose: 1,000 mls/hr           

 

           (unknown)  (no       (unknown)  (unknown)  Last Infusion:  (units    

(unknown)



                    date)                         22 21:10 unknown)  



                                                  Dose: 0 mls/hr           

 

           (unknown)  (no       (unknown)  (unknown)  Last Infusion:  (units    

(unknown)



                    date)                         22 21:28 unknown)  



                                                  Dose: 0 mls/hr           

 

           (unknown)  (no       (unknown)  (unknown)  Previous Rx's  (units    (

unknown)



                    date)                                   unknown)  

 

           (unknown)  (no       (unknown)  (unknown)  Stop: 22  (units    

(unknown)



                    date)                         18:16     unknown)  

 

           (unknown)  (no       (unknown)  (unknown)  Stop: 22  (units    

(unknown)



                    date)                         20:15     unknown)  

 

           (unknown)  (no       (unknown)  (unknown)  Stop: 22  (units    

(unknown)



                    date)                         20:25     unknown)  

 

           (unknown)  (no       (unknown)  (unknown)  Stop: 22  (units    

(unknown)



                    date)                         20:27     unknown)  

 

           (unknown)  (no       (unknown)  (unknown)  Urine Dip  (units    (unkn

own)



                    date)                                   unknown)  

 

           (unknown)  (no       (unknown)  (unknown)  Vital Signs - 8 hr  (units

    (unknown)



                    date)                                   unknown)  

 

           (unknown)  (no       (unknown)  (unknown)  has not been  (units    (u

nknown)



                    date)                         eating so hasn't unknown)  



                                                  taken recently.           



                                                  spouse states           



                                                  thinks it might           

 

           (unknown)  (no       (unknown)  (unknown)  stopped taking  (units    

(unknown)



                    date)                         prior to back unknown)  



                                                  surgery and did not           



                                                  restart             

 

           (unknown)  (no       (unknown)  (unknown)  (no value)  (units    (unk

nown)



                    date)                                   unknown)  

 

           (unknown)  (no       (unknown)  (unknown)  22  (units 

   (unknown)



                    date)                         22  unknown)  



                                                  Range/Units           

 

           (unknown)  (no       (unknown)  (unknown)  22  (units    (unkno

wn)



                    date)                         Range/Units unknown)  

 

           (unknown)  (no       (unknown)  (unknown)  17:40 17:45 17:45  (units 

   (unknown)



                    date)                                   unknown)  

 

           (unknown)  (no       (unknown)  (unknown)  17:45 17:45 20:09  (units 

   (unknown)



                    date)                                   unknown)  

 

           (unknown)  (no       (unknown)  (unknown)  22:00     (units    (unkno

wn)



                    date)                                   unknown)  

 

           (unknown)  (no       (unknown)  (unknown)  ascorbic acid  (units    (

unknown)



                    date)                         (vitamin C) unknown)  



                                                  [Vitamin C] 500 mg           



                                                  Tablet              

 

           (unknown)  (no       (unknown)  (unknown)  atorvastatin 80 mg  (units

    (unknown)



                    date)                         tablet    unknown)  

 

           (unknown)  (no       (unknown)  (unknown)  buspirone 5 mg  (units    

(unknown)



                    date)                         Tablet    unknown)  

 

           (unknown)  (no       (unknown)  (unknown)  ciprofloxacin HCl  (units 

   (unknown)



                    date)                         500 mg tablet unknown)  

 

           (unknown)  (no       (unknown)  (unknown)  clopidogrel 75 mg  (units 

   (unknown)



                    date)                         tablet    unknown)  

 

           (unknown)  (no       (unknown)  (unknown)  cyanocobalamin  (units    

(unknown)



                    date)                         (vitamin B-12) 500 unknown)  



                                                  mcg Tablet           

 

           (unknown)  (no       (unknown)  (unknown)  ferrous sulfate  (units   

 (unknown)



                    date)                         325 mg (65 mg iron) unknown)  



                                                  Tablet              

 

           (unknown)  (no       (unknown)  (unknown)  gabapentin 300 mg  (units 

   (unknown)



                    date)                         capsule   unknown)  

 

           (unknown)  (no       (unknown)  (unknown)  glipizide 10 mg  (units   

 (unknown)



                    date)                         tablet    unknown)  

 

           (unknown)  (no       (unknown)  (unknown)  losartan 50 mg  (units    

(unknown)



                    date)                         tablet    unknown)  

 

           (unknown)  (no       (unknown)  (unknown)  metformin 500 mg  (units  

  (unknown)



                    date)                         tablet extended unknown)  



                                                  release 24 hr           

 

           (unknown)  (no       (unknown)  (unknown)  metoprolol  (units    (unk

nown)



                    date)                         succinate 25 mg unknown)  



                                                  tablet extended           



                                                  release 24 hr           

 

           (unknown)  (no       (unknown)  (unknown)  multivitamin with  (units 

   (unknown)



                    date)                         folic acid unknown)  



                                                  [Tab-A-Lucy] 400           



                                                  mcg Tablet           

 

           (unknown)  (no       (unknown)  (unknown)  ondansetron HCl  (units   

 (unknown)



                    date)                         [Zofran] 4 mg unknown)  



                                                  tablet              

 

           (unknown)  (no       (unknown)  (unknown)  polyethylene  (units    (u

nknown)



                    date)                         glycol 3350 17 gram unknown)  



                                                  Powder In Packet           

 

           (unknown)  (no       (unknown)  (unknown)  sennosides [senna]  (units

    (unknown)



                    date)                         8.6 mg Tablet unknown)  

 

           (unknown)  (no       (unknown)  (unknown)  trospium 20 mg  (units    

(unknown)



                    date)                         tablet    unknown)  

 

           (unknown)  (no       (unknown)  (unknown)  22  (units    (unkno

wn)



                    date)                                   unknown)  

 

           (unknown)  (no       (unknown)  (unknown)  1.07 in January.  (units  

  (unknown)



                    date)                         No elevation in his unknown)  



                                                  troponin,           



                                                  procalcitonin is           



                                                  elevated at           

 

           (unknown)  (no       (unknown)  (unknown)  Anemia,   (units    (unkno

wn)



                    date)                         Hypomagnesemia, unknown)  



                                                  Pyelonephritis           

 

           (unknown)  (no       (unknown)  (unknown)  Medication  (units    (unk

nown)



                    date)                         Instructions unknown)  



                                                  Recorded            

 

           (unknown)  (no       (unknown)  (unknown)  Medication  (units    (unk

nown)



                    date)                         Instructions unknown)  



                                                  Recorded  Confirmed           

 

           (unknown)  (no       (unknown)  (unknown)  sodium 135,  (units    (un

known)



                    date)                         potassium 3.7, unknown)  



                                                  creatinine 1.42           



                                                  which is increased           



                                                  from his prior of           

 

           (unknown)  (no       (unknown)  (unknown)  with regular axis  (units 

   (unknown)



                    date)                         and intervals.  No unknown)  



                                                  STEMI, ST segment           



                                                  changes,            



                                                  arrhythmia, or           

 

           (unknown)  (no       (unknown)  (unknown)  work is   (units    (unkno

wn)



                    date)                         significant for unknown)  



                                                  anemia, hemoglobin           



                                                  is 9.1 today, down           



                                                  from 9.6 in           

 

           (unknown)  (no       (unknown)  (unknown)  <Kitty Costello,  (units 

   (unknown)



                    date)                         [facility] - Last Filed: unknown)  



                                                  22 20:33>           

 

           (unknown)  (no       (unknown)  (unknown)  <Allegra Montiel, DO  (units

    (unknown)



                    date)                         - Last Filed: unknown)  



                                                  22 23:09>           

 

           (unknown)  (no       (unknown)  (unknown)  (Zofran) vomiting  (units 

   (unknown)



                    date)                         #14 tabs  unknown)  

 

           (unknown)  (no       (unknown)  (unknown)  0.72, his lactate  (units 

   (unknown)



                    date)                         is also elevated at unknown)  



                                                  2.6, magnesium is           



                                                  low at 1.0.  His           

 

           (unknown)  (no       (unknown)  (unknown)  [id number]   (units    (unkno

wn)



                    date)                                   unknown)  

 

           (unknown)  (no       (unknown)  (unknown)  22 17:39  (units    

(unknown)



                    date)                                   unknown)  

 

           (unknown)  (no       (unknown)  (unknown)  22 17:40  (units    

(unknown)



                    date)                                   unknown)  

 

           (unknown)  (no       (unknown)  (unknown)  22 17:45  (units    

(unknown)



                    date)                                   unknown)  

 

           (unknown)  (no       (unknown)  (unknown)  07/18/22 19:26  (units    

(unknown)



                    date)                                   unknown)  

 

           (unknown)  (no       (unknown)  (unknown)  22 20:09  (units    

(unknown)



                    date)                                   unknown)  

 

           (unknown)  (no       (unknown)  (unknown)  22 20:55  (units    

(unknown)



                    date)                                   unknown)  

 

           (unknown)  (no       (unknown)  (unknown)  17:34 22  (units    

(unknown)



                    date)                                   unknown)  

 

           (unknown)  (no       (unknown)  (unknown)  with iron  (units    (

unknown)



                    date)                         deficiency anemia, unknown)  



                                                  altered bowel           



                                                  habit, personal           



                                                  history of colon           

 

           (unknown)  (no       (unknown)  (unknown)  22:52     (units    (unkno

wn)



                    date)                                   unknown)  

 

           (unknown)  (no       (unknown)  (unknown)  62-year-old  (units    (un

known)



                    date)                         gentleman with a unknown)  



                                                  history of multiple           



                                                  sclerosis, CVA in           



                                                  2019,               

 

           (unknown)  (no       (unknown)  (unknown)  ?         (units    (unkno

wn)



                    date)                                   unknown)  

 

           (unknown)  (no       (unknown)  (unknown)  ALT     (<50)  (units    (

unknown)



                    date)                         IU/L      unknown)  

 

           (unknown)  (no       (unknown)  (unknown)  ALT    16  (<50)  (units  

  (unknown)



                    date)                         IU/L      unknown)  

 

           (unknown)  (no       (unknown)  (unknown)  ALT   (<50)  IU/L  (units 

   (unknown)



                    date)                                   unknown)  

 

           (unknown)  (no       (unknown)  (unknown)  AST     (17-59)  (units   

 (unknown)



                    date)                         IU/L      unknown)  

 

           (unknown)  (no       (unknown)  (unknown)  AST    19  (17-59)  (units

    (unknown)



                    date)                          IU/L     unknown)  

 

           (unknown)  (no       (unknown)  (unknown)  AST   (17-59)  (units    (

unknown)



                    date)                         IU/L      unknown)  

 

           (unknown)  (no       (unknown)  (unknown)  Activity  (units    (unkno

wn)



                    date)                         Restrictions/Additi unknown)  



                                                  onal Instructions:           

 

           (unknown)  (no       (unknown)  (unknown)  Age/Sex: 62 / M  (units   

 (unknown)



                    date)                                   unknown)  

 

           (unknown)  (no       (unknown)  (unknown)  Albumin   (units    (unkno

wn)



                    date)                         (3.5-5.0)  g/dL unknown)  

 

           (unknown)  (no       (unknown)  (unknown)  Albumin    3.9  (units    

(unknown)



                    date)                         (3.5-5.0)  g/dL unknown)  

 

           (unknown)  (no       (unknown)  (unknown)  Albumin   (units    (unkno

wn)



                    date)                         (3.5-5.0)  g/dL unknown)  

 

           (unknown)  (no       (unknown)  (unknown)  Albumin/Globulin  (units  

  (unknown)



                    date)                         Ratio     (1.0-2.8) unknown)  

 

           (unknown)  (no       (unknown)  (unknown)  Albumin/Globulin  (units  

  (unknown)



                    date)                         Ratio    1.1 unknown)  



                                                  (1.0-2.8)           

 

           (unknown)  (no       (unknown)  (unknown)  Albumin/Globulin  (units  

  (unknown)



                    date)                         Ratio   (1.0-2.8) unknown)  

 

           (unknown)  (no       (unknown)  (unknown)  Alkaline  (units    (unkno

wn)



                    date)                         Phosphatase unknown)  



                                                  ()  U/L           

 

           (unknown)  (no       (unknown)  (unknown)  Alkaline  (units    (unkno

wn)



                    date)                         Phosphatase    105 unknown)  



                                                  ()  U/L           

 

           (unknown)  (no       (unknown)  (unknown)  Alkaline  (units    (unkno

wn)



                    date)                         Phosphatase unknown)  



                                                  ()  U/L           

 

           (unknown)  (no       (unknown)  (unknown)  Allergy/AdvReac  (units   

 (unknown)



                    date)                         Type Severity unknown)  



                                                  Reaction Status           



                                                  Date / Time           

 

           (unknown)  (no       (unknown)  (unknown)  Amorphous Sediment  (units

    (unknown)



                    date)                                   unknown)  

 

           (unknown)  (no       (unknown)  (unknown)  Amorphous Sediment  (units

    (unknown)



                    date)                                   unknown)  

 

           (unknown)  (no       (unknown)  (unknown)  Amorphous Sediment  (units

    (unknown)



                    date)                            2+     unknown)  

 

           (unknown)  (no       (unknown)  (unknown)  Approved by: Garfield Mcdanielsunits 

   (unknown)



                    date)                         SHANNAN Lopez on unknown)  



                                                  2022 at 18:02?           

 

           (unknown)  (no       (unknown)  (unknown)  BUN     (9-20)  (units    

(unknown)



                    date)                         mg/dL     unknown)  

 

           (unknown)  (no       (unknown)  (unknown)  BUN    21 H  (units    (un

known)



                    date)                         (9-20)  mg/dL unknown)  

 

           (unknown)  (no       (unknown)  (unknown)  BUN   (9-20)  (units    (u

nknown)



                    date)                         mg/dL     unknown)  

 

           (unknown)  (no       (unknown)  (unknown)  BUN/Creatinine  (units    

(unknown)



                    date)                         Ratio     (6-22) unknown)  

 

           (unknown)  (no       (unknown)  (unknown)  BUN/Creatinine  (units    

(unknown)



                    date)                         Ratio    14.8 unknown)  



                                                  (6-22)              

 

           (unknown)  (no       (unknown)  (unknown)  BUN/Creatinine  (units    

(unknown)



                    date)                         Ratio   (6-22) unknown)  

 

           (unknown)  (no       (unknown)  (unknown)  Baso # (Auto)  (units    (

unknown)



                    date)                         (0-100)  /uL unknown)  

 

           (unknown)  (no       (unknown)  (unknown)  Baso # (Auto)  (units    (

unknown)



                    date)                         (0-100)  /uL unknown)  

 

           (unknown)  (no       (unknown)  (unknown)  Baso # (Auto)   0  (units 

   (unknown)



                    date)                          (0-100)  /uL unknown)  

 

           (unknown)  (no       (unknown)  (unknown)  Baso % (Auto)  (units    (

unknown)



                    date)                         (0-2)  %  unknown)  

 

           (unknown)  (no       (unknown)  (unknown)  Baso % (Auto)  (units    (

unknown)



                    date)                         (0-2)  %  unknown)  

 

           (unknown)  (no       (unknown)  (unknown)  Baso % (Auto)  (units    (

unknown)



                    date)                         0.2   (0-2)  % unknown)  

 

           (unknown)  (no       (unknown)  (unknown)  Bedside Urine  (units    (

unknown)



                    date)                         Bilirubin        - unknown)  



                                                  Negative            

 

           (unknown)  (no       (unknown)  (unknown)  Bedside Urine  (units    (

unknown)



                    date)                         Glucose    Negative unknown)  

 

           (unknown)  (no       (unknown)  (unknown)  Bedside Urine  (units    (

unknown)



                    date)                         Ketone    - unknown)  



                                                  Negative            

 

           (unknown)  (no       (unknown)  (unknown)  Bedside Urine  (units    (

unknown)



                    date)                         Leukocytes       + unknown)  



                                                  70                  

 

           (unknown)  (no       (unknown)  (unknown)  Bedside Urine  (units    (

unknown)



                    date)                         Nitrite    - unknown)  



                                                  Negative            

 

           (unknown)  (no       (unknown)  (unknown)  Bedside Urine  (units    (

unknown)



                    date)                         Occult Blood     ++ unknown)  

 

           (unknown)  (no       (unknown)  (unknown)  Bedside Urine  (units    (

unknown)



                    date)                         Protein    + 30 unknown)  

 

           (unknown)  (no       (unknown)  (unknown)  Bedside Urine  (units    (

unknown)



                    date)                         Urobilinogen     - unknown)  



                                                  Negative            

 

           (unknown)  (no       (unknown)  (unknown)  Bedside Urine pH  (units  

  (unknown)



                    date)                          6.0      unknown)  

 

           (unknown)  (no       (unknown)  (unknown)  Blood Culture Stat  (units

    (unknown)



                    date)                                   unknown)  

 

           (unknown)  (no       (unknown)  (unknown)  Blood Pressure  (units    

(unknown)



                    date)                         124/66   22 unknown)  



                                                  17:34               

 

           (unknown)  (no       (unknown)  (unknown)  Blood Pressure  (units    

(unknown)



                    date)                         124/66 124/58 L unknown)  

 

           (unknown)  (no       (unknown)  (unknown)  Bones and chest  (units   

 (unknown)



                    date)                         wall:? No unknown)  



                                                  suspicious bony           



                                                  lesions.? Overlying           



                                                  soft tissues           

 

           (unknown)  (no       (unknown)  (unknown)  CK-MB (CK-2)  (units    (u

nknown)



                    date)                                   unknown)  

 

           (unknown)  (no       (unknown)  (unknown)  CK-MB (CK-2)  (units    (u

nknown)



                    date)                                   unknown)  

 

           (unknown)  (no       (unknown)  (unknown)  CK-MB (CK-2)  (units    (u

nknown)



                    date)                         TNP       unknown)  

 

           (unknown)  (no       (unknown)  (unknown)  CK-MB (CK-2) Rel  (units  

  (unknown)



                    date)                         Index     unknown)  

 

           (unknown)  (no       (unknown)  (unknown)  CK-MB (CK-2) Rel  (units  

  (unknown)



                    date)                         Index     unknown)  

 

           (unknown)  (no       (unknown)  (unknown)  CK-MB (CK-2) Rel  (units  

  (unknown)



                    date)                         Index    TNP unknown)  

 

           (unknown)  (no       (unknown)  (unknown)  COMPARISON:?  (units    (u

nknown)



                    date)                         [facility], unknown)  



                                                  CR, XR CHEST 2V,           



                                                  3/18/2021, 10:19.           

 

           (unknown)  (no       (unknown)  (unknown)  COVID19 -Nasal  (units    

(unknown)



                    date)                         RAPID/Pre-Proc Stat unknown)  

 

           (unknown)  (no       (unknown)  (unknown)  CT abdomen pelvis  (units 

   (unknown)



                    date)                         w con Stat unknown)  

 

           (unknown)  (no       (unknown)  (unknown)  CT abdomen pelvis  (units 

   (unknown)



                    date)                         was ordered for unknown)  



                                                  patient has           



                                                  tenderness to his           



                                                  right lower           

 

           (unknown)  (no       (unknown)  (unknown)  CVA (cerebral  (units    (

unknown)



                    date)                         vascular accident) unknown)  



                                                  (2018)           

 

           (unknown)  (no       (unknown)  (unknown)  Calcium   (units    (unkno

wn)



                    date)                         (8.4-10.2)  mg/dL unknown)  

 

           (unknown)  (no       (unknown)  (unknown)  Calcium    9.3  (units    

(unknown)



                    date)                         (8.4-10.2)  mg/dL unknown)  

 

           (unknown)  (no       (unknown)  (unknown)  Calcium   (units    (unkno

wn)



                    date)                         (8.4-10.2)  mg/dL unknown)  

 

           (unknown)  (no       (unknown)  (unknown)  Carbon Dioxide  (units    

(unknown)



                    date)                         (22-32)  mmol/L unknown)  

 

           (unknown)  (no       (unknown)  (unknown)  Carbon Dioxide  (units    

(unknown)



                    date)                         29  (22-32)  mmol/L unknown)  

 

           (unknown)  (no       (unknown)  (unknown)  Carbon Dioxide  (units    

(unknown)



                    date)                         (22-32)  mmol/L unknown)  

 

           (unknown)  (no       (unknown)  (unknown)  Cardio: denies  (units    

(unknown)



                    date)                         chest pain, unknown)  



                                                  palpitations           

 

           (unknown)  (no       (unknown)  (unknown)  Cardiovascular:  (units   

 (unknown)



                    date)                         regular rate and unknown)  



                                                  rhythm, no           



                                                  peripheral edema,           



                                                  warm extremities           

 

           (unknown)  (no       (unknown)  (unknown)  Ceftriaxone Sodium  (units

    (unknown)



                    date)                         1,000 mg/ (Sodium unknown)  



                                                  Chloride)  100 mls           



                                                  @ 200 mls/hr IV NOW           



                                                  ONE                 

 

           (unknown)  (no       (unknown)  (unknown)  Chest x-ray:  (units    (u

nknown)



                    date)                                   unknown)  

 

           (unknown)  (no       (unknown)  (unknown)  Chief complaint:  (units  

  (unknown)



                    date)                         Weakness  unknown)  

 

           (unknown)  (no       (unknown)  (unknown)  Chloride  (units    (o

wn)



                    date)                         ()  mmol/L unknown)  

 

           (unknown)  (no       (unknown)  (unknown)  Chloride    96 L  (units  

  (unknown)



                    date)                         ()  mmol/L unknown)  

 

           (unknown)  (no       (unknown)  (unknown)  Chloride  (units    (unkno

wn)



                    date)                         ()  mmol/L unknown)  

 

           (unknown)  (no       (unknown)  (unknown)  Clinical  (units    (unkno

wn)



                    date)                         Impression: unknown)  

 

           (unknown)  (no       (unknown)  (unknown)  Colon cancer  (units    (u

nknown)



                    date)                         (2013)    unknown)  

 

           (unknown)  (no       (unknown)  (unknown)  Complete Blood  (units    

(unknown)



                    date)                         Count AUTO DIFF unknown)  



                                                  Stat                

 

           (unknown)  (no       (unknown)  (unknown)  Comprehensive  (units    (

unknown)



                    date)                         Metabolic Panel unknown)  



                                                  Stat                

 

           (unknown)  (no       (unknown)  (unknown)  Course    (units    (unkno

wn)



                    date)                                   unknown)  

 

           (unknown)  (no       (unknown)  (unknown)  Creatinine  (units    (unk

nown)



                    date)                         (0.66-1.25)  mg/dL unknown)  

 

           (unknown)  (no       (unknown)  (unknown)  Creatinine    1.42  (units

    (unknown)



                    date)                         H  (0.66-1.25) unknown)  



                                                  mg/dL               

 

           (unknown)  (no       (unknown)  (unknown)  Creatinine  (units    (unk

nown)



                    date)                         (0.66-1.25)  mg/dL unknown)  

 

           (unknown)  (no       (unknown)  (unknown)  : 1960  (units   

 (unknown)



                    date)                         Acct:[id number] unknown)  

 

           (unknown)  (no       (unknown)  (unknown)  Departure  (units    (unkn

own)



                    date)                                   unknown)  

 

           (unknown)  (no       (unknown)  (unknown)  Depression  (units    (unk

nown)



                    date)                                   unknown)  

 

           (unknown)  (no       (unknown)  (unknown)  Diabetes  (units    (unkno

wn)



                    date)                                   unknown)  

 

           (unknown)  (no       (unknown)  (unknown)  Diabetic  (units    (unkno

wn)



                    date)                         neuropathy unknown)  

 

           (unknown)  (no       (unknown)  (unknown)  Discharge Plan  (units    

(unknown)



                    date)                                   unknown)  

 

           (unknown)  (no       (unknown)  (unknown)  Discontinued  (units    (u

nknown)



                    date)                         Medications unknown)  

 

           (unknown)  (no       (unknown)  (unknown)  Gustavo Macias MD  (units   

 (unknown)



                    date)                         [[facility] unknown)  



                                                  Provider] -           

 

           (unknown)  (no       (unknown)  (unknown)  ECG Data  (units    (unkno

wn)



                    date)                                   unknown)  

 

           (unknown)  (no       (unknown)  (unknown) EKG independently  (units  

  (unknown)



                    date)                         reviewed by Dr. mejia)  



                                                  Dinesh and reveals           



                                                  normal sinus rhythm           



                                                  at 72 bpm           

 

           (unknown)  (no       (unknown)  (unknown)  EKG-12 Lead Stat  (units  

  (unknown)



                    date)                                   unknown)  

 

           (unknown)  (no       (unknown)  (unknown)  ER Physician:  (units    (

unknown)



                    date)                         Mank,Allegra C D.O. unknown)  

 

           (unknown)  (no       (unknown)  (unknown)  Eos # (Auto)  (units    (u

nknown)



                    date)                         (0-450)  /uL unknown)  

 

           (unknown)  (no       (unknown)  (unknown)  Eos # (Auto)  (units    (u

nknown)



                    date)                         (0-450)  /uL unknown)  

 

           (unknown)  (no       (unknown)  (unknown)  Eos # (Auto)   100  (units

    (unknown)



                    date)                           (0-450)  /uL unknown)  

 

           (unknown)  (no       (unknown)  (unknown)  Eos % (Auto)  (units    (u

nknown)



                    date)                         (2-4)  %  unknown)  

 

           (unknown)  (no       (unknown)  (unknown)  Eos % (Auto)  (units    (u

nknown)



                    date)                         (2-4)  %  unknown)  

 

           (unknown)  (no       (unknown)  (unknown)  Eos % (Auto)   0.8  (units

    (unknown)



                    date)                         L   (2-4)  % unknown)  

 

           (unknown)  (no       (unknown)  (unknown)  Esterase  (units    (unkno

wn)



                    date)                                   unknown)  

 

           (unknown)  (no       (unknown)  (unknown)  Estimated GFR  (units    (

unknown)



                    date)                         (>60)  mL/min unknown)  

 

           (unknown)  (no       (unknown)  (unknown)  Estimated GFR  (units    (

unknown)



                    date)                         55 L  (>60)  mL/min unknown)  

 

           (unknown)  (no       (unknown)  (unknown)  Estimated GFR  (units    (

unknown)



                    date)                         (>60)  mL/min unknown)  

 

           (unknown)  (no       (unknown)  (unknown)  Exam      (units    (unkno

wn)



                    date)                                   unknown)  

 

           (unknown)  (no       (unknown)  (unknown)  Exam Narrative:  (units   

 (unknown)



                    date)                                   unknown)  

 

           (unknown)  (no       (unknown)  (unknown)  Eyes: denies  (units    (u

nknown)



                    date)                         visual changes, eye unknown)  



                                                  pain                

 

           (unknown)  (no       (unknown)  (unknown)  Eyes: equal round  (units 

   (unknown)



                    date)                         and reactive, EOMI, unknown)  



                                                  conjunctiva normal           

 

           (unknown)  (no       (unknown)  (unknown)  FINDINGS:?  (units    (unk

nown)



                    date)                                   unknown)  

 

           (unknown)  (no       (unknown)  (unknown)  Family History  (units    

(unknown)



                    date)                         (Reviewed 22 unknown)  



                                                  @ 19:57 by Kitty Costello [facility])           

 

           (unknown)  (no       (unknown)  (unknown)  Father     (units 

   (unknown)



                    date)                          Cancer   unknown)  

 

           (unknown)  (no       (unknown)  (unknown)  Follow-up for  (units    (

unknown)



                    date)                         recheck in the next unknown)  



                                                  2-3 days.           

 

           (unknown)  (no       (unknown)  (unknown)  GERD      (units    (unkno

wn)



                    date)                         (gastroesophageal unknown)  



                                                  reflux disease)           

 

           (unknown)  (no       (unknown)  (unknown)  GI:  Right lower  (units  

  (unknown)



                    date)                         quadrant tenderness unknown)  



                                                  to palpation           

 

           (unknown)  (no       (unknown)  (unknown)  GI: abdomen mildly  (units

    (unknown)



                    date)                         guarded, tender to unknown)  



                                                  palpation in the           



                                                  right lower           



                                                  quadrant,           

 

           (unknown)  (no       (unknown)  (unknown)  :  Straight cath  (units

    (unknown)



                    date)                         for urine completed unknown)  



                                                  by myself with 16           



                                                  [place] coude           



                                                  catheter,           

 

           (unknown)  (no       (unknown)  (unknown)  : denies  (units    (unk

nown)



                    date)                         dysuria, hematuria unknown)  



                                                  or flank pain,           



                                                  reports oliguria,           



                                                  patient self           

 

           (unknown)  (no       (unknown)  (unknown)  Gastroenterology  (units  

  (unknown)



                    date)                         from [place], unknown)  



                                                  patient reports           



                                                  that he sees Yasmine Rocha PA-C           

 

           (unknown)  (no       (unknown)  (unknown)  General   (units    (unkno

wn)



                    date)                                   unknown)  

 

           (unknown)  (no       (unknown)  (unknown)  General:  (units    (unkno

wn)



                    date)                         cooperative, unknown)  



                                                  comfortable, in no           



                                                  acute distress,           



                                                  well groomed,           

 

           (unknown)  (no       (unknown)  (unknown)  General: denies  (units   

 (unknown)



                    date)                         fever, chills, unknown)  



                                                  endorses weakness,           



                                                  right lower           



                                                  quadrant pain,           

 

           (unknown)  (no       (unknown)  (unknown)  GenericComposite[P  (units

    (unknown)



                    date)                         lt Count  unknown)  



                                                  (150-400)  X10^3/uL           



                                                   ]                  

 

           (unknown)  (no       (unknown)  (unknown)  GenericComposite[P  (units

    (unknown)



                    date)                         lt Count  unknown)  



                                                  (150-400)  X10^3/uL           



                                                   ]                  

 

           (unknown)  (no       (unknown)  (unknown)  GenericComposite[P  (units

    (unknown)



                    date)                         lt Count   219 unknown)  



                                                  (150-400)  X10^3/uL           



                                                   ]                  

 

           (unknown)  (no       (unknown)  (unknown)  GenericComposite[R  (units

    (unknown)



                    date)                         BC     (4.5-5.9) unknown)  



                                                  X10^6/uL  ]           

 

           (unknown)  (no       (unknown)  (unknown)  GenericComposite[R  (units

    (unknown)



                    date)                         BC   (4.5-5.9) unknown)  



                                                  X10^6/uL  ]           

 

           (unknown)  (no       (unknown)  (unknown)  GenericComposite[R  (units

    (unknown)



                    date)                         BC   3.37 L unknown)  



                                                  (4.5-5.9)  X10^6/uL           



                                                   ]                  

 

           (unknown)  (no       (unknown)  (unknown)  GenericComposite[W  (units

    (unknown)



                    date)                         BC     (4.5-11.0) unknown)  



                                                  X10^3/uL  ]           

 

           (unknown)  (no       (unknown)  (unknown)  GenericComposite[W  (units

    (unknown)



                    date)                         BC   (4.5-11.0) unknown)  



                                                  X10^3/uL  ]           

 

           (unknown)  (no       (unknown)  (unknown)  GenericComposite[W  (units

    (unknown)



                    date)                         BC   7.0  unknown)  



                                                  (4.5-11.0)           



                                                  X10^3/uL  ]           

 

           (unknown)  (no       (unknown)  (unknown)  Globulin  (units    (unkno

wn)



                    date)                         (1.7-4.1)  g/dL unknown)  

 

           (unknown)  (no       (unknown)  (unknown)  Globulin    3.6  (units   

 (unknown)



                    date)                         (1.7-4.1)  g/dL unknown)  

 

           (unknown)  (no       (unknown)  (unknown)  Globulin  (units    (unkno

wn)



                    date)                         (1.7-4.1)  g/dL unknown)  

 

           (unknown)  (no       (unknown)  (unknown)  Glucose   (units    (unkno

wn)



                    date)                         ()  mg/dL unknown)  

 

           (unknown)  (no       (unknown)  (unknown)  Glucose    262 H  (units  

  (unknown)



                    date)                         ()  mg/dL unknown)  

 

           (unknown)  (no       (unknown)  (unknown)  Glucose   ()  (units

    (unknown)



                    date)                          mg/dL    unknown)  

 

           (unknown)  (no       (unknown)  (unknown)  Guaiac stool:  (units    (

unknown)



                    date)                         Negative, good unknown)  



                                                  stool result           

 

           (unknown)  (no       (unknown)  (unknown)  H/O colectomy  (units    (

unknown)



                    date)                                   unknown)  

 

           (unknown)  (no       (unknown)  (unknown)  HPI - Weakness  (units    

(unknown)



                    date)                                   unknown)  

 

           (unknown)  (no       (unknown)  (unknown)  HPI Narrative:  (units    

(unknown)



                    date)                                   unknown)  

 

           (unknown)  (no       (unknown)  (unknown)  Hct     (41-53)  %  (units

    (unknown)



                    date)                                   unknown)  

 

           (unknown)  (no       (unknown)  (unknown)  Hct   (41-53)  %  (units  

  (unknown)



                    date)                                   unknown)  

 

           (unknown)  (no       (unknown)  (unknown)  Hct   26.7 L  (units    (u

nknown)



                    date)                         (41-53)  % unknown)  

 

           (unknown)  (no       (unknown)  (unknown)  He does not have  (units  

  (unknown)



                    date)                         any mental status unknown)  



                                                  changes, GCS is 15.           



                                                   Guaiac stool is           

 

           (unknown)  (no       (unknown)  (unknown)  Head/Neck:  (units    (unk

nown)



                    date)                         Endorses having a unknown)  



                                                  headache, denies           



                                                  any neck pain           

 

           (unknown)  (no       (unknown)  (unknown)  Head: atraumatic,  (units 

   (unknown)



                    date)                         symmetrical facial unknown)  



                                                  expressions           

 

           (unknown)  (no       (unknown)  (unknown)  Hgb       (units    (unkno

wn)



                    date)                         (13.5-17.5)  g/dL unknown)  

 

           (unknown)  (no       (unknown)  (unknown)  Hgb   (13.5-17.5)  (units 

   (unknown)



                    date)                         g/dL      unknown)  

 

           (unknown)  (no       (unknown)  (unknown)  Hgb   9.1 L  (units    (un

known)



                    date)                         (13.5-17.5)  g/dL unknown)  

 

           (unknown)  (no       (unknown)  (unknown)  History of Present  (units

    (unknown)



                    date)                         Illness   unknown)  

 

           (unknown)  (no       (unknown)  (unknown)  History of lumbar  (units 

   (unknown)



                    date)                         surgery (2011) unknown)  

 

           (unknown)  (no       (unknown)  (unknown)  Hx of foot surgery  (units

    (unknown)



                    date)                         (2017)    unknown)  

 

           (unknown)  (no       (unknown)  (unknown)  Hx of shoulder  (units    

(unknown)



                    date)                         surgery (2010) unknown)  

 

           (unknown)  (no       (unknown)  (unknown)  Hypertension  (units    (u

nknown)



                    date)                                   unknown)  

 

           (unknown)  (no       (unknown)  (unknown)  IMPRESSION:? No  (units   

 (unknown)



                    date)                         acute     unknown)  



                                                  cardiopulmonary           



                                                  findings            

 

           (unknown)  (no       (unknown)  (unknown)  INDICATIONS:?  (units    (

unknown)



                    date)                         chest pain unknown)  

 

           (unknown)  (no       (unknown)  (unknown)  Imaging Data  (units    (u

nknown)



                    date)                                   unknown)  

 

           (unknown)  (no       (unknown)  (unknown)  Independently  (units    (

unknown)



                    date)                         reviewed vitals unknown)  



                                                  signs and nursing           



                                                  notes.              

 

           (unknown)  (no       (unknown)  (unknown)  Initial Vital  (units    (

unknown)



                    date)                         Signs     unknown)  

 

           (unknown)  (no       (unknown)  (unknown)  Initial Vital  (units    (

unknown)



                    date)                         Signs:    unknown)  

 

           (unknown)  (no       (unknown)  (unknown)  Instructions:  (units    (

unknown)



                    date)                         Acute Cystitis, unknown)  



                                                  Anemia              

 

           (unknown)  (no       (unknown)  (unknown)  Interpretation:  (units   

 (unknown)



                    date)                                   unknown)  

 

           (unknown)  (no       (unknown)  (unknown) January, hematocrit  (units

    (unknown)



                    date)                         is 26.7, down from unknown)  



                                                  27.8 also in           



                                                  January, platelet           



                                                  count 219,           

 

           (unknown)  (no       (unknown)  (unknown)  Lab Data  (units    (unkno

wn)



                    date)                                   unknown)  

 

           (unknown)  (no       (unknown)  (unknown)  Labs:     (units    (unkno

wn)



                    date)                                   unknown)  

 

           (unknown)  (no       (unknown)  (unknown)  Lactate   (units    (unkno

wn)



                    date)                         (0.7-2.1)  mmol/L unknown)  

 

           (unknown)  (no       (unknown)  (unknown)  Lactate   2.6 H  (units   

 (unknown)



                    date)                         (0.7-2.1)  mmol/L unknown)  

 

           (unknown)  (no       (unknown)  (unknown)  Lactate  1.1  (units    (u

nknown)



                    date)                         (0.7-2.1)  mmol/L unknown)  

 

           (unknown)  (no       (unknown)  (unknown)  Lactate (Lactic  (units   

 (unknown)



                    date)                         Acid) Stat unknown)  

 

           (unknown)  (no       (unknown)  (unknown)  Lipase    (units    (unkno

wn)



                    date)                         ()  U/L unknown)  

 

           (unknown)  (no       (unknown)  (unknown)  Lipase    32  (units    (u

nknown)



                    date)                         ()  U/L unknown)  

 

           (unknown)  (no       (unknown)  (unknown)  Lipase   ()  (units 

   (unknown)



                    date)                         U/L       unknown)  

 

           (unknown)  (no       (unknown)  (unknown)  Lipase Stat  (units    (un

known)



                    date)                                   unknown)  

 

           (unknown)  (no       (unknown)  (unknown)  Lungs and pleura:?  (units

    (unknown)



                    date)                         Lungs are clear.? unknown)  



                                                  No pleural           



                                                  effusions or           



                                                  pneumothorax.? Low           

 

           (unknown)  (no       (unknown)  (unknown)  Lymph # (Auto)  (units    

(unknown)



                    date)                         (0034-1451)  /uL unknown)  

 

           (unknown)  (no       (unknown)  (unknown)  Lymph # (Auto)  (units    

(unknown)



                    date)                         (8919-4102)  /uL unknown)  

 

           (unknown)  (no       (unknown)  (unknown)  Lymph # (Auto)  (units    

(unknown)



                    date)                         400 L   (0729-8472) unknown)  



                                                   /uL                

 

           (unknown)  (no       (unknown)  (unknown)  Lymph % (Auto)  (units    

(unknown)



                    date)                         (25-40)  % unknown)  

 

           (unknown)  (no       (unknown)  (unknown)  Lymph % (Auto)  (units    

(unknown)



                    date)                         (25-40)  % unknown)  

 

           (unknown)  (no       (unknown)  (unknown)  Lymph % (Auto)  (units    

(unknown)



                    date)                         6.4 L   (25-40)  % unknown)  

 

           (unknown)  (no       (unknown)  (unknown)  MCH     (26-34)  (units   

 (unknown)



                    date)                         PG        unknown)  

 

           (unknown)  (no       (unknown)  (unknown)  MCH   (26-34)  PG  (units 

   (unknown)



                    date)                                   unknown)  

 

           (unknown)  (no       (unknown)  (unknown)  MCH   26.9  (units    (unk

nown)



                    date)                         (26-34)  PG unknown)  

 

           (unknown)  (no       (unknown)  (unknown)  MCHC     (30-36)  (units  

  (unknown)



                    date)                         %         unknown)  

 

           (unknown)  (no       (unknown)  (unknown)  MCHC   (30-36)  %  (units 

   (unknown)



                    date)                                   unknown)  

 

           (unknown)  (no       (unknown)  (unknown)  MCHC   34.0  (units    (un

known)



                    date)                         (30-36)  % unknown)  

 

           (unknown)  (no       (unknown)  (unknown)  MCV     ()  (units  

  (unknown)



                    date)                         fL        unknown)  

 

           (unknown)  (no       (unknown)  (unknown)  MCV   ()  fL  (units

    (unknown)



                    date)                                   unknown)  

 

           (unknown)  (no       (unknown)  (unknown)  MCV   79.1 L  (units    (u

nknown)



                    date)                         ()  fL unknown)  

 

           (unknown)  (no       (unknown)  (unknown)  MDM - Weakness  (units    

(unknown)



                    date)                                   unknown)  

 

           (unknown)  (no       (unknown)  (unknown)  MDM Narrative  (units    (

unknown)



                    date)                                   unknown)  

 

           (unknown)  (no       (unknown)  (unknown)  MSK: denies new  (units   

 (unknown)



                    date)                         joint pain, muscle unknown)  



                                                  weakness or           



                                                  swelling            

 

           (unknown)  (no       (unknown)  (unknown) MSK: moves all  (units    (

unknown)



                    date)                         extremities, unknown)  



                                                  neurovascularly           



                                                  intact, generalized           



                                                  weakness, normal           

 

           (unknown)  (no       (unknown)  (unknown)  Magnesium  (units    (unkn

own)



                    date)                         (1.6-2.3)  mg/dL unknown)  

 

           (unknown)  (no       (unknown)  (unknown)  Magnesium    1.0 L  (units

    (unknown)



                    date)                          (1.6-2.3)  mg/dL unknown)  

 

           (unknown)  (no       (unknown)  (unknown)  Magnesium  (units    (unkn

own)



                    date)                         (1.6-2.3)  mg/dL unknown)  

 

           (unknown)  (no       (unknown)  (unknown)  Magnesium Stat  (units    

(unknown)



                    date)                                   unknown)  

 

           (unknown)  (no       (unknown)  (unknown)  Magnesium Sulfate  (units 

   (unknown)



                    date)                         (Magnesium Sulfate) unknown)  



                                                   2 gm in 50 mls @           



                                                  50 mls/hr IV NOW           



                                                  ONE                 

 

           (unknown)  (no       (unknown)  (unknown)  Mediastinum:?  (units    (

unknown)



                    date)                         Mediastinal unknown)  



                                                  contours appear           



                                                  normal.? Heart size           



                                                  is normal.?           

 

           (unknown)  (no       (unknown)  (unknown)  Medical History  (units   

 (unknown)



                    date)                         (Reviewed 22 unknown)  



                                                  @ 19:57 by Kitty Costello [facility])           

 

           (unknown)  (no       (unknown)  (unknown)  Medical decision  (units  

  (unknown)



                    date)                         making narrative: unknown)  

 

           (unknown)  (no       (unknown)  (unknown)  [employer] since this  (units

    (unknown)



                    date)                         happened. unknown)  

 

           (unknown)  (no       (unknown)  (unknown)  Mode of arrival:  (units  

  (unknown)



                    date)                         Wheelchair unknown)  

 

           (unknown)  (no       (unknown)  (unknown)  Mono # (Auto)  (units    (

unknown)



                    date)                         (0-900)  /uL unknown)  

 

           (unknown)  (no       (unknown)  (unknown)  Mono # (Auto)  (units    (

unknown)



                    date)                         (0-900)  /uL unknown)  

 

           (unknown)  (no       (unknown)  (unknown)  Mono # (Auto)  (units    (

unknown)



                    date)                         600   (0-900)  /uL unknown)  

 

           (unknown)  (no       (unknown)  (unknown)  Mono % (Auto)  (units    (

unknown)



                    date)                         (3-14)  % unknown)  

 

           (unknown)  (no       (unknown)  (unknown)  Mono % (Auto)  (units    (

unknown)



                    date)                         (3-14)  % unknown)  

 

           (unknown)  (no       (unknown)  (unknown)  Mono % (Auto)  (units    (

unknown)



                    date)                         7.9   (3-14)  % unknown)  

 

           (unknown)  (no       (unknown)  (unknown)  Mother     (units 

   (unknown)



                    date)                          Cancer   unknown)  

 

           (unknown)  (no       (unknown)  (unknown)  Mouth/Throat:  (units    (

unknown)



                    date)                         moist mucus unknown)  



                                                  membranes           

 

           (unknown)  (no       (unknown)  (unknown)  Narrative  (units    (unkn

own)



                    date)                                   unknown)  

 

           (unknown)  (no       (unknown)  (unknown)  Narrative:  (units    (unk

nown)



                    date)                                   unknown)  

 

           (unknown)  (no       (unknown)  (unknown)  Neck: supple  (units    (u

nknown)



                    date)                                   unknown)  

 

           (unknown)  (no       (unknown)  (unknown)  Neuro: denies  (units    (

unknown)



                    date)                         numbness, tingling, unknown)  



                                                  dizziness           

 

           (unknown)  (no       (unknown)  (unknown)  Neuro: normal  (units    (

unknown)



                    date)                         speech and unknown)  



                                                  cognition, A+O x3           

 

           (unknown)  (no       (unknown)  (unknown)  Neut # (Auto)  (units    (

unknown)



                    date)                         (4855-6229)  /uL unknown)  

 

           (unknown)  (no       (unknown)  (unknown)  Neut # (Auto)  (units    (

unknown)



                    date)                         (4635-9090)  /uL unknown)  

 

           (unknown)  (no       (unknown)  (unknown)  Neut # (Auto)  (units    (

unknown)



                    date)                         5900   (6158-0548) unknown)  



                                                  /uL                 

 

           (unknown)  (no       (unknown)  (unknown)  Neut % (Auto)  (units    (

unknown)



                    date)                         (50-75)  % unknown)  

 

           (unknown)  (no       (unknown)  (unknown)  Neut % (Auto)  (units    (

unknown)



                    date)                         (50-75)  % unknown)  

 

           (unknown)  (no       (unknown)  (unknown)  Neut % (Auto)  (units    (

unknown)



                    date)                         84.7 H   (50-75)  % unknown)  

 

           (unknown)  (no       (unknown)  (unknown)  New       (units    (unkno

wn)



                    date)                                   unknown)  

 

           (unknown)  (no       (unknown)  (unknown)  No Action  (units    (unkn

own)



                    date)                                   unknown)  

 

           (unknown)  (no       (unknown)  (unknown)  No Known Drug  (units    (

unknown)



                    date)                         Allergies Allergy unknown)  



                                                  Verified 22           



                                                  17:38               

 

           (unknown)  (no       (unknown)  (unknown)  Nose: nares  (units    (un

known)



                    date)                         patent, no unknown)  



                                                  rhinorrhea           

 

           (unknown)  (no       (unknown)  (unknown)  Notable on  (units    (unk

nown)



                    date)                         patient's prior unknown)  



                                                  urine cultures,           



                                                  urine from           



                                                  2022 grew out           



                                                  Kle                 

 

           (unknown)  (no       (unknown)  (unknown)  Ondansetron HCl  (units   

 (unknown)



                    date)                         (Ondansetron 4 Mg/2 unknown)  



                                                  Ml Inj)  4 mg IV           



                                                  NOW ONE             

 

           (unknown)  (no       (unknown)  (unknown)  Ordered:  (units    (unkno

wn)



                    date)                                   unknown)  

 

           (unknown)  (no       (unknown)  (unknown)  Orders    (units    (unkno

wn)



                    date)                                   unknown)  

 

           (unknown)  (no       (unknown)  (unknown)  Osteoarthritis  (units    

(unknown)



                    date)                                   unknown)  

 

           (unknown)  (no       (unknown)  (unknown)  Oxygen Delivery  (units   

 (unknown)



                    date)                         Method    22 unknown)  



                                                  17:34               

 

           (unknown)  (no       (unknown)  (unknown)  Oxygen Delivery  (units   

 (unknown)



                    date)                         Method Room Air unknown)  



                                                  Room Air            

 

           (unknown)  (no       (unknown)  (unknown)  PROCEDURE:? XR  (units    

(unknown)



                    date)                         CHEST 1V  unknown)  

 

           (unknown)  (no       (unknown)  (unknown)  Patient   (units    (unkno

wn)



                    date)                         Disposition: Home unknown)  

 

           (unknown)  (no       (unknown)  (unknown)  Patient History  (units   

 (unknown)



                    date)                                   unknown)  

 

           (unknown)  (no       (unknown)  (unknown)  Patient and wife  (units  

  (unknown)



                    date)                         were offered again unknown)  



                                                  admission but once           



                                                  again defer.           



                                                  Received a           

 

           (unknown)  (no       (unknown)  (unknown)  Patient is  (units    (unk

nown)



                    date)                         currently unknown)  



                                                  anticoagulated on           



                                                  Plavix 37.5 mg           



                                                  daily.  Dr. Gómez           

 

           (unknown)  (no       (unknown)  (unknown) Patient reports  (units    

(unknown)



                    date)                         that when he has a unknown)  



                                                  bowel movement, he           



                                                  typically has hard           



                                                  pellets             

 

           (unknown)  (no       (unknown)  (unknown)  Patient self caths  (units

    (unknown)



                    date)                         for urine output, unknown)  



                                                  reports that he has           



                                                  not had much urine           

 

           (unknown)  (no       (unknown)  (unknown)  Patient was seen  (units  

  (unknown)



                    date)                         independently unknown)  



                                                  evaluated by           



                                                  myself, physical           



                                                  exam was repeated           

 

           (unknown)  (no       (unknown)  (unknown)  Patient was  (units    (un

known)



                    date)                         treated in the unknown)  



                                                  emergency           



                                                  department with 1 g           



                                                  of ceftriaxone.           

 

           (unknown)  (no       (unknown)  (unknown) Patient's  (units    (unkno

wn)



                    date)                         colorectal cancer unknown)  



                                                  oncologist was Dr. Jett, patient was           



                                                  referred to him           

 

           (unknown)  (no       (unknown)  (unknown)  Patient's lactate  (units 

   (unknown)



                    date)                         improved with unknown)  



                                                  fluids.  He had           



                                                  difficulty           



                                                  ambulating patient           

 

           (unknown)  (no       (unknown)  (unknown)  Patient's preop  (units   

 (unknown)



                    date)                         diagnosis was unknown)  



                                                  nausea and vomiting           



                                                  from hematemesis in           



                                                  January             

 

           (unknown)  (no       (unknown)  (unknown)  Patient:  (units    (unkno

wn)



                    date)                         Kassandra Leon R unknown)  



                                                  MR#: [id number]            

 

           (unknown)  (no       (unknown)  (unknown)  Please leave her  (units  

  (unknown)



                    date)                         catheter in for the unknown)  



                                                  next several days           



                                                  until you are ready           



                                                  to                  

 

           (unknown)  (no       (unknown)  (unknown)  Please return for  (units 

   (unknown)



                    date)                         fevers, worsening unknown)  



                                                  weakness, passing           



                                                  out, persistent           



                                                  vomiting,           

 

           (unknown)  (no       (unknown)  (unknown)  Postlaminectomy  (units   

 (unknown)



                    date)                         syndrome  unknown)  

 

           (unknown)  (no       (unknown)  (unknown)  Potassium  (units    (unkn

own)



                    date)                         (3.4-5.1)  mmol/L unknown)  

 

           (unknown)  (no       (unknown)  (unknown)  Potassium    3.7  (units  

  (unknown)



                    date)                         (3.4-5.1)  mmol/L unknown)  

 

           (unknown)  (no       (unknown)  (unknown)  Potassium  (units    (unkn

own)



                    date)                         (3.4-5.1)  mmol/L unknown)  

 

           (unknown)  (no       (unknown)  (unknown)  Prescription sent  (units 

   (unknown)



                    date)                         to [facility] pharmacy unknown)  



                                                  in [place].           

 

           (unknown)  (no       (unknown)  (unknown)  Prescriptions:  (units    

(unknown)



                    date)                                   unknown)  

 

           (unknown)  (no       (unknown)  (unknown)  Procalcitonin  (units    (

unknown)



                    date)                         (<0.5)  ng/mL unknown)  

 

           (unknown)  (no       (unknown)  (unknown)  Procalcitonin  (units    (

unknown)



                    date)                         (<0.5)  ng/mL unknown)  

 

           (unknown)  (no       (unknown)  (unknown)  Procalcitonin  (units    (

unknown)



                    date)                         0.72 H    (<0.5) unknown)  



                                                  ng/mL               

 

           (unknown)  (no       (unknown)  (unknown)  Procalcitonin Stat  (units

    (unknown)



                    date)                                   unknown)  

 

           (unknown)  (no       (unknown)  (unknown)  Psych: mental  (units    (

unknown)



                    date)                         status is grossly unknown)  



                                                  normal, congruent           



                                                  mood, normal           



                                                  affect, pleasant           

 

           (unknown)  (no       (unknown)  (unknown)  Pulse Oximetry  98  (units

    (unknown)



                    date)                           22 17:34 unknown)  

 

           (unknown)  (no       (unknown)  (unknown)  Pulse Oximetry 98  (units 

   (unknown)



                    date)                         99        unknown)  

 

           (unknown)  (no       (unknown)  (unknown)  Pulse Rate  77  (units    

(unknown)



                    date)                         22 17:34 unknown)  

 

           (unknown)  (no       (unknown)  (unknown)  Pulse Rate 77 77  (units  

  (unknown)



                    date)                                   unknown)  

 

           (unknown)  (no       (unknown)  (unknown)  RDW       (units    (unkno

wn)



                    date)                         (11.6-14.8)  % unknown)  

 

           (unknown)  (no       (unknown)  (unknown)  RDW   (11.6-14.8)  (units 

   (unknown)



                    date)                         %         unknown)  

 

           (unknown)  (no       (unknown)  (unknown)  RDW   16.1 H  (units    (u

nknown)



                    date)                         (11.6-14.8)  % unknown)  

 

           (unknown)  (no       (unknown)  (unknown)  Reevaluation #1:  (units  

  (unknown)



                    date)                                   unknown)  

 

           (unknown)  (no       (unknown)  (unknown)  Reevaluation #2:  (units  

  (unknown)



                    date)                                   unknown)  

 

           (unknown)  (no       (unknown)  (unknown)  Reevaluation(s)  (units   

 (unknown)



                    date)                                   unknown)  

 

           (unknown)  (no       (unknown)  (unknown)  Referrals:  (units    (unk

nown)



                    date)                                   unknown)  

 

           (unknown)  (no       (unknown)  (unknown)  Related Data  (units    (u

nknown)



                    date)                                   unknown)  

 

           (unknown)  (no       (unknown)  (unknown)  Respiratory Rate  (units  

  (unknown)



                    date)                         22 17:34 unknown)  

 

           (unknown)  (no       (unknown)  (unknown)  Respiratory Rate  (units  

  (unknown)



                    date)                         18 18     unknown)  

 

           (unknown)  (no       (unknown)  (unknown)  Respiratory:  (units    (u

nknown)



                    date)                         denies shortness of unknown)  



                                                  breath, or new           



                                                  cough               

 

           (unknown)  (no       (unknown)  (unknown)  Respiratory:  (units    (u

nknown)



                    date)                         normal effort, able unknown)  



                                                  to speak in           



                                                  complete sentences,           



                                                  no audible           

 

           (unknown)  (no       (unknown)  (unknown)  Result diagrams:  (units  

  (unknown)



                    date)                                   unknown)  

 

           (unknown)  (no       (unknown)  (unknown)  Review of Systems  (units 

   (unknown)



                    date)                                   unknown)  

 

           (unknown)  (no       (unknown)  (unknown)  SARS-CoV-2 (PCR)  (units  

  (unknown)



                    date)                           (Negative) unknown)  

 

           (unknown)  (no       (unknown)  (unknown)  SARS-CoV-2 (PCR)  (units  

  (unknown)



                    date)                         (Negative) unknown)  

 

           (unknown)  (no       (unknown)  (unknown)  SARS-CoV-2 (PCR)  (units  

  (unknown)



                    date)                         Negative  unknown)  



                                                  (Negative)           

 

           (unknown)  (no       (unknown)  (unknown)  Sciatica  (units    (unkno

wn)



                    date)                                   unknown)  

 

           (unknown)  (no       (unknown)  (unknown)  Signed By:  (units    (unk

nown)



                    date)                                   unknown)  

 

           (unknown)  (no       (unknown)  (unknown)  Skin: brisk  (units    (un

known)



                    date)                         capillary refill, unknown)  



                                                  no rash, no           



                                                  erythema            

 

           (unknown)  (no       (unknown)  (unknown)  Skin: denies rash,  (units

    (unknown)



                    date)                         itching or wound unknown)  

 

           (unknown)  (no       (unknown)  (unknown)  Smoking Status:  (units   

 (unknown)



                    date)                         Never smoker unknown)  

 

           (unknown)  (no       (unknown)  (unknown)  Smoking Status:  (units   

 (unknown)



                    date)                         Never smoker unknown)  

 

           (unknown)  (no       (unknown)  (unknown)  Social History  (units    

(unknown)



                    date)                         (Reviewed 22 unknown)  



                                                  @ 19:57 by ZAIRA Amezcua)           

 

           (unknown)  (no       (unknown)  (unknown)  Sodium    (units    (unkno

wn)



                    date)                         (137-145)  mmol/L unknown)  

 

           (unknown)  (no       (unknown)  (unknown)  Sodium    135 L  (units   

 (unknown)



                    date)                         (137-145)  mmol/L unknown)  

 

           (unknown)  (no       (unknown)  (unknown)  Sodium   (137-145)  (units

    (unknown)



                    date)                          mmol/L   unknown)  

 

           (unknown)  (no       (unknown)  (unknown)  Sodium Chloride  (units   

 (unknown)



                    date)                         (Normal Saline unknown)  



                                                  0.9%)  1,000 mls @           



                                                  1,000 mls/hr IV           



                                                  BOLUS ONE           

 

           (unknown)  (no       (unknown)  (unknown)  Source: patient  (units   

 (unknown)



                    date)                         and family unknown)  

 

           (unknown)  (no       (unknown)  (unknown)  Spinal stenosis  (units   

 (unknown)



                    date)                                   unknown)  

 

           (unknown)  (no       (unknown)  (unknown)  Stated complaint:  (units 

   (unknown)



                    date)                         WEAKNESS UNABLE TO unknown)  



                                                  HOLD ANYTHING DOWN           

 

           (unknown)  (no       (unknown)  (unknown)  Substance Use  (units    (

unknown)



                    date)                         Type: does not use unknown)  

 

           (unknown)  (no       (unknown)  (unknown)  Surgical History  (units  

  (unknown)



                    date)                         (Reviewed 22 unknown)  



                                                  @ 19:57 by ZAIRA Amezcua)           

 

           (unknown)  (no       (unknown)  (unknown)  Surgical changes  (units  

  (unknown)



                    date)                         and devices:? unknown)  



                                                  None.?              

 

           (unknown)  (no       (unknown)  (unknown)  TECHNIQUE:? One  (units   

 (unknown)



                    date)                         view of the chest unknown)  



                                                  was acquired.?           

 

           (unknown)  (no       (unknown)  (unknown)  Take antibiotics  (units  

  (unknown)



                    date)                         until completely unknown)  



                                                  gone, start your           



                                                  antibiotic tomorrow           



                                                  morning.            

 

           (unknown)  (no       (unknown)  (unknown)  Temperature  98.3  (units 

   (unknown)



                    date)                         F   22 17:34 unknown)  

 

           (unknown)  (no       (unknown)  (unknown)  Temperature 98.3 F  (units

    (unknown)



                    date)                                   unknown)  

 

           (unknown)  (no       (unknown)  (unknown)  This is a  (units    (unkn

own)



                    date)                         62-year-old male unknown)  



                                                  with history of           



                                                  multiple sclerosis,           



                                                  CVA in 2019,           

 

           (unknown)  (no       (unknown)  (unknown)  Time Seen by  (units    (u

nknown)



                    date)                         Provider: 22 unknown)  



                                                  19:11               

 

           (unknown)  (no       (unknown)  (unknown)  Time: 23:07  (units    (un

known)



                    date)                                   unknown)  

 

           (unknown)  (no       (unknown)  (unknown)  Total Bilirubin  (units   

 (unknown)



                    date)                          (0.2-1.3)  mg/dL unknown)  

 

           (unknown)  (no       (unknown)  (unknown)  Total Bilirubin  (units   

 (unknown)



                    date)                         0.6  (0.2-1.3) unknown)  



                                                  mg/dL               

 

           (unknown)  (no       (unknown)  (unknown)  Total Bilirubin  (units   

 (unknown)



                    date)                         (0.2-1.3)  mg/dL unknown)  

 

           (unknown)  (no       (unknown)  (unknown)  Total Creatine  (units    

(unknown)



                    date)                         Kinase     () unknown)  



                                                   U/L                

 

           (unknown)  (no       (unknown)  (unknown)  Total Creatine  (units    

(unknown)



                    date)                         Kinase    54 L unknown)  



                                                  ()  U/L           

 

           (unknown)  (no       (unknown)  (unknown)  Total Creatine  (units    

(unknown)



                    date)                         Kinase   () unknown)  



                                                  U/L                 

 

           (unknown)  (no       (unknown)  (unknown)  Total Protein  (units    (

unknown)



                    date)                         (6.3-8.2)  g/dL unknown)  

 

           (unknown)  (no       (unknown)  (unknown)  Total Protein  (units    (

unknown)



                    date)                         7.5  (6.3-8.2) unknown)  



                                                  g/dL                

 

           (unknown)  (no       (unknown)  (unknown)  Total Protein  (units    (

unknown)



                    date)                         (6.3-8.2)  g/dL unknown)  

 

           (unknown)  (no       (unknown)  (unknown)  Troponin + CK  (units    (

unknown)



                    date)                         Cardiac Panel Stat unknown)  

 

           (unknown)  (no       (unknown)  (unknown)  Troponin I  (units    (unk

nown)



                    date)                         (0.01-0.034)  ng/mL unknown)  

 

           (unknown)  (no       (unknown)  (unknown)  Troponin I    <  (units   

 (unknown)



                    date)                         0.012  (0.01-0.034) unknown)  



                                                   ng/mL              

 

           (unknown)  (no       (unknown)  (unknown)  Troponin I  (units    (unk

nown)



                    date)                         (0.01-0.034)  ng/mL unknown)  

 

           (unknown)  (no       (unknown)  (unknown)  Ur Culture  (units    (unk

nown)



                    date)                         Indicated? unknown)  

 

           (unknown)  (no       (unknown)  (unknown)  Ur Culture  (units    (unk

nown)



                    date)                         Indicated? unknown)  

 

           (unknown)  (no       (unknown)  (unknown)  Ur Culture  (units    (unk

nown)



                    date)                         Indicated? unknown)  



                                                  Specimen cultured           

 

           (unknown)  (no       (unknown)  (unknown)  Urine Bacteria  (units    

(unknown)



                    date)                         (None)    unknown)  

 

           (unknown)  (no       (unknown)  (unknown)  Urine Bacteria  (units    

(unknown)



                    date)                         Few (2-10) H unknown)  



                                                  (None)              

 

           (unknown)  (no       (unknown)  (unknown)  Urine Bacteria  (units    

(unknown)



                    date)                         (None)    unknown)  

 

           (unknown)  (no       (unknown)  (unknown)  Urine Culture Stat  (units

    (unknown)



                    date)                                   unknown)  

 

           (unknown)  (no       (unknown)  (unknown)  Urine Microscopic  (units 

   (unknown)



                    date)                         Stat      unknown)  

 

           (unknown)  (no       (unknown)  (unknown)  Urine RBC  (units    (unkn

own)



                    date)                         (0-5/HPF) unknown)  

 

           (unknown)  (no       (unknown)  (unknown)  Urine RBC  (units    (unkn

own)



                    date)                         1-5/hpf  (0-5/HPF) unknown)  

 

           (unknown)  (no       (unknown)  (unknown)  Urine RBC  (units    (unkn

own)



                    date)                         (0-5/HPF) unknown)  

 

           (unknown)  (no       (unknown)  (unknown)  Urine Specific  (units    

(unknown)



                    date)                         Gravity    1.015 unknown)  

 

           (unknown)  (no       (unknown)  (unknown)  Urine WBC  (units    (unkn

own)



                    date)                         (0-5/HPF) unknown)  

 

           (unknown)  (no       (unknown)  (unknown)  Urine WBC  (units    (unkn

own)



                    date)                         5-10/hpf H unknown)  



                                                  (0-5/HPF)           

 

           (unknown)  (no       (unknown)  (unknown)  Urine WBC  (units    (unkn

own)



                    date)                         (0-5/HPF) unknown)  

 

           (unknown)  (no       (unknown)  (unknown)  Vital Signs  (units    (un

known)



                    date)                                   unknown)  

 

           (unknown)  (no       (unknown)  (unknown)  Vital signs:  (units    (u

nknown)



                    date)                                   unknown)  

 

           (unknown)  (no       (unknown)  (unknown)  Linette Jett MD  (units  

  (unknown)



                    date)                         [Physician] - As unknown)  



                                                  soon as possible           

 

           (unknown)  (no       (unknown)  (unknown)  XR chest 1V Stat  (units  

  (unknown)



                    date)                                   unknown)  

 

           (unknown)  (no       (unknown)  (unknown)  [Embedded Image  (units   

 (unknown)



                    date)                         Not Available] unknown)  

 

           (unknown)  (no       (unknown)  (unknown)  acute ischemic  (units    

(unknown)



                    date)                         changes.  unknown)  

 

           (unknown)  (no       (unknown)  (unknown)  alcohol intake  (units    

(unknown)



                    date)                         frequency: a few unknown)  



                                                  times a month           

 

           (unknown)  (no       (unknown)  (unknown)  alcohol intake:  (units   

 (unknown)



                    date)                         current   unknown)  

 

           (unknown)  (no       (unknown)  (unknown)  ambulation trial  (units  

  (unknown)



                    date)                         patient fails plan unknown)  



                                                  for admission.           

 

           (unknown)  (no       (unknown)  (unknown)  and cooperative  (units   

 (unknown)



                    date)                                   unknown)  

 

           (unknown)  (no       (unknown)  (unknown)  and generalized  (units   

 (unknown)



                    date)                         weakness.  Patient unknown)  



                                                  self catheterizes           



                                                  his bladder at           



                                                  baseline,           

 

           (unknown)  (no       (unknown)  (unknown)  appear    (units    (unkno

wn)



                    date)                                   unknown)  

 

           (unknown)  (no       (unknown)  (unknown)  as well as HPI.  (units   

 (unknown)



                    date)                         Patient's lactate unknown)  



                                                  was elevated at           



                                                  2.6, blood cultures           



                                                  were                

 

           (unknown)  (no       (unknown)  (unknown)  ascorbic acid  (units    (

unknown)



                    date)                         (vitamin C) 500 mg unknown)  



                                                  500 mg PO BID #60           



                                                  tabs 22           

 

           (unknown)  (no       (unknown)  (unknown)  atorvastatin 80 mg  (units

    (unknown)



                    date)                         tablet 40 mg PO QPM unknown)  



                                                  20           

 

           (unknown)  (no       (unknown)  (unknown)  be causing a rash  (units 

   (unknown)



                    date)                                   unknown)  

 

           (unknown)  (no       (unknown)  (unknown) bleeding.  Guaiac  (units  

  (unknown)



                    date)                         stool in the unknown)  



                                                  emergency           



                                                  department was           



                                                  negative.           



                                                  Patient's lab           

 

           (unknown)  (no       (unknown)  (unknown)  blood in his  (units    (u

nknown)



                    date)                         emesis or in his unknown)  



                                                  stool.  He reports           



                                                  that he did a stool           



                                                  test one            

 

           (unknown)  (no       (unknown)  (unknown)  bsiella pneumoniae  (units

    (unknown)



                    date)                         which was resistant unknown)  



                                                  only to ampicillin           



                                                  and nitrofurantoin.           

 

           (unknown)  (no       (unknown)  (unknown)  buspirone 5 mg  (units    

(unknown)



                    date)                         tablet 5 mg PO BID unknown)  



                                                  #60 tabs 22           

 

           (unknown)  (no       (unknown)  (unknown)  cancer.  Postop  (units   

 (unknown)



                    date)                         diagnosis from this unknown)  



                                                  upper endoscopy was           



                                                  the same.  His           

 

           (unknown)  (no       (unknown)  (unknown)  cath through the  (units  

  (unknown)



                    date)                         weekend and unknown)  



                                                  therefore did not           



                                                  drain his bladder           



                                                  since Friday.           

 

           (unknown)  (no       (unknown)  (unknown)  caths at baseline  (units 

   (unknown)



                    date)                                   unknown)  

 

           (unknown)  (no       (unknown)  (unknown)  ciprofloxacin HCl  (units 

   (unknown)



                    date)                         500 mg tablet 500 unknown)  



                                                  mg PO BID #20 tabs           



                                                  22            

 

           (unknown)  (no       (unknown)  (unknown)  clopidogrel 75 mg  (units 

   (unknown)



                    date)                         tablet 75 mg PO unknown)  



                                                  DAILY 22            

 

           (unknown)  (no       (unknown)  (unknown)  colonic   (units    (unkno

wn)



                    date)                         anastomosis, and a unknown)  



                                                  suboptimal bowel           



                                                  prep.               

 

           (unknown)  (no       (unknown)  (unknown)  concerning  (units    (unk

nown)



                    date)                         symptoms. unknown)  

 

           (unknown)  (no       (unknown)  (unknown)  cultures were  (units    (

unknown)



                    date)                         drawn prior to unknown)  



                                                  receiving           



                                                  antibiotics Urine           



                                                  microscopy shows           

 

           (unknown)  (no       (unknown)  (unknown)  cyanocobalamin  (units    

(unknown)



                    date)                         (vitamin B-12) 500 unknown)  



                                                  1,000 mcg PO DAILY           



                                                  #60 tabs 22           

 

           (unknown)  (no       (unknown)  (unknown)  decrease in urine  (units 

   (unknown)



                    date)                         output, new unknown)  



                                                  abdominal back or           



                                                  flank pain or other           



                                                  new or              

 

           (unknown)  (no       (unknown)  (unknown)  diabetes,  (units    (unkn

own)



                    date)                         hypertension, unknown)  



                                                  colorectal cancer           



                                                  in  with a           



                                                  reverse colectomy           



                                                  who                 

 

           (unknown)  (no       (unknown)  (unknown)  diabetes,  (units    (unkn

own)



                    date)                         hypertension, unknown)  



                                                  colorectal cancer           



                                                  in  with a           



                                                  reverse colectomy,           



                                                  He                  

 

           (unknown)  (no       (unknown)  (unknown)  does not drink  (units    

(unknown)



                    date)                         alcohol.? Patient unknown)  



                                                  and his wife state           



                                                  that he was seen in           



                                                  the                 

 

           (unknown)  (no       (unknown)  (unknown)  dose of Rocephin,  (units 

   (unknown)



                    date)                         a L of fluids, plan unknown)  



                                                  to leave catheter           



                                                  in place until he           



                                                  is                  

 

           (unknown)  (no       (unknown)  (unknown)  drawn after  (units    (un

known)



                    date)                         Rocephin.  Reviewed unknown)  



                                                  patient's CT, his           



                                                  urine findings and           



                                                  plan for            

 

           (unknown)  (no       (unknown)  (unknown)  elevated lactate.  (units 

   (unknown)



                    date)                         His heart rate and unknown)  



                                                  blood pressure are           



                                                  within normal           



                                                  ranges.             

 

           (unknown)  (no       (unknown)  (unknown)  emergency  (units    (unkn

own)



                    date)                         department in unknown)  



                                                  January and           



                                                  diagnosed with a GI           



                                                  bleed, he was           



                                                  admitted,           

 

           (unknown)  (no       (unknown)  (unknown)  endorses fatigue  (units  

  (unknown)



                    date)                                   unknown)  

 

           (unknown)  (no       (unknown)  (unknown) endoscopic  (units    (unkn

own)



                    date)                         diagnosis includes unknown)  



                                                  mild gastropathy,           



                                                  esophagitis, patent           



                                                  retrosigmoid           

 

           (unknown)  (no       (unknown)  (unknown)  equivocal at this  (units 

   (unknown)



                    date)                         time.  Plan to unknown)  



                                                  repeat lactate is           



                                                  improving will           



                                                  attempt             

 

           (unknown)  (no       (unknown)  (unknown)  feeling more able  (units 

   (unknown)



                    date)                         to self cath. unknown)  



                                                  Short-term           



                                                  follow-up in the           



                                                  next several days           

 

           (unknown)  (no       (unknown)  (unknown)  ferrous sulfate  (units   

 (unknown)



                    date)                         325 mg (65 mg 325 unknown)  



                                                  mg PO BIDWM #60           



                                                  tabs 22           

 

           (unknown)  (no       (unknown)  (unknown)  followed by loose  (units 

   (unknown)



                    date)                         stool.    unknown)  

 

           (unknown)  (no       (unknown)  (unknown)  gabapentin 300 mg  (units 

   (unknown)



                    date)                         capsule 300 mg PO unknown)  



                                                  BID 18            

 

           (unknown)  (no       (unknown)  (unknown)  generalized  (units    (un

known)



                    date)                         weakness and unknown)  



                                                  fatigue             

 

           (unknown)  (no       (unknown)  (unknown)  glipizide 10 mg  (units   

 (unknown)



                    date)                         tablet 10 mg PO BID unknown)  



                                                  18           

 

           (unknown)  (no       (unknown)  (unknown)  had a recent  (units    (u

nknown)



                    date)                         endoscopy that did unknown)  



                                                  not show any           



                                                  bleeding polyps or           



                                                  acute GI            

 

           (unknown)  (no       (unknown)  (unknown)  hospital and on  (units   

 (unknown)



                    date)                         chart review it unknown)  



                                                  appears that it was           



                                                  completed on           



                                                  2022.           

 

           (unknown)  (no       (unknown)  (unknown)  household members:  (units

    (unknown)



                    date)                          spouse   unknown)  

 

           (unknown)  (no       (unknown)  (unknown)  ingrown, no penile  (units

    (unknown)



                    date)                         discharge, no unknown)  



                                                  scrotal edema           

 

           (unknown)  (no       (unknown)  (unknown)  iron) tablet  (units    (u

nknown)



                    date)                                   unknown)  

 

           (unknown)  (no       (unknown)  (unknown)  lives     (units    (unkno

wn)



                    date)                         independently:  Yes unknown)  

 

           (unknown)  (no       (unknown)  (unknown)  losartan 50 mg  (units    

(unknown)



                    date)                         tablet 25 mg PO QPM unknown)  



                                                  20           

 

           (unknown)  (no       (unknown)  (unknown)  lung      (units    (unkno

wn)



                    date)                                   unknown)  

 

           (unknown)  (no       (unknown)  (unknown)  magnesium was  (units    (

unknown)



                    date)                         replaced with 2 g, unknown)  



                                                  normal saline 1000           



                                                  mL was given for           



                                                  his                 

 

           (unknown)  (no       (unknown)  (unknown)  mcg tablet  (units    (unk

nown)



                    date)                                   unknown)  

 

           (unknown)  (no       (unknown)  (unknown)  mcg tablet  (units    (unk

nown)



                    date)                         (Tab-A-Lucy) unknown)  

 

           (unknown)  (no       (unknown)  (unknown)  metformin 500 mg  (units  

  (unknown)



                    date)                         tablet,extended 500 unknown)  



                                                  mg PO BID 18            

 

           (unknown)  (no       (unknown)  (unknown)  metoprolol  (units    (unk

nown)



                    date)                         succinate 25 mg 25 unknown)  



                                                  mg PO DAILY           



                                                  21           

 

           (unknown)  (no       (unknown)  (unknown)  moving forward.  (units   

 (unknown)



                    date)                         Patient reports unknown)  



                                                  that he has been           



                                                  taking fiber but           



                                                  not any             

 

           (unknown)  (no       (unknown)  (unknown)  multivitamin with  (units 

   (unknown)



                    date)                         folic acid 400 1 unknown)  



                                                  tab PO DAILY #90           



                                                  tabs 22           

 

           (unknown)  (no       (unknown)  (unknown)  negative.  I  (units    (u

nknown)



                    date)                         completed his unknown)  



                                                  urinary             



                                                  catheterization           



                                                  with a coude           



                                                  catheter, urine           

 

           (unknown)  (no       (unknown)  (unknown) nondistended, no  (units   

 (unknown)



                    date)                         masses, no unknown)  



                                                  exquisite           



                                                  tenderness with           



                                                  exam, no rebound           



                                                  tendernes           

 

           (unknown)  (no       (unknown)  (unknown)  not heard it was  (units  

  (unknown)



                    date)                         positive or not. unknown)  



                                                  Patient reports           



                                                  that his primary           



                                                  care                

 

           (unknown)  (no       (unknown)  (unknown)  not walk very far  (units 

   (unknown)



                    date)                         and did have unknown)  



                                                  difficulty.  He           



                                                  does appear to have           

 

           (unknown)  (no       (unknown)  (unknown)  ondansetron HCl 4  (units 

   (unknown)



                    date)                         mg tablet 4 mg PO unknown)  



                                                  Q8H PRN nausea and           



                                                  21            

 

           (unknown)  (no       (unknown)  (unknown)  oral powder packet  (units

    (unknown)



                    date)                                   unknown)  

 

           (unknown)  (no       (unknown)  (unknown)  output for three  (units  

  (unknown)



                    date)                         days, three days unknown)  



                                                  ago he had nausea           



                                                  and vomiting,           



                                                  denies any           

 

           (unknown)  (no       (unknown)  (unknown)  polyethylene  (units    (u

nknown)



                    date)                         glycol 3350 17 gram unknown)  



                                                  17 gm PO DAILY #90           



                                                  ea 22           

 

           (unknown)  (no       (unknown)  (unknown)  presents to the  (units   

 (unknown)



                    date)                         emergency unknown)  



                                                  department           



                                                  complaining of           



                                                  ongoing fatigue,           



                                                  oliguria,           

 

           (unknown)  (no       (unknown)  (unknown)  provider has left,  (units

    (unknown)



                    date)                         he reports that he unknown)  



                                                  has seen Dr. Macias           



                                                  recently as well.           

 

           (unknown)  (no       (unknown)  (unknown)  pyelonephritis and  (units

    (unknown)



                    date)                         Toro catheter was unknown)  



                                                  left in place as he           



                                                  was too weak to           



                                                  self                

 

           (unknown)  (no       (unknown)  (unknown)  quadrant with a  (units   

 (unknown)



                    date)                         complicated unknown)  



                                                  surgical history of           



                                                  his abdomen.  This           



                                                  shows               

 

           (unknown)  (no       (unknown)  (unknown)  recently for his  (units  

  (unknown)



                    date)                         anemia.   unknown)  

 

           (unknown)  (no       (unknown)  (unknown)  related diarrhea  (units  

  (unknown)



                    date)                         from fecal loading unknown)  



                                                  and obstipation.           



                                                  Recommended MiraLax           



                                                  17 g                

 

           (unknown)  (no       (unknown)  (unknown)  release 24 hr  (units    (

unknown)



                    date)                                   unknown)  

 

           (unknown)  (no       (unknown)  (unknown)  repeat lactate at  (units 

   (unknown)



                    date)                         this time. unknown)  



                                                  Discussed admission           



                                                  versus home patient           



                                                  has                 

 

           (unknown)  (no       (unknown)  (unknown)  reported that  (units    (

unknown)



                    date)                         patient's altered unknown)  



                                                  bowel habit is           



                                                  likely a function           



                                                  of overflow           

 

           (unknown)  (no       (unknown)  (unknown)  s         (units    (unkno

wn)



                    date)                                   unknown)  

 

           (unknown)  (no       (unknown)  (unknown)  self cath  (units    (unkn

own)



                    date)                         regularly. unknown)  

 

           (unknown)  (no       (unknown)  (unknown)  sennosides 8.6 mg  (units 

   (unknown)



                    date)                         tablet (senna) 17.2 unknown)  



                                                  mg PO BEDTIME #60           



                                                  tabs 22           

 

           (unknown)  (no       (unknown)  (unknown)  substance use  (units    (

unknown)



                    date)                         type:  does not use unknown)  

 

           (unknown)  (no       (unknown)  (unknown)  tablet (Vitamin C)  (units

    (unknown)



                    date)                                   unknown)  

 

           (unknown)  (no       (unknown)  (unknown)  tablet,extended  (units   

 (unknown)



                    date)                         release 24 hr unknown)  

 

           (unknown)  (no       (unknown)  (unknown)  there.  He reports  (units

    (unknown)



                    date)                         that he had an unknown)  



                                                  upper endoscopy           



                                                  completed here at           



                                                  this                

 

           (unknown)  (no       (unknown)  (unknown)  to recheck renal  (units  

  (unknown)



                    date)                         function patient unknown)  



                                                  encouraged to           



                                                  return if not           



                                                  improving.           

 

           (unknown)  (no       (unknown)  (unknown)  tone      (units    (unkno

wn)



                    date)                                   unknown)  

 

           (unknown)  (no       (unknown)  (unknown)  trospium 20 mg  (units    

(unknown)



                    date)                         tablet 20 mg PO BID unknown)  



                                                  urinary retention           



                                                  22           

 

           (unknown)  (no       (unknown)  (unknown)  unremarkable.?  (units    

(unknown)



                    date)                                   unknown)  

 

           (unknown)  (no       (unknown)  (unknown)  urine dip showed  (units  

  (unknown)



                    date)                         leukocytes, unknown)  



                                                  ketones, wbc's and           



                                                  did not show           



                                                  nitrites.  Blood           

 

           (unknown)  (no       (unknown)  (unknown)  urine is cloudy,  (units  

  (unknown)



                    date)                         yellow,   unknown)  



                                                  approximately 600           



                                                  mL in bladder and           



                                                  drained, no rash           

 

           (unknown)  (no       (unknown)  (unknown)  volumes accentuate  (units

    (unknown)



                    date)                         pulmonary unknown)  



                                                  interstitium and           



                                                  heart size.           

 

           (unknown)  (no       (unknown)  (unknown)  was cloudy yellow  (units 

   (unknown)



                    date)                         urine and 600 mL unknown)  



                                                  was drained.           



                                                  Patient tolerated           



                                                  well.  His           

 

           (unknown)  (no       (unknown)  (unknown) was offered  (units    (unk

nown)



                    date)                         admission.  Both he unknown)  



                                                  and his wife prefer           



                                                  to return home.           



                                                  Patient did           

 

           (unknown)  (no       (unknown)  (unknown)  week ago and  (units    (u

nknown)



                    date)                         dropped it off at unknown)  



                                                  [facility] which was           



                                                  testing for blood           



                                                  but he has           

 

           (unknown)  (no       (unknown)  (unknown)  went to [facility]  (units 

   (unknown)



                    date)                         for rehab following unknown)  



                                                  his admission and           



                                                  has followed up           



                                                  with                

 

           (unknown)  (no       (unknown)  (unknown)  wheezing, stridor,  (units

    (unknown)



                    date)                         or rales. No unknown)  



                                                  retractions or           



                                                  tachypnea.           









                                         Result panel 28









           (unknown)  (no       (unknown)  (unknown)  (no value)  (units    (unk

nown)



                    date)                                   unknown)  

 

           (unknown)  (no       (unknown)  (unknown)  Radiologist  (units    (un

known)



                    date)                         Impression: unknown)  

 

           (unknown)  (no       (unknown)  (unknown)  Date of Service:  (units  

  (unknown)



                    date)                         22  unknown)  

 

           (unknown)  (no       (unknown)  (unknown)  (no value)  (units    (unk

nown)



                    date)                                   unknown)  

 

           (unknown)  (no       (unknown)  (unknown)  22 17:45  (units    

(unknown)



                    date)                                   unknown)  

 

           (unknown)  (no       (unknown)  (unknown)  1 tab PO DAILY  (units    

(unknown)



                    date)                         Qty: 90 0RF unknown)  

 

           (unknown)  (no       (unknown)  (unknown)  1,000 mcg PO DAILY  (units

    (unknown)



                    date)                         Qty: 60 0RF unknown)  

 

           (unknown)  (no       (unknown)  (unknown)  10 mg PO BID  (units    (u

nknown)



                    date)                                   unknown)  

 

           (unknown)  (no       (unknown)  (unknown)  17 gm PO DAILY  (units    

(unknown)



                    date)                         Qty: 90 0RF unknown)  

 

           (unknown)  (no       (unknown)  (unknown)  17.2 mg PO BEDTIME  (units

    (unknown)



                    date)                         Qty: 60 0RF unknown)  

 

           (unknown)  (no       (unknown)  (unknown)  20 mg PO BID  (units    (u

nknown)



                    date)                                   unknown)  

 

           (unknown)  (no       (unknown)  (unknown)  25 mg PO DAILY  (units    

(unknown)



                    date)                                   unknown)  

 

           (unknown)  (no       (unknown)  (unknown)  25 mg PO QPM  (units    (u

nknown)



                    date)                                   unknown)  

 

           (unknown)  (no       (unknown)  (unknown)  300 mg PO BID  (units    (

unknown)



                    date)                                   unknown)  

 

           (unknown)  (no       (unknown)  (unknown)  325 mg PO BIDWM  (units   

 (unknown)



                    date)                         Qty: 60 0RF unknown)  

 

           (unknown)  (no       (unknown)  (unknown)  4 mg PO Q8H PRN  (units   

 (unknown)



                    date)                         (Reason: nausea and unknown)  



                                                  vomiting) Qty: 14           



                                                  0RF                 

 

           (unknown)  (no       (unknown)  (unknown)  40 mg PO QPM  (units    (u

nknown)



                    date)                                   unknown)  

 

           (unknown)  (no       (unknown)  (unknown)  5 mg PO BID Qty:  (units  

  (unknown)



                    date)                         60 0RF    unknown)  

 

           (unknown)  (no       (unknown)  (unknown)  500 mg PO BID  (units    (

unknown)



                    date)                                   unknown)  

 

           (unknown)  (no       (unknown)  (unknown)  500 mg PO BID Qty:  (units

    (unknown)



                    date)                         20 0RF    unknown)  

 

           (unknown)  (no       (unknown)  (unknown)  500 mg PO BID Qty:  (units

    (unknown)



                    date)                         60 0RF    unknown)  

 

           (unknown)  (no       (unknown)  (unknown)  75 mg PO DAILY  (units    

(unknown)



                    date)                                   unknown)  

 

           (unknown)  (no       (unknown)  (unknown)  Admin: 22  (units   

 (unknown)



                    date)                         20:18  Dose: 1,000 unknown)  



                                                  mls/hr              

 

           (unknown)  (no       (unknown)  (unknown)  Admin: 22  (units   

 (unknown)



                    date)                         20:19  Dose: 50 unknown)  



                                                  mls/hr              

 

           (unknown)  (no       (unknown)  (unknown)  Admin: 22  (units   

 (unknown)



                    date)                         21:06  Dose: 200 unknown)  



                                                  mls/hr              

 

           (unknown)  (no       (unknown)  (unknown)  Allergies  (units    (unkn

own)



                    date)                                   unknown)  

 

           (unknown)  (no       (unknown)  (unknown)  Documented By: [facility]  (units

    (unknown)



                    date)                                   unknown)  

 

           (unknown)  (no       (unknown)  (unknown)  Documented By: AT  (units 

   (unknown)



                    date)                          Co-signed By: MLM unknown)  

 

           (unknown)  (no       (unknown)  (unknown)  Documented By: AT  (units 

   (unknown)



                    date)                                   unknown)  

 

           (unknown)  (no       (unknown)  (unknown)  Documented By: TORREY  (units

    (unknown)



                    date)                           Co-signed By: [facility] unknown)  

 

           (unknown)  (no       (unknown)  (unknown)  Documented By: MLSTEVE  (units

    (unknown)



                    date)                                   unknown)  

 

           (unknown)  (no       (unknown)  (unknown)  ED Orders  (units    (unkn

own)



                    date)                                   unknown)  

 

           (unknown)  (no       (unknown)  (unknown)  Emergency Report  (units  

  (unknown)



                    date)                                   unknown)  

 

           (unknown)  (no       (unknown)  (unknown)  Home Medications  (units  

  (unknown)



                    date)                                   unknown)  

 

           (unknown)  (no       (unknown)  (unknown)  [facility]  (units   

 (unknown)



                    date)                         [address] unknown)  



                                                  [place] [address]           

 

           (unknown)  (no       (unknown)  (unknown)  Lab Results  (units    (un

known)



                    date)                                   unknown)  

 

           (unknown)  (no       (unknown)  (unknown)  Label Comments:  (units   

 (unknown)



                    date)                                   unknown)  

 

           (unknown)  (no       (unknown)  (unknown)  Last Admin:  (units    (un

known)



                    date)                         22 18:30 unknown)  



                                                  Dose: 4 mg           

 

           (unknown)  (no       (unknown)  (unknown)  Last Infusion:  (units    

(unknown)



                    date)                         22 21:10 unknown)  



                                                  Dose: 0 mls/hr           

 

           (unknown)  (no       (unknown)  (unknown)  Last Infusion:  (units    

(unknown)



                    date)                         22 21:28 unknown)  



                                                  Dose: 0 mls/hr           

 

           (unknown)  (no       (unknown)  (unknown)  Last Infusion:  (units    

(unknown)



                    date)                         22 23:15 unknown)  



                                                  Dose: 0 mls/hr           

 

           (unknown)  (no       (unknown)  (unknown)  Previous Rx's  (units    (

unknown)



                    date)                                   unknown)  

 

           (unknown)  (no       (unknown)  (unknown)  Stop: 22  (units    

(unknown)



                    date)                         18:16     unknown)  

 

           (unknown)  (no       (unknown)  (unknown)  Stop: 22  (units    

(unknown)



                    date)                         20:15     unknown)  

 

           (unknown)  (no       (unknown)  (unknown)  Stop: 22  (units    

(unknown)



                    date)                         20:25     unknown)  

 

           (unknown)  (no       (unknown)  (unknown)  Stop: 22  (units    

(unknown)



                    date)                         20:27     unknown)  

 

           (unknown)  (no       (unknown)  (unknown)  Urine Dip  (units    (unkn

own)



                    date)                                   unknown)  

 

           (unknown)  (no       (unknown)  (unknown)  Vital Signs - 8 hr  (units

    (unknown)



                    date)                                   unknown)  

 

           (unknown)  (no       (unknown)  (unknown)  has not been  (units    (u

nknown)



                    date)                         eating so hasn't unknown)  



                                                  taken recently.           



                                                  spouse states           



                                                  thinks it might           

 

           (unknown)  (no       (unknown)  (unknown)  stopped taking  (units    

(unknown)



                    date)                         prior to back unknown)  



                                                  surgery and did not           



                                                  restart             

 

           (unknown)  (no       (unknown)  (unknown)  (no value)  (units    (unk

nown)



                    date)                                   unknown)  

 

           (unknown)  (no       (unknown)  (unknown)  22  (units 

   (unknown)



                    date)                         22  unknown)  



                                                  Range/Units           

 

           (unknown)  (no       (unknown)  (unknown)  22  (units    (unkno

wn)



                    date)                         Range/Units unknown)  

 

           (unknown)  (no       (unknown)  (unknown)  17:40 17:45 17:45  (units 

   (unknown)



                    date)                                   unknown)  

 

           (unknown)  (no       (unknown)  (unknown)  17:45 17:45 20:09  (units 

   (unknown)



                    date)                                   unknown)  

 

           (unknown)  (no       (unknown)  (unknown)  22:00     (units    (unkno

wn)



                    date)                                   unknown)  

 

           (unknown)  (no       (unknown)  (unknown)  ascorbic acid  (units    (

unknown)



                    date)                         (vitamin C) unknown)  



                                                  [Vitamin C] 500 mg           



                                                  Tablet              

 

           (unknown)  (no       (unknown)  (unknown)  atorvastatin 80 mg  (units

    (unknown)



                    date)                         tablet    unknown)  

 

           (unknown)  (no       (unknown)  (unknown)  buspirone 5 mg  (units    

(unknown)



                    date)                         Tablet    unknown)  

 

           (unknown)  (no       (unknown)  (unknown)  ciprofloxacin HCl  (units 

   (unknown)



                    date)                         500 mg tablet unknown)  

 

           (unknown)  (no       (unknown)  (unknown)  clopidogrel 75 mg  (units 

   (unknown)



                    date)                         tablet    unknown)  

 

           (unknown)  (no       (unknown)  (unknown)  cyanocobalamin  (units    

(unknown)



                    date)                         (vitamin B-12) 500 unknown)  



                                                  mcg Tablet           

 

           (unknown)  (no       (unknown)  (unknown)  ferrous sulfate  (units   

 (unknown)



                    date)                         325 mg (65 mg iron) unknown)  



                                                  Tablet              

 

           (unknown)  (no       (unknown)  (unknown)  gabapentin 300 mg  (units 

   (unknown)



                    date)                         capsule   unknown)  

 

           (unknown)  (no       (unknown)  (unknown)  glipizide 10 mg  (units   

 (unknown)



                    date)                         tablet    unknown)  

 

           (unknown)  (no       (unknown)  (unknown)  losartan 50 mg  (units    

(unknown)



                    date)                         tablet    unknown)  

 

           (unknown)  (no       (unknown)  (unknown)  metformin 500 mg  (units  

  (unknown)



                    date)                         tablet extended unknown)  



                                                  release 24 hr           

 

           (unknown)  (no       (unknown)  (unknown)  metoprolol  (units    (unk

nown)



                    date)                         succinate 25 mg unknown)  



                                                  tablet extended           



                                                  release 24 hr           

 

           (unknown)  (no       (unknown)  (unknown)  multivitamin with  (units 

   (unknown)



                    date)                         folic acid unknown)  



                                                  [Tab-A-Lucy] 400           



                                                  mcg Tablet           

 

           (unknown)  (no       (unknown)  (unknown)  ondansetron HCl  (units   

 (unknown)



                    date)                         [Zofran] 4 mg unknown)  



                                                  tablet              

 

           (unknown)  (no       (unknown)  (unknown)  polyethylene  (units    (u

nknown)



                    date)                         glycol 3350 17 gram unknown)  



                                                  Powder In Packet           

 

           (unknown)  (no       (unknown)  (unknown)  sennosides [senna]  (units

    (unknown)



                    date)                         8.6 mg Tablet unknown)  

 

           (unknown)  (no       (unknown)  (unknown)  trospium 20 mg  (units    

(unknown)



                    date)                         tablet    unknown)  

 

           (unknown)  (no       (unknown)  (unknown)  22  (units    (unkno

wn)



                    date)                                   unknown)  

 

           (unknown)  (no       (unknown)  (unknown)  1.07 in January.  (units  

  (unknown)



                    date)                         No elevation in his unknown)  



                                                  troponin,           



                                                  procalcitonin is           



                                                  elevated at           

 

           (unknown)  (no       (unknown)  (unknown)  Anemia,   (units    (unkno

wn)



                    date)                         Hypomagnesemia, unknown)  



                                                  Pyelonephritis           

 

           (unknown)  (no       (unknown)  (unknown)  Medication  (units    (unk

nown)



                    date)                         Instructions unknown)  



                                                  Recorded            

 

           (unknown)  (no       (unknown)  (unknown)  Medication  (units    (unk

nown)



                    date)                         Instructions unknown)  



                                                  Recorded  Confirmed           

 

           (unknown)  (no       (unknown)  (unknown)  sodium 135,  (units    (un

known)



                    date)                         potassium 3.7, unknown)  



                                                  creatinine 1.42           



                                                  which is increased           



                                                  from his prior of           

 

           (unknown)  (no       (unknown)  (unknown)  with regular axis  (units 

   (unknown)



                    date)                         and intervals.  No unknown)  



                                                  STEMI, ST segment           



                                                  changes,            



                                                  arrhythmia, or           

 

           (unknown)  (no       (unknown)  (unknown)  work is   (units    (unkno

wn)



                    date)                         significant for unknown)  



                                                  anemia, hemoglobin           



                                                  is 9.1 today, down           



                                                  from 9.6 in           

 

           (unknown)  (no       (unknown)  (unknown)  <Kitty MAGDY Costello,  (units 

   (unknown)



                    date)                         ARNP - Last Filed: unknown)  



                                                  22 20:33>           

 

           (unknown)  (no       (unknown)  (unknown)  <Allegra CINTHYA Dinesh, DO  (units

    (unknown)



                    date)                         - Last Filed: unknown)  



                                                  22 04:52>           

 

           (unknown)  (no       (unknown)  (unknown)  (Zofran) vomiting  (units 

   (unknown)



                    date)                         #14 tabs  unknown)  

 

           (unknown)  (no       (unknown)  (unknown)  0.72, his lactate  (units 

   (unknown)



                    date)                         is also elevated at unknown)  



                                                  2.6, magnesium is           



                                                  low at 1.0.  His           

 

           (unknown)  (no       (unknown)  (unknown)  [id number]   (units    (unkno

wn)



                    date)                                   unknown)  

 

           (unknown)  (no       (unknown)  (unknown)  22 20:09  (units    

(unknown)



                    date)                                   unknown)  

 

           (unknown)  (no       (unknown)  (unknown)  22 20:55  (units    

(unknown)



                    date)                                   unknown)  

 

           (unknown)  (no       (unknown)  (unknown)  with iron  (units    (

unknown)



                    date)                         deficiency anemia, unknown)  



                                                  altered bowel           



                                                  habit, personal           



                                                  history of colon           

 

           (unknown)  (no       (unknown)  (unknown)  22:52     (units    (unkno

wn)



                    date)                                   unknown)  

 

           (unknown)  (no       (unknown)  (unknown)  62-year-old  (units    (un

known)



                    date)                         gentleman with a unknown)  



                                                  history of multiple           



                                                  sclerosis, CVA in           



                                                  2019,               

 

           (unknown)  (no       (unknown)  (unknown)  ?         (units    (unkno

wn)



                    date)                                   unknown)  

 

           (unknown)  (no       (unknown)  (unknown)  ALT     (<50)  (units    (

unknown)



                    date)                         IU/L      unknown)  

 

           (unknown)  (no       (unknown)  (unknown)  ALT    16  (<50)  (units  

  (unknown)



                    date)                         IU/L      unknown)  

 

           (unknown)  (no       (unknown)  (unknown)  ALT   (<50)  IU/L  (units 

   (unknown)



                    date)                                   unknown)  

 

           (unknown)  (no       (unknown)  (unknown)  AST     (17-59)  (units   

 (unknown)



                    date)                         IU/L      unknown)  

 

           (unknown)  (no       (unknown)  (unknown)  AST    19  (17-59)  (units

    (unknown)



                    date)                          IU/L     unknown)  

 

           (unknown)  (no       (unknown)  (unknown)  AST   (17-59)  (units    (

unknown)



                    date)                         IU/L      unknown)  

 

           (unknown)  (no       (unknown)  (unknown)  Activity  (units    (unkno

wn)



                    date)                         Restrictions/Additi unknown)  



                                                  onal Instructions:           

 

           (unknown)  (no       (unknown)  (unknown)  Age/Sex: 62 / M  (units   

 (unknown)



                    date)                                   unknown)  

 

           (unknown)  (no       (unknown)  (unknown)  Albumin   (units    (unkno

wn)



                    date)                         (3.5-5.0)  g/dL unknown)  

 

           (unknown)  (no       (unknown)  (unknown)  Albumin    3.9  (units    

(unknown)



                    date)                         (3.5-5.0)  g/dL unknown)  

 

           (unknown)  (no       (unknown)  (unknown)  Albumin   (units    (unkno

wn)



                    date)                         (3.5-5.0)  g/dL unknown)  

 

           (unknown)  (no       (unknown)  (unknown)  Albumin/Globulin  (units  

  (unknown)



                    date)                         Ratio     (1.0-2.8) unknown)  

 

           (unknown)  (no       (unknown)  (unknown)  Albumin/Globulin  (units  

  (unknown)



                    date)                         Ratio    1.1 unknown)  



                                                  (1.0-2.8)           

 

           (unknown)  (no       (unknown)  (unknown)  Albumin/Globulin  (units  

  (unknown)



                    date)                         Ratio   (1.0-2.8) unknown)  

 

           (unknown)  (no       (unknown)  (unknown)  Alkaline  (units    (unkno

wn)



                    date)                         Phosphatase unknown)  



                                                  ()  U/L           

 

           (unknown)  (no       (unknown)  (unknown)  Alkaline  (units    (unkno

wn)



                    date)                         Phosphatase    105 unknown)  



                                                  ()  U/L           

 

           (unknown)  (no       (unknown)  (unknown)  Alkaline  (units    (unkno

wn)



                    date)                         Phosphatase unknown)  



                                                  ()  U/L           

 

           (unknown)  (no       (unknown)  (unknown)  Allergy/AdvReac  (units   

 (unknown)



                    date)                         Type Severity unknown)  



                                                  Reaction Status           



                                                  Date / Time           

 

           (unknown)  (no       (unknown)  (unknown)  Amorphous Sediment  (units

    (unknown)



                    date)                                   unknown)  

 

           (unknown)  (no       (unknown)  (unknown)  Amorphous Sediment  (units

    (unknown)



                    date)                                   unknown)  

 

           (unknown)  (no       (unknown)  (unknown)  Amorphous Sediment  (units

    (unknown)



                    date)                            2+     unknown)  

 

           (unknown)  (no       (unknown)  (unknown)  Approved by: Garfield Mcdanielsunits 

   (unknown)



                    date)                         SHANNAN Lopez on unknown)  



                                                  2022 at 18:02?           

 

           (unknown)  (no       (unknown)  (unknown)  BUN     (9-20)  (units    

(unknown)



                    date)                         mg/dL     unknown)  

 

           (unknown)  (no       (unknown)  (unknown)  BUN    21 H  (units    (un

known)



                    date)                         (9-20)  mg/dL unknown)  

 

           (unknown)  (no       (unknown)  (unknown)  BUN   (9-20)  (units    (u

nknown)



                    date)                         mg/dL     unknown)  

 

           (unknown)  (no       (unknown)  (unknown)  BUN/Creatinine  (units    

(unknown)



                    date)                         Ratio     (6-22) unknown)  

 

           (unknown)  (no       (unknown)  (unknown)  BUN/Creatinine  (units    

(unknown)



                    date)                         Ratio    14.8 unknown)  



                                                  (6-22)              

 

           (unknown)  (no       (unknown)  (unknown)  BUN/Creatinine  (units    

(unknown)



                    date)                         Ratio   (6-22) unknown)  

 

           (unknown)  (no       (unknown)  (unknown)  Baso # (Auto)  (units    (

unknown)



                    date)                         (0-100)  /uL unknown)  

 

           (unknown)  (no       (unknown)  (unknown)  Baso # (Auto)  (units    (

unknown)



                    date)                         (0-100)  /uL unknown)  

 

           (unknown)  (no       (unknown)  (unknown)  Baso # (Auto)   0  (units 

   (unknown)



                    date)                          (0-100)  /uL unknown)  

 

           (unknown)  (no       (unknown)  (unknown)  Baso % (Auto)  (units    (

unknown)



                    date)                         (0-2)  %  unknown)  

 

           (unknown)  (no       (unknown)  (unknown)  Baso % (Auto)  (units    (

unknown)



                    date)                         (0-2)  %  unknown)  

 

           (unknown)  (no       (unknown)  (unknown)  Baso % (Auto)  (units    (

unknown)



                    date)                         0.2   (0-2)  % unknown)  

 

           (unknown)  (no       (unknown)  (unknown)  Bedside Urine  (units    (

unknown)



                    date)                         Bilirubin        - unknown)  



                                                  Negative            

 

           (unknown)  (no       (unknown)  (unknown)  Bedside Urine  (units    (

unknown)



                    date)                         Glucose    Negative unknown)  

 

           (unknown)  (no       (unknown)  (unknown)  Bedside Urine  (units    (

unknown)



                    date)                         Ketone    - unknown)  



                                                  Negative            

 

           (unknown)  (no       (unknown)  (unknown)  Bedside Urine  (units    (

unknown)



                    date)                         Leukocytes       + unknown)  



                                                  70                  

 

           (unknown)  (no       (unknown)  (unknown)  Bedside Urine  (units    (

unknown)



                    date)                         Nitrite    - unknown)  



                                                  Negative            

 

           (unknown)  (no       (unknown)  (unknown)  Bedside Urine  (units    (

unknown)



                    date)                         Occult Blood     ++ unknown)  

 

           (unknown)  (no       (unknown)  (unknown)  Bedside Urine  (units    (

unknown)



                    date)                         Protein    + 30 unknown)  

 

           (unknown)  (no       (unknown)  (unknown)  Bedside Urine  (units    (

unknown)



                    date)                         Urobilinogen     - unknown)  



                                                  Negative            

 

           (unknown)  (no       (unknown)  (unknown)  Bedside Urine pH  (units  

  (unknown)



                    date)                          6.0      unknown)  

 

           (unknown)  (no       (unknown)  (unknown)  Blood Culture Stat  (units

    (unknown)



                    date)                                   unknown)  

 

           (unknown)  (no       (unknown)  (unknown)  Blood Pressure  (units    

(unknown)



                    date)                         124/66   22 unknown)  



                                                  17:34               

 

           (unknown)  (no       (unknown)  (unknown)  Blood Pressure  (units    

(unknown)



                    date)                         124/58 L  unknown)  

 

           (unknown)  (no       (unknown)  (unknown)  Bones and chest  (units   

 (unknown)



                    date)                         wall:? No unknown)  



                                                  suspicious bony           



                                                  lesions.? Overlying           



                                                  soft tissues           

 

           (unknown)  (no       (unknown)  (unknown)  CK-MB (CK-2)  (units    (u

nknown)



                    date)                                   unknown)  

 

           (unknown)  (no       (unknown)  (unknown)  CK-MB (CK-2)  (units    (u

nknown)



                    date)                                   unknown)  

 

           (unknown)  (no       (unknown)  (unknown)  CK-MB (CK-2)  (units    (u

nknown)



                    date)                         TNP       unknown)  

 

           (unknown)  (no       (unknown)  (unknown)  CK-MB (CK-2) Rel  (units  

  (unknown)



                    date)                         Index     unknown)  

 

           (unknown)  (no       (unknown)  (unknown)  CK-MB (CK-2) Rel  (units  

  (unknown)



                    date)                         Index     unknown)  

 

           (unknown)  (no       (unknown)  (unknown)  CK-MB (CK-2) Rel  (units  

  (unknown)



                    date)                         Index    TNP unknown)  

 

           (unknown)  (no       (unknown)  (unknown)  COMPARISON:?  (units    (u

nknown)



                    date)                         [facility], unknown)  



                                                  CR, XR CHEST 2V,           



                                                  3/18/2021, 10:19.           

 

           (unknown)  (no       (unknown)  (unknown)  CT abdomen pelvis  (units 

   (unknown)



                    date)                         was ordered for unknown)  



                                                  patient has           



                                                  tenderness to his           



                                                  right lower           

 

           (unknown)  (no       (unknown)  (unknown)  CVA (cerebral  (units    (

unknown)



                    date)                         vascular accident) unknown)  



                                                  (2018)           

 

           (unknown)  (no       (unknown)  (unknown)  Calcium   (units    (unkno

wn)



                    date)                         (8.4-10.2)  mg/dL unknown)  

 

           (unknown)  (no       (unknown)  (unknown)  Calcium    9.3  (units    

(unknown)



                    date)                         (8.4-10.2)  mg/dL unknown)  

 

           (unknown)  (no       (unknown)  (unknown)  Calcium   (units    (unkno

wn)



                    date)                         (8.4-10.2)  mg/dL unknown)  

 

           (unknown)  (no       (unknown)  (unknown)  Carbon Dioxide  (units    

(unknown)



                    date)                         (22-32)  mmol/L unknown)  

 

           (unknown)  (no       (unknown)  (unknown)  Carbon Dioxide  (units    

(unknown)



                    date)                         29  (22-32)  mmol/L unknown)  

 

           (unknown)  (no       (unknown)  (unknown)  Carbon Dioxide  (units    

(unknown)



                    date)                         (22-32)  mmol/L unknown)  

 

           (unknown)  (no       (unknown)  (unknown)  Cardio: denies  (units    

(unknown)



                    date)                         chest pain, unknown)  



                                                  palpitations           

 

           (unknown)  (no       (unknown)  (unknown)  Cardiovascular:  (units   

 (unknown)



                    date)                         regular rate and unknown)  



                                                  rhythm, no           



                                                  peripheral edema,           



                                                  warm extremities           

 

           (unknown)  (no       (unknown)  (unknown)  Ceftriaxone Sodium  (units

    (unknown)



                    date)                         1,000 mg/ (Sodium unknown)  



                                                  Chloride)  100 mls           



                                                  @ 200 mls/hr IV NOW           



                                                  ONE                 

 

           (unknown)  (no       (unknown)  (unknown)  Chest x-ray:  (units    (u

nknown)



                    date)                                   unknown)  

 

           (unknown)  (no       (unknown)  (unknown)  Chief complaint:  (units  

  (unknown)



                    date)                         Weakness  unknown)  

 

           (unknown)  (no       (unknown)  (unknown)  Chloride  (units    (unkno

wn)



                    date)                         ()  mmol/L unknown)  

 

           (unknown)  (no       (unknown)  (unknown)  Chloride    96 L  (units  

  (unknown)



                    date)                         ()  mmol/L unknown)  

 

           (unknown)  (no       (unknown)  (unknown)  Chloride  (units    (unkno

wn)



                    date)                         ()  mmol/L unknown)  

 

           (unknown)  (no       (unknown)  (unknown)  Clinical  (units    (unkno

wn)



                    date)                         Impression: unknown)  

 

           (unknown)  (no       (unknown)  (unknown)  Colon cancer  (units    (u

nknown)



                    date)                         (2013)    unknown)  

 

           (unknown)  (no       (unknown)  (unknown)  Course    (units    (unkno

wn)



                    date)                                   unknown)  

 

           (unknown)  (no       (unknown)  (unknown)  Creatinine  (units    (unk

nown)



                    date)                         (0.66-1.25)  mg/dL unknown)  

 

           (unknown)  (no       (unknown)  (unknown)  Creatinine    1.42  (units

    (unknown)



                    date)                         H  (0.66-1.25) unknown)  



                                                  mg/dL               

 

           (unknown)  (no       (unknown)  (unknown)  Creatinine  (units    (unk

nown)



                    date)                         (0.66-1.25)  mg/dL unknown)  

 

           (unknown)  (no       (unknown)  (unknown)  : 1960  (units   

 (unknown)



                    date)                         Acct:[id number] unknown)  

 

           (unknown)  (no       (unknown)  (unknown)  Departure  (units    (unkn

own)



                    date)                                   unknown)  

 

           (unknown)  (no       (unknown)  (unknown)  Depression  (units    (unk

nown)



                    date)                                   unknown)  

 

           (unknown)  (no       (unknown)  (unknown)  Diabetes  (units    (unkno

wn)



                    date)                                   unknown)  

 

           (unknown)  (no       (unknown)  (unknown)  Diabetic  (units    (unkno

wn)



                    date)                         neuropathy unknown)  

 

           (unknown)  (no       (unknown)  (unknown)  Discharge Plan  (units    

(unknown)



                    date)                                   unknown)  

 

           (unknown)  (no       (unknown)  (unknown)  Discontinued  (units    (u

nknown)



                    date)                         Medications unknown)  

 

           (unknown)  (no       (unknown)  (unknown)  Gustavo Macias MD  (units   

 (unknown)



                    date)                         [[facility] unknown)  



                                                  Provider] -           

 

           (unknown)  (no       (unknown)  (unknown)  ECG Data  (units    (unkno

wn)



                    date)                                   unknown)  

 

           (unknown)  (no       (unknown)  (unknown) EKG independently  (units  

  (unknown)



                    date)                         reviewed by Dr. mejia)  



                                                  Dinesh and reveals           



                                                  normal sinus rhythm           



                                                  at 72 bpm           

 

           (unknown)  (no       (unknown)  (unknown)  EKG shows a normal  (units

    (unknown)



                    date)                         sinus rhythm rate unknown)  



                                                  of 70 2p are 172           



                                                  QRS 86 and .           



                                                   No                 

 

           (unknown)  (no       (unknown)  (unknown)  ER Physician:  (units    (

unknown)



                    date)                         Allegra Montiel D.O. unknown)  

 

           (unknown)  (no       (unknown)  (unknown)  Eos # (Auto)  (units    (u

nknown)



                    date)                         (0-450)  /uL unknown)  

 

           (unknown)  (no       (unknown)  (unknown)  Eos # (Auto)  (units    (u

nknown)



                    date)                         (0-450)  /uL unknown)  

 

           (unknown)  (no       (unknown)  (unknown)  Eos # (Auto)   100  (units

    (unknown)



                    date)                           (0-450)  /uL unknown)  

 

           (unknown)  (no       (unknown)  (unknown)  Eos % (Auto)  (units    (u

nknown)



                    date)                         (2-4)  %  unknown)  

 

           (unknown)  (no       (unknown)  (unknown)  Eos % (Auto)  (units    (u

nknown)



                    date)                         (2-4)  %  unknown)  

 

           (unknown)  (no       (unknown)  (unknown)  Eos % (Auto)   0.8  (units

    (unknown)



                    date)                         L   (2-4)  % unknown)  

 

           (unknown)  (no       (unknown)  (unknown)  Esterase  (units    (unkno

wn)



                    date)                                   unknown)  

 

           (unknown)  (no       (unknown)  (unknown)  Estimated GFR  (units    (

unknown)



                    date)                         (>60)  mL/min unknown)  

 

           (unknown)  (no       (unknown)  (unknown)  Estimated GFR  (units    (

unknown)



                    date)                         55 L  (>60)  mL/min unknown)  

 

           (unknown)  (no       (unknown)  (unknown)  Estimated GFR  (units    (

unknown)



                    date)                         (>60)  mL/min unknown)  

 

           (unknown)  (no       (unknown)  (unknown)  Exam      (units    (unkno

wn)



                    date)                                   unknown)  

 

           (unknown)  (no       (unknown)  (unknown)  Exam Narrative:  (units   

 (unknown)



                    date)                                   unknown)  

 

           (unknown)  (no       (unknown)  (unknown)  Eyes: denies  (units    (u

nknown)



                    date)                         visual changes, eye unknown)  



                                                  pain                

 

           (unknown)  (no       (unknown)  (unknown)  Eyes: equal round  (units 

   (unknown)



                    date)                         and reactive, EOMI, unknown)  



                                                  conjunctiva normal           

 

           (unknown)  (no       (unknown)  (unknown)  FINDINGS:?  (units    (unk

nown)



                    date)                                   unknown)  

 

           (unknown)  (no       (unknown)  (unknown)  Family History  (units    

(unknown)



                    date)                         (Reviewed 22 unknown)  



                                                  @ 19:57 by Kitty Costello [facility])           

 

           (unknown)  (no       (unknown)  (unknown)  Father     (units 

   (unknown)



                    date)                          Cancer   unknown)  

 

           (unknown)  (no       (unknown)  (unknown)  Follow-up for  (units    (

unknown)



                    date)                         recheck in the next unknown)  



                                                  2-3 days.           

 

           (unknown)  (no       (unknown)  (unknown)  GERD      (units    (unkno

wn)



                    date)                         (gastroesophageal unknown)  



                                                  reflux disease)           

 

           (unknown)  (no       (unknown)  (unknown)  GI:  Right lower  (units  

  (unknown)



                    date)                         quadrant tenderness unknown)  



                                                  to palpation           

 

           (unknown)  (no       (unknown)  (unknown)  GI: abdomen mildly  (units

    (unknown)



                    date)                         guarded, tender to unknown)  



                                                  palpation in the           



                                                  right lower           



                                                  quadrant,           

 

           (unknown)  (no       (unknown)  (unknown)  :  Straight cath  (units

    (unknown)



                    date)                         for urine completed unknown)  



                                                  by myself with 16           



                                                  [place] coude           



                                                  catheter,           

 

           (unknown)  (no       (unknown)  (unknown)  : denies  (units    (unk

nown)



                    date)                         dysuria, hematuria unknown)  



                                                  or flank pain,           



                                                  reports oliguria,           



                                                  patient self           

 

           (unknown)  (no       (unknown)  (unknown)  Gastroenterology  (units  

  (unknown)



                    date)                         from [place], unknown)  



                                                  patient reports           



                                                  that he sees Yasmine Rocha PA-C           

 

           (unknown)  (no       (unknown)  (unknown)  General   (units    (unkno

wn)



                    date)                                   unknown)  

 

           (unknown)  (no       (unknown)  (unknown)  General:  (units    (unkno

wn)



                    date)                         cooperative, unknown)  



                                                  comfortable, in no           



                                                  acute distress,           



                                                  well groomed,           

 

           (unknown)  (no       (unknown)  (unknown)  General: denies  (units   

 (unknown)



                    date)                         fever, chills, unknown)  



                                                  endorses weakness,           



                                                  right lower           



                                                  quadrant pain,           

 

           (unknown)  (no       (unknown)  (unknown)  GenericComposite[P  (units

    (unknown)



                    date)                         lt Count  unknown)  



                                                  (150-400)  X10^3/uL           



                                                   ]                  

 

           (unknown)  (no       (unknown)  (unknown)  GenericComposite[P  (units

    (unknown)



                    date)                         lt Count  unknown)  



                                                  (150-400)  X10^3/uL           



                                                   ]                  

 

           (unknown)  (no       (unknown)  (unknown)  GenericComposite[P  (units

    (unknown)



                    date)                         lt Count   219 unknown)  



                                                  (150-400)  X10^3/uL           



                                                   ]                  

 

           (unknown)  (no       (unknown)  (unknown)  GenericComposite[R  (units

    (unknown)



                    date)                         BC     (4.5-5.9) unknown)  



                                                  X10^6/uL  ]           

 

           (unknown)  (no       (unknown)  (unknown)  GenericComposite[R  (units

    (unknown)



                    date)                         BC   (4.5-5.9) unknown)  



                                                  X10^6/uL  ]           

 

           (unknown)  (no       (unknown)  (unknown)  GenericComposite[R  (units

    (unknown)



                    date)                         BC   3.37 L unknown)  



                                                  (4.5-5.9)  X10^6/uL           



                                                   ]                  

 

           (unknown)  (no       (unknown)  (unknown)  GenericComposite[W  (units

    (unknown)



                    date)                         BC     (4.5-11.0) unknown)  



                                                  X10^3/uL  ]           

 

           (unknown)  (no       (unknown)  (unknown)  GenericComposite[W  (units

    (unknown)



                    date)                         BC   (4.5-11.0) unknown)  



                                                  X10^3/uL  ]           

 

           (unknown)  (no       (unknown)  (unknown)  GenericComposite[W  (units

    (unknown)



                    date)                         BC   7.0  unknown)  



                                                  (4.5-11.0)           



                                                  X10^3/uL  ]           

 

           (unknown)  (no       (unknown)  (unknown)  Globulin  (units    (unkno

wn)



                    date)                         (1.7-4.1)  g/dL unknown)  

 

           (unknown)  (no       (unknown)  (unknown)  Globulin    3.6  (units   

 (unknown)



                    date)                         (1.7-4.1)  g/dL unknown)  

 

           (unknown)  (no       (unknown)  (unknown)  Globulin  (units    (unkno

wn)



                    date)                         (1.7-4.1)  g/dL unknown)  

 

           (unknown)  (no       (unknown)  (unknown)  Glucose   (units    (unkno

wn)



                    date)                         ()  mg/dL unknown)  

 

           (unknown)  (no       (unknown)  (unknown)  Glucose    262 H  (units  

  (unknown)



                    date)                         ()  mg/dL unknown)  

 

           (unknown)  (no       (unknown)  (unknown)  Glucose   ()  (units

    (unknown)



                    date)                          mg/dL    unknown)  

 

           (unknown)  (no       (unknown)  (unknown)  Guaiac stool:  (units    (

unknown)



                    date)                         Negative, good unknown)  



                                                  stool result           

 

           (unknown)  (no       (unknown)  (unknown)  H/O colectomy  (units    (

unknown)



                    date)                                   unknown)  

 

           (unknown)  (no       (unknown)  (unknown)  HPI - Weakness  (units    

(unknown)



                    date)                                   unknown)  

 

           (unknown)  (no       (unknown)  (unknown)  HPI Narrative:  (units    

(unknown)



                    date)                                   unknown)  

 

           (unknown)  (no       (unknown)  (unknown)  Hct     (41-53)  %  (units

    (unknown)



                    date)                                   unknown)  

 

           (unknown)  (no       (unknown)  (unknown)  Hct   (41-53)  %  (units  

  (unknown)



                    date)                                   unknown)  

 

           (unknown)  (no       (unknown)  (unknown)  Hct   26.7 L  (units    (u

nknown)



                    date)                         (41-53)  % unknown)  

 

           (unknown)  (no       (unknown)  (unknown)  He does not have  (units  

  (unknown)



                    date)                         any mental status unknown)  



                                                  changes, GCS is 15.           



                                                   Guaiac stool is           

 

           (unknown)  (no       (unknown)  (unknown)  Head/Neck:  (units    (unk

nown)



                    date)                         Endorses having a unknown)  



                                                  headache, denies           



                                                  any neck pain           

 

           (unknown)  (no       (unknown)  (unknown)  Head: atraumatic,  (units 

   (unknown)



                    date)                         symmetrical facial unknown)  



                                                  expressions           

 

           (unknown)  (no       (unknown)  (unknown)  Hgb       (units    (unkno

wn)



                    date)                         (13.5-17.5)  g/dL unknown)  

 

           (unknown)  (no       (unknown)  (unknown)  Hgb   (13.5-17.5)  (units 

   (unknown)



                    date)                         g/dL      unknown)  

 

           (unknown)  (no       (unknown)  (unknown)  Hgb   9.1 L  (units    (un

known)



                    date)                         (13.5-17.5)  g/dL unknown)  

 

           (unknown)  (no       (unknown)  (unknown)  History of Present  (units

    (unknown)



                    date)                         Illness   unknown)  

 

           (unknown)  (no       (unknown)  (unknown)  History of lumbar  (units 

   (unknown)



                    date)                         surgery (2011) unknown)  

 

           (unknown)  (no       (unknown)  (unknown)  Hx of foot surgery  (units

    (unknown)



                    date)                         (2017)    unknown)  

 

           (unknown)  (no       (unknown)  (unknown)  Hx of shoulder  (units    

(unknown)



                    date)                         surgery (2010) unknown)  

 

           (unknown)  (no       (unknown)  (unknown)  Hypertension  (units    (u

nknown)



                    date)                                   unknown)  

 

           (unknown)  (no       (unknown)  (unknown)  IMPRESSION:? No  (units   

 (unknown)



                    date)                         acute     unknown)  



                                                  cardiopulmonary           



                                                  findings            

 

           (unknown)  (no       (unknown)  (unknown)  INDICATIONS:?  (units    (

unknown)



                    date)                         chest pain unknown)  

 

           (unknown)  (no       (unknown)  (unknown)  Imaging Data  (units    (u

nknown)



                    date)                                   unknown)  

 

           (unknown)  (no       (unknown)  (unknown)  Independently  (units    (

unknown)



                    date)                         reviewed vitals unknown)  



                                                  signs and nursing           



                                                  notes.              

 

           (unknown)  (no       (unknown)  (unknown)  Initial Vital  (units    (

unknown)



                    date)                         Signs     unknown)  

 

           (unknown)  (no       (unknown)  (unknown)  Initial Vital  (units    (

unknown)



                    date)                         Signs:    unknown)  

 

           (unknown)  (no       (unknown)  (unknown)  Instructions:  (units    (

unknown)



                    date)                         Acute Cystitis, unknown)  



                                                  Anemia              

 

           (unknown)  (no       (unknown)  (unknown)  Interpretation:  (units   

 (unknown)



                    date)                                   unknown)  

 

           (unknown)  (no       (unknown)  (unknown) January, hematocrit  (units

    (unknown)



                    date)                         is 26.7, down from unknown)  



                                                  27.8 also in           



                                                  January, platelet           



                                                  count 219,           

 

           (unknown)  (no       (unknown)  (unknown) Klebsiella  (units    (unkn

own)



                    date)                         pneumoniae which unknown)  



                                                  was resistant only           



                                                  to ampicillin and           



                                                  nitrofurantoin.           

 

           (unknown)  (no       (unknown)  (unknown)  Lab Data  (units    (unkno

wn)



                    date)                                   unknown)  

 

           (unknown)  (no       (unknown)  (unknown)  Labs:     (units    (unkno

wn)



                    date)                                   unknown)  

 

           (unknown)  (no       (unknown)  (unknown)  Lactate   (units    (unkno

wn)



                    date)                         (0.7-2.1)  mmol/L unknown)  

 

           (unknown)  (no       (unknown)  (unknown)  Lactate   2.6 H  (units   

 (unknown)



                    date)                         (0.7-2.1)  mmol/L unknown)  

 

           (unknown)  (no       (unknown)  (unknown)  Lactate  1.1  (units    (u

nknown)



                    date)                         (0.7-2.1)  mmol/L unknown)  

 

           (unknown)  (no       (unknown)  (unknown)  Lipase    (units    (unkno

wn)



                    date)                         ()  U/L unknown)  

 

           (unknown)  (no       (unknown)  (unknown)  Lipase    32  (units    (u

nknown)



                    date)                         ()  U/L unknown)  

 

           (unknown)  (no       (unknown)  (unknown)  Lipase   ()  (units 

   (unknown)



                    date)                         U/L       unknown)  

 

           (unknown)  (no       (unknown)  (unknown)  Lungs and pleura:?  (units

    (unknown)



                    date)                         Lungs are clear.? unknown)  



                                                  No pleural           



                                                  effusions or           



                                                  pneumothorax.? Low           

 

           (unknown)  (no       (unknown)  (unknown)  Lymph # (Auto)  (units    

(unknown)



                    date)                         (2570-0556)  /uL unknown)  

 

           (unknown)  (no       (unknown)  (unknown)  Lymph # (Auto)  (units    

(unknown)



                    date)                         (0568-8534)  /uL unknown)  

 

           (unknown)  (no       (unknown)  (unknown)  Lymph # (Auto)  (units    

(unknown)



                    date)                         400 L   (7760-3180) unknown)  



                                                   /uL                

 

           (unknown)  (no       (unknown)  (unknown)  Lymph % (Auto)  (units    

(unknown)



                    date)                         (25-40)  % unknown)  

 

           (unknown)  (no       (unknown)  (unknown)  Lymph % (Auto)  (units    

(unknown)



                    date)                         (25-40)  % unknown)  

 

           (unknown)  (no       (unknown)  (unknown)  Lymph % (Auto)  (units    

(unknown)



                    date)                         6.4 L   (25-40)  % unknown)  

 

           (unknown)  (no       (unknown)  (unknown)  MCH     (26-34)  (units   

 (unknown)



                    date)                         PG        unknown)  

 

           (unknown)  (no       (unknown)  (unknown)  MCH   (26-34)  PG  (units 

   (unknown)



                    date)                                   unknown)  

 

           (unknown)  (no       (unknown)  (unknown)  MCH   26.9  (units    (unk

nown)



                    date)                         (26-34)  PG unknown)  

 

           (unknown)  (no       (unknown)  (unknown)  MCHC     (30-36)  (units  

  (unknown)



                    date)                         %         unknown)  

 

           (unknown)  (no       (unknown)  (unknown)  MCHC   (30-36)  %  (units 

   (unknown)



                    date)                                   unknown)  

 

           (unknown)  (no       (unknown)  (unknown)  MCHC   34.0  (units    (un

known)



                    date)                         (30-36)  % unknown)  

 

           (unknown)  (no       (unknown)  (unknown)  MCV     ()  (units  

  (unknown)



                    date)                         fL        unknown)  

 

           (unknown)  (no       (unknown)  (unknown)  MCV   ()  fL  (units

    (unknown)



                    date)                                   unknown)  

 

           (unknown)  (no       (unknown)  (unknown)  MCV   79.1 L  (units    (u

nknown)



                    date)                         ()  fL unknown)  

 

           (unknown)  (no       (unknown)  (unknown)  MDM - Weakness  (units    

(unknown)



                    date)                                   unknown)  

 

           (unknown)  (no       (unknown)  (unknown)  MDM Narrative  (units    (

unknown)



                    date)                                   unknown)  

 

           (unknown)  (no       (unknown)  (unknown)  MSK: denies new  (units   

 (unknown)



                    date)                         joint pain, muscle unknown)  



                                                  weakness or           



                                                  swelling            

 

           (unknown)  (no       (unknown)  (unknown) MSK: moves all  (units    (

unknown)



                    date)                         extremities, unknown)  



                                                  neurovascularly           



                                                  intact, generalized           



                                                  weakness, normal           

 

           (unknown)  (no       (unknown)  (unknown)  Magnesium  (units    (unkn

own)



                    date)                         (1.6-2.3)  mg/dL unknown)  

 

           (unknown)  (no       (unknown)  (unknown)  Magnesium    1.0 L  (units

    (unknown)



                    date)                          (1.6-2.3)  mg/dL unknown)  

 

           (unknown)  (no       (unknown)  (unknown)  Magnesium  (units    (unkn

own)



                    date)                         (1.6-2.3)  mg/dL unknown)  

 

           (unknown)  (no       (unknown)  (unknown)  Magnesium Sulfate  (units 

   (unknown)



                    date)                         (Magnesium Sulfate) unknown)  



                                                   2 gm in 50 mls @           



                                                  50 mls/hr IV NOW           



                                                  ONE                 

 

           (unknown)  (no       (unknown)  (unknown)  Mediastinum:?  (units    (

unknown)



                    date)                         Mediastinal unknown)  



                                                  contours appear           



                                                  normal.? Heart size           



                                                  is normal.?           

 

           (unknown)  (no       (unknown)  (unknown)  Medical History  (units   

 (unknown)



                    date)                         (Reviewed 22 unknown)  



                                                  @ 19:57 by KittyEBONI Deshpande)           

 

           (unknown)  (no       (unknown)  (unknown)  Medical decision  (units  

  (unknown)



                    date)                         making narrative: unknown)  

 

           (unknown)  (no       (unknown)  (unknown)  [employer] since this  (units

    (unknown)



                    date)                         happened. unknown)  

 

           (unknown)  (no       (unknown)  (unknown)  Mode of arrival:  (units  

  (unknown)



                    date)                         Wheelchair unknown)  

 

           (unknown)  (no       (unknown)  (unknown)  Mono # (Auto)  (units    (

unknown)



                    date)                         (0-900)  /uL unknown)  

 

           (unknown)  (no       (unknown)  (unknown)  Mono # (Auto)  (units    (

unknown)



                    date)                         (0-900)  /uL unknown)  

 

           (unknown)  (no       (unknown)  (unknown)  Mono # (Auto)  (units    (

unknown)



                    date)                         600   (0-900)  /uL unknown)  

 

           (unknown)  (no       (unknown)  (unknown)  Mono % (Auto)  (units    (

unknown)



                    date)                         (3-14)  % unknown)  

 

           (unknown)  (no       (unknown)  (unknown)  Mono % (Auto)  (units    (

unknown)



                    date)                         (3-14)  % unknown)  

 

           (unknown)  (no       (unknown)  (unknown)  Mono % (Auto)  (units    (

unknown)



                    date)                         7.9   (3-14)  % unknown)  

 

           (unknown)  (no       (unknown)  (unknown)  Mother     (units 

   (unknown)



                    date)                          Cancer   unknown)  

 

           (unknown)  (no       (unknown)  (unknown)  Mouth/Throat:  (units    (

unknown)



                    date)                         moist mucus unknown)  



                                                  membranes           

 

           (unknown)  (no       (unknown)  (unknown)  Narrative  (units    (unkn

own)



                    date)                                   unknown)  

 

           (unknown)  (no       (unknown)  (unknown)  Narrative:  (units    (unk

nown)



                    date)                                   unknown)  

 

           (unknown)  (no       (unknown)  (unknown)  Neck: supple  (units    (u

nknown)



                    date)                                   unknown)  

 

           (unknown)  (no       (unknown)  (unknown)  Neuro: denies  (units    (

unknown)



                    date)                         numbness, tingling, unknown)  



                                                  dizziness           

 

           (unknown)  (no       (unknown)  (unknown)  Neuro: normal  (units    (

unknown)



                    date)                         speech and unknown)  



                                                  cognition, A+O x3           

 

           (unknown)  (no       (unknown)  (unknown)  Neut # (Auto)  (units    (

unknown)



                    date)                         (6055-2696)  /uL unknown)  

 

           (unknown)  (no       (unknown)  (unknown)  Neut # (Auto)  (units    (

unknown)



                    date)                         (3504-5593)  /uL unknown)  

 

           (unknown)  (no       (unknown)  (unknown)  Neut # (Auto)  (units    (

unknown)



                    date)                         5900   (8450-9196) unknown)  



                                                  /uL                 

 

           (unknown)  (no       (unknown)  (unknown)  Neut % (Auto)  (units    (

unknown)



                    date)                         (50-75)  % unknown)  

 

           (unknown)  (no       (unknown)  (unknown)  Neut % (Auto)  (units    (

unknown)



                    date)                         (50-75)  % unknown)  

 

           (unknown)  (no       (unknown)  (unknown)  Neut % (Auto)  (units    (

unknown)



                    date)                         84.7 H   (50-75)  % unknown)  

 

           (unknown)  (no       (unknown)  (unknown)  New       (units    (unkno

wn)



                    date)                                   unknown)  

 

           (unknown)  (no       (unknown)  (unknown)  No Action  (units    (unkn

own)



                    date)                                   unknown)  

 

           (unknown)  (no       (unknown)  (unknown)  No Known Drug  (units    (

unknown)



                    date)                         Allergies Allergy unknown)  



                                                  Verified 22           



                                                  17:38               

 

           (unknown)  (no       (unknown)  (unknown)  Nose: nares  (units    (un

known)



                    date)                         patent, no unknown)  



                                                  rhinorrhea           

 

           (unknown)  (no       (unknown)  (unknown)  Notable on  (units    (unk

nown)



                    date)                         patient's prior unknown)  



                                                  urine cultures,           



                                                  urine from           



                                                  2022 grew out           

 

           (unknown)  (no       (unknown)  (unknown)  Notable on  (units    (unk

nown)



                    date)                         patient's prior unknown)  



                                                  urine cultures,           



                                                  urine from           



                                                  2022 grew out           



                                                  Klebs               

 

           (unknown)  (no       (unknown)  (unknown)  Ondansetron HCl  (units   

 (unknown)



                    date)                         (Ondansetron 4 Mg/2 unknown)  



                                                  Ml Inj)  4 mg IV           



                                                  NOW ONE             

 

           (unknown)  (no       (unknown)  (unknown)  Ordered:  (units    (unkno

wn)



                    date)                                   unknown)  

 

           (unknown)  (no       (unknown)  (unknown)  Orders    (units    (unkno

wn)



                    date)                                   unknown)  

 

           (unknown)  (no       (unknown)  (unknown)  Osteoarthritis  (units    

(unknown)



                    date)                                   unknown)  

 

           (unknown)  (no       (unknown)  (unknown)  Oxygen Delivery  (units   

 (unknown)



                    date)                         Method    22 unknown)  



                                                  17:34               

 

           (unknown)  (no       (unknown)  (unknown)  Oxygen Delivery  (units   

 (unknown)



                    date)                         Method Room Air unknown)  

 

           (unknown)  (no       (unknown)  (unknown)  PROCEDURE:? XR  (units    

(unknown)



                    date)                         CHEST 1V  unknown)  

 

           (unknown)  (no       (unknown)  (unknown)  Patient   (units    (unkno

wn)



                    date)                         Disposition: Home unknown)  

 

           (unknown)  (no       (unknown)  (unknown)  Patient History  (units   

 (unknown)



                    date)                                   unknown)  

 

           (unknown)  (no       (unknown)  (unknown)  Patient and wife  (units  

  (unknown)



                    date)                         were offered again unknown)  



                                                  admission but once           



                                                  again defer.           



                                                  Received a           

 

           (unknown)  (no       (unknown)  (unknown)  Patient is  (units    (unk

nown)



                    date)                         currently unknown)  



                                                  anticoagulated on           



                                                  Plavix 37.5 mg           



                                                  daily.  Dr. Gómez           

 

           (unknown)  (no       (unknown)  (unknown) Patient reports  (units    

(unknown)



                    date)                         that when he has a unknown)  



                                                  bowel movement, he           



                                                  typically has hard           



                                                  pellets             

 

           (unknown)  (no       (unknown)  (unknown)  Patient self caths  (units

    (unknown)



                    date)                         for urine output, unknown)  



                                                  reports that he has           



                                                  not had much urine           

 

           (unknown)  (no       (unknown)  (unknown)  Patient was seen  (units  

  (unknown)



                    date)                         independently unknown)  



                                                  evaluated by           



                                                  myself, physical           



                                                  exam was repeated           

 

           (unknown)  (no       (unknown)  (unknown)  Patient was  (units    (un

known)



                    date)                         treated in the unknown)  



                                                  emergency           



                                                  department with 1 g           



                                                  of ceftriaxone.           

 

           (unknown)  (no       (unknown)  (unknown) Patient's  (units    (unkno

wn)



                    date)                         colorectal cancer unknown)  



                                                  oncologist was Dr. Jett, patient was           



                                                  referred to him           

 

           (unknown)  (no       (unknown)  (unknown)  Patient's lactate  (units 

   (unknown)



                    date)                         improved with unknown)  



                                                  fluids.  He had           



                                                  difficulty           



                                                  ambulating patient           

 

           (unknown)  (no       (unknown)  (unknown)  Patient's preop  (units   

 (unknown)



                    date)                         diagnosis was unknown)  



                                                  nausea and vomiting           



                                                  from hematemesis in           



                                                  January             

 

           (unknown)  (no       (unknown)  (unknown)  Patient:  (units    (unkno

wn)



                    date)                         Kassandra Leon R unknown)  



                                                  MR#: [id number]            

 

           (unknown)  (no       (unknown)  (unknown)  Please leave her  (units  

  (unknown)



                    date)                         catheter in for the unknown)  



                                                  next several days           



                                                  until you are ready           



                                                  to                  

 

           (unknown)  (no       (unknown)  (unknown)  Please return for  (units 

   (unknown)



                    date)                         fevers, worsening unknown)  



                                                  weakness, passing           



                                                  out, persistent           



                                                  vomiting,           

 

           (unknown)  (no       (unknown)  (unknown)  Postlaminectomy  (units   

 (unknown)



                    date)                         syndrome  unknown)  

 

           (unknown)  (no       (unknown)  (unknown)  Potassium  (units    (unkn

own)



                    date)                         (3.4-5.1)  mmol/L unknown)  

 

           (unknown)  (no       (unknown)  (unknown)  Potassium    3.7  (units  

  (unknown)



                    date)                         (3.4-5.1)  mmol/L unknown)  

 

           (unknown)  (no       (unknown)  (unknown)  Potassium  (units    (unkn

own)



                    date)                         (3.4-5.1)  mmol/L unknown)  

 

           (unknown)  (no       (unknown)  (unknown)  Prescription sent  (units 

   (unknown)



                    date)                         to [facility] pharmacy unknown)  



                                                  in [place].           

 

           (unknown)  (no       (unknown)  (unknown)  Prescriptions:  (units    

(unknown)



                    date)                                   unknown)  

 

           (unknown)  (no       (unknown)  (unknown)  Procalcitonin  (units    (

unknown)



                    date)                         (<0.5)  ng/mL unknown)  

 

           (unknown)  (no       (unknown)  (unknown)  Procalcitonin  (units    (

unknown)



                    date)                         (<0.5)  ng/mL unknown)  

 

           (unknown)  (no       (unknown)  (unknown)  Procalcitonin  (units    (

unknown)



                    date)                         0.72 H    (<0.5) unknown)  



                                                  ng/mL               

 

           (unknown)  (no       (unknown)  (unknown)  Psych: mental  (units    (

unknown)



                    date)                         status is grossly unknown)  



                                                  normal, congruent           



                                                  mood, normal           



                                                  affect, pleasant           

 

           (unknown)  (no       (unknown)  (unknown)  Pulse Oximetry  98  (units

    (unknown)



                    date)                           22 17:34 unknown)  

 

           (unknown)  (no       (unknown)  (unknown)  Pulse Oximetry 99  (units 

   (unknown)



                    date)                                   unknown)  

 

           (unknown)  (no       (unknown)  (unknown)  Pulse Rate  77  (units    

(unknown)



                    date)                         22 17:34 unknown)  

 

           (unknown)  (no       (unknown)  (unknown)  Pulse Rate 77  (units    (

unknown)



                    date)                                   unknown)  

 

           (unknown)  (no       (unknown)  (unknown)  RDW       (units    (unkno

wn)



                    date)                         (11.6-14.8)  % unknown)  

 

           (unknown)  (no       (unknown)  (unknown)  RDW   (11.6-14.8)  (units 

   (unknown)



                    date)                         %         unknown)  

 

           (unknown)  (no       (unknown)  (unknown)  RDW   16.1 H  (units    (u

nknown)



                    date)                         (11.6-14.8)  % unknown)  

 

           (unknown)  (no       (unknown)  (unknown)  Reevaluation #1:  (units  

  (unknown)



                    date)                                   unknown)  

 

           (unknown)  (no       (unknown)  (unknown)  Reevaluation #2:  (units  

  (unknown)



                    date)                                   unknown)  

 

           (unknown)  (no       (unknown)  (unknown)  Reevaluation(s)  (units   

 (unknown)



                    date)                                   unknown)  

 

           (unknown)  (no       (unknown)  (unknown)  Referrals:  (units    (unk

nown)



                    date)                                   unknown)  

 

           (unknown)  (no       (unknown)  (unknown)  Related Data  (units    (u

nknown)



                    date)                                   unknown)  

 

           (unknown)  (no       (unknown)  (unknown)  Respiratory Rate  (units  

  (unknown)



                    date)                         18   22 17:34 unknown)  

 

           (unknown)  (no       (unknown)  (unknown)  Respiratory Rate  (units  

  (unknown)



                    date)                         18        unknown)  

 

           (unknown)  (no       (unknown)  (unknown)  Respiratory:  (units    (u

nknown)



                    date)                         denies shortness of unknown)  



                                                  breath, or new           



                                                  cough               

 

           (unknown)  (no       (unknown)  (unknown)  Respiratory:  (units    (u

nknown)



                    date)                         normal effort, able unknown)  



                                                  to speak in           



                                                  complete sentences,           



                                                  no audible           

 

           (unknown)  (no       (unknown)  (unknown)  Result diagrams:  (units  

  (unknown)



                    date)                                   unknown)  

 

           (unknown)  (no       (unknown)  (unknown)  Review of Systems  (units 

   (unknown)



                    date)                                   unknown)  

 

           (unknown)  (no       (unknown)  (unknown)  SARS-CoV-2 (PCR)  (units  

  (unknown)



                    date)                           (Negative) unknown)  

 

           (unknown)  (no       (unknown)  (unknown)  SARS-CoV-2 (PCR)  (units  

  (unknown)



                    date)                         (Negative) unknown)  

 

           (unknown)  (no       (unknown)  (unknown)  SARS-CoV-2 (PCR)  (units  

  (unknown)



                    date)                         Negative  unknown)  



                                                  (Negative)           

 

           (unknown)  (no       (unknown)  (unknown)  Sciatica  (units    (unkno

wn)



                    date)                                   unknown)  

 

           (unknown)  (no       (unknown)  (unknown)  Signed By:  (units    (unk

nown)



                    date)                                   unknown)  

 

           (unknown)  (no       (unknown)  (unknown)  Skin: brisk  (units    (un

known)



                    date)                         capillary refill, unknown)  



                                                  no rash, no           



                                                  erythema            

 

           (unknown)  (no       (unknown)  (unknown)  Skin: denies rash,  (units

    (unknown)



                    date)                         itching or wound unknown)  

 

           (unknown)  (no       (unknown)  (unknown)  Smoking Status:  (units   

 (unknown)



                    date)                         Never smoker unknown)  

 

           (unknown)  (no       (unknown)  (unknown)  Smoking Status:  (units   

 (unknown)



                    date)                         Never smoker unknown)  

 

           (unknown)  (no       (unknown)  (unknown)  Social History  (units    

(unknown)



                    date)                         (Reviewed 22 unknown)  



                                                  @ 19:57 by ZAIRA Amezcua)           

 

           (unknown)  (no       (unknown)  (unknown)  Sodium    (units    (unkno

wn)



                    date)                         (137-145)  mmol/L unknown)  

 

           (unknown)  (no       (unknown)  (unknown)  Sodium    135 L  (units   

 (unknown)



                    date)                         (137-145)  mmol/L unknown)  

 

           (unknown)  (no       (unknown)  (unknown)  Sodium   (137-145)  (units

    (unknown)



                    date)                          mmol/L   unknown)  

 

           (unknown)  (no       (unknown)  (unknown)  Sodium Chloride  (units   

 (unknown)



                    date)                         (Normal Saline unknown)  



                                                  0.9%)  1,000 mls @           



                                                  1,000 mls/hr IV           



                                                  BOLUS ONE           

 

           (unknown)  (no       (unknown)  (unknown)  Source: patient  (units   

 (unknown)



                    date)                         and family unknown)  

 

           (unknown)  (no       (unknown)  (unknown)  Spinal stenosis  (units   

 (unknown)



                    date)                                   unknown)  

 

           (unknown)  (no       (unknown)  (unknown)  Stated complaint:  (units 

   (unknown)



                    date)                         WEAKNESS UNABLE TO unknown)  



                                                  HOLD ANYTHING DOWN           

 

           (unknown)  (no       (unknown)  (unknown)  Substance Use  (units    (

unknown)



                    date)                         Type: does not use unknown)  

 

           (unknown)  (no       (unknown)  (unknown)  Surgical History  (units  

  (unknown)



                    date)                         (Reviewed 22 unknown)  



                                                  @ 19:57 by ZAIRA Amezcua)           

 

           (unknown)  (no       (unknown)  (unknown)  Surgical changes  (units  

  (unknown)



                    date)                         and devices:? unknown)  



                                                  None.?              

 

           (unknown)  (no       (unknown)  (unknown)  TECHNIQUE:? One  (units   

 (unknown)



                    date)                         view of the chest unknown)  



                                                  was acquired.?           

 

           (unknown)  (no       (unknown)  (unknown)  Take antibiotics  (units  

  (unknown)



                    date)                         until completely unknown)  



                                                  gone, start your           



                                                  antibiotic tomorrow           



                                                  morning.            

 

           (unknown)  (no       (unknown)  (unknown)  Temperature  98.3  (units 

   (unknown)



                    date)                         F   22 17:34 unknown)  

 

           (unknown)  (no       (unknown)  (unknown)  This is a  (units    (unkn

own)



                    date)                         62-year-old male unknown)  



                                                  with history of           



                                                  multiple sclerosis,           



                                                  CVA in 2019,           

 

           (unknown)  (no       (unknown)  (unknown)  Time Seen by  (units    (u

nknown)



                    date)                         Provider: 22 unknown)  



                                                  19:11               

 

           (unknown)  (no       (unknown)  (unknown)  Time: 23:07  (units    (un

known)



                    date)                                   unknown)  

 

           (unknown)  (no       (unknown)  (unknown)  Total Bilirubin  (units   

 (unknown)



                    date)                          (0.2-1.3)  mg/dL unknown)  

 

           (unknown)  (no       (unknown)  (unknown)  Total Bilirubin  (units   

 (unknown)



                    date)                         0.6  (0.2-1.3) unknown)  



                                                  mg/dL               

 

           (unknown)  (no       (unknown)  (unknown)  Total Bilirubin  (units   

 (unknown)



                    date)                         (0.2-1.3)  mg/dL unknown)  

 

           (unknown)  (no       (unknown)  (unknown)  Total Creatine  (units    

(unknown)



                    date)                         Kinase     () unknown)  



                                                   U/L                

 

           (unknown)  (no       (unknown)  (unknown)  Total Creatine  (units    

(unknown)



                    date)                         Kinase    54 L unknown)  



                                                  ()  U/L           

 

           (unknown)  (no       (unknown)  (unknown)  Total Creatine  (units    

(unknown)



                    date)                         Kinase   () unknown)  



                                                  U/L                 

 

           (unknown)  (no       (unknown)  (unknown)  Total Protein  (units    (

unknown)



                    date)                         (6.3-8.2)  g/dL unknown)  

 

           (unknown)  (no       (unknown)  (unknown)  Total Protein  (units    (

unknown)



                    date)                         7.5  (6.3-8.2) unknown)  



                                                  g/dL                

 

           (unknown)  (no       (unknown)  (unknown)  Total Protein  (units    (

unknown)



                    date)                         (6.3-8.2)  g/dL unknown)  

 

           (unknown)  (no       (unknown)  (unknown)  Troponin I  (units    (unk

nown)



                    date)                         (0.01-0.034)  ng/mL unknown)  

 

           (unknown)  (no       (unknown)  (unknown)  Troponin I    <  (units   

 (unknown)



                    date)                         0.012  (0.01-0.034) unknown)  



                                                   ng/mL              

 

           (unknown)  (no       (unknown)  (unknown)  Troponin I  (units    (unk

nown)



                    date)                         (0.01-0.034)  ng/mL unknown)  

 

           (unknown)  (no       (unknown)  (unknown)  Ur Culture  (units    (unk

nown)



                    date)                         Indicated? unknown)  

 

           (unknown)  (no       (unknown)  (unknown)  Ur Culture  (units    (unk

nown)



                    date)                         Indicated? unknown)  

 

           (unknown)  (no       (unknown)  (unknown)  Ur Culture  (units    (unk

nown)



                    date)                         Indicated? unknown)  



                                                  Specimen cultured           

 

           (unknown)  (no       (unknown)  (unknown)  Urine Bacteria  (units    

(unknown)



                    date)                         (None)    unknown)  

 

           (unknown)  (no       (unknown)  (unknown)  Urine Bacteria  (units    

(unknown)



                    date)                         Few (2-10) H unknown)  



                                                  (None)              

 

           (unknown)  (no       (unknown)  (unknown)  Urine Bacteria  (units    

(unknown)



                    date)                         (None)    unknown)  

 

           (unknown)  (no       (unknown)  (unknown)  Urine Culture Stat  (units

    (unknown)



                    date)                                   unknown)  

 

           (unknown)  (no       (unknown)  (unknown)  Urine Microscopic  (units 

   (unknown)



                    date)                         Stat      unknown)  

 

           (unknown)  (no       (unknown)  (unknown)  Urine RBC  (units    (unkn

own)



                    date)                         (0-5/HPF) unknown)  

 

           (unknown)  (no       (unknown)  (unknown)  Urine RBC  (units    (unkn

own)



                    date)                         1-5/hpf  (0-5/HPF) unknown)  

 

           (unknown)  (no       (unknown)  (unknown)  Urine RBC  (units    (unkn

own)



                    date)                         (0-5/HPF) unknown)  

 

           (unknown)  (no       (unknown)  (unknown)  Urine Specific  (units    

(unknown)



                    date)                         Gravity    1.015 unknown)  

 

           (unknown)  (no       (unknown)  (unknown)  Urine WBC  (units    (unkn

own)



                    date)                         (0-5/HPF) unknown)  

 

           (unknown)  (no       (unknown)  (unknown)  Urine WBC  (units    (unkn

own)



                    date)                         5-10/hpf H unknown)  



                                                  (0-5/HPF)           

 

           (unknown)  (no       (unknown)  (unknown)  Urine WBC  (units    (unkn

own)



                    date)                         (0-5/HPF) unknown)  

 

           (unknown)  (no       (unknown)  (unknown)  Visit Report  (units    (u

nknown)



                    date)                         Forms:  Patient unknown)  



                                                  Portal/API           

 

           (unknown)  (no       (unknown)  (unknown)  Vital Signs  (units    (un

known)



                    date)                                   unknown)  

 

           (unknown)  (no       (unknown)  (unknown)  Vital signs:  (units    (u

nknown)



                    date)                                   unknown)  

 

           (unknown)  (no       (unknown)  (unknown)  Linette Jett MD  (units  

  (unknown)



                    date)                         [Physician] - As unknown)  



                                                  soon as possible           

 

           (unknown)  (no       (unknown)  (unknown)  [Embedded Image  (units   

 (unknown)



                    date)                         Not Available] unknown)  

 

           (unknown)  (no       (unknown)  (unknown)  acute ST elevation  (units

    (unknown)



                    date)                         depression noted. unknown)  

 

           (unknown)  (no       (unknown)  (unknown)  acute ischemic  (units    

(unknown)



                    date)                         changes.  unknown)  

 

           (unknown)  (no       (unknown)  (unknown)  alcohol intake  (units    

(unknown)



                    date)                         frequency: a few unknown)  



                                                  times a month           

 

           (unknown)  (no       (unknown)  (unknown)  alcohol intake:  (units   

 (unknown)



                    date)                         current   unknown)  

 

           (unknown)  (no       (unknown)  (unknown)  ambulation trial  (units  

  (unknown)



                    date)                         patient fails plan unknown)  



                                                  for admission.           

 

           (unknown)  (no       (unknown)  (unknown)  and cooperative  (units   

 (unknown)



                    date)                                   unknown)  

 

           (unknown)  (no       (unknown)  (unknown)  and generalized  (units   

 (unknown)



                    date)                         weakness.  Patient unknown)  



                                                  self catheterizes           



                                                  his bladder at           



                                                  baseline,           

 

           (unknown)  (no       (unknown)  (unknown)  appear    (units    (unkno

wn)



                    date)                                   unknown)  

 

           (unknown)  (no       (unknown)  (unknown)  as well as HPI.  (units   

 (unknown)



                    date)                         Patient's lactate unknown)  



                                                  was elevated at           



                                                  2.6, blood cultures           



                                                  were                

 

           (unknown)  (no       (unknown)  (unknown)  ascorbic acid  (units    (

unknown)



                    date)                         (vitamin C) 500 mg unknown)  



                                                  500 mg PO BID #60           



                                                  tabs 22           

 

           (unknown)  (no       (unknown)  (unknown)  atorvastatin 80 mg  (units

    (unknown)



                    date)                         tablet 40 mg PO QPM unknown)  



                                                  20           

 

           (unknown)  (no       (unknown)  (unknown)  be causing a rash  (units 

   (unknown)



                    date)                                   unknown)  

 

           (unknown)  (no       (unknown)  (unknown) bleeding.  Guaiac  (units  

  (unknown)



                    date)                         stool in the unknown)  



                                                  emergency           



                                                  department was           



                                                  negative.           



                                                  Patient's lab           

 

           (unknown)  (no       (unknown)  (unknown)  blood in his  (units    (u

nknown)



                    date)                         emesis or in his unknown)  



                                                  stool.  He reports           



                                                  that he did a stool           



                                                  test one            

 

           (unknown)  (no       (unknown)  (unknown)  buspirone 5 mg  (units    

(unknown)



                    date)                         tablet 5 mg PO BID unknown)  



                                                  #60 tabs 22           

 

           (unknown)  (no       (unknown)  (unknown)  cancer.  Postop  (units   

 (unknown)



                    date)                         diagnosis from this unknown)  



                                                  upper endoscopy was           



                                                  the same.  His           

 

           (unknown)  (no       (unknown)  (unknown)  cath through the  (units  

  (unknown)



                    date)                         weekend and unknown)  



                                                  therefore did not           



                                                  drain his bladder           



                                                  since Friday.           

 

           (unknown)  (no       (unknown)  (unknown)  caths at baseline  (units 

   (unknown)



                    date)                                   unknown)  

 

           (unknown)  (no       (unknown)  (unknown)  ciprofloxacin HCl  (units 

   (unknown)



                    date)                         500 mg tablet 500 unknown)  



                                                  mg PO BID #20 tabs           



                                                  22            

 

           (unknown)  (no       (unknown)  (unknown)  clopidogrel 75 mg  (units 

   (unknown)



                    date)                         tablet 75 mg PO unknown)  



                                                  DAILY 22            

 

           (unknown)  (no       (unknown)  (unknown)  colonic   (units    (unkno

wn)



                    date)                         anastomosis, and a unknown)  



                                                  suboptimal bowel           



                                                  prep.               

 

           (unknown)  (no       (unknown)  (unknown)  concerning  (units    (unk

nown)



                    date)                         symptoms. unknown)  

 

           (unknown)  (no       (unknown)  (unknown)  cultures were  (units    (

unknown)



                    date)                         drawn prior to unknown)  



                                                  receiving           



                                                  antibiotics Urine           



                                                  microscopy shows           

 

           (unknown)  (no       (unknown)  (unknown)  cyanocobalamin  (units    

(unknown)



                    date)                         (vitamin B-12) 500 unknown)  



                                                  1,000 mcg PO DAILY           



                                                  #60 tabs 22           

 

           (unknown)  (no       (unknown)  (unknown)  decrease in urine  (units 

   (unknown)



                    date)                         output, new unknown)  



                                                  abdominal back or           



                                                  flank pain or other           



                                                  new or              

 

           (unknown)  (no       (unknown)  (unknown)  diabetes,  (units    (unkn

own)



                    date)                         hypertension, unknown)  



                                                  colorectal cancer           



                                                  in 2013 with a           



                                                  reverse colectomy           



                                                  who                 

 

           (unknown)  (no       (unknown)  (unknown)  diabetes,  (units    (unkn

own)



                    date)                         hypertension, unknown)  



                                                  colorectal cancer           



                                                  in  with a           



                                                  reverse colectomy,           



                                                  He                  

 

           (unknown)  (no       (unknown)  (unknown)  does not drink  (units    

(unknown)



                    date)                         alcohol.? Patient unknown)  



                                                  and his wife state           



                                                  that he was seen in           



                                                  the                 

 

           (unknown)  (no       (unknown)  (unknown)  dose of Rocephin,  (units 

   (unknown)



                    date)                         a L of fluids, plan unknown)  



                                                  to leave catheter           



                                                  in place until he           



                                                  is                  

 

           (unknown)  (no       (unknown)  (unknown)  drawn after  (units    (un

known)



                    date)                         Rocephin.  Reviewed unknown)  



                                                  patient's CT, his           



                                                  urine findings and           



                                                  plan for            

 

           (unknown)  (no       (unknown)  (unknown)  elevated lactate.  (units 

   (unknown)



                    date)                         His heart rate and unknown)  



                                                  blood pressure are           



                                                  within normal           



                                                  ranges.             

 

           (unknown)  (no       (unknown)  (unknown)  emergency  (units    (unkn

own)



                    date)                         department in unknown)  



                                                  January and           



                                                  diagnosed with a GI           



                                                  bleed, he was           



                                                  admitted,           

 

           (unknown)  (no       (unknown)  (unknown)  endorses fatigue  (units  

  (unknown)



                    date)                                   unknown)  

 

           (unknown)  (no       (unknown)  (unknown) endoscopic  (units    (unkn

own)



                    date)                         diagnosis includes unknown)  



                                                  mild gastropathy,           



                                                  esophagitis, patent           



                                                  retrosigmoid           

 

           (unknown)  (no       (unknown)  (unknown)  equivocal at this  (units 

   (unknown)



                    date)                         time.  Plan to unknown)  



                                                  repeat lactate is           



                                                  improving will           



                                                  attempt             

 

           (unknown)  (no       (unknown)  (unknown)  feeling more able  (units 

   (unknown)



                    date)                         to self cath. unknown)  



                                                  Short-term           



                                                  follow-up in the           



                                                  next several days           

 

           (unknown)  (no       (unknown)  (unknown)  ferrous sulfate  (units   

 (unknown)



                    date)                         325 mg (65 mg 325 unknown)  



                                                  mg PO BIDWM #60           



                                                  tabs 22           

 

           (unknown)  (no       (unknown)  (unknown)  followed by loose  (units 

   (unknown)



                    date)                         stool.    unknown)  

 

           (unknown)  (no       (unknown)  (unknown)  gabapentin 300 mg  (units 

   (unknown)



                    date)                         capsule 300 mg PO unknown)  



                                                  BID 18            

 

           (unknown)  (no       (unknown)  (unknown)  generalized  (units    (un

known)



                    date)                         weakness and unknown)  



                                                  fatigue             

 

           (unknown)  (no       (unknown)  (unknown)  glipizide 10 mg  (units   

 (unknown)



                    date)                         tablet 10 mg PO BID unknown)  



                                                  18           

 

           (unknown)  (no       (unknown)  (unknown)  had a recent  (units    (u

nknown)



                    date)                         endoscopy that did unknown)  



                                                  not show any           



                                                  bleeding polyps or           



                                                  acute GI            

 

           (unknown)  (no       (unknown)  (unknown)  hospital and on  (units   

 (unknown)



                    date)                         chart review it unknown)  



                                                  appears that it was           



                                                  completed on           



                                                  2022.           

 

           (unknown)  (no       (unknown)  (unknown)  household members:  (units

    (unknown)



                    date)                          spouse   unknown)  

 

           (unknown)  (no       (unknown)  (unknown)  iella pneumoniae  (units  

  (unknown)



                    date)                         which was resistant unknown)  



                                                  only to ampicillin           



                                                  and nitrofurantoin.           

 

           (unknown)  (no       (unknown)  (unknown)  ingrown, no penile  (units

    (unknown)



                    date)                         discharge, no unknown)  



                                                  scrotal edema           

 

           (unknown)  (no       (unknown)  (unknown)  iron) tablet  (units    (u

nknown)



                    date)                                   unknown)  

 

           (unknown)  (no       (unknown)  (unknown)  lives     (units    (unkno

wn)



                    date)                         independently:  Yes unknown)  

 

           (unknown)  (no       (unknown)  (unknown)  losartan 50 mg  (units    

(unknown)



                    date)                         tablet 25 mg PO QPM unknown)  



                                                  20           

 

           (unknown)  (no       (unknown)  (unknown)  lung      (units    (unkno

wn)



                    date)                                   unknown)  

 

           (unknown)  (no       (unknown)  (unknown)  magnesium was  (units    (

unknown)



                    date)                         replaced with 2 g, unknown)  



                                                  normal saline 1000           



                                                  mL was given for           



                                                  his                 

 

           (unknown)  (no       (unknown)  (unknown)  mcg tablet  (units    (unk

nown)



                    date)                                   unknown)  

 

           (unknown)  (no       (unknown)  (unknown)  mcg tablet  (units    (unk

nown)



                    date)                         (Tab-A-Lucy) unknown)  

 

           (unknown)  (no       (unknown)  (unknown)  metformin 500 mg  (units  

  (unknown)



                    date)                         tablet,extended 500 unknown)  



                                                  mg PO BID 18            

 

           (unknown)  (no       (unknown)  (unknown)  metoprolol  (units    (unk

nown)



                    date)                         succinate 25 mg 25 unknown)  



                                                  mg PO DAILY           



                                                  21           

 

           (unknown)  (no       (unknown)  (unknown)  moving forward.  (units   

 (unknown)



                    date)                         Patient reports unknown)  



                                                  that he has been           



                                                  taking fiber but           



                                                  not any             

 

           (unknown)  (no       (unknown)  (unknown)  multivitamin with  (units 

   (unknown)



                    date)                         folic acid 400 1 unknown)  



                                                  tab PO DAILY #90           



                                                  tabs 22           

 

           (unknown)  (no       (unknown)  (unknown)  negative.  I  (units    (u

nknown)



                    date)                         completed his unknown)  



                                                  urinary             



                                                  catheterization           



                                                  with a coude           



                                                  catheter, urine           

 

           (unknown)  (no       (unknown)  (unknown) nondistended, no  (units   

 (unknown)



                    date)                         masses, no unknown)  



                                                  exquisite           



                                                  tenderness with           



                                                  exam, no rebound           



                                                  tendernes           

 

           (unknown)  (no       (unknown)  (unknown)  not heard it was  (units  

  (unknown)



                    date)                         positive or not. unknown)  



                                                  Patient reports           



                                                  that his primary           



                                                  care                

 

           (unknown)  (no       (unknown)  (unknown)  not walk very far  (units 

   (unknown)



                    date)                         and did have unknown)  



                                                  difficulty.  He           



                                                  does appear to have           

 

           (unknown)  (no       (unknown)  (unknown)  ondansetron HCl 4  (units 

   (unknown)



                    date)                         mg tablet 4 mg PO unknown)  



                                                  Q8H PRN nausea and           



                                                  21            

 

           (unknown)  (no       (unknown)  (unknown)  oral powder packet  (units

    (unknown)



                    date)                                   unknown)  

 

           (unknown)  (no       (unknown)  (unknown)  output for three  (units  

  (unknown)



                    date)                         days, three days unknown)  



                                                  ago he had nausea           



                                                  and vomiting,           



                                                  denies any           

 

           (unknown)  (no       (unknown)  (unknown)  polyethylene  (units    (u

nknown)



                    date)                         glycol 3350 17 gram unknown)  



                                                  17 gm PO DAILY #90           



                                                  ea 22           

 

           (unknown)  (no       (unknown)  (unknown)  presents to the  (units   

 (unknown)



                    date)                         emergency unknown)  



                                                  department           



                                                  complaining of           



                                                  ongoing fatigue,           



                                                  oliguria,           

 

           (unknown)  (no       (unknown)  (unknown)  provider has left,  (units

    (unknown)



                    date)                         he reports that he unknown)  



                                                  has seen Dr. Macias           



                                                  recently as well.           

 

           (unknown)  (no       (unknown)  (unknown)  pyelonephritis and  (units

    (unknown)



                    date)                         Toro catheter was unknown)  



                                                  left in place as he           



                                                  was too weak to           



                                                  self                

 

           (unknown)  (no       (unknown)  (unknown)  quadrant with a  (units   

 (unknown)



                    date)                         complicated unknown)  



                                                  surgical history of           



                                                  his abdomen.  This           



                                                  shows               

 

           (unknown)  (no       (unknown)  (unknown)  recently for his  (units  

  (unknown)



                    date)                         anemia.   unknown)  

 

           (unknown)  (no       (unknown)  (unknown)  related diarrhea  (units  

  (unknown)



                    date)                         from fecal loading unknown)  



                                                  and obstipation.           



                                                  Recommended MiraLax           



                                                  17 g                

 

           (unknown)  (no       (unknown)  (unknown)  release 24 hr  (units    (

unknown)



                    date)                                   unknown)  

 

           (unknown)  (no       (unknown)  (unknown)  repeat lactate at  (units 

   (unknown)



                    date)                         this time. unknown)  



                                                  Discussed admission           



                                                  versus home patient           



                                                  has                 

 

           (unknown)  (no       (unknown)  (unknown)  reported that  (units    (

unknown)



                    date)                         patient's altered unknown)  



                                                  bowel habit is           



                                                  likely a function           



                                                  of overflow           

 

           (unknown)  (no       (unknown)  (unknown)  s         (units    (unkno

wn)



                    date)                                   unknown)  

 

           (unknown)  (no       (unknown)  (unknown)  self cath  (units    (unkn

own)



                    date)                         regularly. unknown)  

 

           (unknown)  (no       (unknown)  (unknown)  sennosides 8.6 mg  (units 

   (unknown)



                    date)                         tablet (senna) 17.2 unknown)  



                                                  mg PO BEDTIME #60           



                                                  tabs 22           

 

           (unknown)  (no       (unknown)  (unknown)  substance use  (units    (

unknown)



                    date)                         type:  does not use unknown)  

 

           (unknown)  (no       (unknown)  (unknown)  tablet (Vitamin C)  (units

    (unknown)



                    date)                                   unknown)  

 

           (unknown)  (no       (unknown)  (unknown)  tablet,extended  (units   

 (unknown)



                    date)                         release 24 hr unknown)  

 

           (unknown)  (no       (unknown)  (unknown)  there.  He reports  (units

    (unknown)



                    date)                         that he had an unknown)  



                                                  upper endoscopy           



                                                  completed here at           



                                                  this                

 

           (unknown)  (no       (unknown)  (unknown)  to recheck renal  (units  

  (unknown)



                    date)                         function patient unknown)  



                                                  encouraged to           



                                                  return if not           



                                                  improving.           

 

           (unknown)  (no       (unknown)  (unknown)  tone      (units    (unkno

wn)



                    date)                                   unknown)  

 

           (unknown)  (no       (unknown)  (unknown)  trospium 20 mg  (units    

(unknown)



                    date)                         tablet 20 mg PO BID unknown)  



                                                  urinary retention           



                                                  22           

 

           (unknown)  (no       (unknown)  (unknown)  unremarkable.?  (units    

(unknown)



                    date)                                   unknown)  

 

           (unknown)  (no       (unknown)  (unknown)  urine dip showed  (units  

  (unknown)



                    date)                         leukocytes, unknown)  



                                                  ketones, wbc's and           



                                                  did not show           



                                                  nitrites.  Blood           

 

           (unknown)  (no       (unknown)  (unknown)  urine is cloudy,  (units  

  (unknown)



                    date)                         yellow,   unknown)  



                                                  approximately 600           



                                                  mL in bladder and           



                                                  drained, no rash           

 

           (unknown)  (no       (unknown)  (unknown)  volumes accentuate  (units

    (unknown)



                    date)                         pulmonary unknown)  



                                                  interstitium and           



                                                  heart size.           

 

           (unknown)  (no       (unknown)  (unknown)  was cloudy yellow  (units 

   (unknown)



                    date)                         urine and 600 mL unknown)  



                                                  was drained.           



                                                  Patient tolerated           



                                                  well.  His           

 

           (unknown)  (no       (unknown)  (unknown) was offered  (units    (unk

nown)



                    date)                         admission.  Both he unknown)  



                                                  and his wife prefer           



                                                  to return home.           



                                                  Patient did           

 

           (unknown)  (no       (unknown)  (unknown)  week ago and  (units    (u

nknown)



                    date)                         dropped it off at unknown)  



                                                  [facility] which was           



                                                  testing for blood           



                                                  but he has           

 

           (unknown)  (no       (unknown)  (unknown)  went to [facility]  (units 

   (unknown)



                    date)                         for rehab following unknown)  



                                                  his admission and           



                                                  has followed up           



                                                  with                

 

           (unknown)  (no       (unknown)  (unknown)  wheezing, stridor,  (units

    (unknown)



                    date)                         or rales. No unknown)  



                                                  retractions or           



                                                  tachypnea.           









                                         Result panel 29









           (unknown)  (no       (unknown)  (unknown)  (no value)  (units    (unk

nown)



                    date)                                   unknown)  

 

           (unknown)  (no       (unknown)  (unknown)  Radiologist  (units    (un

known)



                    date)                         Impression: unknown)  

 

           (unknown)  (no       (unknown)  (unknown)  Date of Service:  (units  

  (unknown)



                    date)                         22  unknown)  

 

           (unknown)  (no       (unknown)  (unknown)  (no value)  (units    (unk

nown)



                    date)                                   unknown)  

 

           (unknown)  (no       (unknown)  (unknown)  <Electronically  (units   

 (unknown)



                    date)                         signed by Allegra MENDES unknown)  



                                                  ANAYELI Montiel>           

 

           (unknown)  (no       (unknown)  (unknown)  22 17:45  (units    

(unknown)



                    date)                                   unknown)  

 

           (unknown)  (no       (unknown)  (unknown)  22 0740  (units    (

unknown)



                    date)                                   unknown)  

 

           (unknown)  (no       (unknown)  (unknown)  1 tab PO DAILY  (units    

(unknown)



                    date)                         Qty: 90 0RF unknown)  

 

           (unknown)  (no       (unknown)  (unknown)  1,000 mcg PO DAILY  (units

    (unknown)



                    date)                         Qty: 60 0RF unknown)  

 

           (unknown)  (no       (unknown)  (unknown)  10 mg PO BID  (units    (u

nknown)



                    date)                                   unknown)  

 

           (unknown)  (no       (unknown)  (unknown)  17 gm PO DAILY  (units    

(unknown)



                    date)                         Qty: 90 0RF unknown)  

 

           (unknown)  (no       (unknown)  (unknown)  17.2 mg PO BEDTIME  (units

    (unknown)



                    date)                         Qty: 60 0RF unknown)  

 

           (unknown)  (no       (unknown)  (unknown)  20 mg PO BID  (units    (u

nknown)



                    date)                                   unknown)  

 

           (unknown)  (no       (unknown)  (unknown)  25 mg PO DAILY  (units    

(unknown)



                    date)                                   unknown)  

 

           (unknown)  (no       (unknown)  (unknown)  25 mg PO QPM  (units    (u

nknown)



                    date)                                   unknown)  

 

           (unknown)  (no       (unknown)  (unknown)  300 mg PO BID  (units    (

unknown)



                    date)                                   unknown)  

 

           (unknown)  (no       (unknown)  (unknown)  325 mg PO BIDWM  (units   

 (unknown)



                    date)                         Qty: 60 0RF unknown)  

 

           (unknown)  (no       (unknown)  (unknown)  4 mg PO Q8H PRN  (units   

 (unknown)



                    date)                         (Reason: nausea and unknown)  



                                                  vomiting) Qty: 14           



                                                  0RF                 

 

           (unknown)  (no       (unknown)  (unknown)  40 mg PO QPM  (units    (u

nknown)



                    date)                                   unknown)  

 

           (unknown)  (no       (unknown)  (unknown)  5 mg PO BID Qty:  (units  

  (unknown)



                    date)                         60 0RF    unknown)  

 

           (unknown)  (no       (unknown)  (unknown)  500 mg PO BID  (units    (

unknown)



                    date)                                   unknown)  

 

           (unknown)  (no       (unknown)  (unknown)  500 mg PO BID Qty:  (units

    (unknown)



                    date)                         20 0RF    unknown)  

 

           (unknown)  (no       (unknown)  (unknown)  500 mg PO BID Qty:  (units

    (unknown)



                    date)                         60 0RF    unknown)  

 

           (unknown)  (no       (unknown)  (unknown)  75 mg PO DAILY  (units    

(unknown)



                    date)                                   unknown)  

 

           (unknown)  (no       (unknown)  (unknown)  Admin: 22  (units   

 (unknown)



                    date)                         20:18  Dose: 1,000 unknown)  



                                                  mls/hr              

 

           (unknown)  (no       (unknown)  (unknown)  Admin: 22  (units   

 (unknown)



                    date)                         20:19  Dose: 50 unknown)  



                                                  mls/hr              

 

           (unknown)  (no       (unknown)  (unknown)  Admin: 22  (units   

 (unknown)



                    date)                         21:06  Dose: 200 unknown)  



                                                  mls/hr              

 

           (unknown)  (no       (unknown)  (unknown)  Allergies  (units    (unkn

own)



                    date)                                   unknown)  

 

           (unknown)  (no       (unknown)  (unknown)  Documented By: [facility]  (units

    (unknown)



                    date)                                   unknown)  

 

           (unknown)  (no       (unknown)  (unknown)  Documented By: AT  (units 

   (unknown)



                    date)                          Co-signed By: MLSTEVE unknown)  

 

           (unknown)  (no       (unknown)  (unknown)  Documented By: AT  (units 

   (unknown)



                    date)                                   unknown)  

 

           (unknown)  (no       (unknown)  (unknown)  Documented By: MLM  (units

    (unknown)



                    date)                           Co-signed By: [facility] unknown)  

 

           (unknown)  (no       (unknown)  (unknown)  Documented By: MLM  (units

    (unknown)



                    date)                                   unknown)  

 

           (unknown)  (no       (unknown)  (unknown)  Emergency Report  (units  

  (unknown)



                    date)                                   unknown)  

 

           (unknown)  (no       (unknown)  (unknown)  Home Medications  (units  

  (unknown)



                    date)                                   unknown)  

 

           (unknown)  (no       (unknown)  (unknown)  [facility]  (units   

 (unknown)



                    date)                         [address] unknown)  



                                                  [place] [address]           

 

           (unknown)  (no       (unknown)  (unknown)  Lab Results  (units    (un

known)



                    date)                                   unknown)  

 

           (unknown)  (no       (unknown)  (unknown)  Label Comments:  (units   

 (unknown)



                    date)                                   unknown)  

 

           (unknown)  (no       (unknown)  (unknown)  Last Admin:  (units    (un

known)



                    date)                         22 18:30 unknown)  



                                                  Dose: 4 mg           

 

           (unknown)  (no       (unknown)  (unknown)  Last Infusion:  (units    

(unknown)



                    date)                         22 21:10 unknown)  



                                                  Dose: 0 mls/hr           

 

           (unknown)  (no       (unknown)  (unknown)  Last Infusion:  (units    

(unknown)



                    date)                         22 21:28 unknown)  



                                                  Dose: 0 mls/hr           

 

           (unknown)  (no       (unknown)  (unknown)  Last Infusion:  (units    

(unknown)



                    date)                         22 23:15 unknown)  



                                                  Dose: 0 mls/hr           

 

           (unknown)  (no       (unknown)  (unknown)  Previous Rx's  (units    (

unknown)



                    date)                                   unknown)  

 

           (unknown)  (no       (unknown)  (unknown)  Stop: 22  (units    

(unknown)



                    date)                         18:16     unknown)  

 

           (unknown)  (no       (unknown)  (unknown)  Stop: 22  (units    

(unknown)



                    date)                         20:15     unknown)  

 

           (unknown)  (no       (unknown)  (unknown)  Stop: 22  (units    

(unknown)



                    date)                         20:25     unknown)  

 

           (unknown)  (no       (unknown)  (unknown)  Stop: 22  (units    

(unknown)



                    date)                         20:27     unknown)  

 

           (unknown)  (no       (unknown)  (unknown)  Urine Dip  (units    (unkn

own)



                    date)                                   unknown)  

 

           (unknown)  (no       (unknown)  (unknown)  Vital Signs - 8 hr  (units

    (unknown)



                    date)                                   unknown)  

 

           (unknown)  (no       (unknown)  (unknown)  has not been  (units    (u

nknown)



                    date)                         eating so hasn't unknown)  



                                                  taken recently.           



                                                  spouse states           



                                                  thinks it might           

 

           (unknown)  (no       (unknown)  (unknown)  stopped taking  (units    

(unknown)



                    date)                         prior to back unknown)  



                                                  surgery and did not           



                                                  restart             

 

           (unknown)  (no       (unknown)  (unknown)  (no value)  (units    (unk

nown)



                    date)                                   unknown)  

 

           (unknown)  (no       (unknown)  (unknown)  22  (units 

   (unknown)



                    date)                         22  unknown)  



                                                  Range/Units           

 

           (unknown)  (no       (unknown)  (unknown)  22  (units    (unkno

wn)



                    date)                         Range/Units unknown)  

 

           (unknown)  (no       (unknown)  (unknown)  17:40 17:45 17:45  (units 

   (unknown)



                    date)                                   unknown)  

 

           (unknown)  (no       (unknown)  (unknown)  17:45 17:45 20:09  (units 

   (unknown)



                    date)                                   unknown)  

 

           (unknown)  (no       (unknown)  (unknown)  22:00     (units    (unkno

wn)



                    date)                                   unknown)  

 

           (unknown)  (no       (unknown)  (unknown)  ascorbic acid  (units    (

unknown)



                    date)                         (vitamin C) unknown)  



                                                  [Vitamin C] 500 mg           



                                                  Tablet              

 

           (unknown)  (no       (unknown)  (unknown)  atorvastatin 80 mg  (units

    (unknown)



                    date)                         tablet    unknown)  

 

           (unknown)  (no       (unknown)  (unknown)  buspirone 5 mg  (units    

(unknown)



                    date)                         Tablet    unknown)  

 

           (unknown)  (no       (unknown)  (unknown)  ciprofloxacin HCl  (units 

   (unknown)



                    date)                         500 mg tablet unknown)  

 

           (unknown)  (no       (unknown)  (unknown)  clopidogrel 75 mg  (units 

   (unknown)



                    date)                         tablet    unknown)  

 

           (unknown)  (no       (unknown)  (unknown)  cyanocobalamin  (units    

(unknown)



                    date)                         (vitamin B-12) 500 unknown)  



                                                  mcg Tablet           

 

           (unknown)  (no       (unknown)  (unknown)  ferrous sulfate  (units   

 (unknown)



                    date)                         325 mg (65 mg iron) unknown)  



                                                  Tablet              

 

           (unknown)  (no       (unknown)  (unknown)  gabapentin 300 mg  (units 

   (unknown)



                    date)                         capsule   unknown)  

 

           (unknown)  (no       (unknown)  (unknown)  glipizide 10 mg  (units   

 (unknown)



                    date)                         tablet    unknown)  

 

           (unknown)  (no       (unknown)  (unknown)  losartan 50 mg  (units    

(unknown)



                    date)                         tablet    unknown)  

 

           (unknown)  (no       (unknown)  (unknown)  metformin 500 mg  (units  

  (unknown)



                    date)                         tablet extended unknown)  



                                                  release 24 hr           

 

           (unknown)  (no       (unknown)  (unknown)  metoprolol  (units    (unk

nown)



                    date)                         succinate 25 mg unknown)  



                                                  tablet extended           



                                                  release 24 hr           

 

           (unknown)  (no       (unknown)  (unknown)  multivitamin with  (units 

   (unknown)



                    date)                         folic acid unknown)  



                                                  [Tab-A-Lucy] 400           



                                                  mcg Tablet           

 

           (unknown)  (no       (unknown)  (unknown)  ondansetron HCl  (units   

 (unknown)



                    date)                         [Zofran] 4 mg unknown)  



                                                  tablet              

 

           (unknown)  (no       (unknown)  (unknown)  polyethylene  (units    (u

nknown)



                    date)                         glycol 3350 17 gram unknown)  



                                                  Powder In Packet           

 

           (unknown)  (no       (unknown)  (unknown)  sennosides [senna]  (units

    (unknown)



                    date)                         8.6 mg Tablet unknown)  

 

           (unknown)  (no       (unknown)  (unknown)  trospium 20 mg  (units    

(unknown)



                    date)                         tablet    unknown)  

 

           (unknown)  (no       (unknown)  (unknown)  22  (units    (unkno

wn)



                    date)                                   unknown)  

 

           (unknown)  (no       (unknown)  (unknown)  1.07 in January.  (units  

  (unknown)



                    date)                         No elevation in his unknown)  



                                                  troponin,           



                                                  procalcitonin is           



                                                  elevated at           

 

           (unknown)  (no       (unknown)  (unknown)  Anemia,   (units    (unkno

wn)



                    date)                         Hypomagnesemia, unknown)  



                                                  Pyelonephritis           

 

           (unknown)  (no       (unknown)  (unknown)  Medication  (units    (unk

nown)



                    date)                         Instructions unknown)  



                                                  Recorded            

 

           (unknown)  (no       (unknown)  (unknown)  Medication  (units    (unk

nown)



                    date)                         Instructions unknown)  



                                                  Recorded  Confirmed           

 

           (unknown)  (no       (unknown)  (unknown)  sodium 135,  (units    (un

known)



                    date)                         potassium 3.7, unknown)  



                                                  creatinine 1.42           



                                                  which is increased           



                                                  from his prior of           

 

           (unknown)  (no       (unknown)  (unknown)  with regular axis  (units 

   (unknown)



                    date)                         and intervals.  No unknown)  



                                                  STEMI, ST segment           



                                                  changes,            



                                                  arrhythmia, or           

 

           (unknown)  (no       (unknown)  (unknown)  work is   (units    (unkno

wn)



                    date)                         significant for unknown)  



                                                  anemia, hemoglobin           



                                                  is 9.1 today, down           



                                                  from 9.6 in           

 

           (unknown)  (no       (unknown)  (unknown)  <Kitty Costello,  (units 

   (unknown)



                    date)                         [facility] - Last Filed: unknown)  



                                                  22 20:33>           

 

           (unknown)  (no       (unknown)  (unknown)  <Allegra Montiel DO  (units

    (unknown)



                    date)                         - Last Filed: unknown)  



                                                  22 07:39>           

 

           (unknown)  (no       (unknown)  (unknown)  (Zofran) vomiting  (units 

   (unknown)



                    date)                         #14 tabs  unknown)  

 

           (unknown)  (no       (unknown)  (unknown)  0.72, his lactate  (units 

   (unknown)



                    date)                         is also elevated at unknown)  



                                                  2.6, magnesium is           



                                                  low at 1.0.  His           

 

           (unknown)  (no       (unknown)  (unknown)  [id number]   (units    (unkno

wn)



                    date)                                   unknown)  

 

           (unknown)  (no       (unknown)  (unknown)  with iron  (units    (

unknown)



                    date)                         deficiency anemia, unknown)  



                                                  altered bowel           



                                                  habit, personal           



                                                  history of colon           

 

           (unknown)  (no       (unknown)  (unknown)  22:52     (units    (unkno

wn)



                    date)                                   unknown)  

 

           (unknown)  (no       (unknown)  (unknown)  62-year-old  (units    (un

known)



                    date)                         gentleman with a unknown)  



                                                  history of multiple           



                                                  sclerosis, CVA in           



                                                  2019,               

 

           (unknown)  (no       (unknown)  (unknown)  ?         (units    (unkno

wn)



                    date)                                   unknown)  

 

           (unknown)  (no       (unknown)  (unknown)  ALT     (<50)  (units    (

unknown)



                    date)                         IU/L      unknown)  

 

           (unknown)  (no       (unknown)  (unknown)  ALT    16  (<50)  (units  

  (unknown)



                    date)                         IU/L      unknown)  

 

           (unknown)  (no       (unknown)  (unknown)  ALT   (<50)  IU/L  (units 

   (unknown)



                    date)                                   unknown)  

 

           (unknown)  (no       (unknown)  (unknown)  AST     (17-59)  (units   

 (unknown)



                    date)                         IU/L      unknown)  

 

           (unknown)  (no       (unknown)  (unknown)  AST    19  (17-59)  (units

    (unknown)



                    date)                          IU/L     unknown)  

 

           (unknown)  (no       (unknown)  (unknown)  AST   (17-59)  (units    (

unknown)



                    date)                         IU/L      unknown)  

 

           (unknown)  (no       (unknown)  (unknown)  Activity  (units    (unkno

wn)



                    date)                         Restrictions/Additi unknown)  



                                                  onal Instructions:           

 

           (unknown)  (no       (unknown)  (unknown)  Age/Sex: 62 / M  (units   

 (unknown)



                    date)                                   unknown)  

 

           (unknown)  (no       (unknown)  (unknown)  Albumin   (units    (unkno

wn)



                    date)                         (3.5-5.0)  g/dL unknown)  

 

           (unknown)  (no       (unknown)  (unknown)  Albumin    3.9  (units    

(unknown)



                    date)                         (3.5-5.0)  g/dL unknown)  

 

           (unknown)  (no       (unknown)  (unknown)  Albumin   (units    (unkno

wn)



                    date)                         (3.5-5.0)  g/dL unknown)  

 

           (unknown)  (no       (unknown)  (unknown)  Albumin/Globulin  (units  

  (unknown)



                    date)                         Ratio     (1.0-2.8) unknown)  

 

           (unknown)  (no       (unknown)  (unknown)  Albumin/Globulin  (units  

  (unknown)



                    date)                         Ratio    1.1 unknown)  



                                                  (1.0-2.8)           

 

           (unknown)  (no       (unknown)  (unknown)  Albumin/Globulin  (units  

  (unknown)



                    date)                         Ratio   (1.0-2.8) unknown)  

 

           (unknown)  (no       (unknown)  (unknown)  Alkaline  (units    (unkno

wn)



                    date)                         Phosphatase unknown)  



                                                  ()  U/L           

 

           (unknown)  (no       (unknown)  (unknown)  Alkaline  (units    (unkno

wn)



                    date)                         Phosphatase    105 unknown)  



                                                  ()  U/L           

 

           (unknown)  (no       (unknown)  (unknown)  Alkaline  (units    (unkno

wn)



                    date)                         Phosphatase unknown)  



                                                  ()  U/L           

 

           (unknown)  (no       (unknown)  (unknown)  Allergy/AdvReac  (units   

 (unknown)



                    date)                         Type Severity unknown)  



                                                  Reaction Status           



                                                  Date / Time           

 

           (unknown)  (no       (unknown)  (unknown)  Amorphous Sediment  (units

    (unknown)



                    date)                                   unknown)  

 

           (unknown)  (no       (unknown)  (unknown)  Amorphous Sediment  (units

    (unknown)



                    date)                                   unknown)  

 

           (unknown)  (no       (unknown)  (unknown)  Amorphous Sediment  (units

    (unknown)



                    date)                            2+     unknown)  

 

           (unknown)  (no       (unknown)  (unknown)  Approved by: Garfield Mcadnielsunits 

   (unknown)



                    dateIrena Lopez M.D. on unknown)  



                                                  2022 at 18:02?           

 

           (unknown)  (no       (unknown)  (unknown)  BUN     (9-20)  (units    

(unknown)



                    date)                         mg/dL     unknown)  

 

           (unknown)  (no       (unknown)  (unknown)  BUN    21 H  (units    (un

known)



                    date)                         (9-20)  mg/dL unknown)  

 

           (unknown)  (no       (unknown)  (unknown)  BUN   (9-20)  (units    (u

nknown)



                    date)                         mg/dL     unknown)  

 

           (unknown)  (no       (unknown)  (unknown)  BUN/Creatinine  (units    

(unknown)



                    date)                         Ratio     (6-22) unknown)  

 

           (unknown)  (no       (unknown)  (unknown)  BUN/Creatinine  (units    

(unknown)



                    date)                         Ratio    14.8 unknown)  



                                                  (6-22)              

 

           (unknown)  (no       (unknown)  (unknown)  BUN/Creatinine  (units    

(unknown)



                    date)                         Ratio   (6-22) unknown)  

 

           (unknown)  (no       (unknown)  (unknown)  Baso # (Auto)  (units    (

unknown)



                    date)                         (0-100)  /uL unknown)  

 

           (unknown)  (no       (unknown)  (unknown)  Baso # (Auto)  (units    (

unknown)



                    date)                         (0-100)  /uL unknown)  

 

           (unknown)  (no       (unknown)  (unknown)  Baso # (Auto)   0  (units 

   (unknown)



                    date)                          (0-100)  /uL unknown)  

 

           (unknown)  (no       (unknown)  (unknown)  Baso % (Auto)  (units    (

unknown)



                    date)                         (0-2)  %  unknown)  

 

           (unknown)  (no       (unknown)  (unknown)  Baso % (Auto)  (units    (

unknown)



                    date)                         (0-2)  %  unknown)  

 

           (unknown)  (no       (unknown)  (unknown)  Baso % (Auto)  (units    (

unknown)



                    date)                         0.2   (0-2)  % unknown)  

 

           (unknown)  (no       (unknown)  (unknown)  Bedside Urine  (units    (

unknown)



                    date)                         Bilirubin        - unknown)  



                                                  Negative            

 

           (unknown)  (no       (unknown)  (unknown)  Bedside Urine  (units    (

unknown)



                    date)                         Glucose    Negative unknown)  

 

           (unknown)  (no       (unknown)  (unknown)  Bedside Urine  (units    (

unknown)



                    date)                         Ketone    - unknown)  



                                                  Negative            

 

           (unknown)  (no       (unknown)  (unknown)  Bedside Urine  (units    (

unknown)



                    date)                         Leukocytes       + unknown)  



                                                  70                  

 

           (unknown)  (no       (unknown)  (unknown)  Bedside Urine  (units    (

unknown)



                    date)                         Nitrite    - unknown)  



                                                  Negative            

 

           (unknown)  (no       (unknown)  (unknown)  Bedside Urine  (units    (

unknown)



                    date)                         Occult Blood     ++ unknown)  

 

           (unknown)  (no       (unknown)  (unknown)  Bedside Urine  (units    (

unknown)



                    date)                         Protein    + 30 unknown)  

 

           (unknown)  (no       (unknown)  (unknown)  Bedside Urine  (units    (

unknown)



                    date)                         Urobilinogen     - unknown)  



                                                  Negative            

 

           (unknown)  (no       (unknown)  (unknown)  Bedside Urine pH  (units  

  (unknown)



                    date)                          6.0      unknown)  

 

           (unknown)  (no       (unknown)  (unknown)  Blood Pressure  (units    

(unknown)



                    date)                         124/66   18/ unknown)  



                                                  17:34               

 

           (unknown)  (no       (unknown)  (unknown)  Blood Pressure  (units    

(unknown)



                    date)                         124/58 L  unknown)  

 

           (unknown)  (no       (unknown)  (unknown)  Bones and chest  (units   

 (unknown)



                    date)                         wall:? No unknown)  



                                                  suspicious bony           



                                                  lesions.? Overlying           



                                                  soft tissues           

 

           (unknown)  (no       (unknown)  (unknown)  CK-MB (CK-2)  (units    (u

nknown)



                    date)                                   unknown)  

 

           (unknown)  (no       (unknown)  (unknown)  CK-MB (CK-2)  (units    (u

nknown)



                    date)                                   unknown)  

 

           (unknown)  (no       (unknown)  (unknown)  CK-MB (CK-2)  (units    (u

nknown)



                    date)                         TNP       unknown)  

 

           (unknown)  (no       (unknown)  (unknown)  CK-MB (CK-2) Rel  (units  

  (unknown)



                    date)                         Index     unknown)  

 

           (unknown)  (no       (unknown)  (unknown)  CK-MB (CK-2) Rel  (units  

  (unknown)



                    date)                         Index     unknown)  

 

           (unknown)  (no       (unknown)  (unknown)  CK-MB (CK-2) Rel  (units  

  (unknown)



                    date)                         Index    TNP unknown)  

 

           (unknown)  (no       (unknown)  (unknown)  COMPARISON:?  (units    (u

nknown)



                    date)                         [facility], unknown)  



                                                  CR, XR CHEST 2V,           



                                                  3/18/2021, 10:19.           

 

           (unknown)  (no       (unknown)  (unknown)  CT abdomen pelvis  (units 

   (unknown)



                    date)                         was ordered for unknown)  



                                                  patient has           



                                                  tenderness to his           



                                                  right lower           

 

           (unknown)  (no       (unknown)  (unknown)  CVA (cerebral  (units    (

unknown)



                    date)                         vascular accident) unknown)  



                                                  (2018)           

 

           (unknown)  (no       (unknown)  (unknown)  Calcium   (units    (unkno

wn)



                    date)                         (8.4-10.2)  mg/dL unknown)  

 

           (unknown)  (no       (unknown)  (unknown)  Calcium    9.3  (units    

(unknown)



                    date)                         (8.4-10.2)  mg/dL unknown)  

 

           (unknown)  (no       (unknown)  (unknown)  Calcium   (units    (unkno

wn)



                    date)                         (8.4-10.2)  mg/dL unknown)  

 

           (unknown)  (no       (unknown)  (unknown)  Carbon Dioxide  (units    

(unknown)



                    date)                         (22-32)  mmol/L unknown)  

 

           (unknown)  (no       (unknown)  (unknown)  Carbon Dioxide  (units    

(unknown)



                    date)                         29  (22-32)  mmol/L unknown)  

 

           (unknown)  (no       (unknown)  (unknown)  Carbon Dioxide  (units    

(unknown)



                    date)                         (22-32)  mmol/L unknown)  

 

           (unknown)  (no       (unknown)  (unknown)  Cardio: denies  (units    

(unknown)



                    date)                         chest pain, unknown)  



                                                  palpitations           

 

           (unknown)  (no       (unknown)  (unknown)  Cardiovascular:  (units   

 (unknown)



                    date)                         regular rate and unknown)  



                                                  rhythm, no           



                                                  peripheral edema,           



                                                  warm extremities           

 

           (unknown)  (no       (unknown)  (unknown)  Ceftriaxone Sodium  (units

    (unknown)



                    date)                         1,000 mg/ (Sodium unknown)  



                                                  Chloride)  100 mls           



                                                  @ 200 mls/hr IV NOW           



                                                  ONE                 

 

           (unknown)  (no       (unknown)  (unknown)  Chest x-ray:  (units    (u

nknown)



                    date)                                   unknown)  

 

           (unknown)  (no       (unknown)  (unknown)  Chief complaint:  (units  

  (unknown)



                    date)                         Weakness  unknown)  

 

           (unknown)  (no       (unknown)  (unknown)  Chloride  (units    (

wn)



                    date)                         ()  mmol/L unknown)  

 

           (unknown)  (no       (unknown)  (unknown)  Chloride    96 L  (units  

  (unknown)



                    date)                         ()  mmol/L unknown)  

 

           (unknown)  (no       (unknown)  (unknown)  Chloride  (units    (o

wn)



                    date)                         ()  mmol/L unknown)  

 

           (unknown)  (no       (unknown)  (unknown)  Clinical  (units    (

wn)



                    date)                         Impression: unknown)  

 

           (unknown)  (no       (unknown)  (unknown)  Colon cancer  (units    (u

)



                    date)                         ()    unknown)  

 

           (unknown)  (no       (unknown)  (unknown)  Course    (units    (o

wn)



                    date)                                   unknown)  

 

           (unknown)  (no       (unknown)  (unknown)  Creatinine  (units    ()



                    date)                         (0.66-1.25)  mg/dL unknown)  

 

           (unknown)  (no       (unknown)  (unknown)  Creatinine    1.42  (units

    (unknown)



                    date)                         H  (0.66-1.25) unknown)  



                                                  mg/dL               

 

           (unknown)  (no       (unknown)  (unknown)  Creatinine  (units    (un)



                    date)                         (0.66-1.25)  mg/dL unknown)  

 

           (unknown)  (no       (unknown)  (unknown)  : 1960  (units   

 (unknown)



                    date)                         Acct:[id number] unknown)  

 

           (unknown)  (no       (unknown)  (unknown)  Departure  (units    (

own)



                    date)                                   unknown)  

 

           (unknown)  (no       (unknown)  (unknown)  Depression  (units    (unk

nown)



                    date)                                   unknown)  

 

           (unknown)  (no       (unknown)  (unknown)  Diabetes  (units    (o

wn)



                    date)                                   unknown)  

 

           (unknown)  (no       (unknown)  (unknown)  Diabetic  (units    (o

)



                    date)                         neuropathy unknown)  

 

           (unknown)  (no       (unknown)  (unknown)  Discharge Plan  (units    

(unknown)



                    date)                                   unknown)  

 

           (unknown)  (no       (unknown)  (unknown)  Discontinued  (units    (u

nknown)



                    date)                         Medications unknown)  

 

           (unknown)  (no       (unknown)  (unknown)  Gustavo Macias MD  (units   

 (unknown)



                    date)                         [[facility] unknown)  



                                                  Provider] -           

 

           (unknown)  (no       (unknown)  (unknown)  ECG Data  (units    (unkno

wn)



                    date)                                   unknown)  

 

           (unknown)  (no       (unknown)  (unknown) EKG independently  (units  

  (unknown)



                    date)                         reviewed by Dr. mejia)  



                                                  Dinesh and reveals           



                                                  normal sinus rhythm           



                                                  at 72 bpm           

 

           (unknown)  (no       (unknown)  (unknown)  EKG shows a normal  (units

    (unknown)



                    date)                         sinus rhythm rate unknown)  



                                                  of 70 2p are 172           



                                                  QRS 86 and .           



                                                   No                 

 

           (unknown)  (no       (unknown)  (unknown)  ER Physician:  (units    (

unknown)



                    date)                         Allegra Montiel D.O. unknown)  

 

           (unknown)  (no       (unknown)  (unknown)  Eos # (Auto)  (units    (u

nknown)



                    date)                         (0-450)  /uL unknown)  

 

           (unknown)  (no       (unknown)  (unknown)  Eos # (Auto)  (units    (u

nknown)



                    date)                         (0-450)  /uL unknown)  

 

           (unknown)  (no       (unknown)  (unknown)  Eos # (Auto)   100  (units

    (unknown)



                    date)                           (0-450)  /uL unknown)  

 

           (unknown)  (no       (unknown)  (unknown)  Eos % (Auto)  (units    (u

nknown)



                    date)                         (2-4)  %  unknown)  

 

           (unknown)  (no       (unknown)  (unknown)  Eos % (Auto)  (units    (u

nknown)



                    date)                         (2-4)  %  unknown)  

 

           (unknown)  (no       (unknown)  (unknown)  Eos % (Auto)   0.8  (units

    (unknown)



                    date)                         L   (2-4)  % unknown)  

 

           (unknown)  (no       (unknown)  (unknown)  Esterase  (units    (unkno

wn)



                    date)                                   unknown)  

 

           (unknown)  (no       (unknown)  (unknown)  Estimated GFR  (units    (

unknown)



                    date)                         (>60)  mL/min unknown)  

 

           (unknown)  (no       (unknown)  (unknown)  Estimated GFR  (units    (

unknown)



                    date)                         55 L  (>60)  mL/min unknown)  

 

           (unknown)  (no       (unknown)  (unknown)  Estimated GFR  (units    (

unknown)



                    date)                         (>60)  mL/min unknown)  

 

           (unknown)  (no       (unknown)  (unknown)  Exam      (units    (unkno

wn)



                    date)                                   unknown)  

 

           (unknown)  (no       (unknown)  (unknown)  Exam Narrative:  (units   

 (unknown)



                    date)                                   unknown)  

 

           (unknown)  (no       (unknown)  (unknown)  Eyes: denies  (units    (u

nknown)



                    date)                         visual changes, eye unknown)  



                                                  pain                

 

           (unknown)  (no       (unknown)  (unknown)  Eyes: equal round  (units 

   (unknown)



                    date)                         and reactive, EOMI, unknown)  



                                                  conjunctiva normal           

 

           (unknown)  (no       (unknown)  (unknown)  FINDINGS:?  (units    (unk

nown)



                    date)                                   unknown)  

 

           (unknown)  (no       (unknown)  (unknown)  Family History  (units    

(unknown)



                    date)                         (Reviewed 22 unknown)  



                                                  @ 19:57 by Kitty Costello [facility])           

 

           (unknown)  (no       (unknown)  (unknown)  Father     (units 

   (unknown)



                    date)                          Cancer   unknown)  

 

           (unknown)  (no       (unknown)  (unknown)  Follow-up for  (units    (

unknown)



                    date)                         recheck in the next unknown)  



                                                  2-3 days.           

 

           (unknown)  (no       (unknown)  (unknown)  GERD      (units    (unkno

wn)



                    date)                         (gastroesophageal unknown)  



                                                  reflux disease)           

 

           (unknown)  (no       (unknown)  (unknown)  GI:  Right lower  (units  

  (unknown)



                    date)                         quadrant tenderness unknown)  



                                                  to palpation           

 

           (unknown)  (no       (unknown)  (unknown)  GI: abdomen mildly  (units

    (unknown)



                    date)                         guarded, tender to unknown)  



                                                  palpation in the           



                                                  right lower           



                                                  quadrant,           

 

           (unknown)  (no       (unknown)  (unknown)  :  Straight cath  (units

    (unknown)



                    date)                         for urine completed unknown)  



                                                  by myself with 16           



                                                  [place] coude           



                                                  catheter,           

 

           (unknown)  (no       (unknown)  (unknown)  : denies  (units    (unk

nown)



                    date)                         dysuria, hematuria unknown)  



                                                  or flank pain,           



                                                  reports oliguria,           



                                                  patient self           

 

           (unknown)  (no       (unknown)  (unknown)  Gastroenterology  (units  

  (unknown)



                    date)                         from [place], unknown)  



                                                  patient reports           



                                                  that he sees Yasmine Rocha PA-C           

 

           (unknown)  (no       (unknown)  (unknown)  General   (units    (unkno

wn)



                    date)                                   unknown)  

 

           (unknown)  (no       (unknown)  (unknown)  General:  (units    (unkno

wn)



                    date)                         cooperative, unknown)  



                                                  comfortable, in no           



                                                  acute distress,           



                                                  well groomed,           

 

           (unknown)  (no       (unknown)  (unknown)  General: denies  (units   

 (unknown)



                    date)                         fever, chills, unknown)  



                                                  endorses weakness,           



                                                  right lower           



                                                  quadrant pain,           

 

           (unknown)  (no       (unknown)  (unknown)  GenericComposite[P  (units

    (unknown)



                    date)                         lt Count  unknown)  



                                                  (150-400)  X10^3/uL           



                                                   ]                  

 

           (unknown)  (no       (unknown)  (unknown)  GenericComposite[P  (units

    (unknown)



                    date)                         lt Count  unknown)  



                                                  (150-400)  X10^3/uL           



                                                   ]                  

 

           (unknown)  (no       (unknown)  (unknown)  GenericComposite[P  (units

    (unknown)



                    date)                         lt Count   219 unknown)  



                                                  (150-400)  X10^3/uL           



                                                   ]                  

 

           (unknown)  (no       (unknown)  (unknown)  GenericComposite[R  (units

    (unknown)



                    date)                         BC     (4.5-5.9) unknown)  



                                                  X10^6/uL  ]           

 

           (unknown)  (no       (unknown)  (unknown)  GenericComposite[R  (units

    (unknown)



                    date)                         BC   (4.5-5.9) unknown)  



                                                  X10^6/uL  ]           

 

           (unknown)  (no       (unknown)  (unknown)  GenericComposite[R  (units

    (unknown)



                    date)                         BC   3.37 L unknown)  



                                                  (4.5-5.9)  X10^6/uL           



                                                   ]                  

 

           (unknown)  (no       (unknown)  (unknown)  GenericComposite[W  (units

    (unknown)



                    date)                         BC     (4.5-11.0) unknown)  



                                                  X10^3/uL  ]           

 

           (unknown)  (no       (unknown)  (unknown)  GenericComposite[W  (units

    (unknown)



                    date)                         BC   (4.5-11.0) unknown)  



                                                  X10^3/uL  ]           

 

           (unknown)  (no       (unknown)  (unknown)  GenericComposite[W  (units

    (unknown)



                    date)                         BC   7.0  unknown)  



                                                  (4.5-11.0)           



                                                  X10^3/uL  ]           

 

           (unknown)  (no       (unknown)  (unknown)  Globulin  (units    (unkno

wn)



                    date)                         (1.7-4.1)  g/dL unknown)  

 

           (unknown)  (no       (unknown)  (unknown)  Globulin    3.6  (units   

 (unknown)



                    date)                         (1.7-4.1)  g/dL unknown)  

 

           (unknown)  (no       (unknown)  (unknown)  Globulin  (units    (unkno

wn)



                    date)                         (1.7-4.1)  g/dL unknown)  

 

           (unknown)  (no       (unknown)  (unknown)  Glucose   (units    (unkno

wn)



                    date)                         ()  mg/dL unknown)  

 

           (unknown)  (no       (unknown)  (unknown)  Glucose    262 H  (units  

  (unknown)



                    date)                         ()  mg/dL unknown)  

 

           (unknown)  (no       (unknown)  (unknown)  Glucose   ()  (units

    (unknown)



                    date)                          mg/dL    unknown)  

 

           (unknown)  (no       (unknown)  (unknown)  Guaiac stool:  (units    (

unknown)



                    date)                         Negative, good unknown)  



                                                  stool result           

 

           (unknown)  (no       (unknown)  (unknown)  H/O colectomy  (units    (

unknown)



                    date)                                   unknown)  

 

           (unknown)  (no       (unknown)  (unknown)  HPI - Weakness  (units    

(unknown)



                    date)                                   unknown)  

 

           (unknown)  (no       (unknown)  (unknown)  HPI Narrative:  (units    

(unknown)



                    date)                                   unknown)  

 

           (unknown)  (no       (unknown)  (unknown)  Hct     (41-53)  %  (units

    (unknown)



                    date)                                   unknown)  

 

           (unknown)  (no       (unknown)  (unknown)  Hct   (41-53)  %  (units  

  (unknown)



                    date)                                   unknown)  

 

           (unknown)  (no       (unknown)  (unknown)  Hct   26.7 L  (units    (u

nknown)



                    date)                         (41-53)  % unknown)  

 

           (unknown)  (no       (unknown)  (unknown)  He does not have  (units  

  (unknown)



                    date)                         any mental status unknown)  



                                                  changes, GCS is 15.           



                                                   Guaiac stool is           

 

           (unknown)  (no       (unknown)  (unknown)  Head/Neck:  (units    (unk

nown)



                    date)                         Endorses having a unknown)  



                                                  headache, denies           



                                                  any neck pain           

 

           (unknown)  (no       (unknown)  (unknown)  Head: atraumatic,  (units 

   (unknown)



                    date)                         symmetrical facial unknown)  



                                                  expressions           

 

           (unknown)  (no       (unknown)  (unknown)  Hgb       (units    (unkno

wn)



                    date)                         (13.5-17.5)  g/dL unknown)  

 

           (unknown)  (no       (unknown)  (unknown)  Hgb   (13.5-17.5)  (units 

   (unknown)



                    date)                         g/dL      unknown)  

 

           (unknown)  (no       (unknown)  (unknown)  Hgb   9.1 L  (units    (un

known)



                    date)                         (13.5-17.5)  g/dL unknown)  

 

           (unknown)  (no       (unknown)  (unknown)  History of Present  (units

    (unknown)



                    date)                         Illness   unknown)  

 

           (unknown)  (no       (unknown)  (unknown)  History of lumbar  (units 

   (unknown)



                    date)                         surgery (2011) unknown)  

 

           (unknown)  (no       (unknown)  (unknown)  Hx of foot surgery  (units

    (unknown)



                    date)                         (2017)    unknown)  

 

           (unknown)  (no       (unknown)  (unknown)  Hx of shoulder  (units    

(unknown)



                    date)                         surgery (2010) unknown)  

 

           (unknown)  (no       (unknown)  (unknown)  Hypertension  (units    (u

nknown)



                    date)                                   unknown)  

 

           (unknown)  (no       (unknown)  (unknown)  IMPRESSION:? No  (units   

 (unknown)



                    date)                         acute     unknown)  



                                                  cardiopulmonary           



                                                  findings            

 

           (unknown)  (no       (unknown)  (unknown)  INDICATIONS:?  (units    (

unknown)



                    date)                         chest pain unknown)  

 

           (unknown)  (no       (unknown)  (unknown)  Imaging Data  (units    (u

nknown)



                    date)                                   unknown)  

 

           (unknown)  (no       (unknown)  (unknown)  Independently  (units    (

unknown)



                    date)                         reviewed vitals unknown)  



                                                  signs and nursing           



                                                  notes.              

 

           (unknown)  (no       (unknown)  (unknown)  Initial Vital  (units    (

unknown)



                    date)                         Signs     unknown)  

 

           (unknown)  (no       (unknown)  (unknown)  Initial Vital  (units    (

unknown)



                    date)                         Signs:    unknown)  

 

           (unknown)  (no       (unknown)  (unknown)  Instructions:  (units    (

unknown)



                    date)                         Acute Cystitis, unknown)  



                                                  Anemia              

 

           (unknown)  (no       (unknown)  (unknown)  Interpretation:  (units   

 (unknown)



                    date)                                   unknown)  

 

           (unknown)  (no       (unknown)  (unknown) January, hematocrit  (units

    (unknown)



                    date)                         is 26.7, down from unknown)  



                                                  27.8 also in           



                                                  January, platelet           



                                                  count 219,           

 

           (unknown)  (no       (unknown)  (unknown) Klebsiella  (units    (unkn

own)



                    date)                         pneumoniae which unknown)  



                                                  was resistant only           



                                                  to ampicillin and           



                                                  nitrofurantoin.           

 

           (unknown)  (no       (unknown)  (unknown)  Lab Data  (units    (unkno

wn)



                    date)                                   unknown)  

 

           (unknown)  (no       (unknown)  (unknown)  Labs:     (units    (unkno

wn)



                    date)                                   unknown)  

 

           (unknown)  (no       (unknown)  (unknown)  Lactate   (units    (unkno

wn)



                    date)                         (0.7-2.1)  mmol/L unknown)  

 

           (unknown)  (no       (unknown)  (unknown)  Lactate   2.6 H  (units   

 (unknown)



                    date)                         (0.7-2.1)  mmol/L unknown)  

 

           (unknown)  (no       (unknown)  (unknown)  Lactate  1.1  (units    (u

nknown)



                    date)                         (0.7-2.1)  mmol/L unknown)  

 

           (unknown)  (no       (unknown)  (unknown)  Lipase    (units    (unkno

wn)



                    date)                         ()  U/L unknown)  

 

           (unknown)  (no       (unknown)  (unknown)  Lipase    32  (units    (u

nknown)



                    date)                         ()  U/L unknown)  

 

           (unknown)  (no       (unknown)  (unknown)  Lipase   ()  (units 

   (unknown)



                    date)                         U/L       unknown)  

 

           (unknown)  (no       (unknown)  (unknown)  Lungs and pleura:?  (units

    (unknown)



                    date)                         Lungs are clear.? unknown)  



                                                  No pleural           



                                                  effusions or           



                                                  pneumothorax.? Low           

 

           (unknown)  (no       (unknown)  (unknown)  Lymph # (Auto)  (units    

(unknown)



                    date)                         (8466-0580)  /uL unknown)  

 

           (unknown)  (no       (unknown)  (unknown)  Lymph # (Auto)  (units    

(unknown)



                    date)                         (6788-8160)  /uL unknown)  

 

           (unknown)  (no       (unknown)  (unknown)  Lymph # (Auto)  (units    

(unknown)



                    date)                         400 L   (3537-0513) unknown)  



                                                   /uL                

 

           (unknown)  (no       (unknown)  (unknown)  Lymph % (Auto)  (units    

(unknown)



                    date)                         (25-40)  % unknown)  

 

           (unknown)  (no       (unknown)  (unknown)  Lymph % (Auto)  (units    

(unknown)



                    date)                         (25-40)  % unknown)  

 

           (unknown)  (no       (unknown)  (unknown)  Lymph % (Auto)  (units    

(unknown)



                    date)                         6.4 L   (25-40)  % unknown)  

 

           (unknown)  (no       (unknown)  (unknown)  MCH     (26-34)  (units   

 (unknown)



                    date)                         PG        unknown)  

 

           (unknown)  (no       (unknown)  (unknown)  MCH   (26-34)  PG  (units 

   (unknown)



                    date)                                   unknown)  

 

           (unknown)  (no       (unknown)  (unknown)  MCH   26.9  (units    (unk

nown)



                    date)                         (26-34)  PG unknown)  

 

           (unknown)  (no       (unknown)  (unknown)  MCHC     (30-36)  (units  

  (unknown)



                    date)                         %         unknown)  

 

           (unknown)  (no       (unknown)  (unknown)  MCHC   (30-36)  %  (units 

   (unknown)



                    date)                                   unknown)  

 

           (unknown)  (no       (unknown)  (unknown)  MCHC   34.0  (units    (un

known)



                    date)                         (30-36)  % unknown)  

 

           (unknown)  (no       (unknown)  (unknown)  MCV     ()  (units  

  (unknown)



                    date)                         fL        unknown)  

 

           (unknown)  (no       (unknown)  (unknown)  MCV   ()  fL  (units

    (unknown)



                    date)                                   unknown)  

 

           (unknown)  (no       (unknown)  (unknown)  MCV   79.1 L  (units    (u

nknown)



                    date)                         ()  fL unknown)  

 

           (unknown)  (no       (unknown)  (unknown)  MDM - Weakness  (units    

(unknown)



                    date)                                   unknown)  

 

           (unknown)  (no       (unknown)  (unknown)  MDM Narrative  (units    (

unknown)



                    date)                                   unknown)  

 

           (unknown)  (no       (unknown)  (unknown)  MSK: denies new  (units   

 (unknown)



                    date)                         joint pain, muscle unknown)  



                                                  weakness or           



                                                  swelling            

 

           (unknown)  (no       (unknown)  (unknown) MSK: moves all  (units    (

unknown)



                    date)                         extremities, unknown)  



                                                  neurovascularly           



                                                  intact, generalized           



                                                  weakness, normal           

 

           (unknown)  (no       (unknown)  (unknown)  Magnesium  (units    (unkn

own)



                    date)                         (1.6-2.3)  mg/dL unknown)  

 

           (unknown)  (no       (unknown)  (unknown)  Magnesium    1.0 L  (units

    (unknown)



                    date)                          (1.6-2.3)  mg/dL unknown)  

 

           (unknown)  (no       (unknown)  (unknown)  Magnesium  (units    (unkn

own)



                    date)                         (1.6-2.3)  mg/dL unknown)  

 

           (unknown)  (no       (unknown)  (unknown)  Magnesium Sulfate  (units 

   (unknown)



                    date)                         (Magnesium Sulfate) unknown)  



                                                   2 gm in 50 mls @           



                                                  50 mls/hr IV NOW           



                                                  ONE                 

 

           (unknown)  (no       (unknown)  (unknown)  Mank: Patient was  (units 

   (unknown)



                    date)                         seen independently unknown)  



                                                  evaluated by           



                                                  myself, physical           



                                                  exam was            

 

           (unknown)  (no       (unknown)  (unknown)  Mank: Patient's  (units   

 (unknown)



                    date)                         lactate improved unknown)  



                                                  with fluids.  He           



                                                  had difficulty           



                                                  ambulating           

 

           (unknown)  (no       (unknown)  (unknown)  Mediastinum:?  (units    (

unknown)



                    date)                         Mediastinal unknown)  



                                                  contours appear           



                                                  normal.? Heart size           



                                                  is normal.?           

 

           (unknown)  (no       (unknown)  (unknown)  Medical History  (units   

 (unknown)



                    date)                         (Reviewed 22 unknown)  



                                                  @ 19:57 by Kitty Costello [facility])           

 

           (unknown)  (no       (unknown)  (unknown)  Medical decision  (units  

  (unknown)



                    date)                         making narrative: unknown)  

 

           (unknown)  (no       (unknown)  (unknown)  MiraL[employer] since this  (units

    (unknown)



                    date)                         happened. unknown)  

 

           (unknown)  (no       (unknown)  (unknown)  Mode of arrival:  (units  

  (unknown)



                    date)                         Wheelchair unknown)  

 

           (unknown)  (no       (unknown)  (unknown)  Mono # (Auto)  (units    (

unknown)



                    date)                         (0-900)  /uL unknown)  

 

           (unknown)  (no       (unknown)  (unknown)  Mono # (Auto)  (units    (

unknown)



                    date)                         (0-900)  /uL unknown)  

 

           (unknown)  (no       (unknown)  (unknown)  Mono # (Auto)  (units    (

unknown)



                    date)                         600   (0-900)  /uL unknown)  

 

           (unknown)  (no       (unknown)  (unknown)  Mono % (Auto)  (units    (

unknown)



                    date)                         (3-14)  % unknown)  

 

           (unknown)  (no       (unknown)  (unknown)  Mono % (Auto)  (units    (

unknown)



                    date)                         (3-14)  % unknown)  

 

           (unknown)  (no       (unknown)  (unknown)  Mono % (Auto)  (units    (

unknown)



                    date)                         7.9   (3-14)  % unknown)  

 

           (unknown)  (no       (unknown)  (unknown)  Mother     (units 

   (unknown)



                    date)                          Cancer   unknown)  

 

           (unknown)  (no       (unknown)  (unknown)  Mouth/Throat:  (units    (

unknown)



                    date)                         moist mucus unknown)  



                                                  membranes           

 

           (unknown)  (no       (unknown)  (unknown)  Narrative  (units    (unkn

own)



                    date)                                   unknown)  

 

           (unknown)  (no       (unknown)  (unknown)  Narrative:  (units    (unk

nown)



                    date)                                   unknown)  

 

           (unknown)  (no       (unknown)  (unknown)  Neck: supple  (units    (u

nknown)



                    date)                                   unknown)  

 

           (unknown)  (no       (unknown)  (unknown)  Neuro: denies  (units    (

unknown)



                    date)                         numbness, tingling, unknown)  



                                                  dizziness           

 

           (unknown)  (no       (unknown)  (unknown)  Neuro: normal  (units    (

unknown)



                    date)                         speech and unknown)  



                                                  cognition, A+O x3           

 

           (unknown)  (no       (unknown)  (unknown)  Neut # (Auto)  (units    (

unknown)



                    date)                         (8191-8228)  /uL unknown)  

 

           (unknown)  (no       (unknown)  (unknown)  Neut # (Auto)  (units    (

unknown)



                    date)                         (8267-1641)  /uL unknown)  

 

           (unknown)  (no       (unknown)  (unknown)  Neut # (Auto)  (units    (

unknown)



                    date)                         5900   (1707-1046) unknown)  



                                                  /uL                 

 

           (unknown)  (no       (unknown)  (unknown)  Neut % (Auto)  (units    (

unknown)



                    date)                         (50-75)  % unknown)  

 

           (unknown)  (no       (unknown)  (unknown)  Neut % (Auto)  (units    (

unknown)



                    date)                         (50-75)  % unknown)  

 

           (unknown)  (no       (unknown)  (unknown)  Neut % (Auto)  (units    (

unknown)



                    date)                         84.7 H   (50-75)  % unknown)  

 

           (unknown)  (no       (unknown)  (unknown)  New       (units    (unkno

wn)



                    date)                                   unknown)  

 

           (unknown)  (no       (unknown)  (unknown)  No Action  (units    (unkn

own)



                    date)                                   unknown)  

 

           (unknown)  (no       (unknown)  (unknown)  No Known Drug  (units    (

unknown)



                    date)                         Allergies Allergy unknown)  



                                                  Verified 22           



                                                  17:38               

 

           (unknown)  (no       (unknown)  (unknown)  Nose: nares  (units    (un

known)



                    date)                         patent, no unknown)  



                                                  rhinorrhea           

 

           (unknown)  (no       (unknown)  (unknown)  Notable on  (units    (unk

nown)



                    date)                         patient's prior unknown)  



                                                  urine cultures,           



                                                  urine from           



                                                  2022 grew out           

 

           (unknown)  (no       (unknown)  (unknown)  Ondansetron HCl  (units   

 (unknown)



                    date)                         (Ondansetron 4 Mg/2 unknown)  



                                                  Ml Inj)  4 mg IV           



                                                  NOW ONE             

 

           (unknown)  (no       (unknown)  (unknown)  Ordered:  (units    (unkno

wn)



                    date)                                   unknown)  

 

           (unknown)  (no       (unknown)  (unknown)  Orders    (units    (unkno

wn)



                    date)                                   unknown)  

 

           (unknown)  (no       (unknown)  (unknown)  Osteoarthritis  (units    

(unknown)



                    date)                                   unknown)  

 

           (unknown)  (no       (unknown)  (unknown)  Oxygen Delivery  (units   

 (unknown)



                    date)                         Method    22 unknown)  



                                                  17:34               

 

           (unknown)  (no       (unknown)  (unknown)  Oxygen Delivery  (units   

 (unknown)



                    date)                         Method Room Air unknown)  

 

           (unknown)  (no       (unknown)  (unknown)  PROCEDURE:? XR  (units    

(unknown)



                    date)                         CHEST 1V  unknown)  

 

           (unknown)  (no       (unknown)  (unknown)  Patient   (units    (unkno

wn)



                    date)                         Disposition: Home unknown)  

 

           (unknown)  (no       (unknown)  (unknown)  Patient History  (units   

 (unknown)



                    date)                                   unknown)  

 

           (unknown)  (no       (unknown)  (unknown)  Patient and wife  (units  

  (unknown)



                    date)                         were offered again unknown)  



                                                  admission but once           



                                                  again defer.           



                                                  Received a           

 

           (unknown)  (no       (unknown)  (unknown)  Patient did not  (units   

 (unknown)



                    date)                         walk very far and unknown)  



                                                  did have            



                                                  difficulty.  He           



                                                  does appear to have           

 

           (unknown)  (no       (unknown)  (unknown)  Patient is  (units    (unk

nown)



                    date)                         currently unknown)  



                                                  anticoagulated on           



                                                  Plavix 37.5 mg           



                                                  daily.  Dr. Gómez           

 

           (unknown)  (no       (unknown)  (unknown) Patient reports  (units    

(unknown)



                    date)                         that when he has a unknown)  



                                                  bowel movement, he           



                                                  typically has hard           



                                                  pellets             

 

           (unknown)  (no       (unknown)  (unknown)  Patient self caths  (units

    (unknown)



                    date)                         for urine output, unknown)  



                                                  reports that he has           



                                                  not had much urine           

 

           (unknown)  (no       (unknown)  (unknown)  Patient was  (units    (un

known)



                    date)                         treated in the unknown)  



                                                  emergency           



                                                  department with 1 g           



                                                  of ceftriaxone.           

 

           (unknown)  (no       (unknown)  (unknown) Patient's  (units    (unkno

wn)



                    date)                         colorectal cancer unknown)  



                                                  oncologist was Dr. Jett, patient was           



                                                  referred to him           

 

           (unknown)  (no       (unknown)  (unknown)  Patient's preop  (units   

 (unknown)



                    date)                         diagnosis was unknown)  



                                                  nausea and vomiting           



                                                  from hematemesis in           



                                                  January             

 

           (unknown)  (no       (unknown)  (unknown)  Patient:  (units    (unkno

wn)



                    date)                         Kassandra Leon R unknown)  



                                                  MR#: [id number]            

 

           (unknown)  (no       (unknown)  (unknown)  Please leave her  (units  

  (unknown)



                    date)                         catheter in for the unknown)  



                                                  next several days           



                                                  until you are ready           



                                                  to                  

 

           (unknown)  (no       (unknown)  (unknown)  Please return for  (units 

   (unknown)



                    date)                         fevers, worsening unknown)  



                                                  weakness, passing           



                                                  out, persistent           



                                                  vomiting,           

 

           (unknown)  (no       (unknown)  (unknown)  Postlaminectomy  (units   

 (unknown)



                    date)                         syndrome  unknown)  

 

           (unknown)  (no       (unknown)  (unknown)  Potassium  (units    (unkn

own)



                    date)                         (3.4-5.1)  mmol/L unknown)  

 

           (unknown)  (no       (unknown)  (unknown)  Potassium    3.7  (units  

  (unknown)



                    date)                         (3.4-5.1)  mmol/L unknown)  

 

           (unknown)  (no       (unknown)  (unknown)  Potassium  (units    (unkn

own)



                    date)                         (3.4-5.1)  mmol/L unknown)  

 

           (unknown)  (no       (unknown)  (unknown)  Prescription sent  (units 

   (unknown)



                    date)                         to [facility] pharmacy unknown)  



                                                  in [place].           

 

           (unknown)  (no       (unknown)  (unknown)  Prescriptions:  (units    

(unknown)



                    date)                                   unknown)  

 

           (unknown)  (no       (unknown)  (unknown)  Procalcitonin  (units    (

unknown)



                    date)                         (<0.5)  ng/mL unknown)  

 

           (unknown)  (no       (unknown)  (unknown)  Procalcitonin  (units    (

unknown)



                    date)                         (<0.5)  ng/mL unknown)  

 

           (unknown)  (no       (unknown)  (unknown)  Procalcitonin  (units    (

unknown)



                    date)                         0.72 H    (<0.5) unknown)  



                                                  ng/mL               

 

           (unknown)  (no       (unknown)  (unknown)  Psych: mental  (units    (

unknown)



                    date)                         status is grossly unknown)  



                                                  normal, congruent           



                                                  mood, normal           



                                                  affect, pleasant           

 

           (unknown)  (no       (unknown)  (unknown)  Pulse Oximetry  98  (units

    (unknown)



                    date)                           22 17:34 unknown)  

 

           (unknown)  (no       (unknown)  (unknown)  Pulse Oximetry 99  (units 

   (unknown)



                    date)                                   unknown)  

 

           (unknown)  (no       (unknown)  (unknown)  Pulse Rate  77  (units    

(unknown)



                    date)                         22 17:34 unknown)  

 

           (unknown)  (no       (unknown)  (unknown)  Pulse Rate 77  (units    (

unknown)



                    date)                                   unknown)  

 

           (unknown)  (no       (unknown)  (unknown)  RDW       (units    (unkno

wn)



                    date)                         (11.6-14.8)  % unknown)  

 

           (unknown)  (no       (unknown)  (unknown)  RDW   (11.6-14.8)  (units 

   (unknown)



                    date)                         %         unknown)  

 

           (unknown)  (no       (unknown)  (unknown)  RDW   16.1 H  (units    (u

nknown)



                    date)                         (11.6-14.8)  % unknown)  

 

           (unknown)  (no       (unknown)  (unknown)  Reevaluation #1:  (units  

  (unknown)



                    date)                                   unknown)  

 

           (unknown)  (no       (unknown)  (unknown)  Reevaluation #2:  (units  

  (unknown)



                    date)                                   unknown)  

 

           (unknown)  (no       (unknown)  (unknown)  Reevaluation(s)  (units   

 (unknown)



                    date)                                   unknown)  

 

           (unknown)  (no       (unknown)  (unknown)  Referrals:  (units    (unk

nown)



                    date)                                   unknown)  

 

           (unknown)  (no       (unknown)  (unknown)  Related Data  (units    (u

nknown)



                    date)                                   unknown)  

 

           (unknown)  (no       (unknown)  (unknown)  Respiratory Rate  (units  

  (unknown)



                    date)                         18   22 17:34 unknown)  

 

           (unknown)  (no       (unknown)  (unknown)  Respiratory Rate  (units  

  (unknown)



                    date)                         18        unknown)  

 

           (unknown)  (no       (unknown)  (unknown)  Respiratory:  (units    (u

nknown)



                    date)                         denies shortness of unknown)  



                                                  breath, or new           



                                                  cough               

 

           (unknown)  (no       (unknown)  (unknown)  Respiratory:  (units    (u

nknown)



                    date)                         normal effort, able unknown)  



                                                  to speak in           



                                                  complete sentences,           



                                                  no audible           

 

           (unknown)  (no       (unknown)  (unknown)  Result diagrams:  (units  

  (unknown)



                    date)                                   unknown)  

 

           (unknown)  (no       (unknown)  (unknown)  Review of Systems  (units 

   (unknown)



                    date)                                   unknown)  

 

           (unknown)  (no       (unknown)  (unknown)  SARS-CoV-2 (PCR)  (units  

  (unknown)



                    date)                           (Negative) unknown)  

 

           (unknown)  (no       (unknown)  (unknown)  SARS-CoV-2 (PCR)  (units  

  (unknown)



                    date)                         (Negative) unknown)  

 

           (unknown)  (no       (unknown)  (unknown)  SARS-CoV-2 (PCR)  (units  

  (unknown)



                    date)                         Negative  unknown)  



                                                  (Negative)           

 

           (unknown)  (no       (unknown)  (unknown)  Sciatica  (units    (unkno

wn)



                    date)                                   unknown)  

 

           (unknown)  (no       (unknown)  (unknown)  Signed By:  (units    (unk

nown)



                    date)                                   unknown)  

 

           (unknown)  (no       (unknown)  (unknown)  Skin: brisk  (units    (un

known)



                    date)                         capillary refill, unknown)  



                                                  no rash, no           



                                                  erythema            

 

           (unknown)  (no       (unknown)  (unknown)  Skin: denies rash,  (units

    (unknown)



                    date)                         itching or wound unknown)  

 

           (unknown)  (no       (unknown)  (unknown)  Smoking Status:  (units   

 (unknown)



                    date)                         Never smoker unknown)  

 

           (unknown)  (no       (unknown)  (unknown)  Smoking Status:  (units   

 (unknown)



                    date)                         Never smoker unknown)  

 

           (unknown)  (no       (unknown)  (unknown)  Social History  (units    

(unknown)



                    date)                         (Reviewed 22 unknown)  



                                                  @ 19:57 by Kitty Costello [facility])           

 

           (unknown)  (no       (unknown)  (unknown)  Sodium    (units    (unkno

wn)



                    date)                         (137-145)  mmol/L unknown)  

 

           (unknown)  (no       (unknown)  (unknown)  Sodium    135 L  (units   

 (unknown)



                    date)                         (137-145)  mmol/L unknown)  

 

           (unknown)  (no       (unknown)  (unknown)  Sodium   (137-145)  (units

    (unknown)



                    date)                          mmol/L   unknown)  

 

           (unknown)  (no       (unknown)  (unknown)  Sodium Chloride  (units   

 (unknown)



                    date)                         (Normal Saline unknown)  



                                                  0.9%)  1,000 mls @           



                                                  1,000 mls/hr IV           



                                                  BOLUS ONE           

 

           (unknown)  (no       (unknown)  (unknown)  Source: patient  (units   

 (unknown)



                    date)                         and family unknown)  

 

           (unknown)  (no       (unknown)  (unknown)  Spinal stenosis  (units   

 (unknown)



                    date)                                   unknown)  

 

           (unknown)  (no       (unknown)  (unknown)  Stated complaint:  (units 

   (unknown)



                    date)                         WEAKNESS UNABLE TO unknown)  



                                                  HOLD ANYTHING DOWN           

 

           (unknown)  (no       (unknown)  (unknown)  Substance Use  (units    (

unknown)



                    date)                         Type: does not use unknown)  

 

           (unknown)  (no       (unknown)  (unknown)  Surgical History  (units  

  (unknown)



                    date)                         (Reviewed 22 unknown)  



                                                  @ 19:57 by Kitty Costello [facility])           

 

           (unknown)  (no       (unknown)  (unknown)  Surgical changes  (units  

  (unknown)



                    date)                         and devices:? unknown)  



                                                  None.?              

 

           (unknown)  (no       (unknown)  (unknown)  TECHNIQUE:? One  (units   

 (unknown)



                    date)                         view of the chest unknown)  



                                                  was acquired.?           

 

           (unknown)  (no       (unknown)  (unknown)  Take antibiotics  (units  

  (unknown)



                    date)                         until completely unknown)  



                                                  gone, start your           



                                                  antibiotic tomorrow           



                                                  morning.            

 

           (unknown)  (no       (unknown)  (unknown)  Temperature  98.3  (units 

   (unknown)



                    date)                         F   22 17:34 unknown)  

 

           (unknown)  (no       (unknown)  (unknown)  This is a  (units    (unkn

own)



                    date)                         62-year-old male unknown)  



                                                  with history of           



                                                  multiple sclerosis,           



                                                  CVA in 2019,           

 

           (unknown)  (no       (unknown)  (unknown)  Time Seen by  (units    (u

nknown)



                    date)                         Provider: 22 unknown)  



                                                  19:11               

 

           (unknown)  (no       (unknown)  (unknown)  Time: 23:07  (units    (un

known)



                    date)                                   unknown)  

 

           (unknown)  (no       (unknown)  (unknown)  Total Bilirubin  (units   

 (unknown)



                    date)                          (0.2-1.3)  mg/dL unknown)  

 

           (unknown)  (no       (unknown)  (unknown)  Total Bilirubin  (units   

 (unknown)



                    date)                         0.6  (0.2-1.3) unknown)  



                                                  mg/dL               

 

           (unknown)  (no       (unknown)  (unknown)  Total Bilirubin  (units   

 (unknown)



                    date)                         (0.2-1.3)  mg/dL unknown)  

 

           (unknown)  (no       (unknown)  (unknown)  Total Creatine  (units    

(unknown)



                    date)                         Kinase     () unknown)  



                                                   U/L                

 

           (unknown)  (no       (unknown)  (unknown)  Total Creatine  (units    

(unknown)



                    date)                         Kinase    54 L unknown)  



                                                  ()  U/L           

 

           (unknown)  (no       (unknown)  (unknown)  Total Creatine  (units    

(unknown)



                    date)                         Kinase   () unknown)  



                                                  U/L                 

 

           (unknown)  (no       (unknown)  (unknown)  Total Protein  (units    (

unknown)



                    date)                         (6.3-8.2)  g/dL unknown)  

 

           (unknown)  (no       (unknown)  (unknown)  Total Protein  (units    (

unknown)



                    date)                         7.5  (6.3-8.2) unknown)  



                                                  g/dL                

 

           (unknown)  (no       (unknown)  (unknown)  Total Protein  (units    (

unknown)



                    date)                         (6.3-8.2)  g/dL unknown)  

 

           (unknown)  (no       (unknown)  (unknown)  Troponin I  (units    (unk

nown)



                    date)                         (0.01-0.034)  ng/mL unknown)  

 

           (unknown)  (no       (unknown)  (unknown)  Troponin I    <  (units   

 (unknown)



                    date)                         0.012  (0.01-0.034) unknown)  



                                                   ng/mL              

 

           (unknown)  (no       (unknown)  (unknown)  Troponin I  (units    (unk

nown)



                    date)                         (0.01-0.034)  ng/mL unknown)  

 

           (unknown)  (no       (unknown)  (unknown)  Ur Culture  (units    (unk

nown)



                    date)                         Indicated? unknown)  

 

           (unknown)  (no       (unknown)  (unknown)  Ur Culture  (units    (unk

nown)



                    date)                         Indicated? unknown)  

 

           (unknown)  (no       (unknown)  (unknown)  Ur Culture  (units    (unk

nown)



                    date)                         Indicated? unknown)  



                                                  Specimen cultured           

 

           (unknown)  (no       (unknown)  (unknown)  Urine Bacteria  (units    

(unknown)



                    date)                         (None)    unknown)  

 

           (unknown)  (no       (unknown)  (unknown)  Urine Bacteria  (units    

(unknown)



                    date)                         Few (2-10) H unknown)  



                                                  (None)              

 

           (unknown)  (no       (unknown)  (unknown)  Urine Bacteria  (units    

(unknown)



                    date)                         (None)    unknown)  

 

           (unknown)  (no       (unknown)  (unknown)  Urine RBC  (units    (unkn

own)



                    date)                         (0-5/HPF) unknown)  

 

           (unknown)  (no       (unknown)  (unknown)  Urine RBC  (units    (unkn

own)



                    date)                         1-5/hpf  (0-5/HPF) unknown)  

 

           (unknown)  (no       (unknown)  (unknown)  Urine RBC  (units    (unkn

own)



                    date)                         (0-5/HPF) unknown)  

 

           (unknown)  (no       (unknown)  (unknown)  Urine Specific  (units    

(unknown)



                    date)                         Gravity    1.015 unknown)  

 

           (unknown)  (no       (unknown)  (unknown)  Urine WBC  (units    (unkn

own)



                    date)                         (0-5/HPF) unknown)  

 

           (unknown)  (no       (unknown)  (unknown)  Urine WBC  (units    (unkn

own)



                    date)                         5-10/hpf H unknown)  



                                                  (0-5/HPF)           

 

           (unknown)  (no       (unknown)  (unknown)  Urine WBC  (units    (unkn

own)



                    date)                         (0-5/HPF) unknown)  

 

           (unknown)  (no       (unknown)  (unknown)  Visit Report  (units    (u

nknown)



                    date)                         Forms:  Patient unknown)  



                                                  Portal/API           

 

           (unknown)  (no       (unknown)  (unknown)  Vital Signs  (units    (un

known)



                    date)                                   unknown)  

 

           (unknown)  (no       (unknown)  (unknown)  Vital signs:  (units    (u

nknown)



                    date)                                   unknown)  

 

           (unknown)  (no       (unknown)  (unknown)  Linette Jett MD  (units  

  (unknown)



                    date)                         [Physician] - As unknown)  



                                                  soon as possible           

 

           (unknown)  (no       (unknown)  (unknown)  [Embedded Image  (units   

 (unknown)



                    date)                         Not Available] unknown)  

 

           (unknown)  (no       (unknown)  (unknown)  acute ST elevation  (units

    (unknown)



                    date)                         depression noted. unknown)  

 

           (unknown)  (no       (unknown)  (unknown)  acute ischemic  (units    

(unknown)



                    date)                         changes.  unknown)  

 

           (unknown)  (no       (unknown)  (unknown)  alcohol intake  (units    

(unknown)



                    date)                         frequency: a few unknown)  



                                                  times a month           

 

           (unknown)  (no       (unknown)  (unknown)  alcohol intake:  (units   

 (unknown)



                    date)                         current   unknown)  

 

           (unknown)  (no       (unknown)  (unknown)  ambulation trial  (units  

  (unknown)



                    date)                         patient fails plan unknown)  



                                                  for admission.           

 

           (unknown)  (no       (unknown)  (unknown)  and cooperative  (units   

 (unknown)



                    date)                                   unknown)  

 

           (unknown)  (no       (unknown)  (unknown)  and generalized  (units   

 (unknown)



                    date)                         weakness.  Patient unknown)  



                                                  self catheterizes           



                                                  his bladder at           



                                                  baseline,           

 

           (unknown)  (no       (unknown)  (unknown)  appear    (units    (unkno

wn)



                    date)                                   unknown)  

 

           (unknown)  (no       (unknown)  (unknown)  ascorbic acid  (units    (

unknown)



                    date)                         (vitamin C) 500 mg unknown)  



                                                  500 mg PO BID #60           



                                                  tabs 22           

 

           (unknown)  (no       (unknown)  (unknown)  atorvastatin 80 mg  (units

    (unknown)



                    date)                         tablet 40 mg PO QPM unknown)  



                                                  20           

 

           (unknown)  (no       (unknown)  (unknown)  be causing a rash  (units 

   (unknown)



                    date)                                   unknown)  

 

           (unknown)  (no       (unknown)  (unknown) bleeding.  Guaiac  (units  

  (unknown)



                    date)                         stool in the unknown)  



                                                  emergency           



                                                  department was           



                                                  negative.           



                                                  Patient's lab           

 

           (unknown)  (no       (unknown)  (unknown)  blood in his  (units    (u

nknown)



                    date)                         emesis or in his unknown)  



                                                  stool.  He reports           



                                                  that he did a stool           



                                                  test one            

 

           (unknown)  (no       (unknown)  (unknown)  buspirone 5 mg  (units    

(unknown)



                    date)                         tablet 5 mg PO BID unknown)  



                                                  #60 tabs 22           

 

           (unknown)  (no       (unknown)  (unknown)  cancer.  Postop  (units   

 (unknown)



                    date)                         diagnosis from this unknown)  



                                                  upper endoscopy was           



                                                  the same.  His           

 

           (unknown)  (no       (unknown)  (unknown)  cath through the  (units  

  (unknown)



                    date)                         weekend and unknown)  



                                                  therefore did not           



                                                  drain his bladder           



                                                  since Friday.           

 

           (unknown)  (no       (unknown)  (unknown)  caths at baseline  (units 

   (unknown)



                    date)                                   unknown)  

 

           (unknown)  (no       (unknown)  (unknown)  ciprofloxacin HCl  (units 

   (unknown)



                    date)                         500 mg tablet 500 unknown)  



                                                  mg PO BID #20 tabs           



                                                  22            

 

           (unknown)  (no       (unknown)  (unknown)  clopidogrel 75 mg  (units 

   (unknown)



                    date)                         tablet 75 mg PO unknown)  



                                                  DAILY 22            

 

           (unknown)  (no       (unknown)  (unknown)  colonic   (units    (unkno

wn)



                    date)                         anastomosis, and a unknown)  



                                                  suboptimal bowel           



                                                  prep.               

 

           (unknown)  (no       (unknown)  (unknown)  concerning  (units    (unk

nown)



                    date)                         symptoms. unknown)  

 

           (unknown)  (no       (unknown)  (unknown)  cultures were  (units    (

unknown)



                    date)                         drawn prior to unknown)  



                                                  receiving           



                                                  antibiotics Urine           



                                                  microscopy shows           

 

           (unknown)  (no       (unknown)  (unknown)  cyanocobalamin  (units    

(unknown)



                    date)                         (vitamin B-12) 500 unknown)  



                                                  1,000 mcg PO DAILY           



                                                  #60 tabs 22           

 

           (unknown)  (no       (unknown)  (unknown)  decrease in urine  (units 

   (unknown)



                    date)                         output, new unknown)  



                                                  abdominal back or           



                                                  flank pain or other           



                                                  new or              

 

           (unknown)  (no       (unknown)  (unknown)  diabetes,  (units    (unkn

own)



                    date)                         hypertension, unknown)  



                                                  colorectal cancer           



                                                  in  with a           



                                                  reverse colectomy           



                                                  who                 

 

           (unknown)  (no       (unknown)  (unknown)  diabetes,  (units    (unkn

own)



                    date)                         hypertension, unknown)  



                                                  colorectal cancer           



                                                  in  with a           



                                                  reverse colectomy,           



                                                  He                  

 

           (unknown)  (no       (unknown)  (unknown)  does not drink  (units    

(unknown)



                    date)                         alcohol.? Patient unknown)  



                                                  and his wife state           



                                                  that he was seen in           



                                                  the                 

 

           (unknown)  (no       (unknown)  (unknown)  dose of Rocephin,  (units 

   (unknown)



                    date)                         a L of fluids, plan unknown)  



                                                  to leave catheter           



                                                  in place until he           



                                                  is                  

 

           (unknown)  (no       (unknown)  (unknown)  elevated lactate.  (units 

   (unknown)



                    date)                         His heart rate and unknown)  



                                                  blood pressure are           



                                                  within normal           



                                                  ranges.             

 

           (unknown)  (no       (unknown)  (unknown)  emergency  (units    (unkn

own)



                    date)                         department in unknown)  



                                                  January and           



                                                  diagnosed with a GI           



                                                  bleed, he was           



                                                  admitted,           

 

           (unknown)  (no       (unknown)  (unknown)  endorses fatigue  (units  

  (unknown)



                    date)                                   unknown)  

 

           (unknown)  (no       (unknown)  (unknown) endoscopic  (units    (unkn

own)



                    date)                         diagnosis includes unknown)  



                                                  mild gastropathy,           



                                                  esophagitis, patent           



                                                  retrosigmoid           

 

           (unknown)  (no       (unknown)  (unknown)  equivocal at this  (units 

   (unknown)



                    date)                         time.  Plan to unknown)  



                                                  repeat lactate is           



                                                  improving will           



                                                  attempt             

 

           (unknown)  (no       (unknown)  (unknown)  feeling more able  (units 

   (unknown)



                    date)                         to self cath. unknown)  



                                                  Short-term           



                                                  follow-up in the           



                                                  next several days           

 

           (unknown)  (no       (unknown)  (unknown)  ferrous sulfate  (units   

 (unknown)



                    date)                         325 mg (65 mg 325 unknown)  



                                                  mg PO BIDWM #60           



                                                  tabs 22           

 

           (unknown)  (no       (unknown)  (unknown)  followed by loose  (units 

   (unknown)



                    date)                         stool.    unknown)  

 

           (unknown)  (no       (unknown)  (unknown)  for repeat lactate  (units

    (unknown)



                    date)                         at this time. unknown)  



                                                  Discussed admission           



                                                  versus home patient           



                                                  has                 

 

           (unknown)  (no       (unknown)  (unknown)  gabapentin 300 mg  (units 

   (unknown)



                    date)                         capsule 300 mg PO unknown)  



                                                  BID 18            

 

           (unknown)  (no       (unknown)  (unknown)  generalized  (units    (un

known)



                    date)                         weakness and unknown)  



                                                  fatigue             

 

           (unknown)  (no       (unknown)  (unknown)  glipizide 10 mg  (units   

 (unknown)



                    date)                         tablet 10 mg PO BID unknown)  



                                                  18           

 

           (unknown)  (no       (unknown)  (unknown)  had a recent  (units    (u

nknown)



                    date)                         endoscopy that did unknown)  



                                                  not show any           



                                                  bleeding polyps or           



                                                  acute GI            

 

           (unknown)  (no       (unknown)  (unknown)  hospital and on  (units   

 (unknown)



                    date)                         chart review it unknown)  



                                                  appears that it was           



                                                  completed on           



                                                  2022.           

 

           (unknown)  (no       (unknown)  (unknown)  household members:  (units

    (unknown)



                    date)                          spouse   unknown)  

 

           (unknown)  (no       (unknown)  (unknown)  ingrown, no penile  (units

    (unknown)



                    date)                         discharge, no unknown)  



                                                  scrotal edema           

 

           (unknown)  (no       (unknown)  (unknown)  iron) tablet  (units    (u

nknown)



                    date)                                   unknown)  

 

           (unknown)  (no       (unknown)  (unknown)  lives     (units    (unkno

wn)



                    date)                         independently:  Yes unknown)  

 

           (unknown)  (no       (unknown)  (unknown)  losartan 50 mg  (units    

(unknown)



                    date)                         tablet 25 mg PO QPM unknown)  



                                                  20           

 

           (unknown)  (no       (unknown)  (unknown)  lung      (units    (unkno

wn)



                    date)                                   unknown)  

 

           (unknown)  (no       (unknown)  (unknown)  magnesium was  (units    (

unknown)



                    date)                         replaced with 2 g, unknown)  



                                                  normal saline 1000           



                                                  mL was given for           



                                                  his                 

 

           (unknown)  (no       (unknown)  (unknown)  mcg tablet  (units    (unk

nown)



                    date)                                   unknown)  

 

           (unknown)  (no       (unknown)  (unknown)  mcg tablet  (units    (unk

nown)



                    date)                         (Tab-A-Lucy) unknown)  

 

           (unknown)  (no       (unknown)  (unknown)  metformin 500 mg  (units  

  (unknown)



                    date)                         tablet,extended 500 unknown)  



                                                  mg PO BID 18            

 

           (unknown)  (no       (unknown)  (unknown)  metoprolol  (units    (unk

nown)



                    date)                         succinate 25 mg 25 unknown)  



                                                  mg PO DAILY           



                                                  21           

 

           (unknown)  (no       (unknown)  (unknown)  moving forward.  (units   

 (unknown)



                    date)                         Patient reports unknown)  



                                                  that he has been           



                                                  taking fiber but           



                                                  not any             

 

           (unknown)  (no       (unknown)  (unknown)  multivitamin with  (units 

   (unknown)



                    date)                         folic acid 400 1 unknown)  



                                                  tab PO DAILY #90           



                                                  tabs 22           

 

           (unknown)  (no       (unknown)  (unknown)  negative.  I  (units    (u

nknown)



                    date)                         completed his unknown)  



                                                  urinary             



                                                  catheterization           



                                                  with a coude           



                                                  catheter, urine           

 

           (unknown)  (no       (unknown)  (unknown) nondistended, no  (units   

 (unknown)



                    date)                         masses, no unknown)  



                                                  exquisite           



                                                  tenderness with           



                                                  exam, no rebound           



                                                  tendernes           

 

           (unknown)  (no       (unknown)  (unknown)  not heard it was  (units  

  (unknown)



                    date)                         positive or not. unknown)  



                                                  Patient reports           



                                                  that his primary           



                                                  care                

 

           (unknown)  (no       (unknown)  (unknown)  ondansetron HCl 4  (units 

   (unknown)



                    date)                         mg tablet 4 mg PO unknown)  



                                                  Q8H PRN nausea and           



                                                  21            

 

           (unknown)  (no       (unknown)  (unknown)  oral powder packet  (units

    (unknown)



                    date)                                   unknown)  

 

           (unknown)  (no       (unknown)  (unknown)  output for three  (units  

  (unknown)



                    date)                         days, three days unknown)  



                                                  ago he had nausea           



                                                  and vomiting,           



                                                  denies any           

 

           (unknown)  (no       (unknown)  (unknown)  patient was  (units    (un

known)



                    date)                         offered admission. unknown)  



                                                  Both he and his           



                                                  wife prefer to           



                                                  return home.           

 

           (unknown)  (no       (unknown)  (unknown)  polyethylene  (units    (u

nknown)



                    date)                         glycol 3350 17 gram unknown)  



                                                  17 gm PO DAILY #90           



                                                  ea 22           

 

           (unknown)  (no       (unknown)  (unknown)  presents to the  (units   

 (unknown)



                    date)                         emergency unknown)  



                                                  department           



                                                  complaining of           



                                                  ongoing fatigue,           



                                                  oliguria,           

 

           (unknown)  (no       (unknown)  (unknown)  provider has left,  (units

    (unknown)



                    date)                         he reports that he unknown)  



                                                  has seen Dr. Macias           



                                                  recently as well.           

 

           (unknown)  (no       (unknown)  (unknown)  pyelonephritis and  (units

    (unknown)



                    date)                         Toro catheter was unknown)  



                                                  left in place as he           



                                                  was too weak to           



                                                  self                

 

           (unknown)  (no       (unknown)  (unknown)  quadrant with a  (units   

 (unknown)



                    date)                         complicated unknown)  



                                                  surgical history of           



                                                  his abdomen.  This           



                                                  shows               

 

           (unknown)  (no       (unknown)  (unknown)  recently for his  (units  

  (unknown)



                    date)                         anemia.   unknown)  

 

           (unknown)  (no       (unknown)  (unknown)  related diarrhea  (units  

  (unknown)



                    date)                         from fecal loading unknown)  



                                                  and obstipation.           



                                                  Recommended MiraLax           



                                                  17 g                

 

           (unknown)  (no       (unknown)  (unknown)  release 24 hr  (units    (

unknown)



                    date)                                   unknown)  

 

           (unknown)  (no       (unknown)  (unknown)  repeated as well  (units  

  (unknown)



                    date)                         as HPI.  Patient's unknown)  



                                                  lactate was           



                                                  elevated at 2.6,           



                                                  blood cultures           

 

           (unknown)  (no       (unknown)  (unknown)  reported that  (units    (

unknown)



                    date)                         patient's altered unknown)  



                                                  bowel habit is           



                                                  likely a function           



                                                  of overflow           

 

           (unknown)  (no       (unknown)  (unknown)  s         (units    (unkno

wn)



                    date)                                   unknown)  

 

           (unknown)  (no       (unknown)  (unknown)  self cath  (units    (unkn

own)



                    date)                         regularly. unknown)  

 

           (unknown)  (no       (unknown)  (unknown)  sennosides 8.6 mg  (units 

   (unknown)



                    date)                         tablet (senna) 17.2 unknown)  



                                                  mg PO BEDTIME #60           



                                                  tabs 22           

 

           (unknown)  (no       (unknown)  (unknown)  substance use  (units    (

unknown)



                    date)                         type:  does not use unknown)  

 

           (unknown)  (no       (unknown)  (unknown)  tablet (Vitamin C)  (units

    (unknown)



                    date)                                   unknown)  

 

           (unknown)  (no       (unknown)  (unknown)  tablet,extended  (units   

 (unknown)



                    date)                         release 24 hr unknown)  

 

           (unknown)  (no       (unknown)  (unknown)  there.  He reports  (units

    (unknown)



                    date)                         that he had an unknown)  



                                                  upper endoscopy           



                                                  completed here at           



                                                  this                

 

           (unknown)  (no       (unknown)  (unknown)  to recheck renal  (units  

  (unknown)



                    date)                         function patient unknown)  



                                                  encouraged to           



                                                  return if not           



                                                  improving.           

 

           (unknown)  (no       (unknown)  (unknown)  tone      (units    (unkno

wn)



                    date)                                   unknown)  

 

           (unknown)  (no       (unknown)  (unknown)  trospium 20 mg  (units    

(unknown)



                    date)                         tablet 20 mg PO BID unknown)  



                                                  urinary retention           



                                                  22           

 

           (unknown)  (no       (unknown)  (unknown)  unremarkable.?  (units    

(unknown)



                    date)                                   unknown)  

 

           (unknown)  (no       (unknown)  (unknown)  urine dip showed  (units  

  (unknown)



                    date)                         leukocytes, unknown)  



                                                  ketones, wbc's and           



                                                  did not show           



                                                  nitrites.  Blood           

 

           (unknown)  (no       (unknown)  (unknown)  urine is cloudy,  (units  

  (unknown)



                    date)                         yellow,   unknown)  



                                                  approximately 600           



                                                  mL in bladder and           



                                                  drained, no rash           

 

           (unknown)  (no       (unknown)  (unknown)  volumes accentuate  (units

    (unknown)



                    date)                         pulmonary unknown)  



                                                  interstitium and           



                                                  heart size.           

 

           (unknown)  (no       (unknown)  (unknown)  was cloudy yellow  (units 

   (unknown)



                    date)                         urine and 600 mL unknown)  



                                                  was drained.           



                                                  Patient tolerated           



                                                  well.  His           

 

           (unknown)  (no       (unknown)  (unknown)  week ago and  (units    (u

nknown)



                    date)                         dropped it off at unknown)  



                                                  [facility] which was           



                                                  testing for blood           



                                                  but he has           

 

           (unknown)  (no       (unknown)  (unknown)  went to [facility]  (units 

   (unknown)



                    date)                         for rehab following unknown)  



                                                  his admission and           



                                                  has followed up           



                                                  with                

 

           (unknown)  (no       (unknown)  (unknown)  were drawn after  (units  

  (unknown)



                    date)                         [facility].  Reviewed unknown)  



                                                  patient's CT, his           



                                                  urine findings and           



                                                  plan                

 

           (unknown)  (no       (unknown)  (unknown)  wheezing, stridor,  (units

    (unknown)



                    date)                         or rales. No unknown)  



                                                  retractions or           



                                                  tachypnea.           









                                         Result panel 30









           (unknown)  (no       (unknown)  (unknown)  >100,000  CFU/ml    (unkno

wn)



                    date)                                             

 

           (unknown)  (no       (unknown)  (unknown)  GenericComposite[  (units 

   (unknown)



                    date)                         GNB^Gram negative unknown)  



                                                  bacilli]            

 

           (unknown)  (no       (unknown)  (unknown)  Identification  (units    

(unknown)



                    date)                         and Sensitivity to unknown)  



                                                  Follow              









                                         Result panel 31









           (unknown)  (no date)  (unknown)  (unknown)  (no value)  (units    (un

known)



                                                            unknown)  

 

           (unknown)  (no date)  (unknown)  (unknown)  NO GROWTH  (units    (unk

nown)



                                                  AFTER 24  unknown)  



                                                  HOURS               









                                         Result panel 32









           (unknown)  (no date)  (unknown)  (unknown)  >100,000  CFU/ml    (unkn

own)

 

           (unknown)  (no date)  (unknown)  (unknown)  >=32      (units    (unkn

own)



                                                            unknown)  

 

           (unknown)  (no date)  (unknown)  (unknown)  <=0.12    (units    (unkn

own)



                                                            unknown)  

 

           (unknown)  (no date)  (unknown)  (unknown)  <=0.25    (units    (unkn

own)



                                                            unknown)  

 

           (unknown)  (no date)  (unknown)  (unknown)  <=0.5     (units    (unkn

own)



                                                            unknown)  

 

           (unknown)  (no date)  (unknown)  (unknown)  <=1       (units    (unkn

own)



                                                            unknown)  

 

           (unknown)  (no date)  (unknown)  (unknown)  <=2       (units    (unkn

own)



                                                            unknown)  

 

           (unknown)  (no date)  (unknown)  (unknown)  <=20      (units    (unkn

own)



                                                            unknown)  

 

           (unknown)  (no date)  (unknown)  (unknown)  <=4       (units    (unkn

own)



                                                            unknown)  

 

           (unknown)  (no date)  (unknown)  (unknown)  4         (units    (unkn

own)



                                                            unknown)  

 

           (unknown)  (no date)  (unknown)  (unknown)  64        (units    (unkn

own)



                                                            unknown)  

 

           (unknown)  (no date)  (unknown)  (unknown)  GenericCompos  (units    

(unknown)



                                                  ite[KLEPNE^Kle unknown)  



                                                  bsiella             



                                                  pneumoniae]           

 

           (unknown)  (no date)  (unknown)  (unknown)  No Further  (units    (un

known)



                                                  Workup    unknown)  









                                         Result panel 33









           (unknown)  (no       (unknown)  (unknown)  (no value)  (units    (unk

nown)



                    date)                                   unknown)  

 

           (unknown)  (no       (unknown)  (unknown)  Radiologist  (units    (un

known)



                    date)                         Impression: unknown)  

 

           (unknown)  (no       (unknown)  (unknown)  Date of Service:  (units  

  (unknown)



                    date)                         22  unknown)  

 

           (unknown)  (no       (unknown)  (unknown)  (no value)  (units    (unk

nown)



                    date)                                   unknown)  

 

           (unknown)  (no       (unknown)  (unknown)  <Electronically  (units   

 (unknown)



                    date)                         signed by Kitty LYN Crew>           

 

           (unknown)  (no       (unknown)  (unknown)  <Electronically  (units   

 (unknown)



                    date)                         signed by Allegra MENDES unknown)  



                                                  ANAYELI Montiel>           

 

           (unknown)  (no       (unknown)  (unknown)  22 17:45  (units    

(unknown)



                    date)                                   unknown)  

 

           (unknown)  (no       (unknown)  (unknown)  22 0740  (units    (

unknown)



                    date)                                   unknown)  

 

           (unknown)  (no       (unknown)  (unknown)  22 1222  (units    (

unknown)



                    date)                                   unknown)  

 

           (unknown)  (no       (unknown)  (unknown)  1 tab PO DAILY  (units    

(unknown)



                    date)                         Qty: 90 0RF unknown)  

 

           (unknown)  (no       (unknown)  (unknown)  1,000 mcg PO DAILY  (units

    (unknown)



                    date)                         Qty: 60 0RF unknown)  

 

           (unknown)  (no       (unknown)  (unknown)  10 mg PO BID  (units    (u

nknown)



                    date)                                   unknown)  

 

           (unknown)  (no       (unknown)  (unknown)  17 gm PO DAILY  (units    

(unknown)



                    date)                         Qty: 90 0RF unknown)  

 

           (unknown)  (no       (unknown)  (unknown)  17.2 mg PO BEDTIME  (units

    (unknown)



                    date)                         Qty: 60 0RF unknown)  

 

           (unknown)  (no       (unknown)  (unknown)  20 mg PO BID  (units    (u

nknown)



                    date)                                   unknown)  

 

           (unknown)  (no       (unknown)  (unknown)  25 mg PO DAILY  (units    

(unknown)



                    date)                                   unknown)  

 

           (unknown)  (no       (unknown)  (unknown)  25 mg PO QPM  (units    (u

nknown)



                    date)                                   unknown)  

 

           (unknown)  (no       (unknown)  (unknown)  300 mg PO BID  (units    (

unknown)



                    date)                                   unknown)  

 

           (unknown)  (no       (unknown)  (unknown)  325 mg PO BIDWM  (units   

 (unknown)



                    date)                         Qty: 60 0RF unknown)  

 

           (unknown)  (no       (unknown)  (unknown)  4 mg PO Q8H PRN  (units   

 (unknown)



                    date)                         (Reason: nausea and unknown)  



                                                  vomiting) Qty: 14           



                                                  0RF                 

 

           (unknown)  (no       (unknown)  (unknown)  40 mg PO QPM  (units    (u

nknown)



                    date)                                   unknown)  

 

           (unknown)  (no       (unknown)  (unknown)  5 mg PO BID Qty:  (units  

  (unknown)



                    date)                         60 0RF    unknown)  

 

           (unknown)  (no       (unknown)  (unknown)  500 mg PO BID  (units    (

unknown)



                    date)                                   unknown)  

 

           (unknown)  (no       (unknown)  (unknown)  500 mg PO BID Qty:  (units

    (unknown)



                    date)                         20 0RF    unknown)  

 

           (unknown)  (no       (unknown)  (unknown)  500 mg PO BID Qty:  (units

    (unknown)



                    date)                         60 0RF    unknown)  

 

           (unknown)  (no       (unknown)  (unknown)  75 mg PO DAILY  (units    

(unknown)



                    date)                                   unknown)  

 

           (unknown)  (no       (unknown)  (unknown)  Admin: 22  (units   

 (unknown)



                    date)                         20:18  Dose: 1,000 unknown)  



                                                  mls/hr              

 

           (unknown)  (no       (unknown)  (unknown)  Admin: 22  (units   

 (unknown)



                    date)                         20:19  Dose: 50 unknown)  



                                                  mls/hr              

 

           (unknown)  (no       (unknown)  (unknown)  Admin: 22  (units   

 (unknown)



                    date)                         21:06  Dose: 200 unknown)  



                                                  mls/hr              

 

           (unknown)  (no       (unknown)  (unknown)  Allergies  (units    (unkn

own)



                    date)                                   unknown)  

 

           (unknown)  (no       (unknown)  (unknown)  Documented By: [facility]  (units

    (unknown)



                    date)                                   unknown)  

 

           (unknown)  (no       (unknown)  (unknown)  Documented By: AT  (units 

   (unknown)



                    date)                          Co-signed By: MLM unknown)  

 

           (unknown)  (no       (unknown)  (unknown)  Documented By: AT  (units 

   (unknown)



                    date)                                   unknown)  

 

           (unknown)  (no       (unknown)  (unknown)  Documented By: MLM  (units

    (unknown)



                    date)                           Co-signed By: [facility] unknown)  

 

           (unknown)  (no       (unknown)  (unknown)  Documented By: MLM  (units

    (unknown)



                    date)                                   unknown)  

 

           (unknown)  (no       (unknown)  (unknown)  Emergency Report  (units  

  (unknown)



                    date)                                   unknown)  

 

           (unknown)  (no       (unknown)  (unknown)  Home Medications  (units  

  (unknown)



                    date)                                   unknown)  

 

           (unknown)  (no       (unknown)  (unknown)  [facility]  (units   

 (unknown)



                    date)                         [address] [place] [address] Street unknown)  



                                                  [place] [address]           

 

           (unknown)  (no       (unknown)  (unknown)  Lab Results  (units    (un

known)



                    date)                                   unknown)  

 

           (unknown)  (no       (unknown)  (unknown)  Label Comments:  (units   

 (unknown)



                    date)                                   unknown)  

 

           (unknown)  (no       (unknown)  (unknown)  Last Admin:  (units    (un

known)



                    date)                         22 18:30 unknown)  



                                                  Dose: 4 mg           

 

           (unknown)  (no       (unknown)  (unknown)  Last Infusion:  (units    

(unknown)



                    date)                         22 21:10 unknown)  



                                                  Dose: 0 mls/hr           

 

           (unknown)  (no       (unknown)  (unknown)  Last Infusion:  (units    

(unknown)



                    date)                         22 21:28 unknown)  



                                                  Dose: 0 mls/hr           

 

           (unknown)  (no       (unknown)  (unknown)  Last Infusion:  (units    

(unknown)



                    date)                         22 23:15 unknown)  



                                                  Dose: 0 mls/hr           

 

           (unknown)  (no       (unknown)  (unknown)  Previous Rx's  (units    (

unknown)



                    date)                                   unknown)  

 

           (unknown)  (no       (unknown)  (unknown)  Stop: 22  (units    

(unknown)



                    date)                         18:16     unknown)  

 

           (unknown)  (no       (unknown)  (unknown)  Stop: 22  (units    

(unknown)



                    date)                         20:15     unknown)  

 

           (unknown)  (no       (unknown)  (unknown)  Stop: 22  (units    

(unknown)



                    date)                         20:25     unknown)  

 

           (unknown)  (no       (unknown)  (unknown)  Stop: 22  (units    

(unknown)



                    date)                         20:27     unknown)  

 

           (unknown)  (no       (unknown)  (unknown)  Urine Dip  (units    (unkn

own)



                    date)                                   unknown)  

 

           (unknown)  (no       (unknown)  (unknown)  Vital Signs - 8 hr  (units

    (unknown)



                    date)                                   unknown)  

 

           (unknown)  (no       (unknown)  (unknown)  has not been  (units    (u

nknown)



                    date)                         eating so hasn't unknown)  



                                                  taken recently.           



                                                  spouse states           



                                                  thinks it might           

 

           (unknown)  (no       (unknown)  (unknown)  stopped taking  (units    

(unknown)



                    date)                         prior to back unknown)  



                                                  surgery and did not           



                                                  restart             

 

           (unknown)  (no       (unknown)  (unknown)  (no value)  (units    (unk

nown)



                    date)                                   unknown)  

 

           (unknown)  (no       (unknown)  (unknown)  22  (units 

   (unknown)



                    date)                         22  unknown)  



                                                  Range/Units           

 

           (unknown)  (no       (unknown)  (unknown)  22  (units    (unkno

wn)



                    date)                         Range/Units unknown)  

 

           (unknown)  (no       (unknown)  (unknown)  17:40 17:45 17:45  (units 

   (unknown)



                    date)                                   unknown)  

 

           (unknown)  (no       (unknown)  (unknown)  17:45 17:45 20:09  (units 

   (unknown)



                    date)                                   unknown)  

 

           (unknown)  (no       (unknown)  (unknown)  22:00     (units    (unkno

wn)



                    date)                                   unknown)  

 

           (unknown)  (no       (unknown)  (unknown)  ascorbic acid  (units    (

unknown)



                    date)                         (vitamin C) unknown)  



                                                  [Vitamin C] 500 mg           



                                                  Tablet              

 

           (unknown)  (no       (unknown)  (unknown)  atorvastatin 80 mg  (units

    (unknown)



                    date)                         tablet    unknown)  

 

           (unknown)  (no       (unknown)  (unknown)  buspirone 5 mg  (units    

(unknown)



                    date)                         Tablet    unknown)  

 

           (unknown)  (no       (unknown)  (unknown)  ciprofloxacin HCl  (units 

   (unknown)



                    date)                         500 mg tablet unknown)  

 

           (unknown)  (no       (unknown)  (unknown)  clopidogrel 75 mg  (units 

   (unknown)



                    date)                         tablet    unknown)  

 

           (unknown)  (no       (unknown)  (unknown)  cyanocobalamin  (units    

(unknown)



                    date)                         (vitamin B-12) 500 unknown)  



                                                  mcg Tablet           

 

           (unknown)  (no       (unknown)  (unknown)  ferrous sulfate  (units   

 (unknown)



                    date)                         325 mg (65 mg iron) unknown)  



                                                  Tablet              

 

           (unknown)  (no       (unknown)  (unknown)  gabapentin 300 mg  (units 

   (unknown)



                    date)                         capsule   unknown)  

 

           (unknown)  (no       (unknown)  (unknown)  glipizide 10 mg  (units   

 (unknown)



                    date)                         tablet    unknown)  

 

           (unknown)  (no       (unknown)  (unknown)  losartan 50 mg  (units    

(unknown)



                    date)                         tablet    unknown)  

 

           (unknown)  (no       (unknown)  (unknown)  metformin 500 mg  (units  

  (unknown)



                    date)                         tablet extended unknown)  



                                                  release 24 hr           

 

           (unknown)  (no       (unknown)  (unknown)  metoprolol  (units    (unk

nown)



                    date)                         succinate 25 mg unknown)  



                                                  tablet extended           



                                                  release 24 hr           

 

           (unknown)  (no       (unknown)  (unknown)  multivitamin with  (units 

   (unknown)



                    date)                         folic acid unknown)  



                                                  [Tab-A-Lucy] 400           



                                                  mcg Tablet           

 

           (unknown)  (no       (unknown)  (unknown)  ondansetron HCl  (units   

 (unknown)



                    date)                         [Zofran] 4 mg unknown)  



                                                  tablet              

 

           (unknown)  (no       (unknown)  (unknown)  polyethylene  (units    (u

nknown)



                    date)                         glycol 3350 17 gram unknown)  



                                                  Powder In Packet           

 

           (unknown)  (no       (unknown)  (unknown)  sennosides [senna]  (units

    (unknown)



                    date)                         8.6 mg Tablet unknown)  

 

           (unknown)  (no       (unknown)  (unknown)  trospium 20 mg  (units    

(unknown)



                    date)                         tablet    unknown)  

 

           (unknown)  (no       (unknown)  (unknown)  22  (units    (unkno

wn)



                    date)                                   unknown)  

 

           (unknown)  (no       (unknown)  (unknown)  1.07 in January.  (units  

  (unknown)



                    date)                         No elevation in his unknown)  



                                                  troponin,           



                                                  procalcitonin is           



                                                  elevated at           

 

           (unknown)  (no       (unknown)  (unknown)  Anemia,   (units    (unkno

wn)



                    date)                         Hypomagnesemia, unknown)  



                                                  Pyelonephritis           

 

           (unknown)  (no       (unknown)  (unknown)  Medication  (units    (unk

nown)



                    date)                         Instructions unknown)  



                                                  Recorded            

 

           (unknown)  (no       (unknown)  (unknown)  Medication  (units    (unk

nown)



                    date)                         Instructions unknown)  



                                                  Recorded  Confirmed           

 

           (unknown)  (no       (unknown)  (unknown)  sodium 135,  (units    (un

known)



                    date)                         potassium 3.7, unknown)  



                                                  creatinine 1.42           



                                                  which is increased           



                                                  from his prior of           

 

           (unknown)  (no       (unknown)  (unknown)  with regular axis  (units 

   (unknown)



                    date)                         and intervals.  No unknown)  



                                                  STEMI, ST segment           



                                                  changes,            



                                                  arrhythmia, or           

 

           (unknown)  (no       (unknown)  (unknown)  work is   (units    (o

wn)



                    date)                         significant for unknown)  



                                                  anemia, hemoglobin           



                                                  is 9.1 today, down           



                                                  from 9.6 in           

 

           (unknown)  (no       (unknown)  (unknown)  <Kitty Costello,  (units 

   (unknown)



                    date)                         [facility] - Last Filed: unknown)  



                                                  22 12:22>           

 

           (unknown)  (no       (unknown)  (unknown)  <Allegra Monteil, DO  (units

    (unknown)



                    date)                         - Last Filed: unknown)  



                                                  22 07:39>           

 

           (unknown)  (no       (unknown)  (unknown)  (Zofran) vomiting  (units 

   (unknown)



                    date)                         #14 tabs  unknown)  

 

           (unknown)  (no       (unknown)  (unknown)  0.72, his lactate  (units 

   (unknown)



                    date)                         is also elevated at unknown)  



                                                  2.6, magnesium is           



                                                  low at 1.0.  His           

 

           (unknown)  (no       (unknown)  (unknown)  [id number]   (units    (unkno

wn)



                    date)                                   unknown)  

 

           (unknown)  (no       (unknown)  (unknown)  with iron  (units    (

unknown)



                    date)                         deficiency anemia, unknown)  



                                                  altered bowel           



                                                  habit, personal           



                                                  history of colon           

 

           (unknown)  (no       (unknown)  (unknown)  22:52     (units    (unkno

wn)



                    date)                                   unknown)  

 

           (unknown)  (no       (unknown)  (unknown)  62-year-old  (units    (un

known)



                    date)                         gentleman with a unknown)  



                                                  history of multiple           



                                                  sclerosis, CVA in           



                                                  2019,               

 

           (unknown)  (no       (unknown)  (unknown)  ?         (units    (unkno

wn)



                    date)                                   unknown)  

 

           (unknown)  (no       (unknown)  (unknown)  ALT     (<50)  (units    (

unknown)



                    date)                         IU/L      unknown)  

 

           (unknown)  (no       (unknown)  (unknown)  ALT    16  (<50)  (units  

  (unknown)



                    date)                         IU/L      unknown)  

 

           (unknown)  (no       (unknown)  (unknown)  ALT   (<50)  IU/L  (units 

   (unknown)



                    date)                                   unknown)  

 

           (unknown)  (no       (unknown)  (unknown)  AST     (17-59)  (units   

 (unknown)



                    date)                         IU/L      unknown)  

 

           (unknown)  (no       (unknown)  (unknown)  AST    19  (17-59)  (units

    (unknown)



                    date)                          IU/L     unknown)  

 

           (unknown)  (no       (unknown)  (unknown)  AST   (17-59)  (units    (

unknown)



                    date)                         IU/L      unknown)  

 

           (unknown)  (no       (unknown)  (unknown)  Activity  (units    (unkno

wn)



                    date)                         Restrictions/Additi unknown)  



                                                  onal Instructions:           

 

           (unknown)  (no       (unknown)  (unknown)  Age/Sex: 62 / M  (units   

 (unknown)



                    date)                                   unknown)  

 

           (unknown)  (no       (unknown)  (unknown)  Albumin   (units    (unkno

wn)



                    date)                         (3.5-5.0)  g/dL unknown)  

 

           (unknown)  (no       (unknown)  (unknown)  Albumin    3.9  (units    

(unknown)



                    date)                         (3.5-5.0)  g/dL unknown)  

 

           (unknown)  (no       (unknown)  (unknown)  Albumin   (units    (unkno

wn)



                    date)                         (3.5-5.0)  g/dL unknown)  

 

           (unknown)  (no       (unknown)  (unknown)  Albumin/Globulin  (units  

  (unknown)



                    date)                         Ratio     (1.0-2.8) unknown)  

 

           (unknown)  (no       (unknown)  (unknown)  Albumin/Globulin  (units  

  (unknown)



                    date)                         Ratio    1.1 unknown)  



                                                  (1.0-2.8)           

 

           (unknown)  (no       (unknown)  (unknown)  Albumin/Globulin  (units  

  (unknown)



                    date)                         Ratio   (1.0-2.8) unknown)  

 

           (unknown)  (no       (unknown)  (unknown)  Alkaline  (units    (unkno

wn)



                    date)                         Phosphatase unknown)  



                                                  ()  U/L           

 

           (unknown)  (no       (unknown)  (unknown)  Alkaline  (units    (unkno

wn)



                    date)                         Phosphatase    105 unknown)  



                                                  ()  U/L           

 

           (unknown)  (no       (unknown)  (unknown)  Alkaline  (units    (unkno

wn)



                    date)                         Phosphatase unknown)  



                                                  ()  U/L           

 

           (unknown)  (no       (unknown)  (unknown)  Allergy/AdvReac  (units   

 (unknown)



                    date)                         Type Severity unknown)  



                                                  Reaction Status           



                                                  Date / Time           

 

           (unknown)  (no       (unknown)  (unknown)  Amorphous Sediment  (units

    (unknown)



                    date)                                   unknown)  

 

           (unknown)  (no       (unknown)  (unknown)  Amorphous Sediment  (units

    (unknown)



                    date)                                   unknown)  

 

           (unknown)  (no       (unknown)  (unknown)  Amorphous Sediment  (units

    (unknown)



                    date)                            2+     unknown)  

 

           (unknown)  (no       (unknown)  (unknown)  Approved by: Garfield Mcdanielsunits 

   (unknown)



                    date)                         SHANNAN Lopez on unknown)  



                                                  2022 at 18:02?           

 

           (unknown)  (no       (unknown)  (unknown)  BUN     (9-20)  (units    

(unknown)



                    date)                         mg/dL     unknown)  

 

           (unknown)  (no       (unknown)  (unknown)  BUN    21 H  (units    (un

known)



                    date)                         (9-20)  mg/dL unknown)  

 

           (unknown)  (no       (unknown)  (unknown)  BUN   (9-20)  (units    (u

nknown)



                    date)                         mg/dL     unknown)  

 

           (unknown)  (no       (unknown)  (unknown)  BUN/Creatinine  (units    

(unknown)



                    date)                         Ratio     (6-22) unknown)  

 

           (unknown)  (no       (unknown)  (unknown)  BUN/Creatinine  (units    

(unknown)



                    date)                         Ratio    14.8 unknown)  



                                                  (6-22)              

 

           (unknown)  (no       (unknown)  (unknown)  BUN/Creatinine  (units    

(unknown)



                    date)                         Ratio   (6-22) unknown)  

 

           (unknown)  (no       (unknown)  (unknown)  Baso # (Auto)  (units    (

unknown)



                    date)                         (0-100)  /uL unknown)  

 

           (unknown)  (no       (unknown)  (unknown)  Baso # (Auto)  (units    (

unknown)



                    date)                         (0-100)  /uL unknown)  

 

           (unknown)  (no       (unknown)  (unknown)  Baso # (Auto)   0  (units 

   (unknown)



                    date)                          (0-100)  /uL unknown)  

 

           (unknown)  (no       (unknown)  (unknown)  Baso % (Auto)  (units    (

unknown)



                    date)                         (0-2)  %  unknown)  

 

           (unknown)  (no       (unknown)  (unknown)  Baso % (Auto)  (units    (

unknown)



                    date)                         (0-2)  %  unknown)  

 

           (unknown)  (no       (unknown)  (unknown)  Baso % (Auto)  (units    (

unknown)



                    date)                         0.2   (0-2)  % unknown)  

 

           (unknown)  (no       (unknown)  (unknown)  Bedside Urine  (units    (

unknown)



                    date)                         Bilirubin        - unknown)  



                                                  Negative            

 

           (unknown)  (no       (unknown)  (unknown)  Bedside Urine  (units    (

unknown)



                    date)                         Glucose    Negative unknown)  

 

           (unknown)  (no       (unknown)  (unknown)  Bedside Urine  (units    (

unknown)



                    date)                         Ketone    - unknown)  



                                                  Negative            

 

           (unknown)  (no       (unknown)  (unknown)  Bedside Urine  (units    (

unknown)



                    date)                         Leukocytes       + unknown)  



                                                  70                  

 

           (unknown)  (no       (unknown)  (unknown)  Bedside Urine  (units    (

unknown)



                    date)                         Nitrite    - unknown)  



                                                  Negative            

 

           (unknown)  (no       (unknown)  (unknown)  Bedside Urine  (units    (

unknown)



                    date)                         Occult Blood     ++ unknown)  

 

           (unknown)  (no       (unknown)  (unknown)  Bedside Urine  (units    (

unknown)



                    date)                         Protein    + 30 unknown)  

 

           (unknown)  (no       (unknown)  (unknown)  Bedside Urine  (units    (

unknown)



                    date)                         Urobilinogen     - unknown)  



                                                  Negative            

 

           (unknown)  (no       (unknown)  (unknown)  Bedside Urine pH  (units  

  (unknown)



                    date)                          6.0      unknown)  

 

           (unknown)  (no       (unknown)  (unknown)  Blood Pressure  (units    

(unknown)



                    date)                         124/66   07/18/22 unknown)  



                                                  17:34               

 

           (unknown)  (no       (unknown)  (unknown)  Blood Pressure  (units    

(unknown)



                    date)                         124/58 L  unknown)  

 

           (unknown)  (no       (unknown)  (unknown)  Bones and chest  (units   

 (unknown)



                    date)                         wall:? No unknown)  



                                                  suspicious bony           



                                                  lesions.? Overlying           



                                                  soft tissues           

 

           (unknown)  (no       (unknown)  (unknown)  CK-MB (CK-2)  (units    (u

nknown)



                    date)                                   unknown)  

 

           (unknown)  (no       (unknown)  (unknown)  CK-MB (CK-2)  (units    (u

nknown)



                    date)                                   unknown)  

 

           (unknown)  (no       (unknown)  (unknown)  CK-MB (CK-2)  (units    (u

nknown)



                    date)                         TNP       unknown)  

 

           (unknown)  (no       (unknown)  (unknown)  CK-MB (CK-2) Rel  (units  

  (unknown)



                    date)                         Index     unknown)  

 

           (unknown)  (no       (unknown)  (unknown)  CK-MB (CK-2) Rel  (units  

  (unknown)



                    date)                         Index     unknown)  

 

           (unknown)  (no       (unknown)  (unknown)  CK-MB (CK-2) Rel  (units  

  (unknown)



                    date)                         Index    TNP unknown)  

 

           (unknown)  (no       (unknown)  (unknown)  COMPARISON:?  (units    (u

nknown)



                    date)                         [facility], unknown)  



                                                  CR, XR CHEST 2V,           



                                                  3/18/2021, 10:19.           

 

           (unknown)  (no       (unknown)  (unknown)  CT abdomen pelvis  (units 

   (unknown)



                    date)                         was ordered for unknown)  



                                                  patient has           



                                                  tenderness to his           



                                                  right lower           

 

           (unknown)  (no       (unknown)  (unknown)  CVA (cerebral  (units    (

unknown)



                    date)                         vascular accident) unknown)  



                                                  (2018)           

 

           (unknown)  (no       (unknown)  (unknown)  Calcium   (units    (unkno

wn)



                    date)                         (8.4-10.2)  mg/dL unknown)  

 

           (unknown)  (no       (unknown)  (unknown)  Calcium    9.3  (units    

(unknown)



                    date)                         (8.4-10.2)  mg/dL unknown)  

 

           (unknown)  (no       (unknown)  (unknown)  Calcium   (units    (unkno

wn)



                    date)                         (8.4-10.2)  mg/dL unknown)  

 

           (unknown)  (no       (unknown)  (unknown)  Carbon Dioxide  (units    

(unknown)



                    date)                         (22-32)  mmol/L unknown)  

 

           (unknown)  (no       (unknown)  (unknown)  Carbon Dioxide  (units    

(unknown)



                    date)                         29  (22-32)  mmol/L unknown)  

 

           (unknown)  (no       (unknown)  (unknown)  Carbon Dioxide  (units    

(unknown)



                    date)                         (22-32)  mmol/L unknown)  

 

           (unknown)  (no       (unknown)  (unknown)  Cardio: denies  (units    

(unknown)



                    date)                         chest pain, unknown)  



                                                  palpitations           

 

           (unknown)  (no       (unknown)  (unknown)  Cardiovascular:  (units   

 (unknown)



                    date)                         regular rate and unknown)  



                                                  rhythm, no           



                                                  peripheral edema,           



                                                  warm extremities           

 

           (unknown)  (no       (unknown)  (unknown)  Ceftriaxone Sodium  (units

    (unknown)



                    date)                         1,000 mg/ (Sodium unknown)  



                                                  Chloride)  100 mls           



                                                  @ 200 mls/hr IV NOW           



                                                  ONE                 

 

           (unknown)  (no       (unknown)  (unknown)  Chest x-ray:  (units    (u

nknown)



                    date)                                   unknown)  

 

           (unknown)  (no       (unknown)  (unknown)  Chief complaint:  (units  

  (unknown)



                    date)                         Weakness  unknown)  

 

           (unknown)  (no       (unknown)  (unknown)  Chloride  (units    (unkno

wn)



                    date)                         ()  mmol/L unknown)  

 

           (unknown)  (no       (unknown)  (unknown)  Chloride    96 L  (units  

  (unknown)



                    date)                         ()  mmol/L unknown)  

 

           (unknown)  (no       (unknown)  (unknown)  Chloride  (units    (unkno

wn)



                    date)                         ()  mmol/L unknown)  

 

           (unknown)  (no       (unknown)  (unknown)  Clinical  (units    (unkno

wn)



                    date)                         Impression: unknown)  

 

           (unknown)  (no       (unknown)  (unknown)  Colon cancer  (units    (u

nknown)



                    date)                         ()    unknown)  

 

           (unknown)  (no       (unknown)  (unknown)  Course    (units    (unkno

wn)



                    date)                                   unknown)  

 

           (unknown)  (no       (unknown)  (unknown)  Creatinine  (units    (unk

nown)



                    date)                         (0.66-1.25)  mg/dL unknown)  

 

           (unknown)  (no       (unknown)  (unknown)  Creatinine    1.42  (units

    (unknown)



                    date)                         H  (0.66-1.25) unknown)  



                                                  mg/dL               

 

           (unknown)  (no       (unknown)  (unknown)  Creatinine  (units    (unk

nown)



                    date)                         (0.66-1.25)  mg/dL unknown)  

 

           (unknown)  (no       (unknown)  (unknown)  : 1960  (units   

 (unknown)



                    date)                         Acct:[id number] unknown)  

 

           (unknown)  (no       (unknown)  (unknown)  Departure  (units    (unkn

own)



                    date)                                   unknown)  

 

           (unknown)  (no       (unknown)  (unknown)  Depression  (units    (unk

nown)



                    date)                                   unknown)  

 

           (unknown)  (no       (unknown)  (unknown)  Diabetes  (units    (unkno

wn)



                    date)                                   unknown)  

 

           (unknown)  (no       (unknown)  (unknown)  Diabetic  (units    (unkno

wn)



                    date)                         neuropathy unknown)  

 

           (unknown)  (no       (unknown)  (unknown)  Discharge Plan  (units    

(unknown)



                    date)                                   unknown)  

 

           (unknown)  (no       (unknown)  (unknown)  Discontinued  (units    (u

nknown)



                    date)                         Medications unknown)  

 

           (unknown)  (no       (unknown)  (unknown)  Gustavo Macias MD  (units   

 (unknown)



                    date)                         [Primary Care unknown)  



                                                  Provider] -           

 

           (unknown)  (no       (unknown)  (unknown)  ECG Data  (units    (unkno

wn)



                    date)                                   unknown)  

 

           (unknown)  (no       (unknown)  (unknown) EKG independently  (units  

  (unknown)



                    date)                         reviewed by Dr. mejia)  



                                                  Dinesh and reveals           



                                                  normal sinus rhythm           



                                                  at 72 bpm           

 

           (unknown)  (no       (unknown)  (unknown)  EKG shows a normal  (units

    (unknown)



                    date)                         sinus rhythm rate unknown)  



                                                  of 70 2p are 172           



                                                  QRS 86 and .           



                                                   No                 

 

           (unknown)  (no       (unknown)  (unknown)  ER Physician:  (units    (

unknown)



                    date)                         Allegra Montiel D.O. unknown)  

 

           (unknown)  (no       (unknown)  (unknown)  Eos # (Auto)  (units    (u

nknown)



                    date)                         (0-450)  /uL unknown)  

 

           (unknown)  (no       (unknown)  (unknown)  Eos # (Auto)  (units    (u

nknown)



                    date)                         (0-450)  /uL unknown)  

 

           (unknown)  (no       (unknown)  (unknown)  Eos # (Auto)   100  (units

    (unknown)



                    date)                           (0-450)  /uL unknown)  

 

           (unknown)  (no       (unknown)  (unknown)  Eos % (Auto)  (units    (u

nknown)



                    date)                         (2-4)  %  unknown)  

 

           (unknown)  (no       (unknown)  (unknown)  Eos % (Auto)  (units    (u

nknown)



                    date)                         (2-4)  %  unknown)  

 

           (unknown)  (no       (unknown)  (unknown)  Eos % (Auto)   0.8  (units

    (unknown)



                    date)                         L   (2-4)  % unknown)  

 

           (unknown)  (no       (unknown)  (unknown)  Esterase  (units    (unkno

wn)



                    date)                                   unknown)  

 

           (unknown)  (no       (unknown)  (unknown)  Estimated GFR  (units    (

unknown)



                    date)                         (>60)  mL/min unknown)  

 

           (unknown)  (no       (unknown)  (unknown)  Estimated GFR  (units    (

unknown)



                    date)                         55 L  (>60)  mL/min unknown)  

 

           (unknown)  (no       (unknown)  (unknown)  Estimated GFR  (units    (

unknown)



                    date)                         (>60)  mL/min unknown)  

 

           (unknown)  (no       (unknown)  (unknown)  Exam      (units    (unkno

wn)



                    date)                                   unknown)  

 

           (unknown)  (no       (unknown)  (unknown)  Exam Narrative:  (units   

 (unknown)



                    date)                                   unknown)  

 

           (unknown)  (no       (unknown)  (unknown)  Eyes: denies  (units    (u

nknown)



                    date)                         visual changes, eye unknown)  



                                                  pain                

 

           (unknown)  (no       (unknown)  (unknown)  Eyes: equal round  (units 

   (unknown)



                    date)                         and reactive, EOMI, unknown)  



                                                  conjunctiva normal           

 

           (unknown)  (no       (unknown)  (unknown)  FINDINGS:?  (units    (unk

nown)



                    date)                                   unknown)  

 

           (unknown)  (no       (unknown)  (unknown)  Family History  (units    

(unknown)



                    date)                         (Reviewed 22 unknown)  



                                                  @ 19:57 by Kitty Costello [facility])           

 

           (unknown)  (no       (unknown)  (unknown)  Father     (units 

   (unknown)



                    date)                          Cancer   unknown)  

 

           (unknown)  (no       (unknown)  (unknown)  Follow-up for  (units    (

unknown)



                    date)                         recheck in the next unknown)  



                                                  2-3 days.           

 

           (unknown)  (no       (unknown)  (unknown)  GERD      (units    (unkno

wn)



                    date)                         (gastroesophageal unknown)  



                                                  reflux disease)           

 

           (unknown)  (no       (unknown)  (unknown)  GI:  Right lower  (units  

  (unknown)



                    date)                         quadrant tenderness unknown)  



                                                  to palpation           

 

           (unknown)  (no       (unknown)  (unknown)  GI: abdomen mildly  (units

    (unknown)



                    date)                         guarded, tender to unknown)  



                                                  palpation in the           



                                                  right lower           



                                                  quadrant,           

 

           (unknown)  (no       (unknown)  (unknown)  :  Straight cath  (units

    (unknown)



                    date)                         for urine completed unknown)  



                                                  by myself with 16           



                                                  [place] coude           



                                                  catheter,           

 

           (unknown)  (no       (unknown)  (unknown)  : denies  (units    (unk

nown)



                    date)                         dysuria, hematuria unknown)  



                                                  or flank pain,           



                                                  reports oliguria,           



                                                  patient self           

 

           (unknown)  (no       (unknown)  (unknown)  Gastroenterology  (units  

  (unknown)



                    date)                         from [place], unknown)  



                                                  patient reports           



                                                  that he sees Yasmine Rocha PA-C           

 

           (unknown)  (no       (unknown)  (unknown)  General   (units    (unkno

wn)



                    date)                                   unknown)  

 

           (unknown)  (no       (unknown)  (unknown)  General:  (units    (unkno

wn)



                    date)                         cooperative, unknown)  



                                                  comfortable, in no           



                                                  acute distress,           



                                                  well groomed,           

 

           (unknown)  (no       (unknown)  (unknown)  General: denies  (units   

 (unknown)



                    date)                         fever, chills, unknown)  



                                                  endorses weakness,           



                                                  right lower           



                                                  quadrant pain,           

 

           (unknown)  (no       (unknown)  (unknown)  GenericComposite[P  (units

    (unknown)



                    date)                         lt Count  unknown)  



                                                  (150-400)  X10^3/uL           



                                                   ]                  

 

           (unknown)  (no       (unknown)  (unknown)  GenericComposite[P  (units

    (unknown)



                    date)                         lt Count  unknown)  



                                                  (150-400)  X10^3/uL           



                                                   ]                  

 

           (unknown)  (no       (unknown)  (unknown)  GenericComposite[P  (units

    (unknown)



                    date)                         lt Count   219 unknown)  



                                                  (150-400)  X10^3/uL           



                                                   ]                  

 

           (unknown)  (no       (unknown)  (unknown)  GenericComposite[R  (units

    (unknown)



                    date)                         BC     (4.5-5.9) unknown)  



                                                  X10^6/uL  ]           

 

           (unknown)  (no       (unknown)  (unknown)  GenericComposite[R  (units

    (unknown)



                    date)                         BC   (4.5-5.9) unknown)  



                                                  X10^6/uL  ]           

 

           (unknown)  (no       (unknown)  (unknown)  GenericComposite[R  (units

    (unknown)



                    date)                         BC   3.37 L unknown)  



                                                  (4.5-5.9)  X10^6/uL           



                                                   ]                  

 

           (unknown)  (no       (unknown)  (unknown)  GenericComposite[W  (units

    (unknown)



                    date)                         BC     (4.5-11.0) unknown)  



                                                  X10^3/uL  ]           

 

           (unknown)  (no       (unknown)  (unknown)  GenericComposite[W  (units

    (unknown)



                    date)                         BC   (4.5-11.0) unknown)  



                                                  X10^3/uL  ]           

 

           (unknown)  (no       (unknown)  (unknown)  GenericComposite[W  (units

    (unknown)



                    date)                         BC   7.0  unknown)  



                                                  (4.5-11.0)           



                                                  X10^3/uL  ]           

 

           (unknown)  (no       (unknown)  (unknown)  Globulin  (units    (unkno

wn)



                    date)                         (1.7-4.1)  g/dL unknown)  

 

           (unknown)  (no       (unknown)  (unknown)  Globulin    3.6  (units   

 (unknown)



                    date)                         (1.7-4.1)  g/dL unknown)  

 

           (unknown)  (no       (unknown)  (unknown)  Globulin  (units    (unkno

wn)



                    date)                         (1.7-4.1)  g/dL unknown)  

 

           (unknown)  (no       (unknown)  (unknown)  Glucose   (units    (unkno

wn)



                    date)                         ()  mg/dL unknown)  

 

           (unknown)  (no       (unknown)  (unknown)  Glucose    262 H  (units  

  (unknown)



                    date)                         ()  mg/dL unknown)  

 

           (unknown)  (no       (unknown)  (unknown)  Glucose   ()  (units

    (unknown)



                    date)                          mg/dL    unknown)  

 

           (unknown)  (no       (unknown)  (unknown)  Guaiac stool:  (units    (

unknown)



                    date)                         Negative, good unknown)  



                                                  stool result           

 

           (unknown)  (no       (unknown)  (unknown)  H/O colectomy  (units    (

unknown)



                    date)                                   unknown)  

 

           (unknown)  (no       (unknown)  (unknown)  HPI - Weakness  (units    

(unknown)



                    date)                                   unknown)  

 

           (unknown)  (no       (unknown)  (unknown)  HPI Narrative:  (units    

(unknown)



                    date)                                   unknown)  

 

           (unknown)  (no       (unknown)  (unknown)  Hct     (41-53)  %  (units

    (unknown)



                    date)                                   unknown)  

 

           (unknown)  (no       (unknown)  (unknown)  Hct   (41-53)  %  (units  

  (unknown)



                    date)                                   unknown)  

 

           (unknown)  (no       (unknown)  (unknown)  Hct   26.7 L  (units    (u

nknown)



                    date)                         (41-53)  % unknown)  

 

           (unknown)  (no       (unknown)  (unknown)  He does not have  (units  

  (unknown)



                    date)                         any mental status unknown)  



                                                  changes, GCS is 15.           



                                                   Guaiac stool is           

 

           (unknown)  (no       (unknown)  (unknown)  Head/Neck:  (units    (unk

nown)



                    date)                         Endorses having a unknown)  



                                                  headache, denies           



                                                  any neck pain           

 

           (unknown)  (no       (unknown)  (unknown)  Head: atraumatic,  (units 

   (unknown)



                    date)                         symmetrical facial unknown)  



                                                  expressions           

 

           (unknown)  (no       (unknown)  (unknown)  Hgb       (units    (unkno

wn)



                    date)                         (13.5-17.5)  g/dL unknown)  

 

           (unknown)  (no       (unknown)  (unknown)  Hgb   (13.5-17.5)  (units 

   (unknown)



                    date)                         g/dL      unknown)  

 

           (unknown)  (no       (unknown)  (unknown)  Hgb   9.1 L  (units    (un

known)



                    date)                         (13.5-17.5)  g/dL unknown)  

 

           (unknown)  (no       (unknown)  (unknown)  History of Present  (units

    (unknown)



                    date)                         Illness   unknown)  

 

           (unknown)  (no       (unknown)  (unknown)  History of lumbar  (units 

   (unknown)



                    date)                         surgery (2011) unknown)  

 

           (unknown)  (no       (unknown)  (unknown)  Hx of foot surgery  (units

    (unknown)



                    date)                         (2017)    unknown)  

 

           (unknown)  (no       (unknown)  (unknown)  Hx of shoulder  (units    

(unknown)



                    date)                         surgery (2010) unknown)  

 

           (unknown)  (no       (unknown)  (unknown)  Hypertension  (units    (u

nknown)



                    date)                                   unknown)  

 

           (unknown)  (no       (unknown)  (unknown)  IMPRESSION:? No  (units   

 (unknown)



                    date)                         acute     unknown)  



                                                  cardiopulmonary           



                                                  findings            

 

           (unknown)  (no       (unknown)  (unknown)  INDICATIONS:?  (units    (

unknown)



                    date)                         chest pain unknown)  

 

           (unknown)  (no       (unknown)  (unknown)  Imaging Data  (units    (u

nknown)



                    date)                                   unknown)  

 

           (unknown)  (no       (unknown)  (unknown)  Independently  (units    (

unknown)



                    date)                         reviewed vitals unknown)  



                                                  signs and nursing           



                                                  notes.              

 

           (unknown)  (no       (unknown)  (unknown)  Initial Vital  (units    (

unknown)



                    date)                         Signs     unknown)  

 

           (unknown)  (no       (unknown)  (unknown)  Initial Vital  (units    (

unknown)



                    date)                         Signs:    unknown)  

 

           (unknown)  (no       (unknown)  (unknown)  Instructions:  (units    (

unknown)



                    date)                         Acute Cystitis, unknown)  



                                                  Anemia              

 

           (unknown)  (no       (unknown)  (unknown)  Interpretation:  (units   

 (unknown)



                    date)                                   unknown)  

 

           (unknown)  (no       (unknown)  (unknown) January, hematocrit  (units

    (unknown)



                    date)                         is 26.7, down from unknown)  



                                                  27.8 also in           



                                                  January, platelet           



                                                  count 219,           

 

           (unknown)  (no       (unknown)  (unknown) Klebsiella  (units    (unkn

own)



                    date)                         pneumoniae which unknown)  



                                                  was resistant only           



                                                  to ampicillin and           



                                                  nitrofurantoin.           

 

           (unknown)  (no       (unknown)  (unknown)  Lab Data  (units    (unkno

wn)



                    date)                                   unknown)  

 

           (unknown)  (no       (unknown)  (unknown)  Labs:     (units    (unkno

wn)



                    date)                                   unknown)  

 

           (unknown)  (no       (unknown)  (unknown)  Lactate   (units    (unkno

wn)



                    date)                         (0.7-2.1)  mmol/L unknown)  

 

           (unknown)  (no       (unknown)  (unknown)  Lactate   2.6 H  (units   

 (unknown)



                    date)                         (0.7-2.1)  mmol/L unknown)  

 

           (unknown)  (no       (unknown)  (unknown)  Lactate  1.1  (units    (u

nknown)



                    date)                         (0.7-2.1)  mmol/L unknown)  

 

           (unknown)  (no       (unknown)  (unknown)  Lipase    (units    (unkno

wn)



                    date)                         ()  U/L unknown)  

 

           (unknown)  (no       (unknown)  (unknown)  Lipase    32  (units    (u

nknown)



                    date)                         ()  U/L unknown)  

 

           (unknown)  (no       (unknown)  (unknown)  Lipase   ()  (units 

   (unknown)



                    date)                         U/L       unknown)  

 

           (unknown)  (no       (unknown)  (unknown)  Lungs and pleura:?  (units

    (unknown)



                    date)                         Lungs are clear.? unknown)  



                                                  No pleural           



                                                  effusions or           



                                                  pneumothorax.? Low           

 

           (unknown)  (no       (unknown)  (unknown)  Lymph # (Auto)  (units    

(unknown)



                    date)                         (5335-8610)  /uL unknown)  

 

           (unknown)  (no       (unknown)  (unknown)  Lymph # (Auto)  (units    

(unknown)



                    date)                         (8640-8761)  /uL unknown)  

 

           (unknown)  (no       (unknown)  (unknown)  Lymph # (Auto)  (units    

(unknown)



                    date)                         400 L   (0890-5505) unknown)  



                                                   /uL                

 

           (unknown)  (no       (unknown)  (unknown)  Lymph % (Auto)  (units    

(unknown)



                    date)                         (25-40)  % unknown)  

 

           (unknown)  (no       (unknown)  (unknown)  Lymph % (Auto)  (units    

(unknown)



                    date)                         (25-40)  % unknown)  

 

           (unknown)  (no       (unknown)  (unknown)  Lymph % (Auto)  (units    

(unknown)



                    date)                         6.4 L   (25-40)  % unknown)  

 

           (unknown)  (no       (unknown)  (unknown)  MCH     (26-34)  (units   

 (unknown)



                    date)                         PG        unknown)  

 

           (unknown)  (no       (unknown)  (unknown)  MCH   (26-34)  PG  (units 

   (unknown)



                    date)                                   unknown)  

 

           (unknown)  (no       (unknown)  (unknown)  MCH   26.9  (units    (unk

nown)



                    date)                         (26-34)  PG unknown)  

 

           (unknown)  (no       (unknown)  (unknown)  MCHC     (30-36)  (units  

  (unknown)



                    date)                         %         unknown)  

 

           (unknown)  (no       (unknown)  (unknown)  MCHC   (30-36)  %  (units 

   (unknown)



                    date)                                   unknown)  

 

           (unknown)  (no       (unknown)  (unknown)  MCHC   34.0  (units    (un

known)



                    date)                         (30-36)  % unknown)  

 

           (unknown)  (no       (unknown)  (unknown)  MCV     ()  (units  

  (unknown)



                    date)                         fL        unknown)  

 

           (unknown)  (no       (unknown)  (unknown)  MCV   ()  fL  (units

    (unknown)



                    date)                                   unknown)  

 

           (unknown)  (no       (unknown)  (unknown)  MCV   79.1 L  (units    (u

nknown)



                    date)                         ()  fL unknown)  

 

           (unknown)  (no       (unknown)  (unknown)  MDM - Weakness  (units    

(unknown)



                    date)                                   unknown)  

 

           (unknown)  (no       (unknown)  (unknown)  MDM Narrative  (units    (

unknown)



                    date)                                   unknown)  

 

           (unknown)  (no       (unknown)  (unknown)  MSK: denies new  (units   

 (unknown)



                    date)                         joint pain, muscle unknown)  



                                                  weakness or           



                                                  swelling            

 

           (unknown)  (no       (unknown)  (unknown) MSK: moves all  (units    (

unknown)



                    date)                         extremities, unknown)  



                                                  neurovascularly           



                                                  intact, generalized           



                                                  weakness, normal           

 

           (unknown)  (no       (unknown)  (unknown)  Magnesium  (units    (unkn

own)



                    date)                         (1.6-2.3)  mg/dL unknown)  

 

           (unknown)  (no       (unknown)  (unknown)  Magnesium    1.0 L  (units

    (unknown)



                    date)                          (1.6-2.3)  mg/dL unknown)  

 

           (unknown)  (no       (unknown)  (unknown)  Magnesium  (units    (unkn

own)



                    date)                         (1.6-2.3)  mg/dL unknown)  

 

           (unknown)  (no       (unknown)  (unknown)  Magnesium Sulfate  (units 

   (unknown)



                    date)                         (Magnesium Sulfate) unknown)  



                                                   2 gm in 50 mls @           



                                                  50 mls/hr IV NOW           



                                                  ONE                 

 

           (unknown)  (no       (unknown)  (unknown)  Mank: Patient was  (units 

   (unknown)



                    date)                         seen independently unknown)  



                                                  evaluated by           



                                                  myself, physical           



                                                  exam was            

 

           (unknown)  (no       (unknown)  (unknown)  Mank: Patient's  (units   

 (unknown)



                    date)                         lactate improved unknown)  



                                                  with fluids.  He           



                                                  had difficulty           



                                                  ambulating           

 

           (unknown)  (no       (unknown)  (unknown)  Mediastinum:?  (units    (

unknown)



                    date)                         Mediastinal unknown)  



                                                  contours appear           



                                                  normal.? Heart size           



                                                  is normal.?           

 

           (unknown)  (no       (unknown)  (unknown)  Medical History  (units   

 (unknown)



                    date)                         (Reviewed 22 unknown)  



                                                  @ 19:57 by Kitty Costello [facility])           

 

           (unknown)  (no       (unknown)  (unknown)  Medical decision  (units  

  (unknown)



                    date)                         making narrative: unknown)  

 

           (unknown)  (no       (unknown)  (unknown)  M[employer] since this  (units

    (unknown)



                    date)                         happened. unknown)  

 

           (unknown)  (no       (unknown)  (unknown)  Mode of arrival:  (units  

  (unknown)



                    date)                         Wheelchair unknown)  

 

           (unknown)  (no       (unknown)  (unknown)  Mono # (Auto)  (units    (

unknown)



                    date)                         (0-900)  /uL unknown)  

 

           (unknown)  (no       (unknown)  (unknown)  Mono # (Auto)  (units    (

unknown)



                    date)                         (0-900)  /uL unknown)  

 

           (unknown)  (no       (unknown)  (unknown)  Mono # (Auto)  (units    (

unknown)



                    date)                         600   (0-900)  /uL unknown)  

 

           (unknown)  (no       (unknown)  (unknown)  Mono % (Auto)  (units    (

unknown)



                    date)                         (3-14)  % unknown)  

 

           (unknown)  (no       (unknown)  (unknown)  Mono % (Auto)  (units    (

unknown)



                    date)                         (3-14)  % unknown)  

 

           (unknown)  (no       (unknown)  (unknown)  Mono % (Auto)  (units    (

unknown)



                    date)                         7.9   (3-14)  % unknown)  

 

           (unknown)  (no       (unknown)  (unknown)  Mother     (units 

   (unknown)



                    date)                          Cancer   unknown)  

 

           (unknown)  (no       (unknown)  (unknown)  Mouth/Throat:  (units    (

unknown)



                    date)                         moist mucus unknown)  



                                                  membranes           

 

           (unknown)  (no       (unknown)  (unknown)  Narrative  (units    (unkn

own)



                    date)                                   unknown)  

 

           (unknown)  (no       (unknown)  (unknown)  Narrative:  (units    (unk

nown)



                    date)                                   unknown)  

 

           (unknown)  (no       (unknown)  (unknown)  Neck: supple  (units    (u

nknown)



                    date)                                   unknown)  

 

           (unknown)  (no       (unknown)  (unknown)  Neuro: denies  (units    (

unknown)



                    date)                         numbness, tingling, unknown)  



                                                  dizziness           

 

           (unknown)  (no       (unknown)  (unknown)  Neuro: normal  (units    (

unknown)



                    date)                         speech and unknown)  



                                                  cognition, A+O x3           

 

           (unknown)  (no       (unknown)  (unknown)  Neut # (Auto)  (units    (

unknown)



                    date)                         (3205-1692)  /uL unknown)  

 

           (unknown)  (no       (unknown)  (unknown)  Neut # (Auto)  (units    (

unknown)



                    date)                         (5283-0195)  /uL unknown)  

 

           (unknown)  (no       (unknown)  (unknown)  Neut # (Auto)  (units    (

unknown)



                    date)                         5900   (9290-1724) unknown)  



                                                  /uL                 

 

           (unknown)  (no       (unknown)  (unknown)  Neut % (Auto)  (units    (

unknown)



                    date)                         (50-75)  % unknown)  

 

           (unknown)  (no       (unknown)  (unknown)  Neut % (Auto)  (units    (

unknown)



                    date)                         (50-75)  % unknown)  

 

           (unknown)  (no       (unknown)  (unknown)  Neut % (Auto)  (units    (

unknown)



                    date)                         84.7 H   (50-75)  % unknown)  

 

           (unknown)  (no       (unknown)  (unknown)  New       (units    (unkno

wn)



                    date)                                   unknown)  

 

           (unknown)  (no       (unknown)  (unknown)  No Action  (units    (unkn

own)



                    date)                                   unknown)  

 

           (unknown)  (no       (unknown)  (unknown)  No Known Drug  (units    (

unknown)



                    date)                         Allergies Allergy unknown)  



                                                  Verified 22           



                                                  17:38               

 

           (unknown)  (no       (unknown)  (unknown)  Nose: nares  (units    (un

known)



                    date)                         patent, no unknown)  



                                                  rhinorrhea           

 

           (unknown)  (no       (unknown)  (unknown)  Notable on  (units    (unk

nown)



                    date)                         patient's prior unknown)  



                                                  urine cultures,           



                                                  urine from           



                                                  2022 grew out           

 

           (unknown)  (no       (unknown)  (unknown)  Ondansetron HCl  (units   

 (unknown)



                    date)                         (Ondansetron 4 Mg/2 unknown)  



                                                  Ml Inj)  4 mg IV           



                                                  NOW ONE             

 

           (unknown)  (no       (unknown)  (unknown)  Ordered:  (units    (unkno

wn)



                    date)                                   unknown)  

 

           (unknown)  (no       (unknown)  (unknown)  Orders    (units    (unkno

wn)



                    date)                                   unknown)  

 

           (unknown)  (no       (unknown)  (unknown)  Osteoarthritis  (units    

(unknown)



                    date)                                   unknown)  

 

           (unknown)  (no       (unknown)  (unknown)  Oxygen Delivery  (units   

 (unknown)



                    date)                         Method    22 unknown)  



                                                  17:34               

 

           (unknown)  (no       (unknown)  (unknown)  Oxygen Delivery  (units   

 (unknown)



                    date)                         Method Room Air unknown)  

 

           (unknown)  (no       (unknown)  (unknown)  PROCEDURE:? XR  (units    

(unknown)



                    date)                         CHEST 1V  unknown)  

 

           (unknown)  (no       (unknown)  (unknown)  Patient   (units    (unkno

wn)



                    date)                         Disposition: Home unknown)  

 

           (unknown)  (no       (unknown)  (unknown)  Patient History  (units   

 (unknown)



                    date)                                   unknown)  

 

           (unknown)  (no       (unknown)  (unknown)  Patient and wife  (units  

  (unknown)



                    date)                         were offered again unknown)  



                                                  admission but once           



                                                  again defer.           



                                                  Received a           

 

           (unknown)  (no       (unknown)  (unknown)  Patient did not  (units   

 (unknown)



                    date)                         walk very far and unknown)  



                                                  did have            



                                                  difficulty.  He           



                                                  does appear to have           

 

           (unknown)  (no       (unknown)  (unknown)  Patient is  (units    (unk

nown)



                    date)                         currently unknown)  



                                                  anticoagulated on           



                                                  Plavix 37.5 mg           



                                                  daily.  Dr. Gómez           

 

           (unknown)  (no       (unknown)  (unknown) Patient reports  (units    

(unknown)



                    date)                         that when he has a unknown)  



                                                  bowel movement, he           



                                                  typically has hard           



                                                  pellets             

 

           (unknown)  (no       (unknown)  (unknown)  Patient self caths  (units

    (unknown)



                    date)                         for urine output, unknown)  



                                                  reports that he has           



                                                  not had much urine           

 

           (unknown)  (no       (unknown)  (unknown)  Patient was  (units    (un

known)



                    date)                         treated in the unknown)  



                                                  emergency           



                                                  department with 1 g           



                                                  of ceftriaxone.           

 

           (unknown)  (no       (unknown)  (unknown) Patient's  (units    (unkno

wn)



                    date)                         colorectal cancer unknown)  



                                                  oncologist was Dr. Jett, patient was           



                                                  referred to him           

 

           (unknown)  (no       (unknown)  (unknown)  Patient's preop  (units   

 (unknown)



                    date)                         diagnosis was unknown)  



                                                  nausea and vomiting           



                                                  from hematemesis in           



                                                  January             

 

           (unknown)  (no       (unknown)  (unknown)  Patient:  (units    (unkno

wn)



                    date)                         Kassandra Leon R unknown)  



                                                  MR#: [id number]            

 

           (unknown)  (no       (unknown)  (unknown)  Please leave her  (units  

  (unknown)



                    date)                         catheter in for the unknown)  



                                                  next several days           



                                                  until you are ready           



                                                  to                  

 

           (unknown)  (no       (unknown)  (unknown)  Please return for  (units 

   (unknown)



                    date)                         fevers, worsening unknown)  



                                                  weakness, passing           



                                                  out, persistent           



                                                  vomiting,           

 

           (unknown)  (no       (unknown)  (unknown)  Postlaminectomy  (units   

 (unknown)



                    date)                         syndrome  unknown)  

 

           (unknown)  (no       (unknown)  (unknown)  Potassium  (units    (unkn

own)



                    date)                         (3.4-5.1)  mmol/L unknown)  

 

           (unknown)  (no       (unknown)  (unknown)  Potassium    3.7  (units  

  (unknown)



                    date)                         (3.4-5.1)  mmol/L unknown)  

 

           (unknown)  (no       (unknown)  (unknown)  Potassium  (units    (unkn

own)



                    date)                         (3.4-5.1)  mmol/L unknown)  

 

           (unknown)  (no       (unknown)  (unknown)  Prescription sent  (units 

   (unknown)



                    date)                         to [facility] pharmacy unknown)  



                                                  in [place].           

 

           (unknown)  (no       (unknown)  (unknown)  Prescriptions:  (units    

(unknown)



                    date)                                   unknown)  

 

           (unknown)  (no       (unknown)  (unknown)  Procalcitonin  (units    (

unknown)



                    date)                         (<0.5)  ng/mL unknown)  

 

           (unknown)  (no       (unknown)  (unknown)  Procalcitonin  (units    (

unknown)



                    date)                         (<0.5)  ng/mL unknown)  

 

           (unknown)  (no       (unknown)  (unknown)  Procalcitonin  (units    (

unknown)



                    date)                         0.72 H    (<0.5) unknown)  



                                                  ng/mL               

 

           (unknown)  (no       (unknown)  (unknown)  Psych: mental  (units    (

unknown)



                    date)                         status is grossly unknown)  



                                                  normal, congruent           



                                                  mood, normal           



                                                  affect, pleasant           

 

           (unknown)  (no       (unknown)  (unknown)  Pulse Oximetry  98  (units

    (unknown)



                    date)                           22 17:34 unknown)  

 

           (unknown)  (no       (unknown)  (unknown)  Pulse Oximetry 99  (units 

   (unknown)



                    date)                                   unknown)  

 

           (unknown)  (no       (unknown)  (unknown)  Pulse Rate  77  (units    

(unknown)



                    date)                         22 17:34 unknown)  

 

           (unknown)  (no       (unknown)  (unknown)  Pulse Rate 77  (units    (

unknown)



                    date)                                   unknown)  

 

           (unknown)  (no       (unknown)  (unknown)  RDW       (units    (unkno

wn)



                    date)                         (11.6-14.8)  % unknown)  

 

           (unknown)  (no       (unknown)  (unknown)  RDW   (11.6-14.8)  (units 

   (unknown)



                    date)                         %         unknown)  

 

           (unknown)  (no       (unknown)  (unknown)  RDW   16.1 H  (units    (u

nknown)



                    date)                         (11.6-14.8)  % unknown)  

 

           (unknown)  (no       (unknown)  (unknown)  Reevaluation #1:  (units  

  (unknown)



                    date)                                   unknown)  

 

           (unknown)  (no       (unknown)  (unknown)  Reevaluation #2:  (units  

  (unknown)



                    date)                                   unknown)  

 

           (unknown)  (no       (unknown)  (unknown)  Reevaluation(s)  (units   

 (unknown)



                    date)                                   unknown)  

 

           (unknown)  (no       (unknown)  (unknown)  Referrals:  (units    (unk

nown)



                    date)                                   unknown)  

 

           (unknown)  (no       (unknown)  (unknown)  Related Data  (units    (u

nknown)



                    date)                                   unknown)  

 

           (unknown)  (no       (unknown)  (unknown)  Respiratory Rate  (units  

  (unknown)



                    date)                         18   22 17:34 unknown)  

 

           (unknown)  (no       (unknown)  (unknown)  Respiratory Rate  (units  

  (unknown)



                    date)                         18        unknown)  

 

           (unknown)  (no       (unknown)  (unknown)  Respiratory:  (units    (u

nknown)



                    date)                         denies shortness of unknown)  



                                                  breath, or new           



                                                  cough               

 

           (unknown)  (no       (unknown)  (unknown)  Respiratory:  (units    (u

nknown)



                    date)                         normal effort, able unknown)  



                                                  to speak in           



                                                  complete sentences,           



                                                  no audible           

 

           (unknown)  (no       (unknown)  (unknown)  Result diagrams:  (units  

  (unknown)



                    date)                                   unknown)  

 

           (unknown)  (no       (unknown)  (unknown)  Review of Systems  (units 

   (unknown)



                    date)                                   unknown)  

 

           (unknown)  (no       (unknown)  (unknown)  SARS-CoV-2 (PCR)  (units  

  (unknown)



                    date)                           (Negative) unknown)  

 

           (unknown)  (no       (unknown)  (unknown)  SARS-CoV-2 (PCR)  (units  

  (unknown)



                    date)                         (Negative) unknown)  

 

           (unknown)  (no       (unknown)  (unknown)  SARS-CoV-2 (PCR)  (units  

  (unknown)



                    date)                         Negative  unknown)  



                                                  (Negative)           

 

           (unknown)  (no       (unknown)  (unknown)  Sciatica  (units    (unkno

wn)



                    date)                                   unknown)  

 

           (unknown)  (no       (unknown)  (unknown)  Signed By:  (units    (unk

nown)



                    date)                                   unknown)  

 

           (unknown)  (no       (unknown)  (unknown)  Skin: brisk  (units    (un

known)



                    date)                         capillary refill, unknown)  



                                                  no rash, no           



                                                  erythema            

 

           (unknown)  (no       (unknown)  (unknown)  Skin: denies rash,  (units

    (unknown)



                    date)                         itching or wound unknown)  

 

           (unknown)  (no       (unknown)  (unknown)  Smoking Status:  (units   

 (unknown)



                    date)                         Never smoker unknown)  

 

           (unknown)  (no       (unknown)  (unknown)  Smoking Status:  (units   

 (unknown)



                    date)                         Never smoker unknown)  

 

           (unknown)  (no       (unknown)  (unknown)  Social History  (units    

(unknown)



                    date)                         (Reviewed 22 unknown)  



                                                  @ 19:57 by ZAIRA Amezcua)           

 

           (unknown)  (no       (unknown)  (unknown)  Sodium    (units    (unkno

wn)



                    date)                         (137-145)  mmol/L unknown)  

 

           (unknown)  (no       (unknown)  (unknown)  Sodium    135 L  (units   

 (unknown)



                    date)                         (137-145)  mmol/L unknown)  

 

           (unknown)  (no       (unknown)  (unknown)  Sodium   (137-145)  (units

    (unknown)



                    date)                          mmol/L   unknown)  

 

           (unknown)  (no       (unknown)  (unknown)  Sodium Chloride  (units   

 (unknown)



                    date)                         (Normal Saline unknown)  



                                                  0.9%)  1,000 mls @           



                                                  1,000 mls/hr IV           



                                                  BOLUS ONE           

 

           (unknown)  (no       (unknown)  (unknown)  Source: patient  (units   

 (unknown)



                    date)                         and family unknown)  

 

           (unknown)  (no       (unknown)  (unknown)  Spinal stenosis  (units   

 (unknown)



                    date)                                   unknown)  

 

           (unknown)  (no       (unknown)  (unknown)  Stated complaint:  (units 

   (unknown)



                    date)                         WEAKNESS UNABLE TO unknown)  



                                                  HOLD ANYTHING DOWN           

 

           (unknown)  (no       (unknown)  (unknown)  Substance Use  (units    (

unknown)



                    date)                         Type: does not use unknown)  

 

           (unknown)  (no       (unknown)  (unknown)  Surgical History  (units  

  (unknown)



                    date)                         (Reviewed 22 unknown)  



                                                  @ 19:57 by ZAIRA Amezcua)           

 

           (unknown)  (no       (unknown)  (unknown)  Surgical changes  (units  

  (unknown)



                    date)                         and devices:? unknown)  



                                                  None.?              

 

           (unknown)  (no       (unknown)  (unknown)  TECHNIQUE:? One  (units   

 (unknown)



                    date)                         view of the chest unknown)  



                                                  was acquired.?           

 

           (unknown)  (no       (unknown)  (unknown)  Take antibiotics  (units  

  (unknown)



                    date)                         until completely unknown)  



                                                  gone, start your           



                                                  antibiotic tomorrow           



                                                  morning.            

 

           (unknown)  (no       (unknown)  (unknown)  Temperature  98.3  (units 

   (unknown)



                    date)                         F   22 17:34 unknown)  

 

           (unknown)  (no       (unknown)  (unknown)  This is a  (units    (unkn

own)



                    date)                         62-year-old male unknown)  



                                                  with history of           



                                                  multiple sclerosis,           



                                                  CVA in 2019,           

 

           (unknown)  (no       (unknown)  (unknown)  Time Seen by  (units    (u

nknown)



                    date)                         Provider: 22 unknown)  



                                                  19:11               

 

           (unknown)  (no       (unknown)  (unknown)  Time: 23:07  (units    (un

known)



                    date)                                   unknown)  

 

           (unknown)  (no       (unknown)  (unknown)  Total Bilirubin  (units   

 (unknown)



                    date)                          (0.2-1.3)  mg/dL unknown)  

 

           (unknown)  (no       (unknown)  (unknown)  Total Bilirubin  (units   

 (unknown)



                    date)                         0.6  (0.2-1.3) unknown)  



                                                  mg/dL               

 

           (unknown)  (no       (unknown)  (unknown)  Total Bilirubin  (units   

 (unknown)



                    date)                         (0.2-1.3)  mg/dL unknown)  

 

           (unknown)  (no       (unknown)  (unknown)  Total Creatine  (units    

(unknown)



                    date)                         Kinase     () unknown)  



                                                   U/L                

 

           (unknown)  (no       (unknown)  (unknown)  Total Creatine  (units    

(unknown)



                    date)                         Kinase    54 L unknown)  



                                                  ()  U/L           

 

           (unknown)  (no       (unknown)  (unknown)  Total Creatine  (units    

(unknown)



                    date)                         Kinase   () unknown)  



                                                  U/L                 

 

           (unknown)  (no       (unknown)  (unknown)  Total Protein  (units    (

unknown)



                    date)                         (6.3-8.2)  g/dL unknown)  

 

           (unknown)  (no       (unknown)  (unknown)  Total Protein  (units    (

unknown)



                    date)                         7.5  (6.3-8.2) unknown)  



                                                  g/dL                

 

           (unknown)  (no       (unknown)  (unknown)  Total Protein  (units    (

unknown)



                    date)                         (6.3-8.2)  g/dL unknown)  

 

           (unknown)  (no       (unknown)  (unknown)  Troponin I  (units    (unk

nown)



                    date)                         (0.01-0.034)  ng/mL unknown)  

 

           (unknown)  (no       (unknown)  (unknown)  Troponin I    <  (units   

 (unknown)



                    date)                         0.012  (0.01-0.034) unknown)  



                                                   ng/mL              

 

           (unknown)  (no       (unknown)  (unknown)  Troponin I  (units    (unk

nown)



                    date)                         (0.01-0.034)  ng/mL unknown)  

 

           (unknown)  (no       (unknown)  (unknown)  Ur Culture  (units    (unk

nown)



                    date)                         Indicated? unknown)  

 

           (unknown)  (no       (unknown)  (unknown)  Ur Culture  (units    (unk

nown)



                    date)                         Indicated? unknown)  

 

           (unknown)  (no       (unknown)  (unknown)  Ur Culture  (units    (unk

nown)



                    date)                         Indicated? unknown)  



                                                  Specimen cultured           

 

           (unknown)  (no       (unknown)  (unknown)  Urine Bacteria  (units    

(unknown)



                    date)                         (None)    unknown)  

 

           (unknown)  (no       (unknown)  (unknown)  Urine Bacteria  (units    

(unknown)



                    date)                         Few (2-10) H unknown)  



                                                  (None)              

 

           (unknown)  (no       (unknown)  (unknown)  Urine Bacteria  (units    

(unknown)



                    date)                         (None)    unknown)  

 

           (unknown)  (no       (unknown)  (unknown)  Urine RBC  (units    (unkn

own)



                    date)                         (0-5/HPF) unknown)  

 

           (unknown)  (no       (unknown)  (unknown)  Urine RBC  (units    (unkn

own)



                    date)                         1-5/hpf  (0-5/HPF) unknown)  

 

           (unknown)  (no       (unknown)  (unknown)  Urine RBC  (units    (unkn

own)



                    date)                         (0-5/HPF) unknown)  

 

           (unknown)  (no       (unknown)  (unknown)  Urine Specific  (units    

(unknown)



                    date)                         Gravity    1.015 unknown)  

 

           (unknown)  (no       (unknown)  (unknown)  Urine WBC  (units    (unkn

own)



                    date)                         (0-5/HPF) unknown)  

 

           (unknown)  (no       (unknown)  (unknown)  Urine WBC  (units    (unkn

own)



                    date)                         5-10/hpf H unknown)  



                                                  (0-5/HPF)           

 

           (unknown)  (no       (unknown)  (unknown)  Urine WBC  (units    (unkn

own)



                    date)                         (0-5/HPF) unknown)  

 

           (unknown)  (no       (unknown)  (unknown)  Visit Report  (units    (u

nknown)



                    date)                         Forms:  Patient unknown)  



                                                  Portal/API           

 

           (unknown)  (no       (unknown)  (unknown)  Vital Signs  (units    (un

known)



                    date)                                   unknown)  

 

           (unknown)  (no       (unknown)  (unknown)  Vital signs:  (units    (u

nknown)



                    date)                                   unknown)  

 

           (unknown)  (no       (unknown)  (unknown)  Linette Jett MD  (units  

  (unknown)



                    date)                         [Physician] - As unknown)  



                                                  soon as possible           

 

           (unknown)  (no       (unknown)  (unknown)  [Embedded Image  (units   

 (unknown)



                    date)                         Not Available] unknown)  

 

           (unknown)  (no       (unknown)  (unknown)  acute ST elevation  (units

    (unknown)



                    date)                         depression noted. unknown)  

 

           (unknown)  (no       (unknown)  (unknown)  acute ischemic  (units    

(unknown)



                    date)                         changes.  unknown)  

 

           (unknown)  (no       (unknown)  (unknown)  alcohol intake  (units    

(unknown)



                    date)                         frequency: a few unknown)  



                                                  times a month           

 

           (unknown)  (no       (unknown)  (unknown)  alcohol intake:  (units   

 (unknown)



                    date)                         current   unknown)  

 

           (unknown)  (no       (unknown)  (unknown)  ambulation trial  (units  

  (unknown)



                    date)                         patient fails plan unknown)  



                                                  for admission.           

 

           (unknown)  (no       (unknown)  (unknown)  and cooperative  (units   

 (unknown)



                    date)                                   unknown)  

 

           (unknown)  (no       (unknown)  (unknown)  and generalized  (units   

 (unknown)



                    date)                         weakness.  Patient unknown)  



                                                  self catheterizes           



                                                  his bladder at           



                                                  baseline,           

 

           (unknown)  (no       (unknown)  (unknown)  appear    (units    (unkno

wn)



                    date)                                   unknown)  

 

           (unknown)  (no       (unknown)  (unknown)  ascorbic acid  (units    (

unknown)



                    date)                         (vitamin C) 500 mg unknown)  



                                                  500 mg PO BID #60           



                                                  tabs 22           

 

           (unknown)  (no       (unknown)  (unknown)  atorvastatin 80 mg  (units

    (unknown)



                    date)                         tablet 40 mg PO QPM unknown)  



                                                  20           

 

           (unknown)  (no       (unknown)  (unknown)  be causing a rash  (units 

   (unknown)



                    date)                                   unknown)  

 

           (unknown)  (no       (unknown)  (unknown) bleeding.  Guaiac  (units  

  (unknown)



                    date)                         stool in the unknown)  



                                                  emergency           



                                                  department was           



                                                  negative.           



                                                  Patient's lab           

 

           (unknown)  (no       (unknown)  (unknown)  blood in his  (units    (u

nknown)



                    date)                         emesis or in his unknown)  



                                                  stool.  He reports           



                                                  that he did a stool           



                                                  test one            

 

           (unknown)  (no       (unknown)  (unknown)  buspirone 5 mg  (units    

(unknown)



                    date)                         tablet 5 mg PO BID unknown)  



                                                  #60 tabs 22           

 

           (unknown)  (no       (unknown)  (unknown)  cancer.  Postop  (units   

 (unknown)



                    date)                         diagnosis from this unknown)  



                                                  upper endoscopy was           



                                                  the same.  His           

 

           (unknown)  (no       (unknown)  (unknown)  cath through the  (units  

  (unknown)



                    date)                         weekend and unknown)  



                                                  therefore did not           



                                                  drain his bladder           



                                                  since Friday.           

 

           (unknown)  (no       (unknown)  (unknown)  caths at baseline  (units 

   (unknown)



                    date)                                   unknown)  

 

           (unknown)  (no       (unknown)  (unknown)  ciprofloxacin HCl  (units 

   (unknown)



                    date)                         500 mg tablet 500 unknown)  



                                                  mg PO BID #20 tabs           



                                                  22            

 

           (unknown)  (no       (unknown)  (unknown)  clopidogrel 75 mg  (units 

   (unknown)



                    date)                         tablet 75 mg PO unknown)  



                                                  DAILY 22            

 

           (unknown)  (no       (unknown)  (unknown)  colonic   (units    (unkno

wn)



                    date)                         anastomosis, and a unknown)  



                                                  suboptimal bowel           



                                                  prep.               

 

           (unknown)  (no       (unknown)  (unknown)  concerning  (units    (unk

nown)



                    date)                         symptoms. unknown)  

 

           (unknown)  (no       (unknown)  (unknown)  cultures were  (units    (

unknown)



                    date)                         drawn prior to unknown)  



                                                  receiving           



                                                  antibiotics Urine           



                                                  microscopy shows           

 

           (unknown)  (no       (unknown)  (unknown)  cyanocobalamin  (units    

(unknown)



                    date)                         (vitamin B-12) 500 unknown)  



                                                  1,000 mcg PO DAILY           



                                                  #60 tabs 22           

 

           (unknown)  (no       (unknown)  (unknown)  decrease in urine  (units 

   (unknown)



                    date)                         output, new unknown)  



                                                  abdominal back or           



                                                  flank pain or other           



                                                  new or              

 

           (unknown)  (no       (unknown)  (unknown)  diabetes,  (units    (unkn

own)



                    date)                         hypertension, unknown)  



                                                  colorectal cancer           



                                                  in  with a           



                                                  reverse colectomy           



                                                  who                 

 

           (unknown)  (no       (unknown)  (unknown)  diabetes,  (units    (unkn

own)



                    date)                         hypertension, unknown)  



                                                  colorectal cancer           



                                                  in  with a           



                                                  reverse colectomy,           



                                                  He                  

 

           (unknown)  (no       (unknown)  (unknown)  does not drink  (units    

(unknown)



                    date)                         alcohol.? Patient unknown)  



                                                  and his wife state           



                                                  that he was seen in           



                                                  the                 

 

           (unknown)  (no       (unknown)  (unknown)  dose of Rocephin,  (units 

   (unknown)



                    date)                         a L of fluids, plan unknown)  



                                                  to leave catheter           



                                                  in place until he           



                                                  is                  

 

           (unknown)  (no       (unknown)  (unknown)  elevated lactate.  (units 

   (unknown)



                    date)                         His heart rate and unknown)  



                                                  blood pressure are           



                                                  within normal           



                                                  ranges.             

 

           (unknown)  (no       (unknown)  (unknown)  emergency  (units    (unkn

own)



                    date)                         department in unknown)  



                                                  January and           



                                                  diagnosed with a GI           



                                                  bleed, he was           



                                                  admitted,           

 

           (unknown)  (no       (unknown)  (unknown)  endorses fatigue  (units  

  (unknown)



                    date)                                   unknown)  

 

           (unknown)  (no       (unknown)  (unknown) endoscopic  (units    (unkn

own)



                    date)                         diagnosis includes unknown)  



                                                  mild gastropathy,           



                                                  esophagitis, patent           



                                                  retrosigmoid           

 

           (unknown)  (no       (unknown)  (unknown)  equivocal at this  (units 

   (unknown)



                    date)                         time.  Plan to unknown)  



                                                  repeat lactate is           



                                                  improving will           



                                                  attempt             

 

           (unknown)  (no       (unknown)  (unknown)  feeling more able  (units 

   (unknown)



                    date)                         to self cath. unknown)  



                                                  Short-term           



                                                  follow-up in the           



                                                  next several days           

 

           (unknown)  (no       (unknown)  (unknown)  ferrous sulfate  (units   

 (unknown)



                    date)                         325 mg (65 mg 325 unknown)  



                                                  mg PO BIDWM #60           



                                                  tabs 22           

 

           (unknown)  (no       (unknown)  (unknown)  followed by loose  (units 

   (unknown)



                    date)                         stool.    unknown)  

 

           (unknown)  (no       (unknown)  (unknown)  for repeat lactate  (units

    (unknown)



                    date)                         at this time. unknown)  



                                                  Discussed admission           



                                                  versus home patient           



                                                  has                 

 

           (unknown)  (no       (unknown)  (unknown)  gabapentin 300 mg  (units 

   (unknown)



                    date)                         capsule 300 mg PO unknown)  



                                                  BID 18            

 

           (unknown)  (no       (unknown)  (unknown)  generalized  (units    (un

known)



                    date)                         weakness and unknown)  



                                                  fatigue             

 

           (unknown)  (no       (unknown)  (unknown)  glipizide 10 mg  (units   

 (unknown)



                    date)                         tablet 10 mg PO BID unknown)  



                                                  18           

 

           (unknown)  (no       (unknown)  (unknown)  had a recent  (units    (u

nknown)



                    date)                         endoscopy that did unknown)  



                                                  not show any           



                                                  bleeding polyps or           



                                                  acute GI            

 

           (unknown)  (no       (unknown)  (unknown)  hospital and on  (units   

 (unknown)



                    date)                         chart review it unknown)  



                                                  appears that it was           



                                                  completed on           



                                                  2022.           

 

           (unknown)  (no       (unknown)  (unknown)  household members:  (units

    (unknown)



                    date)                          spouse   unknown)  

 

           (unknown)  (no       (unknown)  (unknown)  ingrown, no penile  (units

    (unknown)



                    date)                         discharge, no unknown)  



                                                  scrotal edema           

 

           (unknown)  (no       (unknown)  (unknown)  iron) tablet  (units    (u

nknown)



                    date)                                   unknown)  

 

           (unknown)  (no       (unknown)  (unknown)  lives     (units    (unkno

wn)



                    date)                         independently:  Yes unknown)  

 

           (unknown)  (no       (unknown)  (unknown)  losartan 50 mg  (units    

(unknown)



                    date)                         tablet 25 mg PO QPM unknown)  



                                                  20           

 

           (unknown)  (no       (unknown)  (unknown)  lung      (units    (o

wn)



                    date)                                   unknown)  

 

           (unknown)  (no       (unknown)  (unknown)  magnesium was  (units    (

unknown)



                    date)                         replaced with 2 g, unknown)  



                                                  normal saline 1000           



                                                  mL was given for           



                                                  his                 

 

           (unknown)  (no       (unknown)  (unknown)  mcg tablet  (units    (unk

nown)



                    date)                                   unknown)  

 

           (unknown)  (no       (unknown)  (unknown)  mcg tablet  (units    (unk

nown)



                    date)                         (Tab-A-Lucy) unknown)  

 

           (unknown)  (no       (unknown)  (unknown)  metformin 500 mg  (units  

  (unknown)



                    date)                         tablet,extended 500 unknown)  



                                                  mg PO BID 18            

 

           (unknown)  (no       (unknown)  (unknown)  metoprolol  (units    (unk

nown)



                    date)                         succinate 25 mg 25 unknown)  



                                                  mg PO DAILY           



                                                  21           

 

           (unknown)  (no       (unknown)  (unknown)  moving forward.  (units   

 (unknown)



                    date)                         Patient reports unknown)  



                                                  that he has been           



                                                  taking fiber but           



                                                  not any             

 

           (unknown)  (no       (unknown)  (unknown)  multivitamin with  (units 

   (unknown)



                    date)                         folic acid 400 1 unknown)  



                                                  tab PO DAILY #90           



                                                  tabs 22           

 

           (unknown)  (no       (unknown)  (unknown)  negative.  I  (units    (u

nknown)



                    date)                         completed his unknown)  



                                                  urinary             



                                                  catheterization           



                                                  with a coude           



                                                  catheter, urine           

 

           (unknown)  (no       (unknown)  (unknown) nondistended, no  (units   

 (unknown)



                    date)                         masses, no unknown)  



                                                  exquisite           



                                                  tenderness with           



                                                  exam, no rebound           



                                                  tendernes           

 

           (unknown)  (no       (unknown)  (unknown)  not heard it was  (units  

  (unknown)



                    date)                         positive or not. unknown)  



                                                  Patient reports           



                                                  that his primary           



                                                  care                

 

           (unknown)  (no       (unknown)  (unknown)  ondansetron HCl 4  (units 

   (unknown)



                    date)                         mg tablet 4 mg PO unknown)  



                                                  Q8H PRN nausea and           



                                                  19            

 

           (unknown)  (no       (unknown)  (unknown)  oral powder packet  (units

    (unknown)



                    date)                                   unknown)  

 

           (unknown)  (no       (unknown)  (unknown)  output for three  (units  

  (unknown)



                    date)                         days, three days unknown)  



                                                  ago he had nausea           



                                                  and vomiting,           



                                                  denies any           

 

           (unknown)  (no       (unknown)  (unknown)  patient was  (units    (un

known)



                    date)                         offered admission. unknown)  



                                                  Both he and his           



                                                  wife prefer to           



                                                  return home.           

 

           (unknown)  (no       (unknown)  (unknown)  polyethylene  (units    (u

nknown)



                    date)                         glycol 3350 17 gram unknown)  



                                                  17 gm PO DAILY #90           



                                                  ea 22           

 

           (unknown)  (no       (unknown)  (unknown)  presents to the  (units   

 (unknown)



                    date)                         emergency unknown)  



                                                  department           



                                                  complaining of           



                                                  ongoing fatigue,           



                                                  oliguria,           

 

           (unknown)  (no       (unknown)  (unknown)  provider has left,  (units

    (unknown)



                    date)                         he reports that he unknown)  



                                                  has seen Dr. Macias           



                                                  recently as well.           

 

           (unknown)  (no       (unknown)  (unknown)  pyelonephritis and  (units

    (unknown)



                    date)                         Toro catheter was unknown)  



                                                  left in place as he           



                                                  was too weak to           



                                                  self                

 

           (unknown)  (no       (unknown)  (unknown)  quadrant with a  (units   

 (unknown)



                    date)                         complicated unknown)  



                                                  surgical history of           



                                                  his abdomen.  This           



                                                  shows               

 

           (unknown)  (no       (unknown)  (unknown)  recently for his  (units  

  (unknown)



                    date)                         anemia.   unknown)  

 

           (unknown)  (no       (unknown)  (unknown)  related diarrhea  (units  

  (unknown)



                    date)                         from fecal loading unknown)  



                                                  and obstipation.           



                                                  Recommended MiraLax           



                                                  17 g                

 

           (unknown)  (no       (unknown)  (unknown)  release 24 hr  (units    (

unknown)



                    date)                                   unknown)  

 

           (unknown)  (no       (unknown)  (unknown)  repeated as well  (units  

  (unknown)



                    date)                         as HPI.  Patient's unknown)  



                                                  lactate was           



                                                  elevated at 2.6,           



                                                  blood cultures           

 

           (unknown)  (no       (unknown)  (unknown)  reported that  (units    (

unknown)



                    date)                         patient's altered unknown)  



                                                  bowel habit is           



                                                  likely a function           



                                                  of overflow           

 

           (unknown)  (no       (unknown)  (unknown)  s         (units    (unkno

wn)



                    date)                                   unknown)  

 

           (unknown)  (no       (unknown)  (unknown)  self cath  (units    (unkn

own)



                    date)                         regularly. unknown)  

 

           (unknown)  (no       (unknown)  (unknown)  sennosides 8.6 mg  (units 

   (unknown)



                    date)                         tablet (senna) 17.2 unknown)  



                                                  mg PO BEDTIME #60           



                                                  tabs 22           

 

           (unknown)  (no       (unknown)  (unknown)  substance use  (units    (

unknown)



                    date)                         type:  does not use unknown)  

 

           (unknown)  (no       (unknown)  (unknown)  tablet (Vitamin C)  (units

    (unknown)



                    date)                                   unknown)  

 

           (unknown)  (no       (unknown)  (unknown)  tablet,extended  (units   

 (unknown)



                    date)                         release 24 hr unknown)  

 

           (unknown)  (no       (unknown)  (unknown)  there.  He reports  (units

    (unknown)



                    date)                         that he had an unknown)  



                                                  upper endoscopy           



                                                  completed here at           



                                                  this                

 

           (unknown)  (no       (unknown)  (unknown)  to recheck renal  (units  

  (unknown)



                    date)                         function patient unknown)  



                                                  encouraged to           



                                                  return if not           



                                                  improving.           

 

           (unknown)  (no       (unknown)  (unknown)  tone      (units    (unkno

wn)



                    date)                                   unknown)  

 

           (unknown)  (no       (unknown)  (unknown)  trospium 20 mg  (units    

(unknown)



                    date)                         tablet 20 mg PO BID unknown)  



                                                  urinary retention           



                                                  22           

 

           (unknown)  (no       (unknown)  (unknown)  unremarkable.?  (units    

(unknown)



                    date)                                   unknown)  

 

           (unknown)  (no       (unknown)  (unknown)  urine dip showed  (units  

  (unknown)



                    date)                         leukocytes, unknown)  



                                                  ketones, wbc's and           



                                                  did not show           



                                                  nitrites.  Blood           

 

           (unknown)  (no       (unknown)  (unknown)  urine is cloudy,  (units  

  (unknown)



                    date)                         yellow,   unknown)  



                                                  approximately 600           



                                                  mL in bladder and           



                                                  drained, no rash           

 

           (unknown)  (no       (unknown)  (unknown)  volumes accentuate  (units

    (unknown)



                    date)                         pulmonary unknown)  



                                                  interstitium and           



                                                  heart size.           

 

           (unknown)  (no       (unknown)  (unknown)  was cloudy yellow  (units 

   (unknown)



                    date)                         urine and 600 mL unknown)  



                                                  was drained.           



                                                  Patient tolerated           



                                                  well.  His           

 

           (unknown)  (no       (unknown)  (unknown)  week ago and  (units    (u

nknown)



                    date)                         dropped it off at unknown)  



                                                  [facility] which was           



                                                  testing for blood           



                                                  but he has           

 

           (unknown)  (no       (unknown)  (unknown)  went to [facility]  (units 

   (unknown)



                    date)                         for rehab following unknown)  



                                                  his admission and           



                                                  has followed up           



                                                  with                

 

           (unknown)  (no       (unknown)  (unknown)  were drawn after  (units  

  (unknown)



                    date)                         Rocephin.  Reviewed unknown)  



                                                  patient's CT, his           



                                                  urine findings and           



                                                  plan                

 

           (unknown)  (no       (unknown)  (unknown)  wheezing, stridor,  (units

    (unknown)



                    date)                         or rales. No unknown)  



                                                  retractions or           



                                                  tachypnea.           









                                         Result panel 34









           (unknown)  (no date)  (unknown)  (unknown)  NO GROWTH  (units    (unk

nown)



                                                  AFTER 46  unknown)  



                                                  HOURS               

 

           (unknown)  (no date)  (unknown)  (unknown)  (no value)  (units    (un

known)



                                                            unknown)  









                                         Result panel 35









           (unknown)  (no date)  (unknown)  (unknown)  (no value)  (units    (un

known)



                                                            unknown)  

 

           (unknown)  (no date)  (unknown)  (unknown)  NO GROWTH  (units    (unk

nown)



                                                  AFTER 70  unknown)  



                                                  HOURS               









                                         Result panel 36









           (unknown)  (no date)  (unknown)  (unknown)  (no value)  (units    (un

known)



                                                            unknown)  

 

           (unknown)  (no date)  (unknown)  (unknown)  NO GROWTH  (units    (unk

nown)



                                                  AFTER 4 DAYS unknown)  









                                         Result panel 37









           (unknown)  (no date)  (unknown)  (unknown)  (no value)  (units    (un

known)



                                                            unknown)  

 

           (unknown)  (no date)  (unknown)  (unknown)  NO GROWTH  (units    (unk

nown)



                                                  AFTER 5 DAYS unknown)  







Social History







                     date                description         facility

 

                     (no date)           Never smoked tobacco (finding)  [facility]







Vital Signs







                 date            measurement     value           units









              [id number]+0000  BP_diastolic  BP_diastolic  84           mm[H

g]

 

              [id number]+0000  BP_systolic  BP_systolic  124          mm[Hg]

 

              25993874540283+0000  heart_rate   heart_rate   64           /min

 

              +0000  respiration_rate  respiration_rate  14       

    /min

 

              [id number]+0000  temperature_metric  temperature_metric  36.78

        C

 

              [id number]+0000  temperature_standard  temperature_standard  9

8.2         F

 

              [id number]+0000  BMI          BMI          23.0         kg/m2

 

              [id number]+0000  BP_diastolic  BP_diastolic  58           mm[H

g]

 

              +0000  BP_systolic  BP_systolic  124          mm[Hg]

 

              [id number]+0000  heart_rate   heart_rate   77           /min

 

              [id number]+0000  height_metric  height_metric  182.88       cm

 

              [id number]+0000  height_standard  height_standard  72         

  in

 

              [id number]+0000  respiration_rate  respiration_rate  18       

    /min

 

              [id number]+0000  temperature_metric  temperature_metric  36.83

        C

 

              [id number]+0000  temperature_standard  temperature_standard  9

8.3         F

 

              [id number]+0000  weight_metric  weight_metric  34.98        kg

 

              [id number]+0000  weight_standard  weight_standard  77.11      

  lb

## 2022-09-02 VITALS — DIASTOLIC BLOOD PRESSURE: 70 MMHG | SYSTOLIC BLOOD PRESSURE: 136 MMHG

## 2022-09-02 NOTE — ED PHYSICIAN DOCUMENTATION
ED Addendum





- Addendum


Addendum: 





09/02/22 09:24


Social work met with the patient and his wife.  She provided care resources for 

home.  The patient does receive care at [facility], his wife will 

contact the office for a follow-up appointment.  She is comfortable taking him 

home at this time.  They will return if he worsens.





This document was made in part using voice recognition software. While efforts 

are made to proofread this document, sound alike and grammatical errors may 

occur.





Departure





- Departure


Disposition: 01 Home, Self Care


Clinical Impression: 


 Weakness generalized, Hypomagnesemia





Anemia


Qualifiers:


 Anemia type: unspecified type Qualified Code(s): D64.9 - Anemia, unspecified





Chronic renal insufficiency


Qualifiers:


 Chronic kidney disease stage: unspecified stage Qualified Code(s): N18.9 - 

Chronic kidney disease, unspecified





Condition: Stable


Instructions:  ED Weakness UKO, Hypomagnesemia Dc


Follow-Up: 


Gustavo Macias MD [Provider Admit Priv/Credential] - Within 3 Days


Comments: 


Please follow-up with [facility].  You should have your magnesium 

levels rechecked by your doctor in 3 to 5 days.  They can also help with home ph

ysical therapy and other referrals.  Please return if you worsen.  Your 

magnesium was replaced yesterday.

## 2022-09-03 NOTE — ED PHYSICIAN DOCUMENTATION
ED Addendum





- Addendum


Addendum: 





09/03/22 16:20





Urine culture from the September 1 visit has grown Klebsiella pneumonia.  This 

is pansensitive.  This is the same organism that was grown on previous cultures.

 This gentleman has a chronic indwelling Toro catheter.  He presented to the 

emergency department with generalized weakness but no fevers or leukocytosis.  

Given the presence of a chronic indwelling Toro catheter this likely represents

long-term colonization versus active infection.  Therefore will defer an

tibiotics

## 2022-09-04 ENCOUNTER — HOSPITAL ENCOUNTER (EMERGENCY)
Dept: HOSPITAL 76 - ED | Age: 62
Discharge: HOME | End: 2022-09-04
Payer: MEDICARE

## 2022-09-04 ENCOUNTER — HOSPITAL ENCOUNTER (OUTPATIENT)
Dept: HOSPITAL 76 - EMS | Age: 62
Discharge: ANOTHER HOSPITAL | End: 2022-09-04
Payer: MEDICARE

## 2022-09-04 ENCOUNTER — HOSPITAL ENCOUNTER (OUTPATIENT)
Dept: HOSPITAL 76 - EMS | Age: 62
Discharge: HOME | End: 2022-09-04
Attending: NURSE PRACTITIONER
Payer: COMMERCIAL

## 2022-09-04 VITALS — SYSTOLIC BLOOD PRESSURE: 169 MMHG | DIASTOLIC BLOOD PRESSURE: 91 MMHG

## 2022-09-04 DIAGNOSIS — R94.5: ICD-10-CM

## 2022-09-04 DIAGNOSIS — R53.1: Primary | ICD-10-CM

## 2022-09-04 DIAGNOSIS — R10.11: Primary | ICD-10-CM

## 2022-09-04 DIAGNOSIS — Z74.01: Primary | ICD-10-CM

## 2022-09-04 DIAGNOSIS — N30.00: ICD-10-CM

## 2022-09-04 DIAGNOSIS — E11.65: ICD-10-CM

## 2022-09-04 DIAGNOSIS — Z79.84: ICD-10-CM

## 2022-09-04 DIAGNOSIS — K82.8: ICD-10-CM

## 2022-09-04 LAB
ALBUMIN DIAFP-MCNC: 3.5 G/DL (ref 3.2–5.5)
ALBUMIN/GLOB SERPL: 0.9 {RATIO} (ref 1–2.2)
ALP SERPL-CCNC: 90 IU/L (ref 42–121)
ALT SERPL W P-5'-P-CCNC: 52 IU/L (ref 10–60)
ANION GAP SERPL CALCULATED.4IONS-SCNC: 11 MMOL/L (ref 6–13)
AST SERPL W P-5'-P-CCNC: 51 IU/L (ref 10–42)
BASOPHILS NFR BLD AUTO: 0 10^3/UL (ref 0–0.1)
BASOPHILS NFR BLD AUTO: 0.2 %
BILIRUB BLD-MCNC: 1.6 MG/DL (ref 0.2–1)
BUN SERPL-MCNC: 23 MG/DL (ref 6–20)
CALCIUM UR-MCNC: 8.7 MG/DL (ref 8.5–10.3)
CASTS URNS QL MICRO: (no result) /LPF
CHLORIDE SERPL-SCNC: 101 MMOL/L (ref 101–111)
CLARITY UR REFRACT.AUTO: CLEAR
CO2 SERPL-SCNC: 24 MMOL/L (ref 21–32)
CREAT SERPLBLD-SCNC: 1.7 MG/DL (ref 0.6–1.2)
CRYSTALS URNS QL MICRO: (no result) /LPF
EOSINOPHIL # BLD AUTO: 0 10^3/UL (ref 0–0.7)
EOSINOPHIL NFR BLD AUTO: 0.1 %
ERYTHROCYTE [DISTWIDTH] IN BLOOD BY AUTOMATED COUNT: 15.3 % (ref 12–15)
GFRSERPLBLD MDRD-ARVRAT: 41 ML/MIN/{1.73_M2} (ref 89–?)
GLOBULIN SER-MCNC: 3.8 G/DL (ref 2.1–4.2)
GLUCOSE SERPL-MCNC: 351 MG/DL (ref 70–100)
GLUCOSE UR QL STRIP.AUTO: >=1000 MG/DL
HCT VFR BLD AUTO: 30.4 % (ref 42–52)
HGB UR QL STRIP: 10.2 G/DL (ref 14–18)
KETONES UR QL STRIP.AUTO: NEGATIVE MG/DL
LIPASE SERPL-CCNC: 25 U/L (ref 22–51)
LYMPHOCYTES # SPEC AUTO: 0.4 10^3/UL (ref 1.5–3.5)
LYMPHOCYTES NFR BLD AUTO: 3.8 %
MCH RBC QN AUTO: 28.1 PG (ref 27–31)
MCHC RBC AUTO-ENTMCNC: 33.6 G/DL (ref 32–36)
MCV RBC AUTO: 83.7 FL (ref 80–94)
MONOCYTES # BLD AUTO: 0.9 10^3/UL (ref 0–1)
MONOCYTES NFR BLD AUTO: 7.5 %
NEUTROPHILS # BLD AUTO: 10.2 10^3/UL (ref 1.5–6.6)
NEUTROPHILS # SNV AUTO: 11.6 X10^3/UL (ref 4.8–10.8)
NEUTROPHILS NFR BLD AUTO: 87.9 %
NITRITE UR QL STRIP.AUTO: NEGATIVE
NRBC # BLD AUTO: 0 /100WBC
NRBC # BLD AUTO: 0 X10^3/UL
PDW BLD AUTO: 10.3 FL (ref 7.4–11.4)
PH UR STRIP.AUTO: 6 PH (ref 5–7.5)
PLATELET # BLD: 128 10^3/UL (ref 130–450)
POTASSIUM SERPL-SCNC: 3.3 MMOL/L (ref 3.5–5)
PROT SPEC-MCNC: 7.3 G/DL (ref 6.7–8.2)
PROT UR STRIP.AUTO-MCNC: (no result) MG/DL
RBC # UR STRIP.AUTO: (no result) /UL
RBC # URNS HPF: (no result) /HPF (ref 0–5)
RBC MAR: 3.63 10^6/UL (ref 4.7–6.1)
SODIUM SERPLBLD-SCNC: 136 MMOL/L (ref 135–145)
SP GR UR STRIP.AUTO: 1.01 (ref 1–1.03)
SQUAMOUS URNS QL MICRO: (no result)
UROBILINOGEN UR QL STRIP.AUTO: (no result) E.U./DL
UROBILINOGEN UR STRIP.AUTO-MCNC: NEGATIVE MG/DL
YEAST URNS QL MICRO: PRESENT

## 2022-09-04 PROCEDURE — 51702 INSERT TEMP BLADDER CATH: CPT

## 2022-09-04 PROCEDURE — 87086 URINE CULTURE/COLONY COUNT: CPT

## 2022-09-04 PROCEDURE — 87040 BLOOD CULTURE FOR BACTERIA: CPT

## 2022-09-04 PROCEDURE — 99284 EMERGENCY DEPT VISIT MOD MDM: CPT

## 2022-09-04 PROCEDURE — 83605 ASSAY OF LACTIC ACID: CPT

## 2022-09-04 PROCEDURE — 76705 ECHO EXAM OF ABDOMEN: CPT

## 2022-09-04 PROCEDURE — 82009 KETONE BODYS QUAL: CPT

## 2022-09-04 PROCEDURE — 36415 COLL VENOUS BLD VENIPUNCTURE: CPT

## 2022-09-04 PROCEDURE — 81003 URINALYSIS AUTO W/O SCOPE: CPT

## 2022-09-04 PROCEDURE — 85025 COMPLETE CBC W/AUTO DIFF WBC: CPT

## 2022-09-04 PROCEDURE — 83690 ASSAY OF LIPASE: CPT

## 2022-09-04 PROCEDURE — 81001 URINALYSIS AUTO W/SCOPE: CPT

## 2022-09-04 PROCEDURE — 96361 HYDRATE IV INFUSION ADD-ON: CPT

## 2022-09-04 PROCEDURE — 96375 TX/PRO/DX INJ NEW DRUG ADDON: CPT

## 2022-09-04 PROCEDURE — 96374 THER/PROPH/DIAG INJ IV PUSH: CPT

## 2022-09-04 PROCEDURE — 74176 CT ABD & PELVIS W/O CONTRAST: CPT

## 2022-09-04 PROCEDURE — 80053 COMPREHEN METABOLIC PANEL: CPT

## 2022-09-04 NOTE — ULTRASOUND REPORT
PROCEDURE:  Abdomen Limited

 

INDICATIONS:  RUQ pain and vomiting

 

TECHNIQUE:  

Real-time scanning was performed of the abdominal and retroperitoneal organs, with image documentatio
n.  

 

COMPARISON:  None.

 

FINDINGS:  

 

Liver:  Liver is normal in size and homogeneous in echotexture.  

 

Gallbladder: Nonmobile areas of echogenicity, the largest measuring 25 x 21 x 27 mm.  Wall thickness 
is at the upper limits of normal, measuring 2.9 mm.

 

Biliary ducts:  Intrahepatic bile ducts are non-dilated.  Extrahepatic bile duct caliber measures    
 mm.  Normal is 6-7 mm or less in diameter, or 10 mm or less post-cholecystectomy.  

 

Pancreas:  Visualized portions of the pancreas are sonographically normal.  

 

Kidneys:  Right kidney measures 10.5 cm long.  No hydronephrosis or nephrolithiasis.  No solid masses
.  

 

IMPRESSION:  

Adherent sludge vs polyps in the gallbladder.  

 

 

Reviewed by: Klaudia Gaines MD on 9/4/2022 2:12 PM PDT

Approved by: Klaudia Gaines MD on 9/4/2022 2:12 PM PDT

 

 

Station ID:  [id number]

## 2022-09-04 NOTE — EXTERNAL MEDICAL SUMMARY RPT
Continuity of Care Document

                          Created on:2022



Patient:Bret Esquivel

Sex:Male

:1960

External Reference #:[id number]





Demographics







                          Address                   [address]



                                                    [place] [address]

 

                          Phone                     Unavailable

 

                          Preferred Language        English

 

                          Marital Status            

 

                          Latter day Affiliation     Unknown

 

                          Race                      Unknown

 

                          Ethnic Group              Unknown









Author







                          Organization              [facility]

 

                          Address                   [address] [place] [address]

 

                          Phone                     [phone]









Allergies and Intolerances







                 date            description     facility        type

 

                 (no date)       No Known Drug Allergies  [facility]  (unkn

own)







Encounters

No information.



Functional Status

No information.



Immunizations

No information.



Medications







                     date                description         facility

 

                     [id number]  Ciprofloxacin 500 MG Oral Tablet  [facility]







Problems

No information.



Procedures







                     date                description         facility

 

                     [id number]  [facility]

 

                     [id number]  [facility]

 

                     [id number]  [facility]







Results/Labs







           test      date      author    facility  value     unit      interpret

ation









                                         Result panel 1









           (unknown)  (no       (unknown)  (unknown)  (no value)  (units    (unk

nown)



                    date)                                   unknown)  

 

           (unknown)  (no       (unknown)  (unknown)  (no value)  (units    (unk

nown)



                    date)                                   unknown)  

 

           (unknown)  (no       (unknown)  (unknown)  [place]  (units    (

unknown)



                    date)                         [address]     unknown)  

 

           (unknown)  (no       (unknown)  (unknown)  [facility]     (units    (unkno

wn)



                    date)                                   unknown)  

 

           (unknown)  (no       (unknown)  (unknown)  [facility]  (units   

 (unknown)



                    date)                                   unknown)  

 

           (unknown)  (no       (unknown)  (unknown)  Nurse Office  (units    (u

nknown)



                    date)                         Visit     unknown)  

 

           (unknown)  (no       (unknown)  (unknown)  (no value)  (units    (unk

nown)



                    date)                                   unknown)  

 

           (unknown)  (no       (unknown)  (unknown)  **COVID-19**  (units    (u

nknown)



                    date)                                   unknown)  

 

           (unknown)  (no       (unknown)  (unknown)  [id number]   (units    (unkno

wn)



                    date)                                   unknown)  

 

           (unknown)  (no       (unknown)  (unknown)  06/10/22  (units    (unkno

wn)



                    date)                                   unknown)  

 

           (unknown)  (no       (unknown)  (unknown)  Age/Sex: 62 / M  (units   

 (unknown)



                    date)                           Date of unknown)  



                                                  Service:            

 

           (unknown)  (no       (unknown)  (unknown)  Allergies  (units    (unkn

own)



                    date)                                   unknown)  

 

           (unknown)  (no       (unknown)  (unknown)  Attending Dr:  (units    (

unknown)



                    date)                         Gustavo Wilson MD unknown)  

 

           (unknown)  (no       (unknown)  (unknown)  : 1960  (units   

 (unknown)



                    date)                           Acct:[id number] unknown)  

 

           (unknown)  (no       (unknown)  (unknown)  Dept at   (units    (unkno

wn)



                    date)                         [phone] unknown)  

 

           (unknown)  (no       (unknown)  (unknown)  Documented By:  (units    

(unknown)



                    date)                         Gustavo Wilson MD unknown)  



                                                     06/10/22 1053           

 

           (unknown)  (no       (unknown)  (unknown)  Evaluation/Scree  (units  

  (unknown)



                    date)                         sabine for possible unknown)  



                                                  COVID-19            



                                                  completed?: Yes-           



                                                  COVID-19 CPT           

 

           (unknown)  (no       (unknown)  (unknown)  Intake    (units    (unkno

wn)



                    date)                                   unknown)  

 

           (unknown)  (no       (unknown)  (unknown)  Intake Note:  (units    (u

nknown)



                    date)                                   unknown)  

 

           (unknown)  (no       (unknown)  (unknown)  Loc: [facility]  (units    (unkno

wn)



                    date)                                   unknown)  

 

           (unknown)  (no       (unknown)  (unknown)  No Known Drug  (units    (

unknown)



                    date)                         Allergies Allergy unknown)  



                                                  (Verified           



                                                  22 13:40)           

 

           (unknown)  (no       (unknown)  (unknown)  Note      (units    (unkno

wn)



                    date)                                   unknown)  

 

           (unknown)  (no       (unknown)  (unknown)  PRE-COVID TEST.  (units   

 (unknown)



                    date)                         PT. DENIES ANY unknown)  



                                                  SYMPTOMS.  TEST           



                                                  EXPLAINED AND PT.           



                                                  TOLERATED           

 

           (unknown)  (no       (unknown)  (unknown)  Patient:  (units    (unkno

wn)



                    date)                         Bret Esquivel R unknown)  



                                                   MR#: [id number]           

 

           (unknown)  (no       (unknown)  (unknown)  Reason For Visit  (units  

  (unknown)



                    date)                                   unknown)  

 

           (unknown)  (no       (unknown)  (unknown)  Signed By:  (units    (unk

nown)



                    date)                                   unknown)  

 

           (unknown)  (no       (unknown)  (unknown)  Smoking Status:  (units   

 (unknown)



                    date)                         Never smoker unknown)  

 

           (unknown)  (no       (unknown)  (unknown)  This note may  (units    (

unknown)



                    date)                         have been all or unknown)  



                                                  partially           



                                                  generated using           



                                                  voice recognition           

 

           (unknown)  (no       (unknown)  (unknown)  Tobacco Status  (units    

(unknown)



                    date)                                   unknown)  

 

           (unknown)  (no       (unknown)  (unknown)  Visit Reasons:  (units    

(unknown)



                    date)                         [facility]      unknown)  

 

           (unknown)  (no       (unknown)  (unknown)  WELL.     (units    (unkno

wn)



                    date)                                   unknown)  

 

           (unknown)  (no       (unknown)  (unknown)  have occurred.  (units    

(unknown)



                    date)                         If there are any unknown)  



                                                  questions, please           



                                                  contact the           



                                                  Medical Records           

 

           (unknown)  (no       (unknown)  (unknown)  may occur.  (units    (unk

nown)



                    date)                         Occasional unknown)  



                                                  wrong-word or           



                                                  'sound-alike'           



                                                  substitutions may           



                                                  have                

 

           (unknown)  (no       (unknown)  (unknown)  occurred due to  (units   

 (unknown)



                    date)                         the inherent unknown)  



                                                  limitations of           



                                                  voice recognition           



                                                  software. Please           

 

           (unknown)  (no       (unknown)  (unknown)  read the note  (units    (

unknown)



                    date)                         carefully and unknown)  



                                                  recognize, using           



                                                  context, where           



                                                  these               



                                                  substitutions           

 

           (unknown)  (no       (unknown)  (unknown)  software.  (units    (unkn

own)



                    date)                         Although every unknown)  



                                                  effort is made to           



                                                  edit content,           



                                                  transcription           



                                                  errors              









                                         Result panel 2









           (unknown)  (no date)  (unknown)  (unknown)  Negative  (units    (unkn

own)



                                                            unknown)  









                                         Result panel 3









           (unknown)  (no date)  (unknown)  (unknown)  (no value)  (units    226

37-3



                                                            unknown)  

 

           (unknown)  (no date)  (unknown)  (unknown)  (no value)  (units    495

60-6



                                                            unknown)  

 

           (unknown)  (no date)  (unknown)  (unknown)  (no value)  (units    191

39-5



                                                            unknown)  

 

           (unknown)  (no date)  (unknown)  (unknown)  (no value)  (units    226

34-0



                                                            unknown)  

 

           (unknown)  (no date)  (unknown)  (unknown)  (no value)  (units    226

33-2



                                                            unknown)  

 

           (unknown)  (no date)  (unknown)  (unknown)  (no value)  (units    226

35-7



                                                            unknown)  

 

           (unknown)  (no date)  (unknown)  (unknown)  (no value)  (units    527

97-8



                                                            unknown)  









                                         Result panel 4









           (unknown)  (no       (unknown)  (unknown)  (no value)  (units    (unk

nown)



                    date)                                   unknown)  

 

           (unknown)  (no       (unknown)  (unknown)  (no value)  (units    (unk

nown)



                    date)                                   unknown)  

 

           (unknown)  (no       (unknown)  (unknown)  22 1257  (units    (

unknown)



                    date)                                   unknown)  

 

           (unknown)  (no       (unknown)  (unknown)  [facility]  (units    (

unknown)



                    date)                         [address]     unknown)  

 

           (unknown)  (no       (unknown)  (unknown)  [facility]  (units   

 (unknown)



                    date)                                   unknown)  

 

           (unknown)  (no       (unknown)  (unknown)  Nurse Office  (units    (u

nknown)



                    date)                         Visit     unknown)  

 

           (unknown)  (no       (unknown)  (unknown)  Signed    (units    (unkno

wn)



                    date)                                   unknown)  

 

           (unknown)  (no       (unknown)  (unknown)  (no value)  (units    (unk

nown)



                    date)                                   unknown)  

 

           (unknown)  (no       (unknown)  (unknown)  **COVID-19**  (units    (u

nknown)



                    date)                                   unknown)  

 

           (unknown)  (no       (unknown)  (unknown)  [id number]   (units    (unkno

wn)



                    date)                                   unknown)  

 

           (unknown)  (no       (unknown)  (unknown)  06/10/22  (units    (unkno

wn)



                    date)                                   unknown)  

 

           (unknown)  (no       (unknown)  (unknown)  Age/Sex: 62 / M  (units   

 (unknown)



                    date)                           Date of unknown)  



                                                  Service:            

 

           (unknown)  (no       (unknown)  (unknown)  Allergies  (units    (unkn

own)



                    date)                                   unknown)  

 

           (unknown)  (no       (unknown)  (unknown)  Attending Dr:  (units    (

unknown)



                    date)                         Gustavo Wilson MD unknown)  

 

           (unknown)  (no       (unknown)  (unknown)  : 1960  (units   

 (unknown)



                    date)                           Acct:[id number] unknown)  

 

           (unknown)  (no       (unknown)  (unknown)  Dept at   (units    (unkno

wn)



                    date)                         [phone] unknown)  

 

           (unknown)  (no       (unknown)  (unknown)  Documented By:  (units    

(unknown)



                    date)                         Gustavo Wilson MD unknown)  



                                                     06/10/22 1053           

 

           (unknown)  (no       (unknown)  (unknown)  Evaluation/Scree  (units  

  (unknown)



                    date)                         sabine for possible unknown)  



                                                  COVID-19            



                                                  completed?: Yes-           



                                                  COVID-19 CPT           

 

           (unknown)  (no       (unknown)  (unknown)  Intake    (units    (unkno

wn)



                    date)                                   unknown)  

 

           (unknown)  (no       (unknown)  (unknown)  Intake Note:  (units    (u

nknown)



                    date)                                   unknown)  

 

           (unknown)  (no       (unknown)  (unknown)  Loc: [facility]  (units    (unkno

wn)



                    date)                                   unknown)  

 

           (unknown)  (no       (unknown)  (unknown)  No Known Drug  (units    (

unknown)



                    date)                         Allergies Allergy unknown)  



                                                  (Verified           



                                                  22 13:40)           

 

           (unknown)  (no       (unknown)  (unknown)  Note      (units    (unkno

wn)



                    date)                                   unknown)  

 

           (unknown)  (no       (unknown)  (unknown)  PRE-COVID TEST.  (units   

 (unknown)



                    date)                         PT. DENIES ANY unknown)  



                                                  SYMPTOMS.  TEST           



                                                  EXPLAINED AND PT.           



                                                  TOLERATED           

 

           (unknown)  (no       (unknown)  (unknown)  Patient:  (units    (unkno

wn)



                    date)                         Bret Esquivel R unknown)  



                                                   MR#: [id number]           

 

           (unknown)  (no       (unknown)  (unknown)  Reason For Visit  (units  

  (unknown)



                    date)                                   unknown)  

 

           (unknown)  (no       (unknown)  (unknown)  Signed By:  (units    (unk

nown)



                    date)                         <Electronically unknown)  



                                                  signed by Gustavo Wilson MD>           

 

           (unknown)  (no       (unknown)  (unknown)  Smoking Status:  (units   

 (unknown)



                    date)                         Never smoker unknown)  

 

           (unknown)  (no       (unknown)  (unknown)  This note may  (units    (

unknown)



                    date)                         have been all or unknown)  



                                                  partially           



                                                  generated using           



                                                  voice recognition           

 

           (unknown)  (no       (unknown)  (unknown)  Tobacco Status  (units    

(unknown)



                    date)                                   unknown)  

 

           (unknown)  (no       (unknown)  (unknown)  Visit Reasons:  (units    

(unknown)



                    date)                         [facility]      unknown)  

 

           (unknown)  (no       (unknown)  (unknown)  WELL.     (units    (unkno

wn)



                    date)                                   unknown)  

 

           (unknown)  (no       (unknown)  (unknown)  have occurred.  (units    

(unknown)



                    date)                         If there are any unknown)  



                                                  questions, please           



                                                  contact the           



                                                  Medical Records           

 

           (unknown)  (no       (unknown)  (unknown)  may occur.  (units    (unk

nown)



                    date)                         Occasional unknown)  



                                                  wrong-word or           



                                                  'sound-alike'           



                                                  substitutions may           



                                                  have                

 

           (unknown)  (no       (unknown)  (unknown)  occurred due to  (units   

 (unknown)



                    date)                         the inherent unknown)  



                                                  limitations of           



                                                  voice recognition           



                                                  software. Please           

 

           (unknown)  (no       (unknown)  (unknown)  read the note  (units    (

unknown)



                    date)                         carefully and unknown)  



                                                  recognize, using           



                                                  context, where           



                                                  these               



                                                  substitutions           

 

           (unknown)  (no       (unknown)  (unknown)  software.  (units    (unkn

own)



                    date)                         Although every unknown)  



                                                  effort is made to           



                                                  edit content,           



                                                  transcription           



                                                  errors              









                                         Result panel 5









           (unknown)  (no       (unknown)  (unknown)  (no value)  (units    (unk

nown)



                    date)                                   unknown)  

 

           (unknown)  (no       (unknown)  (unknown)  (no value)  (units    (unk

nown)



                    date)                                   unknown)  

 

           (unknown)  (no       (unknown)  (unknown)  Date of Service:  (units  

  (unknown)



                    date)                         22  unknown)  

 

           (unknown)  (no       (unknown)  (unknown)  (no value)  (units    (unk

nown)



                    date)                                   unknown)  

 

           (unknown)  (no       (unknown)  (unknown)  -         (units    (unkno

wn)



                    date)                                   unknown)  

 

           (unknown)  (no       (unknown)  (unknown)  22 1426  (units    (

unknown)



                    date)                                   unknown)  

 

           (unknown)  (no       (unknown)  (unknown)  Allergies  (units    (unkn

own)



                    date)                                   unknown)  

 

           (unknown)  (no       (unknown)  (unknown)  History +  (units    (unkn

own)



                    date)                         Physical Report unknown)  

 

           (unknown)  (no       (unknown)  (unknown)  Home Medications  (units  

  (unknown)



                    date)                                   unknown)  

 

           (unknown)  (no       (unknown)  (unknown)  [facility]  (units   

 (unknown)



                    date)                         [address] [place] [address] unknown)  



                                                  [place], [place]           



                                                  [address]               

 

           (unknown)  (no       (unknown)  (unknown)  (no value)  (units    (unk

nown)



                    date)                                   unknown)  

 

           (unknown)  (no       (unknown)  (unknown)  22  (units    (unkno

wn)



                    date)                                   unknown)  

 

           (unknown)  (no       (unknown)  (unknown)  Medication  (units    (unk

nown)



                    date)                         Instructions unknown)  



                                                  Recorded            



                                                  Confirmed  Type           

 

           (unknown)  (no       (unknown)  (unknown)  (Zofran) vomiting  (units 

   (unknown)



                    date)                         #14 tabs  unknown)  

 

           (unknown)  (no       (unknown)  (unknown)  (past 8 hours):  (units   

 (unknown)



                    date)                                   unknown)  

 

           (unknown)  (no       (unknown)  (unknown)  [id number]   (units    (unkno

wn)



                    date)                                   unknown)  

 

           (unknown)  (no       (unknown)  (unknown)  14:13     (units    (unkno

wn)



                    date)                                   unknown)  

 

           (unknown)  (no       (unknown)  (unknown)  2-3 bowel  (units    (unkn

own)



                    date)                         movements per week unknown)  



                                                  they tend to be on           



                                                  the loose side.           



                                                  He denies           

 

           (unknown)  (no       (unknown)  (unknown)  62-year-old male  (units  

  (unknown)



                    date)                         with an iron unknown)  



                                                  deficiency anemia           



                                                  nausea vomiting           



                                                  weight loss           

 

           (unknown)  (no       (unknown)  (unknown)  Age/Sex: 62 / M  (units   

 (unknown)



                    date)                                   unknown)  

 

           (unknown)  (no       (unknown)  (unknown)  Allergy/AdvReac  (units   

 (unknown)



                    date)                         Type Severity unknown)  



                                                  Reaction Status           



                                                  Date / Time           

 

           (unknown)  (no       (unknown)  (unknown)  Appearance:  (units    (un

known)



                    date)                         grossly normal unknown)  

 

           (unknown)  (no       (unknown)  (unknown)  Assessment + Plan  (units 

   (unknown)



                    date)                                   unknown)  

 

           (unknown)  (no       (unknown)  (unknown)  Assessment + Plan  (units 

   (unknown)



                    date)                         narrative: unknown)  

 

           (unknown)  (no       (unknown)  (unknown)  Blood Pressure  (units    

(unknown)



                    date)                         150/75 H  unknown)  

 

           (unknown)  (no       (unknown)  (unknown)  CVA (cerebral  (units    (

unknown)



                    date)                         vascular accident) unknown)  



                                                  (2018)           

 

           (unknown)  (no       (unknown)  (unknown)  Cardio    (units    (unkno

wn)



                    date)                                   unknown)  

 

           (unknown)  (no       (unknown)  (unknown)  Chest     (units    (unkno

wn)



                    date)                                   unknown)  

 

           (unknown)  (no       (unknown)  (unknown)  Chest: normal  (units    (

unknown)



                    date)                         inspection of the unknown)  



                                                  chest               

 

           (unknown)  (no       (unknown)  (unknown)  Chief complaint:  (units  

  (unknown)



                    date)                         SDC       unknown)  

 

           (unknown)  (no       (unknown)  (unknown)  Colon cancer  (units    (u

nknown)



                    date)                         (2013)    unknown)  

 

           (unknown)  (no       (unknown)  (unknown)  Const     (units    (unkno

wn)



                    date)                                   unknown)  

 

           (unknown)  (no       (unknown)  (unknown)  Critical Care  (units    (

unknown)



                    date)                         time:     unknown)  

 

           (unknown)  (no       (unknown)  (unknown)  : 1960  (units   

 (unknown)



                    date)                          Acct:[id number] unknown)  

 

           (unknown)  (no       (unknown)  (unknown)  Date Patient  (units    (u

nknown)



                    date)                         Seen: 22 unknown)  

 

           (unknown)  (no       (unknown)  (unknown)  Depression  (units    (unk

nown)



                    date)                                   unknown)  

 

           (unknown)  (no       (unknown)  (unknown)  Diabetes  (units    (unkno

wn)



                    date)                                   unknown)  

 

           (unknown)  (no       (unknown)  (unknown)  Diabetic  (units    (unkno

wn)



                    date)                         neuropathy unknown)  

 

           (unknown)  (no       (unknown)  (unknown)  Effort +  (units    (unkno

wn)



                    date)                         Inspection: normal unknown)  



                                                  respiratory effort           

 

           (unknown)  (no       (unknown)  (unknown)  Exam      (units    (unkno

wn)



                    date)                                   unknown)  

 

           (unknown)  (no       (unknown)  (unknown)  Extrem    (units    (unkno

wn)



                    date)                                   unknown)  

 

           (unknown)  (no       (unknown)  (unknown)  Eyes      (units    (unkno

wn)



                    date)                                   unknown)  

 

           (unknown)  (no       (unknown)  (unknown)  Family + Social  (units   

 (unknown)



                    date)                         History   unknown)  

 

           (unknown)  (no       (unknown)  (unknown)  Family History  (units    

(unknown)



                    date)                         (Reviewed 22 unknown)  



                                                  @ 14:21 by Will Hansen MD)           

 

           (unknown)  (no       (unknown)  (unknown)  Father     (units 

   (unknown)



                    date)                           Cancer  unknown)  

 

           (unknown)  (no       (unknown)  (unknown)  GERD      (units    (unkno

wn)



                    date)                         (gastroesophageal unknown)  



                                                  reflux disease)           

 

           (unknown)  (no       (unknown)  (unknown)  GI        (units    (unkno

wn)



                    date)                                   unknown)  

 

           (unknown)  (no       (unknown)  (unknown)  General:  (units    (unkno

wn)



                    date)                         appearance normal, unknown)  



                                                  both eyes and all           



                                                  related structures           

 

           (unknown)  (no       (unknown)  (unknown)  General:  (units    (unkno

wn)



                    date)                         cooperative unknown)  

 

           (unknown)  (no       (unknown)  (unknown)  General: no  (units    (un

known)



                    date)                         rashes or lesions unknown)  



                                                  noted               

 

           (unknown)  (no       (unknown)  (unknown)  General: normal  (units   

 (unknown)



                    date)                         to inspection and unknown)  



                                                  no pedal edema           

 

           (unknown)  (no       (unknown)  (unknown)  General: patient  (units  

  (unknown)



                    date)                         alert and patient unknown)  



                                                  awake               

 

           (unknown)  (no       (unknown)  (unknown)  H/O colectomy  (units    (

unknown)



                    date)                                   unknown)  

 

           (unknown)  (no       (unknown)  (unknown)  HENMT     (units    (unkno

wn)



                    date)                                   unknown)  

 

           (unknown)  (no       (unknown)  (unknown)  Head: normal to  (units   

 (unknown)



                    date)                         inspection unknown)  

 

           (unknown)  (no       (unknown)  (unknown)  History of  (units    (unk

nown)



                    date)                         Present Illness unknown)  

 

           (unknown)  (no       (unknown)  (unknown)  History of lumbar  (units 

   (unknown)



                    date)                         surgery () unknown)  

 

           (unknown)  (no       (unknown)  (unknown)  Home Medications  (units  

  (unknown)



                    date)                         and Allergies unknown)  

 

           (unknown)  (no       (unknown)  (unknown)  Hx of foot  (units    (unk

nown)



                    date)                         surgery (2017) unknown)  

 

           (unknown)  (no       (unknown)  (unknown)  Hx of shoulder  (units    

(unknown)



                    date)                         surgery () unknown)  

 

           (unknown)  (no       (unknown)  (unknown)  Hypertension  (units    (u

nknown)



                    date)                                   unknown)  

 

           (unknown)  (no       (unknown)  (unknown) I reviewed the  (units    (

unknown)



                    date)                         note from Yasmine unknownIrena Celaya on May 13,           



                                                  2022.  Patient           



                                                  indicates he has           

 

           (unknown)  (no       (unknown)  (unknown)  I spent a total  (units   

 (unknown)



                    date)                         of [] minutes of unknown)  



                                                  critical care time           



                                                  on this patient's           



                                                  care                

 

           (unknown)  (no       (unknown)  (unknown)  Inspection:  (units    (un

known)



                    date)                         normal to unknown)  



                                                  inspection           

 

           (unknown)  (no       (unknown)  (unknown)  Medical History  (units   

 (unknown)



                    date)                         (Reviewed 22 unknown)  



                                                  @ 14:21 by Will Hansen MD)           

 

           (unknown)  (no       (unknown)  (unknown)  Meds      (units    (unkno

wn)



                    date)                                   unknown)  

 

           (unknown)  (no       (unknown)  (unknown)  Mother     (units 

   (unknown)



                    date)                           Cancer  unknown)  

 

           (unknown)  (no       (unknown)  (unknown)  Narrative:  (units    (unk

nown)



                    date)                                   unknown)  

 

           (unknown)  (no       (unknown)  (unknown)  Neck      (units    (unkno

wn)



                    date)                                   unknown)  

 

           (unknown)  (no       (unknown)  (unknown)  Neck: normal  (units    (u

nknown)



                    date)                         visual inspection unknown)  

 

           (unknown)  (no       (unknown)  (unknown)  Neuro     (units    (unkno

wn)



                    date)                                   unknown)  

 

           (unknown)  (no       (unknown)  (unknown)  No Known Drug  (units    (

unknown)



                    date)                         Allergies Allergy unknown)  



                                                   Verified 22           



                                                  09:54               

 

           (unknown)  (no       (unknown)  (unknown)  Osteoarthritis  (units    

(unknown)



                    date)                                   unknown)  

 

           (unknown)  (no       (unknown)  (unknown)  Oxygen Delivery  (units   

 (unknown)



                    date)                         Method    Room Air unknown)  

 

           (unknown)  (no       (unknown)  (unknown)  Oxygen Delivery  (units   

 (unknown)



                    date)                         Method Room Air unknown)  

 

           (unknown)  (no       (unknown)  (unknown)  Patient History  (units   

 (unknown)



                    date)                                   unknown)  

 

           (unknown)  (no       (unknown)  (unknown)  Patient:  (units    (unkno

wn)



                    date)                         Bret Esquivel unknown)  



                                                  MR#: [id number]            

 

           (unknown)  (no       (unknown)  (unknown)  Postlaminectomy  (units   

 (unknown)



                    date)                         syndrome  unknown)  

 

           (unknown)  (no       (unknown)  (unknown)  Provider:  (units    (unkn

own)



                    date)                         Will Hansen MD unknown)  

 

           (unknown)  (no       (unknown)  (unknown)  Psych     (units    (unkno

wn)



                    date)                                   unknown)  

 

           (unknown)  (no       (unknown)  (unknown)  Pulse Oximetry  (units    

(unknown)



                    date)                         100       unknown)  

 

           (unknown)  (no       (unknown)  (unknown)  Pulse Rate 65  (units    (

unknown)



                    date)                                   unknown)  

 

           (unknown)  (no       (unknown)  (unknown)  ROS: Yes All  (units    (u

nknown)



                    date)                         systems reviewed unknown)  



                                                  with the patient           



                                                  and are negative           



                                                  except as           

 

           (unknown)  (no       (unknown)  (unknown)  Rate: regular  (units    (

unknown)



                    date)                         rate      unknown)  

 

           (unknown)  (no       (unknown)  (unknown)  Resp      (units    (unkno

wn)



                    date)                                   unknown)  

 

           (unknown)  (no       (unknown)  (unknown)  Respiratory Rate  (units  

  (unknown)



                    date)                         12        unknown)  

 

           (unknown)  (no       (unknown)  (unknown)  Review of Systems  (units 

   (unknown)



                    date)                                   unknown)  

 

           (unknown)  (no       (unknown)  (unknown)  Sciatica  (units    (unkno

wn)



                    date)                                   unknown)  

 

           (unknown)  (no       (unknown)  (unknown)  Signed    (units    (unkno

wn)



                    date)                         By:<Electronically unknown)  



                                                  signed by Will Hansen MD>           

 

           (unknown)  (no       (unknown)  (unknown)  Skin      (units    (unkno

wn)



                    date)                                   unknown)  

 

           (unknown)  (no       (unknown)  (unknown)  Smoking Status  (units    

(unknown)



                    date)                         Never smoker unknown)  

 

           (unknown)  (no       (unknown)  (unknown)  Social History:  (units   

 (unknown)



                    date)                                   unknown)  

 

           (unknown)  (no       (unknown)  (unknown)  Spinal stenosis  (units   

 (unknown)



                    date)                                   unknown)  

 

           (unknown)  (no       (unknown)  (unknown)  Substance Use  (units    (

unknown)



                    date)                         Type    does not unknown)  



                                                  use                 

 

           (unknown)  (no       (unknown)  (unknown)  Surgical History  (units  

  (unknown)



                    date)                         (Reviewed 22 unknown)  



                                                  @ 14:21 by Will Hansen MD)           

 

           (unknown)  (no       (unknown)  (unknown)  Temperature 97.8  (units  

  (unknown)



                    date)                         F         unknown)  

 

           (unknown)  (no       (unknown)  (unknown)  Time Patient  (units    (u

nknown)



                    date)                         Seen: 14:19 unknown)  

 

           (unknown)  (no       (unknown)  (unknown)  Time Spent With  (units   

 (unknown)



                    date)                         Patient   unknown)  

 

           (unknown)  (no       (unknown)  (unknown)  Tobacco +  (units    (unkn

own)



                    date)                         Substance use: unknown)  

 

           (unknown)  (no       (unknown)  (unknown)  Vital Signs  (units    (un

known)



                    date)                                   unknown)  

 

           (unknown)  (no       (unknown)  (unknown)  alcohol intake  (units    

(unknown)



                    date)                         current   unknown)  

 

           (unknown)  (no       (unknown)  (unknown)  alcohol intake  (units    

(unknown)



                    date)                         frequency    a few unknown)  



                                                  times a month           

 

           (unknown)  (no       (unknown)  (unknown)  ascorbic acid  (units    (

unknown)



                    date)                         (vitamin C) 500 mg unknown)  



                                                  500 mg PO BID #60           



                                                  tabs 22  Rx           

 

           (unknown)  (no       (unknown)  (unknown)  atorvastatin 80  (units   

 (unknown)



                    date)                         mg tablet 40 mg PO unknown)  



                                                  QPM 20 History           

 

           (unknown)  (no       (unknown)  (unknown)  buspirone 5 mg  (units    

(unknown)



                    date)                         tablet 5 mg PO BID unknown)  



                                                  #60 tabs 22           



                                                  Rx                  

 

           (unknown)  (no       (unknown)  (unknown)  clopidogrel 75 mg  (units 

   (unknown)



                    date)                         tablet 75 mg PO unknown)  



                                                  DAILY 22 History           

 

           (unknown)  (no       (unknown)  (unknown)  constipation.  He  (units 

   (unknown)



                    date)                         had a very unknown)  



                                                  difficult time           



                                                  with the bowel           



                                                  prep.  (nausea           

 

           (unknown)  (no       (unknown)  (unknown)  cyanocobalamin  (units    

(unknown)



                    date)                         (vitamin B-12) 500 unknown)  



                                                  1,000 mcg PO DAILY           



                                                  #60 tabs 22           



                                                  Rx                  

 

           (unknown)  (no       (unknown)  (unknown)  ferrous sulfate  (units   

 (unknown)



                    date)                         325 mg (65 mg 325 unknown)  



                                                  mg PO BIDWM #60           



                                                  tabs 22  Rx           

 

           (unknown)  (no       (unknown)  (unknown)  gabapentin 300 mg  (units 

   (unknown)



                    date)                         capsule 300 mg PO unknown)  



                                                  BID 18 History           

 

           (unknown)  (no       (unknown)  (unknown)  glipizide 10 mg  (units   

 (unknown)



                    date)                         tablet 10 mg PO unknown)  



                                                  BID 18 History           

 

           (unknown)  (no       (unknown)  (unknown)  household members  (units 

   (unknown)



                    date)                            spouse unknown)  

 

           (unknown)  (no       (unknown)  (unknown)  iron) tablet  (units    (u

nknown)



                    date)                                   unknown)  

 

           (unknown)  (no       (unknown)  (unknown)  lives     (units    (unkno

wn)



                    date)                         independently unknown)  



                                                  Yes                 

 

           (unknown)  (no       (unknown)  (unknown)  losartan 50 mg  (units    

(unknown)



                    date)                         tablet 25 mg PO unknown)  



                                                  QPM 20 History           

 

           (unknown)  (no       (unknown)  (unknown)  mcg tablet  (units    (unk

nown)



                    date)                                   unknown)  

 

           (unknown)  (no       (unknown)  (unknown)  mcg tablet  (units    (unk

nown)



                    date)                         (Tab-A-Lucy) unknown)  

 

           (unknown)  (no       (unknown)  (unknown)  metformin 500 mg  (units  

  (unknown)



                    date)                         tablet,extended unknown)  



                                                  500 mg PO BID           



                                                  18           



                                                  History             

 

           (unknown)  (no       (unknown)  (unknown)  metoprolol  (units    (unk

nown)



                    date)                         succinate 25 mg 25 unknown)  



                                                  mg PO DAILY           



                                                  21           



                                                  History             

 

           (unknown)  (no       (unknown)  (unknown)  multivitamin with  (units 

   (unknown)



                    date)                         folic acid 400 1 unknown)  



                                                  tab PO DAILY #90           



                                                  tabs 22  Rx           

 

           (unknown)  (no       (unknown)  (unknown)  ondansetron HCl 4  (units 

   (unknown)



                    date)                         mg tablet 4 mg PO unknown)  



                                                  Q8H PRN nausea and           



                                                  21           



                                                  Rx                  

 

           (unknown)  (no       (unknown)  (unknown)  oral powder  (units    (un

known)



                    date)                         packet    unknown)  

 

           (unknown)  (no       (unknown)  (unknown)  otherwise  (units    (unkn

own)



                    date)                         documented unknown)  

 

           (unknown)  (no       (unknown)  (unknown)  personal history  (units  

  (unknown)



                    date)                         of colon cancer. unknown)  



                                                  EGD and             



                                                  colonoscopy are           



                                                  pursued today.           

 

           (unknown)  (no       (unknown)  (unknown)  polyethylene  (units    (u

nknown)



                    date)                         glycol 3350 17 unknown)  



                                                  gram 17 gm PO           



                                                  DAILY #90 ea           



                                                  22  Rx           

 

           (unknown)  (no       (unknown)  (unknown)  release 24 hr  (units    (

unknown)



                    date)                                   unknown)  

 

           (unknown)  (no       (unknown)  (unknown)  sennosides 8.6 mg  (units 

   (unknown)



                    date)                         tablet (senna) unknown)  



                                                  17.2 mg PO BEDTIME           



                                                  #60 tabs 22           



                                                  Rx                  

 

           (unknown)  (no       (unknown)  (unknown)  tablet (Vitamin  (units   

 (unknown)



                    date)                         C)        unknown)  

 

           (unknown)  (no       (unknown)  (unknown)  tablet,extended  (units   

 (unknown)



                    date)                         release 24 hr unknown)  

 

           (unknown)  (no       (unknown)  (unknown)  today; this time  (units  

  (unknown)



                    date)                         is exclusive of unknown)  



                                                  procedural time.           

 

           (unknown)  (no       (unknown)  (unknown)  trospium 20 mg  (units    

(unknown)



                    date)                         tablet 20 mg PO unknown)  



                                                  BID urinary           



                                                  retention 22 History           

 

           (unknown)  (no       (unknown)  (unknown)  vomiting).  He  (units    

(unknown)



                    date)                         estimates he has unknown)  



                                                  lost about 15 lb           



                                                  in the last 6           



                                                  months.             









                                         Result panel 6









           (unknown)  (no date)  (unknown)  (unknown)  (no value)  (units    (un

known)



                                                            unknown)  

 

           (unknown)  (no date)  (unknown)  (unknown)  Date of   (units    (unkn

own)



                                                  Service:  unknown)  



                                                  22            

 

           (unknown)  (no date)  (unknown)  (unknown)  22 1426  (units    

(unknown)



                                                            unknown)  

 

           (unknown)  (no date)  (unknown)  (unknown)  [place]    (units    (unkn

own)



                                                  [facility] [address] unknown)  



                                                  [address] [place] [address]           



                                                  [id number]               

 

           (unknown)  (no date)  (unknown)  (unknown)  Pre-operative  (units    

(unknown)



                                                  Note      unknown)  

 

           (unknown)  (no date)  (unknown)  (unknown)  (no value)  (units    (un

known)



                                                            unknown)  

 

           (unknown)  (no date)  (unknown)  (unknown)  [id number]   (units    (unkn

own)



                                                            unknown)  

 

           (unknown)  (no date)  (unknown)  (unknown)  ASA Class (for  (units   

 (unknown)



                                                  procedural unknown)  



                                                  sedation): III           

 

           (unknown)  (no date)  (unknown)  (unknown)  Age/Sex: 62 /  (units    

(unknown)



                                                  M         unknown)  

 

           (unknown)  (no date)  (unknown)  (unknown)  COVID-19  (units    (unkn

own)



                                                            unknown)  

 

           (unknown)  (no date)  (unknown)  (unknown)  COVID-19  (units    (unkn

own)



                                                  status:   unknown)  



                                                  Negative            

 

           (unknown)  (no date)  (unknown)  (unknown)  Changes to  (units    (un

known)



                                                  H+P: No   unknown)  

 

           (unknown)  (no date)  (unknown)  (unknown)  Criteria for  (units    (

unknown)



                                                  continued unknown)  



                                                  procedure:           



                                                  Possibility           



                                                  delay results           



                                                  in more complex           

 

           (unknown)  (no date)  (unknown)  (unknown)  :      (units    (unkn

own)



                                                  1960 unknown)  



                                                  Acct:[id number]           

 

           (unknown)  (no date)  (unknown)  (unknown)  History +  (units    (unk

nown)



                                                  Physical  unknown)  



                                                  reviewed/Exam           



                                                  performed by           



                                                  Physician: Yes           

 

           (unknown)  (no date)  (unknown)  (unknown)  Interval Note  (units    

(unknown)



                                                            unknown)  

 

           (unknown)  (no date)  (unknown)  (unknown)  Patient:  (units    (unkn

own)



                                                  Bret Esquivel unknown)  



                                                     MR#: [id number]           

 

           (unknown)  (no date)  (unknown)  (unknown)  Pre-operative  (units    

(unknown)



                                                  Note      unknown)  

 

           (unknown)  (no date)  (unknown)  (unknown)  Provider:  (units    (unk

nown)



                                                  Will Hansen unknown)  



                                                  MD                  

 

           (unknown)  (no date)  (unknown)  (unknown)  Result    (units    (unkn

own)



                                                  date/Date unknown)  



                                                  tested (Pos,           



                                                  Neg/Pending):           



                                                  06/10/22            

 

           (unknown)  (no date)  (unknown)  (unknown)  Signed    (units    (unkn

own)



                                                  By:<Electronica unknown)  



                                                  lly signed by           



                                                  Will Hansen MD>                 

 

           (unknown)  (no date)  (unknown)  (unknown)  future surgery  (units   

 (unknown)



                                                  or treatment unknown)  









                                         Result panel 7









           (unknown)  (no       (unknown)  (unknown)  malignancy.  (units    (un

known)



                    date)                                   unknown)  

 

           (unknown)  (no       (unknown)  (unknown)  Antral mucosa with  (units

    (unknown)



                    date)                         mild chronic unknown)  



                                                  gastritis.           

 

           (unknown)  (no       (unknown)  (unknown)  Duodenal mucosa with  (uni

ts    (unknown)



                    date)                         no diagnostic unknown)  



                                                  abnormality.           

 

           (unknown)  (no       (unknown)  (unknown)  Negative for  (units    (u

nknown)



                    date)                         Helicobacter by unknown)  



                                                  immunohistochemistry.         

  

 

           (unknown)  (no       (unknown)  (unknown)  Negative for active  (unit

s    (unknown)



                    date)                         inflammation, unknown)  



                                                  features of sprue,           



                                                  dysplasia, or           

 

           (unknown)  (no       (unknown)  (unknown)  Negative for  (units    (u

nknown)



                    date)                         dysplasia and unknown)  



                                                  malignancy.           

 

           (unknown)  (no       (unknown)  (unknown)  Negative for  (units    (u

nknown)



                    date)                         intestinal unknown)  



                                                  metaplasia.           

 

           (unknown)  (no       (unknown)  (unknown)  [address] [place] [address]  (units   

 (unknown)



                    date)                         [facility] [address], [place], unknown)  



                                                  [place]  [phone]           

 

           (unknown)  (no       (unknown)  (unknown)  [facility]  (units

    (unknown)



                    date)                         Cytology  unknown)  

 

           (unknown)  (no       (unknown)  (unknown)  MD Daniel Toweill MD  (uni

ts    (unknown)



                    date)                         Phone:  [phone] unknown)  

 

           (unknown)  (no       (unknown)  (unknown)  (no value)  (units    (unk

nown)



                    date)                                   unknown)  

 

           (unknown)  (no       (unknown)  (unknown)  [address] [place] [address]  (units  

  (unknown)



                    date)                                   unknown)  

 

           (unknown)  (no       (unknown)  (unknown)  [place] [address]  (unit

s    (unknown)



                    date)                                   unknown)  

 

           (unknown)  (no       (unknown)  (unknown)  [facility]  (units   

 (unknown)



                    date)                                   unknown)  

 

           (unknown)  (no       (unknown)  (unknown)  [facility]  (uni

ts    (unknown)



                    date)                         Report    unknown)  

 

           (unknown)  (no       (unknown)  (unknown)  Signed    (units    (unkno

wn)



                    date)                                   unknown)  

 

           (unknown)  (no       (unknown)  (unknown)  (no value)  (units    (unk

nown)



                    date)                                   unknown)  

 

           (unknown)  (no       (unknown)  (unknown)  * This test was  (units   

 (unknown)



                    date)                         developed and its unknown)  



                                                  performance           



                                                  characteristics           



                                                  determined           

 

           (unknown)  (no       (unknown)  (unknown)  ********************  (uni

ts    (unknown)



                    date)                         ********************* unknown)

  



                                                  *********************         

  



                                                  ********            

 

           (unknown)  (no       (unknown)  (unknown)  ***Performed at:  01  (uni

ts    (unknown)



                    date)                                   unknown)  

 

           (unknown)  (no       (unknown)  (unknown)  .    01   (units    (unkno

wn)



                    date)                                   unknown)  

 

           (unknown)  (no       (unknown)  (unknown)  .         (units    (unkno

wn)



                    date)                                   unknown)  

 

           (unknown)  (no       (unknown)  (unknown)  /CPE  2022  (units  

  (unknown)



                    date)                         0558 Local unknown)  

 

           (unknown)  (no       (unknown)  (unknown)  0.3 x 0.2 x 0.1 cm  (units

    (unknown)



                    date)                         submitted entirely in unknown)

  



                                                  1 cassette(s)           

 

           (unknown)  (no       (unknown)  (unknown)  0.4 x 0.2 x 0.2 cm  (units

    (unknown)



                    date)                         to 0.1 x 0.1 x 0.1 cm unknown)

  



                                                  submitted entirely in         

  



                                                  1                   

 

           (unknown)  (no       (unknown)  (unknown)  [id number], [id number],  (units   

 (unknown)



                    date)                         [id number]    unknown)  

 

           (unknown)  (no       (unknown)  (unknown)  A. Duodenum, Biopsy:  (uni

ts    (unknown)



                    date)                                   unknown)  

 

           (unknown)  (no       (unknown)  (unknown)  Administration. The  (unit

s    (unknown)



                    date)                         FDA has determined unknown)  



                                                  that such clearance           



                                                  or approval is           

 

           (unknown)  (no       (unknown)  (unknown)  B.  An    (units    (unkno

wn)



                    date)                         immunohistochemical unknown)  



                                                  stain was performed           



                                                  to evaluate for           

 

           (unknown)  (no       (unknown)  (unknown)  B. Stomach, Antrum,  (unit

s    (unknown)



                    date)                         Biopsy:   unknown)  

 

           (unknown)  (no       (unknown)  (unknown)  CPT    .  (units    (unkno

wn)



                    date)                                   unknown)  

 

           (unknown)  (no       (unknown)  (unknown)  Diagnosis:  (units    (unk

nown)



                    date)                                   unknown)  

 

           (unknown)  (no       (unknown)  (unknown)  Electronically  (units    

(unknown)



                    date)                         signed:    . unknown)  

 

           (unknown)  (no       (unknown)  (unknown)  Gross description:  (units

    (unknown)



                    date)                          .        unknown)  

 

           (unknown)  (no       (unknown)  (unknown)  Helicobacter  (units    (u

nknown)



                    date)                         organisms and is unknown)  



                                                  negative.  The           



                                                  control stain showed          

 

 

           (unknown)  (no       (unknown)  (unknown)  Kitty Smart,  (uni

ts    (unknown)



                    date)                         MD, Pathologist unknown)  

 

           (unknown)  (no       (unknown)  (unknown)  [facility] Accession  (units    (

unknown)



                    date)                         Number: [id number] unknown)  

 

           (unknown)  (no       (unknown)  (unknown)  MRV  2022  (units   

 (unknown)



                    date)                         1617 Local unknown)  

 

           (unknown)  (no       (unknown)  (unknown)  Material submitted:  (unit

s    (unknown)



                    date)                           .       unknown)  

 

           (unknown)  (no       (unknown)  (unknown)  Microscopic:    .  (units 

   (unknown)



                    date)                                   unknown)  

 

           (unknown)  (no       (unknown)  (unknown)  NPI- [id number]  (units   

 (unknown)



                    date)                                   unknown)  

 

           (unknown)  (no       (unknown)  (unknown)  PART A: duodenum -  (units

    (unknown)



                    date)                         DUODENUM  unknown)  

 

           (unknown)  (no       (unknown)  (unknown)  PART B: stomach -  (units 

   (unknown)



                    date)                         ANTRUM    unknown)  

 

           (unknown)  (no       (unknown)  (unknown)  Part A: DUODENUM:  (units 

   (unknown)



                    date)                                   unknown)  

 

           (unknown)  (no       (unknown)  (unknown)  Part B: ANTRUM:  (units   

 (unknown)



                    date)                                   unknown)  

 

           (unknown)  (no       (unknown)  (unknown)  Pathologist provided  (uni

ts    (unknown)



                    date)                         ICD-10:   unknown)  

 

           (unknown)  (no       (unknown)  (unknown)  R19.7     (units    (unkno

wn)



                    date)                                   unknown)  

 

           (unknown)  (no       (unknown)  (unknown)  Received in formalin  (uni

ts    (unknown)



                    date)                         are 3 fragment(s) of unknown) 

 



                                                  tan, soft tissue           



                                                  measuring           

 

           (unknown)  (no       (unknown)  (unknown)  Received in formalin  (uni

ts    (unknown)



                    date)                         is 1 fragment(s) of unknown)  



                                                  tan, soft tissue           



                                                  measuring           

 

           (unknown)  (no       (unknown)  (unknown)  Specimen Comment: A  (unit

s    (unknown)



                    date)                         courtesy copy of this unknown)

  



                                                  report has been sent          

 



                                                  to [phone]           

 

           (unknown)  (no       (unknown)  (unknown)  appropriate  (units    (un

known)



                    date)                         reactivity. unknown)  

 

           (unknown)  (no       (unknown)  (unknown)  by [facility]. It has  (units

    (unknown)



                    date)                         not been cleared or unknown)  



                                                  approved by the [place]          

 



                                                  Food and Drug           

 

           (unknown)  (no       (unknown)  (unknown)  cassette(s)  (units    (un

known)



                    date)                                   unknown)  

 

           (unknown)  (no       (unknown)  (unknown)  not necessary. This  (unit

s    (unknown)



                    date)                         test is used for unknown)  



                                                  clinical purposes. It         

  



                                                  should not be           

 

           (unknown)  (no       (unknown)  (unknown)  regarded as  (units    (un

known)



                    date)                         investigational or unknown)  



                                                  for research.           

 

           (unknown)  (no       (unknown)  (unknown)  Collection Date:  (units  

  (unknown)



                    date)                         22  unknown)  

 

           (unknown)  (no       (unknown)  (unknown)  DD/DT: 22 0000  (uni

ts    (unknown)



                    date)                                   unknown)  

 

           (unknown)  (no       (unknown)  (unknown)  Date of Birth:  (units    

(unknown)



                    date)                         1960    Admit unknown)  



                                                  Date: 22           

 

           (unknown)  (no       (unknown)  (unknown)  Dictated By:  (units    (u

nknown)



                    date)                         Kitty Smart MD unknown) 

 

 

           (unknown)  (no       (unknown)  (unknown)  MRN: [id number]  (units   

 (unknown)



                    date)                         Dictating Dr: unknown)  



                                                  Kitty Smart MD          

 

 

           (unknown)  (no       (unknown)  (unknown)  Ordering Physician:  (unit

s    (unknown)



                    date)                         Will Hansen MD unknown)  

 

           (unknown)  (no       (unknown)  (unknown)  Patient name:  (units    (

unknown)



                    date)                         rBet Esquivel unknown)  



                                                  Account#: [id number]          

 

 

           (unknown)  (no       (unknown)  (unknown)  Signed By:  (units    (unk

nown)



                    date)                         22 unknown)  

 

           (unknown)  (no       (unknown)  (unknown)  TD/TT: 22  (uni

ts    (unknown)



                    date)                                   unknown)  









                                         Result panel 8









           (unknown)  (no       (unknown)  (unknown)  (no value)  (units    (unk

nown)



                    date)                                   unknown)  

 

           (unknown)  (no       (unknown)  (unknown)  Date of Service:  (units  

  (unknown)



                    date)                         22  unknown)  

 

           (unknown)  (no       (unknown)  (unknown)  22 1459  (units    (

unknown)



                    date)                                   unknown)  

 

           (unknown)  (no       (unknown)  (unknown)  EGD +     (units    (unkno

wn)



                    date)                         Colonoscopy Note unknown)  

 

           (unknown)  (no       (unknown)  (unknown)  [facility]  (units   

 (unknown)



                    date)                         [address] unknown)  



                                                  [place], [place]           



                                                  [address]               

 

           (unknown)  (no       (unknown)  (unknown)  (no value)  (units    (unk

nown)



                    date)                                   unknown)  

 

           (unknown)  (no       (unknown)  (unknown)  [id number]   (units    (unkno

wn)



                    date)                                   unknown)  

 

           (unknown)  (no       (unknown)  (unknown)  1. Await  (units    (unkno

wn)



                    date)                         histopathology. unknown)  



                                                  2. Continue           



                                                  anti-reflux           



                                                  therapy with           



                                                  over-the-counter           

 

           (unknown)  (no       (unknown)  (unknown)  1. Duodenum:  (units    (u

nknown)



                    date)                         This was visually unknown)  



                                                  unremarkable from           



                                                  the bulb through           



                                                  the 2nd             

 

           (unknown)  (no       (unknown)  (unknown)  1. Mild   (units    (unkno

wn)



                    date)                         gastropathy unknown)  

 

           (unknown)  (no       (unknown)  (unknown)  2. Esophagitis  (units    

(unknown)



                    date)                                   unknown)  

 

           (unknown)  (no       (unknown)  (unknown)  2. Stomach:  No  (units   

 (unknown)



                    date)                         outlet    unknown)  



                                                  obstruction no           



                                                  ulcers no mass           



                                                  lesions.  No           



                                                  retained            

 

           (unknown)  (no       (unknown)  (unknown)  3. Esophagus:  (units    (

unknown)



                    date)                         The       unknown)  



                                                  squamocolumnar           



                                                  junction            



                                                  correlated with           



                                                  the top of the           

 

           (unknown)  (no       (unknown)  (unknown)  3. Patent  (units    (unkn

own)



                    date)                         rectosigmoid unknown)  



                                                  colonic             



                                                  anastomosis           

 

           (unknown)  (no       (unknown)  (unknown)  30 cm after the  (units   

 (unknown)



                    date)                         anastomosis. unknown)  

 

           (unknown)  (no       (unknown)  (unknown)  4. Colon:  There  (units  

  (unknown)



                    date)                         was a side to end unknown)  



                                                  anastomosis at           



                                                  approximately 12           



                                                  cm from the           

 

           (unknown)  (no       (unknown)  (unknown)  4. Suboptimal  (units    (

unknown)



                    date)                         bowel prep unknown)  

 

           (unknown)  (no       (unknown)  (unknown)  Adult     (units    (o

wn)



                    date)                         colonoscope unknown)  

 

           (unknown)  (no       (unknown)  (unknown) After the risks  (units    

(unknown)



                    date)                         and benefits were unknown)  



                                                  explained,           



                                                  written and           



                                                  verbal informed           



                                                  consent             

 

           (unknown)  (no       (unknown)  (unknown)  Age/Sex: 62 / M  (units   

 (unknown)



                    date)                                   unknown)  

 

           (unknown)  (no       (unknown)  (unknown)  Bowel prep poor  (units   

 (unknown)



                    date)                                   unknown)  

 

           (unknown)  (no       (unknown)  (unknown)  Complications:  (units    

(unknown)



                    date)                         none      unknown)  

 

           (unknown)  (no       (unknown)  (unknown)  : 1960  (units   

 (unknown)



                    date)                           Acct:[id number] unknown)  

 

           (unknown)  (no       (unknown)  (unknown)  Date of   (units    (o

wn)



                    date)                         procedure: unknown)  



                                                  20            

 

           (unknown)  (no       (unknown)  (unknown)  Disposition:  (units    (u

nknown)



                    date)                         PACU      unknown)  

 

           (unknown)  (no       (unknown)  (unknown)  EGD with  (units    (o

wn)



                    date)                         biopsies and a unknown)  



                                                  flexible            



                                                  sigmoidoscopy           



                                                  (aborted            



                                                  colonoscopy)           

 

           (unknown)  (no       (unknown)  (unknown)  Endoscopic  (units    (unk

nown)



                    date)                         diagnosis unknown)  

 

           (unknown)  (no       (unknown)  (unknown)  Impression:  (units    (un

known)



                    date)                                   unknown)  

 

           (unknown)  (no       (unknown)  (unknown)  Indications:  (units    (u

nknown)



                    date)                                   unknown)  

 

           (unknown)  (no       (unknown)  (unknown)  Nausea vomiting  (units   

 (unknown)



                    date)                         hematemesis unknown)  



                                                  (2022)           



                                                  iron deficiency           



                                                  anemia altered           



                                                  bowel               

 

           (unknown)  (no       (unknown)  (unknown)  Operative  (units    (unkn

own)



                    date)                         Date/Time/Diagnos unknown)  



                                                  es                  

 

           (unknown)  (no       (unknown)  (unknown)  Patient:  (units    (unkno

wn)



                    date)                         EsquivelBret R unknown)  



                                                   MR#: [id number]           

 

           (unknown)  (no       (unknown)  (unknown)  Plan for  (units    (unkno

wn)



                    date)                         aftercare: unknown)  

 

           (unknown)  (no       (unknown)  (unknown)  Post-op   (units    (unkno

wn)



                    date)                         diagnosis: same unknown)  

 

           (unknown)  (no       (unknown)  (unknown)  Post-procedure  (units    

(unknown)



                    date)                                   unknown)  

 

           (unknown)  (no       (unknown)  (unknown)  Pre-op    (units    (unkno

wn)



                    date)                         diagnosis: Nausea unknown)  



                                                  vomiting            



                                                  hematemesis           



                                                  (2022)           



                                                  iron deficiency           

 

           (unknown)  (no       (unknown)  (unknown)  Procedure +  (units    (un

known)



                    date)                         Clinicians unknown)  

 

           (unknown)  (no       (unknown)  (unknown)  Procedure Notes  (units   

 (unknown)



                    date)                                   unknown)  

 

           (unknown)  (no       (unknown)  (unknown)  Procedure in  (units    (u

nknown)



                    date)                         detail:   unknown)  

 

           (unknown)  (no       (unknown)  (unknown)  Provider:  (units    (unkn

own)



                    date)                         Will Hansen MD unknown)  

 

           (unknown)  (no       (unknown)  (unknown)  SCOAP/Timeout:  (units    

(unknown)



                    date)                         Done      unknown)  

 

           (unknown)  (no       (unknown)  (unknown)  Same procedure  (units    

(unknown)



                    date)                         as scheduled: No unknown)  

 

           (unknown)  (no       (unknown)  (unknown)  Scope withdrawal  (units  

  (unknown)



                    date)                         time: Not unknown)  



                                                  applicable           

 

           (unknown)  (no       (unknown)  (unknown)  Sedation  (units    (unkno

wn)



                    date)                         minutes: 19 unknown)  

 

           (unknown)  (no       (unknown)  (unknown)  Signed    (units    (unkno

wn)



                    date)                         By:<Electronicall unknown)  



                                                  y signed by           



                                                  Will Hansen MD>                 

 

           (unknown)  (no       (unknown)  (unknown)  Study performed:  (units  

  (unknown)



                    date)                                   unknown)  

 

           (unknown)  (no       (unknown)  (unknown)  Surgeon: Will  (units   

 (unknown)



                    date)                         [place]   unknown)  

 

           (unknown)  (no       (unknown)  (unknown)  The patient was  (units   

 (unknown)



                    date)                         then turned unknown)  



                                                  around a digital           



                                                  rectal              



                                                  examination was           

 

           (unknown)  (no       (unknown)  (unknown)  Time of   (units    (unkno

wn)



                    date)                         procedure: 14:51 unknown)  

 

           (unknown)  (no       (unknown)  (unknown)  accomplished.  (units    (

unknown)



                    date)                         The scope was unknown)  



                                                  introduced into           



                                                  the rectum and           



                                                  advanced past the           

 

           (unknown)  (no       (unknown)  (unknown)  acquired for  (units    (u

nknown)



                    date)                         exclusion of unknown)  



                                                  sprue.              

 

           (unknown)  (no       (unknown)  (unknown) adequate mucosal  (units   

 (unknown)



                    date)                         exam.  I  unknown)  



                                                  abandoned the           



                                                  navigation to           



                                                  cecum by perhaps           



                                                  about 20-           

 

           (unknown)  (no       (unknown)  (unknown)  anal verge.  (units    (un

known)



                    date)                         This was widely unknown)  



                                                  patent and did           



                                                  not demonstrate           



                                                  any obvious           

 

           (unknown)  (no       (unknown)  (unknown)  anemia altered  (units    

(unknown)



                    date)                         bowel habit unknown)  



                                                  personal history           



                                                  of colon cancer           

 

           (unknown)  (no       (unknown)  (unknown)  attempt at  (units    (unk

nown)



                    date)                         colonoscopy unknown)  



                                                  following a 2 day           



                                                  prep in the next           



                                                  month or so.  5.           



                                                  I                   

 

           (unknown)  (no       (unknown)  (unknown)  bezoar.  Minimal  (units  

  (unknown)



                    date)                         gastropathy was unknown)  



                                                  noted.  Random           



                                                  antral biopsy was           



                                                  acquired for           

 

           (unknown)  (no       (unknown)  (unknown)  block and  (units    (unkn

own)



                    date)                         advanced under unknown)  



                                                  direct              



                                                  visualization to           



                                                  the 2nd portion           



                                                  of the              

 

           (unknown)  (no       (unknown)  (unknown)  decompressed  (units    (u

nknown)



                    date)                         scope removed unknown)  



                                                  from the patient           



                                                  who tolerated the           



                                                  procedure well.           

 

           (unknown)  (no       (unknown)  (unknown)  defects or  (units    (unk

nown)



                    date)                         lesions.  unknown)  



                                                  Retroflexed views           



                                                  were accomplished           



                                                  in the stomach.           



                                                  The                 

 

           (unknown)  (no       (unknown)  (unknown) duodenum.  The  (units    (

unknown)



                    date)                         scope was slowly unknown)  



                                                  withdrawn           



                                                  carefully           



                                                  examining the           



                                                  mucosa for any           

 

           (unknown)  (no       (unknown)  (unknown)  esophagus there  (units   

 (unknown)



                    date)                         was streaky areas unknown)  



                                                  of erythema           



                                                  consistent with           



                                                  subtle erosion.           

 

           (unknown)  (no       (unknown)  (unknown) exclusion of  (units    (un

known)



                    date)                         Helicobacter or unknown)  



                                                  other pathology.           



                                                  Retroflexed views           



                                                  of the LES were           

 

           (unknown)  (no       (unknown)  (unknown)  gastric folds.  (units    

(unknown)



                    date)                         GEJ was at unknown)  



                                                  approximately 41           



                                                  cm from the           



                                                  incisors.  At the           



                                                  level               

 

           (unknown)  (no       (unknown)  (unknown)  habit personal  (units    

(unknown)



                    date)                         history of colon unknown)  



                                                  cancer              

 

           (unknown)  (no       (unknown)  (unknown)  irrigation we  (units    (

unknown)



                    date)                         would not have unknown)  



                                                  been able to           



                                                  sufficiently           



                                                  cleanse the colon           



                                                  for an              

 

           (unknown)  (no       (unknown)  (unknown)  mucosal   (units    (unkno

wn)



                    date)                         visualization unknown)  



                                                  impossible in           



                                                  most areas.  The           



                                                  colon was           



                                                  therefore           

 

           (unknown)  (no       (unknown)  (unknown)  neoplasia.  The  (units   

 (unknown)



                    date)                         prep was  unknown)  



                                                  suboptimal as           



                                                  described above           



                                                  and even with           



                                                  copious             

 

           (unknown)  (no       (unknown)  (unknown)  of GEJ there was  (units  

  (unknown)



                    date)                         no evidence of unknown)  



                                                  any esophagitis.           



                                                  However in the           



                                                  mid to distal           

 

           (unknown)  (no       (unknown)  (unknown)  omeprazole 3.  (units    (

unknown)



                    date)                         Okay to resume unknown)  



                                                  Plavix in the           



                                                  next couple of           



                                                  days.  4.  Repeat           

 

           (unknown)  (no       (unknown)  (unknown)  portion.  In the  (units  

  (unknown)



                    date)                         context of an unknown)  



                                                  iron deficiency           



                                                  anemia, random D2           



                                                  biopsies were           

 

           (unknown)  (no       (unknown)  (unknown)  recommend a  (units    (un

known)



                    date)                         daily dose of unknown)  



                                                  MiraLax 17 g           



                                                  moving forward.           

 

           (unknown)  (no       (unknown)  (unknown)  rectosigmoid  (units    (u

nknown)



                    date)                         anastomosis into unknown)  



                                                  the left colon.           



                                                  The prep was           



                                                  inadequate.           



                                                  There               

 

           (unknown)  (no       (unknown)  (unknown)  related diarrhea  (units  

  (unknown)



                    date)                         from fecal unknown)  



                                                  loading and           



                                                  obstipation.  I           



                                                  therefore would           

 

           (unknown)  (no       (unknown)  (unknown)  sedation  (units    (unkno

wn)



                    date)                         details.  The unknown)  



                                                  scope was           



                                                  introduced into           



                                                  the mouth through           



                                                  the bite            

 

           (unknown)  (no       (unknown)  (unknown)  stomach was  (units    (un

known)



                    date)                         decompressed, the unknown)  



                                                  scope was then           



                                                  removed from the           



                                                  patient who           

 

           (unknown)  (no       (unknown)  (unknown)  suspect the  (units    (un

known)



                    date)                         patient's altered unknown)  



                                                  bowel habit is           



                                                  likely a function           



                                                  of overflow           

 

           (unknown)  (no       (unknown)  (unknown)  the left lateral  (units  

  (unknown)



                    date)                         decubitus unknown)  



                                                  position.  Please           



                                                  see nurse           



                                                  anesthetist notes           



                                                  for                 

 

           (unknown)  (no       (unknown)  (unknown)  tolerated the  (units    (

unknown)



                    date)                         procedure well. unknown)  

 

           (unknown)  (no       (unknown)  (unknown)  unremarkable.  (units    (

unknown)



                    date)                                   unknown)  

 

           (unknown)  (no       (unknown)  (unknown)  was copious  (units    (un

known)



                    date)                         amounts of green unknown)  



                                                  formed stool and           



                                                  liquid debris           



                                                  that rendered           

 

           (unknown)  (no       (unknown)  (unknown)  was obtained.  (units    (

unknown)



                    date)                         The patient was unknown)  



                                                  brought into the           



                                                  procedure room           



                                                  and placed into           









                                         Result panel 9









           (unknown)  (no       (unknown)  (unknown)  (no value)  (units    (unk

nown)



                    date)                                   unknown)  

 

           (unknown)  (no       (unknown)  (unknown)  [address] [place] [address]  (units  

  (unknown)



                    date)                                   unknown)  

 

           (unknown)  (no       (unknown)  (unknown)  [place]  (units    (

unknown)



                    date)                         [id number]     unknown)  

 

           (unknown)  (no       (unknown)  (unknown)  [facility]  (units   

 (unknown)



                    date)                                   unknown)  

 

           (unknown)  (no       (unknown)  (unknown)  Signed    (units    (unkno

wn)



                    date)                                   unknown)  

 

           (unknown)  (no       (unknown)  (unknown)  XRay Report  (units    (un

known)



                    date)                                   unknown)  

 

           (unknown)  (no       (unknown)  (unknown)  (no value)  (units    (unk

nown)



                    date)                                   unknown)  

 

           (unknown)  (no       (unknown)  (unknown)  22  (units    (unkno

wn)



                    date)                                   unknown)  

 

           (unknown)  (no       (unknown)  (unknown)  Approved by: Garfield  (units 

   (unknown)



                    date)                         SHANNAN Lopez on unknown)  



                                                  2022 at 18:02           

 

           (unknown)  (no       (unknown)  (unknown)  Bones and chest  (units   

 (unknown)



                    date)                         wall:  No unknown)  



                                                  suspicious bony           



                                                  lesions.            



                                                  Overlying soft           



                                                  tissues             

 

           (unknown)  (no       (unknown)  (unknown)  COMPARISON:  (units    (un

known)



                    date)                         [facility], unknown)  



                                                  CR, XR CHEST 2V,           



                                                  3/18/2021, 10:19.           

 

           (unknown)  (no       (unknown)  (unknown)  FINDINGS:  (units    (unkn

own)



                    date)                                   unknown)  

 

           (unknown)  (no       (unknown)  (unknown)  IMPRESSION:  No  (units   

 (unknown)



                    date)                         acute     unknown)  



                                                  cardiopulmonary           



                                                  findings            

 

           (unknown)  (no       (unknown)  (unknown)  INDICATIONS:  (units    (u

nknown)



                    date)                         chest pain unknown)  

 

           (unknown)  (no       (unknown)  (unknown)  Lungs and pleura:  (units 

   (unknown)



                    date)                          Lungs are clear. unknown)  



                                                  No pleural           



                                                  effusions or           



                                                  pneumothorax.  Low           

 

           (unknown)  (no       (unknown)  (unknown)  Mediastinum:  (units    (u

nknown)



                    date)                         Mediastinal unknown)  



                                                  contours appear           



                                                  normal.  Heart           



                                                  size is normal.           

 

           (unknown)  (no       (unknown)  (unknown)  Surgical changes  (units  

  (unknown)



                    date)                         and devices: unknown)  



                                                  None.               

 

           (unknown)  (no       (unknown)  (unknown)  TECHNIQUE:  One  (units   

 (unknown)



                    date)                         view of the chest unknown)  



                                                  was acquired.           

 

           (unknown)  (no       (unknown)  (unknown)  unremarkable.  (units    (

unknown)



                    date)                                   unknown)  

 

           (unknown)  (no       (unknown)  (unknown)  volumes   (units    (unkno

wn)



                    date)                         accentuate unknown)  



                                                  pulmonary           



                                                  interstitium and           



                                                  heart size.           

 

           (unknown)  (no       (unknown)  (unknown)  [id number]  (units    (unkno

wn)



                    date)                                   unknown)  

 

           (unknown)  (no       (unknown)  (unknown)  Accession Number:  (units 

   (unknown)



                    date)                         [id number] unknown)  

 

           (unknown)  (no       (unknown)  (unknown)  Age/Sex: 62 / M  (units   

 (unknown)



                    date)                          Date of Service: unknown)  

 

           (unknown)  (no       (unknown)  (unknown)  : 1960  (units   

 (unknown)



                    date)                          Acct:[id number] unknown)  

 

           (unknown)  (no       (unknown)  (unknown)  Loc: ED   (units    (unkno

wn)



                    date)                                   unknown)  

 

           (unknown)  (no       (unknown)  (unknown)  Ordering  (units    (unkno

wn)



                    date)                         Provider: unknown)  



                                                  Hue Dubose D.O.                

 

           (unknown)  (no       (unknown)  (unknown)  PROCEDURE:  XR  (units    

(unknown)



                    date)                         CHEST 1V  unknown)  

 

           (unknown)  (no       (unknown)  (unknown)  Patient:  (units    (unkno

wn)



                    date)                         Bret Esquivel R unknown)  



                                                  MR#: [id number]             

 

           (unknown)  (no       (unknown)  (unknown)  Procedure: XR  (units    (

unknown)



                    date)                         chest 1V  unknown)  

 

           (unknown)  (no       (unknown)  (unknown)  appear    (units    (unkno

wn)



                    date)                                   unknown)  

 

           (unknown)  (no       (unknown)  (unknown)  lung      (units    (unkno

wn)



                    date)                                   unknown)  









                                         Result panel 10









           (unknown)  (no date)  (unknown)  (unknown)  0         /uL       (unkn

own)

 

           (unknown)  (no date)  (unknown)  (unknown)  0.2       %         (unkn

own)

 

           (unknown)  (no date)  (unknown)  (unknown)  0.8       %         (unkn

own)

 

           (unknown)  (no date)  (unknown)  (unknown)  100       /uL       (unkn

own)

 

           (unknown)  (no date)  (unknown)  (unknown)  16.1      %         (unkn

own)

 

           (unknown)  (no date)  (unknown)  (unknown)  219       X10 3/uL  (unkn

own)

 

           (unknown)  (no date)  (unknown)  (unknown)  26.7      %         (unkn

own)

 

           (unknown)  (no date)  (unknown)  (unknown)  26.9      PG        (unkn

own)

 

           (unknown)  (no date)  (unknown)  (unknown)  3.37      X10 6/uL  (unkn

own)

 

           (unknown)  (no date)  (unknown)  (unknown)  34.0      %         (unkn

own)

 

           (unknown)  (no date)  (unknown)  (unknown)  400       /uL       (unkn

own)

 

           (unknown)  (no date)  (unknown)  (unknown)  5900      /uL       (unkn

own)

 

           (unknown)  (no date)  (unknown)  (unknown)  6.4       %         (unkn

own)

 

           (unknown)  (no date)  (unknown)  (unknown)  600       /uL       (unkn

own)

 

           (unknown)  (no date)  (unknown)  (unknown)  7.0       X10 3/uL  (unkn

own)

 

           (unknown)  (no date)  (unknown)  (unknown)  7.9       %         (unkn

own)

 

           (unknown)  (no date)  (unknown)  (unknown)  79.1      fL        (unkn

own)

 

           (unknown)  (no date)  (unknown)  (unknown)  84.7      %         (unkn

own)

 

           (unknown)  (no date)  (unknown)  (unknown)  9.1       g/dL      (unkn

own)









                                         Result panel 11









           (unknown)  (no date)  (unknown)  (unknown)  0.6       mg/dL     (unkn

own)

 

           (unknown)  (no date)  (unknown)  (unknown)  1.0       mg/dL     (unkn

own)

 

           (unknown)  (no date)  (unknown)  (unknown)  1.1       (units    (unkn

own)



                                                            unknown)  

 

           (unknown)  (no date)  (unknown)  (unknown)  1.42      mg/dL     (unkn

own)

 

           (unknown)  (no date)  (unknown)  (unknown)  105       U/L       (unkn

own)

 

           (unknown)  (no date)  (unknown)  (unknown)  135       mmol/L    (unkn

own)

 

           (unknown)  (no date)  (unknown)  (unknown)  14.8      (units    (unkn

own)



                                                            unknown)  

 

           (unknown)  (no date)  (unknown)  (unknown)  16        IU/L      (unkn

own)

 

           (unknown)  (no date)  (unknown)  (unknown)  19        IU/L      (unkn

own)

 

           (unknown)  (no date)  (unknown)  (unknown)  21        mg/dL     (unkn

own)

 

           (unknown)  (no date)  (unknown)  (unknown)  262       mg/dL     (unkn

own)

 

           (unknown)  (no date)  (unknown)  (unknown)  29        mmol/L    (unkn

own)

 

           (unknown)  (no date)  (unknown)  (unknown)  3.6       g/dL      (unkn

own)

 

           (unknown)  (no date)  (unknown)  (unknown)  3.7       mmol/L    (unkn

own)

 

           (unknown)  (no date)  (unknown)  (unknown)  3.9       g/dL      (unkn

own)

 

           (unknown)  (no date)  (unknown)  (unknown)  32        U/L       (unkn

own)

 

           (unknown)  (no date)  (unknown)  (unknown)  54        U/L       (unkn

own)

 

           (unknown)  (no date)  (unknown)  (unknown)  55        mL/min    (unkn

own)

 

           (unknown)  (no date)  (unknown)  (unknown)  7.5       g/dL      (unkn

own)

 

           (unknown)  (no date)  (unknown)  (unknown)  9.3       mg/dL     (unkn

own)

 

           (unknown)  (no date)  (unknown)  (unknown)  96        mmol/L    (unkn

own)

 

           (unknown)  (no date)  (unknown)  (unknown)  Test not  %         (unkn

own)



                                                  performed           

 

           (unknown)  (no date)  (unknown)  (unknown)  Test not  ng/mL     (unkn

own)



                                                  performed           









                                         Result panel 12









           (unknown)  (no date)  (unknown)  (unknown)  < 0.012   ng/mL     (unkn

own)

 

           (unknown)  (no date)  (unknown)  (unknown)  0.6       mg/dL     (unkn

own)

 

           (unknown)  (no date)  (unknown)  (unknown)  1.0       mg/dL     (unkn

own)

 

           (unknown)  (no date)  (unknown)  (unknown)  1.1       (units    (unkn

own)



                                                            unknown)  

 

           (unknown)  (no date)  (unknown)  (unknown)  1.42      mg/dL     (unkn

own)

 

           (unknown)  (no date)  (unknown)  (unknown)  105       U/L       (unkn

own)

 

           (unknown)  (no date)  (unknown)  (unknown)  135       mmol/L    (unkn

own)

 

           (unknown)  (no date)  (unknown)  (unknown)  14.8      (units    (unkn

own)



                                                            unknown)  

 

           (unknown)  (no date)  (unknown)  (unknown)  16        IU/L      (unkn

own)

 

           (unknown)  (no date)  (unknown)  (unknown)  19        IU/L      (unkn

own)

 

           (unknown)  (no date)  (unknown)  (unknown)  21        mg/dL     (unkn

own)

 

           (unknown)  (no date)  (unknown)  (unknown)  262       mg/dL     (unkn

own)

 

           (unknown)  (no date)  (unknown)  (unknown)  29        mmol/L    (unkn

own)

 

           (unknown)  (no date)  (unknown)  (unknown)  3.6       g/dL      (unkn

own)

 

           (unknown)  (no date)  (unknown)  (unknown)  3.7       mmol/L    (unkn

own)

 

           (unknown)  (no date)  (unknown)  (unknown)  3.9       g/dL      (unkn

own)

 

           (unknown)  (no date)  (unknown)  (unknown)  32        U/L       (unkn

own)

 

           (unknown)  (no date)  (unknown)  (unknown)  54        U/L       (unkn

own)

 

           (unknown)  (no date)  (unknown)  (unknown)  55        mL/min    (unkn

own)

 

           (unknown)  (no date)  (unknown)  (unknown)  7.5       g/dL      (unkn

own)

 

           (unknown)  (no date)  (unknown)  (unknown)  9.3       mg/dL     (unkn

own)

 

           (unknown)  (no date)  (unknown)  (unknown)  96        mmol/L    (unkn

own)

 

           (unknown)  (no date)  (unknown)  (unknown)  Test not  %         (unkn

own)



                                                  performed           

 

           (unknown)  (no date)  (unknown)  (unknown)  Test not  ng/mL     (unkn

own)



                                                  performed           









                                         Result panel 13









           (unknown)  (no date)  (unknown)  (unknown)  Negative  (units    (unkn

own)



                                                            unknown)  









                                         Result panel 14









           (unknown)  (no       (unknown)  (unknown)  (no value)  (units    (unk

nown)



                    date)                                   unknown)  

 

           (unknown)  (no       (unknown)  (unknown)  (no value)  (units    (unk

nown)



                    date)                                   unknown)  

 

           (unknown)  (no       (unknown)  (unknown)  [address] [place] [address]  (units  

  (unknown)



                    date)                                   unknown)  

 

           (unknown)  (no       (unknown)  (unknown)  [place]  (units    (

unknown)



                    date)                         [id number]     unknown)  

 

           (unknown)  (no       (unknown)  (unknown)  CT Scan Report  (units    

(unknown)



                    date)                                   unknown)  

 

           (unknown)  (no       (unknown)  (unknown)  [facility]  (units   

 (unknown)



                    date)                                   unknown)  

 

           (unknown)  (no       (unknown)  (unknown)  Signed    (units    (unkno

wn)



                    date)                                   unknown)  

 

           (unknown)  (no       (unknown)  (unknown)  (no value)  (units    (unk

nown)



                    date)                                   unknown)  

 

           (unknown)  (no       (unknown)  (unknown)  22  (units    (unkno

wn)



                    date)                                   unknown)  

 

           (unknown)  (no       (unknown)  (unknown)  1. Bilateral  (units    (u

nknown)



                    date)                         heterogeneous unknown)  



                                                  enhancement of the           



                                                  kidneys with           



                                                  perinephric           

 

           (unknown)  (no       (unknown)  (unknown)  2. Multiple short  (units 

   (unknown)



                    date)                         segments of small unknown)  



                                                  bowel               



                                                  intussusception in           



                                                  the left upper           

 

           (unknown)  (no       (unknown)  (unknown)  3. Mild fluid  (units    (

unknown)



                    date)                         distention of the unknown)  



                                                  visualized distal           



                                                  esophagus is           



                                                  nonspecific and           

 

           (unknown)  (no       (unknown)  (unknown)  4.  Gas   (units    (unkno

wn)



                    date)                         demonstrated within unknown)  



                                                  the bladder which           



                                                  may may reflect           



                                                  sequelae of           

 

           (unknown)  (no       (unknown)  (unknown)  ABDOMEN:  (units    (unkno

wn)



                    date)                                   unknown)  

 

           (unknown)  (no       (unknown)  (unknown)  Abdominal Nodes:  (units  

  (unknown)



                    date)                         No retroperitoneal unknown)  



                                                  or mesenteric           



                                                  adenopathy by size           



                                                  criteria.           

 

           (unknown)  (no       (unknown)  (unknown)  Adrenal Glands:  (units   

 (unknown)



                    date)                         No adrenal nodules. unknown)  

 

           (unknown)  (no       (unknown)  (unknown)  After the  (units    (unkn

own)



                    date)                         administration of unknown)  



                                                  IV contrast, axial           



                                                  sections were           



                                                  acquired from the           

 

           (unknown)  (no       (unknown)  (unknown)  Approved by:  (units    (u

nknown)



                    date)                         Neptali Colin unknown)  



                                                  M.D. on 2022           



                                                  at 20:34            

 

           (unknown)  (no       (unknown)  (unknown)  Biliary ducts:  No  (units

    (unknown)



                    date)                         biliary ductal unknown)  



                                                  dilatation.           

 

           (unknown)  (no       (unknown)  (unknown)  Bladder:  The  (units    (

unknown)



                    date)                         urinary bladder is unknown)  



                                                  distended.  There           



                                                  is mild             



                                                  trabeculation of           



                                                  the                 

 

           (unknown)  (no       (unknown)  (unknown)  Bones:  Visualized  (units

    (unknown)



                    date)                         osseous structures unknown)  



                                                  demonstrate no           



                                                  suspicious focal           



                                                  lesions.            

 

           (unknown)  (no       (unknown)  (unknown)  COMPARISON:  [facility]  (units  

  (unknown)



                    date)                         Outside Film, CT, unknown)  



                                                  CT ABDOMEN PELVIS           



                                                  WITH CONTRAST,           



                                                  2021,           

 

           (unknown)  (no       (unknown)  (unknown)  Dictated by:  (units    (u

nknown)



                    date)                         Neptali Colin unknown)  



                                                  M.D. on 2022           



                                                  at 20:23            

 

           (unknown)  (no       (unknown)  (unknown)  FINDINGS:  (units    (unkn

own)



                    date)                                   unknown)  

 

           (unknown)  (no       (unknown)  (unknown)  Gallbladder:  (units    (u

nknown)



                    date)                         Within normal unknown)  



                                                  limits without           



                                                  calcified           



                                                  gallstones.           

 

           (unknown)  (no       (unknown)  (unknown)  Heart:  Heart is  (units  

  (unknown)



                    date)                         normal in size. unknown)  



                                                  There is mild           



                                                  distention of the           



                                                  visualized           

 

           (unknown)  (no       (unknown)  (unknown)  IMPRESSION:  (units    (un

known)



                    date)                                   unknown)  

 

           (unknown)  (no       (unknown)  (unknown)  INDICATIONS:  (units    (u

nknown)



                    date)                         complicated unknown)  



                                                  surgical abd, RLQ           



                                                  pain                

 

           (unknown)  (no       (unknown)  (unknown)  Image quality:  (units    

(unknown)



                    date)                         Excellent. unknown)  

 

           (unknown)  (no       (unknown)  (unknown)  [facility],  (units  

  (unknown)



                    date)                         CT, CT ABDOMEN unknown)  



                                                  [facility],           



                                                  2020, 22:32.           

 

           (unknown)  (no       (unknown)  (unknown)  Kidneys and  (units    (un

known)



                    date)                         Ureters:  There is unknown)  



                                                  a small             



                                                  nonobstructing           



                                                  stone within the           



                                                  right               

 

           (unknown)  (no       (unknown)  (unknown)  Liver:  No mass  (units   

 (unknown)



                    date)                         lesion.   unknown)  

 

           (unknown)  (no       (unknown)  (unknown)  Lung bases:  There  (units

    (unknown)



                    date)                         is mild dependent unknown)  



                                                  atelectasis           



                                                  bilaterally.           

 

           (unknown)  (no       (unknown)  (unknown)  Miscellaneous:  (units    

(unknown)



                    date)                         There is presacral unknown)  



                                                  soft tissue           



                                                  thickening with a           



                                                  thick-walled           

 

           (unknown)  (no       (unknown)  (unknown)  No discrete  (units    (un

known)



                    date)                         associated mass unknown)  



                                                  identified.  The           



                                                  remainder of the           



                                                  small bowel           

 

           (unknown)  (no       (unknown)  (unknown)  PELVIS:   (units    (unkno

wn)



                    date)                                   unknown)  

 

           (unknown)  (no       (unknown)  (unknown)  Pancreas:  (units    (unkn

own)



                    date)                         Unremarkable. unknown)  

 

           (unknown)  (no       (unknown)  (unknown)  Pelvic Nodes: No  (units  

  (unknown)



                    date)                         enlarged lymph unknown)  



                                                  nodes.              

 

           (unknown)  (no       (unknown)  (unknown)  Pelvic Organs:  (units    

(unknown)



                    date)                         There is moderate unknown)  



                                                  enlargement of the           



                                                  prostate..           

 

           (unknown)  (no       (unknown)  (unknown)  Peritoneum:  No  (units   

 (unknown)



                    date)                         abnormal  unknown)  



                                                  intraperitoneal           



                                                  fluid.  No free           



                                                  air.                

 

           (unknown)  (no       (unknown)  (unknown)  Postsurgical  (units    (u

nknown)



                    date)                         changes are unknown)  



                                                  demonstrated status           



                                                  post posterior           



                                                  fixation at L4-5.           

 

           (unknown)  (no       (unknown)  (unknown)  Spleen:  The  (units    (u

nknown)



                    date)                         spleen is enlarged, unknown)  



                                                  measuring up to           



                                                  15.4 cm.            

 

           (unknown)  (no       (unknown)  (unknown)  Stomach and Bowel:  (units

    (unknown)



                    date)                          There are 3 short unknown)  



                                                  segments of small           



                                                  bowel               



                                                  intussusception           

 

           (unknown)  (no       (unknown)  (unknown)  TECHNIQUE:  (units    (unk

nown)



                    date)                                   unknown)  

 

           (unknown)  (no       (unknown)  (unknown)  Ventral Wall:   No  (units

    (unknown)



                    date)                         hernia.   unknown)  

 

           (unknown)  (no       (unknown)  (unknown)  Vessels:  Aorta  (units   

 (unknown)



                    date)                         and inferior vena unknown)  



                                                  cava are normal in           



                                                  size.               

 

           (unknown)  (no       (unknown)  (unknown)  and/or kV  (units    (unkn

own)



                    date)                         according to unknown)  



                                                  patient size.           

 

           (unknown)  (no       (unknown)  (unknown)  associated  (units    (unk

nown)



                    date)                         perinephric unknown)  



                                                  stranding.  The           



                                                  findings are           



                                                  suggestive of           

 

           (unknown)  (no       (unknown)  (unknown)  catheterization or  (units

    (unknown)



                    date)                         infection from a unknown)  



                                                  gas-forming           



                                                  organism.           

 

           (unknown)  (no       (unknown)  (unknown)  collection  (units    (unk

nown)



                    date)                         measuring unknown)  



                                                  approximately 4.4 x           



                                                  2.1 cm in           



                                                  transverse           



                                                  dimension which           

 

           (unknown)  (no       (unknown)  (unknown)  decreased in size  (units 

   (unknown)



                    date)                         compared to the unknown)  



                                                  prior study.  There           



                                                  are associated           

 

           (unknown)  (no       (unknown)  (unknown)  definite  (units    (unkno

wn)



                    date)                         hydronephrosis. unknown)  

 

           (unknown)  (no       (unknown)  (unknown)  dose reduction,  (units   

 (unknown)



                    date)                         the following was unknown)  



                                                  used:  automated           



                                                  exposure control,           



                                                  adjustment           

 

           (unknown)  (no       (unknown)  (unknown)  esophagus with  (units    

(unknown)



                    date)                         heterogeneous unknown)  



                                                  filling defects           



                                                  likely representing           



                                                  complex fluid.           

 

           (unknown)  (no       (unknown)  (unknown)  intrarenal abscess  (units

    (unknown)



                    date)                         collections.  No unknown)  



                                                  hydronephrosis.           

 

           (unknown)  (no       (unknown)  (unknown)  left upper  (units    (unk

nown)



                    date)                         quadrant involving unknown)  



                                                  the jejunum.  No           



                                                  evidence of           



                                                  associated bowel           

 

           (unknown)  (no       (unknown)  (unknown)  measuring up to  (units   

 (unknown)



                    date)                         0.3 cm.  The unknown)  



                                                  kidneys demonstrate           



                                                  heterogeneous           



                                                  enhancement           

 

           (unknown)  (no       (unknown)  (unknown)  normal caliber and  (units

    (unknown)



                    date)                         wall thickness. unknown)  



                                                  There are surgical           



                                                  sutures within the           



                                                  small               

 

           (unknown)  (no       (unknown)  (unknown)  obstruction.  (units    (u

nknown)



                    date)                         There is a gas unknown)  



                                                  within the urinary           



                                                  bladder consistent           



                                                  with                

 

           (unknown)  (no       (unknown)  (unknown)  perinephric or  (units    

(unknown)



                    date)                         intrarenal abscess unknown)  



                                                  collections.  There           



                                                  is a right           



                                                  extrarenal           

 

           (unknown)  (no       (unknown)  (unknown)  recent    (units    (unkno

wn)



                    date)                         catheterization or unknown)  



                                                  infection from a           



                                                  gas-forming           



                                                  organism.           

 

           (unknown)  (no       (unknown)  (unknown)  reflect sequelae  (units  

  (unknown)



                    date)                         of a stricture at unknown)  



                                                  the                 



                                                  gastroesophageal           



                                                  junction or           

 

           (unknown)  (no       (unknown)  (unknown)  reflux.   (units    (unkno

wn)



                    date)                                   unknown)  

 

           (unknown)  (no       (unknown)  (unknown)  sigmoid colon.  (units    

(unknown)



                    date)                         The appendix is unknown)  



                                                  normal in           



                                                  appearance.           



                                                  Colonic             



                                                  diverticulosis           

 

           (unknown)  (no       (unknown)  (unknown)  suggestive of  (units    (

unknown)



                    date)                         pyelonephritis. unknown)  



                                                  Recommend           



                                                  correlation           



                                                  clinically.  No           

 

           (unknown)  (no       (unknown)  (unknown)  surgery.  There is  (units

    (unknown)



                    date)                         a small   unknown)  



                                                  fat-containing           



                                                  right inguinal           



                                                  hernia.             

 

           (unknown)  (no       (unknown)  (unknown)  the wall of the  (units   

 (unknown)



                    date)                         collection.  The unknown)  



                                                  findings are           



                                                  consistent with           



                                                  scarring related           

 

           (unknown)  (no       (unknown)  (unknown)  to the pubic  (units    (u

nknown)



                    date)                         symphysis.  Coronal unknown)  



                                                  and sagittal           



                                                  reformats were           



                                                  performed.  For           

 

           (unknown)  (no       (unknown)  (unknown)  wall and a few  (units    

(unknown)



                    date)                         small diverticula unknown)  



                                                  compatible with           



                                                  sequelae of chronic           



                                                  bladder             

 

           (unknown)  (no       (unknown)  (unknown)  with indistinct  (units   

 (unknown)



                    date)                         areas of  unknown)  



                                                  hypoenhancement,           



                                                  more pronounced on           



                                                  the left.  There           

 

           (unknown)  (no       (unknown)  (unknown)  without acute  (units    (

unknown)



                    date)                         diverticulitis. unknown)  

 

           (unknown)  (no       (unknown)  (unknown)  without evidence  (units  

  (unknown)



                    date)                         of associated unknown)  



                                                  obstruction or           



                                                  discrete mass.           

 

           (unknown)  (no       (unknown)  (unknown)  [id number]  (units    (unkno

wn)



                    date)                                   unknown)  

 

           (unknown)  (no       (unknown)  (unknown)  20:13.    (units    (unkno

wn)



                    date)                                   unknown)  

 

           (unknown)  (no       (unknown)  (unknown)  Accession Number:  (units 

   (unknown)



                    date)                         [id number] unknown)  

 

           (unknown)  (no       (unknown)  (unknown)  Age/Sex: 62 / M  (units   

 (unknown)



                    date)                         Date of Service: unknown)  

 

           (unknown)  (no       (unknown)  (unknown)  : 1960  (units   

 (unknown)



                    date)                         Acct:[id number] unknown)  

 

           (unknown)  (no       (unknown)  (unknown)  Loc: ED   (units    (unkno

wn)



                    date)                                   unknown)  

 

           (unknown)  (no       (unknown)  (unknown)  Ordering Provider:  (units

    (unknown)



                    date)                         Kitty Costello unknown)  



                                                  ARNP                

 

           (unknown)  (no       (unknown)  (unknown)  PROCEDURE:  CT  (units    

(unknown)



                    date)                         ABDOMEN PELVIS W unknown)  



                                                  CON                 

 

           (unknown)  (no       (unknown)  (unknown)  Patient:  (units    (unkno

wn)



                    date)                         Bret Esquivel R unknown)  



                                                  MR#: [id number]             

 

           (unknown)  (no       (unknown)  (unknown)  Procedure: CT  (units    (

unknown)



                    date)                         abdomen pelvis w unknown)  



                                                  con                 

 

           (unknown)  (no       (unknown)  (unknown)  appears   (units    (unkno

wn)



                    date)                                   unknown)  

 

           (unknown)  (no       (unknown)  (unknown)  bilaterally  (units    (un

known)



                    date)                                   unknown)  

 

           (unknown)  (no       (unknown)  (unknown)  bladder   (units    (unkno

wn)



                    date)                                   unknown)  

 

           (unknown)  (no       (unknown)  (unknown)  bowel and  (units    (unkn

own)



                    date)                                   unknown)  

 

           (unknown)  (no       (unknown)  (unknown)  calcifications  (units    

(unknown)



                    date)                         along     unknown)  

 

           (unknown)  (no       (unknown)  (unknown)  demonstrates  (units    (u

nknown)



                    date)                                   unknown)  

 

           (unknown)  (no       (unknown)  (unknown)  distal    (units    (unkno

wn)



                    date)                                   unknown)  

 

           (unknown)  (no       (unknown)  (unknown)  gastroesophageal  (units  

  (unknown)



                    date)                                   unknown)  

 

           (unknown)  (no       (unknown)  (unknown)  is        (units    (unkno

wn)



                    date)                                   unknown)  

 

           (unknown)  (no       (unknown)  (unknown)  is present  (units    (unk

nown)



                    date)                                   unknown)  

 

           (unknown)  (no       (unknown)  (unknown)  kidney    (units    (unkno

wn)



                    date)                                   unknown)  

 

           (unknown)  (no       (unknown)  (unknown)  loculated  (units    (unkn

own)



                    date)                                   unknown)  

 

           (unknown)  (no       (unknown)  (unknown)  lung bases  (units    (unk

nown)



                    date)                                   unknown)  

 

           (unknown)  (no       (unknown)  (unknown)  may       (units    (unkno

wn)



                    date)                                   unknown)  

 

           (unknown)  (no       (unknown)  (unknown)  obstruction.  (units    (u

nknown)



                    date)                                   unknown)  

 

           (unknown)  (no       (unknown)  (unknown)  of mA     (units    (unkno

wn)



                    date)                                   unknown)  

 

           (unknown)  (no       (unknown)  (unknown)  outlet    (units    (unkno

wn)



                    date)                                   unknown)  

 

           (unknown)  (no       (unknown)  (unknown)  pelvis.  No  (units    (un

known)



                    date)                                   unknown)  

 

           (unknown)  (no       (unknown)  (unknown)  perinephric or  (units    

(unknown)



                    date)                                   unknown)  

 

           (unknown)  (no       (unknown)  (unknown)  pyelonephritis.  (units   

 (unknown)



                    date)                         No        unknown)  

 

           (unknown)  (no       (unknown)  (unknown)  quadrant  (units    (unkno

wn)



                    date)                                   unknown)  

 

           (unknown)  (no       (unknown)  (unknown)  radiation  (units    (unkn

own)



                    date)                                   unknown)  

 

           (unknown)  (no       (unknown)  (unknown)  recent    (units    (unkno

wn)



                    date)                                   unknown)  

 

           (unknown)  (no       (unknown)  (unknown)  sequelae of  (units    (un

known)



                    date)                                   unknown)  

 

           (unknown)  (no       (unknown)  (unknown)  stranding  (units    (unkn

own)



                    date)                                   unknown)  

 

           (unknown)  (no       (unknown)  (unknown)  to prior  (units    (unkno

wn)



                    date)                                   unknown)  

 

           (unknown)  (no       (unknown)  (unknown)  within the  (units    (unk

nown)



                    date)                                   unknown)  









                                         Result panel 15









           (unknown)  (no       (unknown)  (unknown)  (no value)  (units    (unk

nown)



                    date)                                   unknown)  

 

           (unknown)  (no       (unknown)  (unknown)  Date of Service:  (units  

  (unknown)



                    date)                         22  unknown)  

 

           (unknown)  (no       (unknown)  (unknown)  (no value)  (units    (unk

nown)



                    date)                                   unknown)  

 

           (unknown)  (no       (unknown)  (unknown)  22 17:45  (units    

(unknown)



                    date)                                   unknown)  

 

           (unknown)  (no       (unknown)  (unknown)  1 tab PO DAILY  (units    

(unknown)



                    date)                         Qty: 90 0RF unknown)  

 

           (unknown)  (no       (unknown)  (unknown)  1,000 mcg PO DAILY  (units

    (unknown)



                    date)                         Qty: 60 0RF unknown)  

 

           (unknown)  (no       (unknown)  (unknown)  10 mg PO BID  (units    (u

nknown)



                    date)                                   unknown)  

 

           (unknown)  (no       (unknown)  (unknown)  17 gm PO DAILY  (units    

(unknown)



                    date)                         Qty: 90 0RF unknown)  

 

           (unknown)  (no       (unknown)  (unknown)  17.2 mg PO BEDTIME  (units

    (unknown)



                    date)                         Qty: 60 0RF unknown)  

 

           (unknown)  (no       (unknown)  (unknown)  20 mg PO BID  (units    (u

nknown)



                    date)                                   unknown)  

 

           (unknown)  (no       (unknown)  (unknown)  25 mg PO DAILY  (units    

(unknown)



                    date)                                   unknown)  

 

           (unknown)  (no       (unknown)  (unknown)  25 mg PO QPM  (units    (u

nknown)



                    date)                                   unknown)  

 

           (unknown)  (no       (unknown)  (unknown)  300 mg PO BID  (units    (

unknown)



                    date)                                   unknown)  

 

           (unknown)  (no       (unknown)  (unknown)  325 mg PO BIDWM  (units   

 (unknown)



                    date)                         Qty: 60 0RF unknown)  

 

           (unknown)  (no       (unknown)  (unknown)  4 mg PO Q8H PRN  (units   

 (unknown)



                    date)                         (Reason: nausea and unknown)  



                                                  vomiting) Qty: 14           



                                                  0RF                 

 

           (unknown)  (no       (unknown)  (unknown)  40 mg PO QPM  (units    (u

nknown)



                    date)                                   unknown)  

 

           (unknown)  (no       (unknown)  (unknown)  5 mg PO BID Qty:  (units  

  (unknown)



                    date)                         60 0RF    unknown)  

 

           (unknown)  (no       (unknown)  (unknown)  500 mg PO BID  (units    (

unknown)



                    date)                                   unknown)  

 

           (unknown)  (no       (unknown)  (unknown)  500 mg PO BID Qty:  (units

    (unknown)



                    date)                         60 0RF    unknown)  

 

           (unknown)  (no       (unknown)  (unknown)  75 mg PO DAILY  (units    

(unknown)



                    date)                                   unknown)  

 

           (unknown)  (no       (unknown)  (unknown)  Allergies  (units    (unkn

own)



                    date)                                   unknown)  

 

           (unknown)  (no       (unknown)  (unknown)  Documented By: [facility]  (units

    (unknown)



                    date)                                   unknown)  

 

           (unknown)  (no       (unknown)  (unknown)  ED Orders  (units    (unkn

own)



                    date)                                   unknown)  

 

           (unknown)  (no       (unknown)  (unknown)  Emergency Report  (units  

  (unknown)



                    date)                                   unknown)  

 

           (unknown)  (no       (unknown)  (unknown)  Home Medications  (units  

  (unknown)



                    date)                                   unknown)  

 

           (unknown)  (no       (unknown)  (unknown)  [facility]  (units   

 (unknown)



                    date)                         [address] unknown)  



                                                  [place] [address]           

 

           (unknown)  (no       (unknown)  (unknown)  Lab Results  (units    (un

known)



                    date)                                   unknown)  

 

           (unknown)  (no       (unknown)  (unknown)  Label Comments:  (units   

 (unknown)



                    date)                                   unknown)  

 

           (unknown)  (no       (unknown)  (unknown)  Last Admin:  (units    (un

known)



                    date)                         22 18:30 unknown)  



                                                  Dose: 4 mg           

 

           (unknown)  (no       (unknown)  (unknown)  Previous Rx's  (units    (

unknown)



                    date)                                   unknown)  

 

           (unknown)  (no       (unknown)  (unknown)  Stop: 22  (units    

(unknown)



                    date)                         18:16     unknown)  

 

           (unknown)  (no       (unknown)  (unknown)  Stop: 22  (units    

(unknown)



                    date)                         20:25     unknown)  

 

           (unknown)  (no       (unknown)  (unknown)  Stop: 22  (units    

(unknown)



                    date)                         20:27     unknown)  

 

           (unknown)  (no       (unknown)  (unknown)  Vital Signs - 8 hr  (units

    (unknown)



                    date)                                   unknown)  

 

           (unknown)  (no       (unknown)  (unknown)  has not been  (units    (u

nknown)



                    date)                         eating so hasn't unknown)  



                                                  taken recently.           



                                                  spouse states           



                                                  thinks it might           

 

           (unknown)  (no       (unknown)  (unknown)  stopped taking  (units    

(unknown)



                    date)                         prior to back unknown)  



                                                  surgery and did not           



                                                  restart             

 

           (unknown)  (no       (unknown)  (unknown)  (no value)  (units    (unk

nown)



                    date)                                   unknown)  

 

           (unknown)  (no       (unknown)  (unknown)  22  (units 

   (unknown)



                    date)                         22  unknown)  



                                                  Range/Units           

 

           (unknown)  (no       (unknown)  (unknown)  17:40 17:45 17:45  (units 

   (unknown)



                    date)                                   unknown)  

 

           (unknown)  (no       (unknown)  (unknown)  ascorbic acid  (units    (

unknown)



                    date)                         (vitamin C) unknown)  



                                                  [Vitamin C] 500 mg           



                                                  Tablet              

 

           (unknown)  (no       (unknown)  (unknown)  atorvastatin 80 mg  (units

    (unknown)



                    date)                         tablet    unknown)  

 

           (unknown)  (no       (unknown)  (unknown)  buspirone 5 mg  (units    

(unknown)



                    date)                         Tablet    unknown)  

 

           (unknown)  (no       (unknown)  (unknown)  clopidogrel 75 mg  (units 

   (unknown)



                    date)                         tablet    unknown)  

 

           (unknown)  (no       (unknown)  (unknown)  cyanocobalamin  (units    

(unknown)



                    date)                         (vitamin B-12) 500 unknown)  



                                                  mcg Tablet           

 

           (unknown)  (no       (unknown)  (unknown)  ferrous sulfate  (units   

 (unknown)



                    date)                         325 mg (65 mg iron) unknown)  



                                                  Tablet              

 

           (unknown)  (no       (unknown)  (unknown)  gabapentin 300 mg  (units 

   (unknown)



                    date)                         capsule   unknown)  

 

           (unknown)  (no       (unknown)  (unknown)  glipizide 10 mg  (units   

 (unknown)



                    date)                         tablet    unknown)  

 

           (unknown)  (no       (unknown)  (unknown)  losartan 50 mg  (units    

(unknown)



                    date)                         tablet    unknown)  

 

           (unknown)  (no       (unknown)  (unknown)  metformin 500 mg  (units  

  (unknown)



                    date)                         tablet extended unknown)  



                                                  release 24 hr           

 

           (unknown)  (no       (unknown)  (unknown)  metoprolol  (units    (unk

nown)



                    date)                         succinate 25 mg unknown)  



                                                  tablet extended           



                                                  release 24 hr           

 

           (unknown)  (no       (unknown)  (unknown)  multivitamin with  (units 

   (unknown)



                    date)                         folic acid unknown)  



                                                  [Tab-A-Lucy] 400           



                                                  mcg Tablet           

 

           (unknown)  (no       (unknown)  (unknown)  ondansetron HCl  (units   

 (unknown)



                    date)                         [Zofran] 4 mg unknown)  



                                                  tablet              

 

           (unknown)  (no       (unknown)  (unknown)  polyethylene  (units    (u

nknown)



                    date)                         glycol 3350 17 gram unknown)  



                                                  Powder In Packet           

 

           (unknown)  (no       (unknown)  (unknown)  sennosides [senna]  (units

    (unknown)



                    date)                         8.6 mg Tablet unknown)  

 

           (unknown)  (no       (unknown)  (unknown)  trospium 20 mg  (units    

(unknown)



                    date)                         tablet    unknown)  

 

           (unknown)  (no       (unknown)  (unknown)  22  (units    (unkno

wn)



                    date)                                   unknown)  

 

           (unknown)  (no       (unknown)  (unknown)  Medication  (units    (unk

nown)



                    date)                         Instructions unknown)  



                                                  Recorded            

 

           (unknown)  (no       (unknown)  (unknown)  Medication  (units    (unk

nown)



                    date)                         Instructions unknown)  



                                                  Recorded  Confirmed           

 

           (unknown)  (no       (unknown)  (unknown)  (Zofran) vomiting  (units 

   (unknown)



                    date)                         #14 tabs  unknown)  

 

           (unknown)  (no       (unknown)  (unknown)  [id number]   (units    (unkno

wn)



                    date)                                   unknown)  

 

           (unknown)  (no       (unknown)  (unknown)  22 17:39  (units    

(unknown)



                    date)                                   unknown)  

 

           (unknown)  (no       (unknown)  (unknown)  22 17:40  (units    

(unknown)



                    date)                                   unknown)  

 

           (unknown)  (no       (unknown)  (unknown)  22 17:45  (units    

(unknown)



                    date)                                   unknown)  

 

           (unknown)  (no       (unknown)  (unknown)  22 19:26  (units    

(unknown)



                    date)                                   unknown)  

 

           (unknown)  (no       (unknown)  (unknown)  22 19:28  (units    

(unknown)



                    date)                                   unknown)  

 

           (unknown)  (no       (unknown)  (unknown)  17:34     (units    (unkno

wn)



                    date)                                   unknown)  

 

           (unknown)  (no       (unknown)  (unknown) 2018, He does not  (units  

  (unknown)



                    date)                         drink alcohol.? unknown)  



                                                  Patient and his           



                                                  wife state that he           



                                                  was seen in           

 

           (unknown)  (no       (unknown)  (unknown)  62-year-old  (units    (un

known)



                    date)                         gentleman with a unknown)  



                                                  history of multiple           



                                                  sclerosis,           



                                                  diabetes,           

 

           (unknown)  (no       (unknown)  (unknown)  ALT    16  (<50)  (units  

  (unknown)



                    date)                         IU/L      unknown)  

 

           (unknown)  (no       (unknown)  (unknown)  AST    19  (17-59)  (units

    (unknown)



                    date)                          IU/L     unknown)  

 

           (unknown)  (no       (unknown)  (unknown)  Age/Sex: 62 / M  (units   

 (unknown)



                    date)                                   unknown)  

 

           (unknown)  (no       (unknown)  (unknown)  Albumin    3.9  (units    

(unknown)



                    date)                         (3.5-5.0)  g/dL unknown)  

 

           (unknown)  (no       (unknown)  (unknown)  Albumin/Globulin  (units  

  (unknown)



                    date)                         Ratio    1.1 unknown)  



                                                  (1.0-2.8)           

 

           (unknown)  (no       (unknown)  (unknown)  Alkaline  (units    (unkno

wn)



                    date)                         Phosphatase    105 unknown)  



                                                  ()  U/L           

 

           (unknown)  (no       (unknown)  (unknown)  Allergy/AdvReac  (units   

 (unknown)



                    date)                         Type Severity unknown)  



                                                  Reaction Status           



                                                  Date / Time           

 

           (unknown)  (no       (unknown)  (unknown)  BUN    21 H  (units    (un

known)



                    date)                         (9-20)  mg/dL unknown)  

 

           (unknown)  (no       (unknown)  (unknown)  BUN/Creatinine  (units    

(unknown)



                    date)                         Ratio    14.8 unknown)  



                                                  (6-22)              

 

           (unknown)  (no       (unknown)  (unknown)  Baso # (Auto)   0  (units 

   (unknown)



                    date)                          (0-100)  /uL unknown)  

 

           (unknown)  (no       (unknown)  (unknown)  Baso % (Auto)  (units    (

unknown)



                    date)                         0.2   (0-2)  % unknown)  

 

           (unknown)  (no       (unknown)  (unknown)  Blood Pressure  (units    

(unknown)



                    date)                         124/66   22 unknown)  



                                                  17:34               

 

           (unknown)  (no       (unknown)  (unknown)  Blood Pressure  (units    

(unknown)



                    date)                         124/66    unknown)  

 

           (unknown)  (no       (unknown)  (unknown)  CK-MB (CK-2)  (units    (u

nknown)



                    date)                         TNP       unknown)  

 

           (unknown)  (no       (unknown)  (unknown)  CK-MB (CK-2) Rel  (units  

  (unknown)



                    date)                         Index    TNP unknown)  

 

           (unknown)  (no       (unknown)  (unknown)  COVID19 -Nasal  (units    

(unknown)



                    date)                         RAPID/Pre-Proc Stat unknown)  

 

           (unknown)  (no       (unknown)  (unknown)  CT abdomen pelvis  (units 

   (unknown)



                    date)                         w con Stat unknown)  

 

           (unknown)  (no       (unknown)  (unknown)  CVA (cerebral  (units    (

unknown)



                    date)                         vascular accident) unknown)  



                                                  (2018)           

 

           (unknown)  (no       (unknown)  (unknown)  Calcium    9.3  (units    

(unknown)



                    date)                         (8.4-10.2)  mg/dL unknown)  

 

           (unknown)  (no       (unknown)  (unknown)  Carbon Dioxide  (units    

(unknown)



                    date)                         29  (22-32)  mmol/L unknown)  

 

           (unknown)  (no       (unknown)  (unknown)  Chief complaint:  (units  

  (unknown)



                    date)                         Weakness  unknown)  

 

           (unknown)  (no       (unknown)  (unknown)  Chloride    96 L  (units  

  (unknown)



                    date)                         ()  mmol/L unknown)  

 

           (unknown)  (no       (unknown)  (unknown)  Colon cancer  (units    (u

nknown)



                    date)                         ()    unknown)  

 

           (unknown)  (no       (unknown)  (unknown)  Complete Blood  (units    

(unknown)



                    date)                         Count AUTO DIFF unknown)  



                                                  Stat                

 

           (unknown)  (no       (unknown)  (unknown)  Comprehensive  (units    (

unknown)



                    date)                         Metabolic Panel unknown)  



                                                  Stat                

 

           (unknown)  (no       (unknown)  (unknown)  Course    (units    (unkno

wn)



                    date)                                   unknown)  

 

           (unknown)  (no       (unknown)  (unknown)  Creatinine    1.42  (units

    (unknown)



                    date)                         H  (0.66-1.25) unknown)  



                                                  mg/dL               

 

           (unknown)  (no       (unknown)  (unknown)  : 1960  (units   

 (unknown)



                    date)                         Acct:[id number] unknown)  

 

           (unknown)  (no       (unknown)  (unknown)  Departure  (units    (unkn

own)



                    date)                                   unknown)  

 

           (unknown)  (no       (unknown)  (unknown)  Depression  (units    (unk

nown)



                    date)                                   unknown)  

 

           (unknown)  (no       (unknown)  (unknown)  Diabetes  (units    (unkno

wn)



                    date)                                   unknown)  

 

           (unknown)  (no       (unknown)  (unknown)  Diabetic  (units    (unkno

wn)



                    date)                         neuropathy unknown)  

 

           (unknown)  (no       (unknown)  (unknown)  Discharge Plan  (units    

(unknown)



                    date)                                   unknown)  

 

           (unknown)  (no       (unknown)  (unknown)  Discontinued  (units    (u

nknown)



                    date)                         Medications unknown)  

 

           (unknown)  (no       (unknown)  (unknown)  Gustavo Macias MD  (units   

 (unknown)



                    date)                         [Primary Care unknown)  



                                                  Provider] -           

 

           (unknown)  (no       (unknown)  (unknown)  EKG-12 Lead Stat  (units  

  (unknown)



                    date)                                   unknown)  

 

           (unknown)  (no       (unknown)  (unknown)  ER Physician:  (units    (

unknown)



                    date)                         Kitty Costello unknown)  



                                                  ARNP                

 

           (unknown)  (no       (unknown)  (unknown)  Eos # (Auto)   100  (units

    (unknown)



                    date)                           (0-450)  /uL unknown)  

 

           (unknown)  (no       (unknown)  (unknown)  Eos % (Auto)   0.8  (units

    (unknown)



                    date)                         L   (2-4)  % unknown)  

 

           (unknown)  (no       (unknown)  (unknown)  Estimated GFR  (units    (

unknown)



                    date)                         55 L  (>60)  mL/min unknown)  

 

           (unknown)  (no       (unknown)  (unknown)  Exam      (units    (unkno

wn)



                    date)                                   unknown)  

 

           (unknown)  (no       (unknown)  (unknown)  Family History  (units    

(unknown)



                    date)                         (Reviewed 22 unknown)  



                                                  @ 14:21 by Will Hansen MD)           

 

           (unknown)  (no       (unknown)  (unknown)  Father     (units 

   (unknown)



                    date)                          Cancer   unknown)  

 

           (unknown)  (no       (unknown)  (unknown)  GERD      (units    (unkno

wn)



                    date)                         (gastroesophageal unknown)  



                                                  reflux disease)           

 

           (unknown)  (no       (unknown)  (unknown)  General   (units    (unkno

wn)



                    date)                                   unknown)  

 

           (unknown)  (no       (unknown)  (unknown)  GenericComposite[P  (units

    (unknown)



                    date)                         lt Count   219 unknown)  



                                                  (150-400)  X10^3/uL           



                                                   ]                  

 

           (unknown)  (no       (unknown)  (unknown)  GenericComposite[R  (units

    (unknown)



                    date)                         BC   3.37 L unknown)  



                                                  (4.5-5.9)  X10^6/uL           



                                                   ]                  

 

           (unknown)  (no       (unknown)  (unknown)  GenericComposite[W  (units

    (unknown)



                    date)                         BC   7.0  unknown)  



                                                  (4.5-11.0)           



                                                  X10^3/uL  ]           

 

           (unknown)  (no       (unknown)  (unknown)  Globulin    3.6  (units   

 (unknown)



                    date)                         (1.7-4.1)  g/dL unknown)  

 

           (unknown)  (no       (unknown)  (unknown)  Glucose    262 H  (units  

  (unknown)



                    date)                         ()  mg/dL unknown)  

 

           (unknown)  (no       (unknown)  (unknown)  H/O colectomy  (units    (

unknown)



                    date)                                   unknown)  

 

           (unknown)  (no       (unknown)  (unknown)  HPI - Weakness  (units    

(unknown)



                    date)                                   unknown)  

 

           (unknown)  (no       (unknown)  (unknown)  HPI Narrative:  (units    

(unknown)



                    date)                                   unknown)  

 

           (unknown)  (no       (unknown)  (unknown)  Hct   26.7 L  (units    (u

nknown)



                    date)                         (41-53)  % unknown)  

 

           (unknown)  (no       (unknown)  (unknown)  Hgb   9.1 L  (units    (un

known)



                    date)                         (13.5-17.5)  g/dL unknown)  

 

           (unknown)  (no       (unknown)  (unknown)  History of Present  (units

    (unknown)



                    date)                         Illness   unknown)  

 

           (unknown)  (no       (unknown)  (unknown)  History of lumbar  (units 

   (unknown)



                    date)                         surgery () unknown)  

 

           (unknown)  (no       (unknown)  (unknown)  Hx of foot surgery  (units

    (unknown)



                    date)                         (2017)    unknown)  

 

           (unknown)  (no       (unknown)  (unknown)  Hx of shoulder  (units    

(unknown)



                    date)                         surgery () unknown)  

 

           (unknown)  (no       (unknown)  (unknown)  Hypertension  (units    (u

nknown)



                    date)                                   unknown)  

 

           (unknown)  (no       (unknown)  (unknown)  Initial Vital  (units    (

unknown)



                    date)                         Signs     unknown)  

 

           (unknown)  (no       (unknown)  (unknown)  Initial Vital  (units    (

unknown)



                    date)                         Signs:    unknown)  

 

           (unknown)  (no       (unknown)  (unknown)  Lab Data  (units    (unkno

wn)



                    date)                                   unknown)  

 

           (unknown)  (no       (unknown)  (unknown)  Labs:     (units    (unkno

wn)



                    date)                                   unknown)  

 

           (unknown)  (no       (unknown)  (unknown)  Lactate (Lactic  (units   

 (unknown)



                    date)                         Acid) Stat unknown)  

 

           (unknown)  (no       (unknown)  (unknown)  KIMMIE Rocha  (units    (

unknown)



                    date)                         there.  He reports unknown)  



                                                  that he had an           



                                                  upper endoscopy           



                                                  completed here           

 

           (unknown)  (no       (unknown)  (unknown)  Lipase    32  (units    (u

nknown)



                    date)                         ()  U/L unknown)  

 

           (unknown)  (no       (unknown)  (unknown)  Lipase Stat  (units    (un

known)



                    date)                                   unknown)  

 

           (unknown)  (no       (unknown)  (unknown)  Lymph # (Auto)  (units    

(unknown)



                    date)                         400 L   (1795-1623) unknown)  



                                                   /uL                

 

           (unknown)  (no       (unknown)  (unknown)  Lymph % (Auto)  (units    

(unknown)



                    date)                         6.4 L   (25-40)  % unknown)  

 

           (unknown)  (no       (unknown)  (unknown)  MCH   26.9  (units    (unk

nown)



                    date)                         (26-34)  PG unknown)  

 

           (unknown)  (no       (unknown)  (unknown)  MCHC   34.0  (units    (un

known)



                    date)                         (30-36)  % unknown)  

 

           (unknown)  (no       (unknown)  (unknown)  MCV   79.1 L  (units    (u

nknown)



                    date)                         ()  fL unknown)  

 

           (unknown)  (no       (unknown)  (unknown)  MDM - Weakness  (units    

(unknown)



                    date)                                   unknown)  

 

           (unknown)  (no       (unknown)  (unknown)  Magnesium    1.0 L  (units

    (unknown)



                    date)                          (1.6-2.3)  mg/dL unknown)  

 

           (unknown)  (no       (unknown)  (unknown)  Magnesium Stat  (units    

(unknown)



                    date)                                   unknown)  

 

           (unknown)  (no       (unknown)  (unknown)  Magnesium Sulfate  (units 

   (unknown)



                    date)                         (Magnesium Sulfate) unknown)  



                                                   2 gm in 50 mls @           



                                                  50 mls/hr IV NOW           



                                                  ONE                 

 

           (unknown)  (no       (unknown)  (unknown)  Medical History  (units   

 (unknown)



                    date)                         (Reviewed 22 unknown)  



                                                  @ 14:21 by Will Hansen MD)           

 

           (unknown)  (no       (unknown)  (unknown)  Mode of arrival:  (units  

  (unknown)



                    date)                         Wheelchair unknown)  

 

           (unknown)  (no       (unknown)  (unknown)  Mono # (Auto)  (units    (

unknown)



                    date)                         600   (0-900)  /uL unknown)  

 

           (unknown)  (no       (unknown)  (unknown)  Mono % (Auto)  (units    (

unknown)



                    date)                         7.9   (3-14)  % unknown)  

 

           (unknown)  (no       (unknown)  (unknown)  Mother     (units 

   (unknown)



                    date)                          Cancer   unknown)  

 

           (unknown)  (no       (unknown)  (unknown)  Neut # (Auto)  (units    (

unknown)



                    date)                         5900   (0094-1767) unknown)  



                                                  /uL                 

 

           (unknown)  (no       (unknown)  (unknown)  Neut % (Auto)  (units    (

unknown)



                    date)                         84.7 H   (50-75)  % unknown)  

 

           (unknown)  (no       (unknown)  (unknown)  No Action  (units    (unkn

own)



                    date)                                   unknown)  

 

           (unknown)  (no       (unknown)  (unknown)  No Known Drug  (units    (

unknown)



                    date)                         Allergies Allergy unknown)  



                                                  Verified 22           



                                                  17:38               

 

           (unknown)  (no       (unknown)  (unknown)  Ondansetron HCl  (units   

 (unknown)



                    date)                         (Ondansetron 4 Mg/2 unknown)  



                                                  Ml Inj)  4 mg IV           



                                                  NOW ONE             

 

           (unknown)  (no       (unknown)  (unknown)  Ordered:  (units    (unkno

wn)



                    date)                                   unknown)  

 

           (unknown)  (no       (unknown)  (unknown)  Orders    (units    (unkno

wn)



                    date)                                   unknown)  

 

           (unknown)  (no       (unknown)  (unknown)  Osteoarthritis  (units    

(unknown)



                    date)                                   unknown)  

 

           (unknown)  (no       (unknown)  (unknown)  Oxygen Delivery  (units   

 (unknown)



                    date)                         Method    22 unknown)  



                                                  17:34               

 

           (unknown)  (no       (unknown)  (unknown)  Oxygen Delivery  (units   

 (unknown)



                    date)                         Method Room Air unknown)  

 

           (unknown)  (no       (unknown)  (unknown)  Patient History  (units   

 (unknown)



                    date)                                   unknown)  

 

           (unknown)  (no       (unknown)  (unknown)  Patient:  (units    (unkno

wn)



                    date)                         AlvinBret SANTOS unknown)  



                                                  MR#: [id number]            

 

           (unknown)  (no       (unknown)  (unknown)  Postlaminectomy  (units   

 (unknown)



                    date)                         syndrome  unknown)  

 

           (unknown)  (no       (unknown)  (unknown)  Potassium    3.7  (units  

  (unknown)



                    date)                         (3.4-5.1)  mmol/L unknown)  

 

           (unknown)  (no       (unknown)  (unknown)  Prescriptions:  (units    

(unknown)



                    date)                                   unknown)  

 

           (unknown)  (no       (unknown)  (unknown)  Procalcitonin Stat  (units

    (unknown)



                    date)                                   unknown)  

 

           (unknown)  (no       (unknown)  (unknown)  Pulse Oximetry  98  (units

    (unknown)



                    date)                           22 17:34 unknown)  

 

           (unknown)  (no       (unknown)  (unknown)  Pulse Oximetry 98  (units 

   (unknown)



                    date)                                   unknown)  

 

           (unknown)  (no       (unknown)  (unknown)  Pulse Rate  77  (units    

(unknown)



                    date)                         22 17:34 unknown)  

 

           (unknown)  (no       (unknown)  (unknown)  Pulse Rate 77  (units    (

unknown)



                    date)                                   unknown)  

 

           (unknown)  (no       (unknown)  (unknown)  RDW   16.1 H  (units    (u

nknown)



                    date)                         (11.6-14.8)  % unknown)  

 

           (unknown)  (no       (unknown)  (unknown)  Referrals:  (units    (unk

nown)



                    date)                                   unknown)  

 

           (unknown)  (no       (unknown)  (unknown)  Related Data  (units    (u

nknown)



                    date)                                   unknown)  

 

           (unknown)  (no       (unknown)  (unknown)  Respiratory Rate  (units  

  (unknown)



                    date)                         18   22 17:34 unknown)  

 

           (unknown)  (no       (unknown)  (unknown)  Respiratory Rate  (units  

  (unknown)



                    date)                         18        unknown)  

 

           (unknown)  (no       (unknown)  (unknown)  Result diagrams:  (units  

  (unknown)



                    date)                                   unknown)  

 

           (unknown)  (no       (unknown)  (unknown)  SARS-CoV-2 (PCR)  (units  

  (unknown)



                    date)                         Negative  unknown)  



                                                  (Negative)           

 

           (unknown)  (no       (unknown)  (unknown)  Sciatica  (units    (unkno

wn)



                    date)                                   unknown)  

 

           (unknown)  (no       (unknown)  (unknown)  Signed By:  (units    (unk

nown)



                    date)                                   unknown)  

 

           (unknown)  (no       (unknown)  (unknown)  Smoking Status:  (units   

 (unknown)



                    date)                         Never smoker unknown)  

 

           (unknown)  (no       (unknown)  (unknown)  Smoking Status:  (units   

 (unknown)



                    date)                         Never smoker unknown)  

 

           (unknown)  (no       (unknown)  (unknown)  Social History  (units    

(unknown)



                    date)                         (Reviewed 22 unknown)  



                                                  @ 13:31 by Tanya Rocha MD)           

 

           (unknown)  (no       (unknown)  (unknown)  Sodium    135 L  (units   

 (unknown)



                    date)                         (137-145)  mmol/L unknown)  

 

           (unknown)  (no       (unknown)  (unknown)  Sodium Chloride  (units   

 (unknown)



                    date)                         (Normal Saline unknown)  



                                                  0.9%)  1,000 mls @           



                                                  1,000 mls/hr IV           



                                                  BOLUS ONE           

 

           (unknown)  (no       (unknown)  (unknown)  Source: patient  (units   

 (unknown)



                    date)                         and family unknown)  

 

           (unknown)  (no       (unknown)  (unknown)  Spinal stenosis  (units   

 (unknown)



                    date)                                   unknown)  

 

           (unknown)  (no       (unknown)  (unknown)  Stated complaint:  (units 

   (unknown)



                    date)                         WEAKNESS UNABLE TO unknown)  



                                                  HOLD ANYTHING DOWN           

 

           (unknown)  (no       (unknown)  (unknown)  Substance Use  (units    (

unknown)



                    date)                         Type: does not use unknown)  

 

           (unknown)  (no       (unknown)  (unknown)  Surgical History  (units  

  (unknown)



                    date)                         (Reviewed 22 unknown)  



                                                  @ 14:21 by Will Hansen MD)           

 

           (unknown)  (no       (unknown)  (unknown)  Temperature  98.3  (units 

   (unknown)



                    date)                         F   22 17:34 unknown)  

 

           (unknown)  (no       (unknown)  (unknown)  Temperature 98.3 F  (units

    (unknown)



                    date)                                   unknown)  

 

           (unknown)  (no       (unknown)  (unknown)  Time Seen by  (units    (u

nknown)



                    date)                         Provider: 22 unknown)  



                                                  19:11               

 

           (unknown)  (no       (unknown)  (unknown)  Total Bilirubin  (units   

 (unknown)



                    date)                         0.6  (0.2-1.3) unknown)  



                                                  mg/dL               

 

           (unknown)  (no       (unknown)  (unknown)  Total Creatine  (units    

(unknown)



                    date)                         Kinase    54 L unknown)  



                                                  ()  U/L           

 

           (unknown)  (no       (unknown)  (unknown)  Total Protein  (units    (

unknown)



                    date)                         7.5  (6.3-8.2) unknown)  



                                                  g/dL                

 

           (unknown)  (no       (unknown)  (unknown)  Troponin + CK  (units    (

unknown)



                    date)                         Cardiac Panel Stat unknown)  

 

           (unknown)  (no       (unknown)  (unknown)  Troponin I    <  (units   

 (unknown)



                    date)                         0.012  (0.01-0.034) unknown)  



                                                   ng/mL              

 

           (unknown)  (no       (unknown)  (unknown)  Urine Microscopic  (units 

   (unknown)



                    date)                         Stat      unknown)  

 

           (unknown)  (no       (unknown)  (unknown)  Vital Signs  (units    (un

known)



                    date)                                   unknown)  

 

           (unknown)  (no       (unknown)  (unknown)  Vital signs:  (units    (u

nknown)



                    date)                                   unknown)  

 

           (unknown)  (no       (unknown)  (unknown)  XR chest 1V Stat  (units  

  (unknown)



                    date)                                   unknown)  

 

           (unknown)  (no       (unknown)  (unknown)  [Embedded Image  (units   

 (unknown)



                    date)                         Not Available] unknown)  

 

           (unknown)  (no       (unknown)  (unknown)  admitted, went to  (units 

   (unknown)



                    date)                         [facility] for unknown)  



                                                  rehab following his           



                                                  admission and has           



                                                  followed            

 

           (unknown)  (no       (unknown)  (unknown)  alcohol intake  (units    

(unknown)



                    date)                         frequency: a few unknown)  



                                                  times a month           

 

           (unknown)  (no       (unknown)  (unknown)  alcohol intake:  (units   

 (unknown)



                    date)                         current   unknown)  

 

           (unknown)  (no       (unknown)  (unknown)  ascorbic acid  (units    (

unknown)



                    date)                         (vitamin C) 500 mg unknown)  



                                                  500 mg PO BID #60           



                                                  tabs 22           

 

           (unknown)  (no       (unknown)  (unknown)  at this hospital  (units  

  (unknown)



                    date)                                   unknown)  

 

           (unknown)  (no       (unknown)  (unknown)  atorvastatin 80 mg  (units

    (unknown)



                    date)                         tablet 40 mg PO QPM unknown)  



                                                  20           

 

           (unknown)  (no       (unknown)  (unknown)  be causing a rash  (units 

   (unknown)



                    date)                                   unknown)  

 

           (unknown)  (no       (unknown)  (unknown)  buspirone 5 mg  (units    

(unknown)



                    date)                         tablet 5 mg PO BID unknown)  



                                                  #60 tabs 22           

 

           (unknown)  (no       (unknown)  (unknown)  clopidogrel 75 mg  (units 

   (unknown)



                    date)                         tablet 75 mg PO unknown)  



                                                  DAILY 22            

 

           (unknown)  (no       (unknown)  (unknown)  cyanocobalamin  (units    

(unknown)



                    date)                         (vitamin B-12) 500 unknown)  



                                                  1,000 mcg PO DAILY           



                                                  #60 tabs 22           

 

           (unknown)  (no       (unknown)  (unknown)  ferrous sulfate  (units   

 (unknown)



                    date)                         325 mg (65 mg 325 unknown)  



                                                  mg PO BIDWM #60           



                                                  tabs 22           

 

           (unknown)  (no       (unknown)  (unknown)  gabapentin 300 mg  (units 

   (unknown)



                    date)                         capsule 300 mg PO unknown)  



                                                  BID 18            

 

           (unknown)  (no       (unknown)  (unknown)  glipizide 10 mg  (units   

 (unknown)



                    date)                         tablet 10 mg PO BID unknown)  



                                                  18           

 

           (unknown)  (no       (unknown)  (unknown)  household members:  (units

    (unknown)



                    date)                          spouse   unknown)  

 

           (unknown)  (no       (unknown)  (unknown)  hypertension,  (units    (

unknown)



                    date)                         colorectal cancer unknown)  



                                                  in 2013 with a           



                                                  reverse             



                                                  colonoscopy, a           



                                                  stroke in           

 

           (unknown)  (no       (unknown)  (unknown)  iron) tablet  (units    (u

nknown)



                    date)                                   unknown)  

 

           (unknown)  (no       (unknown)  (unknown)  lives     (units    (unkno

wn)



                    date)                         independently:  Yes unknown)  

 

           (unknown)  (no       (unknown)  (unknown)  losartan 50 mg  (units    

(unknown)



                    date)                         tablet 25 mg PO QPM unknown)  



                                                  20           

 

           (unknown)  (no       (unknown)  (unknown)  mcg tablet  (units    (unk

nown)



                    date)                                   unknown)  

 

           (unknown)  (no       (unknown)  (unknown)  mcg tablet  (units    (unk

nown)



                    date)                         (Tab-A-Lucy) unknown)  

 

           (unknown)  (no       (unknown)  (unknown)  metformin 500 mg  (units  

  (unknown)



                    date)                         tablet,extended 500 unknown)  



                                                  mg PO BID 18            

 

           (unknown)  (no       (unknown)  (unknown)  metoprolol  (units    (unk

nown)



                    date)                         succinate 25 mg 25 unknown)  



                                                  mg PO DAILY           



                                                  21           

 

           (unknown)  (no       (unknown)  (unknown)  multivitamin with  (units 

   (unknown)



                    date)                         folic acid 400 1 unknown)  



                                                  tab PO DAILY #90           



                                                  tabs 22           

 

           (unknown)  (no       (unknown)  (unknown)  ondansetron HCl 4  (units 

   (unknown)



                    date)                         mg tablet 4 mg PO unknown)  



                                                  Q8H PRN nausea and           



                                                  21            

 

           (unknown)  (no       (unknown)  (unknown)  oral powder packet  (units

    (unknown)



                    date)                                   unknown)  

 

           (unknown)  (no       (unknown)  (unknown)  polyethylene  (units    (u

nknown)



                    date)                         glycol 3350 17 gram unknown)  



                                                  17 gm PO DAILY #90           



                                                  ea 22           

 

           (unknown)  (no       (unknown)  (unknown)  release 24 hr  (units    (

unknown)



                    date)                                   unknown)  

 

           (unknown)  (no       (unknown)  (unknown)  sennosides 8.6 mg  (units 

   (unknown)



                    date)                         tablet (senna) 17.2 unknown)  



                                                  mg PO BEDTIME #60           



                                                  tabs 22           

 

           (unknown)  (no       (unknown)  (unknown)  substance use  (units    (

unknown)



                    date)                         type:  does not use unknown)  

 

           (unknown)  (no       (unknown)  (unknown)  tablet (Vitamin C)  (units

    (unknown)



                    date)                                   unknown)  

 

           (unknown)  (no       (unknown)  (unknown)  tablet,extended  (units   

 (unknown)



                    date)                         release 24 hr unknown)  

 

           (unknown)  (no       (unknown)  (unknown)  the emergency  (units    (

unknown)



                    date)                         department in unknown)  



                                                  January and           



                                                  diagnosed with a GI           



                                                  bleed, he was           

 

           (unknown)  (no       (unknown)  (unknown)  trospium 20 mg  (units    

(unknown)



                    date)                         tablet 20 mg PO BID unknown)  



                                                  urinary retention           



                                                  22           

 

           (unknown)  (no       (unknown)  (unknown)  up with   (units    (unkno

wn)



                    date)                         Gastroenterology unknown)  



                                                  from [place],           



                                                  patient reports           



                                                  that he sees Yasmine           









                                         Result panel 16









           (unknown)  (no       (unknown)  (unknown)  (no value)  (units    (unk

nown)



                    date)                                   unknown)  

 

           (unknown)  (no       (unknown)  (unknown)  Radiologist  (units    (un

known)



                    date)                         Impression: unknown)  

 

           (unknown)  (no       (unknown)  (unknown)  Date of Service:  (units  

  (unknown)



                    date)                         22  unknown)  

 

           (unknown)  (no       (unknown)  (unknown)  (no value)  (units    (unk

nown)



                    date)                                   unknown)  

 

           (unknown)  (no       (unknown)  (unknown)  22 17:45  (units    

(unknown)



                    date)                                   unknown)  

 

           (unknown)  (no       (unknown)  (unknown)  1 tab PO DAILY  (units    

(unknown)



                    date)                         Qty: 90 0RF unknown)  

 

           (unknown)  (no       (unknown)  (unknown)  1,000 mcg PO DAILY  (units

    (unknown)



                    date)                         Qty: 60 0RF unknown)  

 

           (unknown)  (no       (unknown)  (unknown)  10 mg PO BID  (units    (u

nknown)



                    date)                                   unknown)  

 

           (unknown)  (no       (unknown)  (unknown)  17 gm PO DAILY  (units    

(unknown)



                    date)                         Qty: 90 0RF unknown)  

 

           (unknown)  (no       (unknown)  (unknown)  17.2 mg PO BEDTIME  (units

    (unknown)



                    date)                         Qty: 60 0RF unknown)  

 

           (unknown)  (no       (unknown)  (unknown)  20 mg PO BID  (units    (u

nknown)



                    date)                                   unknown)  

 

           (unknown)  (no       (unknown)  (unknown)  25 mg PO DAILY  (units    

(unknown)



                    date)                                   unknown)  

 

           (unknown)  (no       (unknown)  (unknown)  25 mg PO QPM  (units    (u

nknown)



                    date)                                   unknown)  

 

           (unknown)  (no       (unknown)  (unknown)  300 mg PO BID  (units    (

unknown)



                    date)                                   unknown)  

 

           (unknown)  (no       (unknown)  (unknown)  325 mg PO BIDWM  (units   

 (unknown)



                    date)                         Qty: 60 0RF unknown)  

 

           (unknown)  (no       (unknown)  (unknown)  4 mg PO Q8H PRN  (units   

 (unknown)



                    date)                         (Reason: nausea and unknown)  



                                                  vomiting) Qty: 14           



                                                  0RF                 

 

           (unknown)  (no       (unknown)  (unknown)  40 mg PO QPM  (units    (u

nknown)



                    date)                                   unknown)  

 

           (unknown)  (no       (unknown)  (unknown)  5 mg PO BID Qty:  (units  

  (unknown)



                    date)                         60 0RF    unknown)  

 

           (unknown)  (no       (unknown)  (unknown)  500 mg PO BID  (units    (

unknown)



                    date)                                   unknown)  

 

           (unknown)  (no       (unknown)  (unknown)  500 mg PO BID Qty:  (units

    (unknown)



                    date)                         60 0RF    unknown)  

 

           (unknown)  (no       (unknown)  (unknown)  75 mg PO DAILY  (units    

(unknown)



                    date)                                   unknown)  

 

           (unknown)  (no       (unknown)  (unknown)  Allergies  (units    (unkn

own)



                    date)                                   unknown)  

 

           (unknown)  (no       (unknown)  (unknown)  Documented By: [facility]  (units

    (unknown)



                    date)                                   unknown)  

 

           (unknown)  (no       (unknown)  (unknown)  ED Orders  (units    (unkn

own)



                    date)                                   unknown)  

 

           (unknown)  (no       (unknown)  (unknown)  Emergency Report  (units  

  (unknown)



                    date)                                   unknown)  

 

           (unknown)  (no       (unknown)  (unknown)  Home Medications  (units  

  (unknown)



                    date)                                   unknown)  

 

           (unknown)  (no       (unknown)  (unknown)  [facility]  (units   

 (unknown)



                    date)                         [address] unknown)  



                                                  [place] [address]           

 

           (unknown)  (no       (unknown)  (unknown)  Lab Results  (units    (un

known)



                    date)                                   unknown)  

 

           (unknown)  (no       (unknown)  (unknown)  Label Comments:  (units   

 (unknown)



                    date)                                   unknown)  

 

           (unknown)  (no       (unknown)  (unknown)  Last Admin:  (units    (un

known)



                    date)                         22 18:30 unknown)  



                                                  Dose: 4 mg           

 

           (unknown)  (no       (unknown)  (unknown)  Previous Rx's  (units    (

unknown)



                    date)                                   unknown)  

 

           (unknown)  (no       (unknown)  (unknown)  Stop: 22  (units    

(unknown)



                    date)                         18:16     unknown)  

 

           (unknown)  (no       (unknown)  (unknown)  Stop: 22  (units    

(unknown)



                    date)                         20:25     unknown)  

 

           (unknown)  (no       (unknown)  (unknown)  Stop: 22  (units    

(unknown)



                    date)                         20:27     unknown)  

 

           (unknown)  (no       (unknown)  (unknown)  Vital Signs - 8 hr  (units

    (unknown)



                    date)                                   unknown)  

 

           (unknown)  (no       (unknown)  (unknown)  has not been  (units    (u

nknown)



                    date)                         eating so hasn't unknown)  



                                                  taken recently.           



                                                  spouse states           



                                                  thinks it might           

 

           (unknown)  (no       (unknown)  (unknown)  stopped taking  (units    

(unknown)



                    date)                         prior to back unknown)  



                                                  surgery and did not           



                                                  restart             

 

           (unknown)  (no       (unknown)  (unknown)  (no value)  (units    (unk

nown)



                    date)                                   unknown)  

 

           (unknown)  (no       (unknown)  (unknown)  22  (units 

   (unknown)



                    date)                         22  unknown)  



                                                  Range/Units           

 

           (unknown)  (no       (unknown)  (unknown)  17:40 17:45 17:45  (units 

   (unknown)



                    date)                                   unknown)  

 

           (unknown)  (no       (unknown)  (unknown)  ascorbic acid  (units    (

unknown)



                    date)                         (vitamin C) unknown)  



                                                  [Vitamin C] 500 mg           



                                                  Tablet              

 

           (unknown)  (no       (unknown)  (unknown)  atorvastatin 80 mg  (units

    (unknown)



                    date)                         tablet    unknown)  

 

           (unknown)  (no       (unknown)  (unknown)  buspirone 5 mg  (units    

(unknown)



                    date)                         Tablet    unknown)  

 

           (unknown)  (no       (unknown)  (unknown)  clopidogrel 75 mg  (units 

   (unknown)



                    date)                         tablet    unknown)  

 

           (unknown)  (no       (unknown)  (unknown)  cyanocobalamin  (units    

(unknown)



                    date)                         (vitamin B-12) 500 unknown)  



                                                  mcg Tablet           

 

           (unknown)  (no       (unknown)  (unknown)  ferrous sulfate  (units   

 (unknown)



                    date)                         325 mg (65 mg iron) unknown)  



                                                  Tablet              

 

           (unknown)  (no       (unknown)  (unknown)  gabapentin 300 mg  (units 

   (unknown)



                    date)                         capsule   unknown)  

 

           (unknown)  (no       (unknown)  (unknown)  glipizide 10 mg  (units   

 (unknown)



                    date)                         tablet    unknown)  

 

           (unknown)  (no       (unknown)  (unknown)  losartan 50 mg  (units    

(unknown)



                    date)                         tablet    unknown)  

 

           (unknown)  (no       (unknown)  (unknown)  metformin 500 mg  (units  

  (unknown)



                    date)                         tablet extended unknown)  



                                                  release 24 hr           

 

           (unknown)  (no       (unknown)  (unknown)  metoprolol  (units    (unk

nown)



                    date)                         succinate 25 mg unknown)  



                                                  tablet extended           



                                                  release 24 hr           

 

           (unknown)  (no       (unknown)  (unknown)  multivitamin with  (units 

   (unknown)



                    date)                         folic acid unknown)  



                                                  [Tab-A-Lucy] 400           



                                                  mcg Tablet           

 

           (unknown)  (no       (unknown)  (unknown)  ondansetron HCl  (units   

 (unknown)



                    date)                         [Zofran] 4 mg unknown)  



                                                  tablet              

 

           (unknown)  (no       (unknown)  (unknown)  polyethylene  (units    (u

nknown)



                    date)                         glycol 3350 17 gram unknown)  



                                                  Powder In Packet           

 

           (unknown)  (no       (unknown)  (unknown)  sennosides [senna]  (units

    (unknown)



                    date)                         8.6 mg Tablet unknown)  

 

           (unknown)  (no       (unknown)  (unknown)  trospium 20 mg  (units    

(unknown)



                    date)                         tablet    unknown)  

 

           (unknown)  (no       (unknown)  (unknown)  22  (units    (unkno

wn)



                    date)                                   unknown)  

 

           (unknown)  (no       (unknown)  (unknown)  Medication  (units    (unk

nown)



                    date)                         Instructions unknown)  



                                                  Recorded            

 

           (unknown)  (no       (unknown)  (unknown)  Medication  (units    (unk

nown)



                    date)                         Instructions unknown)  



                                                  Recorded  Confirmed           

 

           (unknown)  (no       (unknown)  (unknown)  (Zofran) vomiting  (units 

   (unknown)



                    date)                         #14 tabs  unknown)  

 

           (unknown)  (no       (unknown)  (unknown)  [id number]   (units    (unkno

wn)



                    date)                                   unknown)  

 

           (unknown)  (no       (unknown)  (unknown)  2022.  (units    (un

known)



                    date)                         Patient's preop unknown)  



                                                  diagnosis was           



                                                  nausea and vomiting           



                                                  from hematemesis           

 

           (unknown)  (no       (unknown)  (unknown)  22 17:39  (units    

(unknown)



                    date)                                   unknown)  

 

           (unknown)  (no       (unknown)  (unknown)  22 17:40  (units    

(unknown)



                    date)                                   unknown)  

 

           (unknown)  (no       (unknown)  (unknown)  22 17:45  (units    

(unknown)



                    date)                                   unknown)  

 

           (unknown)  (no       (unknown)  (unknown)  22 19:26  (units    

(unknown)



                    date)                                   unknown)  

 

           (unknown)  (no       (unknown)  (unknown)  22 19:28  (units    

(unknown)



                    date)                                   unknown)  

 

           (unknown)  (no       (unknown)  (unknown)  17:34     (units    (unkno

wn)



                    date)                                   unknown)  

 

           (unknown)  (no       (unknown)  (unknown) 2018, He does not  (units  

  (unknown)



                    date)                         drink alcohol.? unknown)  



                                                  Patient and his           



                                                  wife state that he           



                                                  was seen in           

 

           (unknown)  (no       (unknown)  (unknown)  62-year-old  (units    (un

known)



                    date)                         gentleman with a unknown)  



                                                  history of multiple           



                                                  sclerosis,           



                                                  diabetes,           

 

           (unknown)  (no       (unknown)  (unknown)  ?         (units    (unkno

wn)



                    date)                                   unknown)  

 

           (unknown)  (no       (unknown)  (unknown)  ALT    16  (<50)  (units  

  (unknown)



                    date)                         IU/L      unknown)  

 

           (unknown)  (no       (unknown)  (unknown)  AST    19  (17-59)  (units

    (unknown)



                    date)                          IU/L     unknown)  

 

           (unknown)  (no       (unknown)  (unknown)  Age/Sex: 62 / M  (units   

 (unknown)



                    date)                                   unknown)  

 

           (unknown)  (no       (unknown)  (unknown)  Albumin    3.9  (units    

(unknown)



                    date)                         (3.5-5.0)  g/dL unknown)  

 

           (unknown)  (no       (unknown)  (unknown)  Albumin/Globulin  (units  

  (unknown)



                    date)                         Ratio    1.1 unknown)  



                                                  (1.0-2.8)           

 

           (unknown)  (no       (unknown)  (unknown)  Alkaline  (units    (unkno

wn)



                    date)                         Phosphatase    105 unknown)  



                                                  ()  U/L           

 

           (unknown)  (no       (unknown)  (unknown)  Allergy/AdvReac  (units   

 (unknown)



                    date)                         Type Severity unknown)  



                                                  Reaction Status           



                                                  Date / Time           

 

           (unknown)  (no       (unknown)  (unknown)  Approved by: [place]  (units 

   (unknown)



                    date)                         SHANNAN Lopez on unknown)  



                                                  2022 at 18:02?           

 

           (unknown)  (no       (unknown)  (unknown)  BUN    21 H  (units    (un

known)



                    date)                         (9-20)  mg/dL unknown)  

 

           (unknown)  (no       (unknown)  (unknown)  BUN/Creatinine  (units    

(unknown)



                    date)                         Ratio    14.8 unknown)  



                                                  (6-22)              

 

           (unknown)  (no       (unknown)  (unknown)  Baso # (Auto)   0  (units 

   (unknown)



                    date)                          (0-100)  /uL unknown)  

 

           (unknown)  (no       (unknown)  (unknown)  Baso % (Auto)  (units    (

unknown)



                    date)                         0.2   (0-2)  % unknown)  

 

           (unknown)  (no       (unknown)  (unknown)  Blood Pressure  (units    

(unknown)



                    date)                         124/66   22 unknown)  



                                                  17:34               

 

           (unknown)  (no       (unknown)  (unknown)  Blood Pressure  (units    

(unknown)



                    date)                         124/66    unknown)  

 

           (unknown)  (no       (unknown)  (unknown)  Bones and chest  (units   

 (unknown)



                    date)                         wall:? No unknown)  



                                                  suspicious bony           



                                                  lesions.? Overlying           



                                                  soft tissues           

 

           (unknown)  (no       (unknown)  (unknown)  CK-MB (CK-2)  (units    (u

nknown)



                    date)                         TNP       unknown)  

 

           (unknown)  (no       (unknown)  (unknown)  CK-MB (CK-2) Rel  (units  

  (unknown)



                    date)                         Index    TNP unknown)  

 

           (unknown)  (no       (unknown)  (unknown)  COMPARISON:?  (units    (u

nknown)



                    date)                         [facility], unknown)  



                                                  CR, XR CHEST 2V,           



                                                  3/18/2021, 10:19.           

 

           (unknown)  (no       (unknown)  (unknown)  COVID19 -Nasal  (units    

(unknown)



                    date)                         RAPID/Pre-Proc Stat unknown)  

 

           (unknown)  (no       (unknown)  (unknown)  CT abdomen pelvis  (units 

   (unknown)



                    date)                         w con Stat unknown)  

 

           (unknown)  (no       (unknown)  (unknown)  CVA (cerebral  (units    (

unknown)



                    date)                         vascular accident) unknown)  



                                                  (2018)           

 

           (unknown)  (no       (unknown)  (unknown)  Calcium    9.3  (units    

(unknown)



                    date)                         (8.4-10.2)  mg/dL unknown)  

 

           (unknown)  (no       (unknown)  (unknown)  Carbon Dioxide  (units    

(unknown)



                    date)                         29  (22-32)  mmol/L unknown)  

 

           (unknown)  (no       (unknown)  (unknown)  Cardio: denies  (units    

(unknown)



                    date)                         chest pain, unknown)  



                                                  palpitations           

 

           (unknown)  (no       (unknown)  (unknown)  Chest x-ray:  (units    (u

nknown)



                    date)                                   unknown)  

 

           (unknown)  (no       (unknown)  (unknown)  Chief complaint:  (units  

  (unknown)



                    date)                         Weakness  unknown)  

 

           (unknown)  (no       (unknown)  (unknown)  Chloride    96 L  (units  

  (unknown)



                    date)                         ()  mmol/L unknown)  

 

           (unknown)  (no       (unknown)  (unknown)  Colon cancer  (units    (u

nknown)



                    date)                         (2013)    unknown)  

 

           (unknown)  (no       (unknown)  (unknown)  Complete Blood  (units    

(unknown)



                    date)                         Count AUTO DIFF unknown)  



                                                  Stat                

 

           (unknown)  (no       (unknown)  (unknown)  Comprehensive  (units    (

unknown)



                    date)                         Metabolic Panel unknown)  



                                                  Stat                

 

           (unknown)  (no       (unknown)  (unknown)  Course    (units    (unkno

wn)



                    date)                                   unknown)  

 

           (unknown)  (no       (unknown)  (unknown)  Creatinine    1.42  (units

    (unknown)



                    date)                         H  (0.66-1.25) unknown)  



                                                  mg/dL               

 

           (unknown)  (no       (unknown)  (unknown)  : 1960  (units   

 (unknown)



                    date)                         Acct:[id number] unknown)  

 

           (unknown)  (no       (unknown)  (unknown)  Departure  (units    (unkn

own)



                    date)                                   unknown)  

 

           (unknown)  (no       (unknown)  (unknown)  Depression  (units    (unk

nown)



                    date)                                   unknown)  

 

           (unknown)  (no       (unknown)  (unknown)  Diabetes  (units    (unkno

wn)



                    date)                                   unknown)  

 

           (unknown)  (no       (unknown)  (unknown)  Diabetic  (units    (unkno

wn)



                    date)                         neuropathy unknown)  

 

           (unknown)  (no       (unknown)  (unknown)  Discharge Plan  (units    

(unknown)



                    date)                                   unknown)  

 

           (unknown)  (no       (unknown)  (unknown)  Discontinued  (units    (u

nknown)



                    date)                         Medications unknown)  

 

           (unknown)  (no       (unknown)  (unknown)  Gustavo Macias MD  (units   

 (unknown)



                    date)                         [[facility] unknown)  



                                                  Provider] -           

 

           (unknown)  (no       (unknown)  (unknown)  EKG-12 Lead Stat  (units  

  (unknown)



                    date)                                   unknown)  

 

           (unknown)  (no       (unknown)  (unknown)  ER Physician:  (units    (

unknown)



                    date)                         Kitty Costello unknown)  



                                                  ARNP                

 

           (unknown)  (no       (unknown)  (unknown)  Eos # (Auto)   100  (units

    (unknown)



                    date)                           (0-450)  /uL unknown)  

 

           (unknown)  (no       (unknown)  (unknown)  Eos % (Auto)   0.8  (units

    (unknown)



                    date)                         L   (2-4)  % unknown)  

 

           (unknown)  (no       (unknown)  (unknown)  Estimated GFR  (units    (

unknown)



                    date)                         55 L  (>60)  mL/min unknown)  

 

           (unknown)  (no       (unknown)  (unknown)  Exam      (units    (unkno

wn)



                    date)                                   unknown)  

 

           (unknown)  (no       (unknown)  (unknown)  Eyes: denies  (units    (u

nknown)



                    date)                         visual changes, eye unknown)  



                                                  pain                

 

           (unknown)  (no       (unknown)  (unknown)  FINDINGS:?  (units    (unk

nown)



                    date)                                   unknown)  

 

           (unknown)  (no       (unknown)  (unknown)  Family History  (units    

(unknown)



                    date)                         (Reviewed 22 unknown)  



                                                  @ 14:21 by Will Hansen MD)           

 

           (unknown)  (no       (unknown)  (unknown)  Father     (units 

   (unknown)



                    date)                          Cancer   unknown)  

 

           (unknown)  (no       (unknown)  (unknown)  GERD      (units    (unkno

wn)



                    date)                         (gastroesophageal unknown)  



                                                  reflux disease)           

 

           (unknown)  (no       (unknown)  (unknown)  GI: denies  (units    (unk

nown)



                    date)                         abdominal pain, unknown)  



                                                  nausea, vomiting,           



                                                  or diarrhea           

 

           (unknown)  (no       (unknown)  (unknown)  : denies  (units    (unk

nown)



                    date)                         dysuria, hematuria unknown)  



                                                  or flank pain           

 

           (unknown)  (no       (unknown)  (unknown)  General   (units    (unkno

wn)



                    date)                                   unknown)  

 

           (unknown)  (no       (unknown)  (unknown)  General: denies  (units   

 (unknown)



                    date)                         fever, chills, unknown)  



                                                  endorses weakness,           



                                                  right upper           



                                                  quadrant pain           

 

           (unknown)  (no       (unknown)  (unknown)  GenericComposite[P  (units

    (unknown)



                    date)                         lt Count   219 unknown)  



                                                  (150-400)  X10^3/uL           



                                                   ]                  

 

           (unknown)  (no       (unknown)  (unknown)  GenericComposite[R  (units

    (unknown)



                    date)                         BC   3.37 L unknown)  



                                                  (4.5-5.9)  X10^6/uL           



                                                   ]                  

 

           (unknown)  (no       (unknown)  (unknown)  GenericComposite[W  (units

    (unknown)



                    date)                         BC   7.0  unknown)  



                                                  (4.5-11.0)           



                                                  X10^3/uL  ]           

 

           (unknown)  (no       (unknown)  (unknown)  Globulin    3.6  (units   

 (unknown)



                    date)                         (1.7-4.1)  g/dL unknown)  

 

           (unknown)  (no       (unknown)  (unknown)  Glucose    262 H  (units  

  (unknown)



                    date)                         ()  mg/dL unknown)  

 

           (unknown)  (no       (unknown)  (unknown)  H/O colectomy  (units    (

unknown)



                    date)                                   unknown)  

 

           (unknown)  (no       (unknown)  (unknown)  HPI - Weakness  (units    

(unknown)



                    date)                                   unknown)  

 

           (unknown)  (no       (unknown)  (unknown)  HPI Narrative:  (units    

(unknown)



                    date)                                   unknown)  

 

           (unknown)  (no       (unknown)  (unknown)  Hct   26.7 L  (units    (u

nknown)



                    date)                         (41-53)  % unknown)  

 

           (unknown)  (no       (unknown)  (unknown)  Head/Neck: denies  (units 

   (unknown)



                    date)                         headache, neck pain unknown)  

 

           (unknown)  (no       (unknown)  (unknown)  Hgb   9.1 L  (units    (un

known)



                    date)                         (13.5-17.5)  g/dL unknown)  

 

           (unknown)  (no       (unknown)  (unknown)  History of Present  (units

    (unknown)



                    date)                         Illness   unknown)  

 

           (unknown)  (no       (unknown)  (unknown)  History of lumbar  (units 

   (unknown)



                    date)                         surgery () unknown)  

 

           (unknown)  (no       (unknown)  (unknown)  Hx of foot surgery  (units

    (unknown)



                    date)                         (2017)    unknown)  

 

           (unknown)  (no       (unknown)  (unknown)  Hx of shoulder  (units    

(unknown)



                    date)                         surgery (2010) unknown)  

 

           (unknown)  (no       (unknown)  (unknown)  Hypertension  (units    (u

nknown)



                    date)                                   unknown)  

 

           (unknown)  (no       (unknown)  (unknown)  IMPRESSION:? No  (units   

 (unknown)



                    date)                         acute     unknown)  



                                                  cardiopulmonary           



                                                  findings            

 

           (unknown)  (no       (unknown)  (unknown)  INDICATIONS:?  (units    (

unknown)



                    date)                         chest pain unknown)  

 

           (unknown)  (no       (unknown)  (unknown)  Imaging Data  (units    (u

nknown)



                    date)                                   unknown)  

 

           (unknown)  (no       (unknown)  (unknown)  Initial Vital  (units    (

unknown)



                    date)                         Signs     unknown)  

 

           (unknown)  (no       (unknown)  (unknown)  Initial Vital  (units    (

unknown)



                    date)                         Signs:    unknown)  

 

           (unknown)  (no       (unknown)  (unknown)  Lab Data  (units    (unkno

wn)



                    date)                                   unknown)  

 

           (unknown)  (no       (unknown)  (unknown)  Labs:     (units    (unkno

wn)



                    date)                                   unknown)  

 

           (unknown)  (no       (unknown)  (unknown)  Lactate (Lactic  (units   

 (unknown)



                    date)                         Acid) Stat unknown)  

 

           (unknown)  (no       (unknown)  (unknown)  KIMMIE Rocha  (units    (

unknown)



                    date)                         there.  He reports unknown)  



                                                  that he had an           



                                                  upper endoscopy           



                                                  completed here           

 

           (unknown)  (no       (unknown)  (unknown)  Lipase    32  (units    (u

nknown)



                    date)                         ()  U/L unknown)  

 

           (unknown)  (no       (unknown)  (unknown)  Lipase Stat  (units    (un

known)



                    date)                                   unknown)  

 

           (unknown)  (no       (unknown)  (unknown)  Lungs and pleura:?  (units

    (unknown)



                    date)                         Lungs are clear.? unknown)  



                                                  No pleural           



                                                  effusions or           



                                                  pneumothorax.? Low           

 

           (unknown)  (no       (unknown)  (unknown)  Lymph # (Auto)  (units    

(unknown)



                    date)                         400 L   (3467-4446) unknown)  



                                                   /uL                

 

           (unknown)  (no       (unknown)  (unknown)  Lymph % (Auto)  (units    

(unknown)



                    date)                         6.4 L   (25-40)  % unknown)  

 

           (unknown)  (no       (unknown)  (unknown)  MCH   26.9  (units    (unk

nown)



                    date)                         (26-34)  PG unknown)  

 

           (unknown)  (no       (unknown)  (unknown)  MCHC   34.0  (units    (un

known)



                    date)                         (30-36)  % unknown)  

 

           (unknown)  (no       (unknown)  (unknown)  MCV   79.1 L  (units    (u

nknown)



                    date)                         ()  fL unknown)  

 

           (unknown)  (no       (unknown)  (unknown)  MDM - Weakness  (units    

(unknown)



                    date)                                   unknown)  

 

           (unknown)  (no       (unknown)  (unknown)  MSK: denies new  (units   

 (unknown)



                    date)                         joint pain, muscle unknown)  



                                                  weakness or           



                                                  swelling            

 

           (unknown)  (no       (unknown)  (unknown)  Magnesium    1.0 L  (units

    (unknown)



                    date)                          (1.6-2.3)  mg/dL unknown)  

 

           (unknown)  (no       (unknown)  (unknown)  Magnesium Stat  (units    

(unknown)



                    date)                                   unknown)  

 

           (unknown)  (no       (unknown)  (unknown)  Magnesium Sulfate  (units 

   (unknown)



                    date)                         (Magnesium Sulfate) unknown)  



                                                   2 gm in 50 mls @           



                                                  50 mls/hr IV NOW           



                                                  ONE                 

 

           (unknown)  (no       (unknown)  (unknown)  Mediastinum:?  (units    (

unknown)



                    date)                         Mediastinal unknown)  



                                                  contours appear           



                                                  normal.? Heart size           



                                                  is normal.?           

 

           (unknown)  (no       (unknown)  (unknown)  Medical History  (units   

 (unknown)



                    date)                         (Reviewed 22 unknown)  



                                                  @ 14:21 by Wlil Hansen MD)           

 

           (unknown)  (no       (unknown)  (unknown)  Mode of arrival:  (units  

  (unknown)



                    date)                         Wheelchair unknown)  

 

           (unknown)  (no       (unknown)  (unknown)  Mono # (Auto)  (units    (

unknown)



                    date)                         600   (0-900)  /uL unknown)  

 

           (unknown)  (no       (unknown)  (unknown)  Mono % (Auto)  (units    (

unknown)



                    date)                         7.9   (3-14)  % unknown)  

 

           (unknown)  (no       (unknown)  (unknown)  Mother     (units 

   (unknown)



                    date)                          Cancer   unknown)  

 

           (unknown)  (no       (unknown)  (unknown)  Narrative:  (units    (unk

nown)



                    date)                                   unknown)  

 

           (unknown)  (no       (unknown)  (unknown)  Neuro: denies  (units    (

unknown)



                    date)                         numbness, tingling, unknown)  



                                                  dizziness           

 

           (unknown)  (no       (unknown)  (unknown)  Neut # (Auto)  (units    (

unknown)



                    date)                         5900   (3486-8705) unknown)  



                                                  /uL                 

 

           (unknown)  (no       (unknown)  (unknown)  Neut % (Auto)  (units    (

unknown)



                    date)                         84.7 H   (50-75)  % unknown)  

 

           (unknown)  (no       (unknown)  (unknown)  No Action  (units    (unkn

own)



                    date)                                   unknown)  

 

           (unknown)  (no       (unknown)  (unknown)  No Known Drug  (units    (

unknown)



                    date)                         Allergies Allergy unknown)  



                                                  Verified 22           



                                                  17:38               

 

           (unknown)  (no       (unknown)  (unknown)  Ondansetron HCl  (units   

 (unknown)



                    date)                         (Ondansetron 4 Mg/2 unknown)  



                                                  Ml Inj)  4 mg IV           



                                                  NOW ONE             

 

           (unknown)  (no       (unknown)  (unknown)  Ordered:  (units    (unkno

wn)



                    date)                                   unknown)  

 

           (unknown)  (no       (unknown)  (unknown)  Orders    (units    (unkno

wn)



                    date)                                   unknown)  

 

           (unknown)  (no       (unknown)  (unknown)  Osteoarthritis  (units    

(unknown)



                    date)                                   unknown)  

 

           (unknown)  (no       (unknown)  (unknown)  Oxygen Delivery  (units   

 (unknown)



                    date)                         Method    22 unknown)  



                                                  17:34               

 

           (unknown)  (no       (unknown)  (unknown)  Oxygen Delivery  (units   

 (unknown)



                    date)                         Method Room Air unknown)  

 

           (unknown)  (no       (unknown)  (unknown)  PROCEDURE:? XR  (units    

(unknown)



                    date)                         CHEST 1V  unknown)  

 

           (unknown)  (no       (unknown)  (unknown)  Patient History  (units   

 (unknown)



                    date)                                   unknown)  

 

           (unknown)  (no       (unknown)  (unknown)  Patient is  (units    (unk

nown)



                    date)                         currently unknown)  



                                                  anticoagulated on           



                                                  Plavix 37.5 mg           



                                                  daily.  Dr. Gómez           

 

           (unknown)  (no       (unknown)  (unknown) Patient reports  (units    

(unknown)



                    date)                         that when he has a unknown)  



                                                  bowel movement, he           



                                                  typically has hard           



                                                  pellets             

 

           (unknown)  (no       (unknown)  (unknown)  Patient:  (units    (unkno

wn)



                    date)                         Bret Esquivel R unknown)  



                                                  MR#: [id number]            

 

           (unknown)  (no       (unknown)  (unknown)  Postlaminectomy  (units   

 (unknown)



                    date)                         syndrome  unknown)  

 

           (unknown)  (no       (unknown)  (unknown)  Potassium    3.7  (units  

  (unknown)



                    date)                         (3.4-5.1)  mmol/L unknown)  

 

           (unknown)  (no       (unknown)  (unknown)  Prescriptions:  (units    

(unknown)



                    date)                                   unknown)  

 

           (unknown)  (no       (unknown)  (unknown)  Procalcitonin Stat  (units

    (unknown)



                    date)                                   unknown)  

 

           (unknown)  (no       (unknown)  (unknown)  Pulse Oximetry  98  (units

    (unknown)



                    date)                           22 17:34 unknown)  

 

           (unknown)  (no       (unknown)  (unknown)  Pulse Oximetry 98  (units 

   (unknown)



                    date)                                   unknown)  

 

           (unknown)  (no       (unknown)  (unknown)  Pulse Rate  77  (units    

(unknown)



                    date)                         22 17:34 unknown)  

 

           (unknown)  (no       (unknown)  (unknown)  Pulse Rate 77  (units    (

unknown)



                    date)                                   unknown)  

 

           (unknown)  (no       (unknown)  (unknown)  RDW   16.1 H  (units    (u

nknown)



                    date)                         (11.6-14.8)  % unknown)  

 

           (unknown)  (no       (unknown)  (unknown)  Referrals:  (units    (unk

nown)



                    date)                                   unknown)  

 

           (unknown)  (no       (unknown)  (unknown)  Related Data  (units    (u

nknown)



                    date)                                   unknown)  

 

           (unknown)  (no       (unknown)  (unknown)  Respiratory Rate  (units  

  (unknown)



                    date)                         18   22 17:34 unknown)  

 

           (unknown)  (no       (unknown)  (unknown)  Respiratory Rate  (units  

  (unknown)



                    date)                         18        unknown)  

 

           (unknown)  (no       (unknown)  (unknown)  Respiratory:  (units    (u

nknown)



                    date)                         denies shortness of unknown)  



                                                  breath, cough           

 

           (unknown)  (no       (unknown)  (unknown)  Result diagrams:  (units  

  (unknown)



                    date)                                   unknown)  

 

           (unknown)  (no       (unknown)  (unknown)  Review of Systems  (units 

   (unknown)



                    date)                                   unknown)  

 

           (unknown)  (no       (unknown)  (unknown)  SARS-CoV-2 (PCR)  (units  

  (unknown)



                    date)                         Negative  unknown)  



                                                  (Negative)           

 

           (unknown)  (no       (unknown)  (unknown)  Sciatica  (units    (unkno

wn)



                    date)                                   unknown)  

 

           (unknown)  (no       (unknown)  (unknown)  Signed By:  (units    (unk

nown)



                    date)                                   unknown)  

 

           (unknown)  (no       (unknown)  (unknown)  Skin: denies rash,  (units

    (unknown)



                    date)                         itching or wound unknown)  

 

           (unknown)  (no       (unknown)  (unknown)  Smoking Status:  (units   

 (unknown)



                    date)                         Never smoker unknown)  

 

           (unknown)  (no       (unknown)  (unknown)  Smoking Status:  (units   

 (unknown)



                    date)                         Never smoker unknown)  

 

           (unknown)  (no       (unknown)  (unknown)  Social History  (units    

(unknown)



                    date)                         (Reviewed 22 unknown)  



                                                  @ 13:31 by Tanya Rocha MD)           

 

           (unknown)  (no       (unknown)  (unknown)  Sodium    135 L  (units   

 (unknown)



                    date)                         (137-145)  mmol/L unknown)  

 

           (unknown)  (no       (unknown)  (unknown)  Sodium Chloride  (units   

 (unknown)



                    date)                         (Normal Saline unknown)  



                                                  0.9%)  1,000 mls @           



                                                  1,000 mls/hr IV           



                                                  BOLUS ONE           

 

           (unknown)  (no       (unknown)  (unknown)  Source: patient  (units   

 (unknown)



                    date)                         and family unknown)  

 

           (unknown)  (no       (unknown)  (unknown)  Spinal stenosis  (units   

 (unknown)



                    date)                                   unknown)  

 

           (unknown)  (no       (unknown)  (unknown)  Stated complaint:  (units 

   (unknown)



                    date)                         WEAKNESS UNABLE TO unknown)  



                                                  HOLD ANYTHING DOWN           

 

           (unknown)  (no       (unknown)  (unknown)  Substance Use  (units    (

unknown)



                    date)                         Type: does not use unknown)  

 

           (unknown)  (no       (unknown)  (unknown)  Surgical History  (units  

  (unknown)



                    date)                         (Reviewed 22 unknown)  



                                                  @ 14:21 by Will Hansen MD)           

 

           (unknown)  (no       (unknown)  (unknown)  Surgical changes  (units  

  (unknown)



                    date)                         and devices:? unknown)  



                                                  None.?              

 

           (unknown)  (no       (unknown)  (unknown)  TECHNIQUE:? One  (units   

 (unknown)



                    date)                         view of the chest unknown)  



                                                  was acquired.?           

 

           (unknown)  (no       (unknown)  (unknown)  Temperature  98.3  (units 

   (unknown)



                    date)                         F   22 17:34 unknown)  

 

           (unknown)  (no       (unknown)  (unknown)  Temperature 98.3 F  (units

    (unknown)



                    date)                                   unknown)  

 

           (unknown)  (no       (unknown)  (unknown)  Time Seen by  (units    (u

nknown)



                    date)                         Provider: 22 unknown)  



                                                  19:11               

 

           (unknown)  (no       (unknown)  (unknown)  Total Bilirubin  (units   

 (unknown)



                    date)                         0.6  (0.2-1.3) unknown)  



                                                  mg/dL               

 

           (unknown)  (no       (unknown)  (unknown)  Total Creatine  (units    

(unknown)



                    date)                         Kinase    54 L unknown)  



                                                  ()  U/L           

 

           (unknown)  (no       (unknown)  (unknown)  Total Protein  (units    (

unknown)



                    date)                         7.5  (6.3-8.2) unknown)  



                                                  g/dL                

 

           (unknown)  (no       (unknown)  (unknown)  Troponin + CK  (units    (

unknown)



                    date)                         Cardiac Panel Stat unknown)  

 

           (unknown)  (no       (unknown)  (unknown)  Troponin I    <  (units   

 (unknown)



                    date)                         0.012  (0.01-0.034) unknown)  



                                                   ng/mL              

 

           (unknown)  (no       (unknown)  (unknown)  Urine Microscopic  (units 

   (unknown)



                    date)                         Stat      unknown)  

 

           (unknown)  (no       (unknown)  (unknown)  Vital Signs  (units    (un

known)



                    date)                                   unknown)  

 

           (unknown)  (no       (unknown)  (unknown)  Vital signs:  (units    (u

nknown)



                    date)                                   unknown)  

 

           (unknown)  (no       (unknown)  (unknown)  XR chest 1V Stat  (units  

  (unknown)



                    date)                                   unknown)  

 

           (unknown)  (no       (unknown)  (unknown)  [Embedded Image  (units   

 (unknown)



                    date)                         Not Available] unknown)  

 

           (unknown)  (no       (unknown)  (unknown)  admitted, went to  (units 

   (unknown)



                    date)                         sound view for unknown)  



                                                  rehab following his           



                                                  admission and has           



                                                  followed            

 

           (unknown)  (no       (unknown)  (unknown)  alcohol intake  (units    

(unknown)



                    date)                         frequency: a few unknown)  



                                                  times a month           

 

           (unknown)  (no       (unknown)  (unknown)  alcohol intake:  (units   

 (unknown)



                    date)                         current   unknown)  

 

           (unknown)  (no       (unknown)  (unknown)  appear    (units    (unkno

wn)



                    date)                                   unknown)  

 

           (unknown)  (no       (unknown)  (unknown)  ascorbic acid  (units    (

unknown)



                    date)                         (vitamin C) 500 mg unknown)  



                                                  500 mg PO BID #60           



                                                  tabs 22           

 

           (unknown)  (no       (unknown)  (unknown)  at this hospital  (units  

  (unknown)



                    date)                         and on chart review unknown)  



                                                  it appears that it           



                                                  was completed on           

 

           (unknown)  (no       (unknown)  (unknown)  atorvastatin 80 mg  (units

    (unknown)



                    date)                         tablet 40 mg PO QPM unknown)  



                                                  20           

 

           (unknown)  (no       (unknown)  (unknown)  be causing a rash  (units 

   (unknown)



                    date)                                   unknown)  

 

           (unknown)  (no       (unknown)  (unknown)  buspirone 5 mg  (units    

(unknown)



                    date)                         tablet 5 mg PO BID unknown)  



                                                  #60 tabs 22           

 

           (unknown)  (no       (unknown)  (unknown)  clopidogrel 75 mg  (units 

   (unknown)



                    date)                         tablet 75 mg PO unknown)  



                                                  DAILY 22            

 

           (unknown)  (no       (unknown)  (unknown)  cyanocobalamin  (units    

(unknown)



                    date)                         (vitamin B-12) 500 unknown)  



                                                  1,000 mcg PO DAILY           



                                                  #60 tabs 22           

 

           (unknown)  (no       (unknown)  (unknown)  ferrous sulfate  (units   

 (unknown)



                    date)                         325 mg (65 mg 325 unknown)  



                                                  mg PO BIDWM #60           



                                                  tabs 22           

 

           (unknown)  (no       (unknown)  (unknown)  followed by loose  (units 

   (unknown)



                    date)                         stool.    unknown)  

 

           (unknown)  (no       (unknown)  (unknown)  gabapentin 300 mg  (units 

   (unknown)



                    date)                         capsule 300 mg PO unknown)  



                                                  BID 18            

 

           (unknown)  (no       (unknown)  (unknown)  glipizide 10 mg  (units   

 (unknown)



                    date)                         tablet 10 mg PO BID unknown)  



                                                  18           

 

           (unknown)  (no       (unknown)  (unknown)  history of colon  (units  

  (unknown)



                    date)                         cancer.  Postop unknown)  



                                                  diagnosis from this           



                                                  upper endoscopy was           



                                                  the                 

 

           (unknown)  (no       (unknown)  (unknown)  household members:  (units

    (unknown)



                    date)                          spouse   unknown)  

 

           (unknown)  (no       (unknown)  (unknown)  hypertension,  (units    (

unknown)



                    date)                         colorectal cancer unknown)  



                                                  in  with a           



                                                  reverse             



                                                  colonoscopy, a           



                                                  stroke in           

 

           (unknown)  (no       (unknown)  (unknown)  in 2022  (units   

 (unknown)



                    date)                         with iron unknown)  



                                                  deficiency anemia,           



                                                  altered bowel           



                                                  habit, personal           

 

           (unknown)  (no       (unknown)  (unknown)  iron) tablet  (units    (u

nknown)



                    date)                                   unknown)  

 

           (unknown)  (no       (unknown)  (unknown)  lives     (units    (unkno

wn)



                    date)                         independently:  Yes unknown)  

 

           (unknown)  (no       (unknown)  (unknown)  losartan 50 mg  (units    

(unknown)



                    date)                         tablet 25 mg PO QPM unknown)  



                                                  20           

 

           (unknown)  (no       (unknown)  (unknown)  lung      (units    (unkno

wn)



                    date)                                   unknown)  

 

           (unknown)  (no       (unknown)  (unknown)  mcg tablet  (units    (unk

nown)



                    date)                                   unknown)  

 

           (unknown)  (no       (unknown)  (unknown)  mcg tablet  (units    (unk

nown)



                    date)                         (Tab-A-Lucy) unknown)  

 

           (unknown)  (no       (unknown)  (unknown)  metformin 500 mg  (units  

  (unknown)



                    date)                         tablet,extended 500 unknown)  



                                                  mg PO BID 18            

 

           (unknown)  (no       (unknown)  (unknown)  metoprolol  (units    (unk

nown)



                    date)                         succinate 25 mg 25 unknown)  



                                                  mg PO DAILY           



                                                  21           

 

           (unknown)  (no       (unknown)  (unknown)  moving forward.  (units   

 (unknown)



                    date)                                   unknown)  

 

           (unknown)  (no       (unknown)  (unknown)  multivitamin with  (units 

   (unknown)



                    date)                         folic acid 400 1 unknown)  



                                                  tab PO DAILY #90           



                                                  tabs 22           

 

           (unknown)  (no       (unknown)  (unknown)  ondansetron HCl 4  (units 

   (unknown)



                    date)                         mg tablet 4 mg PO unknown)  



                                                  Q8H PRN nausea and           



                                                  21            

 

           (unknown)  (no       (unknown)  (unknown)  oral powder packet  (units

    (unknown)



                    date)                                   unknown)  

 

           (unknown)  (no       (unknown)  (unknown)  polyethylene  (units    (u

nknown)



                    date)                         glycol 3350 17 gram unknown)  



                                                  17 gm PO DAILY #90           



                                                  ea 22           

 

           (unknown)  (no       (unknown)  (unknown)  related diarrhea  (units  

  (unknown)



                    date)                         from fecal loading unknown)  



                                                  and obstipation.           



                                                  Recommended MiraLax           



                                                  17 g                

 

           (unknown)  (no       (unknown)  (unknown)  release 24 hr  (units    (

unknown)



                    date)                                   unknown)  

 

           (unknown)  (no       (unknown)  (unknown)  reported that  (units    (

unknown)



                    date)                         patient's altered unknown)  



                                                  bowel habit is           



                                                  likely a function           



                                                  of overflow           

 

           (unknown)  (no       (unknown)  (unknown)  retrosigmoid  (units    (u

nknown)



                    date)                         colonic   unknown)  



                                                  anastomosis, and a           



                                                  suboptimal bowel           



                                                  prep.               

 

           (unknown)  (no       (unknown)  (unknown)  same.  His  (units    (unk

nown)



                    date)                         endoscopic unknown)  



                                                  diagnosis includes           



                                                  mild gastropathy,           



                                                  esophagitis, patent           

 

           (unknown)  (no       (unknown)  (unknown)  sennosides 8.6 mg  (units 

   (unknown)



                    date)                         tablet (senna) 17.2 unknown)  



                                                  mg PO BEDTIME #60           



                                                  tabs 22           

 

           (unknown)  (no       (unknown)  (unknown)  substance use  (units    (

unknown)



                    date)                         type:  does not use unknown)  

 

           (unknown)  (no       (unknown)  (unknown)  tablet (Vitamin C)  (units

    (unknown)



                    date)                                   unknown)  

 

           (unknown)  (no       (unknown)  (unknown)  tablet,extended  (units   

 (unknown)



                    date)                         release 24 hr unknown)  

 

           (unknown)  (no       (unknown)  (unknown)  the emergency  (units    (

unknown)



                    date)                         department in unknown)  



                                                  January and           



                                                  diagnosed with a GI           



                                                  bleed, he was           

 

           (unknown)  (no       (unknown)  (unknown)  trospium 20 mg  (units    

(unknown)



                    date)                         tablet 20 mg PO BID unknown)  



                                                  urinary retention           



                                                  22           

 

           (unknown)  (no       (unknown)  (unknown)  unremarkable.?  (units    

(unknown)



                    date)                                   unknown)  

 

           (unknown)  (no       (unknown)  (unknown)  up with   (units    (unkno

wn)



                    date)                         Gastroenterology unknown)  



                                                  from [place],           



                                                  patient reports           



                                                  that he sees Yasmine           

 

           (unknown)  (no       (unknown)  (unknown)  volumes accentuate  (units

    (unknown)



                    date)                         pulmonary unknown)  



                                                  interstitium and           



                                                  heart size.           









                                         Result panel 17









           (unknown)  (no date)  (unknown)  (unknown)  2.6       mmol/L    (unkn

own)









                                         Result panel 18









           (unknown)  (no       (unknown)  (unknown)  (no value)  (units    (unk

nown)



                    date)                                   unknown)  

 

           (unknown)  (no       (unknown)  (unknown)  Radiologist  (units    (un

known)



                    date)                         Impression: unknown)  

 

           (unknown)  (no       (unknown)  (unknown)  Date of Service:  (units  

  (unknown)



                    date)                         22  unknown)  

 

           (unknown)  (no       (unknown)  (unknown)  (no value)  (units    (unk

nown)



                    date)                                   unknown)  

 

           (unknown)  (no       (unknown)  (unknown)  22 17:45  (units    

(unknown)



                    date)                                   unknown)  

 

           (unknown)  (no       (unknown)  (unknown)  1 tab PO DAILY  (units    

(unknown)



                    date)                         Qty: 90 0RF unknown)  

 

           (unknown)  (no       (unknown)  (unknown)  1,000 mcg PO DAILY  (units

    (unknown)



                    date)                         Qty: 60 0RF unknown)  

 

           (unknown)  (no       (unknown)  (unknown)  10 mg PO BID  (units    (u

nknown)



                    date)                                   unknown)  

 

           (unknown)  (no       (unknown)  (unknown)  17 gm PO DAILY  (units    

(unknown)



                    date)                         Qty: 90 0RF unknown)  

 

           (unknown)  (no       (unknown)  (unknown)  17.2 mg PO BEDTIME  (units

    (unknown)



                    date)                         Qty: 60 0RF unknown)  

 

           (unknown)  (no       (unknown)  (unknown)  20 mg PO BID  (units    (u

nknown)



                    date)                                   unknown)  

 

           (unknown)  (no       (unknown)  (unknown)  25 mg PO DAILY  (units    

(unknown)



                    date)                                   unknown)  

 

           (unknown)  (no       (unknown)  (unknown)  25 mg PO QPM  (units    (u

nknown)



                    date)                                   unknown)  

 

           (unknown)  (no       (unknown)  (unknown)  300 mg PO BID  (units    (

unknown)



                    date)                                   unknown)  

 

           (unknown)  (no       (unknown)  (unknown)  325 mg PO BIDWM  (units   

 (unknown)



                    date)                         Qty: 60 0RF unknown)  

 

           (unknown)  (no       (unknown)  (unknown)  4 mg PO Q8H PRN  (units   

 (unknown)



                    date)                         (Reason: nausea and unknown)  



                                                  vomiting) Qty: 14           



                                                  0RF                 

 

           (unknown)  (no       (unknown)  (unknown)  40 mg PO QPM  (units    (u

nknown)



                    date)                                   unknown)  

 

           (unknown)  (no       (unknown)  (unknown)  5 mg PO BID Qty:  (units  

  (unknown)



                    date)                         60 0RF    unknown)  

 

           (unknown)  (no       (unknown)  (unknown)  500 mg PO BID  (units    (

unknown)



                    date)                                   unknown)  

 

           (unknown)  (no       (unknown)  (unknown)  500 mg PO BID Qty:  (units

    (unknown)



                    date)                         60 0RF    unknown)  

 

           (unknown)  (no       (unknown)  (unknown)  75 mg PO DAILY  (units    

(unknown)



                    date)                                   unknown)  

 

           (unknown)  (no       (unknown)  (unknown)  Allergies  (units    (unkn

own)



                    date)                                   unknown)  

 

           (unknown)  (no       (unknown)  (unknown)  Documented By: [facility]  (units

    (unknown)



                    date)                                   unknown)  

 

           (unknown)  (no       (unknown)  (unknown)  ED Orders  (units    (unkn

own)



                    date)                                   unknown)  

 

           (unknown)  (no       (unknown)  (unknown)  Emergency Report  (units  

  (unknown)



                    date)                                   unknown)  

 

           (unknown)  (no       (unknown)  (unknown)  Home Medications  (units  

  (unknown)



                    date)                                   unknown)  

 

           (unknown)  (no       (unknown)  (unknown)  [facility]  (units   

 (unknown)



                    date)                         [address] unknown)  



                                                  [place] [address]           

 

           (unknown)  (no       (unknown)  (unknown)  Lab Results  (units    (un

known)



                    date)                                   unknown)  

 

           (unknown)  (no       (unknown)  (unknown)  Label Comments:  (units   

 (unknown)



                    date)                                   unknown)  

 

           (unknown)  (no       (unknown)  (unknown)  Last Admin:  (units    (un

known)



                    date)                         22 18:30 unknown)  



                                                  Dose: 4 mg           

 

           (unknown)  (no       (unknown)  (unknown)  Previous Rx's  (units    (

unknown)



                    date)                                   unknown)  

 

           (unknown)  (no       (unknown)  (unknown)  Stop: 22  (units    

(unknown)



                    date)                         18:16     unknown)  

 

           (unknown)  (no       (unknown)  (unknown)  Stop: 22  (units    

(unknown)



                    date)                         20:25     unknown)  

 

           (unknown)  (no       (unknown)  (unknown)  Stop: 22  (units    

(unknown)



                    date)                         20:27     unknown)  

 

           (unknown)  (no       (unknown)  (unknown)  Vital Signs - 8 hr  (units

    (unknown)



                    date)                                   unknown)  

 

           (unknown)  (no       (unknown)  (unknown)  has not been  (units    (u

nknown)



                    date)                         eating so hasn't unknown)  



                                                  taken recently.           



                                                  spouse states           



                                                  thinks it might           

 

           (unknown)  (no       (unknown)  (unknown)  stopped taking  (units    

(unknown)



                    date)                         prior to back unknown)  



                                                  surgery and did not           



                                                  restart             

 

           (unknown)  (no       (unknown)  (unknown)  (no value)  (units    (unk

nown)



                    date)                                   unknown)  

 

           (unknown)  (no       (unknown)  (unknown)  22  (units 

   (unknown)



                    date)                         22  unknown)  



                                                  Range/Units           

 

           (unknown)  (no       (unknown)  (unknown)  17:40 17:45 17:45  (units 

   (unknown)



                    date)                                   unknown)  

 

           (unknown)  (no       (unknown)  (unknown)  ascorbic acid  (units    (

unknown)



                    date)                         (vitamin C) unknown)  



                                                  [Vitamin C] 500 mg           



                                                  Tablet              

 

           (unknown)  (no       (unknown)  (unknown)  atorvastatin 80 mg  (units

    (unknown)



                    date)                         tablet    unknown)  

 

           (unknown)  (no       (unknown)  (unknown)  buspirone 5 mg  (units    

(unknown)



                    date)                         Tablet    unknown)  

 

           (unknown)  (no       (unknown)  (unknown)  clopidogrel 75 mg  (units 

   (unknown)



                    date)                         tablet    unknown)  

 

           (unknown)  (no       (unknown)  (unknown)  cyanocobalamin  (units    

(unknown)



                    date)                         (vitamin B-12) 500 unknown)  



                                                  mcg Tablet           

 

           (unknown)  (no       (unknown)  (unknown)  ferrous sulfate  (units   

 (unknown)



                    date)                         325 mg (65 mg iron) unknown)  



                                                  Tablet              

 

           (unknown)  (no       (unknown)  (unknown)  gabapentin 300 mg  (units 

   (unknown)



                    date)                         capsule   unknown)  

 

           (unknown)  (no       (unknown)  (unknown)  glipizide 10 mg  (units   

 (unknown)



                    date)                         tablet    unknown)  

 

           (unknown)  (no       (unknown)  (unknown)  losartan 50 mg  (units    

(unknown)



                    date)                         tablet    unknown)  

 

           (unknown)  (no       (unknown)  (unknown)  metformin 500 mg  (units  

  (unknown)



                    date)                         tablet extended unknown)  



                                                  release 24 hr           

 

           (unknown)  (no       (unknown)  (unknown)  metoprolol  (units    (unk

nown)



                    date)                         succinate 25 mg unknown)  



                                                  tablet extended           



                                                  release 24 hr           

 

           (unknown)  (no       (unknown)  (unknown)  multivitamin with  (units 

   (unknown)



                    date)                         folic acid unknown)  



                                                  [Tab-A-Lucy] 400           



                                                  mcg Tablet           

 

           (unknown)  (no       (unknown)  (unknown)  ondansetron HCl  (units   

 (unknown)



                    date)                         [Zofran] 4 mg unknown)  



                                                  tablet              

 

           (unknown)  (no       (unknown)  (unknown)  polyethylene  (units    (u

nknown)



                    date)                         glycol 3350 17 gram unknown)  



                                                  Powder In Packet           

 

           (unknown)  (no       (unknown)  (unknown)  sennosides [senna]  (units

    (unknown)



                    date)                         8.6 mg Tablet unknown)  

 

           (unknown)  (no       (unknown)  (unknown)  trospium 20 mg  (units    

(unknown)



                    date)                         tablet    unknown)  

 

           (unknown)  (no       (unknown)  (unknown)  22  (units    (unkno

wn)



                    date)                                   unknown)  

 

           (unknown)  (no       (unknown)  (unknown)  Medication  (units    (unk

nown)



                    date)                         Instructions unknown)  



                                                  Recorded            

 

           (unknown)  (no       (unknown)  (unknown)  Medication  (units    (unk

nown)



                    date)                         Instructions unknown)  



                                                  Recorded  Confirmed           

 

           (unknown)  (no       (unknown)  (unknown)  (Zofran) vomiting  (units 

   (unknown)



                    date)                         #14 tabs  unknown)  

 

           (unknown)  (no       (unknown)  (unknown)  [id number]   (units    (unkno

wn)



                    date)                                   unknown)  

 

           (unknown)  (no       (unknown)  (unknown)  22 17:39  (units    

(unknown)



                    date)                                   unknown)  

 

           (unknown)  (no       (unknown)  (unknown)  22 17:40  (units    

(unknown)



                    date)                                   unknown)  

 

           (unknown)  (no       (unknown)  (unknown)  22 17:45  (units    

(unknown)



                    date)                                   unknown)  

 

           (unknown)  (no       (unknown)  (unknown)  22 19:26  (units    

(unknown)



                    date)                                   unknown)  

 

           (unknown)  (no       (unknown)  (unknown)  22 19:28  (units    

(unknown)



                    date)                                   unknown)  

 

           (unknown)  (no       (unknown)  (unknown)  17:34     (units    (unkno

wn)



                    date)                                   unknown)  

 

           (unknown)  (no       (unknown)  (unknown)  with iron  (units    (

unknown)



                    date)                         deficiency anemia, unknown)  



                                                  altered bowel           



                                                  habit, personal           



                                                  history of colon           

 

           (unknown)  (no       (unknown)  (unknown)  62-year-old  (units    (un

known)



                    date)                         gentleman with a unknown)  



                                                  history of multiple           



                                                  sclerosis, CVA in           



                                                  2019,               

 

           (unknown)  (no       (unknown)  (unknown)  ?         (units    (unkno

wn)



                    date)                                   unknown)  

 

           (unknown)  (no       (unknown)  (unknown)  ALT    16  (<50)  (units  

  (unknown)



                    date)                         IU/L      unknown)  

 

           (unknown)  (no       (unknown)  (unknown)  AST    19  (17-59)  (units

    (unknown)



                    date)                          IU/L     unknown)  

 

           (unknown)  (no       (unknown)  (unknown)  Age/Sex: 62 / M  (units   

 (unknown)



                    date)                                   unknown)  

 

           (unknown)  (no       (unknown)  (unknown)  Albumin    3.9  (units    

(unknown)



                    date)                         (3.5-5.0)  g/dL unknown)  

 

           (unknown)  (no       (unknown)  (unknown)  Albumin/Globulin  (units  

  (unknown)



                    date)                         Ratio    1.1 unknown)  



                                                  (1.0-2.8)           

 

           (unknown)  (no       (unknown)  (unknown)  Alkaline  (units    (unkno

wn)



                    date)                         Phosphatase    105 unknown)  



                                                  ()  U/L           

 

           (unknown)  (no       (unknown)  (unknown)  Allergy/AdvReac  (units   

 (unknown)



                    date)                         Type Severity unknown)  



                                                  Reaction Status           



                                                  Date / Time           

 

           (unknown)  (no       (unknown)  (unknown)  Approved by: Garfield Mcdanielsunits 

   (unknown)



                    date)                         Lopez, M.D. on unknown)  



                                                  2022 at 18:02?           

 

           (unknown)  (no       (unknown)  (unknown)  BUN    21 H  (units    (un

known)



                    date)                         (9-20)  mg/dL unknown)  

 

           (unknown)  (no       (unknown)  (unknown)  BUN/Creatinine  (units    

(unknown)



                    date)                         Ratio    14.8 unknown)  



                                                  (6-22)              

 

           (unknown)  (no       (unknown)  (unknown)  Baso # (Auto)   0  (units 

   (unknown)



                    date)                          (0-100)  /uL unknown)  

 

           (unknown)  (no       (unknown)  (unknown)  Baso % (Auto)  (units    (

unknown)



                    date)                         0.2   (0-2)  % unknown)  

 

           (unknown)  (no       (unknown)  (unknown)  Blood Pressure  (units    

(unknown)



                    date)                         124/66   22 unknown)  



                                                  17:34               

 

           (unknown)  (no       (unknown)  (unknown)  Blood Pressure  (units    

(unknown)



                    date)                         124    unknown)  

 

           (unknown)  (no       (unknown)  (unknown)  Bones and chest  (units   

 (unknown)



                    date)                         wall:? No unknown)  



                                                  suspicious bony           



                                                  lesions.? Overlying           



                                                  soft tissues           

 

           (unknown)  (no       (unknown)  (unknown)  CK-MB (CK-2)  (units    (u

nknown)



                    date)                         TNP       unknown)  

 

           (unknown)  (no       (unknown)  (unknown)  CK-MB (CK-2) Rel  (units  

  (unknown)



                    date)                         Index    TNP unknown)  

 

           (unknown)  (no       (unknown)  (unknown)  COMPARISON:?  (units    (u

nknown)



                    date)                         [place] [facility] [facility]  



                                                  CR, XR CHEST 2V,           



                                                  3/18/2021, 10:19.           

 

           (unknown)  (no       (unknown)  (unknown)  COVID19 -Nasal  (units    

(unknown)



                    date)                         RAPID/Pre-Proc Stat unknown)  

 

           (unknown)  (no       (unknown)  (unknown)  CT abdomen pelvis  (units 

   (unknown)



                    date)                         w con Stat unknown)  

 

           (unknown)  (no       (unknown)  (unknown)  CVA (cerebral  (units    (

unknown)



                    date)                         vascular accident) unknown)  



                                                  (2018)           

 

           (unknown)  (no       (unknown)  (unknown)  Calcium    9.3  (units    

(unknown)



                    date)                         (8.4-10.2)  mg/dL unknown)  

 

           (unknown)  (no       (unknown)  (unknown)  Carbon Dioxide  (units    

(unknown)



                    date)                         29  (22-32)  mmol/L unknown)  

 

           (unknown)  (no       (unknown)  (unknown)  Cardio: denies  (units    

(unknown)



                    date)                         chest pain, unknown)  



                                                  palpitations           

 

           (unknown)  (no       (unknown)  (unknown)  Cardiovascular:  (units   

 (unknown)



                    date)                         regular rate and unknown)  



                                                  rhythm, no           



                                                  peripheral edema,           



                                                  warm extremities           

 

           (unknown)  (no       (unknown)  (unknown)  Chest x-ray:  (units    (u

nknown)



                    date)                                   unknown)  

 

           (unknown)  (no       (unknown)  (unknown)  Chief complaint:  (units  

  (unknown)



                    date)                         Weakness  unknown)  

 

           (unknown)  (no       (unknown)  (unknown)  Chloride    96 L  (units  

  (unknown)



                    date)                         ()  mmol/L unknown)  

 

           (unknown)  (no       (unknown)  (unknown)  Colon cancer  (units    (u

nknown)



                    date)                         (2013)    unknown)  

 

           (unknown)  (no       (unknown)  (unknown)  Complete Blood  (units    

(unknown)



                    date)                         Count AUTO DIFF unknown)  



                                                  Stat                

 

           (unknown)  (no       (unknown)  (unknown)  Comprehensive  (units    (

unknown)



                    date)                         Metabolic Panel unknown)  



                                                  Stat                

 

           (unknown)  (no       (unknown)  (unknown)  Course    (units    (unkno

wn)



                    date)                                   unknown)  

 

           (unknown)  (no       (unknown)  (unknown)  Creatinine    1.42  (units

    (unknown)



                    date)                         H  (0.66-1.25) unknown)  



                                                  mg/dL               

 

           (unknown)  (no       (unknown)  (unknown)  : 1960  (units   

 (unknown)



                    date)                         Acct:[id number] unknown)  

 

           (unknown)  (no       (unknown)  (unknown)  Departure  (units    (unkn

own)



                    date)                                   unknown)  

 

           (unknown)  (no       (unknown)  (unknown)  Depression  (units    (unk

nown)



                    date)                                   unknown)  

 

           (unknown)  (no       (unknown)  (unknown)  Diabetes  (units    (unkno

wn)



                    date)                                   unknown)  

 

           (unknown)  (no       (unknown)  (unknown)  Diabetic  (units    (unkno

wn)



                    date)                         neuropathy unknown)  

 

           (unknown)  (no       (unknown)  (unknown)  Discharge Plan  (units    

(unknown)



                    date)                                   unknown)  

 

           (unknown)  (no       (unknown)  (unknown)  Discontinued  (units    (u

nknown)



                    date)                         Medications unknown)  

 

           (unknown)  (no       (unknown)  (unknown)  Gustavo Macias MD  (units   

 (unknown)



                    date)                         [Primary Care unknown)  



                                                  Provider] -           

 

           (unknown)  (no       (unknown)  (unknown)  EKG-12 Lead Stat  (units  

  (unknown)



                    date)                                   unknown)  

 

           (unknown)  (no       (unknown)  (unknown)  ER Physician:  (units    (

unknown)



                    date)                         Kitty Costello unknown)  



                                                  ARNP                

 

           (unknown)  (no       (unknown)  (unknown)  Eos # (Auto)   100  (units

    (unknown)



                    date)                           (0-450)  /uL unknown)  

 

           (unknown)  (no       (unknown)  (unknown)  Eos % (Auto)   0.8  (units

    (unknown)



                    date)                         L   (2-4)  % unknown)  

 

           (unknown)  (no       (unknown)  (unknown)  Estimated GFR  (units    (

unknown)



                    date)                         55 L  (>60)  mL/min unknown)  

 

           (unknown)  (no       (unknown)  (unknown)  Exam      (units    (unkno

wn)



                    date)                                   unknown)  

 

           (unknown)  (no       (unknown)  (unknown)  Exam Narrative:  (units   

 (unknown)



                    date)                                   unknown)  

 

           (unknown)  (no       (unknown)  (unknown)  Eyes: denies  (units    (u

nknown)



                    date)                         visual changes, eye unknown)  



                                                  pain                

 

           (unknown)  (no       (unknown)  (unknown)  Eyes: equal round  (units 

   (unknown)



                    date)                         and reactive, EOMI, unknown)  



                                                  conjunctiva normal           

 

           (unknown)  (no       (unknown)  (unknown)  FINDINGS:?  (units    (unk

nown)



                    date)                                   unknown)  

 

           (unknown)  (no       (unknown)  (unknown)  Family History  (units    

(unknown)



                    date)                         (Reviewed 22 unknown)  



                                                  @ 19:57 by ZAIRA Amezcua)           

 

           (unknown)  (no       (unknown)  (unknown)  Father     (units 

   (unknown)



                    date)                          Cancer   unknown)  

 

           (unknown)  (no       (unknown)  (unknown)  GERD      (units    (unkno

wn)



                    date)                         (gastroesophageal unknown)  



                                                  reflux disease)           

 

           (unknown)  (no       (unknown)  (unknown)  GI:  Right lower  (units  

  (unknown)



                    date)                         quadrant tenderness unknown)  



                                                  to palpation           

 

           (unknown)  (no       (unknown)  (unknown)  GI: abdomen mildly  (units

    (unknown)



                    date)                         guarded, tender to unknown)  



                                                  palpation in the           



                                                  right lower           



                                                  quadrant,           

 

           (unknown)  (no       (unknown)  (unknown)  :  Straight cath  (units

    (unknown)



                    date)                         for urine, no rash unknown)  



                                                  ingrown, no penile           



                                                  discharge, no           



                                                  scrotal             

 

           (unknown)  (no       (unknown)  (unknown)  : denies  (units    (unk

nown)



                    date)                         dysuria, hematuria unknown)  



                                                  or flank pain,           



                                                  reports oliguria,           



                                                  patient self           

 

           (unknown)  (no       (unknown)  (unknown)  Gastroenterology  (units  

  (unknown)



                    date)                         from [place], unknown)  



                                                  patient reports           



                                                  that he sees Yasmine Rocha PA-C           

 

           (unknown)  (no       (unknown)  (unknown)  General   (units    (unkno

wn)



                    date)                                   unknown)  

 

           (unknown)  (no       (unknown)  (unknown)  General:  (units    (unkno

wn)



                    date)                         cooperative, unknown)  



                                                  comfortable, in no           



                                                  acute distress,           



                                                  well groomed,           

 

           (unknown)  (no       (unknown)  (unknown)  General: denies  (units   

 (unknown)



                    date)                         fever, chills, unknown)  



                                                  endorses weakness,           



                                                  right lower           



                                                  quadrant pain,           

 

           (unknown)  (no       (unknown)  (unknown)  GenericComposite[P  (units

    (unknown)



                    date)                         lt Count   219 unknown)  



                                                  (150-400)  X10^3/uL           



                                                   ]                  

 

           (unknown)  (no       (unknown)  (unknown)  GenericComposite[R  (units

    (unknown)



                    date)                         BC   3.37 L unknown)  



                                                  (4.5-5.9)  X10^6/uL           



                                                   ]                  

 

           (unknown)  (no       (unknown)  (unknown)  GenericComposite[W  (units

    (unknown)



                    date)                         BC   7.0  unknown)  



                                                  (4.5-11.0)           



                                                  X10^3/uL  ]           

 

           (unknown)  (no       (unknown)  (unknown)  Globulin    3.6  (units   

 (unknown)



                    date)                         (1.7-4.1)  g/dL unknown)  

 

           (unknown)  (no       (unknown)  (unknown)  Glucose    262 H  (units  

  (unknown)



                    date)                         ()  mg/dL unknown)  

 

           (unknown)  (no       (unknown)  (unknown)  Guaiac stool:  (units    (

unknown)



                    date)                                   unknown)  

 

           (unknown)  (no       (unknown)  (unknown)  H/O colectomy  (units    (

unknown)



                    date)                                   unknown)  

 

           (unknown)  (no       (unknown)  (unknown)  HPI - Weakness  (units    

(unknown)



                    date)                                   unknown)  

 

           (unknown)  (no       (unknown)  (unknown)  HPI Narrative:  (units    

(unknown)



                    date)                                   unknown)  

 

           (unknown)  (no       (unknown)  (unknown)  Hct   26.7 L  (units    (u

nknown)



                    date)                         (41-53)  % unknown)  

 

           (unknown)  (no       (unknown)  (unknown)  Head/Neck:  (units    (unk

nown)



                    date)                         Endorses having a unknown)  



                                                  headache, denies           



                                                  any neck pain           

 

           (unknown)  (no       (unknown)  (unknown)  Head: atraumatic,  (units 

   (unknown)



                    date)                         symmetrical facial unknown)  



                                                  expressions           

 

           (unknown)  (no       (unknown)  (unknown)  Hgb   9.1 L  (units    (un

known)



                    date)                         (13.5-17.5)  g/dL unknown)  

 

           (unknown)  (no       (unknown)  (unknown)  History of Present  (units

    (unknown)



                    date)                         Illness   unknown)  

 

           (unknown)  (no       (unknown)  (unknown)  History of lumbar  (units 

   (unknown)



                    date)                         surgery () unknown)  

 

           (unknown)  (no       (unknown)  (unknown)  Hx of foot surgery  (units

    (unknown)



                    date)                         (2017)    unknown)  

 

           (unknown)  (no       (unknown)  (unknown)  Hx of shoulder  (units    

(unknown)



                    date)                         surgery () unknown)  

 

           (unknown)  (no       (unknown)  (unknown)  Hypertension  (units    (u

nknown)



                    date)                                   unknown)  

 

           (unknown)  (no       (unknown)  (unknown)  IMPRESSION:? No  (units   

 (unknown)



                    date)                         acute     unknown)  



                                                  cardiopulmonary           



                                                  findings            

 

           (unknown)  (no       (unknown)  (unknown)  INDICATIONS:?  (units    (

unknown)



                    date)                         chest pain unknown)  

 

           (unknown)  (no       (unknown)  (unknown)  Imaging Data  (units    (u

nknown)



                    date)                                   unknown)  

 

           (unknown)  (no       (unknown)  (unknown)  Independently  (units    (

unknown)



                    date)                         reviewed vitals unknown)  



                                                  signs and nursing           



                                                  notes.              

 

           (unknown)  (no       (unknown)  (unknown)  Initial Vital  (units    (

unknown)



                    date)                         Signs     unknown)  

 

           (unknown)  (no       (unknown)  (unknown)  Initial Vital  (units    (

unknown)



                    date)                         Signs:    unknown)  

 

           (unknown)  (no       (unknown)  (unknown)  Lab Data  (units    (unkno

wn)



                    date)                                   unknown)  

 

           (unknown)  (no       (unknown)  (unknown)  Labs:     (units    (unkno

wn)



                    date)                                   unknown)  

 

           (unknown)  (no       (unknown)  (unknown)  Lactate (Lactic  (units   

 (unknown)



                    date)                         Acid) Stat unknown)  

 

           (unknown)  (no       (unknown)  (unknown)  Lipase    32  (units    (u

nknown)



                    date)                         ()  U/L unknown)  

 

           (unknown)  (no       (unknown)  (unknown)  Lipase Stat  (units    (un

known)



                    date)                                   unknown)  

 

           (unknown)  (no       (unknown)  (unknown)  Lungs and pleura:?  (units

    (unknown)



                    date)                         Lungs are clear.? unknown)  



                                                  No pleural           



                                                  effusions or           



                                                  pneumothorax.? Low           

 

           (unknown)  (no       (unknown)  (unknown)  Lymph # (Auto)  (units    

(unknown)



                    date)                         400 L   (8029-0540) unknown)  



                                                   /uL                

 

           (unknown)  (no       (unknown)  (unknown)  Lymph % (Auto)  (units    

(unknown)



                    date)                         6.4 L   (25-40)  % unknown)  

 

           (unknown)  (no       (unknown)  (unknown)  MCH   26.9  (units    (unk

nown)



                    date)                         (26-34)  PG unknown)  

 

           (unknown)  (no       (unknown)  (unknown)  MCHC   34.0  (units    (un

known)



                    date)                         (30-36)  % unknown)  

 

           (unknown)  (no       (unknown)  (unknown)  MCV   79.1 L  (units    (u

nknown)



                    date)                         ()  fL unknown)  

 

           (unknown)  (no       (unknown)  (unknown)  MDM - Weakness  (units    

(unknown)



                    date)                                   unknown)  

 

           (unknown)  (no       (unknown)  (unknown)  MSK: denies new  (units   

 (unknown)



                    date)                         joint pain, muscle unknown)  



                                                  weakness or           



                                                  swelling            

 

           (unknown)  (no       (unknown)  (unknown) MSK: moves all  (units    (

unknown)



                    date)                         extremities, unknown)  



                                                  neurovascularly           



                                                  intact, generalized           



                                                  weakness, normal           

 

           (unknown)  (no       (unknown)  (unknown)  Magnesium    1.0 L  (units

    (unknown)



                    date)                          (1.6-2.3)  mg/dL unknown)  

 

           (unknown)  (no       (unknown)  (unknown)  Magnesium Stat  (units    

(unknown)



                    date)                                   unknown)  

 

           (unknown)  (no       (unknown)  (unknown)  Magnesium Sulfate  (units 

   (unknown)



                    date)                         (Magnesium Sulfate) unknown)  



                                                   2 gm in 50 mls @           



                                                  50 mls/hr IV NOW           



                                                  ONE                 

 

           (unknown)  (no       (unknown)  (unknown)  Mediastinum:?  (units    (

unknown)



                    date)                         Mediastinal unknown)  



                                                  contours appear           



                                                  normal.? Heart size           



                                                  is normal.?           

 

           (unknown)  (no       (unknown)  (unknown)  Medical History  (units   

 (unknown)



                    date)                         (Reviewed 22 unknown)  



                                                  @ 19:57 by Kitty Costello [facility])           

 

           (unknown)  (no       (unknown)  (unknown)  MiraLax since this  (units

    (unknown)



                    date)                         happened. unknown)  

 

           (unknown)  (no       (unknown)  (unknown)  Mode of arrival:  (units  

  (unknown)



                    date)                         Wheelchair unknown)  

 

           (unknown)  (no       (unknown)  (unknown)  Mono # (Auto)  (units    (

unknown)



                    date)                         600   (0-900)  /uL unknown)  

 

           (unknown)  (no       (unknown)  (unknown)  Mono % (Auto)  (units    (

unknown)



                    date)                         7.9   (3-14)  % unknown)  

 

           (unknown)  (no       (unknown)  (unknown)  Mother     (units 

   (unknown)



                    date)                          Cancer   unknown)  

 

           (unknown)  (no       (unknown)  (unknown)  Mouth/Throat:  (units    (

unknown)



                    date)                         moist mucus unknown)  



                                                  membranes           

 

           (unknown)  (no       (unknown)  (unknown)  Narrative  (units    (unkn

own)



                    date)                                   unknown)  

 

           (unknown)  (no       (unknown)  (unknown)  Narrative:  (units    (unk

nown)



                    date)                                   unknown)  

 

           (unknown)  (no       (unknown)  (unknown)  Neck: supple  (units    (u

nknown)



                    date)                                   unknown)  

 

           (unknown)  (no       (unknown)  (unknown)  Neuro: denies  (units    (

unknown)



                    date)                         numbness, tingling, unknown)  



                                                  dizziness           

 

           (unknown)  (no       (unknown)  (unknown)  Neuro: normal  (units    (

unknown)



                    date)                         speech and unknown)  



                                                  cognition, A+O x3           

 

           (unknown)  (no       (unknown)  (unknown)  Neut # (Auto)  (units    (

unknown)



                    date)                         5900   (2234-4189) unknown)  



                                                  /uL                 

 

           (unknown)  (no       (unknown)  (unknown)  Neut % (Auto)  (units    (

unknown)



                    date)                         84.7 H   (50-75)  % unknown)  

 

           (unknown)  (no       (unknown)  (unknown)  No Action  (units    (unkn

own)



                    date)                                   unknown)  

 

           (unknown)  (no       (unknown)  (unknown)  No Known Drug  (units    (

unknown)



                    date)                         Allergies Allergy unknown)  



                                                  Verified 22           



                                                  17:38               

 

           (unknown)  (no       (unknown)  (unknown)  Nose: nares  (units    (un

known)



                    date)                         patent, no unknown)  



                                                  rhinorrhea           

 

           (unknown)  (no       (unknown)  (unknown)  Ondansetron HCl  (units   

 (unknown)



                    date)                         (Ondansetron 4 Mg/2 unknown)  



                                                  Ml Inj)  4 mg IV           



                                                  NOW ONE             

 

           (unknown)  (no       (unknown)  (unknown)  Ordered:  (units    (unkno

wn)



                    date)                                   unknown)  

 

           (unknown)  (no       (unknown)  (unknown)  Orders    (units    (unkno

wn)



                    date)                                   unknown)  

 

           (unknown)  (no       (unknown)  (unknown)  Osteoarthritis  (units    

(unknown)



                    date)                                   unknown)  

 

           (unknown)  (no       (unknown)  (unknown)  Oxygen Delivery  (units   

 (unknown)



                    date)                         Method    22 unknown)  



                                                  17:34               

 

           (unknown)  (no       (unknown)  (unknown)  Oxygen Delivery  (units   

 (unknown)



                    date)                         Method Room Air unknown)  

 

           (unknown)  (no       (unknown)  (unknown)  PROCEDURE:? XR  (units    

(unknown)



                    date)                         CHEST 1V  unknown)  

 

           (unknown)  (no       (unknown)  (unknown)  Patient History  (units   

 (unknown)



                    date)                                   unknown)  

 

           (unknown)  (no       (unknown)  (unknown)  Patient is  (units    (unk

nown)



                    date)                         currently unknown)  



                                                  anticoagulated on           



                                                  Plavix 37.5 mg           



                                                  daily.  Dr. Gómez           

 

           (unknown)  (no       (unknown)  (unknown) Patient reports  (units    

(unknown)



                    date)                         that when he has a unknown)  



                                                  bowel movement, he           



                                                  typically has hard           



                                                  pellets             

 

           (unknown)  (no       (unknown)  (unknown)  Patient self caths  (units

    (unknown)



                    date)                         for urine output, unknown)  



                                                  reports that he has           



                                                  not had much urine           

 

           (unknown)  (no       (unknown)  (unknown) Patient's  (units    (unkno

wn)



                    date)                         colorectal cancer unknown)  



                                                  oncologist was Dr. Jett, patient was           



                                                  referred to him           

 

           (unknown)  (no       (unknown)  (unknown)  Patient's preop  (units   

 (unknown)



                    date)                         diagnosis was unknown)  



                                                  nausea and vomiting           



                                                  from hematemesis in           



                                                  January             

 

           (unknown)  (no       (unknown)  (unknown)  Patient:  (units    (unkno

wn)



                    date)                         Bret Esquivel R unknown)  



                                                  MR#: [id number]            

 

           (unknown)  (no       (unknown)  (unknown)  Postlaminectomy  (units   

 (unknown)



                    date)                         syndrome  unknown)  

 

           (unknown)  (no       (unknown)  (unknown)  Potassium    3.7  (units  

  (unknown)



                    date)                         (3.4-5.1)  mmol/L unknown)  

 

           (unknown)  (no       (unknown)  (unknown)  Prescriptions:  (units    

(unknown)



                    date)                                   unknown)  

 

           (unknown)  (no       (unknown)  (unknown)  Procalcitonin Stat  (units

    (unknown)



                    date)                                   unknown)  

 

           (unknown)  (no       (unknown)  (unknown)  Psych: mental  (units    (

unknown)



                    date)                         status is grossly unknown)  



                                                  normal, congruent           



                                                  mood, normal           



                                                  affect, pleasant           

 

           (unknown)  (no       (unknown)  (unknown)  Pulse Oximetry  98  (units

    (unknown)



                    date)                           22 17:34 unknown)  

 

           (unknown)  (no       (unknown)  (unknown)  Pulse Oximetry 98  (units 

   (unknown)



                    date)                                   unknown)  

 

           (unknown)  (no       (unknown)  (unknown)  Pulse Rate  77  (units    

(unknown)



                    date)                         22 17:34 unknown)  

 

           (unknown)  (no       (unknown)  (unknown)  Pulse Rate 77  (units    (

unknown)



                    date)                                   unknown)  

 

           (unknown)  (no       (unknown)  (unknown)  RDW   16.1 H  (units    (u

nknown)



                    date)                         (11.6-14.8)  % unknown)  

 

           (unknown)  (no       (unknown)  (unknown)  Referrals:  (units    (unk

nown)



                    date)                                   unknown)  

 

           (unknown)  (no       (unknown)  (unknown)  Related Data  (units    (u

nknown)



                    date)                                   unknown)  

 

           (unknown)  (no       (unknown)  (unknown)  Respiratory Rate  (units  

  (unknown)



                    date)                         18   22 17:34 unknown)  

 

           (unknown)  (no       (unknown)  (unknown)  Respiratory Rate  (units  

  (unknown)



                    date)                         18        unknown)  

 

           (unknown)  (no       (unknown)  (unknown)  Respiratory:  (units    (u

nknown)



                    date)                         denies shortness of unknown)  



                                                  breath, or new           



                                                  cough               

 

           (unknown)  (no       (unknown)  (unknown)  Respiratory:  (units    (u

nknown)



                    date)                         normal effort, able unknown)  



                                                  to speak in           



                                                  complete sentences,           



                                                  no audible           

 

           (unknown)  (no       (unknown)  (unknown)  Result diagrams:  (units  

  (unknown)



                    date)                                   unknown)  

 

           (unknown)  (no       (unknown)  (unknown)  Review of Systems  (units 

   (unknown)



                    date)                                   unknown)  

 

           (unknown)  (no       (unknown)  (unknown)  SARS-CoV-2 (PCR)  (units  

  (unknown)



                    date)                         Negative  unknown)  



                                                  (Negative)           

 

           (unknown)  (no       (unknown)  (unknown)  Sciatica  (units    (unkno

wn)



                    date)                                   unknown)  

 

           (unknown)  (no       (unknown)  (unknown)  Signed By:  (units    (unk

nown)



                    date)                                   unknown)  

 

           (unknown)  (no       (unknown)  (unknown)  Skin: brisk  (units    (un

known)



                    date)                         capillary refill, unknown)  



                                                  no rash, no           



                                                  erythema            

 

           (unknown)  (no       (unknown)  (unknown)  Skin: denies rash,  (units

    (unknown)



                    date)                         itching or wound unknown)  

 

           (unknown)  (no       (unknown)  (unknown)  Smoking Status:  (units   

 (unknown)



                    date)                         Never smoker unknown)  

 

           (unknown)  (no       (unknown)  (unknown)  Smoking Status:  (units   

 (unknown)



                    date)                         Never smoker unknown)  

 

           (unknown)  (no       (unknown)  (unknown)  Social History  (units    

(unknown)



                    date)                         (Reviewed 22 unknown)  



                                                  @ 19:57 by ZAIRA Amezcua)           

 

           (unknown)  (no       (unknown)  (unknown)  Sodium    135 L  (units   

 (unknown)



                    date)                         (137-145)  mmol/L unknown)  

 

           (unknown)  (no       (unknown)  (unknown)  Sodium Chloride  (units   

 (unknown)



                    date)                         (Normal Saline unknown)  



                                                  0.9%)  1,000 mls @           



                                                  1,000 mls/hr IV           



                                                  BOLUS ONE           

 

           (unknown)  (no       (unknown)  (unknown)  Source: patient  (units   

 (unknown)



                    date)                         and family unknown)  

 

           (unknown)  (no       (unknown)  (unknown)  Spinal stenosis  (units   

 (unknown)



                    date)                                   unknown)  

 

           (unknown)  (no       (unknown)  (unknown)  Stated complaint:  (units 

   (unknown)



                    date)                         WEAKNESS UNABLE TO unknown)  



                                                  HOLD ANYTHING DOWN           

 

           (unknown)  (no       (unknown)  (unknown)  Substance Use  (units    (

unknown)



                    date)                         Type: does not use unknown)  

 

           (unknown)  (no       (unknown)  (unknown)  Surgical History  (units  

  (unknown)



                    date)                         (Reviewed 22 unknown)  



                                                  @ 19:57 by ZAIRA Amezcua)           

 

           (unknown)  (no       (unknown)  (unknown)  Surgical changes  (units  

  (unknown)



                    date)                         and devices:? unknown)  



                                                  None.?              

 

           (unknown)  (no       (unknown)  (unknown)  TECHNIQUE:? One  (units   

 (unknown)



                    date)                         view of the chest unknown)  



                                                  was acquired.?           

 

           (unknown)  (no       (unknown)  (unknown)  Temperature  98.3  (units 

   (unknown)



                    date)                         F   22 17:34 unknown)  

 

           (unknown)  (no       (unknown)  (unknown)  Temperature 98.3 F  (units

    (unknown)



                    date)                                   unknown)  

 

           (unknown)  (no       (unknown)  (unknown)  Time Seen by  (units    (u

nknown)



                    date)                         Provider: 22 unknown)  



                                                  19:11               

 

           (unknown)  (no       (unknown)  (unknown)  Total Bilirubin  (units   

 (unknown)



                    date)                         0.6  (0.2-1.3) unknown)  



                                                  mg/dL               

 

           (unknown)  (no       (unknown)  (unknown)  Total Creatine  (units    

(unknown)



                    date)                         Kinase    54 L unknown)  



                                                  ()  U/L           

 

           (unknown)  (no       (unknown)  (unknown)  Total Protein  (units    (

unknown)



                    date)                         7.5  (6.3-8.2) unknown)  



                                                  g/dL                

 

           (unknown)  (no       (unknown)  (unknown)  Troponin + CK  (units    (

unknown)



                    date)                         Cardiac Panel Stat unknown)  

 

           (unknown)  (no       (unknown)  (unknown)  Troponin I    <  (units   

 (unknown)



                    date)                         0.012  (0.01-0.034) unknown)  



                                                   ng/mL              

 

           (unknown)  (no       (unknown)  (unknown)  Urine Microscopic  (units 

   (unknown)



                    date)                         Stat      unknown)  

 

           (unknown)  (no       (unknown)  (unknown)  Vital Signs  (units    (un

known)



                    date)                                   unknown)  

 

           (unknown)  (no       (unknown)  (unknown)  Vital signs:  (units    (u

nknown)



                    date)                                   unknown)  

 

           (unknown)  (no       (unknown)  (unknown)  Linette Jett MD  (units  

  (unknown)



                    date)                         [Physician] - As unknown)  



                                                  soon as possible           

 

           (unknown)  (no       (unknown)  (unknown)  XR chest 1V Stat  (units  

  (unknown)



                    date)                                   unknown)  

 

           (unknown)  (no       (unknown)  (unknown)  [Embedded Image  (units   

 (unknown)



                    date)                         Not Available] unknown)  

 

           (unknown)  (no       (unknown)  (unknown)  alcohol intake  (units    

(unknown)



                    date)                         frequency: a few unknown)  



                                                  times a month           

 

           (unknown)  (no       (unknown)  (unknown)  alcohol intake:  (units   

 (unknown)



                    date)                         current   unknown)  

 

           (unknown)  (no       (unknown)  (unknown)  and cooperative  (units   

 (unknown)



                    date)                                   unknown)  

 

           (unknown)  (no       (unknown)  (unknown)  appear    (units    (unkno

wn)



                    date)                                   unknown)  

 

           (unknown)  (no       (unknown)  (unknown)  ascorbic acid  (units    (

unknown)



                    date)                         (vitamin C) 500 mg unknown)  



                                                  500 mg PO BID #60           



                                                  tabs 22           

 

           (unknown)  (no       (unknown)  (unknown)  atorvastatin 80 mg  (units

    (unknown)



                    date)                         tablet 40 mg PO QPM unknown)  



                                                  20           

 

           (unknown)  (no       (unknown)  (unknown)  be causing a rash  (units 

   (unknown)



                    date)                                   unknown)  

 

           (unknown)  (no       (unknown)  (unknown)  blood in his  (units    (u

nknown)



                    date)                         emesis or in his unknown)  



                                                  stool.  He reports           



                                                  that he did a stool           



                                                  test one            

 

           (unknown)  (no       (unknown)  (unknown)  buspirone 5 mg  (units    

(unknown)



                    date)                         tablet 5 mg PO BID unknown)  



                                                  #60 tabs 22           

 

           (unknown)  (no       (unknown)  (unknown)  cancer.  Postop  (units   

 (unknown)



                    date)                         diagnosis from this unknown)  



                                                  upper endoscopy was           



                                                  the same.  His           

 

           (unknown)  (no       (unknown)  (unknown)  caths at baseline  (units 

   (unknown)



                    date)                                   unknown)  

 

           (unknown)  (no       (unknown)  (unknown)  clopidogrel 75 mg  (units 

   (unknown)



                    date)                         tablet 75 mg PO unknown)  



                                                  DAILY 22            

 

           (unknown)  (no       (unknown)  (unknown)  colonic   (units    (unkno

wn)



                    date)                         anastomosis, and a unknown)  



                                                  suboptimal bowel           



                                                  prep.               

 

           (unknown)  (no       (unknown)  (unknown)  cyanocobalamin  (units    

(unknown)



                    date)                         (vitamin B-12) 500 unknown)  



                                                  1,000 mcg PO DAILY           



                                                  #60 tabs 22           

 

           (unknown)  (no       (unknown)  (unknown)  diabetes,  (units    (unkn

own)



                    date)                         hypertension, unknown)  



                                                  colorectal cancer           



                                                  in  with a           



                                                  reverse colectomy,           



                                                  He                  

 

           (unknown)  (no       (unknown)  (unknown)  does not drink  (units    

(unknown)



                    date)                         alcohol.? Patient unknown)  



                                                  and his wife state           



                                                  that he was seen in           



                                                  the                 

 

           (unknown)  (no       (unknown)  (unknown)  edema     (units    (unkno

wn)



                    date)                                   unknown)  

 

           (unknown)  (no       (unknown)  (unknown)  emergency  (units    (unkn

own)



                    date)                         department in unknown)  



                                                  January and           



                                                  diagnosed with a GI           



                                                  bleed, he was           



                                                  admitted,           

 

           (unknown)  (no       (unknown)  (unknown)  endorses fatigue  (units  

  (unknown)



                    date)                                   unknown)  

 

           (unknown)  (no       (unknown)  (unknown) endoscopic  (units    (unkn

own)



                    date)                         diagnosis includes unknown)  



                                                  mild gastropathy,           



                                                  esophagitis, patent           



                                                  retrosigmoid           

 

           (unknown)  (no       (unknown)  (unknown)  ferrous sulfate  (units   

 (unknown)



                    date)                         325 mg (65 mg 325 unknown)  



                                                  mg PO BIDWM #60           



                                                  tabs 22           

 

           (unknown)  (no       (unknown)  (unknown)  followed by loose  (units 

   (unknown)



                    date)                         stool.    unknown)  

 

           (unknown)  (no       (unknown)  (unknown)  gabapentin 300 mg  (units 

   (unknown)



                    date)                         capsule 300 mg PO unknown)  



                                                  BID 18            

 

           (unknown)  (no       (unknown)  (unknown)  generalized  (units    (un

known)



                    date)                         weakness and unknown)  



                                                  fatigue             

 

           (unknown)  (no       (unknown)  (unknown)  glipizide 10 mg  (units   

 (unknown)



                    date)                         tablet 10 mg PO BID unknown)  



                                                  18           

 

           (unknown)  (no       (unknown)  (unknown)  hospital and on  (units   

 (unknown)



                    date)                         chart review it unknown)  



                                                  appears that it was           



                                                  completed on           



                                                  2022.           

 

           (unknown)  (no       (unknown)  (unknown)  household members:  (units

    (unknown)



                    date)                          spouse   unknown)  

 

           (unknown)  (no       (unknown)  (unknown)  iron) tablet  (units    (u

nknown)



                    date)                                   unknown)  

 

           (unknown)  (no       (unknown)  (unknown)  lives     (units    (unkno

wn)



                    date)                         independently:  Yes unknown)  

 

           (unknown)  (no       (unknown)  (unknown)  losartan 50 mg  (units    

(unknown)



                    date)                         tablet 25 mg PO QPM unknown)  



                                                  20           

 

           (unknown)  (no       (unknown)  (unknown)  lung      (units    (unkno

wn)



                    date)                                   unknown)  

 

           (unknown)  (no       (unknown)  (unknown)  mcg tablet  (units    (unk

nown)



                    date)                                   unknown)  

 

           (unknown)  (no       (unknown)  (unknown)  mcg tablet  (units    (unk

nown)



                    date)                         (Tab-A-Lucy) unknown)  

 

           (unknown)  (no       (unknown)  (unknown)  metformin 500 mg  (units  

  (unknown)



                    date)                         tablet,extended 500 unknown)  



                                                  mg PO BID 18            

 

           (unknown)  (no       (unknown)  (unknown)  metoprolol  (units    (unk

nown)



                    date)                         succinate 25 mg 25 unknown)  



                                                  mg PO DAILY           



                                                  21           

 

           (unknown)  (no       (unknown)  (unknown)  moving forward.  (units   

 (unknown)



                    date)                         Patient reports unknown)  



                                                  that he has been           



                                                  taking fiber but           



                                                  not any             

 

           (unknown)  (no       (unknown)  (unknown)  multivitamin with  (units 

   (unknown)



                    date)                         folic acid 400 1 unknown)  



                                                  tab PO DAILY #90           



                                                  tabs 22           

 

           (unknown)  (no       (unknown)  (unknown)  nondistended, no  (units  

  (unknown)



                    date)                         masses, no unknown)  



                                                  exquisite           



                                                  tenderness with           



                                                  exam, no rebound           

 

           (unknown)  (no       (unknown)  (unknown)  not heard it was  (units  

  (unknown)



                    date)                         positive or not. unknown)  



                                                  Patient reports           



                                                  that his primary           



                                                  care                

 

           (unknown)  (no       (unknown)  (unknown)  ondansetron HCl 4  (units 

   (unknown)



                    date)                         mg tablet 4 mg PO unknown)  



                                                  Q8H PRN nausea and           



                                                  21            

 

           (unknown)  (no       (unknown)  (unknown)  oral powder packet  (units

    (unknown)



                    date)                                   unknown)  

 

           (unknown)  (no       (unknown)  (unknown)  output for three  (units  

  (unknown)



                    date)                         days, three days unknown)  



                                                  ago he had nausea           



                                                  and vomiting,           



                                                  denies any           

 

           (unknown)  (no       (unknown)  (unknown)  polyethylene  (units    (u

nknown)



                    date)                         glycol 3350 17 gram unknown)  



                                                  17 gm PO DAILY #90           



                                                  ea 22           

 

           (unknown)  (no       (unknown)  (unknown)  provider has left,  (units

    (unknown)



                    date)                         he reports that he unknown)  



                                                  has seen Dr. Macias           



                                                  recently as well.           

 

           (unknown)  (no       (unknown)  (unknown)  recently for his  (units  

  (unknown)



                    date)                         anemia.   unknown)  

 

           (unknown)  (no       (unknown)  (unknown)  related diarrhea  (units  

  (unknown)



                    date)                         from fecal loading unknown)  



                                                  and obstipation.           



                                                  Recommended MiraLax           



                                                  17 g                

 

           (unknown)  (no       (unknown)  (unknown)  release 24 hr  (units    (

unknown)



                    date)                                   unknown)  

 

           (unknown)  (no       (unknown)  (unknown)  reported that  (units    (

unknown)



                    date)                         patient's altered unknown)  



                                                  bowel habit is           



                                                  likely a function           



                                                  of overflow           

 

           (unknown)  (no       (unknown)  (unknown)  sennosides 8.6 mg  (units 

   (unknown)



                    date)                         tablet (senna) 17.2 unknown)  



                                                  mg PO BEDTIME #60           



                                                  tabs 22           

 

           (unknown)  (no       (unknown)  (unknown)  substance use  (units    (

unknown)



                    date)                         type:  does not use unknown)  

 

           (unknown)  (no       (unknown)  (unknown)  tablet (Vitamin C)  (units

    (unknown)



                    date)                                   unknown)  

 

           (unknown)  (no       (unknown)  (unknown)  tablet,extended  (units   

 (unknown)



                    date)                         release 24 hr unknown)  

 

           (unknown)  (no       (unknown)  (unknown)  tenderness  (units    (unk

nown)



                    date)                                   unknown)  

 

           (unknown)  (no       (unknown)  (unknown)  there.  He reports  (units

    (unknown)



                    date)                         that he had an unknown)  



                                                  upper endoscopy           



                                                  completed here at           



                                                  this                

 

           (unknown)  (no       (unknown)  (unknown)  tone      (units    (unkno

wn)



                    date)                                   unknown)  

 

           (unknown)  (no       (unknown)  (unknown)  trospium 20 mg  (units    

(unknown)



                    date)                         tablet 20 mg PO BID unknown)  



                                                  urinary retention           



                                                  22           

 

           (unknown)  (no       (unknown)  (unknown)  unremarkable.?  (units    

(unknown)



                    date)                                   unknown)  

 

           (unknown)  (no       (unknown)  (unknown)  volumes accentuate  (units

    (unknown)



                    date)                         pulmonary unknown)  



                                                  interstitium and           



                                                  heart size.           

 

           (unknown)  (no       (unknown)  (unknown)  week ago and  (units    (u

nknown)



                    date)                         dropped it off at unknown)  



                                                  [facility] which was           



                                                  testing for blood           



                                                  but he has           

 

           (unknown)  (no       (unknown)  (unknown)  went to [facility]  (units 

   (unknown)



                    date)                         for rehab following unknown)  



                                                  his admission and           



                                                  has followed up           



                                                  with                

 

           (unknown)  (no       (unknown)  (unknown)  wheezing, stridor,  (units

    (unknown)



                    date)                         or rales. No unknown)  



                                                  retractions or           



                                                  tachypnea.           









                                         Result panel 19









           (unknown)  (no date)  (unknown)  (unknown)  0.72      ng/mL     (unkn

own)









                                         Result panel 20









           (unknown)  (no       (unknown)  (unknown)  (no value)  (units    (unk

nown)



                    date)                                   unknown)  

 

           (unknown)  (no       (unknown)  (unknown)  Radiologist  (units    (un

known)



                    date)                         Impression: unknown)  

 

           (unknown)  (no       (unknown)  (unknown)  Date of Service:  (units  

  (unknown)



                    date)                         22  unknown)  

 

           (unknown)  (no       (unknown)  (unknown)  (no value)  (units    (unk

nown)



                    date)                                   unknown)  

 

           (unknown)  (no       (unknown)  (unknown)  22 17:45  (units    

(unknown)



                    date)                                   unknown)  

 

           (unknown)  (no       (unknown)  (unknown)  1 tab PO DAILY  (units    

(unknown)



                    date)                         Qty: 90 0RF unknown)  

 

           (unknown)  (no       (unknown)  (unknown)  1,000 mcg PO DAILY  (units

    (unknown)



                    date)                         Qty: 60 0RF unknown)  

 

           (unknown)  (no       (unknown)  (unknown)  10 mg PO BID  (units    (u

nknown)



                    date)                                   unknown)  

 

           (unknown)  (no       (unknown)  (unknown)  17 gm PO DAILY  (units    

(unknown)



                    date)                         Qty: 90 0RF unknown)  

 

           (unknown)  (no       (unknown)  (unknown)  17.2 mg PO BEDTIME  (units

    (unknown)



                    date)                         Qty: 60 0RF unknown)  

 

           (unknown)  (no       (unknown)  (unknown)  20 mg PO BID  (units    (u

nknown)



                    date)                                   unknown)  

 

           (unknown)  (no       (unknown)  (unknown)  25 mg PO DAILY  (units    

(unknown)



                    date)                                   unknown)  

 

           (unknown)  (no       (unknown)  (unknown)  25 mg PO QPM  (units    (u

nknown)



                    date)                                   unknown)  

 

           (unknown)  (no       (unknown)  (unknown)  300 mg PO BID  (units    (

unknown)



                    date)                                   unknown)  

 

           (unknown)  (no       (unknown)  (unknown)  325 mg PO BIDWM  (units   

 (unknown)



                    date)                         Qty: 60 0RF unknown)  

 

           (unknown)  (no       (unknown)  (unknown)  4 mg PO Q8H PRN  (units   

 (unknown)



                    date)                         (Reason: nausea and unknown)  



                                                  vomiting) Qty: 14           



                                                  0RF                 

 

           (unknown)  (no       (unknown)  (unknown)  40 mg PO QPM  (units    (u

nknown)



                    date)                                   unknown)  

 

           (unknown)  (no       (unknown)  (unknown)  5 mg PO BID Qty:  (units  

  (unknown)



                    date)                         60 0RF    unknown)  

 

           (unknown)  (no       (unknown)  (unknown)  500 mg PO BID  (units    (

unknown)



                    date)                                   unknown)  

 

           (unknown)  (no       (unknown)  (unknown)  500 mg PO BID Qty:  (units

    (unknown)



                    date)                         60 0RF    unknown)  

 

           (unknown)  (no       (unknown)  (unknown)  75 mg PO DAILY  (units    

(unknown)



                    date)                                   unknown)  

 

           (unknown)  (no       (unknown)  (unknown)  Allergies  (units    (unkn

own)



                    date)                                   unknown)  

 

           (unknown)  (no       (unknown)  (unknown)  Documented By: [facility]  (units

    (unknown)



                    date)                                   unknown)  

 

           (unknown)  (no       (unknown)  (unknown)  ED Orders  (units    (unkn

own)



                    date)                                   unknown)  

 

           (unknown)  (no       (unknown)  (unknown)  Emergency Report  (units  

  (unknown)



                    date)                                   unknown)  

 

           (unknown)  (no       (unknown)  (unknown)  Home Medications  (units  

  (unknown)



                    date)                                   unknown)  

 

           (unknown)  (no       (unknown)  (unknown)  [facility]  (units   

 (unknown)



                    date)                         [address] unknown)  



                                                  [place] [address]           

 

           (unknown)  (no       (unknown)  (unknown)  Lab Results  (units    (un

known)



                    date)                                   unknown)  

 

           (unknown)  (no       (unknown)  (unknown)  Label Comments:  (units   

 (unknown)



                    date)                                   unknown)  

 

           (unknown)  (no       (unknown)  (unknown)  Last Admin:  (units    (un

known)



                    date)                         22 18:30 unknown)  



                                                  Dose: 4 mg           

 

           (unknown)  (no       (unknown)  (unknown)  Previous Rx's  (units    (

unknown)



                    date)                                   unknown)  

 

           (unknown)  (no       (unknown)  (unknown)  Stop: 22  (units    

(unknown)



                    date)                         18:16     unknown)  

 

           (unknown)  (no       (unknown)  (unknown)  Stop: 22  (units    

(unknown)



                    date)                         20:25     unknown)  

 

           (unknown)  (no       (unknown)  (unknown)  Stop: 22  (units    

(unknown)



                    date)                         20:27     unknown)  

 

           (unknown)  (no       (unknown)  (unknown)  Vital Signs - 8 hr  (units

    (unknown)



                    date)                                   unknown)  

 

           (unknown)  (no       (unknown)  (unknown)  has not been  (units    (u

nknown)



                    date)                         eating so hasn't unknown)  



                                                  taken recently.           



                                                  spouse states           



                                                  thinks it might           

 

           (unknown)  (no       (unknown)  (unknown)  stopped taking  (units    

(unknown)



                    date)                         prior to back unknown)  



                                                  surgery and did not           



                                                  restart             

 

           (unknown)  (no       (unknown)  (unknown)  (no value)  (units    (unk

nown)



                    date)                                   unknown)  

 

           (unknown)  (no       (unknown)  (unknown)  22  (units 

   (unknown)



                    date)                         22  unknown)  



                                                  Range/Units           

 

           (unknown)  (no       (unknown)  (unknown)  17:40 17:45 17:45  (units 

   (unknown)



                    date)                                   unknown)  

 

           (unknown)  (no       (unknown)  (unknown)  ascorbic acid  (units    (

unknown)



                    date)                         (vitamin C) unknown)  



                                                  [Vitamin C] 500 mg           



                                                  Tablet              

 

           (unknown)  (no       (unknown)  (unknown)  atorvastatin 80 mg  (units

    (unknown)



                    date)                         tablet    unknown)  

 

           (unknown)  (no       (unknown)  (unknown)  buspirone 5 mg  (units    

(unknown)



                    date)                         Tablet    unknown)  

 

           (unknown)  (no       (unknown)  (unknown)  clopidogrel 75 mg  (units 

   (unknown)



                    date)                         tablet    unknown)  

 

           (unknown)  (no       (unknown)  (unknown)  cyanocobalamin  (units    

(unknown)



                    date)                         (vitamin B-12) 500 unknown)  



                                                  mcg Tablet           

 

           (unknown)  (no       (unknown)  (unknown)  ferrous sulfate  (units   

 (unknown)



                    date)                         325 mg (65 mg iron) unknown)  



                                                  Tablet              

 

           (unknown)  (no       (unknown)  (unknown)  gabapentin 300 mg  (units 

   (unknown)



                    date)                         capsule   unknown)  

 

           (unknown)  (no       (unknown)  (unknown)  glipizide 10 mg  (units   

 (unknown)



                    date)                         tablet    unknown)  

 

           (unknown)  (no       (unknown)  (unknown)  losartan 50 mg  (units    

(unknown)



                    date)                         tablet    unknown)  

 

           (unknown)  (no       (unknown)  (unknown)  metformin 500 mg  (units  

  (unknown)



                    date)                         tablet extended unknown)  



                                                  release 24 hr           

 

           (unknown)  (no       (unknown)  (unknown)  metoprolol  (units    (unk

nown)



                    date)                         succinate 25 mg unknown)  



                                                  tablet extended           



                                                  release 24 hr           

 

           (unknown)  (no       (unknown)  (unknown)  multivitamin with  (units 

   (unknown)



                    date)                         folic acid unknown)  



                                                  [Tab-A-Lucy] 400           



                                                  mcg Tablet           

 

           (unknown)  (no       (unknown)  (unknown)  ondansetron HCl  (units   

 (unknown)



                    date)                         [Zofran] 4 mg unknown)  



                                                  tablet              

 

           (unknown)  (no       (unknown)  (unknown)  polyethylene  (units    (u

nknown)



                    date)                         glycol 3350 17 gram unknown)  



                                                  Powder In Packet           

 

           (unknown)  (no       (unknown)  (unknown)  sennosides [senna]  (units

    (unknown)



                    date)                         8.6 mg Tablet unknown)  

 

           (unknown)  (no       (unknown)  (unknown)  trospium 20 mg  (units    

(unknown)



                    date)                         tablet    unknown)  

 

           (unknown)  (no       (unknown)  (unknown)  22  (units    (unkno

wn)



                    date)                                   unknown)  

 

           (unknown)  (no       (unknown)  (unknown)  Medication  (units    (unk

nown)



                    date)                         Instructions unknown)  



                                                  Recorded            

 

           (unknown)  (no       (unknown)  (unknown)  Medication  (units    (unk

nown)



                    date)                         Instructions unknown)  



                                                  Recorded  Confirmed           

 

           (unknown)  (no       (unknown)  (unknown)  (Zofran) vomiting  (units 

   (unknown)



                    date)                         #14 tabs  unknown)  

 

           (unknown)  (no       (unknown)  (unknown)  [id number]   (units    (unkno

wn)



                    date)                                   unknown)  

 

           (unknown)  (no       (unknown)  (unknown)  22 17:39  (units    

(unknown)



                    date)                                   unknown)  

 

           (unknown)  (no       (unknown)  (unknown)  22 17:40  (units    

(unknown)



                    date)                                   unknown)  

 

           (unknown)  (no       (unknown)  (unknown)  22 17:45  (units    

(unknown)



                    date)                                   unknown)  

 

           (unknown)  (no       (unknown)  (unknown)  22 19:26  (units    

(unknown)



                    date)                                   unknown)  

 

           (unknown)  (no       (unknown)  (unknown)  22 19:28  (units    

(unknown)



                    date)                                   unknown)  

 

           (unknown)  (no       (unknown)  (unknown)  17:34     (units    (unkno

wn)



                    date)                                   unknown)  

 

           (unknown)  (no       (unknown)  (unknown)  with iron  (units    (

unknown)



                    date)                         deficiency anemia, unknown)  



                                                  altered bowel           



                                                  habit, personal           



                                                  history of colon           

 

           (unknown)  (no       (unknown)  (unknown)  62-year-old  (units    (un

known)



                    date)                         gentleman with a unknown)  



                                                  history of multiple           



                                                  sclerosis, CVA in           



                                                  2019,               

 

           (unknown)  (no       (unknown)  (unknown)  ?         (units    (unkno

wn)



                    date)                                   unknown)  

 

           (unknown)  (no       (unknown)  (unknown)  ALT    16  (<50)  (units  

  (unknown)



                    date)                         IU/L      unknown)  

 

           (unknown)  (no       (unknown)  (unknown)  AST    19  (17-59)  (units

    (unknown)



                    date)                          IU/L     unknown)  

 

           (unknown)  (no       (unknown)  (unknown)  Age/Sex: 62 / M  (units   

 (unknown)



                    date)                                   unknown)  

 

           (unknown)  (no       (unknown)  (unknown)  Albumin    3.9  (units    

(unknown)



                    date)                         (3.5-5.0)  g/dL unknown)  

 

           (unknown)  (no       (unknown)  (unknown)  Albumin/Globulin  (units  

  (unknown)



                    date)                         Ratio    1.1 unknown)  



                                                  (1.0-2.8)           

 

           (unknown)  (no       (unknown)  (unknown)  Alkaline  (units    (unkno

wn)



                    date)                         Phosphatase    105 unknown)  



                                                  ()  U/L           

 

           (unknown)  (no       (unknown)  (unknown)  Allergy/AdvReac  (units   

 (unknown)



                    date)                         Type Severity unknown)  



                                                  Reaction Status           



                                                  Date / Time           

 

           (unknown)  (no       (unknown)  (unknown)  Approved by: Garfield Mcdanielsunits 

   (unknown)



                    date)                         SHANNAN Lopez on unknown)  



                                                  2022 at 18:02?           

 

           (unknown)  (no       (unknown)  (unknown)  BUN    21 H  (units    (un

known)



                    date)                         (9-20)  mg/dL unknown)  

 

           (unknown)  (no       (unknown)  (unknown)  BUN/Creatinine  (units    

(unknown)



                    date)                         Ratio    14.8 unknown)  



                                                  (6-22)              

 

           (unknown)  (no       (unknown)  (unknown)  Baso # (Auto)   0  (units 

   (unknown)



                    date)                          (0-100)  /uL unknown)  

 

           (unknown)  (no       (unknown)  (unknown)  Baso % (Auto)  (units    (

unknown)



                    date)                         0.2   (0-2)  % unknown)  

 

           (unknown)  (no       (unknown)  (unknown)  Blood Pressure  (units    

(unknown)



                    date)                         124/66   22 unknown)  



                                                  17:34               

 

           (unknown)  (no       (unknown)  (unknown)  Blood Pressure  (units    

(unknown)



                    date)                         124    unknown)  

 

           (unknown)  (no       (unknown)  (unknown)  Bones and chest  (units   

 (unknown)



                    date)                         wall:? No unknown)  



                                                  suspicious bony           



                                                  lesions.? Overlying           



                                                  soft tissues           

 

           (unknown)  (no       (unknown)  (unknown)  CK-MB (CK-2)  (units    (u

nknown)



                    date)                         TNP       unknown)  

 

           (unknown)  (no       (unknown)  (unknown)  CK-MB (CK-2) Rel  (units  

  (unknown)



                    date)                         Index    TNP unknown)  

 

           (unknown)  (no       (unknown)  (unknown)  COMPARISON:?  (units    (u

nknown)



                    date)                         [place] [facility]  



                                                  CR, XR CHEST 2V,           



                                                  3/18/2021, 10:19.           

 

           (unknown)  (no       (unknown)  (unknown)  COVID19 -Nasal  (units    

(unknown)



                    date)                         RAPID/Pre-Proc Stat unknown)  

 

           (unknown)  (no       (unknown)  (unknown)  CT abdomen pelvis  (units 

   (unknown)



                    date)                         w con Stat unknown)  

 

           (unknown)  (no       (unknown)  (unknown)  CVA (cerebral  (units    (

unknown)



                    date)                         vascular accident) unknown)  



                                                  (2018)           

 

           (unknown)  (no       (unknown)  (unknown)  Calcium    9.3  (units    

(unknown)



                    date)                         (8.4-10.2)  mg/dL unknown)  

 

           (unknown)  (no       (unknown)  (unknown)  Carbon Dioxide  (units    

(unknown)



                    date)                         29  (22-32)  mmol/L unknown)  

 

           (unknown)  (no       (unknown)  (unknown)  Cardio: denies  (units    

(unknown)



                    date)                         chest pain, unknown)  



                                                  palpitations           

 

           (unknown)  (no       (unknown)  (unknown)  Cardiovascular:  (units   

 (unknown)



                    date)                         regular rate and unknown)  



                                                  rhythm, no           



                                                  peripheral edema,           



                                                  warm extremities           

 

           (unknown)  (no       (unknown)  (unknown)  Chest x-ray:  (units    (u

nknown)



                    date)                                   unknown)  

 

           (unknown)  (no       (unknown)  (unknown)  Chief complaint:  (units  

  (unknown)



                    date)                         Weakness  unknown)  

 

           (unknown)  (no       (unknown)  (unknown)  Chloride    96 L  (units  

  (unknown)



                    date)                         ()  mmol/L unknown)  

 

           (unknown)  (no       (unknown)  (unknown)  Colon cancer  (units    (u

nknown)



                    date)                         ()    unknown)  

 

           (unknown)  (no       (unknown)  (unknown)  Complete Blood  (units    

(unknown)



                    date)                         Count AUTO DIFF unknown)  



                                                  Stat                

 

           (unknown)  (no       (unknown)  (unknown)  Comprehensive  (units    (

unknown)



                    date)                         Metabolic Panel unknown)  



                                                  Stat                

 

           (unknown)  (no       (unknown)  (unknown)  Course    (units    (unkno

wn)



                    date)                                   unknown)  

 

           (unknown)  (no       (unknown)  (unknown)  Creatinine    1.42  (units

    (unknown)



                    date)                         H  (0.66-1.25) unknown)  



                                                  mg/dL               

 

           (unknown)  (no       (unknown)  (unknown)  : 1960  (units   

 (unknown)



                    date)                         Acct:[id number] unknown)  

 

           (unknown)  (no       (unknown)  (unknown)  Departure  (units    (unkn

own)



                    date)                                   unknown)  

 

           (unknown)  (no       (unknown)  (unknown)  Depression  (units    (unk

nown)



                    date)                                   unknown)  

 

           (unknown)  (no       (unknown)  (unknown)  Diabetes  (units    (unkno

wn)



                    date)                                   unknown)  

 

           (unknown)  (no       (unknown)  (unknown)  Diabetic  (units    (unkno

wn)



                    date)                         neuropathy unknown)  

 

           (unknown)  (no       (unknown)  (unknown)  Discharge Plan  (units    

(unknown)



                    date)                                   unknown)  

 

           (unknown)  (no       (unknown)  (unknown)  Discontinued  (units    (u

nknown)



                    date)                         Medications unknown)  

 

           (unknown)  (no       (unknown)  (unknown)  Gustavo Macias MD  (units   

 (unknown)



                    date)                         [Primary Care unknown)  



                                                  Provider] -           

 

           (unknown)  (no       (unknown)  (unknown)  EKG-12 Lead Stat  (units  

  (unknown)



                    date)                                   unknown)  

 

           (unknown)  (no       (unknown)  (unknown)  ER Physician:  (units    (

unknown)



                    date)                         Kitty Costello unknown)  



                                                  ARNP                

 

           (unknown)  (no       (unknown)  (unknown)  Eos # (Auto)   100  (units

    (unknown)



                    date)                           (0-450)  /uL unknown)  

 

           (unknown)  (no       (unknown)  (unknown)  Eos % (Auto)   0.8  (units

    (unknown)



                    date)                         L   (2-4)  % unknown)  

 

           (unknown)  (no       (unknown)  (unknown)  Estimated GFR  (units    (

unknown)



                    date)                         55 L  (>60)  mL/min unknown)  

 

           (unknown)  (no       (unknown)  (unknown)  Exam      (units    (unkno

wn)



                    date)                                   unknown)  

 

           (unknown)  (no       (unknown)  (unknown)  Exam Narrative:  (units   

 (unknown)



                    date)                                   unknown)  

 

           (unknown)  (no       (unknown)  (unknown)  Eyes: denies  (units    (u

nknown)



                    date)                         visual changes, eye unknown)  



                                                  pain                

 

           (unknown)  (no       (unknown)  (unknown)  Eyes: equal round  (units 

   (unknown)



                    date)                         and reactive, EOMI, unknown)  



                                                  conjunctiva normal           

 

           (unknown)  (no       (unknown)  (unknown)  FINDINGS:?  (units    (unk

nown)



                    date)                                   unknown)  

 

           (unknown)  (no       (unknown)  (unknown)  Family History  (units    

(unknown)



                    date)                         (Reviewed 22 unknown)  



                                                  @ 19:57 by Kitty Costello [facility])           

 

           (unknown)  (no       (unknown)  (unknown)  Father     (units 

   (unknown)



                    date)                          Cancer   unknown)  

 

           (unknown)  (no       (unknown)  (unknown)  GERD      (units    (unkno

wn)



                    date)                         (gastroesophageal unknown)  



                                                  reflux disease)           

 

           (unknown)  (no       (unknown)  (unknown)  GI:  Right lower  (units  

  (unknown)



                    date)                         quadrant tenderness unknown)  



                                                  to palpation           

 

           (unknown)  (no       (unknown)  (unknown)  GI: abdomen mildly  (units

    (unknown)



                    date)                         guarded, tender to unknown)  



                                                  palpation in the           



                                                  right lower           



                                                  quadrant,           

 

           (unknown)  (no       (unknown)  (unknown)  :  Straight cath  (units

    (unknown)



                    date)                         for urine, no rash unknown)  



                                                  ingrown, no penile           



                                                  discharge, no           



                                                  scrotal             

 

           (unknown)  (no       (unknown)  (unknown)  : denies  (units    (unk

nown)



                    date)                         dysuria, hematuria unknown)  



                                                  or flank pain,           



                                                  reports oliguria,           



                                                  patient self           

 

           (unknown)  (no       (unknown)  (unknown)  Gastroenterology  (units  

  (unknown)



                    date)                         from [place], unknown)  



                                                  patient reports           



                                                  that he sees Yasmine Rocha PA-C           

 

           (unknown)  (no       (unknown)  (unknown)  General   (units    (unkno

wn)



                    date)                                   unknown)  

 

           (unknown)  (no       (unknown)  (unknown)  General:  (units    (unkno

wn)



                    date)                         cooperative, unknown)  



                                                  comfortable, in no           



                                                  acute distress,           



                                                  well groomed,           

 

           (unknown)  (no       (unknown)  (unknown)  General: denies  (units   

 (unknown)



                    date)                         fever, chills, unknown)  



                                                  endorses weakness,           



                                                  right lower           



                                                  quadrant pain,           

 

           (unknown)  (no       (unknown)  (unknown)  GenericComposite[P  (units

    (unknown)



                    date)                         lt Count   219 unknown)  



                                                  (150-400)  X10^3/uL           



                                                   ]                  

 

           (unknown)  (no       (unknown)  (unknown)  GenericComposite[R  (units

    (unknown)



                    date)                         BC   3.37 L unknown)  



                                                  (4.5-5.9)  X10^6/uL           



                                                   ]                  

 

           (unknown)  (no       (unknown)  (unknown)  GenericComposite[W  (units

    (unknown)



                    date)                         BC   7.0  unknown)  



                                                  (4.5-11.0)           



                                                  X10^3/uL  ]           

 

           (unknown)  (no       (unknown)  (unknown)  Globulin    3.6  (units   

 (unknown)



                    date)                         (1.7-4.1)  g/dL unknown)  

 

           (unknown)  (no       (unknown)  (unknown)  Glucose    262 H  (units  

  (unknown)



                    date)                         ()  mg/dL unknown)  

 

           (unknown)  (no       (unknown)  (unknown)  Guaiac stool:  (units    (

unknown)



                    date)                                   unknown)  

 

           (unknown)  (no       (unknown)  (unknown)  H/O colectomy  (units    (

unknown)



                    date)                                   unknown)  

 

           (unknown)  (no       (unknown)  (unknown)  HPI - Weakness  (units    

(unknown)



                    date)                                   unknown)  

 

           (unknown)  (no       (unknown)  (unknown)  HPI Narrative:  (units    

(unknown)



                    date)                                   unknown)  

 

           (unknown)  (no       (unknown)  (unknown)  Hct   26.7 L  (units    (u

nknown)



                    date)                         (41-53)  % unknown)  

 

           (unknown)  (no       (unknown)  (unknown)  Head/Neck:  (units    (unk

nown)



                    date)                         Endorses having a unknown)  



                                                  headache, denies           



                                                  any neck pain           

 

           (unknown)  (no       (unknown)  (unknown)  Head: atraumatic,  (units 

   (unknown)



                    date)                         symmetrical facial unknown)  



                                                  expressions           

 

           (unknown)  (no       (unknown)  (unknown)  Hgb   9.1 L  (units    (un

known)



                    date)                         (13.5-17.5)  g/dL unknown)  

 

           (unknown)  (no       (unknown)  (unknown)  History of Present  (units

    (unknown)



                    date)                         Illness   unknown)  

 

           (unknown)  (no       (unknown)  (unknown)  History of lumbar  (units 

   (unknown)



                    date)                         surgery () unknown)  

 

           (unknown)  (no       (unknown)  (unknown)  Hx of foot surgery  (units

    (unknown)



                    date)                         (2017)    unknown)  

 

           (unknown)  (no       (unknown)  (unknown)  Hx of shoulder  (units    

(unknown)



                    date)                         surgery (2010) unknown)  

 

           (unknown)  (no       (unknown)  (unknown)  Hypertension  (units    (u

nknown)



                    date)                                   unknown)  

 

           (unknown)  (no       (unknown)  (unknown)  IMPRESSION:? No  (units   

 (unknown)



                    date)                         acute     unknown)  



                                                  cardiopulmonary           



                                                  findings            

 

           (unknown)  (no       (unknown)  (unknown)  INDICATIONS:?  (units    (

unknown)



                    date)                         chest pain unknown)  

 

           (unknown)  (no       (unknown)  (unknown)  Imaging Data  (units    (u

nknown)



                    date)                                   unknown)  

 

           (unknown)  (no       (unknown)  (unknown)  Independently  (units    (

unknown)



                    date)                         reviewed vitals unknown)  



                                                  signs and nursing           



                                                  notes.              

 

           (unknown)  (no       (unknown)  (unknown)  Initial Vital  (units    (

unknown)



                    date)                         Signs     unknown)  

 

           (unknown)  (no       (unknown)  (unknown)  Initial Vital  (units    (

unknown)



                    date)                         Signs:    unknown)  

 

           (unknown)  (no       (unknown)  (unknown)  Lab Data  (units    (unkno

wn)



                    date)                                   unknown)  

 

           (unknown)  (no       (unknown)  (unknown)  Labs:     (units    (unkno

wn)



                    date)                                   unknown)  

 

           (unknown)  (no       (unknown)  (unknown)  Lactate (Lactic  (units   

 (unknown)



                    date)                         Acid) Stat unknown)  

 

           (unknown)  (no       (unknown)  (unknown)  Lipase    32  (units    (u

nknown)



                    date)                         ()  U/L unknown)  

 

           (unknown)  (no       (unknown)  (unknown)  Lipase Stat  (units    (un

known)



                    date)                                   unknown)  

 

           (unknown)  (no       (unknown)  (unknown)  Lungs and pleura:?  (units

    (unknown)



                    date)                         Lungs are clear.? unknown)  



                                                  No pleural           



                                                  effusions or           



                                                  pneumothorax.? Low           

 

           (unknown)  (no       (unknown)  (unknown)  Lymph # (Auto)  (units    

(unknown)



                    date)                         400 L   (4057-5351) unknown)  



                                                   /uL                

 

           (unknown)  (no       (unknown)  (unknown)  Lymph % (Auto)  (units    

(unknown)



                    date)                         6.4 L   (25-40)  % unknown)  

 

           (unknown)  (no       (unknown)  (unknown)  MCH   26.9  (units    (unk

nown)



                    date)                         (26-34)  PG unknown)  

 

           (unknown)  (no       (unknown)  (unknown)  MCHC   34.0  (units    (un

known)



                    date)                         (30-36)  % unknown)  

 

           (unknown)  (no       (unknown)  (unknown)  MCV   79.1 L  (units    (u

nknown)



                    date)                         ()  fL unknown)  

 

           (unknown)  (no       (unknown)  (unknown)  MDM - Weakness  (units    

(unknown)



                    date)                                   unknown)  

 

           (unknown)  (no       (unknown)  (unknown)  MSK: denies new  (units   

 (unknown)



                    date)                         joint pain, muscle unknown)  



                                                  weakness or           



                                                  swelling            

 

           (unknown)  (no       (unknown)  (unknown) MSK: moves all  (units    (

unknown)



                    date)                         extremities, unknown)  



                                                  neurovascularly           



                                                  intact, generalized           



                                                  weakness, normal           

 

           (unknown)  (no       (unknown)  (unknown)  Magnesium    1.0 L  (units

    (unknown)



                    date)                          (1.6-2.3)  mg/dL unknown)  

 

           (unknown)  (no       (unknown)  (unknown)  Magnesium Stat  (units    

(unknown)



                    date)                                   unknown)  

 

           (unknown)  (no       (unknown)  (unknown)  Magnesium Sulfate  (units 

   (unknown)



                    date)                         (Magnesium Sulfate) unknown)  



                                                   2 gm in 50 mls @           



                                                  50 mls/hr IV NOW           



                                                  ONE                 

 

           (unknown)  (no       (unknown)  (unknown)  Mediastinum:?  (units    (

unknown)



                    date)                         Mediastinal unknown)  



                                                  contours appear           



                                                  normal.? Heart size           



                                                  is normal.?           

 

           (unknown)  (no       (unknown)  (unknown)  Medical History  (units   

 (unknown)



                    date)                         (Reviewed 22 unknown)  



                                                  @ 19:57 by Kitty Costello [facility])           

 

           (unknown)  (no       (unknown)  (unknown)  MiraLax since this  (units

    (unknown)



                    date)                         happened. unknown)  

 

           (unknown)  (no       (unknown)  (unknown)  Mode of arrival:  (units  

  (unknown)



                    date)                         Wheelchair unknown)  

 

           (unknown)  (no       (unknown)  (unknown)  Mono # (Auto)  (units    (

unknown)



                    date)                         600   (0-900)  /uL unknown)  

 

           (unknown)  (no       (unknown)  (unknown)  Mono % (Auto)  (units    (

unknown)



                    date)                         7.9   (3-14)  % unknown)  

 

           (unknown)  (no       (unknown)  (unknown)  Mother     (units 

   (unknown)



                    date)                          Cancer   unknown)  

 

           (unknown)  (no       (unknown)  (unknown)  Mouth/Throat:  (units    (

unknown)



                    date)                         moist mucus unknown)  



                                                  membranes           

 

           (unknown)  (no       (unknown)  (unknown)  Narrative  (units    (unkn

own)



                    date)                                   unknown)  

 

           (unknown)  (no       (unknown)  (unknown)  Narrative:  (units    (unk

nown)



                    date)                                   unknown)  

 

           (unknown)  (no       (unknown)  (unknown)  Neck: supple  (units    (u

nknown)



                    date)                                   unknown)  

 

           (unknown)  (no       (unknown)  (unknown)  Neuro: denies  (units    (

unknown)



                    date)                         numbness, tingling, unknown)  



                                                  dizziness           

 

           (unknown)  (no       (unknown)  (unknown)  Neuro: normal  (units    (

unknown)



                    date)                         speech and unknown)  



                                                  cognition, A+O x3           

 

           (unknown)  (no       (unknown)  (unknown)  Neut # (Auto)  (units    (

unknown)



                    date)                         5900   (7187-4562) unknown)  



                                                  /uL                 

 

           (unknown)  (no       (unknown)  (unknown)  Neut % (Auto)  (units    (

unknown)



                    date)                         84.7 H   (50-75)  % unknown)  

 

           (unknown)  (no       (unknown)  (unknown)  No Action  (units    (unkn

own)



                    date)                                   unknown)  

 

           (unknown)  (no       (unknown)  (unknown)  No Known Drug  (units    (

unknown)



                    date)                         Allergies Allergy unknown)  



                                                  Verified 22           



                                                  17:38               

 

           (unknown)  (no       (unknown)  (unknown)  Nose: nares  (units    (un

known)



                    date)                         patent, no unknown)  



                                                  rhinorrhea           

 

           (unknown)  (no       (unknown)  (unknown)  Ondansetron HCl  (units   

 (unknown)



                    date)                         (Ondansetron 4 Mg/2 unknown)  



                                                  Ml Inj)  4 mg IV           



                                                  NOW ONE             

 

           (unknown)  (no       (unknown)  (unknown)  Ordered:  (units    (unkno

wn)



                    date)                                   unknown)  

 

           (unknown)  (no       (unknown)  (unknown)  Orders    (units    (unkno

wn)



                    date)                                   unknown)  

 

           (unknown)  (no       (unknown)  (unknown)  Osteoarthritis  (units    

(unknown)



                    date)                                   unknown)  

 

           (unknown)  (no       (unknown)  (unknown)  Oxygen Delivery  (units   

 (unknown)



                    date)                         Method    22 unknown)  



                                                  17:34               

 

           (unknown)  (no       (unknown)  (unknown)  Oxygen Delivery  (units   

 (unknown)



                    date)                         Method Room Air unknown)  

 

           (unknown)  (no       (unknown)  (unknown)  PROCEDURE:? XR  (units    

(unknown)



                    date)                         CHEST 1V  unknown)  

 

           (unknown)  (no       (unknown)  (unknown)  Patient History  (units   

 (unknown)



                    date)                                   unknown)  

 

           (unknown)  (no       (unknown)  (unknown)  Patient is  (units    (unk

nown)



                    date)                         currently unknown)  



                                                  anticoagulated on           



                                                  Plavix 37.5 mg           



                                                  daily.  Dr. Gómez           

 

           (unknown)  (no       (unknown)  (unknown) Patient reports  (units    

(unknown)



                    date)                         that when he has a unknown)  



                                                  bowel movement, he           



                                                  typically has hard           



                                                  pellets             

 

           (unknown)  (no       (unknown)  (unknown)  Patient self caths  (units

    (unknown)



                    date)                         for urine output, unknown)  



                                                  reports that he has           



                                                  not had much urine           

 

           (unknown)  (no       (unknown)  (unknown) Patient's  (units    (unkno

wn)



                    date)                         colorectal cancer unknown)  



                                                  oncologist was Dr. Jett, patient was           



                                                  referred to him           

 

           (unknown)  (no       (unknown)  (unknown)  Patient's preop  (units   

 (unknown)



                    date)                         diagnosis was unknown)  



                                                  nausea and vomiting           



                                                  from hematemesis in           



                                                  January             

 

           (unknown)  (no       (unknown)  (unknown)  Patient:  (units    (unkno

wn)



                    date)                         Bret Esquivel unknown)  



                                                  MR#: [id number]            

 

           (unknown)  (no       (unknown)  (unknown)  Postlaminectomy  (units   

 (unknown)



                    date)                         syndrome  unknown)  

 

           (unknown)  (no       (unknown)  (unknown)  Potassium    3.7  (units  

  (unknown)



                    date)                         (3.4-5.1)  mmol/L unknown)  

 

           (unknown)  (no       (unknown)  (unknown)  Prescriptions:  (units    

(unknown)



                    date)                                   unknown)  

 

           (unknown)  (no       (unknown)  (unknown)  Procalcitonin Stat  (units

    (unknown)



                    date)                                   unknown)  

 

           (unknown)  (no       (unknown)  (unknown)  Psych: mental  (units    (

unknown)



                    date)                         status is grossly unknown)  



                                                  normal, congruent           



                                                  mood, normal           



                                                  affect, pleasant           

 

           (unknown)  (no       (unknown)  (unknown)  Pulse Oximetry  98  (units

    (unknown)



                    date)                           22 17:34 unknown)  

 

           (unknown)  (no       (unknown)  (unknown)  Pulse Oximetry 98  (units 

   (unknown)



                    date)                                   unknown)  

 

           (unknown)  (no       (unknown)  (unknown)  Pulse Rate  77  (units    

(unknown)



                    date)                         22 17:34 unknown)  

 

           (unknown)  (no       (unknown)  (unknown)  Pulse Rate 77  (units    (

unknown)



                    date)                                   unknown)  

 

           (unknown)  (no       (unknown)  (unknown)  RDW   16.1 H  (units    (u

nknown)



                    date)                         (11.6-14.8)  % unknown)  

 

           (unknown)  (no       (unknown)  (unknown)  Referrals:  (units    (unk

nown)



                    date)                                   unknown)  

 

           (unknown)  (no       (unknown)  (unknown)  Related Data  (units    (u

nknown)



                    date)                                   unknown)  

 

           (unknown)  (no       (unknown)  (unknown)  Respiratory Rate  (units  

  (unknown)



                    date)                         18   22 17:34 unknown)  

 

           (unknown)  (no       (unknown)  (unknown)  Respiratory Rate  (units  

  (unknown)



                    date)                         18        unknown)  

 

           (unknown)  (no       (unknown)  (unknown)  Respiratory:  (units    (u

nknown)



                    date)                         denies shortness of unknown)  



                                                  breath, or new           



                                                  cough               

 

           (unknown)  (no       (unknown)  (unknown)  Respiratory:  (units    (u

nknown)



                    date)                         normal effort, able unknown)  



                                                  to speak in           



                                                  complete sentences,           



                                                  no audible           

 

           (unknown)  (no       (unknown)  (unknown)  Result diagrams:  (units  

  (unknown)



                    date)                                   unknown)  

 

           (unknown)  (no       (unknown)  (unknown)  Review of Systems  (units 

   (unknown)



                    date)                                   unknown)  

 

           (unknown)  (no       (unknown)  (unknown)  SARS-CoV-2 (PCR)  (units  

  (unknown)



                    date)                         Negative  unknown)  



                                                  (Negative)           

 

           (unknown)  (no       (unknown)  (unknown)  Sciatica  (units    (unkno

wn)



                    date)                                   unknown)  

 

           (unknown)  (no       (unknown)  (unknown)  Signed By:  (units    (unk

nown)



                    date)                                   unknown)  

 

           (unknown)  (no       (unknown)  (unknown)  Skin: brisk  (units    (un

known)



                    date)                         capillary refill, unknown)  



                                                  no rash, no           



                                                  erythema            

 

           (unknown)  (no       (unknown)  (unknown)  Skin: denies rash,  (units

    (unknown)



                    date)                         itching or wound unknown)  

 

           (unknown)  (no       (unknown)  (unknown)  Smoking Status:  (units   

 (unknown)



                    date)                         Never smoker unknown)  

 

           (unknown)  (no       (unknown)  (unknown)  Smoking Status:  (units   

 (unknown)



                    date)                         Never smoker unknown)  

 

           (unknown)  (no       (unknown)  (unknown)  Social History  (units    

(unknown)



                    date)                         (Reviewed 22 unknown)  



                                                  @ 19:57 by Kitty Costello [facility])           

 

           (unknown)  (no       (unknown)  (unknown)  Sodium    135 L  (units   

 (unknown)



                    date)                         (137-145)  mmol/L unknown)  

 

           (unknown)  (no       (unknown)  (unknown)  Sodium Chloride  (units   

 (unknown)



                    date)                         (Normal Saline unknown)  



                                                  0.9%)  1,000 mls @           



                                                  1,000 mls/hr IV           



                                                  BOLUS ONE           

 

           (unknown)  (no       (unknown)  (unknown)  Source: patient  (units   

 (unknown)



                    date)                         and family unknown)  

 

           (unknown)  (no       (unknown)  (unknown)  Spinal stenosis  (units   

 (unknown)



                    date)                                   unknown)  

 

           (unknown)  (no       (unknown)  (unknown)  Stated complaint:  (units 

   (unknown)



                    date)                         WEAKNESS UNABLE TO unknown)  



                                                  HOLD ANYTHING DOWN           

 

           (unknown)  (no       (unknown)  (unknown)  Substance Use  (units    (

unknown)



                    date)                         Type: does not use unknown)  

 

           (unknown)  (no       (unknown)  (unknown)  Surgical History  (units  

  (unknown)



                    date)                         (Reviewed 22 unknown)  



                                                  @ 19:57 by Kitty Costello [facility])           

 

           (unknown)  (no       (unknown)  (unknown)  Surgical changes  (units  

  (unknown)



                    date)                         and devices:? unknown)  



                                                  None.?              

 

           (unknown)  (no       (unknown)  (unknown)  TECHNIQUE:? One  (units   

 (unknown)



                    date)                         view of the chest unknown)  



                                                  was acquired.?           

 

           (unknown)  (no       (unknown)  (unknown)  Temperature  98.3  (units 

   (unknown)



                    date)                         F   22 17:34 unknown)  

 

           (unknown)  (no       (unknown)  (unknown)  Temperature 98.3 F  (units

    (unknown)



                    date)                                   unknown)  

 

           (unknown)  (no       (unknown)  (unknown)  Time Seen by  (units    (u

nknown)



                    date)                         Provider: 22 unknown)  



                                                  19:11               

 

           (unknown)  (no       (unknown)  (unknown)  Total Bilirubin  (units   

 (unknown)



                    date)                         0.6  (0.2-1.3) unknown)  



                                                  mg/dL               

 

           (unknown)  (no       (unknown)  (unknown)  Total Creatine  (units    

(unknown)



                    date)                         Kinase    54 L unknown)  



                                                  ()  U/L           

 

           (unknown)  (no       (unknown)  (unknown)  Total Protein  (units    (

unknown)



                    date)                         7.5  (6.3-8.2) unknown)  



                                                  g/dL                

 

           (unknown)  (no       (unknown)  (unknown)  Troponin + CK  (units    (

unknown)



                    date)                         Cardiac Panel Stat unknown)  

 

           (unknown)  (no       (unknown)  (unknown)  Troponin I    <  (units   

 (unknown)



                    date)                         0.012  (0.01-0.034) unknown)  



                                                   ng/mL              

 

           (unknown)  (no       (unknown)  (unknown)  Urine Microscopic  (units 

   (unknown)



                    date)                         Stat      unknown)  

 

           (unknown)  (no       (unknown)  (unknown)  Vital Signs  (units    (un

known)



                    date)                                   unknown)  

 

           (unknown)  (no       (unknown)  (unknown)  Vital signs:  (units    (u

nknown)



                    date)                                   unknown)  

 

           (unknown)  (no       (unknown)  (unknown)  Linette Jett MD  (units  

  (unknown)



                    date)                         [Physician] - As unknown)  



                                                  soon as possible           

 

           (unknown)  (no       (unknown)  (unknown)  XR chest 1V Stat  (units  

  (unknown)



                    date)                                   unknown)  

 

           (unknown)  (no       (unknown)  (unknown)  [Embedded Image  (units   

 (unknown)



                    date)                         Not Available] unknown)  

 

           (unknown)  (no       (unknown)  (unknown)  alcohol intake  (units    

(unknown)



                    date)                         frequency: a few unknown)  



                                                  times a month           

 

           (unknown)  (no       (unknown)  (unknown)  alcohol intake:  (units   

 (unknown)



                    date)                         current   unknown)  

 

           (unknown)  (no       (unknown)  (unknown)  and cooperative  (units   

 (unknown)



                    date)                                   unknown)  

 

           (unknown)  (no       (unknown)  (unknown)  appear    (units    (unkno

wn)



                    date)                                   unknown)  

 

           (unknown)  (no       (unknown)  (unknown)  ascorbic acid  (units    (

unknown)



                    date)                         (vitamin C) 500 mg unknown)  



                                                  500 mg PO BID #60           



                                                  tabs 22           

 

           (unknown)  (no       (unknown)  (unknown)  atorvastatin 80 mg  (units

    (unknown)



                    date)                         tablet 40 mg PO QPM unknown)  



                                                  20           

 

           (unknown)  (no       (unknown)  (unknown)  be causing a rash  (units 

   (unknown)



                    date)                                   unknown)  

 

           (unknown)  (no       (unknown)  (unknown)  blood in his  (units    (u

nknown)



                    date)                         emesis or in his unknown)  



                                                  stool.  He reports           



                                                  that he did a stool           



                                                  test one            

 

           (unknown)  (no       (unknown)  (unknown)  buspirone 5 mg  (units    

(unknown)



                    date)                         tablet 5 mg PO BID unknown)  



                                                  #60 tabs 22           

 

           (unknown)  (no       (unknown)  (unknown)  cancer.  Postop  (units   

 (unknown)



                    date)                         diagnosis from this unknown)  



                                                  upper endoscopy was           



                                                  the same.  His           

 

           (unknown)  (no       (unknown)  (unknown)  caths at baseline  (units 

   (unknown)



                    date)                                   unknown)  

 

           (unknown)  (no       (unknown)  (unknown)  clopidogrel 75 mg  (units 

   (unknown)



                    date)                         tablet 75 mg PO unknown)  



                                                  DAILY 22            

 

           (unknown)  (no       (unknown)  (unknown)  colonic   (units    (unkno

wn)



                    date)                         anastomosis, and a unknown)  



                                                  suboptimal bowel           



                                                  prep.               

 

           (unknown)  (no       (unknown)  (unknown)  cyanocobalamin  (units    

(unknown)



                    date)                         (vitamin B-12) 500 unknown)  



                                                  1,000 mcg PO DAILY           



                                                  #60 tabs 22           

 

           (unknown)  (no       (unknown)  (unknown)  diabetes,  (units    (unkn

own)



                    date)                         hypertension, unknown)  



                                                  colorectal cancer           



                                                  in  with a           



                                                  reverse colectomy,           



                                                  He                  

 

           (unknown)  (no       (unknown)  (unknown)  does not drink  (units    

(unknown)



                    date)                         alcohol.? Patient unknown)  



                                                  and his wife state           



                                                  that he was seen in           



                                                  the                 

 

           (unknown)  (no       (unknown)  (unknown)  edema     (units    (unkno

wn)



                    date)                                   unknown)  

 

           (unknown)  (no       (unknown)  (unknown)  emergency  (units    (unkn

own)



                    date)                         department in unknown)  



                                                  January and           



                                                  diagnosed with a GI           



                                                  bleed, he was           



                                                  admitted,           

 

           (unknown)  (no       (unknown)  (unknown)  endorses fatigue  (units  

  (unknown)



                    date)                                   unknown)  

 

           (unknown)  (no       (unknown)  (unknown) endoscopic  (units    (unkn

own)



                    date)                         diagnosis includes unknown)  



                                                  mild gastropathy,           



                                                  esophagitis, patent           



                                                  retrosigmoid           

 

           (unknown)  (no       (unknown)  (unknown)  ferrous sulfate  (units   

 (unknown)



                    date)                         325 mg (65 mg 325 unknown)  



                                                  mg PO BIDWM #60           



                                                  tabs 22           

 

           (unknown)  (no       (unknown)  (unknown)  followed by loose  (units 

   (unknown)



                    date)                         stool.    unknown)  

 

           (unknown)  (no       (unknown)  (unknown)  gabapentin 300 mg  (units 

   (unknown)



                    date)                         capsule 300 mg PO unknown)  



                                                  BID 18            

 

           (unknown)  (no       (unknown)  (unknown)  generalized  (units    (un

known)



                    date)                         weakness and unknown)  



                                                  fatigue             

 

           (unknown)  (no       (unknown)  (unknown)  glipizide 10 mg  (units   

 (unknown)



                    date)                         tablet 10 mg PO BID unknown)  



                                                  18           

 

           (unknown)  (no       (unknown)  (unknown)  hospital and on  (units   

 (unknown)



                    date)                         chart review it unknown)  



                                                  appears that it was           



                                                  completed on           



                                                  2022.           

 

           (unknown)  (no       (unknown)  (unknown)  household members:  (units

    (unknown)



                    date)                          spouse   unknown)  

 

           (unknown)  (no       (unknown)  (unknown)  iron) tablet  (units    (u

nknown)



                    date)                                   unknown)  

 

           (unknown)  (no       (unknown)  (unknown)  lives     (units    (unkno

wn)



                    date)                         independently:  Yes unknown)  

 

           (unknown)  (no       (unknown)  (unknown)  losartan 50 mg  (units    

(unknown)



                    date)                         tablet 25 mg PO QPM unknown)  



                                                  20           

 

           (unknown)  (no       (unknown)  (unknown)  lung      (units    (unkno

wn)



                    date)                                   unknown)  

 

           (unknown)  (no       (unknown)  (unknown)  mcg tablet  (units    (unk

nown)



                    date)                                   unknown)  

 

           (unknown)  (no       (unknown)  (unknown)  mcg tablet  (units    (unk

nown)



                    date)                         (Tab-A-Lucy) unknown)  

 

           (unknown)  (no       (unknown)  (unknown)  metformin 500 mg  (units  

  (unknown)



                    date)                         tablet,extended 500 unknown)  



                                                  mg PO BID 18            

 

           (unknown)  (no       (unknown)  (unknown)  metoprolol  (units    (unk

nown)



                    date)                         succinate 25 mg 25 unknown)  



                                                  mg PO DAILY           



                                                  21           

 

           (unknown)  (no       (unknown)  (unknown)  moving forward.  (units   

 (unknown)



                    date)                         Patient reports unknown)  



                                                  that he has been           



                                                  taking fiber but           



                                                  not any             

 

           (unknown)  (no       (unknown)  (unknown)  multivitamin with  (units 

   (unknown)



                    date)                         folic acid 400 1 unknown)  



                                                  tab PO DAILY #90           



                                                  tabs 22           

 

           (unknown)  (no       (unknown)  (unknown)  nondistended, no  (units  

  (unknown)



                    date)                         masses, no unknown)  



                                                  exquisite           



                                                  tenderness with           



                                                  exam, no rebound           

 

           (unknown)  (no       (unknown)  (unknown)  not heard it was  (units  

  (unknown)



                    date)                         positive or not. unknown)  



                                                  Patient reports           



                                                  that his primary           



                                                  care                

 

           (unknown)  (no       (unknown)  (unknown)  ondansetron HCl 4  (units 

   (unknown)



                    date)                         mg tablet 4 mg PO unknown)  



                                                  Q8H PRN nausea and           



                                                  21            

 

           (unknown)  (no       (unknown)  (unknown)  oral powder packet  (units

    (unknown)



                    date)                                   unknown)  

 

           (unknown)  (no       (unknown)  (unknown)  output for three  (units  

  (unknown)



                    date)                         days, three days unknown)  



                                                  ago he had nausea           



                                                  and vomiting,           



                                                  denies any           

 

           (unknown)  (no       (unknown)  (unknown)  polyethylene  (units    (u

nknown)



                    date)                         glycol 3350 17 gram unknown)  



                                                  17 gm PO DAILY #90           



                                                  ea 22           

 

           (unknown)  (no       (unknown)  (unknown)  provider has left,  (units

    (unknown)



                    date)                         he reports that he unknown)  



                                                  has seen Dr. Macias           



                                                  recently as well.           

 

           (unknown)  (no       (unknown)  (unknown)  recently for his  (units  

  (unknown)



                    date)                         anemia.   unknown)  

 

           (unknown)  (no       (unknown)  (unknown)  related diarrhea  (units  

  (unknown)



                    date)                         from fecal loading unknown)  



                                                  and obstipation.           



                                                  Recommended MiraLax           



                                                  17 g                

 

           (unknown)  (no       (unknown)  (unknown)  release 24 hr  (units    (

unknown)



                    date)                                   unknown)  

 

           (unknown)  (no       (unknown)  (unknown)  reported that  (units    (

unknown)



                    date)                         patient's altered unknown)  



                                                  bowel habit is           



                                                  likely a function           



                                                  of overflow           

 

           (unknown)  (no       (unknown)  (unknown)  sennosides 8.6 mg  (units 

   (unknown)



                    date)                         tablet (senna) 17.2 unknown)  



                                                  mg PO BEDTIME #60           



                                                  tabs 22           

 

           (unknown)  (no       (unknown)  (unknown)  substance use  (units    (

unknown)



                    date)                         type:  does not use unknown)  

 

           (unknown)  (no       (unknown)  (unknown)  tablet (Vitamin C)  (units

    (unknown)



                    date)                                   unknown)  

 

           (unknown)  (no       (unknown)  (unknown)  tablet,extended  (units   

 (unknown)



                    date)                         release 24 hr unknown)  

 

           (unknown)  (no       (unknown)  (unknown)  tenderness  (units    (unk

nown)



                    date)                                   unknown)  

 

           (unknown)  (no       (unknown)  (unknown)  there.  He reports  (units

    (unknown)



                    date)                         that he had an unknown)  



                                                  upper endoscopy           



                                                  completed here at           



                                                  this                

 

           (unknown)  (no       (unknown)  (unknown)  tone      (units    (unkno

wn)



                    date)                                   unknown)  

 

           (unknown)  (no       (unknown)  (unknown)  trospium 20 mg  (units    

(unknown)



                    date)                         tablet 20 mg PO BID unknown)  



                                                  urinary retention           



                                                  22           

 

           (unknown)  (no       (unknown)  (unknown)  unremarkable.?  (units    

(unknown)



                    date)                                   unknown)  

 

           (unknown)  (no       (unknown)  (unknown)  volumes accentuate  (units

    (unknown)



                    date)                         pulmonary unknown)  



                                                  interstitium and           



                                                  heart size.           

 

           (unknown)  (no       (unknown)  (unknown)  week ago and  (units    (u

nknown)



                    date)                         dropped it off at unknown)  



                                                  [facility] which was           



                                                  testing for blood           



                                                  but he has           

 

           (unknown)  (no       (unknown)  (unknown)  went to [facility]  (units 

   (unknown)



                    date)                         for rehab following unknown)  



                                                  his admission and           



                                                  has followed up           



                                                  with                

 

           (unknown)  (no       (unknown)  (unknown)  wheezing, stridor,  (units

    (unknown)



                    date)                         or rales. No unknown)  



                                                  retractions or           



                                                  tachypnea.           









                                         Result panel 21









           (unknown)  (no date)  (unknown)  (unknown)  1-5/HPF   (units    (unkn

own)



                                                            unknown)  

 

           (unknown)  (no date)  (unknown)  (unknown)  2+        (units    (unkn

own)



                                                            unknown)  

 

           (unknown)  (no date)  (unknown)  (unknown)  5-10/HPF  (units    (unkn

own)



                                                            unknown)  

 

           (unknown)  (no date)  (unknown)  (unknown)  Few (2-10)  (units    (un

known)



                                                            unknown)  

 

           (unknown)  (no date)  (unknown)  (unknown)  Specimen  (units    (unkn

own)



                                                  Cultured  unknown)  









                                         Result panel 22









           (unknown)  (no       (unknown)  (unknown)  (no value)  (units    (unk

nown)



                    date)                                   unknown)  

 

           (unknown)  (no       (unknown)  (unknown)  Radiologist  (units    (un

known)



                    date)                         Impression: unknown)  

 

           (unknown)  (no       (unknown)  (unknown)  Date of Service:  (units  

  (unknown)



                    date)                         22  unknown)  

 

           (unknown)  (no       (unknown)  (unknown)  (no value)  (units    (unk

nown)



                    date)                                   unknown)  

 

           (unknown)  (no       (unknown)  (unknown)  22 17:45  (units    

(unknown)



                    date)                                   unknown)  

 

           (unknown)  (no       (unknown)  (unknown)  1 tab PO DAILY  (units    

(unknown)



                    date)                         Qty: 90 0RF unknown)  

 

           (unknown)  (no       (unknown)  (unknown)  1,000 mcg PO DAILY  (units

    (unknown)



                    date)                         Qty: 60 0RF unknown)  

 

           (unknown)  (no       (unknown)  (unknown)  10 mg PO BID  (units    (u

nknown)



                    date)                                   unknown)  

 

           (unknown)  (no       (unknown)  (unknown)  17 gm PO DAILY  (units    

(unknown)



                    date)                         Qty: 90 0RF unknown)  

 

           (unknown)  (no       (unknown)  (unknown)  17.2 mg PO BEDTIME  (units

    (unknown)



                    date)                         Qty: 60 0RF unknown)  

 

           (unknown)  (no       (unknown)  (unknown)  20 mg PO BID  (units    (u

nknown)



                    date)                                   unknown)  

 

           (unknown)  (no       (unknown)  (unknown)  25 mg PO DAILY  (units    

(unknown)



                    date)                                   unknown)  

 

           (unknown)  (no       (unknown)  (unknown)  25 mg PO QPM  (units    (u

nknown)



                    date)                                   unknown)  

 

           (unknown)  (no       (unknown)  (unknown)  300 mg PO BID  (units    (

unknown)



                    date)                                   unknown)  

 

           (unknown)  (no       (unknown)  (unknown)  325 mg PO BIDWM  (units   

 (unknown)



                    date)                         Qty: 60 0RF unknown)  

 

           (unknown)  (no       (unknown)  (unknown)  4 mg PO Q8H PRN  (units   

 (unknown)



                    date)                         (Reason: nausea and unknown)  



                                                  vomiting) Qty: 14           



                                                  0RF                 

 

           (unknown)  (no       (unknown)  (unknown)  40 mg PO QPM  (units    (u

nknown)



                    date)                                   unknown)  

 

           (unknown)  (no       (unknown)  (unknown)  5 mg PO BID Qty:  (units  

  (unknown)



                    date)                         60 0RF    unknown)  

 

           (unknown)  (no       (unknown)  (unknown)  500 mg PO BID  (units    (

unknown)



                    date)                                   unknown)  

 

           (unknown)  (no       (unknown)  (unknown)  500 mg PO BID Qty:  (units

    (unknown)



                    date)                         60 0RF    unknown)  

 

           (unknown)  (no       (unknown)  (unknown)  75 mg PO DAILY  (units    

(unknown)



                    date)                                   unknown)  

 

           (unknown)  (no       (unknown)  (unknown)  Allergies  (units    (unkn

own)



                    date)                                   unknown)  

 

           (unknown)  (no       (unknown)  (unknown)  Documented By: [facility]  (units

    (unknown)



                    date)                                   unknown)  

 

           (unknown)  (no       (unknown)  (unknown)  ED Orders  (units    (unkn

own)



                    date)                                   unknown)  

 

           (unknown)  (no       (unknown)  (unknown)  Emergency Report  (units  

  (unknown)



                    date)                                   unknown)  

 

           (unknown)  (no       (unknown)  (unknown)  Home Medications  (units  

  (unknown)



                    date)                                   unknown)  

 

           (unknown)  (no       (unknown)  (unknown)  [facility]  (units   

 (unknown)



                    date)                         [address] unknown)  



                                                  [place] [address]           

 

           (unknown)  (no       (unknown)  (unknown)  Lab Results  (units    (un

known)



                    date)                                   unknown)  

 

           (unknown)  (no       (unknown)  (unknown)  Label Comments:  (units   

 (unknown)



                    date)                                   unknown)  

 

           (unknown)  (no       (unknown)  (unknown)  Last Admin:  (units    (un

known)



                    date)                         22 18:30 unknown)  



                                                  Dose: 4 mg           

 

           (unknown)  (no       (unknown)  (unknown)  Previous Rx's  (units    (

unknown)



                    date)                                   unknown)  

 

           (unknown)  (no       (unknown)  (unknown)  Stop: 22  (units    

(unknown)



                    date)                         18:16     unknown)  

 

           (unknown)  (no       (unknown)  (unknown)  Stop: 22  (units    

(unknown)



                    date)                         20:25     unknown)  

 

           (unknown)  (no       (unknown)  (unknown)  Stop: 22  (units    

(unknown)



                    date)                         20:27     unknown)  

 

           (unknown)  (no       (unknown)  (unknown)  Vital Signs - 8 hr  (units

    (unknown)



                    date)                                   unknown)  

 

           (unknown)  (no       (unknown)  (unknown)  has not been  (units    (u

nknown)



                    date)                         eating so hasn't unknown)  



                                                  taken recently.           



                                                  spouse states           



                                                  thinks it might           

 

           (unknown)  (no       (unknown)  (unknown)  stopped taking  (units    

(unknown)



                    date)                         prior to back unknown)  



                                                  surgery and did not           



                                                  restart             

 

           (unknown)  (no       (unknown)  (unknown)  (no value)  (units    (unk

nown)



                    date)                                   unknown)  

 

           (unknown)  (no       (unknown)  (unknown)  22  (units 

   (unknown)



                    date)                         22  unknown)  



                                                  Range/Units           

 

           (unknown)  (no       (unknown)  (unknown)  17:40 17:45 17:45  (units 

   (unknown)



                    date)                                   unknown)  

 

           (unknown)  (no       (unknown)  (unknown)  ascorbic acid  (units    (

unknown)



                    date)                         (vitamin C) unknown)  



                                                  [Vitamin C] 500 mg           



                                                  Tablet              

 

           (unknown)  (no       (unknown)  (unknown)  atorvastatin 80 mg  (units

    (unknown)



                    date)                         tablet    unknown)  

 

           (unknown)  (no       (unknown)  (unknown)  buspirone 5 mg  (units    

(unknown)



                    date)                         Tablet    unknown)  

 

           (unknown)  (no       (unknown)  (unknown)  clopidogrel 75 mg  (units 

   (unknown)



                    date)                         tablet    unknown)  

 

           (unknown)  (no       (unknown)  (unknown)  cyanocobalamin  (units    

(unknown)



                    date)                         (vitamin B-12) 500 unknown)  



                                                  mcg Tablet           

 

           (unknown)  (no       (unknown)  (unknown)  ferrous sulfate  (units   

 (unknown)



                    date)                         325 mg (65 mg iron) unknown)  



                                                  Tablet              

 

           (unknown)  (no       (unknown)  (unknown)  gabapentin 300 mg  (units 

   (unknown)



                    date)                         capsule   unknown)  

 

           (unknown)  (no       (unknown)  (unknown)  glipizide 10 mg  (units   

 (unknown)



                    date)                         tablet    unknown)  

 

           (unknown)  (no       (unknown)  (unknown)  losartan 50 mg  (units    

(unknown)



                    date)                         tablet    unknown)  

 

           (unknown)  (no       (unknown)  (unknown)  metformin 500 mg  (units  

  (unknown)



                    date)                         tablet extended unknown)  



                                                  release 24 hr           

 

           (unknown)  (no       (unknown)  (unknown)  metoprolol  (units    (unk

nown)



                    date)                         succinate 25 mg unknown)  



                                                  tablet extended           



                                                  release 24 hr           

 

           (unknown)  (no       (unknown)  (unknown)  multivitamin with  (units 

   (unknown)



                    date)                         folic acid unknown)  



                                                  [Tab-A-Lucy] 400           



                                                  mcg Tablet           

 

           (unknown)  (no       (unknown)  (unknown)  ondansetron HCl  (units   

 (unknown)



                    date)                         [Zofran] 4 mg unknown)  



                                                  tablet              

 

           (unknown)  (no       (unknown)  (unknown)  polyethylene  (units    (u

nknown)



                    date)                         glycol 3350 17 gram unknown)  



                                                  Powder In Packet           

 

           (unknown)  (no       (unknown)  (unknown)  sennosides [senna]  (units

    (unknown)



                    date)                         8.6 mg Tablet unknown)  

 

           (unknown)  (no       (unknown)  (unknown)  trospium 20 mg  (units    

(unknown)



                    date)                         tablet    unknown)  

 

           (unknown)  (no       (unknown)  (unknown)  22  (units    (unkno

wn)



                    date)                                   unknown)  

 

           (unknown)  (no       (unknown)  (unknown)  1.07 in January.  (units  

  (unknown)



                    date)                         No elevation in his unknown)  



                                                  troponin,           



                                                  procalcitonin is           



                                                  elevated at           

 

           (unknown)  (no       (unknown)  (unknown)  Acute cystitis  (units    

(unknown)



                    date)                         with hematuria, unknown)  



                                                  Anemia,             



                                                  Hypomagnesemia           

 

           (unknown)  (no       (unknown)  (unknown)  Medication  (units    (unk

nown)



                    date)                         Instructions unknown)  



                                                  Recorded            

 

           (unknown)  (no       (unknown)  (unknown)  Medication  (units    (unk

nown)



                    date)                         Instructions unknown)  



                                                  Recorded  Confirmed           

 

           (unknown)  (no       (unknown)  (unknown)  sodium 135,  (units    (un

known)



                    date)                         potassium 3.7, unknown)  



                                                  creatinine 1.42           



                                                  which is increased           



                                                  from his prior of           

 

           (unknown)  (no       (unknown)  (unknown)  with regular axis  (units 

   (unknown)



                    date)                         and intervals.  No unknown)  



                                                  STEMI, ST segment           



                                                  changes,            



                                                  arrhythmia, or           

 

           (unknown)  (no       (unknown)  (unknown)  work is   (units    (unkno

wn)



                    date)                         significant for unknown)  



                                                  anemia, hemoglobin           



                                                  is 9.1 today, down           



                                                  from 9.6 in           

 

           (unknown)  (no       (unknown)  (unknown)  (Zofran) vomiting  (units 

   (unknown)



                    date)                         #14 tabs  unknown)  

 

           (unknown)  (no       (unknown)  (unknown)  0.72, his lactate  (units 

   (unknown)



                    date)                         is also elevated at unknown)  



                                                  2.6, magnesium is           



                                                  low at 1.0.  His           

 

           (unknown)  (no       (unknown)  (unknown)  1979854   (units    (unkno

wn)



                    date)                                   unknown)  

 

           (unknown)  (no       (unknown)  (unknown)  22 17:39  (units    

(unknown)



                    date)                                   unknown)  

 

           (unknown)  (no       (unknown)  (unknown)  22 17:40  (units    

(unknown)



                    date)                                   unknown)  

 

           (unknown)  (no       (unknown)  (unknown)  22 17:45  (units    

(unknown)



                    date)                                   unknown)  

 

           (unknown)  (no       (unknown)  (unknown)  22 19:26  (units    

(unknown)



                    date)                                   unknown)  

 

           (unknown)  (no       (unknown)  (unknown)  22 19:28  (units    

(unknown)



                    date)                                   unknown)  

 

           (unknown)  (no       (unknown)  (unknown)  17:34     (units    (unkno

wn)



                    date)                                   unknown)  

 

           (unknown)  (no       (unknown)  (unknown)  with iron  (units    (

unknown)



                    date)                         deficiency anemia, unknown)  



                                                  altered bowel           



                                                  habit, personal           



                                                  history of colon           

 

           (unknown)  (no       (unknown)  (unknown)  62-year-old  (units    (un

known)



                    date)                         gentleman with a unknown)  



                                                  history of multiple           



                                                  sclerosis, CVA in           



                                                  2019,               

 

           (unknown)  (no       (unknown)  (unknown)  ?         (units    (unkno

wn)



                    date)                                   unknown)  

 

           (unknown)  (no       (unknown)  (unknown)  ALT    16  (<50)  (units  

  (unknown)



                    date)                         IU/L      unknown)  

 

           (unknown)  (no       (unknown)  (unknown)  AST    19  (17-59)  (units

    (unknown)



                    date)                          IU/L     unknown)  

 

           (unknown)  (no       (unknown)  (unknown)  Age/Sex: 62 / M  (units   

 (unknown)



                    date)                                   unknown)  

 

           (unknown)  (no       (unknown)  (unknown)  Albumin    3.9  (units    

(unknown)



                    date)                         (3.5-5.0)  g/dL unknown)  

 

           (unknown)  (no       (unknown)  (unknown)  Albumin/Globulin  (units  

  (unknown)



                    date)                         Ratio    1.1 unknown)  



                                                  (1.0-2.8)           

 

           (unknown)  (no       (unknown)  (unknown)  Alkaline  (units    (unkno

wn)



                    date)                         Phosphatase    105 unknown)  



                                                  ()  U/L           

 

           (unknown)  (no       (unknown)  (unknown)  Allergy/AdvReac  (units   

 (unknown)



                    date)                         Type Severity unknown)  



                                                  Reaction Status           



                                                  Date / Time           

 

           (unknown)  (no       (unknown)  (unknown)  Approved by: Garfield Mcdanielsunits 

   (unknown)



                    date)                         SHANNAN Lopez on unknown)  



                                                  2022 at 18:02?           

 

           (unknown)  (no       (unknown)  (unknown)  BUN    21 H  (units    (un

known)



                    date)                         (9-20)  mg/dL unknown)  

 

           (unknown)  (no       (unknown)  (unknown)  BUN/Creatinine  (units    

(unknown)



                    date)                         Ratio    14.8 unknown)  



                                                  (6-22)              

 

           (unknown)  (no       (unknown)  (unknown)  Baso # (Auto)   0  (units 

   (unknown)



                    date)                          (0-100)  /uL unknown)  

 

           (unknown)  (no       (unknown)  (unknown)  Baso % (Auto)  (units    (

unknown)



                    date)                         0.2   (0-2)  % unknown)  

 

           (unknown)  (no       (unknown)  (unknown)  Blood Pressure  (units    

(unknown)



                    date)                         124/66   22 unknown)  



                                                  17:34               

 

           (unknown)  (no       (unknown)  (unknown)  Blood Pressure  (units    

(unknown)



                    date)                         124    unknown)  

 

           (unknown)  (no       (unknown)  (unknown)  Bones and chest  (units   

 (unknown)



                    date)                         wall:? No unknown)  



                                                  suspicious bony           



                                                  lesions.? Overlying           



                                                  soft tissues           

 

           (unknown)  (no       (unknown)  (unknown)  CK-MB (CK-2)  (units    (u

nknown)



                    date)                         TNP       unknown)  

 

           (unknown)  (no       (unknown)  (unknown)  CK-MB (CK-2) Rel  (units  

  (unknown)



                    date)                         Index    TNP unknown)  

 

           (unknown)  (no       (unknown)  (unknown)  COMPARISON:?  (units    (u

nknown)



                    date)                         [facility], unknown)  



                                                  CR, XR CHEST 2V,           



                                                  3/18/2021, 10:19.           

 

           (unknown)  (no       (unknown)  (unknown)  COVID19 -Nasal  (units    

(unknown)



                    date)                         RAPID/Pre-Proc Stat unknown)  

 

           (unknown)  (no       (unknown)  (unknown)  CT abdomen pelvis  (units 

   (unknown)



                    date)                         w con Stat unknown)  

 

           (unknown)  (no       (unknown)  (unknown)  CT abdomen pelvis  (units 

   (unknown)



                    date)                         was ordered for unknown)  



                                                  patient has           



                                                  tenderness to his           



                                                  right lower           

 

           (unknown)  (no       (unknown)  (unknown)  CVA (cerebral  (units    (

unknown)



                    date)                         vascular accident) unknown)  



                                                  (2018)           

 

           (unknown)  (no       (unknown)  (unknown)  Calcium    9.3  (units    

(unknown)



                    date)                         (8.4-10.2)  mg/dL unknown)  

 

           (unknown)  (no       (unknown)  (unknown)  Carbon Dioxide  (units    

(unknown)



                    date)                         29  (22-32)  mmol/L unknown)  

 

           (unknown)  (no       (unknown)  (unknown)  Cardio: denies  (units    

(unknown)



                    date)                         chest pain, unknown)  



                                                  palpitations           

 

           (unknown)  (no       (unknown)  (unknown)  Cardiovascular:  (units   

 (unknown)



                    date)                         regular rate and unknown)  



                                                  rhythm, no           



                                                  peripheral edema,           



                                                  warm extremities           

 

           (unknown)  (no       (unknown)  (unknown)  Chest x-ray:  (units    (u

nknown)



                    date)                                   unknown)  

 

           (unknown)  (no       (unknown)  (unknown)  Chief complaint:  (units  

  (unknown)



                    date)                         Weakness  unknown)  

 

           (unknown)  (no       (unknown)  (unknown)  Chloride    96 L  (units  

  (unknown)



                    date)                         ()  mmol/L unknown)  

 

           (unknown)  (no       (unknown)  (unknown)  Clinical  (units    (unkno

wn)



                    date)                         Impression: unknown)  

 

           (unknown)  (no       (unknown)  (unknown)  Colon cancer  (units    (u

nknown)



                    date)                         ()    unknown)  

 

           (unknown)  (no       (unknown)  (unknown)  Complete Blood  (units    

(unknown)



                    date)                         Count AUTO DIFF unknown)  



                                                  Stat                

 

           (unknown)  (no       (unknown)  (unknown)  Comprehensive  (units    (

unknown)



                    date)                         Metabolic Panel unknown)  



                                                  Stat                

 

           (unknown)  (no       (unknown)  (unknown)  Course    (units    (unkno

wn)



                    date)                                   unknown)  

 

           (unknown)  (no       (unknown)  (unknown)  Creatinine    1.42  (units

    (unknown)



                    date)                         H  (0.66-1.25) unknown)  



                                                  mg/dL               

 

           (unknown)  (no       (unknown)  (unknown)  : 1960  (units   

 (unknown)



                    date)                         Acct:[id number] unknown)  

 

           (unknown)  (no       (unknown)  (unknown)  Departure  (units    (unkn

own)



                    date)                                   unknown)  

 

           (unknown)  (no       (unknown)  (unknown)  Depression  (units    (unk

nown)



                    date)                                   unknown)  

 

           (unknown)  (no       (unknown)  (unknown)  Diabetes  (units    (unkno

wn)



                    date)                                   unknown)  

 

           (unknown)  (no       (unknown)  (unknown)  Diabetic  (units    (unkno

wn)



                    date)                         neuropathy unknown)  

 

           (unknown)  (no       (unknown)  (unknown)  Discharge Plan  (units    

(unknown)



                    date)                                   unknown)  

 

           (unknown)  (no       (unknown)  (unknown)  Discontinued  (units    (u

nknown)



                    date)                         Medications unknown)  

 

           (unknown)  (no       (unknown)  (unknown)  Gustavo Macias MD  (units   

 (unknown)



                    date)                         [Primary Care unknown)  



                                                  Provider] -           

 

           (unknown)  (no       (unknown)  (unknown)  ECG Data  (units    (unkno

wn)



                    date)                                   unknown)  

 

           (unknown)  (no       (unknown)  (unknown) EKG independently  (units  

  (unknown)



                    date)                         reviewed by Dr. mejia)  



                                                  Dinesh and reveals           



                                                  normal sinus rhythm           



                                                  at 72 bpm           

 

           (unknown)  (no       (unknown)  (unknown)  EKG-12 Lead Stat  (units  

  (unknown)



                    date)                                   unknown)  

 

           (unknown)  (no       (unknown)  (unknown)  ER Physician:  (units    (

unknown)



                    date)                         Kitty Costello unknown)  



                                                  ARNP                

 

           (unknown)  (no       (unknown)  (unknown)  Eos # (Auto)   100  (units

    (unknown)



                    date)                           (0-450)  /uL unknown)  

 

           (unknown)  (no       (unknown)  (unknown)  Eos % (Auto)   0.8  (units

    (unknown)



                    date)                         L   (2-4)  % unknown)  

 

           (unknown)  (no       (unknown)  (unknown)  Estimated GFR  (units    (

unknown)



                    date)                         55 L  (>60)  mL/min unknown)  

 

           (unknown)  (no       (unknown)  (unknown)  Exam      (units    (unkno

wn)



                    date)                                   unknown)  

 

           (unknown)  (no       (unknown)  (unknown)  Exam Narrative:  (units   

 (unknown)



                    date)                                   unknown)  

 

           (unknown)  (no       (unknown)  (unknown)  Eyes: denies  (units    (u

nknown)



                    date)                         visual changes, eye unknown)  



                                                  pain                

 

           (unknown)  (no       (unknown)  (unknown)  Eyes: equal round  (units 

   (unknown)



                    date)                         and reactive, EOMI, unknown)  



                                                  conjunctiva normal           

 

           (unknown)  (no       (unknown)  (unknown)  FINDINGS:?  (units    (unk

nown)



                    date)                                   unknown)  

 

           (unknown)  (no       (unknown)  (unknown)  Family History  (units    

(unknown)



                    date)                         (Reviewed 22 unknown)  



                                                  @ 19:57 by Kitty Costello [facility])           

 

           (unknown)  (no       (unknown)  (unknown)  Father     (units 

   (unknown)



                    date)                          Cancer   unknown)  

 

           (unknown)  (no       (unknown)  (unknown)  GERD      (units    (unkno

wn)



                    date)                         (gastroesophageal unknown)  



                                                  reflux disease)           

 

           (unknown)  (no       (unknown)  (unknown)  GI:  Right lower  (units  

  (unknown)



                    date)                         quadrant tenderness unknown)  



                                                  to palpation           

 

           (unknown)  (no       (unknown)  (unknown)  GI: abdomen mildly  (units

    (unknown)



                    date)                         guarded, tender to unknown)  



                                                  palpation in the           



                                                  right lower           



                                                  quadrant,           

 

           (unknown)  (no       (unknown)  (unknown)  :  Straight cath  (units

    (unknown)



                    date)                         for urine completed unknown)  



                                                  by myself with 16           



                                                  [place] coude           



                                                  catheter,           

 

           (unknown)  (no       (unknown)  (unknown)  : denies  (units    (unk

nown)



                    date)                         dysuria, hematuria unknown)  



                                                  or flank pain,           



                                                  reports oliguria,           



                                                  patient self           

 

           (unknown)  (no       (unknown)  (unknown)  Gastroenterology  (units  

  (unknown)



                    date)                         from [place], unknown)  



                                                  patient reports           



                                                  that he sees Yasmine Rocha PA-C           

 

           (unknown)  (no       (unknown)  (unknown)  General   (units    (unkno

wn)



                    date)                                   unknown)  

 

           (unknown)  (no       (unknown)  (unknown)  General:  (units    (unkno

wn)



                    date)                         cooperative, unknown)  



                                                  comfortable, in no           



                                                  acute distress,           



                                                  well groomed,           

 

           (unknown)  (no       (unknown)  (unknown)  General: denies  (units   

 (unknown)



                    date)                         fever, chills, unknown)  



                                                  endorses weakness,           



                                                  right lower           



                                                  quadrant pain,           

 

           (unknown)  (no       (unknown)  (unknown)  GenericComposite[P  (units

    (unknown)



                    date)                         lt Count   219 unknown)  



                                                  (150-400)  X10^3/uL           



                                                   ]                  

 

           (unknown)  (no       (unknown)  (unknown)  GenericComposite[R  (units

    (unknown)



                    date)                         BC   3.37 L unknown)  



                                                  (4.5-5.9)  X10^6/uL           



                                                   ]                  

 

           (unknown)  (no       (unknown)  (unknown)  GenericComposite[W  (units

    (unknown)



                    date)                         BC   7.0  unknown)  



                                                  (4.5-11.0)           



                                                  X10^3/uL  ]           

 

           (unknown)  (no       (unknown)  (unknown)  Globulin    3.6  (units   

 (unknown)



                    date)                         (1.7-4.1)  g/dL unknown)  

 

           (unknown)  (no       (unknown)  (unknown)  Glucose    262 H  (units  

  (unknown)



                    date)                         ()  mg/dL unknown)  

 

           (unknown)  (no       (unknown)  (unknown)  Guaiac stool:  (units    (

unknown)



                    date)                         Negative, good unknown)  



                                                  stool result           

 

           (unknown)  (no       (unknown)  (unknown)  H/O colectomy  (units    (

unknown)



                    date)                                   unknown)  

 

           (unknown)  (no       (unknown)  (unknown)  HPI - Weakness  (units    

(unknown)



                    date)                                   unknown)  

 

           (unknown)  (no       (unknown)  (unknown)  HPI Narrative:  (units    

(unknown)



                    date)                                   unknown)  

 

           (unknown)  (no       (unknown)  (unknown)  Hct   26.7 L  (units    (u

nknown)



                    date)                         (41-53)  % unknown)  

 

           (unknown)  (no       (unknown)  (unknown)  He does not have  (units  

  (unknown)



                    date)                         any mental status unknown)  



                                                  changes, GCS is 15.           



                                                   Guaiac stool is           

 

           (unknown)  (no       (unknown)  (unknown)  Head/Neck:  (units    (unk

nown)



                    date)                         Endorses having a unknown)  



                                                  headache, denies           



                                                  any neck pain           

 

           (unknown)  (no       (unknown)  (unknown)  Head: atraumatic,  (units 

   (unknown)



                    date)                         symmetrical facial unknown)  



                                                  expressions           

 

           (unknown)  (no       (unknown)  (unknown)  Hgb   9.1 L  (units    (un

known)



                    date)                         (13.5-17.5)  g/dL unknown)  

 

           (unknown)  (no       (unknown)  (unknown)  History of Present  (units

    (unknown)



                    date)                         Illness   unknown)  

 

           (unknown)  (no       (unknown)  (unknown)  History of lumbar  (units 

   (unknown)



                    date)                         surgery () unknown)  

 

           (unknown)  (no       (unknown)  (unknown)  Hx of foot surgery  (units

    (unknown)



                    date)                         (2017)    unknown)  

 

           (unknown)  (no       (unknown)  (unknown)  Hx of shoulder  (units    

(unknown)



                    date)                         surgery (2010) unknown)  

 

           (unknown)  (no       (unknown)  (unknown)  Hypertension  (units    (u

nknown)



                    date)                                   unknown)  

 

           (unknown)  (no       (unknown)  (unknown)  IMPRESSION:? No  (units   

 (unknown)



                    date)                         acute     unknown)  



                                                  cardiopulmonary           



                                                  findings            

 

           (unknown)  (no       (unknown)  (unknown)  INDICATIONS:?  (units    (

unknown)



                    date)                         chest pain unknown)  

 

           (unknown)  (no       (unknown)  (unknown)  Imaging Data  (units    (u

nknown)



                    date)                                   unknown)  

 

           (unknown)  (no       (unknown)  (unknown)  Independently  (units    (

unknown)



                    date)                         reviewed vitals unknown)  



                                                  signs and nursing           



                                                  notes.              

 

           (unknown)  (no       (unknown)  (unknown)  Initial Vital  (units    (

unknown)



                    date)                         Signs     unknown)  

 

           (unknown)  (no       (unknown)  (unknown)  Initial Vital  (units    (

unknown)



                    date)                         Signs:    unknown)  

 

           (unknown)  (no       (unknown)  (unknown)  Instructions:  (units    (

unknown)



                    date)                         Acute Cystitis, unknown)  



                                                  Anemia              

 

           (unknown)  (no       (unknown)  (unknown)  Interpretation:  (units   

 (unknown)



                    date)                                   unknown)  

 

           (unknown)  (no       (unknown)  (unknown) January, hematocrit  (units

    (unknown)



                    date)                         is 26.7, down from unknown)  



                                                  27.8 also in           



                                                  January, platelet           



                                                  count 219,           

 

           (unknown)  (no       (unknown)  (unknown) Klebsiella  (units    (unkn

own)



                    date)                         pneumoniae which unknown)  



                                                  was resistant only           



                                                  to ampicillin and           



                                                  nitrofurantoin.           

 

           (unknown)  (no       (unknown)  (unknown)  Lab Data  (units    (unkno

wn)



                    date)                                   unknown)  

 

           (unknown)  (no       (unknown)  (unknown)  Labs:     (units    (unkno

wn)



                    date)                                   unknown)  

 

           (unknown)  (no       (unknown)  (unknown)  Lactate (Lactic  (units   

 (unknown)



                    date)                         Acid) Stat unknown)  

 

           (unknown)  (no       (unknown)  (unknown)  Lipase    32  (units    (u

nknown)



                    date)                         ()  U/L unknown)  

 

           (unknown)  (no       (unknown)  (unknown)  Lipase Stat  (units    (un

known)



                    date)                                   unknown)  

 

           (unknown)  (no       (unknown)  (unknown)  Lungs and pleura:?  (units

    (unknown)



                    date)                         Lungs are clear.? unknown)  



                                                  No pleural           



                                                  effusions or           



                                                  pneumothorax.? Low           

 

           (unknown)  (no       (unknown)  (unknown)  Lymph # (Auto)  (units    

(unknown)



                    date)                         400 L   (7145-2849) unknown)  



                                                   /uL                

 

           (unknown)  (no       (unknown)  (unknown)  Lymph % (Auto)  (units    

(unknown)



                    date)                         6.4 L   (25-40)  % unknown)  

 

           (unknown)  (no       (unknown)  (unknown)  MCH   26.9  (units    (unk

nown)



                    date)                         (26-34)  PG unknown)  

 

           (unknown)  (no       (unknown)  (unknown)  MCHC   34.0  (units    (un

known)



                    date)                         (30-36)  % unknown)  

 

           (unknown)  (no       (unknown)  (unknown)  MCV   79.1 L  (units    (u

nknown)



                    date)                         ()  fL unknown)  

 

           (unknown)  (no       (unknown)  (unknown)  MDM - Weakness  (units    

(unknown)



                    date)                                   unknown)  

 

           (unknown)  (no       (unknown)  (unknown)  MDM Narrative  (units    (

unknown)



                    date)                                   unknown)  

 

           (unknown)  (no       (unknown)  (unknown)  MSK: denies new  (units   

 (unknown)



                    date)                         joint pain, muscle unknown)  



                                                  weakness or           



                                                  swelling            

 

           (unknown)  (no       (unknown)  (unknown) MSK: moves all  (units    (

unknown)



                    date)                         extremities, unknown)  



                                                  neurovascularly           



                                                  intact, generalized           



                                                  weakness, normal           

 

           (unknown)  (no       (unknown)  (unknown)  Magnesium    1.0 L  (units

    (unknown)



                    date)                          (1.6-2.3)  mg/dL unknown)  

 

           (unknown)  (no       (unknown)  (unknown)  Magnesium Stat  (units    

(unknown)



                    date)                                   unknown)  

 

           (unknown)  (no       (unknown)  (unknown)  Magnesium Sulfate  (units 

   (unknown)



                    date)                         (Magnesium Sulfate) unknown)  



                                                   2 gm in 50 mls @           



                                                  50 mls/hr IV NOW           



                                                  ONE                 

 

           (unknown)  (no       (unknown)  (unknown)  Mediastinum:?  (units    (

unknown)



                    date)                         Mediastinal unknown)  



                                                  contours appear           



                                                  normal.? Heart size           



                                                  is normal.?           

 

           (unknown)  (no       (unknown)  (unknown)  Medical History  (units   

 (unknown)



                    date)                         (Reviewed 22 unknown)  



                                                  @ 19:57 by Kitty Costello ARNP)           

 

           (unknown)  (no       (unknown)  (unknown)  Medical decision  (units  

  (unknown)



                    date)                         making narrative: unknown)  

 

           (unknown)  (no       (unknown)  (unknown)  [employer] since this  (units

    (unknown)



                    date)                         happened. unknown)  

 

           (unknown)  (no       (unknown)  (unknown)  Mode of arrival:  (units  

  (unknown)



                    date)                         Wheelchair unknown)  

 

           (unknown)  (no       (unknown)  (unknown)  Mono # (Auto)  (units    (

unknown)



                    date)                         600   (0-900)  /uL unknown)  

 

           (unknown)  (no       (unknown)  (unknown)  Mono % (Auto)  (units    (

unknown)



                    date)                         7.9   (3-14)  % unknown)  

 

           (unknown)  (no       (unknown)  (unknown)  Mother     (units 

   (unknown)



                    date)                          Cancer   unknown)  

 

           (unknown)  (no       (unknown)  (unknown)  Mouth/Throat:  (units    (

unknown)



                    date)                         moist mucus unknown)  



                                                  membranes           

 

           (unknown)  (no       (unknown)  (unknown)  Narrative  (units    (unkn

own)



                    date)                                   unknown)  

 

           (unknown)  (no       (unknown)  (unknown)  Narrative:  (units    (unk

nown)



                    date)                                   unknown)  

 

           (unknown)  (no       (unknown)  (unknown)  Neck: supple  (units    (u

nknown)



                    date)                                   unknown)  

 

           (unknown)  (no       (unknown)  (unknown)  Neuro: denies  (units    (

unknown)



                    date)                         numbness, tingling, unknown)  



                                                  dizziness           

 

           (unknown)  (no       (unknown)  (unknown)  Neuro: normal  (units    (

unknown)



                    date)                         speech and unknown)  



                                                  cognition, A+O x3           

 

           (unknown)  (no       (unknown)  (unknown)  Neut # (Auto)  (units    (

unknown)



                    date)                         5900   (6681-8536) unknown)  



                                                  /uL                 

 

           (unknown)  (no       (unknown)  (unknown)  Neut % (Auto)  (units    (

unknown)



                    date)                         84.7 H   (50-75)  % unknown)  

 

           (unknown)  (no       (unknown)  (unknown)  No Action  (units    (unkn

own)



                    date)                                   unknown)  

 

           (unknown)  (no       (unknown)  (unknown)  No Known Drug  (units    (

unknown)



                    date)                         Allergies Allergy unknown)  



                                                  Verified 22           



                                                  17:38               

 

           (unknown)  (no       (unknown)  (unknown)  Nose: nares  (units    (un

known)



                    date)                         patent, no unknown)  



                                                  rhinorrhea           

 

           (unknown)  (no       (unknown)  (unknown)  Notable on  (units    (unk

nown)



                    date)                         patient's prior unknown)  



                                                  urine cultures,           



                                                  urine from           



                                                  2022 grew out           

 

           (unknown)  (no       (unknown)  (unknown)  Ondansetron HCl  (units   

 (unknown)



                    date)                         (Ondansetron 4 Mg/2 unknown)  



                                                  Ml Inj)  4 mg IV           



                                                  NOW ONE             

 

           (unknown)  (no       (unknown)  (unknown)  Ordered:  (units    (unkno

wn)



                    date)                                   unknown)  

 

           (unknown)  (no       (unknown)  (unknown)  Orders    (units    (unkno

wn)



                    date)                                   unknown)  

 

           (unknown)  (no       (unknown)  (unknown)  Osteoarthritis  (units    

(unknown)



                    date)                                   unknown)  

 

           (unknown)  (no       (unknown)  (unknown)  Oxygen Delivery  (units   

 (unknown)



                    date)                         Method    22 unknown)  



                                                  17:34               

 

           (unknown)  (no       (unknown)  (unknown)  Oxygen Delivery  (units   

 (unknown)



                    date)                         Method Room Air unknown)  

 

           (unknown)  (no       (unknown)  (unknown)  PROCEDURE:? XR  (units    

(unknown)



                    date)                         CHEST 1V  unknown)  

 

           (unknown)  (no       (unknown)  (unknown)  Patient   (units    (unkno

wn)



                    date)                         Disposition: Home unknown)  

 

           (unknown)  (no       (unknown)  (unknown)  Patient History  (units   

 (unknown)



                    date)                                   unknown)  

 

           (unknown)  (no       (unknown)  (unknown)  Patient is  (units    (unk

nown)



                    date)                         currently unknown)  



                                                  anticoagulated on           



                                                  Plavix 37.5 mg           



                                                  daily.  Dr. Gómez           

 

           (unknown)  (no       (unknown)  (unknown) Patient reports  (units    

(unknown)



                    date)                         that when he has a unknown)  



                                                  bowel movement, he           



                                                  typically has hard           



                                                  pellets             

 

           (unknown)  (no       (unknown)  (unknown)  Patient self caths  (units

    (unknown)



                    date)                         for urine output, unknown)  



                                                  reports that he has           



                                                  not had much urine           

 

           (unknown)  (no       (unknown)  (unknown)  Patient was  (units    (un

known)



                    date)                         treated in the unknown)  



                                                  emergency           



                                                  department with 1 g           



                                                  of ceftriaxone.           

 

           (unknown)  (no       (unknown)  (unknown) Patient's  (units    (unkno

wn)



                    date)                         colorectal cancer unknown)  



                                                  oncologist was Dr. Jett, patient was           



                                                  referred to him           

 

           (unknown)  (no       (unknown)  (unknown)  Patient's preop  (units   

 (unknown)



                    date)                         diagnosis was unknown)  



                                                  nausea and vomiting           



                                                  from hematemesis in           



                                                  January             

 

           (unknown)  (no       (unknown)  (unknown)  Patient:  (units    (unkno

wn)



                    date)                         Bret Esquivel R unknown)  



                                                  MR#: [id number]            

 

           (unknown)  (no       (unknown)  (unknown)  Postlaminectomy  (units   

 (unknown)



                    date)                         syndrome  unknown)  

 

           (unknown)  (no       (unknown)  (unknown)  Potassium    3.7  (units  

  (unknown)



                    date)                         (3.4-5.1)  mmol/L unknown)  

 

           (unknown)  (no       (unknown)  (unknown)  Prescriptions:  (units    

(unknown)



                    date)                                   unknown)  

 

           (unknown)  (no       (unknown)  (unknown)  Procalcitonin Stat  (units

    (unknown)



                    date)                                   unknown)  

 

           (unknown)  (no       (unknown)  (unknown)  Psych: mental  (units    (

unknown)



                    date)                         status is grossly unknown)  



                                                  normal, congruent           



                                                  mood, normal           



                                                  affect, pleasant           

 

           (unknown)  (no       (unknown)  (unknown)  Pulse Oximetry  98  (units

    (unknown)



                    date)                           22 17:34 unknown)  

 

           (unknown)  (no       (unknown)  (unknown)  Pulse Oximetry 98  (units 

   (unknown)



                    date)                                   unknown)  

 

           (unknown)  (no       (unknown)  (unknown)  Pulse Rate  77  (units    

(unknown)



                    date)                         22 17:34 unknown)  

 

           (unknown)  (no       (unknown)  (unknown)  Pulse Rate 77  (units    (

unknown)



                    date)                                   unknown)  

 

           (unknown)  (no       (unknown)  (unknown)  RDW   16.1 H  (units    (u

nknown)



                    date)                         (11.6-14.8)  % unknown)  

 

           (unknown)  (no       (unknown)  (unknown)  Referrals:  (units    (unk

nown)



                    date)                                   unknown)  

 

           (unknown)  (no       (unknown)  (unknown)  Related Data  (units    (u

nknown)



                    date)                                   unknown)  

 

           (unknown)  (no       (unknown)  (unknown)  Respiratory Rate  (units  

  (unknown)



                    date)                         18   22 17:34 unknown)  

 

           (unknown)  (no       (unknown)  (unknown)  Respiratory Rate  (units  

  (unknown)



                    date)                         18        unknown)  

 

           (unknown)  (no       (unknown)  (unknown)  Respiratory:  (units    (u

nknown)



                    date)                         denies shortness of unknown)  



                                                  breath, or new           



                                                  cough               

 

           (unknown)  (no       (unknown)  (unknown)  Respiratory:  (units    (u

nknown)



                    date)                         normal effort, able unknown)  



                                                  to speak in           



                                                  complete sentences,           



                                                  no audible           

 

           (unknown)  (no       (unknown)  (unknown)  Result diagrams:  (units  

  (unknown)



                    date)                                   unknown)  

 

           (unknown)  (no       (unknown)  (unknown)  Review of Systems  (units 

   (unknown)



                    date)                                   unknown)  

 

           (unknown)  (no       (unknown)  (unknown)  SARS-CoV-2 (PCR)  (units  

  (unknown)



                    date)                         Negative  unknown)  



                                                  (Negative)           

 

           (unknown)  (no       (unknown)  (unknown)  Sciatica  (units    (unkno

wn)



                    date)                                   unknown)  

 

           (unknown)  (no       (unknown)  (unknown)  Signed By:  (units    (unk

nown)



                    date)                                   unknown)  

 

           (unknown)  (no       (unknown)  (unknown)  Skin: brisk  (units    (un

known)



                    date)                         capillary refill, unknown)  



                                                  no rash, no           



                                                  erythema            

 

           (unknown)  (no       (unknown)  (unknown)  Skin: denies rash,  (units

    (unknown)



                    date)                         itching or wound unknown)  

 

           (unknown)  (no       (unknown)  (unknown)  Smoking Status:  (units   

 (unknown)



                    date)                         Never smoker unknown)  

 

           (unknown)  (no       (unknown)  (unknown)  Smoking Status:  (units   

 (unknown)



                    date)                         Never smoker unknown)  

 

           (unknown)  (no       (unknown)  (unknown)  Social History  (units    

(unknown)



                    date)                         (Reviewed 22 unknown)  



                                                  @ 19:57 by Kitty Costello [facility])           

 

           (unknown)  (no       (unknown)  (unknown)  Sodium    135 L  (units   

 (unknown)



                    date)                         (137-145)  mmol/L unknown)  

 

           (unknown)  (no       (unknown)  (unknown)  Sodium Chloride  (units   

 (unknown)



                    date)                         (Normal Saline unknown)  



                                                  0.9%)  1,000 mls @           



                                                  1,000 mls/hr IV           



                                                  BOLUS ONE           

 

           (unknown)  (no       (unknown)  (unknown)  Source: patient  (units   

 (unknown)



                    date)                         and family unknown)  

 

           (unknown)  (no       (unknown)  (unknown)  Spinal stenosis  (units   

 (unknown)



                    date)                                   unknown)  

 

           (unknown)  (no       (unknown)  (unknown)  Stated complaint:  (units 

   (unknown)



                    date)                         WEAKNESS UNABLE TO unknown)  



                                                  HOLD ANYTHING DOWN           

 

           (unknown)  (no       (unknown)  (unknown)  Substance Use  (units    (

unknown)



                    date)                         Type: does not use unknown)  

 

           (unknown)  (no       (unknown)  (unknown)  Surgical History  (units  

  (unknown)



                    date)                         (Reviewed 22 unknown)  



                                                  @ 19:57 by Kitty Costello [facility])           

 

           (unknown)  (no       (unknown)  (unknown)  Surgical changes  (units  

  (unknown)



                    date)                         and devices:? unknown)  



                                                  None.?              

 

           (unknown)  (no       (unknown)  (unknown)  TECHNIQUE:? One  (units   

 (unknown)



                    date)                         view of the chest unknown)  



                                                  was acquired.?           

 

           (unknown)  (no       (unknown)  (unknown)  Temperature  98.3  (units 

   (unknown)



                    date)                         F   22 17:34 unknown)  

 

           (unknown)  (no       (unknown)  (unknown)  Temperature 98.3 F  (units

    (unknown)



                    date)                                   unknown)  

 

           (unknown)  (no       (unknown)  (unknown)  This is a  (units    (unkn

own)



                    date)                         62-year-old male unknown)  



                                                  with history of           



                                                  multiple sclerosis,           



                                                  CVA in 2019,           

 

           (unknown)  (no       (unknown)  (unknown)  Time Seen by  (units    (u

nknown)



                    date)                         Provider: 22 unknown)  



                                                  19:11               

 

           (unknown)  (no       (unknown)  (unknown)  Total Bilirubin  (units   

 (unknown)



                    date)                         0.6  (0.2-1.3) unknown)  



                                                  mg/dL               

 

           (unknown)  (no       (unknown)  (unknown)  Total Creatine  (units    

(unknown)



                    date)                         Kinase    54 L unknown)  



                                                  ()  U/L           

 

           (unknown)  (no       (unknown)  (unknown)  Total Protein  (units    (

unknown)



                    date)                         7.5  (6.3-8.2) unknown)  



                                                  g/dL                

 

           (unknown)  (no       (unknown)  (unknown)  Troponin + CK  (units    (

unknown)



                    date)                         Cardiac Panel Stat unknown)  

 

           (unknown)  (no       (unknown)  (unknown)  Troponin I    <  (units   

 (unknown)



                    date)                         0.012  (0.01-0.034) unknown)  



                                                   ng/mL              

 

           (unknown)  (no       (unknown)  (unknown)  Urine Microscopic  (units 

   (unknown)



                    date)                         Stat      unknown)  

 

           (unknown)  (no       (unknown)  (unknown)  Vital Signs  (units    (un

known)



                    date)                                   unknown)  

 

           (unknown)  (no       (unknown)  (unknown)  Vital signs:  (units    (u

nknown)



                    date)                                   unknown)  

 

           (unknown)  (no       (unknown)  (unknown)  Linette Jett MD  (units  

  (unknown)



                    date)                         [Physician] - As unknown)  



                                                  soon as possible           

 

           (unknown)  (no       (unknown)  (unknown)  XR chest 1V Stat  (units  

  (unknown)



                    date)                                   unknown)  

 

           (unknown)  (no       (unknown)  (unknown)  [Embedded Image  (units   

 (unknown)



                    date)                         Not Available] unknown)  

 

           (unknown)  (no       (unknown)  (unknown)  acute ischemic  (units    

(unknown)



                    date)                         changes.  unknown)  

 

           (unknown)  (no       (unknown)  (unknown)  alcohol intake  (units    

(unknown)



                    date)                         frequency: a few unknown)  



                                                  times a month           

 

           (unknown)  (no       (unknown)  (unknown)  alcohol intake:  (units   

 (unknown)



                    date)                         current   unknown)  

 

           (unknown)  (no       (unknown)  (unknown)  and cooperative  (units   

 (unknown)



                    date)                                   unknown)  

 

           (unknown)  (no       (unknown)  (unknown)  and generalized  (units   

 (unknown)



                    date)                         weakness.  Patient unknown)  



                                                  self catheterizes           



                                                  his bladder at           



                                                  baseline,           

 

           (unknown)  (no       (unknown)  (unknown)  appear    (units    (unkno

wn)



                    date)                                   unknown)  

 

           (unknown)  (no       (unknown)  (unknown)  ascorbic acid  (units    (

unknown)



                    date)                         (vitamin C) 500 mg unknown)  



                                                  500 mg PO BID #60           



                                                  tabs 22           

 

           (unknown)  (no       (unknown)  (unknown)  atorvastatin 80 mg  (units

    (unknown)



                    date)                         tablet 40 mg PO QPM unknown)  



                                                  20           

 

           (unknown)  (no       (unknown)  (unknown)  be causing a rash  (units 

   (unknown)



                    date)                                   unknown)  

 

           (unknown)  (no       (unknown)  (unknown) bleeding.  Guaiac  (units  

  (unknown)



                    date)                         stool in the unknown)  



                                                  emergency           



                                                  department was           



                                                  negative.           



                                                  Patient's lab           

 

           (unknown)  (no       (unknown)  (unknown)  blood in his  (units    (u

nknown)



                    date)                         emesis or in his unknown)  



                                                  stool.  He reports           



                                                  that he did a stool           



                                                  test one            

 

           (unknown)  (no       (unknown)  (unknown)  buspirone 5 mg  (units    

(unknown)



                    date)                         tablet 5 mg PO BID unknown)  



                                                  #60 tabs 22           

 

           (unknown)  (no       (unknown)  (unknown)  cancer.  Postop  (units   

 (unknown)



                    date)                         diagnosis from this unknown)  



                                                  upper endoscopy was           



                                                  the same.  His           

 

           (unknown)  (no       (unknown)  (unknown)  caths at baseline  (units 

   (unknown)



                    date)                                   unknown)  

 

           (unknown)  (no       (unknown)  (unknown)  clopidogrel 75 mg  (units 

   (unknown)



                    date)                         tablet 75 mg PO unknown)  



                                                  DAILY 22            

 

           (unknown)  (no       (unknown)  (unknown)  colonic   (units    (unkno

wn)



                    date)                         anastomosis, and a unknown)  



                                                  suboptimal bowel           



                                                  prep.               

 

           (unknown)  (no       (unknown)  (unknown)  cyanocobalamin  (units    

(unknown)



                    date)                         (vitamin B-12) 500 unknown)  



                                                  1,000 mcg PO DAILY           



                                                  #60 tabs 22           

 

           (unknown)  (no       (unknown)  (unknown)  diabetes,  (units    (unkn

own)



                    date)                         hypertension, unknown)  



                                                  colorectal cancer           



                                                  in  with a           



                                                  reverse colectomy           



                                                  who                 

 

           (unknown)  (no       (unknown)  (unknown)  diabetes,  (units    (unkn

own)



                    date)                         hypertension, unknown)  



                                                  colorectal cancer           



                                                  in  with a           



                                                  reverse colectomy,           



                                                  He                  

 

           (unknown)  (no       (unknown)  (unknown)  does not drink  (units    

(unknown)



                    date)                         alcohol.? Patient unknown)  



                                                  and his wife state           



                                                  that he was seen in           



                                                  the                 

 

           (unknown)  (no       (unknown)  (unknown)  elevated lactate.  (units 

   (unknown)



                    date)                         His heart rate and unknown)  



                                                  blood pressure are           



                                                  within normal           



                                                  ranges.             

 

           (unknown)  (no       (unknown)  (unknown)  emergency  (units    (unkn

own)



                    date)                         department in unknown)  



                                                  January and           



                                                  diagnosed with a GI           



                                                  bleed, he was           



                                                  admitted,           

 

           (unknown)  (no       (unknown)  (unknown)  endorses fatigue  (units  

  (unknown)



                    date)                                   unknown)  

 

           (unknown)  (no       (unknown)  (unknown) endoscopic  (units    (unkn

own)



                    date)                         diagnosis includes unknown)  



                                                  mild gastropathy,           



                                                  esophagitis, patent           



                                                  retrosigmoid           

 

           (unknown)  (no       (unknown)  (unknown)  ferrous sulfate  (units   

 (unknown)



                    date)                         325 mg (65 mg 325 unknown)  



                                                  mg PO BIDWM #60           



                                                  tabs 22           

 

           (unknown)  (no       (unknown)  (unknown)  followed by loose  (units 

   (unknown)



                    date)                         stool.    unknown)  

 

           (unknown)  (no       (unknown)  (unknown)  gabapentin 300 mg  (units 

   (unknown)



                    date)                         capsule 300 mg PO unknown)  



                                                  BID 18            

 

           (unknown)  (no       (unknown)  (unknown)  generalized  (units    (un

known)



                    date)                         weakness and unknown)  



                                                  fatigue             

 

           (unknown)  (no       (unknown)  (unknown)  glipizide 10 mg  (units   

 (unknown)



                    date)                         tablet 10 mg PO BID unknown)  



                                                  18           

 

           (unknown)  (no       (unknown)  (unknown)  had a recent  (units    (u

nknown)



                    date)                         endoscopy that did unknown)  



                                                  not show any           



                                                  bleeding polyps or           



                                                  acute GI            

 

           (unknown)  (no       (unknown)  (unknown)  hospital and on  (units   

 (unknown)



                    date)                         chart review it unknown)  



                                                  appears that it was           



                                                  completed on           



                                                  2022.           

 

           (unknown)  (no       (unknown)  (unknown)  household members:  (units

    (unknown)



                    date)                          spouse   unknown)  

 

           (unknown)  (no       (unknown)  (unknown)  ingrown, no penile  (units

    (unknown)



                    date)                         discharge, no unknown)  



                                                  scrotal edema           

 

           (unknown)  (no       (unknown)  (unknown)  iron) tablet  (units    (u

nknown)



                    date)                                   unknown)  

 

           (unknown)  (no       (unknown)  (unknown)  lives     (units    (unkno

wn)



                    date)                         independently:  Yes unknown)  

 

           (unknown)  (no       (unknown)  (unknown)  losartan 50 mg  (units    

(unknown)



                    date)                         tablet 25 mg PO QPM unknown)  



                                                  20           

 

           (unknown)  (no       (unknown)  (unknown)  lung      (units    (unkno

wn)



                    date)                                   unknown)  

 

           (unknown)  (no       (unknown)  (unknown)  magnesium was  (units    (

unknown)



                    date)                         replaced with 2 g, unknown)  



                                                  normal saline 1000           



                                                  mL was given for           



                                                  his                 

 

           (unknown)  (no       (unknown)  (unknown)  mcg tablet  (units    (unk

nown)



                    date)                                   unknown)  

 

           (unknown)  (no       (unknown)  (unknown)  mcg tablet  (units    (unk

nown)



                    date)                         (Tab-A-Lucy) unknown)  

 

           (unknown)  (no       (unknown)  (unknown)  metformin 500 mg  (units  

  (unknown)



                    date)                         tablet,extended 500 unknown)  



                                                  mg PO BID 18            

 

           (unknown)  (no       (unknown)  (unknown)  metoprolol  (units    (unk

nown)



                    date)                         succinate 25 mg 25 unknown)  



                                                  mg PO DAILY           



                                                  21           

 

           (unknown)  (no       (unknown)  (unknown)  microscopy shows  (units  

  (unknown)



                    date)                                   unknown)  

 

           (unknown)  (no       (unknown)  (unknown)  moving forward.  (units   

 (unknown)



                    date)                         Patient reports unknown)  



                                                  that he has been           



                                                  taking fiber but           



                                                  not any             

 

           (unknown)  (no       (unknown)  (unknown)  multivitamin with  (units 

   (unknown)



                    date)                         folic acid 400 1 unknown)  



                                                  tab PO DAILY #90           



                                                  tabs 22           

 

           (unknown)  (no       (unknown)  (unknown)  negative.  I  (units    (u

nknown)



                    date)                         completed his unknown)  



                                                  urinary             



                                                  catheterization           



                                                  with a coude           



                                                  catheter, urine           

 

           (unknown)  (no       (unknown)  (unknown)  nondistended, no  (units  

  (unknown)



                    date)                         masses, no unknown)  



                                                  exquisite           



                                                  tenderness with           



                                                  exam, no rebound           

 

           (unknown)  (no       (unknown)  (unknown)  not heard it was  (units  

  (unknown)



                    date)                         positive or not. unknown)  



                                                  Patient reports           



                                                  that his primary           



                                                  care                

 

           (unknown)  (no       (unknown)  (unknown)  ondansetron HCl 4  (units 

   (unknown)



                    date)                         mg tablet 4 mg PO unknown)  



                                                  Q8H PRN nausea and           



                                                  21            

 

           (unknown)  (no       (unknown)  (unknown)  oral powder packet  (units

    (unknown)



                    date)                                   unknown)  

 

           (unknown)  (no       (unknown)  (unknown)  output for three  (units  

  (unknown)



                    date)                         days, three days unknown)  



                                                  ago he had nausea           



                                                  and vomiting,           



                                                  denies any           

 

           (unknown)  (no       (unknown)  (unknown)  polyethylene  (units    (u

nknown)



                    date)                         glycol 3350 17 gram unknown)  



                                                  17 gm PO DAILY #90           



                                                  ea 22           

 

           (unknown)  (no       (unknown)  (unknown)  presents to the  (units   

 (unknown)



                    date)                         emergency unknown)  



                                                  department           



                                                  complaining of           



                                                  ongoing fatigue,           



                                                  oliguria,           

 

           (unknown)  (no       (unknown)  (unknown)  provider has left,  (units

    (unknown)



                    date)                         he reports that he unknown)  



                                                  has seen Dr. Macias           



                                                  recently as well.           

 

           (unknown)  (no       (unknown)  (unknown)  quadrant with a  (units   

 (unknown)



                    date)                         complicated unknown)  



                                                  surgical history of           



                                                  his abdomen.  This           



                                                  shows               

 

           (unknown)  (no       (unknown)  (unknown)  recently for his  (units  

  (unknown)



                    date)                         anemia.   unknown)  

 

           (unknown)  (no       (unknown)  (unknown)  related diarrhea  (units  

  (unknown)



                    date)                         from fecal loading unknown)  



                                                  and obstipation.           



                                                  Recommended MiraLax           



                                                  17 g                

 

           (unknown)  (no       (unknown)  (unknown)  release 24 hr  (units    (

unknown)



                    date)                                   unknown)  

 

           (unknown)  (no       (unknown)  (unknown)  reported that  (units    (

unknown)



                    date)                         patient's altered unknown)  



                                                  bowel habit is           



                                                  likely a function           



                                                  of overflow           

 

           (unknown)  (no       (unknown)  (unknown)  sennosides 8.6 mg  (units 

   (unknown)



                    date)                         tablet (senna) 17.2 unknown)  



                                                  mg PO BEDTIME #60           



                                                  tabs 22           

 

           (unknown)  (no       (unknown)  (unknown)  substance use  (units    (

unknown)



                    date)                         type:  does not use unknown)  

 

           (unknown)  (no       (unknown)  (unknown)  tablet (Vitamin C)  (units

    (unknown)



                    date)                                   unknown)  

 

           (unknown)  (no       (unknown)  (unknown)  tablet,extended  (units   

 (unknown)



                    date)                         release 24 hr unknown)  

 

           (unknown)  (no       (unknown)  (unknown)  tenderness  (units    (unk

nown)



                    date)                                   unknown)  

 

           (unknown)  (no       (unknown)  (unknown)  there.  He reports  (units

    (unknown)



                    date)                         that he had an unknown)  



                                                  upper endoscopy           



                                                  completed here at           



                                                  this                

 

           (unknown)  (no       (unknown)  (unknown)  tone      (units    (unkno

wn)



                    date)                                   unknown)  

 

           (unknown)  (no       (unknown)  (unknown)  trospium 20 mg  (units    

(unknown)



                    date)                         tablet 20 mg PO BID unknown)  



                                                  urinary retention           



                                                  22           

 

           (unknown)  (no       (unknown)  (unknown)  unremarkable.?  (units    

(unknown)



                    date)                                   unknown)  

 

           (unknown)  (no       (unknown)  (unknown)  urine dip showed  (units  

  (unknown)



                    date)                         leukocytes, unknown)  



                                                  ketones, wbc's and           



                                                  did not show           



                                                  nitrites.  Urine           

 

           (unknown)  (no       (unknown)  (unknown)  urine is cloudy,  (units  

  (unknown)



                    date)                         yellow,   unknown)  



                                                  approximately 600           



                                                  mL in bladder and           



                                                  drained, no rash           

 

           (unknown)  (no       (unknown)  (unknown)  volumes accentuate  (units

    (unknown)



                    date)                         pulmonary unknown)  



                                                  interstitium and           



                                                  heart size.           

 

           (unknown)  (no       (unknown)  (unknown)  was cloudy yellow  (units 

   (unknown)



                    date)                         urine and 600 mL unknown)  



                                                  was drained.           



                                                  Patient tolerated           



                                                  well.  His           

 

           (unknown)  (no       (unknown)  (unknown)  week ago and  (units    (u

nknown)



                    date)                         dropped it off at unknown)  



                                                  [facility] which was           



                                                  testing for blood           



                                                  but he has           

 

           (unknown)  (no       (unknown)  (unknown)  went to [facility]  (units 

   (unknown)



                    date)                         for rehab following unknown)  



                                                  his admission and           



                                                  has followed up           



                                                  with                

 

           (unknown)  (no       (unknown)  (unknown)  wheezing, stridor,  (units

    (unknown)



                    date)                         or rales. No unknown)  



                                                  retractions or           



                                                  tachypnea.           









                                         Result panel 23









           (unknown)  (no       (unknown)  (unknown)  (no value)  (units    (unk

nown)



                    date)                                   unknown)  

 

           (unknown)  (no       (unknown)  (unknown)  Radiologist  (units    (un

known)



                    date)                         Impression: unknown)  

 

           (unknown)  (no       (unknown)  (unknown)  (no value)  (units    (unk

nown)



                    date)                                   unknown)  

 

           (unknown)  (no       (unknown)  (unknown)  Date of Service:  (units  

  (unknown)



                    date)                         22  unknown)  

 

           (unknown)  (no       (unknown)  (unknown)  (no value)  (units    (unk

nown)



                    date)                                   unknown)  

 

           (unknown)  (no       (unknown)  (unknown)  22 17:45  (units    

(unknown)



                    date)                                   unknown)  

 

           (unknown)  (no       (unknown)  (unknown)  1 tab PO DAILY  (units    

(unknown)



                    date)                         Qty: 90 0RF unknown)  

 

           (unknown)  (no       (unknown)  (unknown)  1,000 mcg PO DAILY  (units

    (unknown)



                    date)                         Qty: 60 0RF unknown)  

 

           (unknown)  (no       (unknown)  (unknown)  10 mg PO BID  (units    (u

nknown)



                    date)                                   unknown)  

 

           (unknown)  (no       (unknown)  (unknown)  17 gm PO DAILY  (units    

(unknown)



                    date)                         Qty: 90 0RF unknown)  

 

           (unknown)  (no       (unknown)  (unknown)  17.2 mg PO BEDTIME  (units

    (unknown)



                    date)                         Qty: 60 0RF unknown)  

 

           (unknown)  (no       (unknown)  (unknown)  20 mg PO BID  (units    (u

nknown)



                    date)                                   unknown)  

 

           (unknown)  (no       (unknown)  (unknown)  25 mg PO DAILY  (units    

(unknown)



                    date)                                   unknown)  

 

           (unknown)  (no       (unknown)  (unknown)  25 mg PO QPM  (units    (u

nknown)



                    date)                                   unknown)  

 

           (unknown)  (no       (unknown)  (unknown)  300 mg PO BID  (units    (

unknown)



                    date)                                   unknown)  

 

           (unknown)  (no       (unknown)  (unknown)  325 mg PO BIDWM  (units   

 (unknown)



                    date)                         Qty: 60 0RF unknown)  

 

           (unknown)  (no       (unknown)  (unknown)  4 mg PO Q8H PRN  (units   

 (unknown)



                    date)                         (Reason: nausea and unknown)  



                                                  vomiting) Qty: 14           



                                                  0RF                 

 

           (unknown)  (no       (unknown)  (unknown)  40 mg PO QPM  (units    (u

nknown)



                    date)                                   unknown)  

 

           (unknown)  (no       (unknown)  (unknown)  5 mg PO BID Qty:  (units  

  (unknown)



                    date)                         60 0RF    unknown)  

 

           (unknown)  (no       (unknown)  (unknown)  500 mg PO BID  (units    (

unknown)



                    date)                                   unknown)  

 

           (unknown)  (no       (unknown)  (unknown)  500 mg PO BID Qty:  (units

    (unknown)



                    date)                         60 0RF    unknown)  

 

           (unknown)  (no       (unknown)  (unknown)  75 mg PO DAILY  (units    

(unknown)



                    date)                                   unknown)  

 

           (unknown)  (no       (unknown)  (unknown)  Allergies  (units    (unkn

own)



                    date)                                   unknown)  

 

           (unknown)  (no       (unknown)  (unknown)  Documented By: [facility]  (units

    (unknown)



                    date)                                   unknown)  

 

           (unknown)  (no       (unknown)  (unknown)  ED Orders  (units    (unkn

own)



                    date)                                   unknown)  

 

           (unknown)  (no       (unknown)  (unknown)  Emergency Report  (units  

  (unknown)



                    date)                                   unknown)  

 

           (unknown)  (no       (unknown)  (unknown)  Home Medications  (units  

  (unknown)



                    date)                                   unknown)  

 

           (unknown)  (no       (unknown)  (unknown)  [facility]  (units   

 (unknown)



                    date)                         [address] [place] [address] unknown)  



                                                  [place] [address]           

 

           (unknown)  (no       (unknown)  (unknown)  Lab Results  (units    (un

known)



                    date)                                   unknown)  

 

           (unknown)  (no       (unknown)  (unknown)  Label Comments:  (units   

 (unknown)



                    date)                                   unknown)  

 

           (unknown)  (no       (unknown)  (unknown)  Last Admin:  (units    (un

known)



                    date)                         22 18:30 unknown)  



                                                  Dose: 4 mg           

 

           (unknown)  (no       (unknown)  (unknown)  Last Admin:  (units    (un

known)



                    date)                         22 20:18 unknown)  



                                                  Dose:  1,000 mls/hr           

 

           (unknown)  (no       (unknown)  (unknown)  Last Admin:  (units    (un

known)



                    date)                         22 20:19 unknown)  



                                                  Dose:  50 mls/hr           

 

           (unknown)  (no       (unknown)  (unknown)  Previous Rx's  (units    (

unknown)



                    date)                                   unknown)  

 

           (unknown)  (no       (unknown)  (unknown)  Stop: 22  (units    

(unknown)



                    date)                         18:16     unknown)  

 

           (unknown)  (no       (unknown)  (unknown)  Stop: 22  (units    

(unknown)



                    date)                         20:15     unknown)  

 

           (unknown)  (no       (unknown)  (unknown)  Stop: 22  (units    

(unknown)



                    date)                         20:25     unknown)  

 

           (unknown)  (no       (unknown)  (unknown)  Stop: 22  (units    

(unknown)



                    date)                         20:27     unknown)  

 

           (unknown)  (no       (unknown)  (unknown)  Urine Dip  (units    (unkn

own)



                    date)                                   unknown)  

 

           (unknown)  (no       (unknown)  (unknown)  Vital Signs - 8 hr  (units

    (unknown)



                    date)                                   unknown)  

 

           (unknown)  (no       (unknown)  (unknown)  has not been  (units    (u

nknown)



                    date)                         eating so hasn't unknown)  



                                                  taken recently.           



                                                  spouse states           



                                                  thinks it might           

 

           (unknown)  (no       (unknown)  (unknown)  stopped taking  (units    

(unknown)



                    date)                         prior to back unknown)  



                                                  surgery and did not           



                                                  restart             

 

           (unknown)  (no       (unknown)  (unknown)  (no value)  (units    (unk

nown)



                    date)                                   unknown)  

 

           (unknown)  (no       (unknown)  (unknown)  22  (units 

   (unknown)



                    date)                         22  unknown)  



                                                  Range/Units           

 

           (unknown)  (no       (unknown)  (unknown)  17:40 17:45 17:45  (units 

   (unknown)



                    date)                                   unknown)  

 

           (unknown)  (no       (unknown)  (unknown)  17:45 17:45 20:09  (units 

   (unknown)



                    date)                                   unknown)  

 

           (unknown)  (no       (unknown)  (unknown)  ascorbic acid  (units    (

unknown)



                    date)                         (vitamin C) unknown)  



                                                  [Vitamin C] 500 mg           



                                                  Tablet              

 

           (unknown)  (no       (unknown)  (unknown)  atorvastatin 80 mg  (units

    (unknown)



                    date)                         tablet    unknown)  

 

           (unknown)  (no       (unknown)  (unknown)  buspirone 5 mg  (units    

(unknown)



                    date)                         Tablet    unknown)  

 

           (unknown)  (no       (unknown)  (unknown)  clopidogrel 75 mg  (units 

   (unknown)



                    date)                         tablet    unknown)  

 

           (unknown)  (no       (unknown)  (unknown)  cyanocobalamin  (units    

(unknown)



                    date)                         (vitamin B-12) 500 unknown)  



                                                  mcg Tablet           

 

           (unknown)  (no       (unknown)  (unknown)  ferrous sulfate  (units   

 (unknown)



                    date)                         325 mg (65 mg iron) unknown)  



                                                  Tablet              

 

           (unknown)  (no       (unknown)  (unknown)  gabapentin 300 mg  (units 

   (unknown)



                    date)                         capsule   unknown)  

 

           (unknown)  (no       (unknown)  (unknown)  glipizide 10 mg  (units   

 (unknown)



                    date)                         tablet    unknown)  

 

           (unknown)  (no       (unknown)  (unknown)  losartan 50 mg  (units    

(unknown)



                    date)                         tablet    unknown)  

 

           (unknown)  (no       (unknown)  (unknown)  metformin 500 mg  (units  

  (unknown)



                    date)                         tablet extended unknown)  



                                                  release 24 hr           

 

           (unknown)  (no       (unknown)  (unknown)  metoprolol  (units    (unk

nown)



                    date)                         succinate 25 mg unknown)  



                                                  tablet extended           



                                                  release 24 hr           

 

           (unknown)  (no       (unknown)  (unknown)  multivitamin with  (units 

   (unknown)



                    date)                         folic acid unknown)  



                                                  [Tab-A-Lucy] 400           



                                                  mcg Tablet           

 

           (unknown)  (no       (unknown)  (unknown)  ondansetron HCl  (units   

 (unknown)



                    date)                         [Zofran] 4 mg unknown)  



                                                  tablet              

 

           (unknown)  (no       (unknown)  (unknown)  polyethylene  (units    (u

nknown)



                    date)                         glycol 3350 17 gram unknown)  



                                                  Powder In Packet           

 

           (unknown)  (no       (unknown)  (unknown)  sennosides [senna]  (units

    (unknown)



                    date)                         8.6 mg Tablet unknown)  

 

           (unknown)  (no       (unknown)  (unknown)  trospium 20 mg  (units    

(unknown)



                    date)                         tablet    unknown)  

 

           (unknown)  (no       (unknown)  (unknown)  22  (units    (unkno

wn)



                    date)                                   unknown)  

 

           (unknown)  (no       (unknown)  (unknown)  1.07 in January.  (units  

  (unknown)



                    date)                         No elevation in his unknown)  



                                                  troponin,           



                                                  procalcitonin is           



                                                  elevated at           

 

           (unknown)  (no       (unknown)  (unknown)  Acute cystitis  (units    

(unknown)



                    date)                         with hematuria, unknown)  



                                                  Anemia,             



                                                  Hypomagnesemia           

 

           (unknown)  (no       (unknown)  (unknown)  Medication  (units    (unk

nown)



                    date)                         Instructions unknown)  



                                                  Recorded            

 

           (unknown)  (no       (unknown)  (unknown)  Medication  (units    (unk

nown)



                    date)                         Instructions unknown)  



                                                  Recorded  Confirmed           

 

           (unknown)  (no       (unknown)  (unknown)  sodium 135,  (units    (un

known)



                    date)                         potassium 3.7, unknown)  



                                                  creatinine 1.42           



                                                  which is increased           



                                                  from his prior of           

 

           (unknown)  (no       (unknown)  (unknown)  with regular axis  (units 

   (unknown)



                    date)                         and intervals.  No unknown)  



                                                  STEMI, ST segment           



                                                  changes,            



                                                  arrhythmia, or           

 

           (unknown)  (no       (unknown)  (unknown)  work is   (units    (unkno

wn)



                    date)                         significant for unknown)  



                                                  anemia, hemoglobin           



                                                  is 9.1 today, down           



                                                  from 9.6 in           

 

           (unknown)  (no       (unknown)  (unknown)  (Zofran) vomiting  (units 

   (unknown)



                    date)                         #14 tabs  unknown)  

 

           (unknown)  (no       (unknown)  (unknown)  0.72, his lactate  (units 

   (unknown)



                    date)                         is also elevated at unknown)  



                                                  2.6, magnesium is           



                                                  low at 1.0.  His           

 

           (unknown)  (no       (unknown)  (unknown)  [id number]   (units    (unkno

wn)



                    date)                                   unknown)  

 

           (unknown)  (no       (unknown)  (unknown)  22 17:39  (units    

(unknown)



                    date)                                   unknown)  

 

           (unknown)  (no       (unknown)  (unknown)  22 17:40  (units    

(unknown)



                    date)                                   unknown)  

 

           (unknown)  (no       (unknown)  (unknown)  22 17:45  (units    

(unknown)



                    date)                                   unknown)  

 

           (unknown)  (no       (unknown)  (unknown)  22 19:26  (units    

(unknown)



                    date)                                   unknown)  

 

           (unknown)  (no       (unknown)  (unknown)  22 20:09  (units    

(unknown)



                    date)                                   unknown)  

 

           (unknown)  (no       (unknown)  (unknown)  22 20:29  (units    

(unknown)



                    date)                                   unknown)  

 

           (unknown)  (no       (unknown)  (unknown)  17:34     (units    (unkno

wn)



                    date)                                   unknown)  

 

           (unknown)  (no       (unknown)  (unknown)  with iron  (units    (

unknown)



                    date)                         deficiency anemia, unknown)  



                                                  altered bowel           



                                                  habit, personal           



                                                  history of colon           

 

           (unknown)  (no       (unknown)  (unknown)  62-year-old  (units    (un

known)



                    date)                         gentleman with a unknown)  



                                                  history of multiple           



                                                  sclerosis, CVA in           



                                                  2019,               

 

           (unknown)  (no       (unknown)  (unknown)  ?         (units    (unkno

wn)



                    date)                                   unknown)  

 

           (unknown)  (no       (unknown)  (unknown)  ALT     (<50)  (units    (

unknown)



                    date)                         IU/L      unknown)  

 

           (unknown)  (no       (unknown)  (unknown)  ALT    16  (<50)  (units  

  (unknown)



                    date)                         IU/L      unknown)  

 

           (unknown)  (no       (unknown)  (unknown)  AST     (17-59)  (units   

 (unknown)



                    date)                         IU/L      unknown)  

 

           (unknown)  (no       (unknown)  (unknown)  AST    19  (17-59)  (units

    (unknown)



                    date)                          IU/L     unknown)  

 

           (unknown)  (no       (unknown)  (unknown)  Age/Sex: 62 / M  (units   

 (unknown)



                    date)                                   unknown)  

 

           (unknown)  (no       (unknown)  (unknown)  Albumin   (units    (unkno

wn)



                    date)                         (3.5-5.0)  g/dL unknown)  

 

           (unknown)  (no       (unknown)  (unknown)  Albumin    3.9  (units    

(unknown)



                    date)                         (3.5-5.0)  g/dL unknown)  

 

           (unknown)  (no       (unknown)  (unknown)  Albumin/Globulin  (units  

  (unknown)



                    date)                         Ratio     (1.0-2.8) unknown)  

 

           (unknown)  (no       (unknown)  (unknown)  Albumin/Globulin  (units  

  (unknown)



                    date)                         Ratio    1.1 unknown)  



                                                  (1.0-2.8)           

 

           (unknown)  (no       (unknown)  (unknown)  Alkaline  (units    (unkno

wn)



                    date)                         Phosphatase unknown)  



                                                  ()  U/L           

 

           (unknown)  (no       (unknown)  (unknown)  Alkaline  (units    (unkno

wn)



                    date)                         Phosphatase    105 unknown)  



                                                  ()  U/L           

 

           (unknown)  (no       (unknown)  (unknown)  Allergy/AdvReac  (units   

 (unknown)



                    date)                         Type Severity unknown)  



                                                  Reaction Status           



                                                  Date / Time           

 

           (unknown)  (no       (unknown)  (unknown)  Amorphous Sediment  (units

    (unknown)



                    date)                                   unknown)  

 

           (unknown)  (no       (unknown)  (unknown)  Amorphous Sediment  (units

    (unknown)



                    date)                            2+     unknown)  

 

           (unknown)  (no       (unknown)  (unknown)  Approved by: Garfield Mcdanielsunits 

   (unknown)



                    dateIrena Lopez M.D. on unknown)  



                                                  2022 at 18:02?           

 

           (unknown)  (no       (unknown)  (unknown)  BUN     (9-20)  (units    

(unknown)



                    date)                         mg/dL     unknown)  

 

           (unknown)  (no       (unknown)  (unknown)  BUN    21 H  (units    (un

known)



                    date)                         (9-20)  mg/dL unknown)  

 

           (unknown)  (no       (unknown)  (unknown)  BUN/Creatinine  (units    

(unknown)



                    date)                         Ratio     (6-22) unknown)  

 

           (unknown)  (no       (unknown)  (unknown)  BUN/Creatinine  (units    

(unknown)



                    date)                         Ratio    14.8 unknown)  



                                                  (6-22)              

 

           (unknown)  (no       (unknown)  (unknown)  Baso # (Auto)  (units    (

unknown)



                    date)                         (0-100)  /uL unknown)  

 

           (unknown)  (no       (unknown)  (unknown)  Baso # (Auto)   0  (units 

   (unknown)



                    date)                          (0-100)  /uL unknown)  

 

           (unknown)  (no       (unknown)  (unknown)  Baso % (Auto)  (units    (

unknown)



                    date)                         (0-2)  %  unknown)  

 

           (unknown)  (no       (unknown)  (unknown)  Baso % (Auto)  (units    (

unknown)



                    date)                         0.2   (0-2)  % unknown)  

 

           (unknown)  (no       (unknown)  (unknown)  Bedside Urine  (units    (

unknown)



                    date)                         Bilirubin        - unknown)  



                                                  Negative            

 

           (unknown)  (no       (unknown)  (unknown)  Bedside Urine  (units    (

unknown)



                    date)                         Glucose    Negative unknown)  

 

           (unknown)  (no       (unknown)  (unknown)  Bedside Urine  (units    (

unknown)



                    date)                         Ketone    - unknown)  



                                                  Negative            

 

           (unknown)  (no       (unknown)  (unknown)  Bedside Urine  (units    (

unknown)



                    date)                         Leukocytes       + unknown)  



                                                  70                  

 

           (unknown)  (no       (unknown)  (unknown)  Bedside Urine  (units    (

unknown)



                    date)                         Nitrite    - unknown)  



                                                  Negative            

 

           (unknown)  (no       (unknown)  (unknown)  Bedside Urine  (units    (

unknown)



                    date)                         Occult Blood     ++ unknown)  

 

           (unknown)  (no       (unknown)  (unknown)  Bedside Urine  (units    (

unknown)



                    date)                         Protein    + 30 unknown)  

 

           (unknown)  (no       (unknown)  (unknown)  Bedside Urine  (units    (

unknown)



                    date)                         Urobilinogen     - unknown)  



                                                  Negative            

 

           (unknown)  (no       (unknown)  (unknown)  Bedside Urine pH  (units  

  (unknown)



                    date)                          6.0      unknown)  

 

           (unknown)  (no       (unknown)  (unknown)  Blood Culture Stat  (units

    (unknown)



                    date)                                   unknown)  

 

           (unknown)  (no       (unknown)  (unknown)  Blood Pressure  (units    

(unknown)



                    date)                         124/66   22 unknown)  



                                                  17:34               

 

           (unknown)  (no       (unknown)  (unknown)  Blood Pressure  (units    

(unknown)



                    date)                         124/66    unknown)  

 

           (unknown)  (no       (unknown)  (unknown)  Bones and chest  (units   

 (unknown)



                    date)                         wall:? No unknown)  



                                                  suspicious bony           



                                                  lesions.? Overlying           



                                                  soft tissues           

 

           (unknown)  (no       (unknown)  (unknown)  CK-MB (CK-2)  (units    (u

nknown)



                    date)                                   unknown)  

 

           (unknown)  (no       (unknown)  (unknown)  CK-MB (CK-2)  (units    (u

nknown)



                    date)                         TNP       unknown)  

 

           (unknown)  (no       (unknown)  (unknown)  CK-MB (CK-2) Rel  (units  

  (unknown)



                    date)                         Index     unknown)  

 

           (unknown)  (no       (unknown)  (unknown)  CK-MB (CK-2) Rel  (units  

  (unknown)



                    date)                         Index    TNP unknown)  

 

           (unknown)  (no       (unknown)  (unknown)  COMPARISON:?  (units    (u

nknown)



                    date)                         [facility], unknown)  



                                                  CR, XR CHEST 2V,           



                                                  3/18/2021, 10:19.           

 

           (unknown)  (no       (unknown)  (unknown)  COVID19 -Nasal  (units    

(unknown)



                    date)                         RAPID/Pre-Proc Stat unknown)  

 

           (unknown)  (no       (unknown)  (unknown)  CT abdomen pelvis  (units 

   (unknown)



                    date)                         w con Stat unknown)  

 

           (unknown)  (no       (unknown)  (unknown)  CT abdomen pelvis  (units 

   (unknown)



                    date)                         was ordered for unknown)  



                                                  patient has           



                                                  tenderness to his           



                                                  right lower           

 

           (unknown)  (no       (unknown)  (unknown)  CVA (cerebral  (units    (

unknown)



                    date)                         vascular accident) unknown)  



                                                  (2018)           

 

           (unknown)  (no       (unknown)  (unknown)  Calcium   (units    (unkno

wn)



                    date)                         (8.4-10.2)  mg/dL unknown)  

 

           (unknown)  (no       (unknown)  (unknown)  Calcium    9.3  (units    

(unknown)



                    date)                         (8.4-10.2)  mg/dL unknown)  

 

           (unknown)  (no       (unknown)  (unknown)  Carbon Dioxide  (units    

(unknown)



                    date)                         (22-32)  mmol/L unknown)  

 

           (unknown)  (no       (unknown)  (unknown)  Carbon Dioxide  (units    

(unknown)



                    date)                         29  (22-32)  mmol/L unknown)  

 

           (unknown)  (no       (unknown)  (unknown)  Cardio: denies  (units    

(unknown)



                    date)                         chest pain, unknown)  



                                                  palpitations           

 

           (unknown)  (no       (unknown)  (unknown)  Cardiovascular:  (units   

 (unknown)



                    date)                         regular rate and unknown)  



                                                  rhythm, no           



                                                  peripheral edema,           



                                                  warm extremities           

 

           (unknown)  (no       (unknown)  (unknown)  Ceftriaxone Sodium  (units

    (unknown)



                    date)                         1,000 mg/ (Sodium unknown)  



                                                  Chloride)  100 mls           



                                                  @ 200 mls/hr IV NOW           



                                                  ONE                 

 

           (unknown)  (no       (unknown)  (unknown)  Chest x-ray:  (units    (u

nknown)



                    date)                                   unknown)  

 

           (unknown)  (no       (unknown)  (unknown)  Chief complaint:  (units  

  (unknown)



                    date)                         Weakness  unknown)  

 

           (unknown)  (no       (unknown)  (unknown)  Chloride  (units    (unkno

wn)



                    date)                         ()  mmol/L unknown)  

 

           (unknown)  (no       (unknown)  (unknown)  Chloride    96 L  (units  

  (unknown)



                    date)                         ()  mmol/L unknown)  

 

           (unknown)  (no       (unknown)  (unknown)  Clinical  (units    (o

wn)



                    date)                         Impression: unknown)  

 

           (unknown)  (no       (unknown)  (unknown)  Colon cancer  (units    (u

nknown)



                    date)                         (2013)    unknown)  

 

           (unknown)  (no       (unknown)  (unknown)  Complete Blood  (units    

(unknown)



                    date)                         Count AUTO DIFF unknown)  



                                                  Stat                

 

           (unknown)  (no       (unknown)  (unknown)  Comprehensive  (units    (

unknown)



                    date)                         Metabolic Panel unknown)  



                                                  Stat                

 

           (unknown)  (no       (unknown)  (unknown)  Course    (units    (o

wn)



                    date)                                   unknown)  

 

           (unknown)  (no       (unknown)  (unknown)  Creatinine  (units    (unk

nown)



                    date)                         (0.66-1.25)  mg/dL unknown)  

 

           (unknown)  (no       (unknown)  (unknown)  Creatinine    1.42  (units

    (unknown)



                    date)                         H  (0.66-1.25) unknown)  



                                                  mg/dL               

 

           (unknown)  (no       (unknown)  (unknown)  : 1960  (units   

 (unknown)



                    date)                         Acct:[id number] unknown)  

 

           (unknown)  (no       (unknown)  (unknown)  Departure  (units    (unkn

own)



                    date)                                   unknown)  

 

           (unknown)  (no       (unknown)  (unknown)  Depression  (units    (unk

nown)



                    date)                                   unknown)  

 

           (unknown)  (no       (unknown)  (unknown)  Diabetes  (units    (unkno

wn)



                    date)                                   unknown)  

 

           (unknown)  (no       (unknown)  (unknown)  Diabetic  (units    (unkno

wn)



                    date)                         neuropathy unknown)  

 

           (unknown)  (no       (unknown)  (unknown)  Discharge Plan  (units    

(unknown)



                    date)                                   unknown)  

 

           (unknown)  (no       (unknown)  (unknown)  Discontinued  (units    (u

nknown)



                    date)                         Medications unknown)  

 

           (unknown)  (no       (unknown)  (unknown)  Gustavo Macias MD  (units   

 (unknown)



                    date)                         [[facility] unknown)  



                                                  Provider] -           

 

           (unknown)  (no       (unknown)  (unknown)  ECG Data  (units    (unkno

wn)



                    date)                                   unknown)  

 

           (unknown)  (no       (unknown)  (unknown) EKG independently  (units  

  (unknown)



                    date)                         reviewed by Dr. mejia)  



                                                  Dinesh and reveals           



                                                  normal sinus rhythm           



                                                  at 72 bpm           

 

           (unknown)  (no       (unknown)  (unknown)  EKG-12 Lead Stat  (units  

  (unknown)



                    date)                                   unknown)  

 

           (unknown)  (no       (unknown)  (unknown)  ER Physician:  (units    (

unknown)



                    date)                         Kitty Costello unknown)  



                                                  ARNP                

 

           (unknown)  (no       (unknown)  (unknown)  Eos # (Auto)  (units    (u

nknown)



                    date)                         (0-450)  /uL unknown)  

 

           (unknown)  (no       (unknown)  (unknown)  Eos # (Auto)   100  (units

    (unknown)



                    date)                           (0-450)  /uL unknown)  

 

           (unknown)  (no       (unknown)  (unknown)  Eos % (Auto)  (units    (u

nknown)



                    date)                         (2-4)  %  unknown)  

 

           (unknown)  (no       (unknown)  (unknown)  Eos % (Auto)   0.8  (units

    (unknown)



                    date)                         L   (2-4)  % unknown)  

 

           (unknown)  (no       (unknown)  (unknown)  Esterase  (units    (unkno

wn)



                    date)                                   unknown)  

 

           (unknown)  (no       (unknown)  (unknown)  Estimated GFR  (units    (

unknown)



                    date)                         (>60)  mL/min unknown)  

 

           (unknown)  (no       (unknown)  (unknown)  Estimated GFR  (units    (

unknown)



                    date)                         55 L  (>60)  mL/min unknown)  

 

           (unknown)  (no       (unknown)  (unknown)  Exam      (units    (unkno

wn)



                    date)                                   unknown)  

 

           (unknown)  (no       (unknown)  (unknown)  Exam Narrative:  (units   

 (unknown)



                    date)                                   unknown)  

 

           (unknown)  (no       (unknown)  (unknown)  Eyes: denies  (units    (u

nknown)



                    date)                         visual changes, eye unknown)  



                                                  pain                

 

           (unknown)  (no       (unknown)  (unknown)  Eyes: equal round  (units 

   (unknown)



                    date)                         and reactive, EOMI, unknown)  



                                                  conjunctiva normal           

 

           (unknown)  (no       (unknown)  (unknown)  FINDINGS:?  (units    (unk

nown)



                    date)                                   unknown)  

 

           (unknown)  (no       (unknown)  (unknown)  Family History  (units    

(unknown)



                    date)                         (Reviewed 22 unknown)  



                                                  @ 19:57 by Kitty Costello [facility])           

 

           (unknown)  (no       (unknown)  (unknown)  Father     (units 

   (unknown)



                    date)                          Cancer   unknown)  

 

           (unknown)  (no       (unknown)  (unknown)  GERD      (units    (unkno

wn)



                    date)                         (gastroesophageal unknown)  



                                                  reflux disease)           

 

           (unknown)  (no       (unknown)  (unknown)  GI:  Right lower  (units  

  (unknown)



                    date)                         quadrant tenderness unknown)  



                                                  to palpation           

 

           (unknown)  (no       (unknown)  (unknown)  GI: abdomen mildly  (units

    (unknown)



                    date)                         guarded, tender to unknown)  



                                                  palpation in the           



                                                  right lower           



                                                  quadrant,           

 

           (unknown)  (no       (unknown)  (unknown)  :  Straight cath  (units

    (unknown)



                    date)                         for urine completed unknown)  



                                                  by myself with 16           



                                                  [place] coude           



                                                  catheter,           

 

           (unknown)  (no       (unknown)  (unknown)  : denies  (units    (unk

nown)



                    date)                         dysuria, hematuria unknown)  



                                                  or flank pain,           



                                                  reports oliguria,           



                                                  patient self           

 

           (unknown)  (no       (unknown)  (unknown)  Gastroenterology  (units  

  (unknown)



                    date)                         from [place], unknown)  



                                                  patient reports           



                                                  that he sees Yasmine Rocha PA-C           

 

           (unknown)  (no       (unknown)  (unknown)  General   (units    (unkno

wn)



                    date)                                   unknown)  

 

           (unknown)  (no       (unknown)  (unknown)  General:  (units    (unkno

wn)



                    date)                         cooperative, unknown)  



                                                  comfortable, in no           



                                                  acute distress,           



                                                  well groomed,           

 

           (unknown)  (no       (unknown)  (unknown)  General: denies  (units   

 (unknown)



                    date)                         fever, chills, unknown)  



                                                  endorses weakness,           



                                                  right lower           



                                                  quadrant pain,           

 

           (unknown)  (no       (unknown)  (unknown)  GenericComposite[P  (units

    (unknown)



                    date)                         lt Count  unknown)  



                                                  (150-400)  X10^3/uL           



                                                   ]                  

 

           (unknown)  (no       (unknown)  (unknown)  GenericComposite[P  (units

    (unknown)



                    date)                         lt Count   219 unknown)  



                                                  (150-400)  X10^3/uL           



                                                   ]                  

 

           (unknown)  (no       (unknown)  (unknown)  GenericComposite[R  (units

    (unknown)



                    date)                         BC     (4.5-5.9) unknown)  



                                                  X10^6/uL  ]           

 

           (unknown)  (no       (unknown)  (unknown)  GenericComposite[R  (units

    (unknown)



                    date)                         BC   3.37 L unknown)  



                                                  (4.5-5.9)  X10^6/uL           



                                                   ]                  

 

           (unknown)  (no       (unknown)  (unknown)  GenericComposite[W  (units

    (unknown)



                    date)                         BC     (4.5-11.0) unknown)  



                                                  X10^3/uL  ]           

 

           (unknown)  (no       (unknown)  (unknown)  GenericComposite[W  (units

    (unknown)



                    date)                         BC   7.0  unknown)  



                                                  (4.5-11.0)           



                                                  X10^3/uL  ]           

 

           (unknown)  (no       (unknown)  (unknown)  Globulin  (units    (unkno

wn)



                    date)                         (1.7-4.1)  g/dL unknown)  

 

           (unknown)  (no       (unknown)  (unknown)  Globulin    3.6  (units   

 (unknown)



                    date)                         (1.7-4.1)  g/dL unknown)  

 

           (unknown)  (no       (unknown)  (unknown)  Glucose   (units    (unkno

wn)



                    date)                         ()  mg/dL unknown)  

 

           (unknown)  (no       (unknown)  (unknown)  Glucose    262 H  (units  

  (unknown)



                    date)                         ()  mg/dL unknown)  

 

           (unknown)  (no       (unknown)  (unknown)  Guaiac stool:  (units    (

unknown)



                    date)                         Negative, good unknown)  



                                                  stool result           

 

           (unknown)  (no       (unknown)  (unknown)  H/O colectomy  (units    (

unknown)



                    date)                                   unknown)  

 

           (unknown)  (no       (unknown)  (unknown)  HPI - Weakness  (units    

(unknown)



                    date)                                   unknown)  

 

           (unknown)  (no       (unknown)  (unknown)  HPI Narrative:  (units    

(unknown)



                    date)                                   unknown)  

 

           (unknown)  (no       (unknown)  (unknown)  Hct     (41-53)  %  (units

    (unknown)



                    date)                                   unknown)  

 

           (unknown)  (no       (unknown)  (unknown)  Hct   26.7 L  (units    (u

nknown)



                    date)                         (41-53)  % unknown)  

 

           (unknown)  (no       (unknown)  (unknown)  He does not have  (units  

  (unknown)



                    date)                         any mental status unknown)  



                                                  changes, GCS is 15.           



                                                   Guaiac stool is           

 

           (unknown)  (no       (unknown)  (unknown)  Head/Neck:  (units    (unk

nown)



                    date)                         Endorses having a unknown)  



                                                  headache, denies           



                                                  any neck pain           

 

           (unknown)  (no       (unknown)  (unknown)  Head: atraumatic,  (units 

   (unknown)



                    date)                         symmetrical facial unknown)  



                                                  expressions           

 

           (unknown)  (no       (unknown)  (unknown)  Hgb       (units    (unkno

wn)



                    date)                         (13.5-17.5)  g/dL unknown)  

 

           (unknown)  (no       (unknown)  (unknown)  Hgb   9.1 L  (units    (un

known)



                    date)                         (13.5-17.5)  g/dL unknown)  

 

           (unknown)  (no       (unknown)  (unknown)  History of Present  (units

    (unknown)



                    date)                         Illness   unknown)  

 

           (unknown)  (no       (unknown)  (unknown)  History of lumbar  (units 

   (unknown)



                    date)                         surgery () unknown)  

 

           (unknown)  (no       (unknown)  (unknown)  Hx of foot surgery  (units

    (unknown)



                    date)                         (2017)    unknown)  

 

           (unknown)  (no       (unknown)  (unknown)  Hx of shoulder  (units    

(unknown)



                    date)                         surgery (2010) unknown)  

 

           (unknown)  (no       (unknown)  (unknown)  Hypertension  (units    (u

nknown)



                    date)                                   unknown)  

 

           (unknown)  (no       (unknown)  (unknown)  IMPRESSION:? No  (units   

 (unknown)



                    date)                         acute     unknown)  



                                                  cardiopulmonary           



                                                  findings            

 

           (unknown)  (no       (unknown)  (unknown)  INDICATIONS:?  (units    (

unknown)



                    date)                         chest pain unknown)  

 

           (unknown)  (no       (unknown)  (unknown)  Imaging Data  (units    (u

nknown)



                    date)                                   unknown)  

 

           (unknown)  (no       (unknown)  (unknown)  Independently  (units    (

unknown)



                    date)                         reviewed vitals unknown)  



                                                  signs and nursing           



                                                  notes.              

 

           (unknown)  (no       (unknown)  (unknown)  Initial Vital  (units    (

unknown)



                    date)                         Signs     unknown)  

 

           (unknown)  (no       (unknown)  (unknown)  Initial Vital  (units    (

unknown)



                    date)                         Signs:    unknown)  

 

           (unknown)  (no       (unknown)  (unknown)  Instructions:  (units    (

unknown)



                    date)                         Acute Cystitis, unknown)  



                                                  Anemia              

 

           (unknown)  (no       (unknown)  (unknown)  Interpretation:  (units   

 (unknown)



                    date)                                   unknown)  

 

           (unknown)  (no       (unknown)  (unknown) January, hematocrit  (units

    (unknown)



                    date)                         is 26.7, down from unknown)  



                                                  27.8 also in           



                                                  January, platelet           



                                                  count 219,           

 

           (unknown)  (no       (unknown)  (unknown) Klebsiella  (units    (unkn

own)



                    date)                         pneumoniae which unknown)  



                                                  was resistant only           



                                                  to ampicillin and           



                                                  nitrofurantoin.           

 

           (unknown)  (no       (unknown)  (unknown)  Lab Data  (units    (unkno

wn)



                    date)                                   unknown)  

 

           (unknown)  (no       (unknown)  (unknown)  Labs:     (units    (unkno

wn)



                    date)                                   unknown)  

 

           (unknown)  (no       (unknown)  (unknown)  Lactate   (units    (unkno

wn)



                    date)                         (0.7-2.1)  mmol/L unknown)  

 

           (unknown)  (no       (unknown)  (unknown)  Lactate   2.6 H  (units   

 (unknown)



                    date)                         (0.7-2.1)  mmol/L unknown)  

 

           (unknown)  (no       (unknown)  (unknown)  Lactate (Lactic  (units   

 (unknown)



                    date)                         Acid) Stat unknown)  

 

           (unknown)  (no       (unknown)  (unknown)  Lipase    (units    (unkno

wn)



                    date)                         ()  U/L unknown)  

 

           (unknown)  (no       (unknown)  (unknown)  Lipase    32  (units    (u

nknown)



                    date)                         ()  U/L unknown)  

 

           (unknown)  (no       (unknown)  (unknown)  Lipase Stat  (units    (un

known)



                    date)                                   unknown)  

 

           (unknown)  (no       (unknown)  (unknown)  Lungs and pleura:?  (units

    (unknown)



                    date)                         Lungs are clear.? unknown)  



                                                  No pleural           



                                                  effusions or           



                                                  pneumothorax.? Low           

 

           (unknown)  (no       (unknown)  (unknown)  Lymph # (Auto)  (units    

(unknown)



                    date)                         (9027-9135)  /uL unknown)  

 

           (unknown)  (no       (unknown)  (unknown)  Lymph # (Auto)  (units    

(unknown)



                    date)                         400 L   (8349-2569) unknown)  



                                                   /uL                

 

           (unknown)  (no       (unknown)  (unknown)  Lymph % (Auto)  (units    

(unknown)



                    date)                         (25-40)  % unknown)  

 

           (unknown)  (no       (unknown)  (unknown)  Lymph % (Auto)  (units    

(unknown)



                    date)                         6.4 L   (25-40)  % unknown)  

 

           (unknown)  (no       (unknown)  (unknown)  MCH     (26-34)  (units   

 (unknown)



                    date)                         PG        unknown)  

 

           (unknown)  (no       (unknown)  (unknown)  MCH   26.9  (units    (unk

nown)



                    date)                         (26-34)  PG unknown)  

 

           (unknown)  (no       (unknown)  (unknown)  MCHC     (30-36)  (units  

  (unknown)



                    date)                         %         unknown)  

 

           (unknown)  (no       (unknown)  (unknown)  MCHC   34.0  (units    (un

known)



                    date)                         (30-36)  % unknown)  

 

           (unknown)  (no       (unknown)  (unknown)  MCV     ()  (units  

  (unknown)



                    date)                         fL        unknown)  

 

           (unknown)  (no       (unknown)  (unknown)  MCV   79.1 L  (units    (u

nknown)



                    date)                         ()  fL unknown)  

 

           (unknown)  (no       (unknown)  (unknown)  MDM - Weakness  (units    

(unknown)



                    date)                                   unknown)  

 

           (unknown)  (no       (unknown)  (unknown)  MDM Narrative  (units    (

unknown)



                    date)                                   unknown)  

 

           (unknown)  (no       (unknown)  (unknown)  MSK: denies new  (units   

 (unknown)



                    date)                         joint pain, muscle unknown)  



                                                  weakness or           



                                                  swelling            

 

           (unknown)  (no       (unknown)  (unknown) MSK: moves all  (units    (

unknown)



                    date)                         extremities, unknown)  



                                                  neurovascularly           



                                                  intact, generalized           



                                                  weakness, normal           

 

           (unknown)  (no       (unknown)  (unknown)  Magnesium  (units    (unkn

own)



                    date)                         (1.6-2.3)  mg/dL unknown)  

 

           (unknown)  (no       (unknown)  (unknown)  Magnesium    1.0 L  (units

    (unknown)



                    date)                          (1.6-2.3)  mg/dL unknown)  

 

           (unknown)  (no       (unknown)  (unknown)  Magnesium Stat  (units    

(unknown)



                    date)                                   unknown)  

 

           (unknown)  (no       (unknown)  (unknown)  Magnesium Sulfate  (units 

   (unknown)



                    date)                         (Magnesium Sulfate) unknown)  



                                                   2 gm in 50 mls @           



                                                  50 mls/hr IV NOW           



                                                  ONE                 

 

           (unknown)  (no       (unknown)  (unknown)  Mediastinum:?  (units    (

unknown)



                    date)                         Mediastinal unknown)  



                                                  contours appear           



                                                  normal.? Heart size           



                                                  is normal.?           

 

           (unknown)  (no       (unknown)  (unknown)  Medical History  (units   

 (unknown)



                    date)                         (Reviewed 22 unknown)  



                                                  @ 19:57 by Kitty Costello [facility])           

 

           (unknown)  (no       (unknown)  (unknown)  Medical decision  (units  

  (unknown)



                    date)                         making narrative: unknown)  

 

           (unknown)  (no       (unknown)  (unknown)  [employer] since this  (units

    (unknown)



                    date)                         happened. unknown)  

 

           (unknown)  (no       (unknown)  (unknown)  Mode of arrival:  (units  

  (unknown)



                    date)                         Wheelchair unknown)  

 

           (unknown)  (no       (unknown)  (unknown)  Mono # (Auto)  (units    (

unknown)



                    date)                         (0-900)  /uL unknown)  

 

           (unknown)  (no       (unknown)  (unknown)  Mono # (Auto)  (units    (

unknown)



                    date)                         600   (0-900)  /uL unknown)  

 

           (unknown)  (no       (unknown)  (unknown)  Mono % (Auto)  (units    (

unknown)



                    date)                         (3-14)  % unknown)  

 

           (unknown)  (no       (unknown)  (unknown)  Mono % (Auto)  (units    (

unknown)



                    date)                         7.9   (3-14)  % unknown)  

 

           (unknown)  (no       (unknown)  (unknown)  Mother     (units 

   (unknown)



                    date)                          Cancer   unknown)  

 

           (unknown)  (no       (unknown)  (unknown)  Mouth/Throat:  (units    (

unknown)



                    date)                         moist mucus unknown)  



                                                  membranes           

 

           (unknown)  (no       (unknown)  (unknown)  Narrative  (units    (unkn

own)



                    date)                                   unknown)  

 

           (unknown)  (no       (unknown)  (unknown)  Narrative:  (units    (unk

nown)



                    date)                                   unknown)  

 

           (unknown)  (no       (unknown)  (unknown)  Neck: supple  (units    (u

nknown)



                    date)                                   unknown)  

 

           (unknown)  (no       (unknown)  (unknown)  Neuro: denies  (units    (

unknown)



                    date)                         numbness, tingling, unknown)  



                                                  dizziness           

 

           (unknown)  (no       (unknown)  (unknown)  Neuro: normal  (units    (

unknown)



                    date)                         speech and unknown)  



                                                  cognition, A+O x3           

 

           (unknown)  (no       (unknown)  (unknown)  Neut # (Auto)  (units    (

unknown)



                    date)                         (0311-6249)  /uL unknown)  

 

           (unknown)  (no       (unknown)  (unknown)  Neut # (Auto)  (units    (

unknown)



                    date)                         5900   (2934-4396) unknown)  



                                                  /uL                 

 

           (unknown)  (no       (unknown)  (unknown)  Neut % (Auto)  (units    (

unknown)



                    date)                         (50-75)  % unknown)  

 

           (unknown)  (no       (unknown)  (unknown)  Neut % (Auto)  (units    (

unknown)



                    date)                         84.7 H   (50-75)  % unknown)  

 

           (unknown)  (no       (unknown)  (unknown)  No Action  (units    (unkn

own)



                    date)                                   unknown)  

 

           (unknown)  (no       (unknown)  (unknown)  No Known Drug  (units    (

unknown)



                    date)                         Allergies Allergy unknown)  



                                                  Verified 22           



                                                  17:38               

 

           (unknown)  (no       (unknown)  (unknown)  Nose: nares  (units    (un

known)



                    date)                         patent, no unknown)  



                                                  rhinorrhea           

 

           (unknown)  (no       (unknown)  (unknown)  Notable on  (units    (unk

nown)



                    date)                         patient's prior unknown)  



                                                  urine cultures,           



                                                  urine from           



                                                  2022 grew out           

 

           (unknown)  (no       (unknown)  (unknown)  Ondansetron HCl  (units   

 (unknown)



                    date)                         (Ondansetron 4 Mg/2 unknown)  



                                                  Ml Inj)  4 mg IV           



                                                  NOW ONE             

 

           (unknown)  (no       (unknown)  (unknown)  Ordered:  (units    (unkno

wn)



                    date)                                   unknown)  

 

           (unknown)  (no       (unknown)  (unknown)  Orders    (units    (unkno

wn)



                    date)                                   unknown)  

 

           (unknown)  (no       (unknown)  (unknown)  Osteoarthritis  (units    

(unknown)



                    date)                                   unknown)  

 

           (unknown)  (no       (unknown)  (unknown)  Oxygen Delivery  (units   

 (unknown)



                    date)                         Method    22 unknown)  



                                                  17:34               

 

           (unknown)  (no       (unknown)  (unknown)  Oxygen Delivery  (units   

 (unknown)



                    date)                         Method Room Air unknown)  

 

           (unknown)  (no       (unknown)  (unknown)  PROCEDURE:? XR  (units    

(unknown)



                    date)                         CHEST 1V  unknown)  

 

           (unknown)  (no       (unknown)  (unknown)  Patient   (units    (unkno

wn)



                    date)                         Disposition: Home unknown)  

 

           (unknown)  (no       (unknown)  (unknown)  Patient History  (units   

 (unknown)



                    date)                                   unknown)  

 

           (unknown)  (no       (unknown)  (unknown)  Patient is  (units    (unk

nown)



                    date)                         currently unknown)  



                                                  anticoagulated on           



                                                  Plavix 37.5 mg           



                                                  daily.  Dr. Gómez           

 

           (unknown)  (no       (unknown)  (unknown) Patient reports  (units    

(unknown)



                    date)                         that when he has a unknown)  



                                                  bowel movement, he           



                                                  typically has hard           



                                                  pellets             

 

           (unknown)  (no       (unknown)  (unknown)  Patient self caths  (units

    (unknown)



                    date)                         for urine output, unknown)  



                                                  reports that he has           



                                                  not had much urine           

 

           (unknown)  (no       (unknown)  (unknown)  Patient was  (units    (un

known)



                    date)                         treated in the unknown)  



                                                  emergency           



                                                  department with 1 g           



                                                  of ceftriaxone.           

 

           (unknown)  (no       (unknown)  (unknown) Patient's  (units    (unkno

wn)



                    date)                         colorectal cancer unknown)  



                                                  oncologist was Dr. Jett, patient was           



                                                  referred to him           

 

           (unknown)  (no       (unknown)  (unknown)  Patient's preop  (units   

 (unknown)



                    date)                         diagnosis was unknown)  



                                                  nausea and vomiting           



                                                  from hematemesis in           



                                                  January             

 

           (unknown)  (no       (unknown)  (unknown)  Patient:  (units    (unkno

wn)



                    date)                         Bret Esquivel unknown)  



                                                  MR#: [id number]            

 

           (unknown)  (no       (unknown)  (unknown)  Postlaminectomy  (units   

 (unknown)



                    date)                         syndrome  unknown)  

 

           (unknown)  (no       (unknown)  (unknown)  Potassium  (units    (unkn

own)



                    date)                         (3.4-5.1)  mmol/L unknown)  

 

           (unknown)  (no       (unknown)  (unknown)  Potassium    3.7  (units  

  (unknown)



                    date)                         (3.4-5.1)  mmol/L unknown)  

 

           (unknown)  (no       (unknown)  (unknown)  Prescriptions:  (units    

(unknown)



                    date)                                   unknown)  

 

           (unknown)  (no       (unknown)  (unknown)  Procalcitonin  (units    (

unknown)



                    date)                         (<0.5)  ng/mL unknown)  

 

           (unknown)  (no       (unknown)  (unknown)  Procalcitonin  (units    (

unknown)



                    date)                         0.72 H    (<0.5) unknown)  



                                                  ng/mL               

 

           (unknown)  (no       (unknown)  (unknown)  Procalcitonin Stat  (units

    (unknown)



                    date)                                   unknown)  

 

           (unknown)  (no       (unknown)  (unknown)  Psych: mental  (units    (

unknown)



                    date)                         status is grossly unknown)  



                                                  normal, congruent           



                                                  mood, normal           



                                                  affect, pleasant           

 

           (unknown)  (no       (unknown)  (unknown)  Pulse Oximetry  98  (units

    (unknown)



                    date)                           22 17:34 unknown)  

 

           (unknown)  (no       (unknown)  (unknown)  Pulse Oximetry 98  (units 

   (unknown)



                    date)                                   unknown)  

 

           (unknown)  (no       (unknown)  (unknown)  Pulse Rate  77  (units    

(unknown)



                    date)                         22 17:34 unknown)  

 

           (unknown)  (no       (unknown)  (unknown)  Pulse Rate 77  (units    (

unknown)



                    date)                                   unknown)  

 

           (unknown)  (no       (unknown)  (unknown)  RDW       (units    (unkno

wn)



                    date)                         (11.6-14.8)  % unknown)  

 

           (unknown)  (no       (unknown)  (unknown)  RDW   16.1 H  (units    (u

nknown)



                    date)                         (11.6-14.8)  % unknown)  

 

           (unknown)  (no       (unknown)  (unknown)  Referrals:  (units    (unk

nown)



                    date)                                   unknown)  

 

           (unknown)  (no       (unknown)  (unknown)  Related Data  (units    (u

nknown)



                    date)                                   unknown)  

 

           (unknown)  (no       (unknown)  (unknown)  Respiratory Rate  (units  

  (unknown)



                    date)                         18   22 17:34 unknown)  

 

           (unknown)  (no       (unknown)  (unknown)  Respiratory Rate  (units  

  (unknown)



                    date)                         18        unknown)  

 

           (unknown)  (no       (unknown)  (unknown)  Respiratory:  (units    (u

nknown)



                    date)                         denies shortness of unknown)  



                                                  breath, or new           



                                                  cough               

 

           (unknown)  (no       (unknown)  (unknown)  Respiratory:  (units    (u

nknown)



                    date)                         normal effort, able unknown)  



                                                  to speak in           



                                                  complete sentences,           



                                                  no audible           

 

           (unknown)  (no       (unknown)  (unknown)  Result diagrams:  (units  

  (unknown)



                    date)                                   unknown)  

 

           (unknown)  (no       (unknown)  (unknown)  Review of Systems  (units 

   (unknown)



                    date)                                   unknown)  

 

           (unknown)  (no       (unknown)  (unknown)  SARS-CoV-2 (PCR)  (units  

  (unknown)



                    date)                           (Negative) unknown)  

 

           (unknown)  (no       (unknown)  (unknown)  SARS-CoV-2 (PCR)  (units  

  (unknown)



                    date)                         Negative  unknown)  



                                                  (Negative)           

 

           (unknown)  (no       (unknown)  (unknown)  Sciatica  (units    (unkno

wn)



                    date)                                   unknown)  

 

           (unknown)  (no       (unknown)  (unknown)  Signed By:  (units    (unk

nown)



                    date)                                   unknown)  

 

           (unknown)  (no       (unknown)  (unknown)  Skin: brisk  (units    (un

known)



                    date)                         capillary refill, unknown)  



                                                  no rash, no           



                                                  erythema            

 

           (unknown)  (no       (unknown)  (unknown)  Skin: denies rash,  (units

    (unknown)



                    date)                         itching or wound unknown)  

 

           (unknown)  (no       (unknown)  (unknown)  Smoking Status:  (units   

 (unknown)



                    date)                         Never smoker unknown)  

 

           (unknown)  (no       (unknown)  (unknown)  Smoking Status:  (units   

 (unknown)



                    date)                         Never smoker unknown)  

 

           (unknown)  (no       (unknown)  (unknown)  Social History  (units    

(unknown)



                    date)                         (Reviewed 22 unknown)  



                                                  @ 19:57 by ZAIRA Amezcua)           

 

           (unknown)  (no       (unknown)  (unknown)  Sodium    (units    (unkno

wn)



                    date)                         (137-145)  mmol/L unknown)  

 

           (unknown)  (no       (unknown)  (unknown)  Sodium    135 L  (units   

 (unknown)



                    date)                         (137-145)  mmol/L unknown)  

 

           (unknown)  (no       (unknown)  (unknown)  Sodium Chloride  (units   

 (unknown)



                    date)                         (Normal Saline unknown)  



                                                  0.9%)  1,000 mls @           



                                                  1,000 mls/hr IV           



                                                  BOLUS ONE           

 

           (unknown)  (no       (unknown)  (unknown)  Source: patient  (units   

 (unknown)



                    date)                         and family unknown)  

 

           (unknown)  (no       (unknown)  (unknown)  Spinal stenosis  (units   

 (unknown)



                    date)                                   unknown)  

 

           (unknown)  (no       (unknown)  (unknown)  Stated complaint:  (units 

   (unknown)



                    date)                         WEAKNESS UNABLE TO unknown)  



                                                  HOLD ANYTHING DOWN           

 

           (unknown)  (no       (unknown)  (unknown)  Substance Use  (units    (

unknown)



                    date)                         Type: does not use unknown)  

 

           (unknown)  (no       (unknown)  (unknown)  Surgical History  (units  

  (unknown)



                    date)                         (Reviewed 22 unknown)  



                                                  @ 19:57 by ZAIRA Amezcua)           

 

           (unknown)  (no       (unknown)  (unknown)  Surgical changes  (units  

  (unknown)



                    date)                         and devices:? unknown)  



                                                  None.?              

 

           (unknown)  (no       (unknown)  (unknown)  TECHNIQUE:? One  (units   

 (unknown)



                    date)                         view of the chest unknown)  



                                                  was acquired.?           

 

           (unknown)  (no       (unknown)  (unknown)  Temperature  98.3  (units 

   (unknown)



                    date)                         F   22 17:34 unknown)  

 

           (unknown)  (no       (unknown)  (unknown)  Temperature 98.3 F  (units

    (unknown)



                    date)                                   unknown)  

 

           (unknown)  (no       (unknown)  (unknown)  This is a  (units    (unkn

own)



                    date)                         62-year-old male unknown)  



                                                  with history of           



                                                  multiple sclerosis,           



                                                  CVA in 2019,           

 

           (unknown)  (no       (unknown)  (unknown)  Time Seen by  (units    (u

nknown)



                    date)                         Provider: 22 unknown)  



                                                  19:11               

 

           (unknown)  (no       (unknown)  (unknown)  Total Bilirubin  (units   

 (unknown)



                    date)                          (0.2-1.3)  mg/dL unknown)  

 

           (unknown)  (no       (unknown)  (unknown)  Total Bilirubin  (units   

 (unknown)



                    date)                         0.6  (0.2-1.3) unknown)  



                                                  mg/dL               

 

           (unknown)  (no       (unknown)  (unknown)  Total Creatine  (units    

(unknown)



                    date)                         Kinase     () unknown)  



                                                   U/L                

 

           (unknown)  (no       (unknown)  (unknown)  Total Creatine  (units    

(unknown)



                    date)                         Kinase    54 L unknown)  



                                                  ()  U/L           

 

           (unknown)  (no       (unknown)  (unknown)  Total Protein  (units    (

unknown)



                    date)                         (6.3-8.2)  g/dL unknown)  

 

           (unknown)  (no       (unknown)  (unknown)  Total Protein  (units    (

unknown)



                    date)                         7.5  (6.3-8.2) unknown)  



                                                  g/dL                

 

           (unknown)  (no       (unknown)  (unknown)  Troponin + CK  (units    (

unknown)



                    date)                         Cardiac Panel Stat unknown)  

 

           (unknown)  (no       (unknown)  (unknown)  Troponin I  (units    (unk

nown)



                    date)                         (0.01-0.034)  ng/mL unknown)  

 

           (unknown)  (no       (unknown)  (unknown)  Troponin I    <  (units   

 (unknown)



                    date)                         0.012  (0.01-0.034) unknown)  



                                                   ng/mL              

 

           (unknown)  (no       (unknown)  (unknown)  Ur Culture  (units    (unk

nown)



                    date)                         Indicated? unknown)  

 

           (unknown)  (no       (unknown)  (unknown)  Ur Culture  (units    (unk

nown)



                    date)                         Indicated? unknown)  



                                                  Specimen cultured           

 

           (unknown)  (no       (unknown)  (unknown)  Urine Bacteria  (units    

(unknown)



                    date)                         (None)    unknown)  

 

           (unknown)  (no       (unknown)  (unknown)  Urine Bacteria  (units    

(unknown)



                    date)                         Few (2-10) H unknown)  



                                                  (None)              

 

           (unknown)  (no       (unknown)  (unknown)  Urine Culture Stat  (units

    (unknown)



                    date)                                   unknown)  

 

           (unknown)  (no       (unknown)  (unknown)  Urine Microscopic  (units 

   (unknown)



                    date)                         Stat      unknown)  

 

           (unknown)  (no       (unknown)  (unknown)  Urine RBC  (units    (unkn

own)



                    date)                         (0-5/HPF) unknown)  

 

           (unknown)  (no       (unknown)  (unknown)  Urine RBC  (units    (unkn

own)



                    date)                         1-5/hpf  (0-5/HPF) unknown)  

 

           (unknown)  (no       (unknown)  (unknown)  Urine Specific  (units    

(unknown)



                    date)                         Gravity    1.015 unknown)  

 

           (unknown)  (no       (unknown)  (unknown)  Urine WBC  (units    (unkn

own)



                    date)                         (0-5/HPF) unknown)  

 

           (unknown)  (no       (unknown)  (unknown)  Urine WBC  (units    (unkn

own)



                    date)                         5-10/hpf H unknown)  



                                                  (0-5/HPF)           

 

           (unknown)  (no       (unknown)  (unknown)  Vital Signs  (units    (un

known)



                    date)                                   unknown)  

 

           (unknown)  (no       (unknown)  (unknown)  Vital signs:  (units    (u

nknown)



                    date)                                   unknown)  

 

           (unknown)  (no       (unknown)  (unknown)  Linette Jett MD  (units  

  (unknown)



                    date)                         [Physician] - As unknown)  



                                                  soon as possible           

 

           (unknown)  (no       (unknown)  (unknown)  XR chest 1V Stat  (units  

  (unknown)



                    date)                                   unknown)  

 

           (unknown)  (no       (unknown)  (unknown)  [Embedded Image  (units   

 (unknown)



                    date)                         Not Available] unknown)  

 

           (unknown)  (no       (unknown)  (unknown)  acute ischemic  (units    

(unknown)



                    date)                         changes.  unknown)  

 

           (unknown)  (no       (unknown)  (unknown)  alcohol intake  (units    

(unknown)



                    date)                         frequency: a few unknown)  



                                                  times a month           

 

           (unknown)  (no       (unknown)  (unknown)  alcohol intake:  (units   

 (unknown)



                    date)                         current   unknown)  

 

           (unknown)  (no       (unknown)  (unknown)  and cooperative  (units   

 (unknown)



                    date)                                   unknown)  

 

           (unknown)  (no       (unknown)  (unknown)  and generalized  (units   

 (unknown)



                    date)                         weakness.  Patient unknown)  



                                                  self catheterizes           



                                                  his bladder at           



                                                  baseline,           

 

           (unknown)  (no       (unknown)  (unknown)  appear    (units    (unkno

wn)



                    date)                                   unknown)  

 

           (unknown)  (no       (unknown)  (unknown)  ascorbic acid  (units    (

unknown)



                    date)                         (vitamin C) 500 mg unknown)  



                                                  500 mg PO BID #60           



                                                  tabs 22           

 

           (unknown)  (no       (unknown)  (unknown)  atorvastatin 80 mg  (units

    (unknown)



                    date)                         tablet 40 mg PO QPM unknown)  



                                                  20           

 

           (unknown)  (no       (unknown)  (unknown)  be causing a rash  (units 

   (unknown)



                    date)                                   unknown)  

 

           (unknown)  (no       (unknown)  (unknown) bleeding.  Guaiac  (units  

  (unknown)



                    date)                         stool in the unknown)  



                                                  emergency           



                                                  department was           



                                                  negative.           



                                                  Patient's lab           

 

           (unknown)  (no       (unknown)  (unknown)  blood in his  (units    (u

nknown)



                    date)                         emesis or in his unknown)  



                                                  stool.  He reports           



                                                  that he did a stool           



                                                  test one            

 

           (unknown)  (no       (unknown)  (unknown)  buspirone 5 mg  (units    

(unknown)



                    date)                         tablet 5 mg PO BID unknown)  



                                                  #60 tabs 22           

 

           (unknown)  (no       (unknown)  (unknown)  cancer.  Postop  (units   

 (unknown)



                    date)                         diagnosis from this unknown)  



                                                  upper endoscopy was           



                                                  the same.  His           

 

           (unknown)  (no       (unknown)  (unknown)  caths at baseline  (units 

   (unknown)



                    date)                                   unknown)  

 

           (unknown)  (no       (unknown)  (unknown)  clopidogrel 75 mg  (units 

   (unknown)



                    date)                         tablet 75 mg PO unknown)  



                                                  DAILY 22            

 

           (unknown)  (no       (unknown)  (unknown)  colonic   (units    (unkno

wn)



                    date)                         anastomosis, and a unknown)  



                                                  suboptimal bowel           



                                                  prep.               

 

           (unknown)  (no       (unknown)  (unknown)  cultures were  (units    (

unknown)



                    date)                         drawn prior to unknown)  



                                                  receiving           



                                                  antibiotics Urine           



                                                  microscopy shows           

 

           (unknown)  (no       (unknown)  (unknown)  cyanocobalamin  (units    

(unknown)



                    date)                         (vitamin B-12) 500 unknown)  



                                                  1,000 mcg PO DAILY           



                                                  #60 tabs 22           

 

           (unknown)  (no       (unknown)  (unknown)  diabetes,  (units    (unkn

own)



                    date)                         hypertension, unknown)  



                                                  colorectal cancer           



                                                  in  with a           



                                                  reverse colectomy           



                                                  who                 

 

           (unknown)  (no       (unknown)  (unknown)  diabetes,  (units    (unkn

own)



                    date)                         hypertension, unknown)  



                                                  colorectal cancer           



                                                  in  with a           



                                                  reverse colectomy,           



                                                  He                  

 

           (unknown)  (no       (unknown)  (unknown)  does not drink  (units    

(unknown)



                    date)                         alcohol.? Patient unknown)  



                                                  and his wife state           



                                                  that he was seen in           



                                                  the                 

 

           (unknown)  (no       (unknown)  (unknown)  elevated lactate.  (units 

   (unknown)



                    date)                         His heart rate and unknown)  



                                                  blood pressure are           



                                                  within normal           



                                                  ranges.             

 

           (unknown)  (no       (unknown)  (unknown)  emergency  (units    (unkn

own)



                    date)                         department in unknown)  



                                                  January and           



                                                  diagnosed with a GI           



                                                  bleed, he was           



                                                  admitted,           

 

           (unknown)  (no       (unknown)  (unknown)  endorses fatigue  (units  

  (unknown)



                    date)                                   unknown)  

 

           (unknown)  (no       (unknown)  (unknown) endoscopic  (units    (unkn

own)



                    date)                         diagnosis includes unknown)  



                                                  mild gastropathy,           



                                                  esophagitis, patent           



                                                  retrosigmoid           

 

           (unknown)  (no       (unknown)  (unknown)  ferrous sulfate  (units   

 (unknown)



                    date)                         325 mg (65 mg 325 unknown)  



                                                  mg PO BIDWM #60           



                                                  tabs 22           

 

           (unknown)  (no       (unknown)  (unknown)  followed by loose  (units 

   (unknown)



                    date)                         stool.    unknown)  

 

           (unknown)  (no       (unknown)  (unknown)  gabapentin 300 mg  (units 

   (unknown)



                    date)                         capsule 300 mg PO unknown)  



                                                  BID 18            

 

           (unknown)  (no       (unknown)  (unknown)  generalized  (units    (un

known)



                    date)                         weakness and unknown)  



                                                  fatigue             

 

           (unknown)  (no       (unknown)  (unknown)  glipizide 10 mg  (units   

 (unknown)



                    date)                         tablet 10 mg PO BID unknown)  



                                                  18           

 

           (unknown)  (no       (unknown)  (unknown)  had a recent  (units    (u

nknown)



                    date)                         endoscopy that did unknown)  



                                                  not show any           



                                                  bleeding polyps or           



                                                  acute GI            

 

           (unknown)  (no       (unknown)  (unknown)  hospital and on  (units   

 (unknown)



                    date)                         chart review it unknown)  



                                                  appears that it was           



                                                  completed on           



                                                  2022.           

 

           (unknown)  (no       (unknown)  (unknown)  household members:  (units

    (unknown)



                    date)                          spouse   unknown)  

 

           (unknown)  (no       (unknown)  (unknown)  ingrown, no penile  (units

    (unknown)



                    date)                         discharge, no unknown)  



                                                  scrotal edema           

 

           (unknown)  (no       (unknown)  (unknown)  iron) tablet  (units    (u

nknown)



                    date)                                   unknown)  

 

           (unknown)  (no       (unknown)  (unknown)  lives     (units    (unkno

wn)



                    date)                         independently:  Yes unknown)  

 

           (unknown)  (no       (unknown)  (unknown)  losartan 50 mg  (units    

(unknown)



                    date)                         tablet 25 mg PO QPM unknown)  



                                                  20           

 

           (unknown)  (no       (unknown)  (unknown)  lung      (units    (unkno

wn)



                    date)                                   unknown)  

 

           (unknown)  (no       (unknown)  (unknown)  magnesium was  (units    (

unknown)



                    date)                         replaced with 2 g, unknown)  



                                                  normal saline 1000           



                                                  mL was given for           



                                                  his                 

 

           (unknown)  (no       (unknown)  (unknown)  mcg tablet  (units    (unk

nown)



                    date)                                   unknown)  

 

           (unknown)  (no       (unknown)  (unknown)  mcg tablet  (units    (unk

nown)



                    date)                         (Tab-A-Lucy) unknown)  

 

           (unknown)  (no       (unknown)  (unknown)  metformin 500 mg  (units  

  (unknown)



                    date)                         tablet,extended 500 unknown)  



                                                  mg PO BID 18            

 

           (unknown)  (no       (unknown)  (unknown)  metoprolol  (units    (unk

nown)



                    date)                         succinate 25 mg 25 unknown)  



                                                  mg PO DAILY           



                                                  21           

 

           (unknown)  (no       (unknown)  (unknown)  moving forward.  (units   

 (unknown)



                    date)                         Patient reports unknown)  



                                                  that he has been           



                                                  taking fiber but           



                                                  not any             

 

           (unknown)  (no       (unknown)  (unknown)  multivitamin with  (units 

   (unknown)



                    date)                         folic acid 400 1 unknown)  



                                                  tab PO DAILY #90           



                                                  tabs 22           

 

           (unknown)  (no       (unknown)  (unknown)  negative.  I  (units    (u

nknown)



                    date)                         completed his unknown)  



                                                  urinary             



                                                  catheterization           



                                                  with a coude           



                                                  catheter, urine           

 

           (unknown)  (no       (unknown)  (unknown)  nondistended, no  (units  

  (unknown)



                    date)                         masses, no unknown)  



                                                  exquisite           



                                                  tenderness with           



                                                  exam, no rebound           

 

           (unknown)  (no       (unknown)  (unknown)  not heard it was  (units  

  (unknown)



                    date)                         positive or not. unknown)  



                                                  Patient reports           



                                                  that his primary           



                                                  care                

 

           (unknown)  (no       (unknown)  (unknown)  ondansetron HCl 4  (units 

   (unknown)



                    date)                         mg tablet 4 mg PO unknown)  



                                                  Q8H PRN nausea and           



                                                  21            

 

           (unknown)  (no       (unknown)  (unknown)  oral powder packet  (units

    (unknown)



                    date)                                   unknown)  

 

           (unknown)  (no       (unknown)  (unknown)  output for three  (units  

  (unknown)



                    date)                         days, three days unknown)  



                                                  ago he had nausea           



                                                  and vomiting,           



                                                  denies any           

 

           (unknown)  (no       (unknown)  (unknown)  polyethylene  (units    (u

nknown)



                    date)                         glycol 3350 17 gram unknown)  



                                                  17 gm PO DAILY #90           



                                                  ea 22           

 

           (unknown)  (no       (unknown)  (unknown)  presents to the  (units   

 (unknown)



                    date)                         emergency unknown)  



                                                  department           



                                                  complaining of           



                                                  ongoing fatigue,           



                                                  oliguria,           

 

           (unknown)  (no       (unknown)  (unknown)  provider has left,  (units

    (unknown)



                    date)                         he reports that he unknown)  



                                                  has seen Dr. Macias           



                                                  recently as well.           

 

           (unknown)  (no       (unknown)  (unknown)  quadrant with a  (units   

 (unknown)



                    date)                         complicated unknown)  



                                                  surgical history of           



                                                  his abdomen.  This           



                                                  shows               

 

           (unknown)  (no       (unknown)  (unknown)  recently for his  (units  

  (unknown)



                    date)                         anemia.   unknown)  

 

           (unknown)  (no       (unknown)  (unknown)  related diarrhea  (units  

  (unknown)



                    date)                         from fecal loading unknown)  



                                                  and obstipation.           



                                                  Recommended MiraLax           



                                                  17 g                

 

           (unknown)  (no       (unknown)  (unknown)  release 24 hr  (units    (

unknown)



                    date)                                   unknown)  

 

           (unknown)  (no       (unknown)  (unknown)  reported that  (units    (

unknown)



                    date)                         patient's altered unknown)  



                                                  bowel habit is           



                                                  likely a function           



                                                  of overflow           

 

           (unknown)  (no       (unknown)  (unknown)  sennosides 8.6 mg  (units 

   (unknown)



                    date)                         tablet (senna) 17.2 unknown)  



                                                  mg PO BEDTIME #60           



                                                  tabs 22           

 

           (unknown)  (no       (unknown)  (unknown)  substance use  (units    (

unknown)



                    date)                         type:  does not use unknown)  

 

           (unknown)  (no       (unknown)  (unknown)  tablet (Vitamin C)  (units

    (unknown)



                    date)                                   unknown)  

 

           (unknown)  (no       (unknown)  (unknown)  tablet,extended  (units   

 (unknown)



                    date)                         release 24 hr unknown)  

 

           (unknown)  (no       (unknown)  (unknown)  tenderness  (units    (unk

nown)



                    date)                                   unknown)  

 

           (unknown)  (no       (unknown)  (unknown)  there.  He reports  (units

    (unknown)



                    date)                         that he had an unknown)  



                                                  upper endoscopy           



                                                  completed here at           



                                                  this                

 

           (unknown)  (no       (unknown)  (unknown)  tone      (units    (unkno

wn)



                    date)                                   unknown)  

 

           (unknown)  (no       (unknown)  (unknown)  trospium 20 mg  (units    

(unknown)



                    date)                         tablet 20 mg PO BID unknown)  



                                                  urinary retention           



                                                  22           

 

           (unknown)  (no       (unknown)  (unknown)  unremarkable.?  (units    

(unknown)



                    date)                                   unknown)  

 

           (unknown)  (no       (unknown)  (unknown)  urine dip showed  (units  

  (unknown)



                    date)                         leukocytes, unknown)  



                                                  ketones, wbc's and           



                                                  did not show           



                                                  nitrites.  Blood           

 

           (unknown)  (no       (unknown)  (unknown)  urine is cloudy,  (units  

  (unknown)



                    date)                         yellow,   unknown)  



                                                  approximately 600           



                                                  mL in bladder and           



                                                  drained, no rash           

 

           (unknown)  (no       (unknown)  (unknown)  volumes accentuate  (units

    (unknown)



                    date)                         pulmonary unknown)  



                                                  interstitium and           



                                                  heart size.           

 

           (unknown)  (no       (unknown)  (unknown)  was cloudy yellow  (units 

   (unknown)



                    date)                         urine and 600 mL unknown)  



                                                  was drained.           



                                                  Patient tolerated           



                                                  well.  His           

 

           (unknown)  (no       (unknown)  (unknown)  week ago and  (units    (u

nknown)



                    date)                         dropped it off at unknown)  



                                                  [facility] which was           



                                                  testing for blood           



                                                  but he has           

 

           (unknown)  (no       (unknown)  (unknown)  went to [facility]  (units 

   (unknown)



                    date)                         for rehab following unknown)  



                                                  his admission and           



                                                  has followed up           



                                                  with                

 

           (unknown)  (no       (unknown)  (unknown)  wheezing, stridor,  (units

    (unknown)



                    date)                         or rales. No unknown)  



                                                  retractions or           



                                                  tachypnea.           









                                         Result panel 24









           (unknown)  (no       (unknown)  (unknown)  (no value)  (units    (unk

nown)



                    date)                                   unknown)  

 

           (unknown)  (no       (unknown)  (unknown)  Radiologist  (units    (un

known)



                    date)                         Impression: unknown)  

 

           (unknown)  (no       (unknown)  (unknown)  Date of Service:  (units  

  (unknown)



                    date)                         22  unknown)  

 

           (unknown)  (no       (unknown)  (unknown)  (no value)  (units    (unk

nown)



                    date)                                   unknown)  

 

           (unknown)  (no       (unknown)  (unknown)  22 17:45  (units    

(unknown)



                    date)                                   unknown)  

 

           (unknown)  (no       (unknown)  (unknown)  1 tab PO DAILY  (units    

(unknown)



                    date)                         Qty: 90 0RF unknown)  

 

           (unknown)  (no       (unknown)  (unknown)  1,000 mcg PO DAILY  (units

    (unknown)



                    date)                         Qty: 60 0RF unknown)  

 

           (unknown)  (no       (unknown)  (unknown)  10 mg PO BID  (units    (u

nknown)



                    date)                                   unknown)  

 

           (unknown)  (no       (unknown)  (unknown)  17 gm PO DAILY  (units    

(unknown)



                    date)                         Qty: 90 0RF unknown)  

 

           (unknown)  (no       (unknown)  (unknown)  17.2 mg PO BEDTIME  (units

    (unknown)



                    date)                         Qty: 60 0RF unknown)  

 

           (unknown)  (no       (unknown)  (unknown)  20 mg PO BID  (units    (u

nknown)



                    date)                                   unknown)  

 

           (unknown)  (no       (unknown)  (unknown)  25 mg PO DAILY  (units    

(unknown)



                    date)                                   unknown)  

 

           (unknown)  (no       (unknown)  (unknown)  25 mg PO QPM  (units    (u

nknown)



                    date)                                   unknown)  

 

           (unknown)  (no       (unknown)  (unknown)  300 mg PO BID  (units    (

unknown)



                    date)                                   unknown)  

 

           (unknown)  (no       (unknown)  (unknown)  325 mg PO BIDWM  (units   

 (unknown)



                    date)                         Qty: 60 0RF unknown)  

 

           (unknown)  (no       (unknown)  (unknown)  4 mg PO Q8H PRN  (units   

 (unknown)



                    date)                         (Reason: nausea and unknown)  



                                                  vomiting) Qty: 14           



                                                  0RF                 

 

           (unknown)  (no       (unknown)  (unknown)  40 mg PO QPM  (units    (u

nknown)



                    date)                                   unknown)  

 

           (unknown)  (no       (unknown)  (unknown)  5 mg PO BID Qty:  (units  

  (unknown)



                    date)                         60 0RF    unknown)  

 

           (unknown)  (no       (unknown)  (unknown)  500 mg PO BID  (units    (

unknown)



                    date)                                   unknown)  

 

           (unknown)  (no       (unknown)  (unknown)  500 mg PO BID Qty:  (units

    (unknown)



                    date)                         60 0RF    unknown)  

 

           (unknown)  (no       (unknown)  (unknown)  75 mg PO DAILY  (units    

(unknown)



                    date)                                   unknown)  

 

           (unknown)  (no       (unknown)  (unknown)  Admin: 22  (units   

 (unknown)



                    date)                         20:19  Dose: 50 unknown)  



                                                  mls/hr              

 

           (unknown)  (no       (unknown)  (unknown)  Admin: 22  (units   

 (unknown)



                    date)                         21:06  Dose: 200 unknown)  



                                                  mls/hr              

 

           (unknown)  (no       (unknown)  (unknown)  Allergies  (units    (unkn

own)



                    date)                                   unknown)  

 

           (unknown)  (no       (unknown)  (unknown)  Documented By: [facility]  (units

    (unknown)



                    date)                                   unknown)  

 

           (unknown)  (no       (unknown)  (unknown)  Documented By: AT  (units 

   (unknown)



                    date)                          Co-signed By: TORREY unknown)  

 

           (unknown)  (no       (unknown)  (unknown)  Documented By: AT  (units 

   (unknown)



                    date)                                   unknown)  

 

           (unknown)  (no       (unknown)  (unknown)  Documented By: MLM  (units

    (unknown)



                    date)                           Co-signed By: [facility] unknown)  

 

           (unknown)  (no       (unknown)  (unknown)  Documented By: MLM  (units

    (unknown)



                    date)                                   unknown)  

 

           (unknown)  (no       (unknown)  (unknown)  ED Orders  (units    (unkn

own)



                    date)                                   unknown)  

 

           (unknown)  (no       (unknown)  (unknown)  Emergency Report  (units  

  (unknown)



                    date)                                   unknown)  

 

           (unknown)  (no       (unknown)  (unknown)  Home Medications  (units  

  (unknown)



                    date)                                   unknown)  

 

           (unknown)  (no       (unknown)  (unknown)  [facility]  (units   

 (unknown)



                    date)                         [address] [place] [address] unknown)  



                                                  [place] [address]           

 

           (unknown)  (no       (unknown)  (unknown)  Lab Results  (units    (un

known)



                    date)                                   unknown)  

 

           (unknown)  (no       (unknown)  (unknown)  Label Comments:  (units   

 (unknown)



                    date)                                   unknown)  

 

           (unknown)  (no       (unknown)  (unknown)  Last Admin:  (units    (un

known)



                    date)                         22 18:30 unknown)  



                                                  Dose: 4 mg           

 

           (unknown)  (no       (unknown)  (unknown)  Last Admin:  (units    (un

known)



                    date)                         22 20:18 unknown)  



                                                  Dose: 1,000 mls/hr           

 

           (unknown)  (no       (unknown)  (unknown)  Last Infusion:  (units    

(unknown)



                    date)                         22 21:10 unknown)  



                                                  Dose: 0 mls/hr           

 

           (unknown)  (no       (unknown)  (unknown)  Last Infusion:  (units    

(unknown)



                    date)                         22 21:28 unknown)  



                                                  Dose: 0 mls/hr           

 

           (unknown)  (no       (unknown)  (unknown)  Previous Rx's  (units    (

unknown)



                    date)                                   unknown)  

 

           (unknown)  (no       (unknown)  (unknown)  Stop: 22  (units    

(unknown)



                    date)                         18:16     unknown)  

 

           (unknown)  (no       (unknown)  (unknown)  Stop: 22  (units    

(unknown)



                    date)                         20:15     unknown)  

 

           (unknown)  (no       (unknown)  (unknown)  Stop: 22  (units    

(unknown)



                    date)                         20:25     unknown)  

 

           (unknown)  (no       (unknown)  (unknown)  Stop: 22  (units    

(unknown)



                    date)                         20:27     unknown)  

 

           (unknown)  (no       (unknown)  (unknown)  Urine Dip  (units    (unkn

own)



                    date)                                   unknown)  

 

           (unknown)  (no       (unknown)  (unknown)  Vital Signs - 8 hr  (units

    (unknown)



                    date)                                   unknown)  

 

           (unknown)  (no       (unknown)  (unknown)  has not been  (units    (u

nknown)



                    date)                         eating so hasn't unknown)  



                                                  taken recently.           



                                                  spouse states           



                                                  thinks it might           

 

           (unknown)  (no       (unknown)  (unknown)  stopped taking  (units    

(unknown)



                    date)                         prior to back unknown)  



                                                  surgery and did not           



                                                  restart             

 

           (unknown)  (no       (unknown)  (unknown)  (no value)  (units    (unk

nown)



                    date)                                   unknown)  

 

           (unknown)  (no       (unknown)  (unknown)  22  (units 

   (unknown)



                    date)                         22  unknown)  



                                                  Range/Units           

 

           (unknown)  (no       (unknown)  (unknown)  17:40 17:45 17:45  (units 

   (unknown)



                    date)                                   unknown)  

 

           (unknown)  (no       (unknown)  (unknown)  17:45 17:45 20:09  (units 

   (unknown)



                    date)                                   unknown)  

 

           (unknown)  (no       (unknown)  (unknown)  ascorbic acid  (units    (

unknown)



                    date)                         (vitamin C) unknown)  



                                                  [Vitamin C] 500 mg           



                                                  Tablet              

 

           (unknown)  (no       (unknown)  (unknown)  atorvastatin 80 mg  (units

    (unknown)



                    date)                         tablet    unknown)  

 

           (unknown)  (no       (unknown)  (unknown)  buspirone 5 mg  (units    

(unknown)



                    date)                         Tablet    unknown)  

 

           (unknown)  (no       (unknown)  (unknown)  clopidogrel 75 mg  (units 

   (unknown)



                    date)                         tablet    unknown)  

 

           (unknown)  (no       (unknown)  (unknown)  cyanocobalamin  (units    

(unknown)



                    date)                         (vitamin B-12) 500 unknown)  



                                                  mcg Tablet           

 

           (unknown)  (no       (unknown)  (unknown)  ferrous sulfate  (units   

 (unknown)



                    date)                         325 mg (65 mg iron) unknown)  



                                                  Tablet              

 

           (unknown)  (no       (unknown)  (unknown)  gabapentin 300 mg  (units 

   (unknown)



                    date)                         capsule   unknown)  

 

           (unknown)  (no       (unknown)  (unknown)  glipizide 10 mg  (units   

 (unknown)



                    date)                         tablet    unknown)  

 

           (unknown)  (no       (unknown)  (unknown)  losartan 50 mg  (units    

(unknown)



                    date)                         tablet    unknown)  

 

           (unknown)  (no       (unknown)  (unknown)  metformin 500 mg  (units  

  (unknown)



                    date)                         tablet extended unknown)  



                                                  release 24 hr           

 

           (unknown)  (no       (unknown)  (unknown)  metoprolol  (units    (unk

nown)



                    date)                         succinate 25 mg unknown)  



                                                  tablet extended           



                                                  release 24 hr           

 

           (unknown)  (no       (unknown)  (unknown)  multivitamin with  (units 

   (unknown)



                    date)                         folic acid unknown)  



                                                  [Tab-A-Lucy] 400           



                                                  mcg Tablet           

 

           (unknown)  (no       (unknown)  (unknown)  ondansetron HCl  (units   

 (unknown)



                    date)                         [Zofran] 4 mg unknown)  



                                                  tablet              

 

           (unknown)  (no       (unknown)  (unknown)  polyethylene  (units    (u

nknown)



                    date)                         glycol 3350 17 gram unknown)  



                                                  Powder In Packet           

 

           (unknown)  (no       (unknown)  (unknown)  sennosides [senna]  (units

    (unknown)



                    date)                         8.6 mg Tablet unknown)  

 

           (unknown)  (no       (unknown)  (unknown)  trospium 20 mg  (units    

(unknown)



                    date)                         tablet    unknown)  

 

           (unknown)  (no       (unknown)  (unknown)  22  (units    (unkno

wn)



                    date)                                   unknown)  

 

           (unknown)  (no       (unknown)  (unknown)  1.07 in January.  (units  

  (unknown)



                    date)                         No elevation in his unknown)  



                                                  troponin,           



                                                  procalcitonin is           



                                                  elevated at           

 

           (unknown)  (no       (unknown)  (unknown)  Anemia,   (units    (unkno

wn)



                    date)                         Hypomagnesemia, unknown)  



                                                  Pyelonephritis           

 

           (unknown)  (no       (unknown)  (unknown)  Medication  (units    (unk

nown)



                    date)                         Instructions unknown)  



                                                  Recorded            

 

           (unknown)  (no       (unknown)  (unknown)  Medication  (units    (unk

nown)



                    date)                         Instructions unknown)  



                                                  Recorded  Confirmed           

 

           (unknown)  (no       (unknown)  (unknown)  sodium 135,  (units    (un

known)



                    date)                         potassium 3.7, unknown)  



                                                  creatinine 1.42           



                                                  which is increased           



                                                  from his prior of           

 

           (unknown)  (no       (unknown)  (unknown)  with regular axis  (units 

   (unknown)



                    date)                         and intervals.  No unknown)  



                                                  STEMI, ST segment           



                                                  changes,            



                                                  arrhythmia, or           

 

           (unknown)  (no       (unknown)  (unknown)  work is   (units    (unkno

wn)



                    date)                         significant for unknown)  



                                                  anemia, hemoglobin           



                                                  is 9.1 today, down           



                                                  from 9.6 in           

 

           (unknown)  (no       (unknown)  (unknown)  (Zofran) vomiting  (units 

   (unknown)



                    date)                         #14 tabs  unknown)  

 

           (unknown)  (no       (unknown)  (unknown)  0.72, his lactate  (units 

   (unknown)



                    date)                         is also elevated at unknown)  



                                                  2.6, magnesium is           



                                                  low at 1.0.  His           

 

           (unknown)  (no       (unknown)  (unknown)  [id number]   (units    (unkno

wn)



                    date)                                   unknown)  

 

           (unknown)  (no       (unknown)  (unknown)  22 17:39  (units    

(unknown)



                    date)                                   unknown)  

 

           (unknown)  (no       (unknown)  (unknown)  22 17:40  (units    

(unknown)



                    date)                                   unknown)  

 

           (unknown)  (no       (unknown)  (unknown)  22 17:45  (units    

(unknown)



                    date)                                   unknown)  

 

           (unknown)  (no       (unknown)  (unknown)  22 19:26  (units    

(unknown)



                    date)                                   unknown)  

 

           (unknown)  (no       (unknown)  (unknown)  22 20:09  (units    

(unknown)



                    date)                                   unknown)  

 

           (unknown)  (no       (unknown)  (unknown)  22 20:55  (units    

(unknown)



                    date)                                   unknown)  

 

           (unknown)  (no       (unknown)  (unknown)  17:34     (units    (unkno

wn)



                    date)                                   unknown)  

 

           (unknown)  (no       (unknown)  (unknown)  with iron  (units    (

unknown)



                    date)                         deficiency anemia, unknown)  



                                                  altered bowel           



                                                  habit, personal           



                                                  history of colon           

 

           (unknown)  (no       (unknown)  (unknown)  62-year-old  (units    (un

known)



                    date)                         gentleman with a unknown)  



                                                  history of multiple           



                                                  sclerosis, CVA in           



                                                  2019,               

 

           (unknown)  (no       (unknown)  (unknown)  ?         (units    (unkno

wn)



                    date)                                   unknown)  

 

           (unknown)  (no       (unknown)  (unknown)  ALT     (<50)  (units    (

unknown)



                    date)                         IU/L      unknown)  

 

           (unknown)  (no       (unknown)  (unknown)  ALT    16  (<50)  (units  

  (unknown)



                    date)                         IU/L      unknown)  

 

           (unknown)  (no       (unknown)  (unknown)  AST     (17-59)  (units   

 (unknown)



                    date)                         IU/L      unknown)  

 

           (unknown)  (no       (unknown)  (unknown)  AST    19  (17-59)  (units

    (unknown)



                    date)                          IU/L     unknown)  

 

           (unknown)  (no       (unknown)  (unknown)  Age/Sex: 62 / M  (units   

 (unknown)



                    date)                                   unknown)  

 

           (unknown)  (no       (unknown)  (unknown)  Albumin   (units    (unkno

wn)



                    date)                         (3.5-5.0)  g/dL unknown)  

 

           (unknown)  (no       (unknown)  (unknown)  Albumin    3.9  (units    

(unknown)



                    date)                         (3.5-5.0)  g/dL unknown)  

 

           (unknown)  (no       (unknown)  (unknown)  Albumin/Globulin  (units  

  (unknown)



                    date)                         Ratio     (1.0-2.8) unknown)  

 

           (unknown)  (no       (unknown)  (unknown)  Albumin/Globulin  (units  

  (unknown)



                    date)                         Ratio    1.1 unknown)  



                                                  (1.0-2.8)           

 

           (unknown)  (no       (unknown)  (unknown)  Alkaline  (units    (unkno

wn)



                    date)                         Phosphatase unknown)  



                                                  ()  U/L           

 

           (unknown)  (no       (unknown)  (unknown)  Alkaline  (units    (unkno

wn)



                    date)                         Phosphatase    105 unknown)  



                                                  ()  U/L           

 

           (unknown)  (no       (unknown)  (unknown)  Allergy/AdvReac  (units   

 (unknown)



                    date)                         Type Severity unknown)  



                                                  Reaction Status           



                                                  Date / Time           

 

           (unknown)  (no       (unknown)  (unknown)  Amorphous Sediment  (units

    (unknown)



                    date)                                   unknown)  

 

           (unknown)  (no       (unknown)  (unknown)  Amorphous Sediment  (units

    (unknown)



                    date)                            2+     unknown)  

 

           (unknown)  (no       (unknown)  (unknown)  Approved by: Garfield Mcdanielsunits 

   (unknown)



                    date)                         SHANNAN Lopez on unknown)  



                                                  2022 at 18:02?           

 

           (unknown)  (no       (unknown)  (unknown)  BUN     (9-20)  (units    

(unknown)



                    date)                         mg/dL     unknown)  

 

           (unknown)  (no       (unknown)  (unknown)  BUN    21 H  (units    (un

known)



                    date)                         (9-20)  mg/dL unknown)  

 

           (unknown)  (no       (unknown)  (unknown)  BUN/Creatinine  (units    

(unknown)



                    date)                         Ratio     (6-22) unknown)  

 

           (unknown)  (no       (unknown)  (unknown)  BUN/Creatinine  (units    

(unknown)



                    date)                         Ratio    14.8 unknown)  



                                                  (6-22)              

 

           (unknown)  (no       (unknown)  (unknown)  Baso # (Auto)  (units    (

unknown)



                    date)                         (0-100)  /uL unknown)  

 

           (unknown)  (no       (unknown)  (unknown)  Baso # (Auto)   0  (units 

   (unknown)



                    date)                          (0-100)  /uL unknown)  

 

           (unknown)  (no       (unknown)  (unknown)  Baso % (Auto)  (units    (

unknown)



                    date)                         (0-2)  %  unknown)  

 

           (unknown)  (no       (unknown)  (unknown)  Baso % (Auto)  (units    (

unknown)



                    date)                         0.2   (0-2)  % unknown)  

 

           (unknown)  (no       (unknown)  (unknown)  Bedside Urine  (units    (

unknown)



                    date)                         Bilirubin        - unknown)  



                                                  Negative            

 

           (unknown)  (no       (unknown)  (unknown)  Bedside Urine  (units    (

unknown)



                    date)                         Glucose    Negative unknown)  

 

           (unknown)  (no       (unknown)  (unknown)  Bedside Urine  (units    (

unknown)



                    date)                         Ketone    - unknown)  



                                                  Negative            

 

           (unknown)  (no       (unknown)  (unknown)  Bedside Urine  (units    (

unknown)



                    date)                         Leukocytes       + unknown)  



                                                  70                  

 

           (unknown)  (no       (unknown)  (unknown)  Bedside Urine  (units    (

unknown)



                    date)                         Nitrite    - unknown)  



                                                  Negative            

 

           (unknown)  (no       (unknown)  (unknown)  Bedside Urine  (units    (

unknown)



                    date)                         Occult Blood     ++ unknown)  

 

           (unknown)  (no       (unknown)  (unknown)  Bedside Urine  (units    (

unknown)



                    date)                         Protein    + 30 unknown)  

 

           (unknown)  (no       (unknown)  (unknown)  Bedside Urine  (units    (

unknown)



                    date)                         Urobilinogen     - unknown)  



                                                  Negative            

 

           (unknown)  (no       (unknown)  (unknown)  Bedside Urine pH  (units  

  (unknown)



                    date)                          6.0      unknown)  

 

           (unknown)  (no       (unknown)  (unknown)  Blood Culture Stat  (units

    (unknown)



                    date)                                   unknown)  

 

           (unknown)  (no       (unknown)  (unknown)  Blood Pressure  (units    

(unknown)



                    date)                         124/66   22 unknown)  



                                                  17:34               

 

           (unknown)  (no       (unknown)  (unknown)  Blood Pressure  (units    

(unknown)



                    date)                         124/66    unknown)  

 

           (unknown)  (no       (unknown)  (unknown)  Bones and chest  (units   

 (unknown)



                    date)                         wall:? No unknown)  



                                                  suspicious bony           



                                                  lesions.? Overlying           



                                                  soft tissues           

 

           (unknown)  (no       (unknown)  (unknown)  CK-MB (CK-2)  (units    (u

nknown)



                    date)                                   unknown)  

 

           (unknown)  (no       (unknown)  (unknown)  CK-MB (CK-2)  (units    (u

nknown)



                    date)                         TNP       unknown)  

 

           (unknown)  (no       (unknown)  (unknown)  CK-MB (CK-2) Rel  (units  

  (unknown)



                    date)                         Index     unknown)  

 

           (unknown)  (no       (unknown)  (unknown)  CK-MB (CK-2) Rel  (units  

  (unknown)



                    date)                         Index    TNP unknown)  

 

           (unknown)  (no       (unknown)  (unknown)  COMPARISON:?  (units    (u

nknown)



                    date)                         [facility], unknown)  



                                                  CR, XR CHEST 2V,           



                                                  3/18/2021, 10:19.           

 

           (unknown)  (no       (unknown)  (unknown)  COVID19 -Nasal  (units    

(unknown)



                    date)                         RAPID/Pre-Proc Stat unknown)  

 

           (unknown)  (no       (unknown)  (unknown)  CT abdomen pelvis  (units 

   (unknown)



                    date)                         w con Stat unknown)  

 

           (unknown)  (no       (unknown)  (unknown)  CT abdomen pelvis  (units 

   (unknown)



                    date)                         was ordered for unknown)  



                                                  patient has           



                                                  tenderness to his           



                                                  right lower           

 

           (unknown)  (no       (unknown)  (unknown)  CVA (cerebral  (units    (

unknown)



                    date)                         vascular accident) unknown)  



                                                  (2018)           

 

           (unknown)  (no       (unknown)  (unknown)  Calcium   (units    (unkno

wn)



                    date)                         (8.4-10.2)  mg/dL unknown)  

 

           (unknown)  (no       (unknown)  (unknown)  Calcium    9.3  (units    

(unknown)



                    date)                         (8.4-10.2)  mg/dL unknown)  

 

           (unknown)  (no       (unknown)  (unknown)  Carbon Dioxide  (units    

(unknown)



                    date)                         (22-32)  mmol/L unknown)  

 

           (unknown)  (no       (unknown)  (unknown)  Carbon Dioxide  (units    

(unknown)



                    date)                         29  (22-32)  mmol/L unknown)  

 

           (unknown)  (no       (unknown)  (unknown)  Cardio: denies  (units    

(unknown)



                    date)                         chest pain, unknown)  



                                                  palpitations           

 

           (unknown)  (no       (unknown)  (unknown)  Cardiovascular:  (units   

 (unknown)



                    date)                         regular rate and unknown)  



                                                  rhythm, no           



                                                  peripheral edema,           



                                                  warm extremities           

 

           (unknown)  (no       (unknown)  (unknown)  Ceftriaxone Sodium  (units

    (unknown)



                    date)                         1,000 mg/ (Sodium unknown)  



                                                  Chloride)  100 mls           



                                                  @ 200 mls/hr IV NOW           



                                                  ONE                 

 

           (unknown)  (no       (unknown)  (unknown)  Chest x-ray:  (units    (u

nknown)



                    date)                                   unknown)  

 

           (unknown)  (no       (unknown)  (unknown)  Chief complaint:  (units  

  (unknown)



                    date)                         Weakness  unknown)  

 

           (unknown)  (no       (unknown)  (unknown)  Chloride  (units    (unkno

wn)



                    date)                         ()  mmol/L unknown)  

 

           (unknown)  (no       (unknown)  (unknown)  Chloride    96 L  (units  

  (unknown)



                    date)                         ()  mmol/L unknown)  

 

           (unknown)  (no       (unknown)  (unknown)  Clinical  (units    (o

wn)



                    date)                         Impression: unknown)  

 

           (unknown)  (no       (unknown)  (unknown)  Colon cancer  (units    (u

nknown)



                    date)                         (2013)    unknown)  

 

           (unknown)  (no       (unknown)  (unknown)  Complete Blood  (units    

(unknown)



                    date)                         Count AUTO DIFF unknown)  



                                                  Stat                

 

           (unknown)  (no       (unknown)  (unknown)  Comprehensive  (units    (

unknown)



                    date)                         Metabolic Panel unknown)  



                                                  Stat                

 

           (unknown)  (no       (unknown)  (unknown)  Course    (units    (o

wn)



                    date)                                   unknown)  

 

           (unknown)  (no       (unknown)  (unknown)  Creatinine  (units    (unk

nown)



                    date)                         (0.66-1.25)  mg/dL unknown)  

 

           (unknown)  (no       (unknown)  (unknown)  Creatinine    1.42  (units

    (unknown)



                    date)                         H  (0.66-1.25) unknown)  



                                                  mg/dL               

 

           (unknown)  (no       (unknown)  (unknown)  : 1960  (units   

 (unknown)



                    date)                         Acct:[id number] unknown)  

 

           (unknown)  (no       (unknown)  (unknown)  Departure  (units    (unkn

own)



                    date)                                   unknown)  

 

           (unknown)  (no       (unknown)  (unknown)  Depression  (units    (unk

nown)



                    date)                                   unknown)  

 

           (unknown)  (no       (unknown)  (unknown)  Diabetes  (units    (unkno

wn)



                    date)                                   unknown)  

 

           (unknown)  (no       (unknown)  (unknown)  Diabetic  (units    (unkno

wn)



                    date)                         neuropathy unknown)  

 

           (unknown)  (no       (unknown)  (unknown)  Discharge Plan  (units    

(unknown)



                    date)                                   unknown)  

 

           (unknown)  (no       (unknown)  (unknown)  Discontinued  (units    (u

nknown)



                    date)                         Medications unknown)  

 

           (unknown)  (no       (unknown)  (unknown)  Gustavo Macias MD  (units   

 (unknown)



                    date)                         [[facility] Care unknown)  



                                                  Provider] -           

 

           (unknown)  (no       (unknown)  (unknown)  ECG Data  (units    (unkno

wn)



                    date)                                   unknown)  

 

           (unknown)  (no       (unknown)  (unknown) EKG independently  (units  

  (unknown)



                    date)                         reviewed by Dr. mejia)  



                                                  Dinesh and reveals           



                                                  normal sinus rhythm           



                                                  at 72 bpm           

 

           (unknown)  (no       (unknown)  (unknown)  EKG-12 Lead Stat  (units  

  (unknown)



                    date)                                   unknown)  

 

           (unknown)  (no       (unknown)  (unknown)  ER Physician:  (units    (

unknown)



                    date)                         Allegra Montiel D.O. unknown)  

 

           (unknown)  (no       (unknown)  (unknown)  Eos # (Auto)  (units    (u

nknown)



                    date)                         (0-450)  /uL unknown)  

 

           (unknown)  (no       (unknown)  (unknown)  Eos # (Auto)   100  (units

    (unknown)



                    date)                           (0-450)  /uL unknown)  

 

           (unknown)  (no       (unknown)  (unknown)  Eos % (Auto)  (units    (u

nknown)



                    date)                         (2-4)  %  unknown)  

 

           (unknown)  (no       (unknown)  (unknown)  Eos % (Auto)   0.8  (units

    (unknown)



                    date)                         L   (2-4)  % unknown)  

 

           (unknown)  (no       (unknown)  (unknown)  Esterase  (units    (unkno

wn)



                    date)                                   unknown)  

 

           (unknown)  (no       (unknown)  (unknown)  Estimated GFR  (units    (

unknown)



                    date)                         (>60)  mL/min unknown)  

 

           (unknown)  (no       (unknown)  (unknown)  Estimated GFR  (units    (

unknown)



                    date)                         55 L  (>60)  mL/min unknown)  

 

           (unknown)  (no       (unknown)  (unknown)  Exam      (units    (unkno

wn)



                    date)                                   unknown)  

 

           (unknown)  (no       (unknown)  (unknown)  Exam Narrative:  (units   

 (unknown)



                    date)                                   unknown)  

 

           (unknown)  (no       (unknown)  (unknown)  Eyes: denies  (units    (u

nknown)



                    date)                         visual changes, eye unknown)  



                                                  pain                

 

           (unknown)  (no       (unknown)  (unknown)  Eyes: equal round  (units 

   (unknown)



                    date)                         and reactive, EOMI, unknown)  



                                                  conjunctiva normal           

 

           (unknown)  (no       (unknown)  (unknown)  FINDINGS:?  (units    (unk

nown)



                    date)                                   unknown)  

 

           (unknown)  (no       (unknown)  (unknown)  Family History  (units    

(unknown)



                    date)                         (Reviewed 22 unknown)  



                                                  @ 19:57 by Kitty Costello [facility])           

 

           (unknown)  (no       (unknown)  (unknown)  Father     (units 

   (unknown)



                    date)                          Cancer   unknown)  

 

           (unknown)  (no       (unknown)  (unknown)  GERD      (units    (unkno

wn)



                    date)                         (gastroesophageal unknown)  



                                                  reflux disease)           

 

           (unknown)  (no       (unknown)  (unknown)  GI:  Right lower  (units  

  (unknown)



                    date)                         quadrant tenderness unknown)  



                                                  to palpation           

 

           (unknown)  (no       (unknown)  (unknown)  GI: abdomen mildly  (units

    (unknown)



                    date)                         guarded, tender to unknown)  



                                                  palpation in the           



                                                  right lower           



                                                  quadrant,           

 

           (unknown)  (no       (unknown)  (unknown)  :  Straight cath  (units

    (unknown)



                    date)                         for urine completed unknown)  



                                                  by myself with 16           



                                                  [place] coude           



                                                  catheter,           

 

           (unknown)  (no       (unknown)  (unknown)  : denies  (units    (unk

nown)



                    date)                         dysuria, hematuria unknown)  



                                                  or flank pain,           



                                                  reports oliguria,           



                                                  patient self           

 

           (unknown)  (no       (unknown)  (unknown)  Gastroenterology  (units  

  (unknown)



                    date)                         from [place], unknown)  



                                                  patient reports           



                                                  that he sees Yasmine Rocha PA-C           

 

           (unknown)  (no       (unknown)  (unknown)  General   (units    (unkno

wn)



                    date)                                   unknown)  

 

           (unknown)  (no       (unknown)  (unknown)  General:  (units    (unkno

wn)



                    date)                         cooperative, unknown)  



                                                  comfortable, in no           



                                                  acute distress,           



                                                  well groomed,           

 

           (unknown)  (no       (unknown)  (unknown)  General: denies  (units   

 (unknown)



                    date)                         fever, chills, unknown)  



                                                  endorses weakness,           



                                                  right lower           



                                                  quadrant pain,           

 

           (unknown)  (no       (unknown)  (unknown)  GenericComposite[P  (units

    (unknown)



                    date)                         lt Count  unknown)  



                                                  (150-400)  X10^3/uL           



                                                   ]                  

 

           (unknown)  (no       (unknown)  (unknown)  GenericComposite[P  (units

    (unknown)



                    date)                         lt Count   219 unknown)  



                                                  (150-400)  X10^3/uL           



                                                   ]                  

 

           (unknown)  (no       (unknown)  (unknown)  GenericComposite[R  (units

    (unknown)



                    date)                         BC     (4.5-5.9) unknown)  



                                                  X10^6/uL  ]           

 

           (unknown)  (no       (unknown)  (unknown)  GenericComposite[R  (units

    (unknown)



                    date)                         BC   3.37 L unknown)  



                                                  (4.5-5.9)  X10^6/uL           



                                                   ]                  

 

           (unknown)  (no       (unknown)  (unknown)  GenericComposite[W  (units

    (unknown)



                    date)                         BC     (4.5-11.0) unknown)  



                                                  X10^3/uL  ]           

 

           (unknown)  (no       (unknown)  (unknown)  GenericComposite[W  (units

    (unknown)



                    date)                         BC   7.0  unknown)  



                                                  (4.5-11.0)           



                                                  X10^3/uL  ]           

 

           (unknown)  (no       (unknown)  (unknown)  Globulin  (units    (unkno

wn)



                    date)                         (1.7-4.1)  g/dL unknown)  

 

           (unknown)  (no       (unknown)  (unknown)  Globulin    3.6  (units   

 (unknown)



                    date)                         (1.7-4.1)  g/dL unknown)  

 

           (unknown)  (no       (unknown)  (unknown)  Glucose   (units    (unkno

wn)



                    date)                         ()  mg/dL unknown)  

 

           (unknown)  (no       (unknown)  (unknown)  Glucose    262 H  (units  

  (unknown)



                    date)                         ()  mg/dL unknown)  

 

           (unknown)  (no       (unknown)  (unknown)  Guaiac stool:  (units    (

unknown)



                    date)                         Negative, good unknown)  



                                                  stool result           

 

           (unknown)  (no       (unknown)  (unknown)  H/O colectomy  (units    (

unknown)



                    date)                                   unknown)  

 

           (unknown)  (no       (unknown)  (unknown)  HPI - Weakness  (units    

(unknown)



                    date)                                   unknown)  

 

           (unknown)  (no       (unknown)  (unknown)  HPI Narrative:  (units    

(unknown)



                    date)                                   unknown)  

 

           (unknown)  (no       (unknown)  (unknown)  Hct     (41-53)  %  (units

    (unknown)



                    date)                                   unknown)  

 

           (unknown)  (no       (unknown)  (unknown)  Hct   26.7 L  (units    (u

nknown)



                    date)                         (41-53)  % unknown)  

 

           (unknown)  (no       (unknown)  (unknown)  He does not have  (units  

  (unknown)



                    date)                         any mental status unknown)  



                                                  changes, GCS is 15.           



                                                   Guaiac stool is           

 

           (unknown)  (no       (unknown)  (unknown)  Head/Neck:  (units    (unk

nown)



                    date)                         Endorses having a unknown)  



                                                  headache, denies           



                                                  any neck pain           

 

           (unknown)  (no       (unknown)  (unknown)  Head: atraumatic,  (units 

   (unknown)



                    date)                         symmetrical facial unknown)  



                                                  expressions           

 

           (unknown)  (no       (unknown)  (unknown)  Hgb       (units    (unkno

wn)



                    date)                         (13.5-17.5)  g/dL unknown)  

 

           (unknown)  (no       (unknown)  (unknown)  Hgb   9.1 L  (units    (un

known)



                    date)                         (13.5-17.5)  g/dL unknown)  

 

           (unknown)  (no       (unknown)  (unknown)  History of Present  (units

    (unknown)



                    date)                         Illness   unknown)  

 

           (unknown)  (no       (unknown)  (unknown)  History of lumbar  (units 

   (unknown)



                    date)                         surgery () unknown)  

 

           (unknown)  (no       (unknown)  (unknown)  Hx of foot surgery  (units

    (unknown)



                    date)                         (2017)    unknown)  

 

           (unknown)  (no       (unknown)  (unknown)  Hx of shoulder  (units    

(unknown)



                    date)                         surgery () unknown)  

 

           (unknown)  (no       (unknown)  (unknown)  Hypertension  (units    (u

nknown)



                    date)                                   unknown)  

 

           (unknown)  (no       (unknown)  (unknown)  IMPRESSION:? No  (units   

 (unknown)



                    date)                         acute     unknown)  



                                                  cardiopulmonary           



                                                  findings            

 

           (unknown)  (no       (unknown)  (unknown)  INDICATIONS:?  (units    (

unknown)



                    date)                         chest pain unknown)  

 

           (unknown)  (no       (unknown)  (unknown)  Imaging Data  (units    (u

nknown)



                    date)                                   unknown)  

 

           (unknown)  (no       (unknown)  (unknown)  Independently  (units    (

unknown)



                    date)                         reviewed vitals unknown)  



                                                  signs and nursing           



                                                  notes.              

 

           (unknown)  (no       (unknown)  (unknown)  Initial Vital  (units    (

unknown)



                    date)                         Signs     unknown)  

 

           (unknown)  (no       (unknown)  (unknown)  Initial Vital  (units    (

unknown)



                    date)                         Signs:    unknown)  

 

           (unknown)  (no       (unknown)  (unknown)  Instructions:  (units    (

unknown)



                    date)                         Acute Cystitis, unknown)  



                                                  Anemia              

 

           (unknown)  (no       (unknown)  (unknown)  Interpretation:  (units   

 (unknown)



                    date)                                   unknown)  

 

           (unknown)  (no       (unknown)  (unknown) January, hematocrit  (units

    (unknown)



                    date)                         is 26.7, down from unknown)  



                                                  27.8 also in           



                                                  January, platelet           



                                                  count 219,           

 

           (unknown)  (no       (unknown)  (unknown) Klebsiella  (units    (unkn

own)



                    date)                         pneumoniae which unknown)  



                                                  was resistant only           



                                                  to ampicillin and           



                                                  nitrofurantoin.           

 

           (unknown)  (no       (unknown)  (unknown)  Lab Data  (units    (unkno

wn)



                    date)                                   unknown)  

 

           (unknown)  (no       (unknown)  (unknown)  Labs:     (units    (unkno

wn)



                    date)                                   unknown)  

 

           (unknown)  (no       (unknown)  (unknown)  Lactate   (units    (unkno

wn)



                    date)                         (0.7-2.1)  mmol/L unknown)  

 

           (unknown)  (no       (unknown)  (unknown)  Lactate   2.6 H  (units   

 (unknown)



                    date)                         (0.7-2.1)  mmol/L unknown)  

 

           (unknown)  (no       (unknown)  (unknown)  Lactate (Lactic  (units   

 (unknown)



                    date)                         Acid) Stat unknown)  

 

           (unknown)  (no       (unknown)  (unknown)  Lipase    (units    (unkno

wn)



                    date)                         ()  U/L unknown)  

 

           (unknown)  (no       (unknown)  (unknown)  Lipase    32  (units    (u

nknown)



                    date)                         ()  U/L unknown)  

 

           (unknown)  (no       (unknown)  (unknown)  Lipase Stat  (units    (un

known)



                    date)                                   unknown)  

 

           (unknown)  (no       (unknown)  (unknown)  Lungs and pleura:?  (units

    (unknown)



                    date)                         Lungs are clear.? unknown)  



                                                  No pleural           



                                                  effusions or           



                                                  pneumothorax.? Low           

 

           (unknown)  (no       (unknown)  (unknown)  Lymph # (Auto)  (units    

(unknown)



                    date)                         (2344-0695)  /uL unknown)  

 

           (unknown)  (no       (unknown)  (unknown)  Lymph # (Auto)  (units    

(unknown)



                    date)                         400 L   (3391-4297) unknown)  



                                                   /uL                

 

           (unknown)  (no       (unknown)  (unknown)  Lymph % (Auto)  (units    

(unknown)



                    date)                         (25-40)  % unknown)  

 

           (unknown)  (no       (unknown)  (unknown)  Lymph % (Auto)  (units    

(unknown)



                    date)                         6.4 L   (25-40)  % unknown)  

 

           (unknown)  (no       (unknown)  (unknown)  MCH     (26-34)  (units   

 (unknown)



                    date)                         PG        unknown)  

 

           (unknown)  (no       (unknown)  (unknown)  MCH   26.9  (units    (unk

nown)



                    date)                         (26-34)  PG unknown)  

 

           (unknown)  (no       (unknown)  (unknown)  MCHC     (30-36)  (units  

  (unknown)



                    date)                         %         unknown)  

 

           (unknown)  (no       (unknown)  (unknown)  MCHC   34.0  (units    (un

known)



                    date)                         (30-36)  % unknown)  

 

           (unknown)  (no       (unknown)  (unknown)  MCV     ()  (units  

  (unknown)



                    date)                         fL        unknown)  

 

           (unknown)  (no       (unknown)  (unknown)  MCV   79.1 L  (units    (u

nknown)



                    date)                         ()  fL unknown)  

 

           (unknown)  (no       (unknown)  (unknown)  MDM - Weakness  (units    

(unknown)



                    date)                                   unknown)  

 

           (unknown)  (no       (unknown)  (unknown)  MDM Narrative  (units    (

unknown)



                    date)                                   unknown)  

 

           (unknown)  (no       (unknown)  (unknown)  MSK: denies new  (units   

 (unknown)



                    date)                         joint pain, muscle unknown)  



                                                  weakness or           



                                                  swelling            

 

           (unknown)  (no       (unknown)  (unknown) MSK: moves all  (units    (

unknown)



                    date)                         extremities, unknown)  



                                                  neurovascularly           



                                                  intact, generalized           



                                                  weakness, normal           

 

           (unknown)  (no       (unknown)  (unknown)  Magnesium  (units    (unkn

own)



                    date)                         (1.6-2.3)  mg/dL unknown)  

 

           (unknown)  (no       (unknown)  (unknown)  Magnesium    1.0 L  (units

    (unknown)



                    date)                          (1.6-2.3)  mg/dL unknown)  

 

           (unknown)  (no       (unknown)  (unknown)  Magnesium Stat  (units    

(unknown)



                    date)                                   unknown)  

 

           (unknown)  (no       (unknown)  (unknown)  Magnesium Sulfate  (units 

   (unknown)



                    date)                         (Magnesium Sulfate) unknown)  



                                                   2 gm in 50 mls @           



                                                  50 mls/hr IV NOW           



                                                  ONE                 

 

           (unknown)  (no       (unknown)  (unknown)  Mediastinum:?  (units    (

unknown)



                    date)                         Mediastinal unknown)  



                                                  contours appear           



                                                  normal.? Heart size           



                                                  is normal.?           

 

           (unknown)  (no       (unknown)  (unknown)  Medical History  (units   

 (unknown)



                    date)                         (Reviewed 22 unknown)  



                                                  @ 19:57 by Kitty Costello [facility])           

 

           (unknown)  (no       (unknown)  (unknown)  Medical decision  (units  

  (unknown)



                    date)                         making narrative: unknown)  

 

           (unknown)  (no       (unknown)  (unknown)  MiraL[employer] since this  (units

    (unknown)



                    date)                         happened. unknown)  

 

           (unknown)  (no       (unknown)  (unknown)  Mode of arrival:  (units  

  (unknown)



                    date)                         Wheelchair unknown)  

 

           (unknown)  (no       (unknown)  (unknown)  Mono # (Auto)  (units    (

unknown)



                    date)                         (0-900)  /uL unknown)  

 

           (unknown)  (no       (unknown)  (unknown)  Mono # (Auto)  (units    (

unknown)



                    date)                         600   (0-900)  /uL unknown)  

 

           (unknown)  (no       (unknown)  (unknown)  Mono % (Auto)  (units    (

unknown)



                    date)                         (3-14)  % unknown)  

 

           (unknown)  (no       (unknown)  (unknown)  Mono % (Auto)  (units    (

unknown)



                    date)                         7.9   (3-14)  % unknown)  

 

           (unknown)  (no       (unknown)  (unknown)  Mother     (units 

   (unknown)



                    date)                          Cancer   unknown)  

 

           (unknown)  (no       (unknown)  (unknown)  Mouth/Throat:  (units    (

unknown)



                    date)                         moist mucus unknown)  



                                                  membranes           

 

           (unknown)  (no       (unknown)  (unknown)  Narrative  (units    (unkn

own)



                    date)                                   unknown)  

 

           (unknown)  (no       (unknown)  (unknown)  Narrative:  (units    (unk

nown)



                    date)                                   unknown)  

 

           (unknown)  (no       (unknown)  (unknown)  Neck: supple  (units    (u

nknown)



                    date)                                   unknown)  

 

           (unknown)  (no       (unknown)  (unknown)  Neuro: denies  (units    (

unknown)



                    date)                         numbness, tingling, unknown)  



                                                  dizziness           

 

           (unknown)  (no       (unknown)  (unknown)  Neuro: normal  (units    (

unknown)



                    date)                         speech and unknown)  



                                                  cognition, A+O x3           

 

           (unknown)  (no       (unknown)  (unknown)  Neut # (Auto)  (units    (

unknown)



                    date)                         (9628-8244)  /uL unknown)  

 

           (unknown)  (no       (unknown)  (unknown)  Neut # (Auto)  (units    (

unknown)



                    date)                         5900   (8204-3690) unknown)  



                                                  /uL                 

 

           (unknown)  (no       (unknown)  (unknown)  Neut % (Auto)  (units    (

unknown)



                    date)                         (50-75)  % unknown)  

 

           (unknown)  (no       (unknown)  (unknown)  Neut % (Auto)  (units    (

unknown)



                    date)                         84.7 H   (50-75)  % unknown)  

 

           (unknown)  (no       (unknown)  (unknown)  No Action  (units    (unkn

own)



                    date)                                   unknown)  

 

           (unknown)  (no       (unknown)  (unknown)  No Known Drug  (units    (

unknown)



                    date)                         Allergies Allergy unknown)  



                                                  Verified 22           



                                                  17:38               

 

           (unknown)  (no       (unknown)  (unknown)  Nose: nares  (units    (un

known)



                    date)                         patent, no unknown)  



                                                  rhinorrhea           

 

           (unknown)  (no       (unknown)  (unknown)  Notable on  (units    (unk

nown)



                    date)                         patient's prior unknown)  



                                                  urine cultures,           



                                                  urine from           



                                                  2022 grew out           

 

           (unknown)  (no       (unknown)  (unknown)  Ondansetron HCl  (units   

 (unknown)



                    date)                         (Ondansetron 4 Mg/2 unknown)  



                                                  Ml Inj)  4 mg IV           



                                                  NOW ONE             

 

           (unknown)  (no       (unknown)  (unknown)  Ordered:  (units    (unkno

wn)



                    date)                                   unknown)  

 

           (unknown)  (no       (unknown)  (unknown)  Orders    (units    (unkno

wn)



                    date)                                   unknown)  

 

           (unknown)  (no       (unknown)  (unknown)  Osteoarthritis  (units    

(unknown)



                    date)                                   unknown)  

 

           (unknown)  (no       (unknown)  (unknown)  Oxygen Delivery  (units   

 (unknown)



                    date)                         Method    22 unknown)  



                                                  17:34               

 

           (unknown)  (no       (unknown)  (unknown)  Oxygen Delivery  (units   

 (unknown)



                    date)                         Method Room Air unknown)  

 

           (unknown)  (no       (unknown)  (unknown)  PROCEDURE:? XR  (units    

(unknown)



                    date)                         CHEST 1V  unknown)  

 

           (unknown)  (no       (unknown)  (unknown)  Patient   (units    (unkno

wn)



                    date)                         Disposition: Home unknown)  

 

           (unknown)  (no       (unknown)  (unknown)  Patient History  (units   

 (unknown)



                    date)                                   unknown)  

 

           (unknown)  (no       (unknown)  (unknown)  Patient is  (units    (unk

nown)



                    date)                         currently unknown)  



                                                  anticoagulated on           



                                                  Plavix 37.5 mg           



                                                  daily.  Dr. Gómez           

 

           (unknown)  (no       (unknown)  (unknown) Patient reports  (units    

(unknown)



                    date)                         that when he has a unknown)  



                                                  bowel movement, he           



                                                  typically has hard           



                                                  pellets             

 

           (unknown)  (no       (unknown)  (unknown)  Patient self caths  (units

    (unknown)



                    date)                         for urine output, unknown)  



                                                  reports that he has           



                                                  not had much urine           

 

           (unknown)  (no       (unknown)  (unknown)  Patient was  (units    (un

known)



                    date)                         treated in the unknown)  



                                                  emergency           



                                                  department with 1 g           



                                                  of ceftriaxone.           

 

           (unknown)  (no       (unknown)  (unknown) Patient's  (units    (unkno

wn)



                    date)                         colorectal cancer unknown)  



                                                  oncologist was Dr. Jett, patient was           



                                                  referred to him           

 

           (unknown)  (no       (unknown)  (unknown)  Patient's preop  (units   

 (unknown)



                    date)                         diagnosis was unknown)  



                                                  nausea and vomiting           



                                                  from hematemesis in           



                                                  January             

 

           (unknown)  (no       (unknown)  (unknown)  Patient:  (units    (unkno

wn)



                    date)                         Bret Esquivel unknown)  



                                                  MR#: [id number]            

 

           (unknown)  (no       (unknown)  (unknown)  Postlaminectomy  (units   

 (unknown)



                    date)                         syndrome  unknown)  

 

           (unknown)  (no       (unknown)  (unknown)  Potassium  (units    (unkn

own)



                    date)                         (3.4-5.1)  mmol/L unknown)  

 

           (unknown)  (no       (unknown)  (unknown)  Potassium    3.7  (units  

  (unknown)



                    date)                         (3.4-5.1)  mmol/L unknown)  

 

           (unknown)  (no       (unknown)  (unknown)  Prescriptions:  (units    

(unknown)



                    date)                                   unknown)  

 

           (unknown)  (no       (unknown)  (unknown)  Procalcitonin  (units    (

unknown)



                    date)                         (<0.5)  ng/mL unknown)  

 

           (unknown)  (no       (unknown)  (unknown)  Procalcitonin  (units    (

unknown)



                    date)                         0.72 H    (<0.5) unknown)  



                                                  ng/mL               

 

           (unknown)  (no       (unknown)  (unknown)  Procalcitonin Stat  (units

    (unknown)



                    date)                                   unknown)  

 

           (unknown)  (no       (unknown)  (unknown)  Psych: mental  (units    (

unknown)



                    date)                         status is grossly unknown)  



                                                  normal, congruent           



                                                  mood, normal           



                                                  affect, pleasant           

 

           (unknown)  (no       (unknown)  (unknown)  Pulse Oximetry  98  (units

    (unknown)



                    date)                           22 17:34 unknown)  

 

           (unknown)  (no       (unknown)  (unknown)  Pulse Oximetry 98  (units 

   (unknown)



                    date)                                   unknown)  

 

           (unknown)  (no       (unknown)  (unknown)  Pulse Rate  77  (units    

(unknown)



                    date)                         22 17:34 unknown)  

 

           (unknown)  (no       (unknown)  (unknown)  Pulse Rate 77  (units    (

unknown)



                    date)                                   unknown)  

 

           (unknown)  (no       (unknown)  (unknown)  RDW       (units    (unkno

wn)



                    date)                         (11.6-14.8)  % unknown)  

 

           (unknown)  (no       (unknown)  (unknown)  RDW   16.1 H  (units    (u

nknown)



                    date)                         (11.6-14.8)  % unknown)  

 

           (unknown)  (no       (unknown)  (unknown)  Referrals:  (units    (unk

nown)



                    date)                                   unknown)  

 

           (unknown)  (no       (unknown)  (unknown)  Related Data  (units    (u

nknown)



                    date)                                   unknown)  

 

           (unknown)  (no       (unknown)  (unknown)  Respiratory Rate  (units  

  (unknown)



                    date)                         18   22 17:34 unknown)  

 

           (unknown)  (no       (unknown)  (unknown)  Respiratory Rate  (units  

  (unknown)



                    date)                         18        unknown)  

 

           (unknown)  (no       (unknown)  (unknown)  Respiratory:  (units    (u

nknown)



                    date)                         denies shortness of unknown)  



                                                  breath, or new           



                                                  cough               

 

           (unknown)  (no       (unknown)  (unknown)  Respiratory:  (units    (u

nknown)



                    date)                         normal effort, able unknown)  



                                                  to speak in           



                                                  complete sentences,           



                                                  no audible           

 

           (unknown)  (no       (unknown)  (unknown)  Result diagrams:  (units  

  (unknown)



                    date)                                   unknown)  

 

           (unknown)  (no       (unknown)  (unknown)  Review of Systems  (units 

   (unknown)



                    date)                                   unknown)  

 

           (unknown)  (no       (unknown)  (unknown)  SARS-CoV-2 (PCR)  (units  

  (unknown)



                    date)                           (Negative) unknown)  

 

           (unknown)  (no       (unknown)  (unknown)  SARS-CoV-2 (PCR)  (units  

  (unknown)



                    date)                         Negative  unknown)  



                                                  (Negative)           

 

           (unknown)  (no       (unknown)  (unknown)  Sciatica  (units    (unkno

wn)



                    date)                                   unknown)  

 

           (unknown)  (no       (unknown)  (unknown)  Signed By:  (units    (unk

nown)



                    date)                                   unknown)  

 

           (unknown)  (no       (unknown)  (unknown)  Skin: brisk  (units    (un

known)



                    date)                         capillary refill, unknown)  



                                                  no rash, no           



                                                  erythema            

 

           (unknown)  (no       (unknown)  (unknown)  Skin: denies rash,  (units

    (unknown)



                    date)                         itching or wound unknown)  

 

           (unknown)  (no       (unknown)  (unknown)  Smoking Status:  (units   

 (unknown)



                    date)                         Never smoker unknown)  

 

           (unknown)  (no       (unknown)  (unknown)  Smoking Status:  (units   

 (unknown)



                    date)                         Never smoker unknown)  

 

           (unknown)  (no       (unknown)  (unknown)  Social History  (units    

(unknown)



                    date)                         (Reviewed 22 unknown)  



                                                  @ 19:57 by ZAIRA Amezcua)           

 

           (unknown)  (no       (unknown)  (unknown)  Sodium    (units    (unkno

wn)



                    date)                         (137-145)  mmol/L unknown)  

 

           (unknown)  (no       (unknown)  (unknown)  Sodium    135 L  (units   

 (unknown)



                    date)                         (137-145)  mmol/L unknown)  

 

           (unknown)  (no       (unknown)  (unknown)  Sodium Chloride  (units   

 (unknown)



                    date)                         (Normal Saline unknown)  



                                                  0.9%)  1,000 mls @           



                                                  1,000 mls/hr IV           



                                                  BOLUS ONE           

 

           (unknown)  (no       (unknown)  (unknown)  Source: patient  (units   

 (unknown)



                    date)                         and family unknown)  

 

           (unknown)  (no       (unknown)  (unknown)  Spinal stenosis  (units   

 (unknown)



                    date)                                   unknown)  

 

           (unknown)  (no       (unknown)  (unknown)  Stated complaint:  (units 

   (unknown)



                    date)                         WEAKNESS UNABLE TO unknown)  



                                                  HOLD ANYTHING DOWN           

 

           (unknown)  (no       (unknown)  (unknown)  Substance Use  (units    (

unknown)



                    date)                         Type: does not use unknown)  

 

           (unknown)  (no       (unknown)  (unknown)  Surgical History  (units  

  (unknown)



                    date)                         (Reviewed 22 unknown)  



                                                  @ 19:57 by ZAIRA Amezcua)           

 

           (unknown)  (no       (unknown)  (unknown)  Surgical changes  (units  

  (unknown)



                    date)                         and devices:? unknown)  



                                                  None.?              

 

           (unknown)  (no       (unknown)  (unknown)  TECHNIQUE:? One  (units   

 (unknown)



                    date)                         view of the chest unknown)  



                                                  was acquired.?           

 

           (unknown)  (no       (unknown)  (unknown)  Temperature  98.3  (units 

   (unknown)



                    date)                         F   22 17:34 unknown)  

 

           (unknown)  (no       (unknown)  (unknown)  Temperature 98.3 F  (units

    (unknown)



                    date)                                   unknown)  

 

           (unknown)  (no       (unknown)  (unknown)  This is a  (units    (unkn

own)



                    date)                         62-year-old male unknown)  



                                                  with history of           



                                                  multiple sclerosis,           



                                                  CVA in 2019,           

 

           (unknown)  (no       (unknown)  (unknown)  Time Seen by  (units    (u

nknown)



                    date)                         Provider: 22 unknown)  



                                                  19:11               

 

           (unknown)  (no       (unknown)  (unknown)  Total Bilirubin  (units   

 (unknown)



                    date)                          (0.2-1.3)  mg/dL unknown)  

 

           (unknown)  (no       (unknown)  (unknown)  Total Bilirubin  (units   

 (unknown)



                    date)                         0.6  (0.2-1.3) unknown)  



                                                  mg/dL               

 

           (unknown)  (no       (unknown)  (unknown)  Total Creatine  (units    

(unknown)



                    date)                         Kinase     () unknown)  



                                                   U/L                

 

           (unknown)  (no       (unknown)  (unknown)  Total Creatine  (units    

(unknown)



                    date)                         Kinase    54 L unknown)  



                                                  ()  U/L           

 

           (unknown)  (no       (unknown)  (unknown)  Total Protein  (units    (

unknown)



                    date)                         (6.3-8.2)  g/dL unknown)  

 

           (unknown)  (no       (unknown)  (unknown)  Total Protein  (units    (

unknown)



                    date)                         7.5  (6.3-8.2) unknown)  



                                                  g/dL                

 

           (unknown)  (no       (unknown)  (unknown)  Troponin + CK  (units    (

unknown)



                    date)                         Cardiac Panel Stat unknown)  

 

           (unknown)  (no       (unknown)  (unknown)  Troponin I  (units    (unk

nown)



                    date)                         (0.01-0.034)  ng/mL unknown)  

 

           (unknown)  (no       (unknown)  (unknown)  Troponin I    <  (units   

 (unknown)



                    date)                         0.012  (0.01-0.034) unknown)  



                                                   ng/mL              

 

           (unknown)  (no       (unknown)  (unknown)  Ur Culture  (units    (unk

nown)



                    date)                         Indicated? unknown)  

 

           (unknown)  (no       (unknown)  (unknown)  Ur Culture  (units    (unk

nown)



                    date)                         Indicated? unknown)  



                                                  Specimen cultured           

 

           (unknown)  (no       (unknown)  (unknown)  Urine Bacteria  (units    

(unknown)



                    date)                         (None)    unknown)  

 

           (unknown)  (no       (unknown)  (unknown)  Urine Bacteria  (units    

(unknown)



                    date)                         Few (2-10) H unknown)  



                                                  (None)              

 

           (unknown)  (no       (unknown)  (unknown)  Urine Culture Stat  (units

    (unknown)



                    date)                                   unknown)  

 

           (unknown)  (no       (unknown)  (unknown)  Urine Microscopic  (units 

   (unknown)



                    date)                         Stat      unknown)  

 

           (unknown)  (no       (unknown)  (unknown)  Urine RBC  (units    (unkn

own)



                    date)                         (0-5/HPF) unknown)  

 

           (unknown)  (no       (unknown)  (unknown)  Urine RBC  (units    (unkn

own)



                    date)                         1-5/hpf  (0-5/HPF) unknown)  

 

           (unknown)  (no       (unknown)  (unknown)  Urine Specific  (units    

(unknown)



                    date)                         Gravity    1.015 unknown)  

 

           (unknown)  (no       (unknown)  (unknown)  Urine WBC  (units    (unkn

own)



                    date)                         (0-5/HPF) unknown)  

 

           (unknown)  (no       (unknown)  (unknown)  Urine WBC  (units    (unkn

own)



                    date)                         5-10/hpf H unknown)  



                                                  (0-5/HPF)           

 

           (unknown)  (no       (unknown)  (unknown)  Vital Signs  (units    (un

known)



                    date)                                   unknown)  

 

           (unknown)  (no       (unknown)  (unknown)  Vital signs:  (units    (u

nknown)



                    date)                                   unknown)  

 

           (unknown)  (no       (unknown)  (unknown)  Linette Jett MD  (units  

  (unknown)



                    date)                         [Physician] - As unknown)  



                                                  soon as possible           

 

           (unknown)  (no       (unknown)  (unknown)  XR chest 1V Stat  (units  

  (unknown)



                    date)                                   unknown)  

 

           (unknown)  (no       (unknown)  (unknown)  [Embedded Image  (units   

 (unknown)



                    date)                         Not Available] unknown)  

 

           (unknown)  (no       (unknown)  (unknown)  acute ischemic  (units    

(unknown)



                    date)                         changes.  unknown)  

 

           (unknown)  (no       (unknown)  (unknown)  alcohol intake  (units    

(unknown)



                    date)                         frequency: a few unknown)  



                                                  times a month           

 

           (unknown)  (no       (unknown)  (unknown)  alcohol intake:  (units   

 (unknown)



                    date)                         current   unknown)  

 

           (unknown)  (no       (unknown)  (unknown)  and cooperative  (units   

 (unknown)



                    date)                                   unknown)  

 

           (unknown)  (no       (unknown)  (unknown)  and generalized  (units   

 (unknown)



                    date)                         weakness.  Patient unknown)  



                                                  self catheterizes           



                                                  his bladder at           



                                                  baseline,           

 

           (unknown)  (no       (unknown)  (unknown)  appear    (units    (unkno

wn)



                    date)                                   unknown)  

 

           (unknown)  (no       (unknown)  (unknown)  ascorbic acid  (units    (

unknown)



                    date)                         (vitamin C) 500 mg unknown)  



                                                  500 mg PO BID #60           



                                                  tabs 22           

 

           (unknown)  (no       (unknown)  (unknown)  atorvastatin 80 mg  (units

    (unknown)



                    date)                         tablet 40 mg PO QPM unknown)  



                                                  20           

 

           (unknown)  (no       (unknown)  (unknown)  be causing a rash  (units 

   (unknown)



                    date)                                   unknown)  

 

           (unknown)  (no       (unknown)  (unknown) bleeding.  Guaiac  (units  

  (unknown)



                    date)                         stool in the unknown)  



                                                  emergency           



                                                  department was           



                                                  negative.           



                                                  Patient's lab           

 

           (unknown)  (no       (unknown)  (unknown)  blood in his  (units    (u

nknown)



                    date)                         emesis or in his unknown)  



                                                  stool.  He reports           



                                                  that he did a stool           



                                                  test one            

 

           (unknown)  (no       (unknown)  (unknown)  buspirone 5 mg  (units    

(unknown)



                    date)                         tablet 5 mg PO BID unknown)  



                                                  #60 tabs 22           

 

           (unknown)  (no       (unknown)  (unknown)  cancer.  Postop  (units   

 (unknown)



                    date)                         diagnosis from this unknown)  



                                                  upper endoscopy was           



                                                  the same.  His           

 

           (unknown)  (no       (unknown)  (unknown)  caths at baseline  (units 

   (unknown)



                    date)                                   unknown)  

 

           (unknown)  (no       (unknown)  (unknown)  clopidogrel 75 mg  (units 

   (unknown)



                    date)                         tablet 75 mg PO unknown)  



                                                  DAILY 22            

 

           (unknown)  (no       (unknown)  (unknown)  colonic   (units    (unkno

wn)



                    date)                         anastomosis, and a unknown)  



                                                  suboptimal bowel           



                                                  prep.               

 

           (unknown)  (no       (unknown)  (unknown)  cultures were  (units    (

unknown)



                    date)                         drawn prior to unknown)  



                                                  receiving           



                                                  antibiotics Urine           



                                                  microscopy shows           

 

           (unknown)  (no       (unknown)  (unknown)  cyanocobalamin  (units    

(unknown)



                    date)                         (vitamin B-12) 500 unknown)  



                                                  1,000 mcg PO DAILY           



                                                  #60 tabs 22           

 

           (unknown)  (no       (unknown)  (unknown)  diabetes,  (units    (unkn

own)



                    date)                         hypertension, unknown)  



                                                  colorectal cancer           



                                                  in  with a           



                                                  reverse colectomy           



                                                  who                 

 

           (unknown)  (no       (unknown)  (unknown)  diabetes,  (units    (unkn

own)



                    date)                         hypertension, unknown)  



                                                  colorectal cancer           



                                                  in  with a           



                                                  reverse colectomy,           



                                                  He                  

 

           (unknown)  (no       (unknown)  (unknown)  does not drink  (units    

(unknown)



                    date)                         alcohol.? Patient unknown)  



                                                  and his wife state           



                                                  that he was seen in           



                                                  the                 

 

           (unknown)  (no       (unknown)  (unknown)  elevated lactate.  (units 

   (unknown)



                    date)                         His heart rate and unknown)  



                                                  blood pressure are           



                                                  within normal           



                                                  ranges.             

 

           (unknown)  (no       (unknown)  (unknown)  emergency  (units    (unkn

own)



                    date)                         department in unknown)  



                                                  January and           



                                                  diagnosed with a GI           



                                                  bleed, he was           



                                                  admitted,           

 

           (unknown)  (no       (unknown)  (unknown)  endorses fatigue  (units  

  (unknown)



                    date)                                   unknown)  

 

           (unknown)  (no       (unknown)  (unknown) endoscopic  (units    (unkn

own)



                    date)                         diagnosis includes unknown)  



                                                  mild gastropathy,           



                                                  esophagitis, patent           



                                                  retrosigmoid           

 

           (unknown)  (no       (unknown)  (unknown)  ferrous sulfate  (units   

 (unknown)



                    date)                         325 mg (65 mg 325 unknown)  



                                                  mg PO BIDWM #60           



                                                  tabs 22           

 

           (unknown)  (no       (unknown)  (unknown)  followed by loose  (units 

   (unknown)



                    date)                         stool.    unknown)  

 

           (unknown)  (no       (unknown)  (unknown)  gabapentin 300 mg  (units 

   (unknown)



                    date)                         capsule 300 mg PO unknown)  



                                                  BID 18            

 

           (unknown)  (no       (unknown)  (unknown)  generalized  (units    (un

known)



                    date)                         weakness and unknown)  



                                                  fatigue             

 

           (unknown)  (no       (unknown)  (unknown)  glipizide 10 mg  (units   

 (unknown)



                    date)                         tablet 10 mg PO BID unknown)  



                                                  18           

 

           (unknown)  (no       (unknown)  (unknown)  had a recent  (units    (u

nknown)



                    date)                         endoscopy that did unknown)  



                                                  not show any           



                                                  bleeding polyps or           



                                                  acute GI            

 

           (unknown)  (no       (unknown)  (unknown)  hospital and on  (units   

 (unknown)



                    date)                         chart review it unknown)  



                                                  appears that it was           



                                                  completed on           



                                                  2022.           

 

           (unknown)  (no       (unknown)  (unknown)  household members:  (units

    (unknown)



                    date)                          spouse   unknown)  

 

           (unknown)  (no       (unknown)  (unknown)  ingrown, no penile  (units

    (unknown)



                    date)                         discharge, no unknown)  



                                                  scrotal edema           

 

           (unknown)  (no       (unknown)  (unknown)  iron) tablet  (units    (u

nknown)



                    date)                                   unknown)  

 

           (unknown)  (no       (unknown)  (unknown)  lives     (units    (unkno

wn)



                    date)                         independently:  Yes unknown)  

 

           (unknown)  (no       (unknown)  (unknown)  losartan 50 mg  (units    

(unknown)



                    date)                         tablet 25 mg PO QPM unknown)  



                                                  20           

 

           (unknown)  (no       (unknown)  (unknown)  lung      (units    (unkno

wn)



                    date)                                   unknown)  

 

           (unknown)  (no       (unknown)  (unknown)  magnesium was  (units    (

unknown)



                    date)                         replaced with 2 g, unknown)  



                                                  normal saline 1000           



                                                  mL was given for           



                                                  his                 

 

           (unknown)  (no       (unknown)  (unknown)  mcg tablet  (units    (unk

nown)



                    date)                                   unknown)  

 

           (unknown)  (no       (unknown)  (unknown)  mcg tablet  (units    (unk

nown)



                    date)                         (Tab-A-Lucy) unknown)  

 

           (unknown)  (no       (unknown)  (unknown)  metformin 500 mg  (units  

  (unknown)



                    date)                         tablet,extended 500 unknown)  



                                                  mg PO BID 18            

 

           (unknown)  (no       (unknown)  (unknown)  metoprolol  (units    (unk

nown)



                    date)                         succinate 25 mg 25 unknown)  



                                                  mg PO DAILY           



                                                  21           

 

           (unknown)  (no       (unknown)  (unknown)  moving forward.  (units   

 (unknown)



                    date)                         Patient reports unknown)  



                                                  that he has been           



                                                  taking fiber but           



                                                  not any             

 

           (unknown)  (no       (unknown)  (unknown)  multivitamin with  (units 

   (unknown)



                    date)                         folic acid 400 1 unknown)  



                                                  tab PO DAILY #90           



                                                  tabs 22           

 

           (unknown)  (no       (unknown)  (unknown)  negative.  I  (units    (u

nknown)



                    date)                         completed his unknown)  



                                                  urinary             



                                                  catheterization           



                                                  with a coude           



                                                  catheter, urine           

 

           (unknown)  (no       (unknown)  (unknown)  nondistended, no  (units  

  (unknown)



                    date)                         masses, no unknown)  



                                                  exquisite           



                                                  tenderness with           



                                                  exam, no rebound           

 

           (unknown)  (no       (unknown)  (unknown)  not heard it was  (units  

  (unknown)



                    date)                         positive or not. unknown)  



                                                  Patient reports           



                                                  that his primary           



                                                  care                

 

           (unknown)  (no       (unknown)  (unknown)  ondansetron HCl 4  (units 

   (unknown)



                    date)                         mg tablet 4 mg PO unknown)  



                                                  Q8H PRN nausea and           



                                                  21            

 

           (unknown)  (no       (unknown)  (unknown)  oral powder packet  (units

    (unknown)



                    date)                                   unknown)  

 

           (unknown)  (no       (unknown)  (unknown)  output for three  (units  

  (unknown)



                    date)                         days, three days unknown)  



                                                  ago he had nausea           



                                                  and vomiting,           



                                                  denies any           

 

           (unknown)  (no       (unknown)  (unknown)  polyethylene  (units    (u

nknown)



                    date)                         glycol 3350 17 gram unknown)  



                                                  17 gm PO DAILY #90           



                                                  ea 22           

 

           (unknown)  (no       (unknown)  (unknown)  presents to the  (units   

 (unknown)



                    date)                         emergency unknown)  



                                                  department           



                                                  complaining of           



                                                  ongoing fatigue,           



                                                  oliguria,           

 

           (unknown)  (no       (unknown)  (unknown)  provider has left,  (units

    (unknown)



                    date)                         he reports that he unknown)  



                                                  has seen Dr. Macias           



                                                  recently as well.           

 

           (unknown)  (no       (unknown)  (unknown)  quadrant with a  (units   

 (unknown)



                    date)                         complicated unknown)  



                                                  surgical history of           



                                                  his abdomen.  This           



                                                  shows               

 

           (unknown)  (no       (unknown)  (unknown)  recently for his  (units  

  (unknown)



                    date)                         anemia.   unknown)  

 

           (unknown)  (no       (unknown)  (unknown)  related diarrhea  (units  

  (unknown)



                    date)                         from fecal loading unknown)  



                                                  and obstipation.           



                                                  Recommended MiraLax           



                                                  17 g                

 

           (unknown)  (no       (unknown)  (unknown)  release 24 hr  (units    (

unknown)



                    date)                                   unknown)  

 

           (unknown)  (no       (unknown)  (unknown)  reported that  (units    (

unknown)



                    date)                         patient's altered unknown)  



                                                  bowel habit is           



                                                  likely a function           



                                                  of overflow           

 

           (unknown)  (no       (unknown)  (unknown)  sennosides 8.6 mg  (units 

   (unknown)



                    date)                         tablet (senna) 17.2 unknown)  



                                                  mg PO BEDTIME #60           



                                                  tabs 22           

 

           (unknown)  (no       (unknown)  (unknown)  substance use  (units    (

unknown)



                    date)                         type:  does not use unknown)  

 

           (unknown)  (no       (unknown)  (unknown)  tablet (Vitamin C)  (units

    (unknown)



                    date)                                   unknown)  

 

           (unknown)  (no       (unknown)  (unknown)  tablet,extended  (units   

 (unknown)



                    date)                         release 24 hr unknown)  

 

           (unknown)  (no       (unknown)  (unknown)  tenderness  (units    (unk

nown)



                    date)                                   unknown)  

 

           (unknown)  (no       (unknown)  (unknown)  there.  He reports  (units

    (unknown)



                    date)                         that he had an unknown)  



                                                  upper endoscopy           



                                                  completed here at           



                                                  this                

 

           (unknown)  (no       (unknown)  (unknown)  tone      (units    (unkno

wn)



                    date)                                   unknown)  

 

           (unknown)  (no       (unknown)  (unknown)  trospium 20 mg  (units    

(unknown)



                    date)                         tablet 20 mg PO BID unknown)  



                                                  urinary retention           



                                                  22           

 

           (unknown)  (no       (unknown)  (unknown)  unremarkable.?  (units    

(unknown)



                    date)                                   unknown)  

 

           (unknown)  (no       (unknown)  (unknown)  urine dip showed  (units  

  (unknown)



                    date)                         leukocytes, unknown)  



                                                  ketones, wbc's and           



                                                  did not show           



                                                  nitrites.  Blood           

 

           (unknown)  (no       (unknown)  (unknown)  urine is cloudy,  (units  

  (unknown)



                    date)                         yellow,   unknown)  



                                                  approximately 600           



                                                  mL in bladder and           



                                                  drained, no rash           

 

           (unknown)  (no       (unknown)  (unknown)  volumes accentuate  (units

    (unknown)



                    date)                         pulmonary unknown)  



                                                  interstitium and           



                                                  heart size.           

 

           (unknown)  (no       (unknown)  (unknown)  was cloudy yellow  (units 

   (unknown)



                    date)                         urine and 600 mL unknown)  



                                                  was drained.           



                                                  Patient tolerated           



                                                  well.  His           

 

           (unknown)  (no       (unknown)  (unknown)  week ago and  (units    (u

nknown)



                    date)                         dropped it off at unknown)  



                                                  [facility] which was           



                                                  testing for blood           



                                                  but he has           

 

           (unknown)  (no       (unknown)  (unknown)  went to [facility]  (units 

   (unknown)



                    date)                         for rehab following unknown)  



                                                  his admission and           



                                                  has followed up           



                                                  with                

 

           (unknown)  (no       (unknown)  (unknown)  wheezing, stridor,  (units

    (unknown)



                    date)                         or rales. No unknown)  



                                                  retractions or           



                                                  tachypnea.           









                                         Result panel 25









           (unknown)  (no       (unknown)  (unknown)  (no value)  (units    (unk

nown)



                    date)                                   unknown)  

 

           (unknown)  (no       (unknown)  (unknown)  Radiologist  (units    (un

known)



                    date)                         Impression: unknown)  

 

           (unknown)  (no       (unknown)  (unknown)  Date of Service:  (units  

  (unknown)



                    date)                         22  unknown)  

 

           (unknown)  (no       (unknown)  (unknown)  (no value)  (units    (unk

nown)



                    date)                                   unknown)  

 

           (unknown)  (no       (unknown)  (unknown)  22 17:45  (units    

(unknown)



                    date)                                   unknown)  

 

           (unknown)  (no       (unknown)  (unknown)  1 tab PO DAILY  (units    

(unknown)



                    date)                         Qty: 90 0RF unknown)  

 

           (unknown)  (no       (unknown)  (unknown)  1,000 mcg PO DAILY  (units

    (unknown)



                    date)                         Qty: 60 0RF unknown)  

 

           (unknown)  (no       (unknown)  (unknown)  10 mg PO BID  (units    (u

nknown)



                    date)                                   unknown)  

 

           (unknown)  (no       (unknown)  (unknown)  17 gm PO DAILY  (units    

(unknown)



                    date)                         Qty: 90 0RF unknown)  

 

           (unknown)  (no       (unknown)  (unknown)  17.2 mg PO BEDTIME  (units

    (unknown)



                    date)                         Qty: 60 0RF unknown)  

 

           (unknown)  (no       (unknown)  (unknown)  20 mg PO BID  (units    (u

nknown)



                    date)                                   unknown)  

 

           (unknown)  (no       (unknown)  (unknown)  25 mg PO DAILY  (units    

(unknown)



                    date)                                   unknown)  

 

           (unknown)  (no       (unknown)  (unknown)  25 mg PO QPM  (units    (u

nknown)



                    date)                                   unknown)  

 

           (unknown)  (no       (unknown)  (unknown)  300 mg PO BID  (units    (

unknown)



                    date)                                   unknown)  

 

           (unknown)  (no       (unknown)  (unknown)  325 mg PO BIDWM  (units   

 (unknown)



                    date)                         Qty: 60 0RF unknown)  

 

           (unknown)  (no       (unknown)  (unknown)  4 mg PO Q8H PRN  (units   

 (unknown)



                    date)                         (Reason: nausea and unknown)  



                                                  vomiting) Qty: 14           



                                                  0RF                 

 

           (unknown)  (no       (unknown)  (unknown)  40 mg PO QPM  (units    (u

nknown)



                    date)                                   unknown)  

 

           (unknown)  (no       (unknown)  (unknown)  5 mg PO BID Qty:  (units  

  (unknown)



                    date)                         60 0RF    unknown)  

 

           (unknown)  (no       (unknown)  (unknown)  500 mg PO BID  (units    (

unknown)



                    date)                                   unknown)  

 

           (unknown)  (no       (unknown)  (unknown)  500 mg PO BID Qty:  (units

    (unknown)



                    date)                         60 0RF    unknown)  

 

           (unknown)  (no       (unknown)  (unknown)  75 mg PO DAILY  (units    

(unknown)



                    date)                                   unknown)  

 

           (unknown)  (no       (unknown)  (unknown)  Admin: 22  (units   

 (unknown)



                    date)                         20:19  Dose: 50 unknown)  



                                                  mls/hr              

 

           (unknown)  (no       (unknown)  (unknown)  Admin: 22  (units   

 (unknown)



                    date)                         21:06  Dose: 200 unknown)  



                                                  mls/hr              

 

           (unknown)  (no       (unknown)  (unknown)  Allergies  (units    (unkn

own)



                    date)                                   unknown)  

 

           (unknown)  (no       (unknown)  (unknown)  Documented By: [facility]  (units

    (unknown)



                    date)                                   unknown)  

 

           (unknown)  (no       (unknown)  (unknown)  Documented By: AT  (units 

   (unknown)



                    date)                          Co-signed By: TORREY unknown)  

 

           (unknown)  (no       (unknown)  (unknown)  Documented By: AT  (units 

   (unknown)



                    date)                                   unknown)  

 

           (unknown)  (no       (unknown)  (unknown)  Documented By: TORREY  (units

    (unknown)



                    date)                           Co-signed By: [facility] unknown)  

 

           (unknown)  (no       (unknown)  (unknown)  Documented By: [facility]  (units

    (unknown)



                    date)                                   unknown)  

 

           (unknown)  (no       (unknown)  (unknown)  ED Orders  (units    (unkn

own)



                    date)                                   unknown)  

 

           (unknown)  (no       (unknown)  (unknown)  Emergency Report  (units  

  (unknown)



                    date)                                   unknown)  

 

           (unknown)  (no       (unknown)  (unknown)  Home Medications  (units  

  (unknown)



                    date)                                   unknown)  

 

           (unknown)  (no       (unknown)  (unknown)  [facility]  (units   

 (unknown)



                    date)                         [address] [place] [address] unknown)  



                                                  [place] [address]           

 

           (unknown)  (no       (unknown)  (unknown)  Lab Results  (units    (un

known)



                    date)                                   unknown)  

 

           (unknown)  (no       (unknown)  (unknown)  Label Comments:  (units   

 (unknown)



                    date)                                   unknown)  

 

           (unknown)  (no       (unknown)  (unknown)  Last Admin:  (units    (un

known)



                    date)                         22 18:30 unknown)  



                                                  Dose: 4 mg           

 

           (unknown)  (no       (unknown)  (unknown)  Last Admin:  (units    (un

known)



                    date)                         22 20:18 unknown)  



                                                  Dose: 1,000 mls/hr           

 

           (unknown)  (no       (unknown)  (unknown)  Last Infusion:  (units    

(unknown)



                    date)                         22 21:10 unknown)  



                                                  Dose: 0 mls/hr           

 

           (unknown)  (no       (unknown)  (unknown)  Last Infusion:  (units    

(unknown)



                    date)                         22 21:28 unknown)  



                                                  Dose: 0 mls/hr           

 

           (unknown)  (no       (unknown)  (unknown)  Previous Rx's  (units    (

unknown)



                    date)                                   unknown)  

 

           (unknown)  (no       (unknown)  (unknown)  Stop: 22  (units    

(unknown)



                    date)                         18:16     unknown)  

 

           (unknown)  (no       (unknown)  (unknown)  Stop: 22  (units    

(unknown)



                    date)                         20:15     unknown)  

 

           (unknown)  (no       (unknown)  (unknown)  Stop: 22  (units    

(unknown)



                    date)                         20:25     unknown)  

 

           (unknown)  (no       (unknown)  (unknown)  Stop: 22  (units    

(unknown)



                    date)                         20:27     unknown)  

 

           (unknown)  (no       (unknown)  (unknown)  Urine Dip  (units    (unkn

own)



                    date)                                   unknown)  

 

           (unknown)  (no       (unknown)  (unknown)  Vital Signs - 8 hr  (units

    (unknown)



                    date)                                   unknown)  

 

           (unknown)  (no       (unknown)  (unknown)  has not been  (units    (u

nknown)



                    date)                         eating so hasn't unknown)  



                                                  taken recently.           



                                                  spouse states           



                                                  thinks it might           

 

           (unknown)  (no       (unknown)  (unknown)  stopped taking  (units    

(unknown)



                    date)                         prior to back unknown)  



                                                  surgery and did not           



                                                  restart             

 

           (unknown)  (no       (unknown)  (unknown)  (no value)  (units    (unk

nown)



                    date)                                   unknown)  

 

           (unknown)  (no       (unknown)  (unknown)  22  (units 

   (unknown)



                    date)                         22  unknown)  



                                                  Range/Units           

 

           (unknown)  (no       (unknown)  (unknown)  17:40 17:45 17:45  (units 

   (unknown)



                    date)                                   unknown)  

 

           (unknown)  (no       (unknown)  (unknown)  17:45 17:45 20:09  (units 

   (unknown)



                    date)                                   unknown)  

 

           (unknown)  (no       (unknown)  (unknown)  ascorbic acid  (units    (

unknown)



                    date)                         (vitamin C) unknown)  



                                                  [Vitamin C] 500 mg           



                                                  Tablet              

 

           (unknown)  (no       (unknown)  (unknown)  atorvastatin 80 mg  (units

    (unknown)



                    date)                         tablet    unknown)  

 

           (unknown)  (no       (unknown)  (unknown)  buspirone 5 mg  (units    

(unknown)



                    date)                         Tablet    unknown)  

 

           (unknown)  (no       (unknown)  (unknown)  clopidogrel 75 mg  (units 

   (unknown)



                    date)                         tablet    unknown)  

 

           (unknown)  (no       (unknown)  (unknown)  cyanocobalamin  (units    

(unknown)



                    date)                         (vitamin B-12) 500 unknown)  



                                                  mcg Tablet           

 

           (unknown)  (no       (unknown)  (unknown)  ferrous sulfate  (units   

 (unknown)



                    date)                         325 mg (65 mg iron) unknown)  



                                                  Tablet              

 

           (unknown)  (no       (unknown)  (unknown)  gabapentin 300 mg  (units 

   (unknown)



                    date)                         capsule   unknown)  

 

           (unknown)  (no       (unknown)  (unknown)  glipizide 10 mg  (units   

 (unknown)



                    date)                         tablet    unknown)  

 

           (unknown)  (no       (unknown)  (unknown)  losartan 50 mg  (units    

(unknown)



                    date)                         tablet    unknown)  

 

           (unknown)  (no       (unknown)  (unknown)  metformin 500 mg  (units  

  (unknown)



                    date)                         tablet extended unknown)  



                                                  release 24 hr           

 

           (unknown)  (no       (unknown)  (unknown)  metoprolol  (units    (unk

nown)



                    date)                         succinate 25 mg unknown)  



                                                  tablet extended           



                                                  release 24 hr           

 

           (unknown)  (no       (unknown)  (unknown)  multivitamin with  (units 

   (unknown)



                    date)                         folic acid unknown)  



                                                  [Tab-A-Lucy] 400           



                                                  mcg Tablet           

 

           (unknown)  (no       (unknown)  (unknown)  ondansetron HCl  (units   

 (unknown)



                    date)                         [Zofran] 4 mg unknown)  



                                                  tablet              

 

           (unknown)  (no       (unknown)  (unknown)  polyethylene  (units    (u

nknown)



                    date)                         glycol 3350 17 gram unknown)  



                                                  Powder In Packet           

 

           (unknown)  (no       (unknown)  (unknown)  sennosides [senna]  (units

    (unknown)



                    date)                         8.6 mg Tablet unknown)  

 

           (unknown)  (no       (unknown)  (unknown)  trospium 20 mg  (units    

(unknown)



                    date)                         tablet    unknown)  

 

           (unknown)  (no       (unknown)  (unknown)  22  (units    (unkno

wn)



                    date)                                   unknown)  

 

           (unknown)  (no       (unknown)  (unknown)  1.07 in January.  (units  

  (unknown)



                    date)                         No elevation in his unknown)  



                                                  troponin,           



                                                  procalcitonin is           



                                                  elevated at           

 

           (unknown)  (no       (unknown)  (unknown)  Anemia,   (units    (unkno

wn)



                    date)                         Hypomagnesemia, unknown)  



                                                  Pyelonephritis           

 

           (unknown)  (no       (unknown)  (unknown)  Medication  (units    (unk

nown)



                    date)                         Instructions unknown)  



                                                  Recorded            

 

           (unknown)  (no       (unknown)  (unknown)  Medication  (units    (unk

nown)



                    date)                         Instructions unknown)  



                                                  Recorded  Confirmed           

 

           (unknown)  (no       (unknown)  (unknown)  sodium 135,  (units    (un

known)



                    date)                         potassium 3.7, unknown)  



                                                  creatinine 1.42           



                                                  which is increased           



                                                  from his prior of           

 

           (unknown)  (no       (unknown)  (unknown)  with regular axis  (units 

   (unknown)



                    date)                         and intervals.  No unknown)  



                                                  STEMI, ST segment           



                                                  changes,            



                                                  arrhythmia, or           

 

           (unknown)  (no       (unknown)  (unknown)  work is   (units    (unkno

wn)



                    date)                         significant for unknown)  



                                                  anemia, hemoglobin           



                                                  is 9.1 today, down           



                                                  from 9.6 in           

 

           (unknown)  (no       (unknown)  (unknown)  (Zofran) vomiting  (units 

   (unknown)



                    date)                         #14 tabs  unknown)  

 

           (unknown)  (no       (unknown)  (unknown)  0.72, his lactate  (units 

   (unknown)



                    date)                         is also elevated at unknown)  



                                                  2.6, magnesium is           



                                                  low at 1.0.  His           

 

           (unknown)  (no       (unknown)  (unknown)  [id number]   (units    (unkno

wn)



                    date)                                   unknown)  

 

           (unknown)  (no       (unknown)  (unknown)  22 17:39  (units    

(unknown)



                    date)                                   unknown)  

 

           (unknown)  (no       (unknown)  (unknown)  22 17:40  (units    

(unknown)



                    date)                                   unknown)  

 

           (unknown)  (no       (unknown)  (unknown)  22 17:45  (units    

(unknown)



                    date)                                   unknown)  

 

           (unknown)  (no       (unknown)  (unknown)  22 19:26  (units    

(unknown)



                    date)                                   unknown)  

 

           (unknown)  (no       (unknown)  (unknown)  22 20:09  (units    

(unknown)



                    date)                                   unknown)  

 

           (unknown)  (no       (unknown)  (unknown)  22 20:55  (units    

(unknown)



                    date)                                   unknown)  

 

           (unknown)  (no       (unknown)  (unknown)  17:34     (units    (unkno

wn)



                    date)                                   unknown)  

 

           (unknown)  (no       (unknown)  (unknown)  with iron  (units    (

unknown)



                    date)                         deficiency anemia, unknown)  



                                                  altered bowel           



                                                  habit, personal           



                                                  history of colon           

 

           (unknown)  (no       (unknown)  (unknown)  62-year-old  (units    (un

known)



                    date)                         gentleman with a unknown)  



                                                  history of multiple           



                                                  sclerosis, CVA in           



                                                  2019,               

 

           (unknown)  (no       (unknown)  (unknown)  ?         (units    (unkno

wn)



                    date)                                   unknown)  

 

           (unknown)  (no       (unknown)  (unknown)  ALT     (<50)  (units    (

unknown)



                    date)                         IU/L      unknown)  

 

           (unknown)  (no       (unknown)  (unknown)  ALT    16  (<50)  (units  

  (unknown)



                    date)                         IU/L      unknown)  

 

           (unknown)  (no       (unknown)  (unknown)  AST     (17-59)  (units   

 (unknown)



                    date)                         IU/L      unknown)  

 

           (unknown)  (no       (unknown)  (unknown)  AST    19  (17-59)  (units

    (unknown)



                    date)                          IU/L     unknown)  

 

           (unknown)  (no       (unknown)  (unknown)  Age/Sex: 62 / M  (units   

 (unknown)



                    date)                                   unknown)  

 

           (unknown)  (no       (unknown)  (unknown)  Albumin   (units    (unkno

wn)



                    date)                         (3.5-5.0)  g/dL unknown)  

 

           (unknown)  (no       (unknown)  (unknown)  Albumin    3.9  (units    

(unknown)



                    date)                         (3.5-5.0)  g/dL unknown)  

 

           (unknown)  (no       (unknown)  (unknown)  Albumin/Globulin  (units  

  (unknown)



                    date)                         Ratio     (1.0-2.8) unknown)  

 

           (unknown)  (no       (unknown)  (unknown)  Albumin/Globulin  (units  

  (unknown)



                    date)                         Ratio    1.1 unknown)  



                                                  (1.0-2.8)           

 

           (unknown)  (no       (unknown)  (unknown)  Alkaline  (units    (unkno

wn)



                    date)                         Phosphatase unknown)  



                                                  ()  U/L           

 

           (unknown)  (no       (unknown)  (unknown)  Alkaline  (units    (unkno

wn)



                    date)                         Phosphatase    105 unknown)  



                                                  ()  U/L           

 

           (unknown)  (no       (unknown)  (unknown)  Allergy/AdvReac  (units   

 (unknown)



                    date)                         Type Severity unknown)  



                                                  Reaction Status           



                                                  Date / Time           

 

           (unknown)  (no       (unknown)  (unknown)  Amorphous Sediment  (units

    (unknown)



                    date)                                   unknown)  

 

           (unknown)  (no       (unknown)  (unknown)  Amorphous Sediment  (units

    (unknown)



                    date)                            2+     unknown)  

 

           (unknown)  (no       (unknown)  (unknown)  Approved by: Garfield Mcdanielsunits 

   (unknown)



                    date)                         SHANNAN Lopez on unknown)  



                                                  2022 at 18:02?           

 

           (unknown)  (no       (unknown)  (unknown)  BUN     (9-20)  (units    

(unknown)



                    date)                         mg/dL     unknown)  

 

           (unknown)  (no       (unknown)  (unknown)  BUN    21 H  (units    (un

known)



                    date)                         (9-20)  mg/dL unknown)  

 

           (unknown)  (no       (unknown)  (unknown)  BUN/Creatinine  (units    

(unknown)



                    date)                         Ratio     (6-22) unknown)  

 

           (unknown)  (no       (unknown)  (unknown)  BUN/Creatinine  (units    

(unknown)



                    date)                         Ratio    14.8 unknown)  



                                                  (6-22)              

 

           (unknown)  (no       (unknown)  (unknown)  Baso # (Auto)  (units    (

unknown)



                    date)                         (0-100)  /uL unknown)  

 

           (unknown)  (no       (unknown)  (unknown)  Baso # (Auto)   0  (units 

   (unknown)



                    date)                          (0-100)  /uL unknown)  

 

           (unknown)  (no       (unknown)  (unknown)  Baso % (Auto)  (units    (

unknown)



                    date)                         (0-2)  %  unknown)  

 

           (unknown)  (no       (unknown)  (unknown)  Baso % (Auto)  (units    (

unknown)



                    date)                         0.2   (0-2)  % unknown)  

 

           (unknown)  (no       (unknown)  (unknown)  Bedside Urine  (units    (

unknown)



                    date)                         Bilirubin        - unknown)  



                                                  Negative            

 

           (unknown)  (no       (unknown)  (unknown)  Bedside Urine  (units    (

unknown)



                    date)                         Glucose    Negative unknown)  

 

           (unknown)  (no       (unknown)  (unknown)  Bedside Urine  (units    (

unknown)



                    date)                         Ketone    - unknown)  



                                                  Negative            

 

           (unknown)  (no       (unknown)  (unknown)  Bedside Urine  (units    (

unknown)



                    date)                         Leukocytes       + unknown)  



                                                  70                  

 

           (unknown)  (no       (unknown)  (unknown)  Bedside Urine  (units    (

unknown)



                    date)                         Nitrite    - unknown)  



                                                  Negative            

 

           (unknown)  (no       (unknown)  (unknown)  Bedside Urine  (units    (

unknown)



                    date)                         Occult Blood     ++ unknown)  

 

           (unknown)  (no       (unknown)  (unknown)  Bedside Urine  (units    (

unknown)



                    date)                         Protein    + 30 unknown)  

 

           (unknown)  (no       (unknown)  (unknown)  Bedside Urine  (units    (

unknown)



                    date)                         Urobilinogen     - unknown)  



                                                  Negative            

 

           (unknown)  (no       (unknown)  (unknown)  Bedside Urine pH  (units  

  (unknown)



                    date)                          6.0      unknown)  

 

           (unknown)  (no       (unknown)  (unknown)  Blood Culture Stat  (units

    (unknown)



                    date)                                   unknown)  

 

           (unknown)  (no       (unknown)  (unknown)  Blood Pressure  (units    

(unknown)



                    date)                         124/66   18/ unknown)  



                                                  17:34               

 

           (unknown)  (no       (unknown)  (unknown)  Blood Pressure  (units    

(unknown)



                    date)                         124/66    unknown)  

 

           (unknown)  (no       (unknown)  (unknown)  Bones and chest  (units   

 (unknown)



                    date)                         wall:? No unknown)  



                                                  suspicious bony           



                                                  lesions.? Overlying           



                                                  soft tissues           

 

           (unknown)  (no       (unknown)  (unknown)  CK-MB (CK-2)  (units    (u

nknown)



                    date)                                   unknown)  

 

           (unknown)  (no       (unknown)  (unknown)  CK-MB (CK-2)  (units    (u

nknown)



                    date)                         TNP       unknown)  

 

           (unknown)  (no       (unknown)  (unknown)  CK-MB (CK-2) Rel  (units  

  (unknown)



                    date)                         Index     unknown)  

 

           (unknown)  (no       (unknown)  (unknown)  CK-MB (CK-2) Rel  (units  

  (unknown)



                    date)                         Index    TNP unknown)  

 

           (unknown)  (no       (unknown)  (unknown)  COMPARISON:?  (units    (u

nknown)



                    date)                         [facility], unknown)  



                                                  CR, XR CHEST 2V,           



                                                  3/18/2021, 10:19.           

 

           (unknown)  (no       (unknown)  (unknown)  COVID19 -Nasal  (units    

(unknown)



                    date)                         RAPID/Pre-Proc Stat unknown)  

 

           (unknown)  (no       (unknown)  (unknown)  CT abdomen pelvis  (units 

   (unknown)



                    date)                         w con Stat unknown)  

 

           (unknown)  (no       (unknown)  (unknown)  CT abdomen pelvis  (units 

   (unknown)



                    date)                         was ordered for unknown)  



                                                  patient has           



                                                  tenderness to his           



                                                  right lower           

 

           (unknown)  (no       (unknown)  (unknown)  CVA (cerebral  (units    (

unknown)



                    date)                         vascular accident) unknown)  



                                                  (2018)           

 

           (unknown)  (no       (unknown)  (unknown)  Calcium   (units    (unkno

wn)



                    date)                         (8.4-10.2)  mg/dL unknown)  

 

           (unknown)  (no       (unknown)  (unknown)  Calcium    9.3  (units    

(unknown)



                    date)                         (8.4-10.2)  mg/dL unknown)  

 

           (unknown)  (no       (unknown)  (unknown)  Carbon Dioxide  (units    

(unknown)



                    date)                         (22-32)  mmol/L unknown)  

 

           (unknown)  (no       (unknown)  (unknown)  Carbon Dioxide  (units    

(unknown)



                    date)                         29  (22-32)  mmol/L unknown)  

 

           (unknown)  (no       (unknown)  (unknown)  Cardio: denies  (units    

(unknown)



                    date)                         chest pain, unknown)  



                                                  palpitations           

 

           (unknown)  (no       (unknown)  (unknown)  Cardiovascular:  (units   

 (unknown)



                    date)                         regular rate and unknown)  



                                                  rhythm, no           



                                                  peripheral edema,           



                                                  warm extremities           

 

           (unknown)  (no       (unknown)  (unknown)  Ceftriaxone Sodium  (units

    (unknown)



                    date)                         1,000 mg/ (Sodium unknown)  



                                                  Chloride)  100 mls           



                                                  @ 200 mls/hr IV NOW           



                                                  ONE                 

 

           (unknown)  (no       (unknown)  (unknown)  Chest x-ray:  (units    (u

nknown)



                    date)                                   unknown)  

 

           (unknown)  (no       (unknown)  (unknown)  Chief complaint:  (units  

  (unknown)



                    date)                         Weakness  unknown)  

 

           (unknown)  (no       (unknown)  (unknown)  Chloride  (units    (unkno

wn)



                    date)                         ()  mmol/L unknown)  

 

           (unknown)  (no       (unknown)  (unknown)  Chloride    96 L  (units  

  (unknown)



                    date)                         ()  mmol/L unknown)  

 

           (unknown)  (no       (unknown)  (unknown)  Clinical  (units    (o

wn)



                    date)                         Impression: unknown)  

 

           (unknown)  (no       (unknown)  (unknown)  Colon cancer  (units    (u

nknown)



                    date)                         (2013)    unknown)  

 

           (unknown)  (no       (unknown)  (unknown)  Complete Blood  (units    

(unknown)



                    date)                         Count AUTO DIFF unknown)  



                                                  Stat                

 

           (unknown)  (no       (unknown)  (unknown)  Comprehensive  (units    (

unknown)



                    date)                         Metabolic Panel unknown)  



                                                  Stat                

 

           (unknown)  (no       (unknown)  (unknown)  Course    (units    (unkno

wn)



                    date)                                   unknown)  

 

           (unknown)  (no       (unknown)  (unknown)  Creatinine  (units    (unk

nown)



                    date)                         (0.66-1.25)  mg/dL unknown)  

 

           (unknown)  (no       (unknown)  (unknown)  Creatinine    1.42  (units

    (unknown)



                    date)                         H  (0.66-1.25) unknown)  



                                                  mg/dL               

 

           (unknown)  (no       (unknown)  (unknown)  : 1960  (units   

 (unknown)



                    date)                         Acct:[id number] unknown)  

 

           (unknown)  (no       (unknown)  (unknown)  Departure  (units    (unkn

own)



                    date)                                   unknown)  

 

           (unknown)  (no       (unknown)  (unknown)  Depression  (units    (unk

nown)



                    date)                                   unknown)  

 

           (unknown)  (no       (unknown)  (unknown)  Diabetes  (units    (unkno

wn)



                    date)                                   unknown)  

 

           (unknown)  (no       (unknown)  (unknown)  Diabetic  (units    (unkno

wn)



                    date)                         neuropathy unknown)  

 

           (unknown)  (no       (unknown)  (unknown)  Discharge Plan  (units    

(unknown)



                    date)                                   unknown)  

 

           (unknown)  (no       (unknown)  (unknown)  Discontinued  (units    (u

nknown)



                    date)                         Medications unknown)  

 

           (unknown)  (no       (unknown)  (unknown)  Gustavo Macias MD  (units   

 (unknown)



                    date)                         [Primary Care unknown)  



                                                  Provider] -           

 

           (unknown)  (no       (unknown)  (unknown)  ECG Data  (units    (unkno

wn)



                    date)                                   unknown)  

 

           (unknown)  (no       (unknown)  (unknown) EKG independently  (units  

  (unknown)



                    date)                         reviewed by Dr. mejia)  



                                                  Dinesh and reveals           



                                                  normal sinus rhythm           



                                                  at 72 bpm           

 

           (unknown)  (no       (unknown)  (unknown)  EKG-12 Lead Stat  (units  

  (unknown)



                    date)                                   unknown)  

 

           (unknown)  (no       (unknown)  (unknown)  ER Physician:  (units    (

unknown)



                    date)                         Allegra Montiel D.O. unknown)  

 

           (unknown)  (no       (unknown)  (unknown)  Eos # (Auto)  (units    (u

nknown)



                    date)                         (0-450)  /uL unknown)  

 

           (unknown)  (no       (unknown)  (unknown)  Eos # (Auto)   100  (units

    (unknown)



                    date)                           (0-450)  /uL unknown)  

 

           (unknown)  (no       (unknown)  (unknown)  Eos % (Auto)  (units    (u

nknown)



                    date)                         (2-4)  %  unknown)  

 

           (unknown)  (no       (unknown)  (unknown)  Eos % (Auto)   0.8  (units

    (unknown)



                    date)                         L   (2-4)  % unknown)  

 

           (unknown)  (no       (unknown)  (unknown)  Esterase  (units    (unkno

wn)



                    date)                                   unknown)  

 

           (unknown)  (no       (unknown)  (unknown)  Estimated GFR  (units    (

unknown)



                    date)                         (>60)  mL/min unknown)  

 

           (unknown)  (no       (unknown)  (unknown)  Estimated GFR  (units    (

unknown)



                    date)                         55 L  (>60)  mL/min unknown)  

 

           (unknown)  (no       (unknown)  (unknown)  Exam      (units    (unkno

wn)



                    date)                                   unknown)  

 

           (unknown)  (no       (unknown)  (unknown)  Exam Narrative:  (units   

 (unknown)



                    date)                                   unknown)  

 

           (unknown)  (no       (unknown)  (unknown)  Eyes: denies  (units    (u

nknown)



                    date)                         visual changes, eye unknown)  



                                                  pain                

 

           (unknown)  (no       (unknown)  (unknown)  Eyes: equal round  (units 

   (unknown)



                    date)                         and reactive, EOMI, unknown)  



                                                  conjunctiva normal           

 

           (unknown)  (no       (unknown)  (unknown)  FINDINGS:?  (units    (unk

nown)



                    date)                                   unknown)  

 

           (unknown)  (no       (unknown)  (unknown)  Family History  (units    

(unknown)



                    date)                         (Reviewed 22 unknown)  



                                                  @ 19:57 by Kitty Costello [facility])           

 

           (unknown)  (no       (unknown)  (unknown)  Father     (units 

   (unknown)



                    date)                          Cancer   unknown)  

 

           (unknown)  (no       (unknown)  (unknown)  GERD      (units    (unkno

wn)



                    date)                         (gastroesophageal unknown)  



                                                  reflux disease)           

 

           (unknown)  (no       (unknown)  (unknown)  GI:  Right lower  (units  

  (unknown)



                    date)                         quadrant tenderness unknown)  



                                                  to palpation           

 

           (unknown)  (no       (unknown)  (unknown)  GI: abdomen mildly  (units

    (unknown)



                    date)                         guarded, tender to unknown)  



                                                  palpation in the           



                                                  right lower           



                                                  quadrant,           

 

           (unknown)  (no       (unknown)  (unknown)  :  Straight cath  (units

    (unknown)



                    date)                         for urine completed unknown)  



                                                  by myself with 16           



                                                  [place] coude           



                                                  catheter,           

 

           (unknown)  (no       (unknown)  (unknown)  : denies  (units    (unk

nown)



                    date)                         dysuria, hematuria unknown)  



                                                  or flank pain,           



                                                  reports oliguria,           



                                                  patient self           

 

           (unknown)  (no       (unknown)  (unknown)  Gastroenterology  (units  

  (unknown)



                    date)                         from [place], unknown)  



                                                  patient reports           



                                                  that he sees Yasmine Rocha PA-C           

 

           (unknown)  (no       (unknown)  (unknown)  General   (units    (unkno

wn)



                    date)                                   unknown)  

 

           (unknown)  (no       (unknown)  (unknown)  General:  (units    (unkno

wn)



                    date)                         cooperative, unknown)  



                                                  comfortable, in no           



                                                  acute distress,           



                                                  well groomed,           

 

           (unknown)  (no       (unknown)  (unknown)  General: denies  (units   

 (unknown)



                    date)                         fever, chills, unknown)  



                                                  endorses weakness,           



                                                  right lower           



                                                  quadrant pain,           

 

           (unknown)  (no       (unknown)  (unknown)  GenericComposite[P  (units

    (unknown)



                    date)                         lt Count  unknown)  



                                                  (150-400)  X10^3/uL           



                                                   ]                  

 

           (unknown)  (no       (unknown)  (unknown)  GenericComposite[P  (units

    (unknown)



                    date)                         lt Count   219 unknown)  



                                                  (150-400)  X10^3/uL           



                                                   ]                  

 

           (unknown)  (no       (unknown)  (unknown)  GenericComposite[R  (units

    (unknown)



                    date)                         BC     (4.5-5.9) unknown)  



                                                  X10^6/uL  ]           

 

           (unknown)  (no       (unknown)  (unknown)  GenericComposite[R  (units

    (unknown)



                    date)                         BC   3.37 L unknown)  



                                                  (4.5-5.9)  X10^6/uL           



                                                   ]                  

 

           (unknown)  (no       (unknown)  (unknown)  GenericComposite[W  (units

    (unknown)



                    date)                         BC     (4.5-11.0) unknown)  



                                                  X10^3/uL  ]           

 

           (unknown)  (no       (unknown)  (unknown)  GenericComposite[W  (units

    (unknown)



                    date)                         BC   7.0  unknown)  



                                                  (4.5-11.0)           



                                                  X10^3/uL  ]           

 

           (unknown)  (no       (unknown)  (unknown)  Globulin  (units    (unkno

wn)



                    date)                         (1.7-4.1)  g/dL unknown)  

 

           (unknown)  (no       (unknown)  (unknown)  Globulin    3.6  (units   

 (unknown)



                    date)                         (1.7-4.1)  g/dL unknown)  

 

           (unknown)  (no       (unknown)  (unknown)  Glucose   (units    (unkno

wn)



                    date)                         ()  mg/dL unknown)  

 

           (unknown)  (no       (unknown)  (unknown)  Glucose    262 H  (units  

  (unknown)



                    date)                         ()  mg/dL unknown)  

 

           (unknown)  (no       (unknown)  (unknown)  Guaiac stool:  (units    (

unknown)



                    date)                         Negative, good unknown)  



                                                  stool result           

 

           (unknown)  (no       (unknown)  (unknown)  H/O colectomy  (units    (

unknown)



                    date)                                   unknown)  

 

           (unknown)  (no       (unknown)  (unknown)  HPI - Weakness  (units    

(unknown)



                    date)                                   unknown)  

 

           (unknown)  (no       (unknown)  (unknown)  HPI Narrative:  (units    

(unknown)



                    date)                                   unknown)  

 

           (unknown)  (no       (unknown)  (unknown)  Hct     (41-53)  %  (units

    (unknown)



                    date)                                   unknown)  

 

           (unknown)  (no       (unknown)  (unknown)  Hct   26.7 L  (units    (u

nknown)



                    date)                         (41-53)  % unknown)  

 

           (unknown)  (no       (unknown)  (unknown)  He does not have  (units  

  (unknown)



                    date)                         any mental status unknown)  



                                                  changes, GCS is 15.           



                                                   Guaiac stool is           

 

           (unknown)  (no       (unknown)  (unknown)  Head/Neck:  (units    (unk

nown)



                    date)                         Endorses having a unknown)  



                                                  headache, denies           



                                                  any neck pain           

 

           (unknown)  (no       (unknown)  (unknown)  Head: atraumatic,  (units 

   (unknown)



                    date)                         symmetrical facial unknown)  



                                                  expressions           

 

           (unknown)  (no       (unknown)  (unknown)  Hgb       (units    (unkno

wn)



                    date)                         (13.5-17.5)  g/dL unknown)  

 

           (unknown)  (no       (unknown)  (unknown)  Hgb   9.1 L  (units    (un

known)



                    date)                         (13.5-17.5)  g/dL unknown)  

 

           (unknown)  (no       (unknown)  (unknown)  History of Present  (units

    (unknown)



                    date)                         Illness   unknown)  

 

           (unknown)  (no       (unknown)  (unknown)  History of lumbar  (units 

   (unknown)



                    date)                         surgery () unknown)  

 

           (unknown)  (no       (unknown)  (unknown)  Hx of foot surgery  (units

    (unknown)



                    date)                         (2017)    unknown)  

 

           (unknown)  (no       (unknown)  (unknown)  Hx of shoulder  (units    

(unknown)



                    date)                         surgery (2010) unknown)  

 

           (unknown)  (no       (unknown)  (unknown)  Hypertension  (units    (u

nknown)



                    date)                                   unknown)  

 

           (unknown)  (no       (unknown)  (unknown)  IMPRESSION:? No  (units   

 (unknown)



                    date)                         acute     unknown)  



                                                  cardiopulmonary           



                                                  findings            

 

           (unknown)  (no       (unknown)  (unknown)  INDICATIONS:?  (units    (

unknown)



                    date)                         chest pain unknown)  

 

           (unknown)  (no       (unknown)  (unknown)  Imaging Data  (units    (u

nknown)



                    date)                                   unknown)  

 

           (unknown)  (no       (unknown)  (unknown)  Independently  (units    (

unknown)



                    date)                         reviewed vitals unknown)  



                                                  signs and nursing           



                                                  notes.              

 

           (unknown)  (no       (unknown)  (unknown)  Initial Vital  (units    (

unknown)



                    date)                         Signs     unknown)  

 

           (unknown)  (no       (unknown)  (unknown)  Initial Vital  (units    (

unknown)



                    date)                         Signs:    unknown)  

 

           (unknown)  (no       (unknown)  (unknown)  Instructions:  (units    (

unknown)



                    date)                         Acute Cystitis, unknown)  



                                                  Anemia              

 

           (unknown)  (no       (unknown)  (unknown)  Interpretation:  (units   

 (unknown)



                    date)                                   unknown)  

 

           (unknown)  (no       (unknown)  (unknown) January, hematocrit  (units

    (unknown)



                    date)                         is 26.7, down from unknown)  



                                                  27.8 also in           



                                                  January, platelet           



                                                  count 219,           

 

           (unknown)  (no       (unknown)  (unknown) Klebsiella  (units    (unkn

own)



                    date)                         pneumoniae which unknown)  



                                                  was resistant only           



                                                  to ampicillin and           



                                                  nitrofurantoin.           

 

           (unknown)  (no       (unknown)  (unknown)  Lab Data  (units    (unkno

wn)



                    date)                                   unknown)  

 

           (unknown)  (no       (unknown)  (unknown)  Labs:     (units    (unkno

wn)



                    date)                                   unknown)  

 

           (unknown)  (no       (unknown)  (unknown)  Lactate   (units    (unkno

wn)



                    date)                         (0.7-2.1)  mmol/L unknown)  

 

           (unknown)  (no       (unknown)  (unknown)  Lactate   2.6 H  (units   

 (unknown)



                    date)                         (0.7-2.1)  mmol/L unknown)  

 

           (unknown)  (no       (unknown)  (unknown)  Lactate (Lactic  (units   

 (unknown)



                    date)                         Acid) Stat unknown)  

 

           (unknown)  (no       (unknown)  (unknown)  Lipase    (units    (unkno

wn)



                    date)                         ()  U/L unknown)  

 

           (unknown)  (no       (unknown)  (unknown)  Lipase    32  (units    (u

nknown)



                    date)                         ()  U/L unknown)  

 

           (unknown)  (no       (unknown)  (unknown)  Lipase Stat  (units    (un

known)



                    date)                                   unknown)  

 

           (unknown)  (no       (unknown)  (unknown)  Lungs and pleura:?  (units

    (unknown)



                    date)                         Lungs are clear.? unknown)  



                                                  No pleural           



                                                  effusions or           



                                                  pneumothorax.? Low           

 

           (unknown)  (no       (unknown)  (unknown)  Lymph # (Auto)  (units    

(unknown)



                    date)                         (6734-0390)  /uL unknown)  

 

           (unknown)  (no       (unknown)  (unknown)  Lymph # (Auto)  (units    

(unknown)



                    date)                         400 L   (0560-3128) unknown)  



                                                   /uL                

 

           (unknown)  (no       (unknown)  (unknown)  Lymph % (Auto)  (units    

(unknown)



                    date)                         (25-40)  % unknown)  

 

           (unknown)  (no       (unknown)  (unknown)  Lymph % (Auto)  (units    

(unknown)



                    date)                         6.4 L   (25-40)  % unknown)  

 

           (unknown)  (no       (unknown)  (unknown)  MCH     (26-34)  (units   

 (unknown)



                    date)                         PG        unknown)  

 

           (unknown)  (no       (unknown)  (unknown)  MCH   26.9  (units    (unk

nown)



                    date)                         (26-34)  PG unknown)  

 

           (unknown)  (no       (unknown)  (unknown)  MCHC     (30-36)  (units  

  (unknown)



                    date)                         %         unknown)  

 

           (unknown)  (no       (unknown)  (unknown)  MCHC   34.0  (units    (un

known)



                    date)                         (30-36)  % unknown)  

 

           (unknown)  (no       (unknown)  (unknown)  MCV     ()  (units  

  (unknown)



                    date)                         fL        unknown)  

 

           (unknown)  (no       (unknown)  (unknown)  MCV   79.1 L  (units    (u

nknown)



                    date)                         ()  fL unknown)  

 

           (unknown)  (no       (unknown)  (unknown)  MDM - Weakness  (units    

(unknown)



                    date)                                   unknown)  

 

           (unknown)  (no       (unknown)  (unknown)  MDM Narrative  (units    (

unknown)



                    date)                                   unknown)  

 

           (unknown)  (no       (unknown)  (unknown)  MSK: denies new  (units   

 (unknown)



                    date)                         joint pain, muscle unknown)  



                                                  weakness or           



                                                  swelling            

 

           (unknown)  (no       (unknown)  (unknown) MSK: moves all  (units    (

unknown)



                    date)                         extremities, unknown)  



                                                  neurovascularly           



                                                  intact, generalized           



                                                  weakness, normal           

 

           (unknown)  (no       (unknown)  (unknown)  Magnesium  (units    (unkn

own)



                    date)                         (1.6-2.3)  mg/dL unknown)  

 

           (unknown)  (no       (unknown)  (unknown)  Magnesium    1.0 L  (units

    (unknown)



                    date)                          (1.6-2.3)  mg/dL unknown)  

 

           (unknown)  (no       (unknown)  (unknown)  Magnesium Stat  (units    

(unknown)



                    date)                                   unknown)  

 

           (unknown)  (no       (unknown)  (unknown)  Magnesium Sulfate  (units 

   (unknown)



                    date)                         (Magnesium Sulfate) unknown)  



                                                   2 gm in 50 mls @           



                                                  50 mls/hr IV NOW           



                                                  ONE                 

 

           (unknown)  (no       (unknown)  (unknown)  Mediastinum:?  (units    (

unknown)



                    date)                         Mediastinal unknown)  



                                                  contours appear           



                                                  normal.? Heart size           



                                                  is normal.?           

 

           (unknown)  (no       (unknown)  (unknown)  Medical History  (units   

 (unknown)



                    date)                         (Reviewed 22 unknown)  



                                                  @ 19:57 by Kitty Costello [facility])           

 

           (unknown)  (no       (unknown)  (unknown)  Medical decision  (units  

  (unknown)



                    date)                         making narrative: unknown)  

 

           (unknown)  (no       (unknown)  (unknown)  MiraL[employer] since this  (units

    (unknown)



                    date)                         happened. unknown)  

 

           (unknown)  (no       (unknown)  (unknown)  Mode of arrival:  (units  

  (unknown)



                    date)                         Wheelchair unknown)  

 

           (unknown)  (no       (unknown)  (unknown)  Mono # (Auto)  (units    (

unknown)



                    date)                         (0-900)  /uL unknown)  

 

           (unknown)  (no       (unknown)  (unknown)  Mono # (Auto)  (units    (

unknown)



                    date)                         600   (0-900)  /uL unknown)  

 

           (unknown)  (no       (unknown)  (unknown)  Mono % (Auto)  (units    (

unknown)



                    date)                         (3-14)  % unknown)  

 

           (unknown)  (no       (unknown)  (unknown)  Mono % (Auto)  (units    (

unknown)



                    date)                         7.9   (3-14)  % unknown)  

 

           (unknown)  (no       (unknown)  (unknown)  Mother     (units 

   (unknown)



                    date)                          Cancer   unknown)  

 

           (unknown)  (no       (unknown)  (unknown)  Mouth/Throat:  (units    (

unknown)



                    date)                         moist mucus unknown)  



                                                  membranes           

 

           (unknown)  (no       (unknown)  (unknown)  Narrative  (units    (unkn

own)



                    date)                                   unknown)  

 

           (unknown)  (no       (unknown)  (unknown)  Narrative:  (units    (unk

nown)



                    date)                                   unknown)  

 

           (unknown)  (no       (unknown)  (unknown)  Neck: supple  (units    (u

nknown)



                    date)                                   unknown)  

 

           (unknown)  (no       (unknown)  (unknown)  Neuro: denies  (units    (

unknown)



                    date)                         numbness, tingling, unknown)  



                                                  dizziness           

 

           (unknown)  (no       (unknown)  (unknown)  Neuro: normal  (units    (

unknown)



                    date)                         speech and unknown)  



                                                  cognition, A+O x3           

 

           (unknown)  (no       (unknown)  (unknown)  Neut # (Auto)  (units    (

unknown)



                    date)                         (3085-4642)  /uL unknown)  

 

           (unknown)  (no       (unknown)  (unknown)  Neut # (Auto)  (units    (

unknown)



                    date)                         5900   (4617-1058) unknown)  



                                                  /uL                 

 

           (unknown)  (no       (unknown)  (unknown)  Neut % (Auto)  (units    (

unknown)



                    date)                         (50-75)  % unknown)  

 

           (unknown)  (no       (unknown)  (unknown)  Neut % (Auto)  (units    (

unknown)



                    date)                         84.7 H   (50-75)  % unknown)  

 

           (unknown)  (no       (unknown)  (unknown)  No Action  (units    (unkn

own)



                    date)                                   unknown)  

 

           (unknown)  (no       (unknown)  (unknown)  No Known Drug  (units    (

unknown)



                    date)                         Allergies Allergy unknown)  



                                                  Verified 22           



                                                  17:38               

 

           (unknown)  (no       (unknown)  (unknown)  Nose: nares  (units    (un

known)



                    date)                         patent, no unknown)  



                                                  rhinorrhea           

 

           (unknown)  (no       (unknown)  (unknown)  Notable on  (units    (unk

nown)



                    date)                         patient's prior unknown)  



                                                  urine cultures,           



                                                  urine from           



                                                  2022 grew out           

 

           (unknown)  (no       (unknown)  (unknown)  Ondansetron HCl  (units   

 (unknown)



                    date)                         (Ondansetron 4 Mg/2 unknown)  



                                                  Ml Inj)  4 mg IV           



                                                  NOW ONE             

 

           (unknown)  (no       (unknown)  (unknown)  Ordered:  (units    (unkno

wn)



                    date)                                   unknown)  

 

           (unknown)  (no       (unknown)  (unknown)  Orders    (units    (unkno

wn)



                    date)                                   unknown)  

 

           (unknown)  (no       (unknown)  (unknown)  Osteoarthritis  (units    

(unknown)



                    date)                                   unknown)  

 

           (unknown)  (no       (unknown)  (unknown)  Oxygen Delivery  (units   

 (unknown)



                    date)                         Method    22 unknown)  



                                                  17:34               

 

           (unknown)  (no       (unknown)  (unknown)  Oxygen Delivery  (units   

 (unknown)



                    date)                         Method Room Air unknown)  

 

           (unknown)  (no       (unknown)  (unknown)  PROCEDURE:? XR  (units    

(unknown)



                    date)                         CHEST 1V  unknown)  

 

           (unknown)  (no       (unknown)  (unknown)  Patient   (units    (unkno

wn)



                    date)                         Disposition: Home unknown)  

 

           (unknown)  (no       (unknown)  (unknown)  Patient History  (units   

 (unknown)



                    date)                                   unknown)  

 

           (unknown)  (no       (unknown)  (unknown)  Patient is  (units    (unk

nown)



                    date)                         currently unknown)  



                                                  anticoagulated on           



                                                  Plavix 37.5 mg           



                                                  daily.  Dr. Gómez           

 

           (unknown)  (no       (unknown)  (unknown) Patient reports  (units    

(unknown)



                    date)                         that when he has a unknown)  



                                                  bowel movement, he           



                                                  typically has hard           



                                                  pellets             

 

           (unknown)  (no       (unknown)  (unknown)  Patient self caths  (units

    (unknown)



                    date)                         for urine output, unknown)  



                                                  reports that he has           



                                                  not had much urine           

 

           (unknown)  (no       (unknown)  (unknown)  Patient was  (units    (un

known)



                    date)                         treated in the unknown)  



                                                  emergency           



                                                  department with 1 g           



                                                  of ceftriaxone.           

 

           (unknown)  (no       (unknown)  (unknown) Patient's  (units    (unkno

wn)



                    date)                         colorectal cancer unknown)  



                                                  oncologist was Dr. Jett, patient was           



                                                  referred to him           

 

           (unknown)  (no       (unknown)  (unknown)  Patient's preop  (units   

 (unknown)



                    date)                         diagnosis was unknown)  



                                                  nausea and vomiting           



                                                  from hematemesis in           



                                                  January             

 

           (unknown)  (no       (unknown)  (unknown)  Patient:  (units    (unkno

wn)



                    date)                         Bret Esquivel unknown)  



                                                  MR#: [id number]            

 

           (unknown)  (no       (unknown)  (unknown)  Postlaminectomy  (units   

 (unknown)



                    date)                         syndrome  unknown)  

 

           (unknown)  (no       (unknown)  (unknown)  Potassium  (units    (unkn

own)



                    date)                         (3.4-5.1)  mmol/L unknown)  

 

           (unknown)  (no       (unknown)  (unknown)  Potassium    3.7  (units  

  (unknown)



                    date)                         (3.4-5.1)  mmol/L unknown)  

 

           (unknown)  (no       (unknown)  (unknown)  Prescriptions:  (units    

(unknown)



                    date)                                   unknown)  

 

           (unknown)  (no       (unknown)  (unknown)  Procalcitonin  (units    (

unknown)



                    date)                         (<0.5)  ng/mL unknown)  

 

           (unknown)  (no       (unknown)  (unknown)  Procalcitonin  (units    (

unknown)



                    date)                         0.72 H    (<0.5) unknown)  



                                                  ng/mL               

 

           (unknown)  (no       (unknown)  (unknown)  Procalcitonin Stat  (units

    (unknown)



                    date)                                   unknown)  

 

           (unknown)  (no       (unknown)  (unknown)  Psych: mental  (units    (

unknown)



                    date)                         status is grossly unknown)  



                                                  normal, congruent           



                                                  mood, normal           



                                                  affect, pleasant           

 

           (unknown)  (no       (unknown)  (unknown)  Pulse Oximetry  98  (units

    (unknown)



                    date)                           22 17:34 unknown)  

 

           (unknown)  (no       (unknown)  (unknown)  Pulse Oximetry 98  (units 

   (unknown)



                    date)                                   unknown)  

 

           (unknown)  (no       (unknown)  (unknown)  Pulse Rate  77  (units    

(unknown)



                    date)                         22 17:34 unknown)  

 

           (unknown)  (no       (unknown)  (unknown)  Pulse Rate 77  (units    (

unknown)



                    date)                                   unknown)  

 

           (unknown)  (no       (unknown)  (unknown)  RDW       (units    (unkno

wn)



                    date)                         (11.6-14.8)  % unknown)  

 

           (unknown)  (no       (unknown)  (unknown)  RDW   16.1 H  (units    (u

nknown)



                    date)                         (11.6-14.8)  % unknown)  

 

           (unknown)  (no       (unknown)  (unknown)  Referrals:  (units    (unk

nown)



                    date)                                   unknown)  

 

           (unknown)  (no       (unknown)  (unknown)  Related Data  (units    (u

nknown)



                    date)                                   unknown)  

 

           (unknown)  (no       (unknown)  (unknown)  Respiratory Rate  (units  

  (unknown)



                    date)                         18   22 17:34 unknown)  

 

           (unknown)  (no       (unknown)  (unknown)  Respiratory Rate  (units  

  (unknown)



                    date)                         18        unknown)  

 

           (unknown)  (no       (unknown)  (unknown)  Respiratory:  (units    (u

nknown)



                    date)                         denies shortness of unknown)  



                                                  breath, or new           



                                                  cough               

 

           (unknown)  (no       (unknown)  (unknown)  Respiratory:  (units    (u

nknown)



                    date)                         normal effort, able unknown)  



                                                  to speak in           



                                                  complete sentences,           



                                                  no audible           

 

           (unknown)  (no       (unknown)  (unknown)  Result diagrams:  (units  

  (unknown)



                    date)                                   unknown)  

 

           (unknown)  (no       (unknown)  (unknown)  Review of Systems  (units 

   (unknown)



                    date)                                   unknown)  

 

           (unknown)  (no       (unknown)  (unknown)  SARS-CoV-2 (PCR)  (units  

  (unknown)



                    date)                           (Negative) unknown)  

 

           (unknown)  (no       (unknown)  (unknown)  SARS-CoV-2 (PCR)  (units  

  (unknown)



                    date)                         Negative  unknown)  



                                                  (Negative)           

 

           (unknown)  (no       (unknown)  (unknown)  Sciatica  (units    (unkno

wn)



                    date)                                   unknown)  

 

           (unknown)  (no       (unknown)  (unknown)  Signed By:  (units    (unk

nown)



                    date)                                   unknown)  

 

           (unknown)  (no       (unknown)  (unknown)  Skin: brisk  (units    (un

known)



                    date)                         capillary refill, unknown)  



                                                  no rash, no           



                                                  erythema            

 

           (unknown)  (no       (unknown)  (unknown)  Skin: denies rash,  (units

    (unknown)



                    date)                         itching or wound unknown)  

 

           (unknown)  (no       (unknown)  (unknown)  Smoking Status:  (units   

 (unknown)



                    date)                         Never smoker unknown)  

 

           (unknown)  (no       (unknown)  (unknown)  Smoking Status:  (units   

 (unknown)



                    date)                         Never smoker unknown)  

 

           (unknown)  (no       (unknown)  (unknown)  Social History  (units    

(unknown)



                    date)                         (Reviewed 22 unknown)  



                                                  @ 19:57 by ZAIRA Amezcua)           

 

           (unknown)  (no       (unknown)  (unknown)  Sodium    (units    (unkno

wn)



                    date)                         (137-145)  mmol/L unknown)  

 

           (unknown)  (no       (unknown)  (unknown)  Sodium    135 L  (units   

 (unknown)



                    date)                         (137-145)  mmol/L unknown)  

 

           (unknown)  (no       (unknown)  (unknown)  Sodium Chloride  (units   

 (unknown)



                    date)                         (Normal Saline unknown)  



                                                  0.9%)  1,000 mls @           



                                                  1,000 mls/hr IV           



                                                  BOLUS ONE           

 

           (unknown)  (no       (unknown)  (unknown)  Source: patient  (units   

 (unknown)



                    date)                         and family unknown)  

 

           (unknown)  (no       (unknown)  (unknown)  Spinal stenosis  (units   

 (unknown)



                    date)                                   unknown)  

 

           (unknown)  (no       (unknown)  (unknown)  Stated complaint:  (units 

   (unknown)



                    date)                         WEAKNESS UNABLE TO unknown)  



                                                  HOLD ANYTHING DOWN           

 

           (unknown)  (no       (unknown)  (unknown)  Substance Use  (units    (

unknown)



                    date)                         Type: does not use unknown)  

 

           (unknown)  (no       (unknown)  (unknown)  Surgical History  (units  

  (unknown)



                    date)                         (Reviewed 22 unknown)  



                                                  @ 19:57 by ZAIRA Amezcua)           

 

           (unknown)  (no       (unknown)  (unknown)  Surgical changes  (units  

  (unknown)



                    date)                         and devices:? unknown)  



                                                  None.?              

 

           (unknown)  (no       (unknown)  (unknown)  TECHNIQUE:? One  (units   

 (unknown)



                    date)                         view of the chest unknown)  



                                                  was acquired.?           

 

           (unknown)  (no       (unknown)  (unknown)  Temperature  98.3  (units 

   (unknown)



                    date)                         F   22 17:34 unknown)  

 

           (unknown)  (no       (unknown)  (unknown)  Temperature 98.3 F  (units

    (unknown)



                    date)                                   unknown)  

 

           (unknown)  (no       (unknown)  (unknown)  This is a  (units    (unkn

own)



                    date)                         62-year-old male unknown)  



                                                  with history of           



                                                  multiple sclerosis,           



                                                  CVA in 2019,           

 

           (unknown)  (no       (unknown)  (unknown)  Time Seen by  (units    (u

nknown)



                    date)                         Provider: 22 unknown)  



                                                  19:11               

 

           (unknown)  (no       (unknown)  (unknown)  Total Bilirubin  (units   

 (unknown)



                    date)                          (0.2-1.3)  mg/dL unknown)  

 

           (unknown)  (no       (unknown)  (unknown)  Total Bilirubin  (units   

 (unknown)



                    date)                         0.6  (0.2-1.3) unknown)  



                                                  mg/dL               

 

           (unknown)  (no       (unknown)  (unknown)  Total Creatine  (units    

(unknown)



                    date)                         Kinase     () unknown)  



                                                   U/L                

 

           (unknown)  (no       (unknown)  (unknown)  Total Creatine  (units    

(unknown)



                    date)                         Kinase    54 L unknown)  



                                                  ()  U/L           

 

           (unknown)  (no       (unknown)  (unknown)  Total Protein  (units    (

unknown)



                    date)                         (6.3-8.2)  g/dL unknown)  

 

           (unknown)  (no       (unknown)  (unknown)  Total Protein  (units    (

unknown)



                    date)                         7.5  (6.3-8.2) unknown)  



                                                  g/dL                

 

           (unknown)  (no       (unknown)  (unknown)  Troponin + CK  (units    (

unknown)



                    date)                         Cardiac Panel Stat unknown)  

 

           (unknown)  (no       (unknown)  (unknown)  Troponin I  (units    (unk

nown)



                    date)                         (0.01-0.034)  ng/mL unknown)  

 

           (unknown)  (no       (unknown)  (unknown)  Troponin I    <  (units   

 (unknown)



                    date)                         0.012  (0.01-0.034) unknown)  



                                                   ng/mL              

 

           (unknown)  (no       (unknown)  (unknown)  Ur Culture  (units    (unk

nown)



                    date)                         Indicated? unknown)  

 

           (unknown)  (no       (unknown)  (unknown)  Ur Culture  (units    (unk

nown)



                    date)                         Indicated? unknown)  



                                                  Specimen cultured           

 

           (unknown)  (no       (unknown)  (unknown)  Urine Bacteria  (units    

(unknown)



                    date)                         (None)    unknown)  

 

           (unknown)  (no       (unknown)  (unknown)  Urine Bacteria  (units    

(unknown)



                    date)                         Few (2-10) H unknown)  



                                                  (None)              

 

           (unknown)  (no       (unknown)  (unknown)  Urine Culture Stat  (units

    (unknown)



                    date)                                   unknown)  

 

           (unknown)  (no       (unknown)  (unknown)  Urine Microscopic  (units 

   (unknown)



                    date)                         Stat      unknown)  

 

           (unknown)  (no       (unknown)  (unknown)  Urine RBC  (units    (unkn

own)



                    date)                         (0-5/HPF) unknown)  

 

           (unknown)  (no       (unknown)  (unknown)  Urine RBC  (units    (unkn

own)



                    date)                         1-5/hpf  (0-5/HPF) unknown)  

 

           (unknown)  (no       (unknown)  (unknown)  Urine Specific  (units    

(unknown)



                    date)                         Gravity    1.015 unknown)  

 

           (unknown)  (no       (unknown)  (unknown)  Urine WBC  (units    (unkn

own)



                    date)                         (0-5/HPF) unknown)  

 

           (unknown)  (no       (unknown)  (unknown)  Urine WBC  (units    (unkn

own)



                    date)                         5-10/hpf H unknown)  



                                                  (0-5/HPF)           

 

           (unknown)  (no       (unknown)  (unknown)  Vital Signs  (units    (un

known)



                    date)                                   unknown)  

 

           (unknown)  (no       (unknown)  (unknown)  Vital signs:  (units    (u

nknown)



                    date)                                   unknown)  

 

           (unknown)  (no       (unknown)  (unknown)  Linette Jett MD  (units  

  (unknown)



                    date)                         [Physician] - As unknown)  



                                                  soon as possible           

 

           (unknown)  (no       (unknown)  (unknown)  XR chest 1V Stat  (units  

  (unknown)



                    date)                                   unknown)  

 

           (unknown)  (no       (unknown)  (unknown)  [Embedded Image  (units   

 (unknown)



                    date)                         Not Available] unknown)  

 

           (unknown)  (no       (unknown)  (unknown)  acute ischemic  (units    

(unknown)



                    date)                         changes.  unknown)  

 

           (unknown)  (no       (unknown)  (unknown)  alcohol intake  (units    

(unknown)



                    date)                         frequency: a few unknown)  



                                                  times a month           

 

           (unknown)  (no       (unknown)  (unknown)  alcohol intake:  (units   

 (unknown)



                    date)                         current   unknown)  

 

           (unknown)  (no       (unknown)  (unknown)  and cooperative  (units   

 (unknown)



                    date)                                   unknown)  

 

           (unknown)  (no       (unknown)  (unknown)  and generalized  (units   

 (unknown)



                    date)                         weakness.  Patient unknown)  



                                                  self catheterizes           



                                                  his bladder at           



                                                  baseline,           

 

           (unknown)  (no       (unknown)  (unknown)  appear    (units    (unkno

wn)



                    date)                                   unknown)  

 

           (unknown)  (no       (unknown)  (unknown)  ascorbic acid  (units    (

unknown)



                    date)                         (vitamin C) 500 mg unknown)  



                                                  500 mg PO BID #60           



                                                  tabs 22           

 

           (unknown)  (no       (unknown)  (unknown)  atorvastatin 80 mg  (units

    (unknown)



                    date)                         tablet 40 mg PO QPM unknown)  



                                                  20           

 

           (unknown)  (no       (unknown)  (unknown)  be causing a rash  (units 

   (unknown)



                    date)                                   unknown)  

 

           (unknown)  (no       (unknown)  (unknown) bleeding.  Guaiac  (units  

  (unknown)



                    date)                         stool in the unknown)  



                                                  emergency           



                                                  department was           



                                                  negative.           



                                                  Patient's lab           

 

           (unknown)  (no       (unknown)  (unknown)  blood in his  (units    (u

nknown)



                    date)                         emesis or in his unknown)  



                                                  stool.  He reports           



                                                  that he did a stool           



                                                  test one            

 

           (unknown)  (no       (unknown)  (unknown)  buspirone 5 mg  (units    

(unknown)



                    date)                         tablet 5 mg PO BID unknown)  



                                                  #60 tabs 22           

 

           (unknown)  (no       (unknown)  (unknown)  cancer.  Postop  (units   

 (unknown)



                    date)                         diagnosis from this unknown)  



                                                  upper endoscopy was           



                                                  the same.  His           

 

           (unknown)  (no       (unknown)  (unknown)  caths at baseline  (units 

   (unknown)



                    date)                                   unknown)  

 

           (unknown)  (no       (unknown)  (unknown)  clopidogrel 75 mg  (units 

   (unknown)



                    date)                         tablet 75 mg PO unknown)  



                                                  DAILY 22            

 

           (unknown)  (no       (unknown)  (unknown)  colonic   (units    (unkno

wn)



                    date)                         anastomosis, and a unknown)  



                                                  suboptimal bowel           



                                                  prep.               

 

           (unknown)  (no       (unknown)  (unknown)  cultures were  (units    (

unknown)



                    date)                         drawn prior to unknown)  



                                                  receiving           



                                                  antibiotics Urine           



                                                  microscopy shows           

 

           (unknown)  (no       (unknown)  (unknown)  cyanocobalamin  (units    

(unknown)



                    date)                         (vitamin B-12) 500 unknown)  



                                                  1,000 mcg PO DAILY           



                                                  #60 tabs 22           

 

           (unknown)  (no       (unknown)  (unknown)  diabetes,  (units    (unkn

own)



                    date)                         hypertension, unknown)  



                                                  colorectal cancer           



                                                  in  with a           



                                                  reverse colectomy           



                                                  who                 

 

           (unknown)  (no       (unknown)  (unknown)  diabetes,  (units    (unkn

own)



                    date)                         hypertension, unknown)  



                                                  colorectal cancer           



                                                  in  with a           



                                                  reverse colectomy,           



                                                  He                  

 

           (unknown)  (no       (unknown)  (unknown)  does not drink  (units    

(unknown)



                    date)                         alcohol.? Patient unknown)  



                                                  and his wife state           



                                                  that he was seen in           



                                                  the                 

 

           (unknown)  (no       (unknown)  (unknown)  elevated lactate.  (units 

   (unknown)



                    date)                         His heart rate and unknown)  



                                                  blood pressure are           



                                                  within normal           



                                                  ranges.             

 

           (unknown)  (no       (unknown)  (unknown)  emergency  (units    (unkn

own)



                    date)                         department in unknown)  



                                                  January and           



                                                  diagnosed with a GI           



                                                  bleed, he was           



                                                  admitted,           

 

           (unknown)  (no       (unknown)  (unknown)  endorses fatigue  (units  

  (unknown)



                    date)                                   unknown)  

 

           (unknown)  (no       (unknown)  (unknown) endoscopic  (units    (unkn

own)



                    date)                         diagnosis includes unknown)  



                                                  mild gastropathy,           



                                                  esophagitis, patent           



                                                  retrosigmoid           

 

           (unknown)  (no       (unknown)  (unknown)  ferrous sulfate  (units   

 (unknown)



                    date)                         325 mg (65 mg 325 unknown)  



                                                  mg PO BIDWM #60           



                                                  tabs 22           

 

           (unknown)  (no       (unknown)  (unknown)  followed by loose  (units 

   (unknown)



                    date)                         stool.    unknown)  

 

           (unknown)  (no       (unknown)  (unknown)  gabapentin 300 mg  (units 

   (unknown)



                    date)                         capsule 300 mg PO unknown)  



                                                  BID 18            

 

           (unknown)  (no       (unknown)  (unknown)  generalized  (units    (un

known)



                    date)                         weakness and unknown)  



                                                  fatigue             

 

           (unknown)  (no       (unknown)  (unknown)  glipizide 10 mg  (units   

 (unknown)



                    date)                         tablet 10 mg PO BID unknown)  



                                                  18           

 

           (unknown)  (no       (unknown)  (unknown)  had a recent  (units    (u

nknown)



                    date)                         endoscopy that did unknown)  



                                                  not show any           



                                                  bleeding polyps or           



                                                  acute GI            

 

           (unknown)  (no       (unknown)  (unknown)  hospital and on  (units   

 (unknown)



                    date)                         chart review it unknown)  



                                                  appears that it was           



                                                  completed on           



                                                  2022.           

 

           (unknown)  (no       (unknown)  (unknown)  household members:  (units

    (unknown)



                    date)                          spouse   unknown)  

 

           (unknown)  (no       (unknown)  (unknown)  ingrown, no penile  (units

    (unknown)



                    date)                         discharge, no unknown)  



                                                  scrotal edema           

 

           (unknown)  (no       (unknown)  (unknown)  iron) tablet  (units    (u

nknown)



                    date)                                   unknown)  

 

           (unknown)  (no       (unknown)  (unknown)  lives     (units    (unkno

wn)



                    date)                         independently:  Yes unknown)  

 

           (unknown)  (no       (unknown)  (unknown)  losartan 50 mg  (units    

(unknown)



                    date)                         tablet 25 mg PO QPM unknown)  



                                                  20           

 

           (unknown)  (no       (unknown)  (unknown)  lung      (units    (unkno

wn)



                    date)                                   unknown)  

 

           (unknown)  (no       (unknown)  (unknown)  magnesium was  (units    (

unknown)



                    date)                         replaced with 2 g, unknown)  



                                                  normal saline 1000           



                                                  mL was given for           



                                                  his                 

 

           (unknown)  (no       (unknown)  (unknown)  mcg tablet  (units    (unk

nown)



                    date)                                   unknown)  

 

           (unknown)  (no       (unknown)  (unknown)  mcg tablet  (units    (unk

nown)



                    date)                         (Tab-A-Lucy) unknown)  

 

           (unknown)  (no       (unknown)  (unknown)  metformin 500 mg  (units  

  (unknown)



                    date)                         tablet,extended 500 unknown)  



                                                  mg PO BID 18            

 

           (unknown)  (no       (unknown)  (unknown)  metoprolol  (units    (unk

nown)



                    date)                         succinate 25 mg 25 unknown)  



                                                  mg PO DAILY           



                                                  21           

 

           (unknown)  (no       (unknown)  (unknown)  moving forward.  (units   

 (unknown)



                    date)                         Patient reports unknown)  



                                                  that he has been           



                                                  taking fiber but           



                                                  not any             

 

           (unknown)  (no       (unknown)  (unknown)  multivitamin with  (units 

   (unknown)



                    date)                         folic acid 400 1 unknown)  



                                                  tab PO DAILY #90           



                                                  tabs 22           

 

           (unknown)  (no       (unknown)  (unknown)  negative.  I  (units    (u

nknown)



                    date)                         completed his unknown)  



                                                  urinary             



                                                  catheterization           



                                                  with a coude           



                                                  catheter, urine           

 

           (unknown)  (no       (unknown)  (unknown)  nondistended, no  (units  

  (unknown)



                    date)                         masses, no unknown)  



                                                  exquisite           



                                                  tenderness with           



                                                  exam, no rebound           

 

           (unknown)  (no       (unknown)  (unknown)  not heard it was  (units  

  (unknown)



                    date)                         positive or not. unknown)  



                                                  Patient reports           



                                                  that his primary           



                                                  care                

 

           (unknown)  (no       (unknown)  (unknown)  ondansetron HCl 4  (units 

   (unknown)



                    date)                         mg tablet 4 mg PO unknown)  



                                                  Q8H PRN nausea and           



                                                  21            

 

           (unknown)  (no       (unknown)  (unknown)  oral powder packet  (units

    (unknown)



                    date)                                   unknown)  

 

           (unknown)  (no       (unknown)  (unknown)  output for three  (units  

  (unknown)



                    date)                         days, three days unknown)  



                                                  ago he had nausea           



                                                  and vomiting,           



                                                  denies any           

 

           (unknown)  (no       (unknown)  (unknown)  polyethylene  (units    (u

nknown)



                    date)                         glycol 3350 17 gram unknown)  



                                                  17 gm PO DAILY #90           



                                                  ea 22           

 

           (unknown)  (no       (unknown)  (unknown)  presents to the  (units   

 (unknown)



                    date)                         emergency unknown)  



                                                  department           



                                                  complaining of           



                                                  ongoing fatigue,           



                                                  oliguria,           

 

           (unknown)  (no       (unknown)  (unknown)  provider has left,  (units

    (unknown)



                    date)                         he reports that he unknown)  



                                                  has seen Dr. Macias           



                                                  recently as well.           

 

           (unknown)  (no       (unknown)  (unknown)  quadrant with a  (units   

 (unknown)



                    date)                         complicated unknown)  



                                                  surgical history of           



                                                  his abdomen.  This           



                                                  shows               

 

           (unknown)  (no       (unknown)  (unknown)  recently for his  (units  

  (unknown)



                    date)                         anemia.   unknown)  

 

           (unknown)  (no       (unknown)  (unknown)  related diarrhea  (units  

  (unknown)



                    date)                         from fecal loading unknown)  



                                                  and obstipation.           



                                                  Recommended MiraLax           



                                                  17 g                

 

           (unknown)  (no       (unknown)  (unknown)  release 24 hr  (units    (

unknown)



                    date)                                   unknown)  

 

           (unknown)  (no       (unknown)  (unknown)  reported that  (units    (

unknown)



                    date)                         patient's altered unknown)  



                                                  bowel habit is           



                                                  likely a function           



                                                  of overflow           

 

           (unknown)  (no       (unknown)  (unknown)  sennosides 8.6 mg  (units 

   (unknown)



                    date)                         tablet (senna) 17.2 unknown)  



                                                  mg PO BEDTIME #60           



                                                  tabs 22           

 

           (unknown)  (no       (unknown)  (unknown)  substance use  (units    (

unknown)



                    date)                         type:  does not use unknown)  

 

           (unknown)  (no       (unknown)  (unknown)  tablet (Vitamin C)  (units

    (unknown)



                    date)                                   unknown)  

 

           (unknown)  (no       (unknown)  (unknown)  tablet,extended  (units   

 (unknown)



                    date)                         release 24 hr unknown)  

 

           (unknown)  (no       (unknown)  (unknown)  tenderness  (units    (unk

nown)



                    date)                                   unknown)  

 

           (unknown)  (no       (unknown)  (unknown)  there.  He reports  (units

    (unknown)



                    date)                         that he had an unknown)  



                                                  upper endoscopy           



                                                  completed here at           



                                                  this                

 

           (unknown)  (no       (unknown)  (unknown)  tone      (units    (unkno

wn)



                    date)                                   unknown)  

 

           (unknown)  (no       (unknown)  (unknown)  trospium 20 mg  (units    

(unknown)



                    date)                         tablet 20 mg PO BID unknown)  



                                                  urinary retention           



                                                  22           

 

           (unknown)  (no       (unknown)  (unknown)  unremarkable.?  (units    

(unknown)



                    date)                                   unknown)  

 

           (unknown)  (no       (unknown)  (unknown)  urine dip showed  (units  

  (unknown)



                    date)                         leukocytes, unknown)  



                                                  ketones, wbc's and           



                                                  did not show           



                                                  nitrites.  Blood           

 

           (unknown)  (no       (unknown)  (unknown)  urine is cloudy,  (units  

  (unknown)



                    date)                         yellow,   unknown)  



                                                  approximately 600           



                                                  mL in bladder and           



                                                  drained, no rash           

 

           (unknown)  (no       (unknown)  (unknown)  volumes accentuate  (units

    (unknown)



                    date)                         pulmonary unknown)  



                                                  interstitium and           



                                                  heart size.           

 

           (unknown)  (no       (unknown)  (unknown)  was cloudy yellow  (units 

   (unknown)



                    date)                         urine and 600 mL unknown)  



                                                  was drained.           



                                                  Patient tolerated           



                                                  well.  His           

 

           (unknown)  (no       (unknown)  (unknown)  week ago and  (units    (u

nknown)



                    date)                         dropped it off at unknown)  



                                                  [facility] which was           



                                                  testing for blood           



                                                  but he has           

 

           (unknown)  (no       (unknown)  (unknown)  went to [facility]  (units 

   (unknown)



                    date)                         for rehab following unknown)  



                                                  his admission and           



                                                  has followed up           



                                                  with                

 

           (unknown)  (no       (unknown)  (unknown)  wheezing, stridor,  (units

    (unknown)



                    date)                         or rales. No unknown)  



                                                  retractions or           



                                                  tachypnea.           









                                         Result panel 26









           (unknown)  (no date)  (unknown)  (unknown)  1.1       mmol/L    (unkn

own)









                                         Result panel 27









           (unknown)  (no       (unknown)  (unknown)  (no value)  (units    (unk

nown)



                    date)                                   unknown)  

 

           (unknown)  (no       (unknown)  (unknown)  Radiologist  (units    (un

known)



                    date)                         Impression: unknown)  

 

           (unknown)  (no       (unknown)  (unknown)  Date of Service:  (units  

  (unknown)



                    date)                         22  unknown)  

 

           (unknown)  (no       (unknown)  (unknown)  (no value)  (units    (unk

nown)



                    date)                                   unknown)  

 

           (unknown)  (no       (unknown)  (unknown)  22 17:45  (units    

(unknown)



                    date)                                   unknown)  

 

           (unknown)  (no       (unknown)  (unknown)  1 tab PO DAILY  (units    

(unknown)



                    date)                         Qty: 90 0RF unknown)  

 

           (unknown)  (no       (unknown)  (unknown)  1,000 mcg PO DAILY  (units

    (unknown)



                    date)                         Qty: 60 0RF unknown)  

 

           (unknown)  (no       (unknown)  (unknown)  10 mg PO BID  (units    (u

nknown)



                    date)                                   unknown)  

 

           (unknown)  (no       (unknown)  (unknown)  17 gm PO DAILY  (units    

(unknown)



                    date)                         Qty: 90 0RF unknown)  

 

           (unknown)  (no       (unknown)  (unknown)  17.2 mg PO BEDTIME  (units

    (unknown)



                    date)                         Qty: 60 0RF unknown)  

 

           (unknown)  (no       (unknown)  (unknown)  20 mg PO BID  (units    (u

nknown)



                    date)                                   unknown)  

 

           (unknown)  (no       (unknown)  (unknown)  25 mg PO DAILY  (units    

(unknown)



                    date)                                   unknown)  

 

           (unknown)  (no       (unknown)  (unknown)  25 mg PO QPM  (units    (u

nknown)



                    date)                                   unknown)  

 

           (unknown)  (no       (unknown)  (unknown)  300 mg PO BID  (units    (

unknown)



                    date)                                   unknown)  

 

           (unknown)  (no       (unknown)  (unknown)  325 mg PO BIDWM  (units   

 (unknown)



                    date)                         Qty: 60 0RF unknown)  

 

           (unknown)  (no       (unknown)  (unknown)  4 mg PO Q8H PRN  (units   

 (unknown)



                    date)                         (Reason: nausea and unknown)  



                                                  vomiting) Qty: 14           



                                                  0RF                 

 

           (unknown)  (no       (unknown)  (unknown)  40 mg PO QPM  (units    (u

nknown)



                    date)                                   unknown)  

 

           (unknown)  (no       (unknown)  (unknown)  5 mg PO BID Qty:  (units  

  (unknown)



                    date)                         60 0RF    unknown)  

 

           (unknown)  (no       (unknown)  (unknown)  500 mg PO BID  (units    (

unknown)



                    date)                                   unknown)  

 

           (unknown)  (no       (unknown)  (unknown)  500 mg PO BID Qty:  (units

    (unknown)



                    date)                         20 0RF    unknown)  

 

           (unknown)  (no       (unknown)  (unknown)  500 mg PO BID Qty:  (units

    (unknown)



                    date)                         60 0RF    unknown)  

 

           (unknown)  (no       (unknown)  (unknown)  75 mg PO DAILY  (units    

(unknown)



                    date)                                   unknown)  

 

           (unknown)  (no       (unknown)  (unknown)  Admin: 22  (units   

 (unknown)



                    date)                         20:19  Dose: 50 unknown)  



                                                  mls/hr              

 

           (unknown)  (no       (unknown)  (unknown)  Admin: 22  (units   

 (unknown)



                    date)                         21:06  Dose: 200 unknown)  



                                                  mls/hr              

 

           (unknown)  (no       (unknown)  (unknown)  Allergies  (units    (unkn

own)



                    date)                                   unknown)  

 

           (unknown)  (no       (unknown)  (unknown)  Documented By: [facility]  (units

    (unknown)



                    date)                                   unknown)  

 

           (unknown)  (no       (unknown)  (unknown)  Documented By: AT  (units 

   (unknown)



                    date)                          Co-signed By: MLSTEVE unknown)  

 

           (unknown)  (no       (unknown)  (unknown)  Documented By: AT  (units 

   (unknown)



                    date)                                   unknown)  

 

           (unknown)  (no       (unknown)  (unknown)  Documented By: MLSTEVE  (units

    (unknown)



                    date)                           Co-signed By: [facility] unknown)  

 

           (unknown)  (no       (unknown)  (unknown)  Documented By: MLM  (units

    (unknown)



                    date)                                   unknown)  

 

           (unknown)  (no       (unknown)  (unknown)  ED Orders  (units    (unkn

own)



                    date)                                   unknown)  

 

           (unknown)  (no       (unknown)  (unknown)  Emergency Report  (units  

  (unknown)



                    date)                                   unknown)  

 

           (unknown)  (no       (unknown)  (unknown)  Home Medications  (units  

  (unknown)



                    date)                                   unknown)  

 

           (unknown)  (no       (unknown)  (unknown)  [facility]  (units   

 (unknown)



                    date)                         [address] [place] [address] unknown)  



                                                  [place] [address]           

 

           (unknown)  (no       (unknown)  (unknown)  Lab Results  (units    (un

known)



                    date)                                   unknown)  

 

           (unknown)  (no       (unknown)  (unknown)  Label Comments:  (units   

 (unknown)



                    date)                                   unknown)  

 

           (unknown)  (no       (unknown)  (unknown)  Last Admin:  (units    (un

known)



                    date)                         22 18:30 unknown)  



                                                  Dose: 4 mg           

 

           (unknown)  (no       (unknown)  (unknown)  Last Admin:  (units    (un

known)



                    date)                         22 20:18 unknown)  



                                                  Dose: 1,000 mls/hr           

 

           (unknown)  (no       (unknown)  (unknown)  Last Infusion:  (units    

(unknown)



                    date)                         22 21:10 unknown)  



                                                  Dose: 0 mls/hr           

 

           (unknown)  (no       (unknown)  (unknown)  Last Infusion:  (units    

(unknown)



                    date)                         22 21:28 unknown)  



                                                  Dose: 0 mls/hr           

 

           (unknown)  (no       (unknown)  (unknown)  Previous Rx's  (units    (

unknown)



                    date)                                   unknown)  

 

           (unknown)  (no       (unknown)  (unknown)  Stop: 22  (units    

(unknown)



                    date)                         18:16     unknown)  

 

           (unknown)  (no       (unknown)  (unknown)  Stop: 22  (units    

(unknown)



                    date)                         20:15     unknown)  

 

           (unknown)  (no       (unknown)  (unknown)  Stop: 22  (units    

(unknown)



                    date)                         20:25     unknown)  

 

           (unknown)  (no       (unknown)  (unknown)  Stop: 22  (units    

(unknown)



                    date)                         20:27     unknown)  

 

           (unknown)  (no       (unknown)  (unknown)  Urine Dip  (units    (unkn

own)



                    date)                                   unknown)  

 

           (unknown)  (no       (unknown)  (unknown)  Vital Signs - 8 hr  (units

    (unknown)



                    date)                                   unknown)  

 

           (unknown)  (no       (unknown)  (unknown)  has not been  (units    (u

nknown)



                    date)                         eating so hasn't unknown)  



                                                  taken recently.           



                                                  spouse states           



                                                  thinks it might           

 

           (unknown)  (no       (unknown)  (unknown)  stopped taking  (units    

(unknown)



                    date)                         prior to back unknown)  



                                                  surgery and did not           



                                                  restart             

 

           (unknown)  (no       (unknown)  (unknown)  (no value)  (units    (unk

nown)



                    date)                                   unknown)  

 

           (unknown)  (no       (unknown)  (unknown)  22  (units 

   (unknown)



                    date)                         22  unknown)  



                                                  Range/Units           

 

           (unknown)  (no       (unknown)  (unknown)  22  (units    (unkno

wn)



                    date)                         Range/Units unknown)  

 

           (unknown)  (no       (unknown)  (unknown)  17:40 17:45 17:45  (units 

   (unknown)



                    date)                                   unknown)  

 

           (unknown)  (no       (unknown)  (unknown)  17:45 17:45 20:09  (units 

   (unknown)



                    date)                                   unknown)  

 

           (unknown)  (no       (unknown)  (unknown)  22:00     (units    (unkno

wn)



                    date)                                   unknown)  

 

           (unknown)  (no       (unknown)  (unknown)  ascorbic acid  (units    (

unknown)



                    date)                         (vitamin C) unknown)  



                                                  [Vitamin C] 500 mg           



                                                  Tablet              

 

           (unknown)  (no       (unknown)  (unknown)  atorvastatin 80 mg  (units

    (unknown)



                    date)                         tablet    unknown)  

 

           (unknown)  (no       (unknown)  (unknown)  buspirone 5 mg  (units    

(unknown)



                    date)                         Tablet    unknown)  

 

           (unknown)  (no       (unknown)  (unknown)  ciprofloxacin HCl  (units 

   (unknown)



                    date)                         500 mg tablet unknown)  

 

           (unknown)  (no       (unknown)  (unknown)  clopidogrel 75 mg  (units 

   (unknown)



                    date)                         tablet    unknown)  

 

           (unknown)  (no       (unknown)  (unknown)  cyanocobalamin  (units    

(unknown)



                    date)                         (vitamin B-12) 500 unknown)  



                                                  mcg Tablet           

 

           (unknown)  (no       (unknown)  (unknown)  ferrous sulfate  (units   

 (unknown)



                    date)                         325 mg (65 mg iron) unknown)  



                                                  Tablet              

 

           (unknown)  (no       (unknown)  (unknown)  gabapentin 300 mg  (units 

   (unknown)



                    date)                         capsule   unknown)  

 

           (unknown)  (no       (unknown)  (unknown)  glipizide 10 mg  (units   

 (unknown)



                    date)                         tablet    unknown)  

 

           (unknown)  (no       (unknown)  (unknown)  losartan 50 mg  (units    

(unknown)



                    date)                         tablet    unknown)  

 

           (unknown)  (no       (unknown)  (unknown)  metformin 500 mg  (units  

  (unknown)



                    date)                         tablet extended unknown)  



                                                  release 24 hr           

 

           (unknown)  (no       (unknown)  (unknown)  metoprolol  (units    (unk

nown)



                    date)                         succinate 25 mg unknown)  



                                                  tablet extended           



                                                  release 24 hr           

 

           (unknown)  (no       (unknown)  (unknown)  multivitamin with  (units 

   (unknown)



                    date)                         folic acid unknown)  



                                                  [Tab-A-Lucy] 400           



                                                  mcg Tablet           

 

           (unknown)  (no       (unknown)  (unknown)  ondansetron HCl  (units   

 (unknown)



                    date)                         [Zofran] 4 mg unknown)  



                                                  tablet              

 

           (unknown)  (no       (unknown)  (unknown)  polyethylene  (units    (u

nknown)



                    date)                         glycol 3350 17 gram unknown)  



                                                  Powder In Packet           

 

           (unknown)  (no       (unknown)  (unknown)  sennosides [senna]  (units

    (unknown)



                    date)                         8.6 mg Tablet unknown)  

 

           (unknown)  (no       (unknown)  (unknown)  trospium 20 mg  (units    

(unknown)



                    date)                         tablet    unknown)  

 

           (unknown)  (no       (unknown)  (unknown)  22  (units    (unkno

wn)



                    date)                                   unknown)  

 

           (unknown)  (no       (unknown)  (unknown)  1.07 in January.  (units  

  (unknown)



                    date)                         No elevation in his unknown)  



                                                  troponin,           



                                                  procalcitonin is           



                                                  elevated at           

 

           (unknown)  (no       (unknown)  (unknown)  Anemia,   (units    (unkno

wn)



                    date)                         Hypomagnesemia, unknown)  



                                                  Pyelonephritis           

 

           (unknown)  (no       (unknown)  (unknown)  Medication  (units    (unk

nown)



                    date)                         Instructions unknown)  



                                                  Recorded            

 

           (unknown)  (no       (unknown)  (unknown)  Medication  (units    (unk

nown)



                    date)                         Instructions unknown)  



                                                  Recorded  Confirmed           

 

           (unknown)  (no       (unknown)  (unknown)  sodium 135,  (units    (un

known)



                    date)                         potassium 3.7, unknown)  



                                                  creatinine 1.42           



                                                  which is increased           



                                                  from his prior of           

 

           (unknown)  (no       (unknown)  (unknown)  with regular axis  (units 

   (unknown)



                    date)                         and intervals.  No unknown)  



                                                  STEMI, ST segment           



                                                  changes,            



                                                  arrhythmia, or           

 

           (unknown)  (no       (unknown)  (unknown)  work is   (units    (unkno

wn)



                    date)                         significant for unknown)  



                                                  anemia, hemoglobin           



                                                  is 9.1 today, down           



                                                  from 9.6 in           

 

           (unknown)  (no       (unknown)  (unknown)  <Kitty Costello,  (units 

   (unknown)



                    date)                         [facility] - Last Filed: unknown)  



                                                  22 20:33>           

 

           (unknown)  (no       (unknown)  (unknown)  <Allegra Montiel, DO  (units

    (unknown)



                    date)                         - Last Filed: unknown)  



                                                  22 23:09>           

 

           (unknown)  (no       (unknown)  (unknown)  (Zofran) vomiting  (units 

   (unknown)



                    date)                         #14 tabs  unknown)  

 

           (unknown)  (no       (unknown)  (unknown)  0.72, his lactate  (units 

   (unknown)



                    date)                         is also elevated at unknown)  



                                                  2.6, magnesium is           



                                                  low at 1.0.  His           

 

           (unknown)  (no       (unknown)  (unknown)  [id number]   (units    (unkno

wn)



                    date)                                   unknown)  

 

           (unknown)  (no       (unknown)  (unknown)  22 17:39  (units    

(unknown)



                    date)                                   unknown)  

 

           (unknown)  (no       (unknown)  (unknown)  22 17:40  (units    

(unknown)



                    date)                                   unknown)  

 

           (unknown)  (no       (unknown)  (unknown)  22 17:45  (units    

(unknown)



                    date)                                   unknown)  

 

           (unknown)  (no       (unknown)  (unknown)  07/18/22 19:26  (units    

(unknown)



                    date)                                   unknown)  

 

           (unknown)  (no       (unknown)  (unknown)  22 20:09  (units    

(unknown)



                    date)                                   unknown)  

 

           (unknown)  (no       (unknown)  (unknown)  22 20:55  (units    

(unknown)



                    date)                                   unknown)  

 

           (unknown)  (no       (unknown)  (unknown)  17:34 22  (units    

(unknown)



                    date)                                   unknown)  

 

           (unknown)  (no       (unknown)  (unknown)  with iron  (units    (

unknown)



                    date)                         deficiency anemia, unknown)  



                                                  altered bowel           



                                                  habit, personal           



                                                  history of colon           

 

           (unknown)  (no       (unknown)  (unknown)  22:52     (units    (unkno

wn)



                    date)                                   unknown)  

 

           (unknown)  (no       (unknown)  (unknown)  62-year-old  (units    (un

known)



                    date)                         gentleman with a unknown)  



                                                  history of multiple           



                                                  sclerosis, CVA in           



                                                  2019,               

 

           (unknown)  (no       (unknown)  (unknown)  ?         (units    (unkno

wn)



                    date)                                   unknown)  

 

           (unknown)  (no       (unknown)  (unknown)  ALT     (<50)  (units    (

unknown)



                    date)                         IU/L      unknown)  

 

           (unknown)  (no       (unknown)  (unknown)  ALT    16  (<50)  (units  

  (unknown)



                    date)                         IU/L      unknown)  

 

           (unknown)  (no       (unknown)  (unknown)  ALT   (<50)  IU/L  (units 

   (unknown)



                    date)                                   unknown)  

 

           (unknown)  (no       (unknown)  (unknown)  AST     (17-59)  (units   

 (unknown)



                    date)                         IU/L      unknown)  

 

           (unknown)  (no       (unknown)  (unknown)  AST    19  (17-59)  (units

    (unknown)



                    date)                          IU/L     unknown)  

 

           (unknown)  (no       (unknown)  (unknown)  AST   (17-59)  (units    (

unknown)



                    date)                         IU/L      unknown)  

 

           (unknown)  (no       (unknown)  (unknown)  Activity  (units    (unkno

wn)



                    date)                         Restrictions/Additi unknown)  



                                                  onal Instructions:           

 

           (unknown)  (no       (unknown)  (unknown)  Age/Sex: 62 / M  (units   

 (unknown)



                    date)                                   unknown)  

 

           (unknown)  (no       (unknown)  (unknown)  Albumin   (units    (unkno

wn)



                    date)                         (3.5-5.0)  g/dL unknown)  

 

           (unknown)  (no       (unknown)  (unknown)  Albumin    3.9  (units    

(unknown)



                    date)                         (3.5-5.0)  g/dL unknown)  

 

           (unknown)  (no       (unknown)  (unknown)  Albumin   (units    (unkno

wn)



                    date)                         (3.5-5.0)  g/dL unknown)  

 

           (unknown)  (no       (unknown)  (unknown)  Albumin/Globulin  (units  

  (unknown)



                    date)                         Ratio     (1.0-2.8) unknown)  

 

           (unknown)  (no       (unknown)  (unknown)  Albumin/Globulin  (units  

  (unknown)



                    date)                         Ratio    1.1 unknown)  



                                                  (1.0-2.8)           

 

           (unknown)  (no       (unknown)  (unknown)  Albumin/Globulin  (units  

  (unknown)



                    date)                         Ratio   (1.0-2.8) unknown)  

 

           (unknown)  (no       (unknown)  (unknown)  Alkaline  (units    (unkno

wn)



                    date)                         Phosphatase unknown)  



                                                  ()  U/L           

 

           (unknown)  (no       (unknown)  (unknown)  Alkaline  (units    (unkno

wn)



                    date)                         Phosphatase    105 unknown)  



                                                  ()  U/L           

 

           (unknown)  (no       (unknown)  (unknown)  Alkaline  (units    (unkno

wn)



                    date)                         Phosphatase unknown)  



                                                  ()  U/L           

 

           (unknown)  (no       (unknown)  (unknown)  Allergy/AdvReac  (units   

 (unknown)



                    date)                         Type Severity unknown)  



                                                  Reaction Status           



                                                  Date / Time           

 

           (unknown)  (no       (unknown)  (unknown)  Amorphous Sediment  (units

    (unknown)



                    date)                                   unknown)  

 

           (unknown)  (no       (unknown)  (unknown)  Amorphous Sediment  (units

    (unknown)



                    date)                                   unknown)  

 

           (unknown)  (no       (unknown)  (unknown)  Amorphous Sediment  (units

    (unknown)



                    date)                            2+     unknown)  

 

           (unknown)  (no       (unknown)  (unknown)  Approved by: Garfield Mcdanielsunits 

   (unknown)



                    date)                         SHANNAN Lopez on unknown)  



                                                  2022 at 18:02?           

 

           (unknown)  (no       (unknown)  (unknown)  BUN     (9-20)  (units    

(unknown)



                    date)                         mg/dL     unknown)  

 

           (unknown)  (no       (unknown)  (unknown)  BUN    21 H  (units    (un

known)



                    date)                         (9-20)  mg/dL unknown)  

 

           (unknown)  (no       (unknown)  (unknown)  BUN   (9-20)  (units    (u

nknown)



                    date)                         mg/dL     unknown)  

 

           (unknown)  (no       (unknown)  (unknown)  BUN/Creatinine  (units    

(unknown)



                    date)                         Ratio     (6-22) unknown)  

 

           (unknown)  (no       (unknown)  (unknown)  BUN/Creatinine  (units    

(unknown)



                    date)                         Ratio    14.8 unknown)  



                                                  (6-22)              

 

           (unknown)  (no       (unknown)  (unknown)  BUN/Creatinine  (units    

(unknown)



                    date)                         Ratio   (6-22) unknown)  

 

           (unknown)  (no       (unknown)  (unknown)  Baso # (Auto)  (units    (

unknown)



                    date)                         (0-100)  /uL unknown)  

 

           (unknown)  (no       (unknown)  (unknown)  Baso # (Auto)  (units    (

unknown)



                    date)                         (0-100)  /uL unknown)  

 

           (unknown)  (no       (unknown)  (unknown)  Baso # (Auto)   0  (units 

   (unknown)



                    date)                          (0-100)  /uL unknown)  

 

           (unknown)  (no       (unknown)  (unknown)  Baso % (Auto)  (units    (

unknown)



                    date)                         (0-2)  %  unknown)  

 

           (unknown)  (no       (unknown)  (unknown)  Baso % (Auto)  (units    (

unknown)



                    date)                         (0-2)  %  unknown)  

 

           (unknown)  (no       (unknown)  (unknown)  Baso % (Auto)  (units    (

unknown)



                    date)                         0.2   (0-2)  % unknown)  

 

           (unknown)  (no       (unknown)  (unknown)  Bedside Urine  (units    (

unknown)



                    date)                         Bilirubin        - unknown)  



                                                  Negative            

 

           (unknown)  (no       (unknown)  (unknown)  Bedside Urine  (units    (

unknown)



                    date)                         Glucose    Negative unknown)  

 

           (unknown)  (no       (unknown)  (unknown)  Bedside Urine  (units    (

unknown)



                    date)                         Ketone    - unknown)  



                                                  Negative            

 

           (unknown)  (no       (unknown)  (unknown)  Bedside Urine  (units    (

unknown)



                    date)                         Leukocytes       + unknown)  



                                                  70                  

 

           (unknown)  (no       (unknown)  (unknown)  Bedside Urine  (units    (

unknown)



                    date)                         Nitrite    - unknown)  



                                                  Negative            

 

           (unknown)  (no       (unknown)  (unknown)  Bedside Urine  (units    (

unknown)



                    date)                         Occult Blood     ++ unknown)  

 

           (unknown)  (no       (unknown)  (unknown)  Bedside Urine  (units    (

unknown)



                    date)                         Protein    + 30 unknown)  

 

           (unknown)  (no       (unknown)  (unknown)  Bedside Urine  (units    (

unknown)



                    date)                         Urobilinogen     - unknown)  



                                                  Negative            

 

           (unknown)  (no       (unknown)  (unknown)  Bedside Urine pH  (units  

  (unknown)



                    date)                          6.0      unknown)  

 

           (unknown)  (no       (unknown)  (unknown)  Blood Culture Stat  (units

    (unknown)



                    date)                                   unknown)  

 

           (unknown)  (no       (unknown)  (unknown)  Blood Pressure  (units    

(unknown)



                    date)                         124/66   22 unknown)  



                                                  17:34               

 

           (unknown)  (no       (unknown)  (unknown)  Blood Pressure  (units    

(unknown)



                    date)                         124/66 124/58 L unknown)  

 

           (unknown)  (no       (unknown)  (unknown)  Bones and chest  (units   

 (unknown)



                    date)                         wall:? No unknown)  



                                                  suspicious bony           



                                                  lesions.? Overlying           



                                                  soft tissues           

 

           (unknown)  (no       (unknown)  (unknown)  CK-MB (CK-2)  (units    (u

nknown)



                    date)                                   unknown)  

 

           (unknown)  (no       (unknown)  (unknown)  CK-MB (CK-2)  (units    (u

nknown)



                    date)                                   unknown)  

 

           (unknown)  (no       (unknown)  (unknown)  CK-MB (CK-2)  (units    (u

nknown)



                    date)                         TNP       unknown)  

 

           (unknown)  (no       (unknown)  (unknown)  CK-MB (CK-2) Rel  (units  

  (unknown)



                    date)                         Index     unknown)  

 

           (unknown)  (no       (unknown)  (unknown)  CK-MB (CK-2) Rel  (units  

  (unknown)



                    date)                         Index     unknown)  

 

           (unknown)  (no       (unknown)  (unknown)  CK-MB (CK-2) Rel  (units  

  (unknown)



                    date)                         Index    TNP unknown)  

 

           (unknown)  (no       (unknown)  (unknown)  COMPARISON:?  (units    (u

nknown)



                    date)                         [facility], unknown)  



                                                  CR, XR CHEST 2V,           



                                                  3/18/2021, 10:19.           

 

           (unknown)  (no       (unknown)  (unknown)  COVID19 -Nasal  (units    

(unknown)



                    date)                         RAPID/Pre-Proc Stat unknown)  

 

           (unknown)  (no       (unknown)  (unknown)  CT abdomen pelvis  (units 

   (unknown)



                    date)                         w con Stat unknown)  

 

           (unknown)  (no       (unknown)  (unknown)  CT abdomen pelvis  (units 

   (unknown)



                    date)                         was ordered for unknown)  



                                                  patient has           



                                                  tenderness to his           



                                                  right lower           

 

           (unknown)  (no       (unknown)  (unknown)  CVA (cerebral  (units    (

unknown)



                    date)                         vascular accident) unknown)  



                                                  (2018)           

 

           (unknown)  (no       (unknown)  (unknown)  Calcium   (units    (unkno

wn)



                    date)                         (8.4-10.2)  mg/dL unknown)  

 

           (unknown)  (no       (unknown)  (unknown)  Calcium    9.3  (units    

(unknown)



                    date)                         (8.4-10.2)  mg/dL unknown)  

 

           (unknown)  (no       (unknown)  (unknown)  Calcium   (units    (unkno

wn)



                    date)                         (8.4-10.2)  mg/dL unknown)  

 

           (unknown)  (no       (unknown)  (unknown)  Carbon Dioxide  (units    

(unknown)



                    date)                         (22-32)  mmol/L unknown)  

 

           (unknown)  (no       (unknown)  (unknown)  Carbon Dioxide  (units    

(unknown)



                    date)                         29  (22-32)  mmol/L unknown)  

 

           (unknown)  (no       (unknown)  (unknown)  Carbon Dioxide  (units    

(unknown)



                    date)                         (22-32)  mmol/L unknown)  

 

           (unknown)  (no       (unknown)  (unknown)  Cardio: denies  (units    

(unknown)



                    date)                         chest pain, unknown)  



                                                  palpitations           

 

           (unknown)  (no       (unknown)  (unknown)  Cardiovascular:  (units   

 (unknown)



                    date)                         regular rate and unknown)  



                                                  rhythm, no           



                                                  peripheral edema,           



                                                  warm extremities           

 

           (unknown)  (no       (unknown)  (unknown)  Ceftriaxone Sodium  (units

    (unknown)



                    date)                         1,000 mg/ (Sodium unknown)  



                                                  Chloride)  100 mls           



                                                  @ 200 mls/hr IV NOW           



                                                  ONE                 

 

           (unknown)  (no       (unknown)  (unknown)  Chest x-ray:  (units    (u

nknown)



                    date)                                   unknown)  

 

           (unknown)  (no       (unknown)  (unknown)  Chief complaint:  (units  

  (unknown)



                    date)                         Weakness  unknown)  

 

           (unknown)  (no       (unknown)  (unknown)  Chloride  (units    (o

wn)



                    date)                         ()  mmol/L unknown)  

 

           (unknown)  (no       (unknown)  (unknown)  Chloride    96 L  (units  

  (unknown)



                    date)                         ()  mmol/L unknown)  

 

           (unknown)  (no       (unknown)  (unknown)  Chloride  (units    (unkno

wn)



                    date)                         ()  mmol/L unknown)  

 

           (unknown)  (no       (unknown)  (unknown)  Clinical  (units    (unkno

wn)



                    date)                         Impression: unknown)  

 

           (unknown)  (no       (unknown)  (unknown)  Colon cancer  (units    (u

nknown)



                    date)                         (2013)    unknown)  

 

           (unknown)  (no       (unknown)  (unknown)  Complete Blood  (units    

(unknown)



                    date)                         Count AUTO DIFF unknown)  



                                                  Stat                

 

           (unknown)  (no       (unknown)  (unknown)  Comprehensive  (units    (

unknown)



                    date)                         Metabolic Panel unknown)  



                                                  Stat                

 

           (unknown)  (no       (unknown)  (unknown)  Course    (units    (unkno

wn)



                    date)                                   unknown)  

 

           (unknown)  (no       (unknown)  (unknown)  Creatinine  (units    (unk

nown)



                    date)                         (0.66-1.25)  mg/dL unknown)  

 

           (unknown)  (no       (unknown)  (unknown)  Creatinine    1.42  (units

    (unknown)



                    date)                         H  (0.66-1.25) unknown)  



                                                  mg/dL               

 

           (unknown)  (no       (unknown)  (unknown)  Creatinine  (units    (unk

nown)



                    date)                         (0.66-1.25)  mg/dL unknown)  

 

           (unknown)  (no       (unknown)  (unknown)  : 1960  (units   

 (unknown)



                    date)                         Acct:[id number] unknown)  

 

           (unknown)  (no       (unknown)  (unknown)  Departure  (units    (unkn

own)



                    date)                                   unknown)  

 

           (unknown)  (no       (unknown)  (unknown)  Depression  (units    (unk

nown)



                    date)                                   unknown)  

 

           (unknown)  (no       (unknown)  (unknown)  Diabetes  (units    (unkno

wn)



                    date)                                   unknown)  

 

           (unknown)  (no       (unknown)  (unknown)  Diabetic  (units    (unkno

wn)



                    date)                         neuropathy unknown)  

 

           (unknown)  (no       (unknown)  (unknown)  Discharge Plan  (units    

(unknown)



                    date)                                   unknown)  

 

           (unknown)  (no       (unknown)  (unknown)  Discontinued  (units    (u

nknown)



                    date)                         Medications unknown)  

 

           (unknown)  (no       (unknown)  (unknown)  Gustavo Macias MD  (units   

 (unknown)



                    date)                         [[facility] unknown)  



                                                  Provider] -           

 

           (unknown)  (no       (unknown)  (unknown)  ECG Data  (units    (unkno

wn)



                    date)                                   unknown)  

 

           (unknown)  (no       (unknown)  (unknown) EKG independently  (units  

  (unknown)



                    date)                         reviewed by Dr. mejia)  



                                                  Dinesh and reveals           



                                                  normal sinus rhythm           



                                                  at 72 bpm           

 

           (unknown)  (no       (unknown)  (unknown)  EKG-12 Lead Stat  (units  

  (unknown)



                    date)                                   unknown)  

 

           (unknown)  (no       (unknown)  (unknown)  ER Physician:  (units    (

unknown)



                    date)                         Mank,Allegra C D.O. unknown)  

 

           (unknown)  (no       (unknown)  (unknown)  Eos # (Auto)  (units    (u

nknown)



                    date)                         (0-450)  /uL unknown)  

 

           (unknown)  (no       (unknown)  (unknown)  Eos # (Auto)  (units    (u

nknown)



                    date)                         (0-450)  /uL unknown)  

 

           (unknown)  (no       (unknown)  (unknown)  Eos # (Auto)   100  (units

    (unknown)



                    date)                           (0-450)  /uL unknown)  

 

           (unknown)  (no       (unknown)  (unknown)  Eos % (Auto)  (units    (u

nknown)



                    date)                         (2-4)  %  unknown)  

 

           (unknown)  (no       (unknown)  (unknown)  Eos % (Auto)  (units    (u

nknown)



                    date)                         (2-4)  %  unknown)  

 

           (unknown)  (no       (unknown)  (unknown)  Eos % (Auto)   0.8  (units

    (unknown)



                    date)                         L   (2-4)  % unknown)  

 

           (unknown)  (no       (unknown)  (unknown)  Esterase  (units    (unkno

wn)



                    date)                                   unknown)  

 

           (unknown)  (no       (unknown)  (unknown)  Estimated GFR  (units    (

unknown)



                    date)                         (>60)  mL/min unknown)  

 

           (unknown)  (no       (unknown)  (unknown)  Estimated GFR  (units    (

unknown)



                    date)                         55 L  (>60)  mL/min unknown)  

 

           (unknown)  (no       (unknown)  (unknown)  Estimated GFR  (units    (

unknown)



                    date)                         (>60)  mL/min unknown)  

 

           (unknown)  (no       (unknown)  (unknown)  Exam      (units    (unkno

wn)



                    date)                                   unknown)  

 

           (unknown)  (no       (unknown)  (unknown)  Exam Narrative:  (units   

 (unknown)



                    date)                                   unknown)  

 

           (unknown)  (no       (unknown)  (unknown)  Eyes: denies  (units    (u

nknown)



                    date)                         visual changes, eye unknown)  



                                                  pain                

 

           (unknown)  (no       (unknown)  (unknown)  Eyes: equal round  (units 

   (unknown)



                    date)                         and reactive, EOMI, unknown)  



                                                  conjunctiva normal           

 

           (unknown)  (no       (unknown)  (unknown)  FINDINGS:?  (units    (unk

nown)



                    date)                                   unknown)  

 

           (unknown)  (no       (unknown)  (unknown)  Family History  (units    

(unknown)



                    date)                         (Reviewed 22 unknown)  



                                                  @ 19:57 by Kitty Costello [facility])           

 

           (unknown)  (no       (unknown)  (unknown)  Father     (units 

   (unknown)



                    date)                          Cancer   unknown)  

 

           (unknown)  (no       (unknown)  (unknown)  Follow-up for  (units    (

unknown)



                    date)                         recheck in the next unknown)  



                                                  2-3 days.           

 

           (unknown)  (no       (unknown)  (unknown)  GERD      (units    (unkno

wn)



                    date)                         (gastroesophageal unknown)  



                                                  reflux disease)           

 

           (unknown)  (no       (unknown)  (unknown)  GI:  Right lower  (units  

  (unknown)



                    date)                         quadrant tenderness unknown)  



                                                  to palpation           

 

           (unknown)  (no       (unknown)  (unknown)  GI: abdomen mildly  (units

    (unknown)



                    date)                         guarded, tender to unknown)  



                                                  palpation in the           



                                                  right lower           



                                                  quadrant,           

 

           (unknown)  (no       (unknown)  (unknown)  :  Straight cath  (units

    (unknown)



                    date)                         for urine completed unknown)  



                                                  by myself with 16           



                                                  [place] coude           



                                                  catheter,           

 

           (unknown)  (no       (unknown)  (unknown)  : denies  (units    (unk

nown)



                    date)                         dysuria, hematuria unknown)  



                                                  or flank pain,           



                                                  reports oliguria,           



                                                  patient self           

 

           (unknown)  (no       (unknown)  (unknown)  Gastroenterology  (units  

  (unknown)



                    date)                         from [place], unknown)  



                                                  patient reports           



                                                  that he sees Yasmine Rocha PA-C           

 

           (unknown)  (no       (unknown)  (unknown)  General   (units    (unkno

wn)



                    date)                                   unknown)  

 

           (unknown)  (no       (unknown)  (unknown)  General:  (units    (unkno

wn)



                    date)                         cooperative, unknown)  



                                                  comfortable, in no           



                                                  acute distress,           



                                                  well groomed,           

 

           (unknown)  (no       (unknown)  (unknown)  General: denies  (units   

 (unknown)



                    date)                         fever, chills, unknown)  



                                                  endorses weakness,           



                                                  right lower           



                                                  quadrant pain,           

 

           (unknown)  (no       (unknown)  (unknown)  GenericComposite[P  (units

    (unknown)



                    date)                         lt Count  unknown)  



                                                  (150-400)  X10^3/uL           



                                                   ]                  

 

           (unknown)  (no       (unknown)  (unknown)  GenericComposite[P  (units

    (unknown)



                    date)                         lt Count  unknown)  



                                                  (150-400)  X10^3/uL           



                                                   ]                  

 

           (unknown)  (no       (unknown)  (unknown)  GenericComposite[P  (units

    (unknown)



                    date)                         lt Count   219 unknown)  



                                                  (150-400)  X10^3/uL           



                                                   ]                  

 

           (unknown)  (no       (unknown)  (unknown)  GenericComposite[R  (units

    (unknown)



                    date)                         BC     (4.5-5.9) unknown)  



                                                  X10^6/uL  ]           

 

           (unknown)  (no       (unknown)  (unknown)  GenericComposite[R  (units

    (unknown)



                    date)                         BC   (4.5-5.9) unknown)  



                                                  X10^6/uL  ]           

 

           (unknown)  (no       (unknown)  (unknown)  GenericComposite[R  (units

    (unknown)



                    date)                         BC   3.37 L unknown)  



                                                  (4.5-5.9)  X10^6/uL           



                                                   ]                  

 

           (unknown)  (no       (unknown)  (unknown)  GenericComposite[W  (units

    (unknown)



                    date)                         BC     (4.5-11.0) unknown)  



                                                  X10^3/uL  ]           

 

           (unknown)  (no       (unknown)  (unknown)  GenericComposite[W  (units

    (unknown)



                    date)                         BC   (4.5-11.0) unknown)  



                                                  X10^3/uL  ]           

 

           (unknown)  (no       (unknown)  (unknown)  GenericComposite[W  (units

    (unknown)



                    date)                         BC   7.0  unknown)  



                                                  (4.5-11.0)           



                                                  X10^3/uL  ]           

 

           (unknown)  (no       (unknown)  (unknown)  Globulin  (units    (unkno

wn)



                    date)                         (1.7-4.1)  g/dL unknown)  

 

           (unknown)  (no       (unknown)  (unknown)  Globulin    3.6  (units   

 (unknown)



                    date)                         (1.7-4.1)  g/dL unknown)  

 

           (unknown)  (no       (unknown)  (unknown)  Globulin  (units    (unkno

wn)



                    date)                         (1.7-4.1)  g/dL unknown)  

 

           (unknown)  (no       (unknown)  (unknown)  Glucose   (units    (unkno

wn)



                    date)                         ()  mg/dL unknown)  

 

           (unknown)  (no       (unknown)  (unknown)  Glucose    262 H  (units  

  (unknown)



                    date)                         ()  mg/dL unknown)  

 

           (unknown)  (no       (unknown)  (unknown)  Glucose   ()  (units

    (unknown)



                    date)                          mg/dL    unknown)  

 

           (unknown)  (no       (unknown)  (unknown)  Guaiac stool:  (units    (

unknown)



                    date)                         Negative, good unknown)  



                                                  stool result           

 

           (unknown)  (no       (unknown)  (unknown)  H/O colectomy  (units    (

unknown)



                    date)                                   unknown)  

 

           (unknown)  (no       (unknown)  (unknown)  HPI - Weakness  (units    

(unknown)



                    date)                                   unknown)  

 

           (unknown)  (no       (unknown)  (unknown)  HPI Narrative:  (units    

(unknown)



                    date)                                   unknown)  

 

           (unknown)  (no       (unknown)  (unknown)  Hct     (41-53)  %  (units

    (unknown)



                    date)                                   unknown)  

 

           (unknown)  (no       (unknown)  (unknown)  Hct   (41-53)  %  (units  

  (unknown)



                    date)                                   unknown)  

 

           (unknown)  (no       (unknown)  (unknown)  Hct   26.7 L  (units    (u

nknown)



                    date)                         (41-53)  % unknown)  

 

           (unknown)  (no       (unknown)  (unknown)  He does not have  (units  

  (unknown)



                    date)                         any mental status unknown)  



                                                  changes, GCS is 15.           



                                                   Guaiac stool is           

 

           (unknown)  (no       (unknown)  (unknown)  Head/Neck:  (units    (unk

nown)



                    date)                         Endorses having a unknown)  



                                                  headache, denies           



                                                  any neck pain           

 

           (unknown)  (no       (unknown)  (unknown)  Head: atraumatic,  (units 

   (unknown)



                    date)                         symmetrical facial unknown)  



                                                  expressions           

 

           (unknown)  (no       (unknown)  (unknown)  Hgb       (units    (unkno

wn)



                    date)                         (13.5-17.5)  g/dL unknown)  

 

           (unknown)  (no       (unknown)  (unknown)  Hgb   (13.5-17.5)  (units 

   (unknown)



                    date)                         g/dL      unknown)  

 

           (unknown)  (no       (unknown)  (unknown)  Hgb   9.1 L  (units    (un

known)



                    date)                         (13.5-17.5)  g/dL unknown)  

 

           (unknown)  (no       (unknown)  (unknown)  History of Present  (units

    (unknown)



                    date)                         Illness   unknown)  

 

           (unknown)  (no       (unknown)  (unknown)  History of lumbar  (units 

   (unknown)



                    date)                         surgery (2011) unknown)  

 

           (unknown)  (no       (unknown)  (unknown)  Hx of foot surgery  (units

    (unknown)



                    date)                         (2017)    unknown)  

 

           (unknown)  (no       (unknown)  (unknown)  Hx of shoulder  (units    

(unknown)



                    date)                         surgery (2010) unknown)  

 

           (unknown)  (no       (unknown)  (unknown)  Hypertension  (units    (u

nknown)



                    date)                                   unknown)  

 

           (unknown)  (no       (unknown)  (unknown)  IMPRESSION:? No  (units   

 (unknown)



                    date)                         acute     unknown)  



                                                  cardiopulmonary           



                                                  findings            

 

           (unknown)  (no       (unknown)  (unknown)  INDICATIONS:?  (units    (

unknown)



                    date)                         chest pain unknown)  

 

           (unknown)  (no       (unknown)  (unknown)  Imaging Data  (units    (u

nknown)



                    date)                                   unknown)  

 

           (unknown)  (no       (unknown)  (unknown)  Independently  (units    (

unknown)



                    date)                         reviewed vitals unknown)  



                                                  signs and nursing           



                                                  notes.              

 

           (unknown)  (no       (unknown)  (unknown)  Initial Vital  (units    (

unknown)



                    date)                         Signs     unknown)  

 

           (unknown)  (no       (unknown)  (unknown)  Initial Vital  (units    (

unknown)



                    date)                         Signs:    unknown)  

 

           (unknown)  (no       (unknown)  (unknown)  Instructions:  (units    (

unknown)



                    date)                         Acute Cystitis, unknown)  



                                                  Anemia              

 

           (unknown)  (no       (unknown)  (unknown)  Interpretation:  (units   

 (unknown)



                    date)                                   unknown)  

 

           (unknown)  (no       (unknown)  (unknown) January, hematocrit  (units

    (unknown)



                    date)                         is 26.7, down from unknown)  



                                                  27.8 also in           



                                                  January, platelet           



                                                  count 219,           

 

           (unknown)  (no       (unknown)  (unknown)  Lab Data  (units    (unkno

wn)



                    date)                                   unknown)  

 

           (unknown)  (no       (unknown)  (unknown)  Labs:     (units    (unkno

wn)



                    date)                                   unknown)  

 

           (unknown)  (no       (unknown)  (unknown)  Lactate   (units    (unkno

wn)



                    date)                         (0.7-2.1)  mmol/L unknown)  

 

           (unknown)  (no       (unknown)  (unknown)  Lactate   2.6 H  (units   

 (unknown)



                    date)                         (0.7-2.1)  mmol/L unknown)  

 

           (unknown)  (no       (unknown)  (unknown)  Lactate  1.1  (units    (u

nknown)



                    date)                         (0.7-2.1)  mmol/L unknown)  

 

           (unknown)  (no       (unknown)  (unknown)  Lactate (Lactic  (units   

 (unknown)



                    date)                         Acid) Stat unknown)  

 

           (unknown)  (no       (unknown)  (unknown)  Lipase    (units    (unkno

wn)



                    date)                         ()  U/L unknown)  

 

           (unknown)  (no       (unknown)  (unknown)  Lipase    32  (units    (u

nknown)



                    date)                         ()  U/L unknown)  

 

           (unknown)  (no       (unknown)  (unknown)  Lipase   ()  (units 

   (unknown)



                    date)                         U/L       unknown)  

 

           (unknown)  (no       (unknown)  (unknown)  Lipase Stat  (units    (un

known)



                    date)                                   unknown)  

 

           (unknown)  (no       (unknown)  (unknown)  Lungs and pleura:?  (units

    (unknown)



                    date)                         Lungs are clear.? unknown)  



                                                  No pleural           



                                                  effusions or           



                                                  pneumothorax.? Low           

 

           (unknown)  (no       (unknown)  (unknown)  Lymph # (Auto)  (units    

(unknown)



                    date)                         (1115-1907)  /uL unknown)  

 

           (unknown)  (no       (unknown)  (unknown)  Lymph # (Auto)  (units    

(unknown)



                    date)                         (5300-5531)  /uL unknown)  

 

           (unknown)  (no       (unknown)  (unknown)  Lymph # (Auto)  (units    

(unknown)



                    date)                         400 L   (8251-7064) unknown)  



                                                   /uL                

 

           (unknown)  (no       (unknown)  (unknown)  Lymph % (Auto)  (units    

(unknown)



                    date)                         (25-40)  % unknown)  

 

           (unknown)  (no       (unknown)  (unknown)  Lymph % (Auto)  (units    

(unknown)



                    date)                         (25-40)  % unknown)  

 

           (unknown)  (no       (unknown)  (unknown)  Lymph % (Auto)  (units    

(unknown)



                    date)                         6.4 L   (25-40)  % unknown)  

 

           (unknown)  (no       (unknown)  (unknown)  MCH     (26-34)  (units   

 (unknown)



                    date)                         PG        unknown)  

 

           (unknown)  (no       (unknown)  (unknown)  MCH   (26-34)  PG  (units 

   (unknown)



                    date)                                   unknown)  

 

           (unknown)  (no       (unknown)  (unknown)  MCH   26.9  (units    (unk

nown)



                    date)                         (26-34)  PG unknown)  

 

           (unknown)  (no       (unknown)  (unknown)  MCHC     (30-36)  (units  

  (unknown)



                    date)                         %         unknown)  

 

           (unknown)  (no       (unknown)  (unknown)  MCHC   (30-36)  %  (units 

   (unknown)



                    date)                                   unknown)  

 

           (unknown)  (no       (unknown)  (unknown)  MCHC   34.0  (units    (un

known)



                    date)                         (30-36)  % unknown)  

 

           (unknown)  (no       (unknown)  (unknown)  MCV     ()  (units  

  (unknown)



                    date)                         fL        unknown)  

 

           (unknown)  (no       (unknown)  (unknown)  MCV   ()  fL  (units

    (unknown)



                    date)                                   unknown)  

 

           (unknown)  (no       (unknown)  (unknown)  MCV   79.1 L  (units    (u

nknown)



                    date)                         ()  fL unknown)  

 

           (unknown)  (no       (unknown)  (unknown)  MDM - Weakness  (units    

(unknown)



                    date)                                   unknown)  

 

           (unknown)  (no       (unknown)  (unknown)  MDM Narrative  (units    (

unknown)



                    date)                                   unknown)  

 

           (unknown)  (no       (unknown)  (unknown)  MSK: denies new  (units   

 (unknown)



                    date)                         joint pain, muscle unknown)  



                                                  weakness or           



                                                  swelling            

 

           (unknown)  (no       (unknown)  (unknown) MSK: moves all  (units    (

unknown)



                    date)                         extremities, unknown)  



                                                  neurovascularly           



                                                  intact, generalized           



                                                  weakness, normal           

 

           (unknown)  (no       (unknown)  (unknown)  Magnesium  (units    (unkn

own)



                    date)                         (1.6-2.3)  mg/dL unknown)  

 

           (unknown)  (no       (unknown)  (unknown)  Magnesium    1.0 L  (units

    (unknown)



                    date)                          (1.6-2.3)  mg/dL unknown)  

 

           (unknown)  (no       (unknown)  (unknown)  Magnesium  (units    (unkn

own)



                    date)                         (1.6-2.3)  mg/dL unknown)  

 

           (unknown)  (no       (unknown)  (unknown)  Magnesium Stat  (units    

(unknown)



                    date)                                   unknown)  

 

           (unknown)  (no       (unknown)  (unknown)  Magnesium Sulfate  (units 

   (unknown)



                    date)                         (Magnesium Sulfate) unknown)  



                                                   2 gm in 50 mls @           



                                                  50 mls/hr IV NOW           



                                                  ONE                 

 

           (unknown)  (no       (unknown)  (unknown)  Mediastinum:?  (units    (

unknown)



                    date)                         Mediastinal unknown)  



                                                  contours appear           



                                                  normal.? Heart size           



                                                  is normal.?           

 

           (unknown)  (no       (unknown)  (unknown)  Medical History  (units   

 (unknown)



                    date)                         (Reviewed 22 unknown)  



                                                  @ 19:57 by Kitty Costello [facility])           

 

           (unknown)  (no       (unknown)  (unknown)  Medical decision  (units  

  (unknown)



                    date)                         making narrative: unknown)  

 

           (unknown)  (no       (unknown)  (unknown)  [employer] since this  (units

    (unknown)



                    date)                         happened. unknown)  

 

           (unknown)  (no       (unknown)  (unknown)  Mode of arrival:  (units  

  (unknown)



                    date)                         Wheelchair unknown)  

 

           (unknown)  (no       (unknown)  (unknown)  Mono # (Auto)  (units    (

unknown)



                    date)                         (0-900)  /uL unknown)  

 

           (unknown)  (no       (unknown)  (unknown)  Mono # (Auto)  (units    (

unknown)



                    date)                         (0-900)  /uL unknown)  

 

           (unknown)  (no       (unknown)  (unknown)  Mono # (Auto)  (units    (

unknown)



                    date)                         600   (0-900)  /uL unknown)  

 

           (unknown)  (no       (unknown)  (unknown)  Mono % (Auto)  (units    (

unknown)



                    date)                         (3-14)  % unknown)  

 

           (unknown)  (no       (unknown)  (unknown)  Mono % (Auto)  (units    (

unknown)



                    date)                         (3-14)  % unknown)  

 

           (unknown)  (no       (unknown)  (unknown)  Mono % (Auto)  (units    (

unknown)



                    date)                         7.9   (3-14)  % unknown)  

 

           (unknown)  (no       (unknown)  (unknown)  Mother     (units 

   (unknown)



                    date)                          Cancer   unknown)  

 

           (unknown)  (no       (unknown)  (unknown)  Mouth/Throat:  (units    (

unknown)



                    date)                         moist mucus unknown)  



                                                  membranes           

 

           (unknown)  (no       (unknown)  (unknown)  Narrative  (units    (unkn

own)



                    date)                                   unknown)  

 

           (unknown)  (no       (unknown)  (unknown)  Narrative:  (units    (unk

nown)



                    date)                                   unknown)  

 

           (unknown)  (no       (unknown)  (unknown)  Neck: supple  (units    (u

nknown)



                    date)                                   unknown)  

 

           (unknown)  (no       (unknown)  (unknown)  Neuro: denies  (units    (

unknown)



                    date)                         numbness, tingling, unknown)  



                                                  dizziness           

 

           (unknown)  (no       (unknown)  (unknown)  Neuro: normal  (units    (

unknown)



                    date)                         speech and unknown)  



                                                  cognition, A+O x3           

 

           (unknown)  (no       (unknown)  (unknown)  Neut # (Auto)  (units    (

unknown)



                    date)                         (0639-3618)  /uL unknown)  

 

           (unknown)  (no       (unknown)  (unknown)  Neut # (Auto)  (units    (

unknown)



                    date)                         (7289-4424)  /uL unknown)  

 

           (unknown)  (no       (unknown)  (unknown)  Neut # (Auto)  (units    (

unknown)



                    date)                         5900   (7912-9453) unknown)  



                                                  /uL                 

 

           (unknown)  (no       (unknown)  (unknown)  Neut % (Auto)  (units    (

unknown)



                    date)                         (50-75)  % unknown)  

 

           (unknown)  (no       (unknown)  (unknown)  Neut % (Auto)  (units    (

unknown)



                    date)                         (50-75)  % unknown)  

 

           (unknown)  (no       (unknown)  (unknown)  Neut % (Auto)  (units    (

unknown)



                    date)                         84.7 H   (50-75)  % unknown)  

 

           (unknown)  (no       (unknown)  (unknown)  New       (units    (unkno

wn)



                    date)                                   unknown)  

 

           (unknown)  (no       (unknown)  (unknown)  No Action  (units    (unkn

own)



                    date)                                   unknown)  

 

           (unknown)  (no       (unknown)  (unknown)  No Known Drug  (units    (

unknown)



                    date)                         Allergies Allergy unknown)  



                                                  Verified 22           



                                                  17:38               

 

           (unknown)  (no       (unknown)  (unknown)  Nose: nares  (units    (un

known)



                    date)                         patent, no unknown)  



                                                  rhinorrhea           

 

           (unknown)  (no       (unknown)  (unknown)  Notable on  (units    (unk

nown)



                    date)                         patient's prior unknown)  



                                                  urine cultures,           



                                                  urine from           



                                                  2022 grew out           



                                                  Kle                 

 

           (unknown)  (no       (unknown)  (unknown)  Ondansetron HCl  (units   

 (unknown)



                    date)                         (Ondansetron 4 Mg/2 unknown)  



                                                  Ml Inj)  4 mg IV           



                                                  NOW ONE             

 

           (unknown)  (no       (unknown)  (unknown)  Ordered:  (units    (unkno

wn)



                    date)                                   unknown)  

 

           (unknown)  (no       (unknown)  (unknown)  Orders    (units    (unkno

wn)



                    date)                                   unknown)  

 

           (unknown)  (no       (unknown)  (unknown)  Osteoarthritis  (units    

(unknown)



                    date)                                   unknown)  

 

           (unknown)  (no       (unknown)  (unknown)  Oxygen Delivery  (units   

 (unknown)



                    date)                         Method    22 unknown)  



                                                  17:34               

 

           (unknown)  (no       (unknown)  (unknown)  Oxygen Delivery  (units   

 (unknown)



                    date)                         Method Room Air unknown)  



                                                  Room Air            

 

           (unknown)  (no       (unknown)  (unknown)  PROCEDURE:? XR  (units    

(unknown)



                    date)                         CHEST 1V  unknown)  

 

           (unknown)  (no       (unknown)  (unknown)  Patient   (units    (unkno

wn)



                    date)                         Disposition: Home unknown)  

 

           (unknown)  (no       (unknown)  (unknown)  Patient History  (units   

 (unknown)



                    date)                                   unknown)  

 

           (unknown)  (no       (unknown)  (unknown)  Patient and wife  (units  

  (unknown)



                    date)                         were offered again unknown)  



                                                  admission but once           



                                                  again defer.           



                                                  Received a           

 

           (unknown)  (no       (unknown)  (unknown)  Patient is  (units    (unk

nown)



                    date)                         currently unknown)  



                                                  anticoagulated on           



                                                  Plavix 37.5 mg           



                                                  daily.  Dr. Gómez           

 

           (unknown)  (no       (unknown)  (unknown) Patient reports  (units    

(unknown)



                    date)                         that when he has a unknown)  



                                                  bowel movement, he           



                                                  typically has hard           



                                                  pellets             

 

           (unknown)  (no       (unknown)  (unknown)  Patient self caths  (units

    (unknown)



                    date)                         for urine output, unknown)  



                                                  reports that he has           



                                                  not had much urine           

 

           (unknown)  (no       (unknown)  (unknown)  Patient was seen  (units  

  (unknown)



                    date)                         independently unknown)  



                                                  evaluated by           



                                                  myself, physical           



                                                  exam was repeated           

 

           (unknown)  (no       (unknown)  (unknown)  Patient was  (units    (un

known)



                    date)                         treated in the unknown)  



                                                  emergency           



                                                  department with 1 g           



                                                  of ceftriaxone.           

 

           (unknown)  (no       (unknown)  (unknown) Patient's  (units    (unkno

wn)



                    date)                         colorectal cancer unknown)  



                                                  oncologist was Dr. Jett, patient was           



                                                  referred to him           

 

           (unknown)  (no       (unknown)  (unknown)  Patient's lactate  (units 

   (unknown)



                    date)                         improved with unknown)  



                                                  fluids.  He had           



                                                  difficulty           



                                                  ambulating patient           

 

           (unknown)  (no       (unknown)  (unknown)  Patient's preop  (units   

 (unknown)



                    date)                         diagnosis was unknown)  



                                                  nausea and vomiting           



                                                  from hematemesis in           



                                                  January             

 

           (unknown)  (no       (unknown)  (unknown)  Patient:  (units    (unkno

wn)



                    date)                         Bret Esquivel R unknown)  



                                                  MR#: [id number]            

 

           (unknown)  (no       (unknown)  (unknown)  Please leave her  (units  

  (unknown)



                    date)                         catheter in for the unknown)  



                                                  next several days           



                                                  until you are ready           



                                                  to                  

 

           (unknown)  (no       (unknown)  (unknown)  Please return for  (units 

   (unknown)



                    date)                         fevers, worsening unknown)  



                                                  weakness, passing           



                                                  out, persistent           



                                                  vomiting,           

 

           (unknown)  (no       (unknown)  (unknown)  Postlaminectomy  (units   

 (unknown)



                    date)                         syndrome  unknown)  

 

           (unknown)  (no       (unknown)  (unknown)  Potassium  (units    (unkn

own)



                    date)                         (3.4-5.1)  mmol/L unknown)  

 

           (unknown)  (no       (unknown)  (unknown)  Potassium    3.7  (units  

  (unknown)



                    date)                         (3.4-5.1)  mmol/L unknown)  

 

           (unknown)  (no       (unknown)  (unknown)  Potassium  (units    (unkn

own)



                    date)                         (3.4-5.1)  mmol/L unknown)  

 

           (unknown)  (no       (unknown)  (unknown)  Prescription sent  (units 

   (unknown)



                    date)                         to [facility] pharmacy unknown)  



                                                  in [place].           

 

           (unknown)  (no       (unknown)  (unknown)  Prescriptions:  (units    

(unknown)



                    date)                                   unknown)  

 

           (unknown)  (no       (unknown)  (unknown)  Procalcitonin  (units    (

unknown)



                    date)                         (<0.5)  ng/mL unknown)  

 

           (unknown)  (no       (unknown)  (unknown)  Procalcitonin  (units    (

unknown)



                    date)                         (<0.5)  ng/mL unknown)  

 

           (unknown)  (no       (unknown)  (unknown)  Procalcitonin  (units    (

unknown)



                    date)                         0.72 H    (<0.5) unknown)  



                                                  ng/mL               

 

           (unknown)  (no       (unknown)  (unknown)  Procalcitonin Stat  (units

    (unknown)



                    date)                                   unknown)  

 

           (unknown)  (no       (unknown)  (unknown)  Psych: mental  (units    (

unknown)



                    date)                         status is grossly unknown)  



                                                  normal, congruent           



                                                  mood, normal           



                                                  affect, pleasant           

 

           (unknown)  (no       (unknown)  (unknown)  Pulse Oximetry  98  (units

    (unknown)



                    date)                           22 17:34 unknown)  

 

           (unknown)  (no       (unknown)  (unknown)  Pulse Oximetry 98  (units 

   (unknown)



                    date)                         99        unknown)  

 

           (unknown)  (no       (unknown)  (unknown)  Pulse Rate  77  (units    

(unknown)



                    date)                         22 17:34 unknown)  

 

           (unknown)  (no       (unknown)  (unknown)  Pulse Rate 77 77  (units  

  (unknown)



                    date)                                   unknown)  

 

           (unknown)  (no       (unknown)  (unknown)  RDW       (units    (unkno

wn)



                    date)                         (11.6-14.8)  % unknown)  

 

           (unknown)  (no       (unknown)  (unknown)  RDW   (11.6-14.8)  (units 

   (unknown)



                    date)                         %         unknown)  

 

           (unknown)  (no       (unknown)  (unknown)  RDW   16.1 H  (units    (u

nknown)



                    date)                         (11.6-14.8)  % unknown)  

 

           (unknown)  (no       (unknown)  (unknown)  Reevaluation #1:  (units  

  (unknown)



                    date)                                   unknown)  

 

           (unknown)  (no       (unknown)  (unknown)  Reevaluation #2:  (units  

  (unknown)



                    date)                                   unknown)  

 

           (unknown)  (no       (unknown)  (unknown)  Reevaluation(s)  (units   

 (unknown)



                    date)                                   unknown)  

 

           (unknown)  (no       (unknown)  (unknown)  Referrals:  (units    (unk

nown)



                    date)                                   unknown)  

 

           (unknown)  (no       (unknown)  (unknown)  Related Data  (units    (u

nknown)



                    date)                                   unknown)  

 

           (unknown)  (no       (unknown)  (unknown)  Respiratory Rate  (units  

  (unknown)



                    date)                         22 17:34 unknown)  

 

           (unknown)  (no       (unknown)  (unknown)  Respiratory Rate  (units  

  (unknown)



                    date)                         18 18     unknown)  

 

           (unknown)  (no       (unknown)  (unknown)  Respiratory:  (units    (u

nknown)



                    date)                         denies shortness of unknown)  



                                                  breath, or new           



                                                  cough               

 

           (unknown)  (no       (unknown)  (unknown)  Respiratory:  (units    (u

nknown)



                    date)                         normal effort, able unknown)  



                                                  to speak in           



                                                  complete sentences,           



                                                  no audible           

 

           (unknown)  (no       (unknown)  (unknown)  Result diagrams:  (units  

  (unknown)



                    date)                                   unknown)  

 

           (unknown)  (no       (unknown)  (unknown)  Review of Systems  (units 

   (unknown)



                    date)                                   unknown)  

 

           (unknown)  (no       (unknown)  (unknown)  SARS-CoV-2 (PCR)  (units  

  (unknown)



                    date)                           (Negative) unknown)  

 

           (unknown)  (no       (unknown)  (unknown)  SARS-CoV-2 (PCR)  (units  

  (unknown)



                    date)                         (Negative) unknown)  

 

           (unknown)  (no       (unknown)  (unknown)  SARS-CoV-2 (PCR)  (units  

  (unknown)



                    date)                         Negative  unknown)  



                                                  (Negative)           

 

           (unknown)  (no       (unknown)  (unknown)  Sciatica  (units    (unkno

wn)



                    date)                                   unknown)  

 

           (unknown)  (no       (unknown)  (unknown)  Signed By:  (units    (unk

nown)



                    date)                                   unknown)  

 

           (unknown)  (no       (unknown)  (unknown)  Skin: brisk  (units    (un

known)



                    date)                         capillary refill, unknown)  



                                                  no rash, no           



                                                  erythema            

 

           (unknown)  (no       (unknown)  (unknown)  Skin: denies rash,  (units

    (unknown)



                    date)                         itching or wound unknown)  

 

           (unknown)  (no       (unknown)  (unknown)  Smoking Status:  (units   

 (unknown)



                    date)                         Never smoker unknown)  

 

           (unknown)  (no       (unknown)  (unknown)  Smoking Status:  (units   

 (unknown)



                    date)                         Never smoker unknown)  

 

           (unknown)  (no       (unknown)  (unknown)  Social History  (units    

(unknown)



                    date)                         (Reviewed 22 unknown)  



                                                  @ 19:57 by ZAIRA Amezcua)           

 

           (unknown)  (no       (unknown)  (unknown)  Sodium    (units    (unkno

wn)



                    date)                         (137-145)  mmol/L unknown)  

 

           (unknown)  (no       (unknown)  (unknown)  Sodium    135 L  (units   

 (unknown)



                    date)                         (137-145)  mmol/L unknown)  

 

           (unknown)  (no       (unknown)  (unknown)  Sodium   (137-145)  (units

    (unknown)



                    date)                          mmol/L   unknown)  

 

           (unknown)  (no       (unknown)  (unknown)  Sodium Chloride  (units   

 (unknown)



                    date)                         (Normal Saline unknown)  



                                                  0.9%)  1,000 mls @           



                                                  1,000 mls/hr IV           



                                                  BOLUS ONE           

 

           (unknown)  (no       (unknown)  (unknown)  Source: patient  (units   

 (unknown)



                    date)                         and family unknown)  

 

           (unknown)  (no       (unknown)  (unknown)  Spinal stenosis  (units   

 (unknown)



                    date)                                   unknown)  

 

           (unknown)  (no       (unknown)  (unknown)  Stated complaint:  (units 

   (unknown)



                    date)                         WEAKNESS UNABLE TO unknown)  



                                                  HOLD ANYTHING DOWN           

 

           (unknown)  (no       (unknown)  (unknown)  Substance Use  (units    (

unknown)



                    date)                         Type: does not use unknown)  

 

           (unknown)  (no       (unknown)  (unknown)  Surgical History  (units  

  (unknown)



                    date)                         (Reviewed 22 unknown)  



                                                  @ 19:57 by ZAIRA Amezcua)           

 

           (unknown)  (no       (unknown)  (unknown)  Surgical changes  (units  

  (unknown)



                    date)                         and devices:? unknown)  



                                                  None.?              

 

           (unknown)  (no       (unknown)  (unknown)  TECHNIQUE:? One  (units   

 (unknown)



                    date)                         view of the chest unknown)  



                                                  was acquired.?           

 

           (unknown)  (no       (unknown)  (unknown)  Take antibiotics  (units  

  (unknown)



                    date)                         until completely unknown)  



                                                  gone, start your           



                                                  antibiotic tomorrow           



                                                  morning.            

 

           (unknown)  (no       (unknown)  (unknown)  Temperature  98.3  (units 

   (unknown)



                    date)                         F   22 17:34 unknown)  

 

           (unknown)  (no       (unknown)  (unknown)  Temperature 98.3 F  (units

    (unknown)



                    date)                                   unknown)  

 

           (unknown)  (no       (unknown)  (unknown)  This is a  (units    (unkn

own)



                    date)                         62-year-old male unknown)  



                                                  with history of           



                                                  multiple sclerosis,           



                                                  CVA in 2019,           

 

           (unknown)  (no       (unknown)  (unknown)  Time Seen by  (units    (u

nknown)



                    date)                         Provider: 22 unknown)  



                                                  19:11               

 

           (unknown)  (no       (unknown)  (unknown)  Time: 23:07  (units    (un

known)



                    date)                                   unknown)  

 

           (unknown)  (no       (unknown)  (unknown)  Total Bilirubin  (units   

 (unknown)



                    date)                          (0.2-1.3)  mg/dL unknown)  

 

           (unknown)  (no       (unknown)  (unknown)  Total Bilirubin  (units   

 (unknown)



                    date)                         0.6  (0.2-1.3) unknown)  



                                                  mg/dL               

 

           (unknown)  (no       (unknown)  (unknown)  Total Bilirubin  (units   

 (unknown)



                    date)                         (0.2-1.3)  mg/dL unknown)  

 

           (unknown)  (no       (unknown)  (unknown)  Total Creatine  (units    

(unknown)



                    date)                         Kinase     () unknown)  



                                                   U/L                

 

           (unknown)  (no       (unknown)  (unknown)  Total Creatine  (units    

(unknown)



                    date)                         Kinase    54 L unknown)  



                                                  ()  U/L           

 

           (unknown)  (no       (unknown)  (unknown)  Total Creatine  (units    

(unknown)



                    date)                         Kinase   () unknown)  



                                                  U/L                 

 

           (unknown)  (no       (unknown)  (unknown)  Total Protein  (units    (

unknown)



                    date)                         (6.3-8.2)  g/dL unknown)  

 

           (unknown)  (no       (unknown)  (unknown)  Total Protein  (units    (

unknown)



                    date)                         7.5  (6.3-8.2) unknown)  



                                                  g/dL                

 

           (unknown)  (no       (unknown)  (unknown)  Total Protein  (units    (

unknown)



                    date)                         (6.3-8.2)  g/dL unknown)  

 

           (unknown)  (no       (unknown)  (unknown)  Troponin + CK  (units    (

unknown)



                    date)                         Cardiac Panel Stat unknown)  

 

           (unknown)  (no       (unknown)  (unknown)  Troponin I  (units    (unk

nown)



                    date)                         (0.01-0.034)  ng/mL unknown)  

 

           (unknown)  (no       (unknown)  (unknown)  Troponin I    <  (units   

 (unknown)



                    date)                         0.012  (0.01-0.034) unknown)  



                                                   ng/mL              

 

           (unknown)  (no       (unknown)  (unknown)  Troponin I  (units    (unk

nown)



                    date)                         (0.01-0.034)  ng/mL unknown)  

 

           (unknown)  (no       (unknown)  (unknown)  Ur Culture  (units    (unk

nown)



                    date)                         Indicated? unknown)  

 

           (unknown)  (no       (unknown)  (unknown)  Ur Culture  (units    (unk

nown)



                    date)                         Indicated? unknown)  

 

           (unknown)  (no       (unknown)  (unknown)  Ur Culture  (units    (unk

nown)



                    date)                         Indicated? unknown)  



                                                  Specimen cultured           

 

           (unknown)  (no       (unknown)  (unknown)  Urine Bacteria  (units    

(unknown)



                    date)                         (None)    unknown)  

 

           (unknown)  (no       (unknown)  (unknown)  Urine Bacteria  (units    

(unknown)



                    date)                         Few (2-10) H unknown)  



                                                  (None)              

 

           (unknown)  (no       (unknown)  (unknown)  Urine Bacteria  (units    

(unknown)



                    date)                         (None)    unknown)  

 

           (unknown)  (no       (unknown)  (unknown)  Urine Culture Stat  (units

    (unknown)



                    date)                                   unknown)  

 

           (unknown)  (no       (unknown)  (unknown)  Urine Microscopic  (units 

   (unknown)



                    date)                         Stat      unknown)  

 

           (unknown)  (no       (unknown)  (unknown)  Urine RBC  (units    (unkn

own)



                    date)                         (0-5/HPF) unknown)  

 

           (unknown)  (no       (unknown)  (unknown)  Urine RBC  (units    (unkn

own)



                    date)                         1-5/hpf  (0-5/HPF) unknown)  

 

           (unknown)  (no       (unknown)  (unknown)  Urine RBC  (units    (unkn

own)



                    date)                         (0-5/HPF) unknown)  

 

           (unknown)  (no       (unknown)  (unknown)  Urine Specific  (units    

(unknown)



                    date)                         Gravity    1.015 unknown)  

 

           (unknown)  (no       (unknown)  (unknown)  Urine WBC  (units    (unkn

own)



                    date)                         (0-5/HPF) unknown)  

 

           (unknown)  (no       (unknown)  (unknown)  Urine WBC  (units    (unkn

own)



                    date)                         5-10/hpf H unknown)  



                                                  (0-5/HPF)           

 

           (unknown)  (no       (unknown)  (unknown)  Urine WBC  (units    (unkn

own)



                    date)                         (0-5/HPF) unknown)  

 

           (unknown)  (no       (unknown)  (unknown)  Vital Signs  (units    (un

known)



                    date)                                   unknown)  

 

           (unknown)  (no       (unknown)  (unknown)  Vital signs:  (units    (u

nknown)



                    date)                                   unknown)  

 

           (unknown)  (no       (unknown)  (unknown)  Linette Jett MD  (units  

  (unknown)



                    date)                         [Physician] - As unknown)  



                                                  soon as possible           

 

           (unknown)  (no       (unknown)  (unknown)  XR chest 1V Stat  (units  

  (unknown)



                    date)                                   unknown)  

 

           (unknown)  (no       (unknown)  (unknown)  [Embedded Image  (units   

 (unknown)



                    date)                         Not Available] unknown)  

 

           (unknown)  (no       (unknown)  (unknown)  acute ischemic  (units    

(unknown)



                    date)                         changes.  unknown)  

 

           (unknown)  (no       (unknown)  (unknown)  alcohol intake  (units    

(unknown)



                    date)                         frequency: a few unknown)  



                                                  times a month           

 

           (unknown)  (no       (unknown)  (unknown)  alcohol intake:  (units   

 (unknown)



                    date)                         current   unknown)  

 

           (unknown)  (no       (unknown)  (unknown)  ambulation trial  (units  

  (unknown)



                    date)                         patient fails plan unknown)  



                                                  for admission.           

 

           (unknown)  (no       (unknown)  (unknown)  and cooperative  (units   

 (unknown)



                    date)                                   unknown)  

 

           (unknown)  (no       (unknown)  (unknown)  and generalized  (units   

 (unknown)



                    date)                         weakness.  Patient unknown)  



                                                  self catheterizes           



                                                  his bladder at           



                                                  baseline,           

 

           (unknown)  (no       (unknown)  (unknown)  appear    (units    (unkno

wn)



                    date)                                   unknown)  

 

           (unknown)  (no       (unknown)  (unknown)  as well as HPI.  (units   

 (unknown)



                    date)                         Patient's lactate unknown)  



                                                  was elevated at           



                                                  2.6, blood cultures           



                                                  were                

 

           (unknown)  (no       (unknown)  (unknown)  ascorbic acid  (units    (

unknown)



                    date)                         (vitamin C) 500 mg unknown)  



                                                  500 mg PO BID #60           



                                                  tabs 22           

 

           (unknown)  (no       (unknown)  (unknown)  atorvastatin 80 mg  (units

    (unknown)



                    date)                         tablet 40 mg PO QPM unknown)  



                                                  20           

 

           (unknown)  (no       (unknown)  (unknown)  be causing a rash  (units 

   (unknown)



                    date)                                   unknown)  

 

           (unknown)  (no       (unknown)  (unknown) bleeding.  Guaiac  (units  

  (unknown)



                    date)                         stool in the unknown)  



                                                  emergency           



                                                  department was           



                                                  negative.           



                                                  Patient's lab           

 

           (unknown)  (no       (unknown)  (unknown)  blood in his  (units    (u

nknown)



                    date)                         emesis or in his unknown)  



                                                  stool.  He reports           



                                                  that he did a stool           



                                                  test one            

 

           (unknown)  (no       (unknown)  (unknown)  bsiella pneumoniae  (units

    (unknown)



                    date)                         which was resistant unknown)  



                                                  only to ampicillin           



                                                  and nitrofurantoin.           

 

           (unknown)  (no       (unknown)  (unknown)  buspirone 5 mg  (units    

(unknown)



                    date)                         tablet 5 mg PO BID unknown)  



                                                  #60 tabs 22           

 

           (unknown)  (no       (unknown)  (unknown)  cancer.  Postop  (units   

 (unknown)



                    date)                         diagnosis from this unknown)  



                                                  upper endoscopy was           



                                                  the same.  His           

 

           (unknown)  (no       (unknown)  (unknown)  cath through the  (units  

  (unknown)



                    date)                         weekend and unknown)  



                                                  therefore did not           



                                                  drain his bladder           



                                                  since Friday.           

 

           (unknown)  (no       (unknown)  (unknown)  caths at baseline  (units 

   (unknown)



                    date)                                   unknown)  

 

           (unknown)  (no       (unknown)  (unknown)  ciprofloxacin HCl  (units 

   (unknown)



                    date)                         500 mg tablet 500 unknown)  



                                                  mg PO BID #20 tabs           



                                                  22            

 

           (unknown)  (no       (unknown)  (unknown)  clopidogrel 75 mg  (units 

   (unknown)



                    date)                         tablet 75 mg PO unknown)  



                                                  DAILY 22            

 

           (unknown)  (no       (unknown)  (unknown)  colonic   (units    (unkno

wn)



                    date)                         anastomosis, and a unknown)  



                                                  suboptimal bowel           



                                                  prep.               

 

           (unknown)  (no       (unknown)  (unknown)  concerning  (units    (unk

nown)



                    date)                         symptoms. unknown)  

 

           (unknown)  (no       (unknown)  (unknown)  cultures were  (units    (

unknown)



                    date)                         drawn prior to unknown)  



                                                  receiving           



                                                  antibiotics Urine           



                                                  microscopy shows           

 

           (unknown)  (no       (unknown)  (unknown)  cyanocobalamin  (units    

(unknown)



                    date)                         (vitamin B-12) 500 unknown)  



                                                  1,000 mcg PO DAILY           



                                                  #60 tabs 22           

 

           (unknown)  (no       (unknown)  (unknown)  decrease in urine  (units 

   (unknown)



                    date)                         output, new unknown)  



                                                  abdominal back or           



                                                  flank pain or other           



                                                  new or              

 

           (unknown)  (no       (unknown)  (unknown)  diabetes,  (units    (unkn

own)



                    date)                         hypertension, unknown)  



                                                  colorectal cancer           



                                                  in  with a           



                                                  reverse colectomy           



                                                  who                 

 

           (unknown)  (no       (unknown)  (unknown)  diabetes,  (units    (unkn

own)



                    date)                         hypertension, unknown)  



                                                  colorectal cancer           



                                                  in  with a           



                                                  reverse colectomy,           



                                                  He                  

 

           (unknown)  (no       (unknown)  (unknown)  does not drink  (units    

(unknown)



                    date)                         alcohol.? Patient unknown)  



                                                  and his wife state           



                                                  that he was seen in           



                                                  the                 

 

           (unknown)  (no       (unknown)  (unknown)  dose of Rocephin,  (units 

   (unknown)



                    date)                         a L of fluids, plan unknown)  



                                                  to leave catheter           



                                                  in place until he           



                                                  is                  

 

           (unknown)  (no       (unknown)  (unknown)  drawn after  (units    (un

known)



                    date)                         Rocephin.  Reviewed unknown)  



                                                  patient's CT, his           



                                                  urine findings and           



                                                  plan for            

 

           (unknown)  (no       (unknown)  (unknown)  elevated lactate.  (units 

   (unknown)



                    date)                         His heart rate and unknown)  



                                                  blood pressure are           



                                                  within normal           



                                                  ranges.             

 

           (unknown)  (no       (unknown)  (unknown)  emergency  (units    (unkn

own)



                    date)                         department in unknown)  



                                                  January and           



                                                  diagnosed with a GI           



                                                  bleed, he was           



                                                  admitted,           

 

           (unknown)  (no       (unknown)  (unknown)  endorses fatigue  (units  

  (unknown)



                    date)                                   unknown)  

 

           (unknown)  (no       (unknown)  (unknown) endoscopic  (units    (unkn

own)



                    date)                         diagnosis includes unknown)  



                                                  mild gastropathy,           



                                                  esophagitis, patent           



                                                  retrosigmoid           

 

           (unknown)  (no       (unknown)  (unknown)  equivocal at this  (units 

   (unknown)



                    date)                         time.  Plan to unknown)  



                                                  repeat lactate is           



                                                  improving will           



                                                  attempt             

 

           (unknown)  (no       (unknown)  (unknown)  feeling more able  (units 

   (unknown)



                    date)                         to self cath. unknown)  



                                                  Short-term           



                                                  follow-up in the           



                                                  next several days           

 

           (unknown)  (no       (unknown)  (unknown)  ferrous sulfate  (units   

 (unknown)



                    date)                         325 mg (65 mg 325 unknown)  



                                                  mg PO BIDWM #60           



                                                  tabs 22           

 

           (unknown)  (no       (unknown)  (unknown)  followed by loose  (units 

   (unknown)



                    date)                         stool.    unknown)  

 

           (unknown)  (no       (unknown)  (unknown)  gabapentin 300 mg  (units 

   (unknown)



                    date)                         capsule 300 mg PO unknown)  



                                                  BID 18            

 

           (unknown)  (no       (unknown)  (unknown)  generalized  (units    (un

known)



                    date)                         weakness and unknown)  



                                                  fatigue             

 

           (unknown)  (no       (unknown)  (unknown)  glipizide 10 mg  (units   

 (unknown)



                    date)                         tablet 10 mg PO BID unknown)  



                                                  18           

 

           (unknown)  (no       (unknown)  (unknown)  had a recent  (units    (u

nknown)



                    date)                         endoscopy that did unknown)  



                                                  not show any           



                                                  bleeding polyps or           



                                                  acute GI            

 

           (unknown)  (no       (unknown)  (unknown)  hospital and on  (units   

 (unknown)



                    date)                         chart review it unknown)  



                                                  appears that it was           



                                                  completed on           



                                                  2022.           

 

           (unknown)  (no       (unknown)  (unknown)  household members:  (units

    (unknown)



                    date)                          spouse   unknown)  

 

           (unknown)  (no       (unknown)  (unknown)  ingrown, no penile  (units

    (unknown)



                    date)                         discharge, no unknown)  



                                                  scrotal edema           

 

           (unknown)  (no       (unknown)  (unknown)  iron) tablet  (units    (u

nknown)



                    date)                                   unknown)  

 

           (unknown)  (no       (unknown)  (unknown)  lives     (units    (unkno

wn)



                    date)                         independently:  Yes unknown)  

 

           (unknown)  (no       (unknown)  (unknown)  losartan 50 mg  (units    

(unknown)



                    date)                         tablet 25 mg PO QPM unknown)  



                                                  20           

 

           (unknown)  (no       (unknown)  (unknown)  lung      (units    (unkno

wn)



                    date)                                   unknown)  

 

           (unknown)  (no       (unknown)  (unknown)  magnesium was  (units    (

unknown)



                    date)                         replaced with 2 g, unknown)  



                                                  normal saline 1000           



                                                  mL was given for           



                                                  his                 

 

           (unknown)  (no       (unknown)  (unknown)  mcg tablet  (units    (unk

nown)



                    date)                                   unknown)  

 

           (unknown)  (no       (unknown)  (unknown)  mcg tablet  (units    (unk

nown)



                    date)                         (Tab-A-Lucy) unknown)  

 

           (unknown)  (no       (unknown)  (unknown)  metformin 500 mg  (units  

  (unknown)



                    date)                         tablet,extended 500 unknown)  



                                                  mg PO BID 18            

 

           (unknown)  (no       (unknown)  (unknown)  metoprolol  (units    (unk

nown)



                    date)                         succinate 25 mg 25 unknown)  



                                                  mg PO DAILY           



                                                  21           

 

           (unknown)  (no       (unknown)  (unknown)  moving forward.  (units   

 (unknown)



                    date)                         Patient reports unknown)  



                                                  that he has been           



                                                  taking fiber but           



                                                  not any             

 

           (unknown)  (no       (unknown)  (unknown)  multivitamin with  (units 

   (unknown)



                    date)                         folic acid 400 1 unknown)  



                                                  tab PO DAILY #90           



                                                  tabs 22           

 

           (unknown)  (no       (unknown)  (unknown)  negative.  I  (units    (u

nknown)



                    date)                         completed his unknown)  



                                                  urinary             



                                                  catheterization           



                                                  with a coude           



                                                  catheter, urine           

 

           (unknown)  (no       (unknown)  (unknown) nondistended, no  (units   

 (unknown)



                    date)                         masses, no unknown)  



                                                  exquisite           



                                                  tenderness with           



                                                  exam, no rebound           



                                                  tendernes           

 

           (unknown)  (no       (unknown)  (unknown)  not heard it was  (units  

  (unknown)



                    date)                         positive or not. unknown)  



                                                  Patient reports           



                                                  that his primary           



                                                  care                

 

           (unknown)  (no       (unknown)  (unknown)  not walk very far  (units 

   (unknown)



                    date)                         and did have unknown)  



                                                  difficulty.  He           



                                                  does appear to have           

 

           (unknown)  (no       (unknown)  (unknown)  ondansetron HCl 4  (units 

   (unknown)



                    date)                         mg tablet 4 mg PO unknown)  



                                                  Q8H PRN nausea and           



                                                  21            

 

           (unknown)  (no       (unknown)  (unknown)  oral powder packet  (units

    (unknown)



                    date)                                   unknown)  

 

           (unknown)  (no       (unknown)  (unknown)  output for three  (units  

  (unknown)



                    date)                         days, three days unknown)  



                                                  ago he had nausea           



                                                  and vomiting,           



                                                  denies any           

 

           (unknown)  (no       (unknown)  (unknown)  polyethylene  (units    (u

nknown)



                    date)                         glycol 3350 17 gram unknown)  



                                                  17 gm PO DAILY #90           



                                                  ea 22           

 

           (unknown)  (no       (unknown)  (unknown)  presents to the  (units   

 (unknown)



                    date)                         emergency unknown)  



                                                  department           



                                                  complaining of           



                                                  ongoing fatigue,           



                                                  oliguria,           

 

           (unknown)  (no       (unknown)  (unknown)  provider has left,  (units

    (unknown)



                    date)                         he reports that he unknown)  



                                                  has seen Dr. Macias           



                                                  recently as well.           

 

           (unknown)  (no       (unknown)  (unknown)  pyelonephritis and  (units

    (unknown)



                    date)                         Toro catheter was unknown)  



                                                  left in place as he           



                                                  was too weak to           



                                                  self                

 

           (unknown)  (no       (unknown)  (unknown)  quadrant with a  (units   

 (unknown)



                    date)                         complicated unknown)  



                                                  surgical history of           



                                                  his abdomen.  This           



                                                  shows               

 

           (unknown)  (no       (unknown)  (unknown)  recently for his  (units  

  (unknown)



                    date)                         anemia.   unknown)  

 

           (unknown)  (no       (unknown)  (unknown)  related diarrhea  (units  

  (unknown)



                    date)                         from fecal loading unknown)  



                                                  and obstipation.           



                                                  Recommended MiraLax           



                                                  17 g                

 

           (unknown)  (no       (unknown)  (unknown)  release 24 hr  (units    (

unknown)



                    date)                                   unknown)  

 

           (unknown)  (no       (unknown)  (unknown)  repeat lactate at  (units 

   (unknown)



                    date)                         this time. unknown)  



                                                  Discussed admission           



                                                  versus home patient           



                                                  has                 

 

           (unknown)  (no       (unknown)  (unknown)  reported that  (units    (

unknown)



                    date)                         patient's altered unknown)  



                                                  bowel habit is           



                                                  likely a function           



                                                  of overflow           

 

           (unknown)  (no       (unknown)  (unknown)  s         (units    (unkno

wn)



                    date)                                   unknown)  

 

           (unknown)  (no       (unknown)  (unknown)  self cath  (units    (unkn

own)



                    date)                         regularly. unknown)  

 

           (unknown)  (no       (unknown)  (unknown)  sennosides 8.6 mg  (units 

   (unknown)



                    date)                         tablet (senna) 17.2 unknown)  



                                                  mg PO BEDTIME #60           



                                                  tabs 22           

 

           (unknown)  (no       (unknown)  (unknown)  substance use  (units    (

unknown)



                    date)                         type:  does not use unknown)  

 

           (unknown)  (no       (unknown)  (unknown)  tablet (Vitamin C)  (units

    (unknown)



                    date)                                   unknown)  

 

           (unknown)  (no       (unknown)  (unknown)  tablet,extended  (units   

 (unknown)



                    date)                         release 24 hr unknown)  

 

           (unknown)  (no       (unknown)  (unknown)  there.  He reports  (units

    (unknown)



                    date)                         that he had an unknown)  



                                                  upper endoscopy           



                                                  completed here at           



                                                  this                

 

           (unknown)  (no       (unknown)  (unknown)  to recheck renal  (units  

  (unknown)



                    date)                         function patient unknown)  



                                                  encouraged to           



                                                  return if not           



                                                  improving.           

 

           (unknown)  (no       (unknown)  (unknown)  tone      (units    (unkno

wn)



                    date)                                   unknown)  

 

           (unknown)  (no       (unknown)  (unknown)  trospium 20 mg  (units    

(unknown)



                    date)                         tablet 20 mg PO BID unknown)  



                                                  urinary retention           



                                                  22           

 

           (unknown)  (no       (unknown)  (unknown)  unremarkable.?  (units    

(unknown)



                    date)                                   unknown)  

 

           (unknown)  (no       (unknown)  (unknown)  urine dip showed  (units  

  (unknown)



                    date)                         leukocytes, unknown)  



                                                  ketones, wbc's and           



                                                  did not show           



                                                  nitrites.  Blood           

 

           (unknown)  (no       (unknown)  (unknown)  urine is cloudy,  (units  

  (unknown)



                    date)                         yellow,   unknown)  



                                                  approximately 600           



                                                  mL in bladder and           



                                                  drained, no rash           

 

           (unknown)  (no       (unknown)  (unknown)  volumes accentuate  (units

    (unknown)



                    date)                         pulmonary unknown)  



                                                  interstitium and           



                                                  heart size.           

 

           (unknown)  (no       (unknown)  (unknown)  was cloudy yellow  (units 

   (unknown)



                    date)                         urine and 600 mL unknown)  



                                                  was drained.           



                                                  Patient tolerated           



                                                  well.  His           

 

           (unknown)  (no       (unknown)  (unknown) was offered  (units    (unk

nown)



                    date)                         admission.  Both he unknown)  



                                                  and his wife prefer           



                                                  to return home.           



                                                  Patient did           

 

           (unknown)  (no       (unknown)  (unknown)  week ago and  (units    (u

nknown)



                    date)                         dropped it off at unknown)  



                                                  [facility] which was           



                                                  testing for blood           



                                                  but he has           

 

           (unknown)  (no       (unknown)  (unknown)  went to [facility]  (units 

   (unknown)



                    date)                         for rehab following unknown)  



                                                  his admission and           



                                                  has followed up           



                                                  with                

 

           (unknown)  (no       (unknown)  (unknown)  wheezing, stridor,  (units

    (unknown)



                    date)                         or rales. No unknown)  



                                                  retractions or           



                                                  tachypnea.           









                                         Result panel 28









           (unknown)  (no       (unknown)  (unknown)  (no value)  (units    (unk

nown)



                    date)                                   unknown)  

 

           (unknown)  (no       (unknown)  (unknown)  Radiologist  (units    (un

known)



                    date)                         Impression: unknown)  

 

           (unknown)  (no       (unknown)  (unknown)  Date of Service:  (units  

  (unknown)



                    date)                         22  unknown)  

 

           (unknown)  (no       (unknown)  (unknown)  (no value)  (units    (unk

nown)



                    date)                                   unknown)  

 

           (unknown)  (no       (unknown)  (unknown)  22 17:45  (units    

(unknown)



                    date)                                   unknown)  

 

           (unknown)  (no       (unknown)  (unknown)  1 tab PO DAILY  (units    

(unknown)



                    date)                         Qty: 90 0RF unknown)  

 

           (unknown)  (no       (unknown)  (unknown)  1,000 mcg PO DAILY  (units

    (unknown)



                    date)                         Qty: 60 0RF unknown)  

 

           (unknown)  (no       (unknown)  (unknown)  10 mg PO BID  (units    (u

nknown)



                    date)                                   unknown)  

 

           (unknown)  (no       (unknown)  (unknown)  17 gm PO DAILY  (units    

(unknown)



                    date)                         Qty: 90 0RF unknown)  

 

           (unknown)  (no       (unknown)  (unknown)  17.2 mg PO BEDTIME  (units

    (unknown)



                    date)                         Qty: 60 0RF unknown)  

 

           (unknown)  (no       (unknown)  (unknown)  20 mg PO BID  (units    (u

nknown)



                    date)                                   unknown)  

 

           (unknown)  (no       (unknown)  (unknown)  25 mg PO DAILY  (units    

(unknown)



                    date)                                   unknown)  

 

           (unknown)  (no       (unknown)  (unknown)  25 mg PO QPM  (units    (u

nknown)



                    date)                                   unknown)  

 

           (unknown)  (no       (unknown)  (unknown)  300 mg PO BID  (units    (

unknown)



                    date)                                   unknown)  

 

           (unknown)  (no       (unknown)  (unknown)  325 mg PO BIDWM  (units   

 (unknown)



                    date)                         Qty: 60 0RF unknown)  

 

           (unknown)  (no       (unknown)  (unknown)  4 mg PO Q8H PRN  (units   

 (unknown)



                    date)                         (Reason: nausea and unknown)  



                                                  vomiting) Qty: 14           



                                                  0RF                 

 

           (unknown)  (no       (unknown)  (unknown)  40 mg PO QPM  (units    (u

nknown)



                    date)                                   unknown)  

 

           (unknown)  (no       (unknown)  (unknown)  5 mg PO BID Qty:  (units  

  (unknown)



                    date)                         60 0RF    unknown)  

 

           (unknown)  (no       (unknown)  (unknown)  500 mg PO BID  (units    (

unknown)



                    date)                                   unknown)  

 

           (unknown)  (no       (unknown)  (unknown)  500 mg PO BID Qty:  (units

    (unknown)



                    date)                         20 0RF    unknown)  

 

           (unknown)  (no       (unknown)  (unknown)  500 mg PO BID Qty:  (units

    (unknown)



                    date)                         60 0RF    unknown)  

 

           (unknown)  (no       (unknown)  (unknown)  75 mg PO DAILY  (units    

(unknown)



                    date)                                   unknown)  

 

           (unknown)  (no       (unknown)  (unknown)  Admin: 22  (units   

 (unknown)



                    date)                         20:18  Dose: 1,000 unknown)  



                                                  mls/hr              

 

           (unknown)  (no       (unknown)  (unknown)  Admin: 22  (units   

 (unknown)



                    date)                         20:19  Dose: 50 unknown)  



                                                  mls/hr              

 

           (unknown)  (no       (unknown)  (unknown)  Admin: 22  (units   

 (unknown)



                    date)                         21:06  Dose: 200 unknown)  



                                                  mls/hr              

 

           (unknown)  (no       (unknown)  (unknown)  Allergies  (units    (unkn

own)



                    date)                                   unknown)  

 

           (unknown)  (no       (unknown)  (unknown)  Documented By: [facility]  (units

    (unknown)



                    date)                                   unknown)  

 

           (unknown)  (no       (unknown)  (unknown)  Documented By: AT  (units 

   (unknown)



                    date)                          Co-signed By: MLM unknown)  

 

           (unknown)  (no       (unknown)  (unknown)  Documented By: AT  (units 

   (unknown)



                    date)                                   unknown)  

 

           (unknown)  (no       (unknown)  (unknown)  Documented By: TORREY  (units

    (unknown)



                    date)                           Co-signed By: [facility] unknown)  

 

           (unknown)  (no       (unknown)  (unknown)  Documented By: MLSTEVE  (units

    (unknown)



                    date)                                   unknown)  

 

           (unknown)  (no       (unknown)  (unknown)  ED Orders  (units    (unkn

own)



                    date)                                   unknown)  

 

           (unknown)  (no       (unknown)  (unknown)  Emergency Report  (units  

  (unknown)



                    date)                                   unknown)  

 

           (unknown)  (no       (unknown)  (unknown)  Home Medications  (units  

  (unknown)



                    date)                                   unknown)  

 

           (unknown)  (no       (unknown)  (unknown)  [facility]  (units   

 (unknown)



                    date)                         [address] unknown)  



                                                  [place] [address]           

 

           (unknown)  (no       (unknown)  (unknown)  Lab Results  (units    (un

known)



                    date)                                   unknown)  

 

           (unknown)  (no       (unknown)  (unknown)  Label Comments:  (units   

 (unknown)



                    date)                                   unknown)  

 

           (unknown)  (no       (unknown)  (unknown)  Last Admin:  (units    (un

known)



                    date)                         22 18:30 unknown)  



                                                  Dose: 4 mg           

 

           (unknown)  (no       (unknown)  (unknown)  Last Infusion:  (units    

(unknown)



                    date)                         22 21:10 unknown)  



                                                  Dose: 0 mls/hr           

 

           (unknown)  (no       (unknown)  (unknown)  Last Infusion:  (units    

(unknown)



                    date)                         22 21:28 unknown)  



                                                  Dose: 0 mls/hr           

 

           (unknown)  (no       (unknown)  (unknown)  Last Infusion:  (units    

(unknown)



                    date)                         22 23:15 unknown)  



                                                  Dose: 0 mls/hr           

 

           (unknown)  (no       (unknown)  (unknown)  Previous Rx's  (units    (

unknown)



                    date)                                   unknown)  

 

           (unknown)  (no       (unknown)  (unknown)  Stop: 22  (units    

(unknown)



                    date)                         18:16     unknown)  

 

           (unknown)  (no       (unknown)  (unknown)  Stop: 22  (units    

(unknown)



                    date)                         20:15     unknown)  

 

           (unknown)  (no       (unknown)  (unknown)  Stop: 22  (units    

(unknown)



                    date)                         20:25     unknown)  

 

           (unknown)  (no       (unknown)  (unknown)  Stop: 22  (units    

(unknown)



                    date)                         20:27     unknown)  

 

           (unknown)  (no       (unknown)  (unknown)  Urine Dip  (units    (unkn

own)



                    date)                                   unknown)  

 

           (unknown)  (no       (unknown)  (unknown)  Vital Signs - 8 hr  (units

    (unknown)



                    date)                                   unknown)  

 

           (unknown)  (no       (unknown)  (unknown)  has not been  (units    (u

nknown)



                    date)                         eating so hasn't unknown)  



                                                  taken recently.           



                                                  spouse states           



                                                  thinks it might           

 

           (unknown)  (no       (unknown)  (unknown)  stopped taking  (units    

(unknown)



                    date)                         prior to back unknown)  



                                                  surgery and did not           



                                                  restart             

 

           (unknown)  (no       (unknown)  (unknown)  (no value)  (units    (unk

nown)



                    date)                                   unknown)  

 

           (unknown)  (no       (unknown)  (unknown)  22  (units 

   (unknown)



                    date)                         22  unknown)  



                                                  Range/Units           

 

           (unknown)  (no       (unknown)  (unknown)  22  (units    (unkno

wn)



                    date)                         Range/Units unknown)  

 

           (unknown)  (no       (unknown)  (unknown)  17:40 17:45 17:45  (units 

   (unknown)



                    date)                                   unknown)  

 

           (unknown)  (no       (unknown)  (unknown)  17:45 17:45 20:09  (units 

   (unknown)



                    date)                                   unknown)  

 

           (unknown)  (no       (unknown)  (unknown)  22:00     (units    (unkno

wn)



                    date)                                   unknown)  

 

           (unknown)  (no       (unknown)  (unknown)  ascorbic acid  (units    (

unknown)



                    date)                         (vitamin C) unknown)  



                                                  [Vitamin C] 500 mg           



                                                  Tablet              

 

           (unknown)  (no       (unknown)  (unknown)  atorvastatin 80 mg  (units

    (unknown)



                    date)                         tablet    unknown)  

 

           (unknown)  (no       (unknown)  (unknown)  buspirone 5 mg  (units    

(unknown)



                    date)                         Tablet    unknown)  

 

           (unknown)  (no       (unknown)  (unknown)  ciprofloxacin HCl  (units 

   (unknown)



                    date)                         500 mg tablet unknown)  

 

           (unknown)  (no       (unknown)  (unknown)  clopidogrel 75 mg  (units 

   (unknown)



                    date)                         tablet    unknown)  

 

           (unknown)  (no       (unknown)  (unknown)  cyanocobalamin  (units    

(unknown)



                    date)                         (vitamin B-12) 500 unknown)  



                                                  mcg Tablet           

 

           (unknown)  (no       (unknown)  (unknown)  ferrous sulfate  (units   

 (unknown)



                    date)                         325 mg (65 mg iron) unknown)  



                                                  Tablet              

 

           (unknown)  (no       (unknown)  (unknown)  gabapentin 300 mg  (units 

   (unknown)



                    date)                         capsule   unknown)  

 

           (unknown)  (no       (unknown)  (unknown)  glipizide 10 mg  (units   

 (unknown)



                    date)                         tablet    unknown)  

 

           (unknown)  (no       (unknown)  (unknown)  losartan 50 mg  (units    

(unknown)



                    date)                         tablet    unknown)  

 

           (unknown)  (no       (unknown)  (unknown)  metformin 500 mg  (units  

  (unknown)



                    date)                         tablet extended unknown)  



                                                  release 24 hr           

 

           (unknown)  (no       (unknown)  (unknown)  metoprolol  (units    (unk

nown)



                    date)                         succinate 25 mg unknown)  



                                                  tablet extended           



                                                  release 24 hr           

 

           (unknown)  (no       (unknown)  (unknown)  multivitamin with  (units 

   (unknown)



                    date)                         folic acid unknown)  



                                                  [Tab-A-Lucy] 400           



                                                  mcg Tablet           

 

           (unknown)  (no       (unknown)  (unknown)  ondansetron HCl  (units   

 (unknown)



                    date)                         [Zofran] 4 mg unknown)  



                                                  tablet              

 

           (unknown)  (no       (unknown)  (unknown)  polyethylene  (units    (u

nknown)



                    date)                         glycol 3350 17 gram unknown)  



                                                  Powder In Packet           

 

           (unknown)  (no       (unknown)  (unknown)  sennosides [senna]  (units

    (unknown)



                    date)                         8.6 mg Tablet unknown)  

 

           (unknown)  (no       (unknown)  (unknown)  trospium 20 mg  (units    

(unknown)



                    date)                         tablet    unknown)  

 

           (unknown)  (no       (unknown)  (unknown)  22  (units    (unkno

wn)



                    date)                                   unknown)  

 

           (unknown)  (no       (unknown)  (unknown)  1.07 in January.  (units  

  (unknown)



                    date)                         No elevation in his unknown)  



                                                  troponin,           



                                                  procalcitonin is           



                                                  elevated at           

 

           (unknown)  (no       (unknown)  (unknown)  Anemia,   (units    (unkno

wn)



                    date)                         Hypomagnesemia, unknown)  



                                                  Pyelonephritis           

 

           (unknown)  (no       (unknown)  (unknown)  Medication  (units    (unk

nown)



                    date)                         Instructions unknown)  



                                                  Recorded            

 

           (unknown)  (no       (unknown)  (unknown)  Medication  (units    (unk

nown)



                    date)                         Instructions unknown)  



                                                  Recorded  Confirmed           

 

           (unknown)  (no       (unknown)  (unknown)  sodium 135,  (units    (un

known)



                    date)                         potassium 3.7, unknown)  



                                                  creatinine 1.42           



                                                  which is increased           



                                                  from his prior of           

 

           (unknown)  (no       (unknown)  (unknown)  with regular axis  (units 

   (unknown)



                    date)                         and intervals.  No unknown)  



                                                  STEMI, ST segment           



                                                  changes,            



                                                  arrhythmia, or           

 

           (unknown)  (no       (unknown)  (unknown)  work is   (units    (unkno

wn)



                    date)                         significant for unknown)  



                                                  anemia, hemoglobin           



                                                  is 9.1 today, down           



                                                  from 9.6 in           

 

           (unknown)  (no       (unknown)  (unknown)  <Kitty MAGDY Costello,  (units 

   (unknown)



                    date)                         ARNP - Last Filed: unknown)  



                                                  22 20:33>           

 

           (unknown)  (no       (unknown)  (unknown)  <Allegra CINTHYA Dinesh, DO  (units

    (unknown)



                    date)                         - Last Filed: unknown)  



                                                  22 04:52>           

 

           (unknown)  (no       (unknown)  (unknown)  (Zofran) vomiting  (units 

   (unknown)



                    date)                         #14 tabs  unknown)  

 

           (unknown)  (no       (unknown)  (unknown)  0.72, his lactate  (units 

   (unknown)



                    date)                         is also elevated at unknown)  



                                                  2.6, magnesium is           



                                                  low at 1.0.  His           

 

           (unknown)  (no       (unknown)  (unknown)  [id number]   (units    (unkno

wn)



                    date)                                   unknown)  

 

           (unknown)  (no       (unknown)  (unknown)  22 20:09  (units    

(unknown)



                    date)                                   unknown)  

 

           (unknown)  (no       (unknown)  (unknown)  22 20:55  (units    

(unknown)



                    date)                                   unknown)  

 

           (unknown)  (no       (unknown)  (unknown)  with iron  (units    (

unknown)



                    date)                         deficiency anemia, unknown)  



                                                  altered bowel           



                                                  habit, personal           



                                                  history of colon           

 

           (unknown)  (no       (unknown)  (unknown)  22:52     (units    (unkno

wn)



                    date)                                   unknown)  

 

           (unknown)  (no       (unknown)  (unknown)  62-year-old  (units    (un

known)



                    date)                         gentleman with a unknown)  



                                                  history of multiple           



                                                  sclerosis, CVA in           



                                                  2019,               

 

           (unknown)  (no       (unknown)  (unknown)  ?         (units    (unkno

wn)



                    date)                                   unknown)  

 

           (unknown)  (no       (unknown)  (unknown)  ALT     (<50)  (units    (

unknown)



                    date)                         IU/L      unknown)  

 

           (unknown)  (no       (unknown)  (unknown)  ALT    16  (<50)  (units  

  (unknown)



                    date)                         IU/L      unknown)  

 

           (unknown)  (no       (unknown)  (unknown)  ALT   (<50)  IU/L  (units 

   (unknown)



                    date)                                   unknown)  

 

           (unknown)  (no       (unknown)  (unknown)  AST     (17-59)  (units   

 (unknown)



                    date)                         IU/L      unknown)  

 

           (unknown)  (no       (unknown)  (unknown)  AST    19  (17-59)  (units

    (unknown)



                    date)                          IU/L     unknown)  

 

           (unknown)  (no       (unknown)  (unknown)  AST   (17-59)  (units    (

unknown)



                    date)                         IU/L      unknown)  

 

           (unknown)  (no       (unknown)  (unknown)  Activity  (units    (unkno

wn)



                    date)                         Restrictions/Additi unknown)  



                                                  onal Instructions:           

 

           (unknown)  (no       (unknown)  (unknown)  Age/Sex: 62 / M  (units   

 (unknown)



                    date)                                   unknown)  

 

           (unknown)  (no       (unknown)  (unknown)  Albumin   (units    (unkno

wn)



                    date)                         (3.5-5.0)  g/dL unknown)  

 

           (unknown)  (no       (unknown)  (unknown)  Albumin    3.9  (units    

(unknown)



                    date)                         (3.5-5.0)  g/dL unknown)  

 

           (unknown)  (no       (unknown)  (unknown)  Albumin   (units    (unkno

wn)



                    date)                         (3.5-5.0)  g/dL unknown)  

 

           (unknown)  (no       (unknown)  (unknown)  Albumin/Globulin  (units  

  (unknown)



                    date)                         Ratio     (1.0-2.8) unknown)  

 

           (unknown)  (no       (unknown)  (unknown)  Albumin/Globulin  (units  

  (unknown)



                    date)                         Ratio    1.1 unknown)  



                                                  (1.0-2.8)           

 

           (unknown)  (no       (unknown)  (unknown)  Albumin/Globulin  (units  

  (unknown)



                    date)                         Ratio   (1.0-2.8) unknown)  

 

           (unknown)  (no       (unknown)  (unknown)  Alkaline  (units    (unkno

wn)



                    date)                         Phosphatase unknown)  



                                                  ()  U/L           

 

           (unknown)  (no       (unknown)  (unknown)  Alkaline  (units    (unkno

wn)



                    date)                         Phosphatase    105 unknown)  



                                                  ()  U/L           

 

           (unknown)  (no       (unknown)  (unknown)  Alkaline  (units    (unkno

wn)



                    date)                         Phosphatase unknown)  



                                                  ()  U/L           

 

           (unknown)  (no       (unknown)  (unknown)  Allergy/AdvReac  (units   

 (unknown)



                    date)                         Type Severity unknown)  



                                                  Reaction Status           



                                                  Date / Time           

 

           (unknown)  (no       (unknown)  (unknown)  Amorphous Sediment  (units

    (unknown)



                    date)                                   unknown)  

 

           (unknown)  (no       (unknown)  (unknown)  Amorphous Sediment  (units

    (unknown)



                    date)                                   unknown)  

 

           (unknown)  (no       (unknown)  (unknown)  Amorphous Sediment  (units

    (unknown)



                    date)                            2+     unknown)  

 

           (unknown)  (no       (unknown)  (unknown)  Approved by: Garfield Mcdanielsunits 

   (unknown)



                    date)                         SHANNAN Lopez on unknown)  



                                                  2022 at 18:02?           

 

           (unknown)  (no       (unknown)  (unknown)  BUN     (9-20)  (units    

(unknown)



                    date)                         mg/dL     unknown)  

 

           (unknown)  (no       (unknown)  (unknown)  BUN    21 H  (units    (un

known)



                    date)                         (9-20)  mg/dL unknown)  

 

           (unknown)  (no       (unknown)  (unknown)  BUN   (9-20)  (units    (u

nknown)



                    date)                         mg/dL     unknown)  

 

           (unknown)  (no       (unknown)  (unknown)  BUN/Creatinine  (units    

(unknown)



                    date)                         Ratio     (6-22) unknown)  

 

           (unknown)  (no       (unknown)  (unknown)  BUN/Creatinine  (units    

(unknown)



                    date)                         Ratio    14.8 unknown)  



                                                  (6-22)              

 

           (unknown)  (no       (unknown)  (unknown)  BUN/Creatinine  (units    

(unknown)



                    date)                         Ratio   (6-22) unknown)  

 

           (unknown)  (no       (unknown)  (unknown)  Baso # (Auto)  (units    (

unknown)



                    date)                         (0-100)  /uL unknown)  

 

           (unknown)  (no       (unknown)  (unknown)  Baso # (Auto)  (units    (

unknown)



                    date)                         (0-100)  /uL unknown)  

 

           (unknown)  (no       (unknown)  (unknown)  Baso # (Auto)   0  (units 

   (unknown)



                    date)                          (0-100)  /uL unknown)  

 

           (unknown)  (no       (unknown)  (unknown)  Baso % (Auto)  (units    (

unknown)



                    date)                         (0-2)  %  unknown)  

 

           (unknown)  (no       (unknown)  (unknown)  Baso % (Auto)  (units    (

unknown)



                    date)                         (0-2)  %  unknown)  

 

           (unknown)  (no       (unknown)  (unknown)  Baso % (Auto)  (units    (

unknown)



                    date)                         0.2   (0-2)  % unknown)  

 

           (unknown)  (no       (unknown)  (unknown)  Bedside Urine  (units    (

unknown)



                    date)                         Bilirubin        - unknown)  



                                                  Negative            

 

           (unknown)  (no       (unknown)  (unknown)  Bedside Urine  (units    (

unknown)



                    date)                         Glucose    Negative unknown)  

 

           (unknown)  (no       (unknown)  (unknown)  Bedside Urine  (units    (

unknown)



                    date)                         Ketone    - unknown)  



                                                  Negative            

 

           (unknown)  (no       (unknown)  (unknown)  Bedside Urine  (units    (

unknown)



                    date)                         Leukocytes       + unknown)  



                                                  70                  

 

           (unknown)  (no       (unknown)  (unknown)  Bedside Urine  (units    (

unknown)



                    date)                         Nitrite    - unknown)  



                                                  Negative            

 

           (unknown)  (no       (unknown)  (unknown)  Bedside Urine  (units    (

unknown)



                    date)                         Occult Blood     ++ unknown)  

 

           (unknown)  (no       (unknown)  (unknown)  Bedside Urine  (units    (

unknown)



                    date)                         Protein    + 30 unknown)  

 

           (unknown)  (no       (unknown)  (unknown)  Bedside Urine  (units    (

unknown)



                    date)                         Urobilinogen     - unknown)  



                                                  Negative            

 

           (unknown)  (no       (unknown)  (unknown)  Bedside Urine pH  (units  

  (unknown)



                    date)                          6.0      unknown)  

 

           (unknown)  (no       (unknown)  (unknown)  Blood Culture Stat  (units

    (unknown)



                    date)                                   unknown)  

 

           (unknown)  (no       (unknown)  (unknown)  Blood Pressure  (units    

(unknown)



                    date)                         124/66   22 unknown)  



                                                  17:34               

 

           (unknown)  (no       (unknown)  (unknown)  Blood Pressure  (units    

(unknown)



                    date)                         124/58 L  unknown)  

 

           (unknown)  (no       (unknown)  (unknown)  Bones and chest  (units   

 (unknown)



                    date)                         wall:? No unknown)  



                                                  suspicious bony           



                                                  lesions.? Overlying           



                                                  soft tissues           

 

           (unknown)  (no       (unknown)  (unknown)  CK-MB (CK-2)  (units    (u

nknown)



                    date)                                   unknown)  

 

           (unknown)  (no       (unknown)  (unknown)  CK-MB (CK-2)  (units    (u

nknown)



                    date)                                   unknown)  

 

           (unknown)  (no       (unknown)  (unknown)  CK-MB (CK-2)  (units    (u

nknown)



                    date)                         TNP       unknown)  

 

           (unknown)  (no       (unknown)  (unknown)  CK-MB (CK-2) Rel  (units  

  (unknown)



                    date)                         Index     unknown)  

 

           (unknown)  (no       (unknown)  (unknown)  CK-MB (CK-2) Rel  (units  

  (unknown)



                    date)                         Index     unknown)  

 

           (unknown)  (no       (unknown)  (unknown)  CK-MB (CK-2) Rel  (units  

  (unknown)



                    date)                         Index    TNP unknown)  

 

           (unknown)  (no       (unknown)  (unknown)  COMPARISON:?  (units    (u

nknown)



                    date)                         [facility], unknown)  



                                                  CR, XR CHEST 2V,           



                                                  3/18/2021, 10:19.           

 

           (unknown)  (no       (unknown)  (unknown)  CT abdomen pelvis  (units 

   (unknown)



                    date)                         was ordered for unknown)  



                                                  patient has           



                                                  tenderness to his           



                                                  right lower           

 

           (unknown)  (no       (unknown)  (unknown)  CVA (cerebral  (units    (

unknown)



                    date)                         vascular accident) unknown)  



                                                  (2018)           

 

           (unknown)  (no       (unknown)  (unknown)  Calcium   (units    (unkno

wn)



                    date)                         (8.4-10.2)  mg/dL unknown)  

 

           (unknown)  (no       (unknown)  (unknown)  Calcium    9.3  (units    

(unknown)



                    date)                         (8.4-10.2)  mg/dL unknown)  

 

           (unknown)  (no       (unknown)  (unknown)  Calcium   (units    (unkno

wn)



                    date)                         (8.4-10.2)  mg/dL unknown)  

 

           (unknown)  (no       (unknown)  (unknown)  Carbon Dioxide  (units    

(unknown)



                    date)                         (22-32)  mmol/L unknown)  

 

           (unknown)  (no       (unknown)  (unknown)  Carbon Dioxide  (units    

(unknown)



                    date)                         29  (22-32)  mmol/L unknown)  

 

           (unknown)  (no       (unknown)  (unknown)  Carbon Dioxide  (units    

(unknown)



                    date)                         (22-32)  mmol/L unknown)  

 

           (unknown)  (no       (unknown)  (unknown)  Cardio: denies  (units    

(unknown)



                    date)                         chest pain, unknown)  



                                                  palpitations           

 

           (unknown)  (no       (unknown)  (unknown)  Cardiovascular:  (units   

 (unknown)



                    date)                         regular rate and unknown)  



                                                  rhythm, no           



                                                  peripheral edema,           



                                                  warm extremities           

 

           (unknown)  (no       (unknown)  (unknown)  Ceftriaxone Sodium  (units

    (unknown)



                    date)                         1,000 mg/ (Sodium unknown)  



                                                  Chloride)  100 mls           



                                                  @ 200 mls/hr IV NOW           



                                                  ONE                 

 

           (unknown)  (no       (unknown)  (unknown)  Chest x-ray:  (units    (u

nknown)



                    date)                                   unknown)  

 

           (unknown)  (no       (unknown)  (unknown)  Chief complaint:  (units  

  (unknown)



                    date)                         Weakness  unknown)  

 

           (unknown)  (no       (unknown)  (unknown)  Chloride  (units    (unkno

wn)



                    date)                         ()  mmol/L unknown)  

 

           (unknown)  (no       (unknown)  (unknown)  Chloride    96 L  (units  

  (unknown)



                    date)                         ()  mmol/L unknown)  

 

           (unknown)  (no       (unknown)  (unknown)  Chloride  (units    (unkno

wn)



                    date)                         ()  mmol/L unknown)  

 

           (unknown)  (no       (unknown)  (unknown)  Clinical  (units    (unkno

wn)



                    date)                         Impression: unknown)  

 

           (unknown)  (no       (unknown)  (unknown)  Colon cancer  (units    (u

nknown)



                    date)                         (2013)    unknown)  

 

           (unknown)  (no       (unknown)  (unknown)  Course    (units    (unkno

wn)



                    date)                                   unknown)  

 

           (unknown)  (no       (unknown)  (unknown)  Creatinine  (units    (unk

nown)



                    date)                         (0.66-1.25)  mg/dL unknown)  

 

           (unknown)  (no       (unknown)  (unknown)  Creatinine    1.42  (units

    (unknown)



                    date)                         H  (0.66-1.25) unknown)  



                                                  mg/dL               

 

           (unknown)  (no       (unknown)  (unknown)  Creatinine  (units    (unk

nown)



                    date)                         (0.66-1.25)  mg/dL unknown)  

 

           (unknown)  (no       (unknown)  (unknown)  : 1960  (units   

 (unknown)



                    date)                         Acct:[id number] unknown)  

 

           (unknown)  (no       (unknown)  (unknown)  Departure  (units    (unkn

own)



                    date)                                   unknown)  

 

           (unknown)  (no       (unknown)  (unknown)  Depression  (units    (unk

nown)



                    date)                                   unknown)  

 

           (unknown)  (no       (unknown)  (unknown)  Diabetes  (units    (unkno

wn)



                    date)                                   unknown)  

 

           (unknown)  (no       (unknown)  (unknown)  Diabetic  (units    (unkno

wn)



                    date)                         neuropathy unknown)  

 

           (unknown)  (no       (unknown)  (unknown)  Discharge Plan  (units    

(unknown)



                    date)                                   unknown)  

 

           (unknown)  (no       (unknown)  (unknown)  Discontinued  (units    (u

nknown)



                    date)                         Medications unknown)  

 

           (unknown)  (no       (unknown)  (unknown)  Gustavo Macias MD  (units   

 (unknown)



                    date)                         [[facility] unknown)  



                                                  Provider] -           

 

           (unknown)  (no       (unknown)  (unknown)  ECG Data  (units    (unkno

wn)



                    date)                                   unknown)  

 

           (unknown)  (no       (unknown)  (unknown) EKG independently  (units  

  (unknown)



                    date)                         reviewed by Dr. mejia)  



                                                  Dinesh and reveals           



                                                  normal sinus rhythm           



                                                  at 72 bpm           

 

           (unknown)  (no       (unknown)  (unknown)  EKG shows a normal  (units

    (unknown)



                    date)                         sinus rhythm rate unknown)  



                                                  of 70 2p are 172           



                                                  QRS 86 and .           



                                                   No                 

 

           (unknown)  (no       (unknown)  (unknown)  ER Physician:  (units    (

unknown)



                    date)                         Allegra Montiel D.O. unknown)  

 

           (unknown)  (no       (unknown)  (unknown)  Eos # (Auto)  (units    (u

nknown)



                    date)                         (0-450)  /uL unknown)  

 

           (unknown)  (no       (unknown)  (unknown)  Eos # (Auto)  (units    (u

nknown)



                    date)                         (0-450)  /uL unknown)  

 

           (unknown)  (no       (unknown)  (unknown)  Eos # (Auto)   100  (units

    (unknown)



                    date)                           (0-450)  /uL unknown)  

 

           (unknown)  (no       (unknown)  (unknown)  Eos % (Auto)  (units    (u

nknown)



                    date)                         (2-4)  %  unknown)  

 

           (unknown)  (no       (unknown)  (unknown)  Eos % (Auto)  (units    (u

nknown)



                    date)                         (2-4)  %  unknown)  

 

           (unknown)  (no       (unknown)  (unknown)  Eos % (Auto)   0.8  (units

    (unknown)



                    date)                         L   (2-4)  % unknown)  

 

           (unknown)  (no       (unknown)  (unknown)  Esterase  (units    (unkno

wn)



                    date)                                   unknown)  

 

           (unknown)  (no       (unknown)  (unknown)  Estimated GFR  (units    (

unknown)



                    date)                         (>60)  mL/min unknown)  

 

           (unknown)  (no       (unknown)  (unknown)  Estimated GFR  (units    (

unknown)



                    date)                         55 L  (>60)  mL/min unknown)  

 

           (unknown)  (no       (unknown)  (unknown)  Estimated GFR  (units    (

unknown)



                    date)                         (>60)  mL/min unknown)  

 

           (unknown)  (no       (unknown)  (unknown)  Exam      (units    (unkno

wn)



                    date)                                   unknown)  

 

           (unknown)  (no       (unknown)  (unknown)  Exam Narrative:  (units   

 (unknown)



                    date)                                   unknown)  

 

           (unknown)  (no       (unknown)  (unknown)  Eyes: denies  (units    (u

nknown)



                    date)                         visual changes, eye unknown)  



                                                  pain                

 

           (unknown)  (no       (unknown)  (unknown)  Eyes: equal round  (units 

   (unknown)



                    date)                         and reactive, EOMI, unknown)  



                                                  conjunctiva normal           

 

           (unknown)  (no       (unknown)  (unknown)  FINDINGS:?  (units    (unk

nown)



                    date)                                   unknown)  

 

           (unknown)  (no       (unknown)  (unknown)  Family History  (units    

(unknown)



                    date)                         (Reviewed 22 unknown)  



                                                  @ 19:57 by Kitty Costello [facility])           

 

           (unknown)  (no       (unknown)  (unknown)  Father     (units 

   (unknown)



                    date)                          Cancer   unknown)  

 

           (unknown)  (no       (unknown)  (unknown)  Follow-up for  (units    (

unknown)



                    date)                         recheck in the next unknown)  



                                                  2-3 days.           

 

           (unknown)  (no       (unknown)  (unknown)  GERD      (units    (unkno

wn)



                    date)                         (gastroesophageal unknown)  



                                                  reflux disease)           

 

           (unknown)  (no       (unknown)  (unknown)  GI:  Right lower  (units  

  (unknown)



                    date)                         quadrant tenderness unknown)  



                                                  to palpation           

 

           (unknown)  (no       (unknown)  (unknown)  GI: abdomen mildly  (units

    (unknown)



                    date)                         guarded, tender to unknown)  



                                                  palpation in the           



                                                  right lower           



                                                  quadrant,           

 

           (unknown)  (no       (unknown)  (unknown)  :  Straight cath  (units

    (unknown)



                    date)                         for urine completed unknown)  



                                                  by myself with 16           



                                                  [place] coude           



                                                  catheter,           

 

           (unknown)  (no       (unknown)  (unknown)  : denies  (units    (unk

nown)



                    date)                         dysuria, hematuria unknown)  



                                                  or flank pain,           



                                                  reports oliguria,           



                                                  patient self           

 

           (unknown)  (no       (unknown)  (unknown)  Gastroenterology  (units  

  (unknown)



                    date)                         from [place], unknown)  



                                                  patient reports           



                                                  that he sees Yasmine Rocha PA-C           

 

           (unknown)  (no       (unknown)  (unknown)  General   (units    (unkno

wn)



                    date)                                   unknown)  

 

           (unknown)  (no       (unknown)  (unknown)  General:  (units    (unkno

wn)



                    date)                         cooperative, unknown)  



                                                  comfortable, in no           



                                                  acute distress,           



                                                  well groomed,           

 

           (unknown)  (no       (unknown)  (unknown)  General: denies  (units   

 (unknown)



                    date)                         fever, chills, unknown)  



                                                  endorses weakness,           



                                                  right lower           



                                                  quadrant pain,           

 

           (unknown)  (no       (unknown)  (unknown)  GenericComposite[P  (units

    (unknown)



                    date)                         lt Count  unknown)  



                                                  (150-400)  X10^3/uL           



                                                   ]                  

 

           (unknown)  (no       (unknown)  (unknown)  GenericComposite[P  (units

    (unknown)



                    date)                         lt Count  unknown)  



                                                  (150-400)  X10^3/uL           



                                                   ]                  

 

           (unknown)  (no       (unknown)  (unknown)  GenericComposite[P  (units

    (unknown)



                    date)                         lt Count   219 unknown)  



                                                  (150-400)  X10^3/uL           



                                                   ]                  

 

           (unknown)  (no       (unknown)  (unknown)  GenericComposite[R  (units

    (unknown)



                    date)                         BC     (4.5-5.9) unknown)  



                                                  X10^6/uL  ]           

 

           (unknown)  (no       (unknown)  (unknown)  GenericComposite[R  (units

    (unknown)



                    date)                         BC   (4.5-5.9) unknown)  



                                                  X10^6/uL  ]           

 

           (unknown)  (no       (unknown)  (unknown)  GenericComposite[R  (units

    (unknown)



                    date)                         BC   3.37 L unknown)  



                                                  (4.5-5.9)  X10^6/uL           



                                                   ]                  

 

           (unknown)  (no       (unknown)  (unknown)  GenericComposite[W  (units

    (unknown)



                    date)                         BC     (4.5-11.0) unknown)  



                                                  X10^3/uL  ]           

 

           (unknown)  (no       (unknown)  (unknown)  GenericComposite[W  (units

    (unknown)



                    date)                         BC   (4.5-11.0) unknown)  



                                                  X10^3/uL  ]           

 

           (unknown)  (no       (unknown)  (unknown)  GenericComposite[W  (units

    (unknown)



                    date)                         BC   7.0  unknown)  



                                                  (4.5-11.0)           



                                                  X10^3/uL  ]           

 

           (unknown)  (no       (unknown)  (unknown)  Globulin  (units    (unkno

wn)



                    date)                         (1.7-4.1)  g/dL unknown)  

 

           (unknown)  (no       (unknown)  (unknown)  Globulin    3.6  (units   

 (unknown)



                    date)                         (1.7-4.1)  g/dL unknown)  

 

           (unknown)  (no       (unknown)  (unknown)  Globulin  (units    (unkno

wn)



                    date)                         (1.7-4.1)  g/dL unknown)  

 

           (unknown)  (no       (unknown)  (unknown)  Glucose   (units    (unkno

wn)



                    date)                         ()  mg/dL unknown)  

 

           (unknown)  (no       (unknown)  (unknown)  Glucose    262 H  (units  

  (unknown)



                    date)                         ()  mg/dL unknown)  

 

           (unknown)  (no       (unknown)  (unknown)  Glucose   ()  (units

    (unknown)



                    date)                          mg/dL    unknown)  

 

           (unknown)  (no       (unknown)  (unknown)  Guaiac stool:  (units    (

unknown)



                    date)                         Negative, good unknown)  



                                                  stool result           

 

           (unknown)  (no       (unknown)  (unknown)  H/O colectomy  (units    (

unknown)



                    date)                                   unknown)  

 

           (unknown)  (no       (unknown)  (unknown)  HPI - Weakness  (units    

(unknown)



                    date)                                   unknown)  

 

           (unknown)  (no       (unknown)  (unknown)  HPI Narrative:  (units    

(unknown)



                    date)                                   unknown)  

 

           (unknown)  (no       (unknown)  (unknown)  Hct     (41-53)  %  (units

    (unknown)



                    date)                                   unknown)  

 

           (unknown)  (no       (unknown)  (unknown)  Hct   (41-53)  %  (units  

  (unknown)



                    date)                                   unknown)  

 

           (unknown)  (no       (unknown)  (unknown)  Hct   26.7 L  (units    (u

nknown)



                    date)                         (41-53)  % unknown)  

 

           (unknown)  (no       (unknown)  (unknown)  He does not have  (units  

  (unknown)



                    date)                         any mental status unknown)  



                                                  changes, GCS is 15.           



                                                   Guaiac stool is           

 

           (unknown)  (no       (unknown)  (unknown)  Head/Neck:  (units    (unk

nown)



                    date)                         Endorses having a unknown)  



                                                  headache, denies           



                                                  any neck pain           

 

           (unknown)  (no       (unknown)  (unknown)  Head: atraumatic,  (units 

   (unknown)



                    date)                         symmetrical facial unknown)  



                                                  expressions           

 

           (unknown)  (no       (unknown)  (unknown)  Hgb       (units    (unkno

wn)



                    date)                         (13.5-17.5)  g/dL unknown)  

 

           (unknown)  (no       (unknown)  (unknown)  Hgb   (13.5-17.5)  (units 

   (unknown)



                    date)                         g/dL      unknown)  

 

           (unknown)  (no       (unknown)  (unknown)  Hgb   9.1 L  (units    (un

known)



                    date)                         (13.5-17.5)  g/dL unknown)  

 

           (unknown)  (no       (unknown)  (unknown)  History of Present  (units

    (unknown)



                    date)                         Illness   unknown)  

 

           (unknown)  (no       (unknown)  (unknown)  History of lumbar  (units 

   (unknown)



                    date)                         surgery (2011) unknown)  

 

           (unknown)  (no       (unknown)  (unknown)  Hx of foot surgery  (units

    (unknown)



                    date)                         (2017)    unknown)  

 

           (unknown)  (no       (unknown)  (unknown)  Hx of shoulder  (units    

(unknown)



                    date)                         surgery (2010) unknown)  

 

           (unknown)  (no       (unknown)  (unknown)  Hypertension  (units    (u

nknown)



                    date)                                   unknown)  

 

           (unknown)  (no       (unknown)  (unknown)  IMPRESSION:? No  (units   

 (unknown)



                    date)                         acute     unknown)  



                                                  cardiopulmonary           



                                                  findings            

 

           (unknown)  (no       (unknown)  (unknown)  INDICATIONS:?  (units    (

unknown)



                    date)                         chest pain unknown)  

 

           (unknown)  (no       (unknown)  (unknown)  Imaging Data  (units    (u

nknown)



                    date)                                   unknown)  

 

           (unknown)  (no       (unknown)  (unknown)  Independently  (units    (

unknown)



                    date)                         reviewed vitals unknown)  



                                                  signs and nursing           



                                                  notes.              

 

           (unknown)  (no       (unknown)  (unknown)  Initial Vital  (units    (

unknown)



                    date)                         Signs     unknown)  

 

           (unknown)  (no       (unknown)  (unknown)  Initial Vital  (units    (

unknown)



                    date)                         Signs:    unknown)  

 

           (unknown)  (no       (unknown)  (unknown)  Instructions:  (units    (

unknown)



                    date)                         Acute Cystitis, unknown)  



                                                  Anemia              

 

           (unknown)  (no       (unknown)  (unknown)  Interpretation:  (units   

 (unknown)



                    date)                                   unknown)  

 

           (unknown)  (no       (unknown)  (unknown) January, hematocrit  (units

    (unknown)



                    date)                         is 26.7, down from unknown)  



                                                  27.8 also in           



                                                  January, platelet           



                                                  count 219,           

 

           (unknown)  (no       (unknown)  (unknown) Klebsiella  (units    (unkn

own)



                    date)                         pneumoniae which unknown)  



                                                  was resistant only           



                                                  to ampicillin and           



                                                  nitrofurantoin.           

 

           (unknown)  (no       (unknown)  (unknown)  Lab Data  (units    (unkno

wn)



                    date)                                   unknown)  

 

           (unknown)  (no       (unknown)  (unknown)  Labs:     (units    (unkno

wn)



                    date)                                   unknown)  

 

           (unknown)  (no       (unknown)  (unknown)  Lactate   (units    (unkno

wn)



                    date)                         (0.7-2.1)  mmol/L unknown)  

 

           (unknown)  (no       (unknown)  (unknown)  Lactate   2.6 H  (units   

 (unknown)



                    date)                         (0.7-2.1)  mmol/L unknown)  

 

           (unknown)  (no       (unknown)  (unknown)  Lactate  1.1  (units    (u

nknown)



                    date)                         (0.7-2.1)  mmol/L unknown)  

 

           (unknown)  (no       (unknown)  (unknown)  Lipase    (units    (unkno

wn)



                    date)                         ()  U/L unknown)  

 

           (unknown)  (no       (unknown)  (unknown)  Lipase    32  (units    (u

nknown)



                    date)                         ()  U/L unknown)  

 

           (unknown)  (no       (unknown)  (unknown)  Lipase   ()  (units 

   (unknown)



                    date)                         U/L       unknown)  

 

           (unknown)  (no       (unknown)  (unknown)  Lungs and pleura:?  (units

    (unknown)



                    date)                         Lungs are clear.? unknown)  



                                                  No pleural           



                                                  effusions or           



                                                  pneumothorax.? Low           

 

           (unknown)  (no       (unknown)  (unknown)  Lymph # (Auto)  (units    

(unknown)



                    date)                         (7363-6880)  /uL unknown)  

 

           (unknown)  (no       (unknown)  (unknown)  Lymph # (Auto)  (units    

(unknown)



                    date)                         (2659-3234)  /uL unknown)  

 

           (unknown)  (no       (unknown)  (unknown)  Lymph # (Auto)  (units    

(unknown)



                    date)                         400 L   (5526-6427) unknown)  



                                                   /uL                

 

           (unknown)  (no       (unknown)  (unknown)  Lymph % (Auto)  (units    

(unknown)



                    date)                         (25-40)  % unknown)  

 

           (unknown)  (no       (unknown)  (unknown)  Lymph % (Auto)  (units    

(unknown)



                    date)                         (25-40)  % unknown)  

 

           (unknown)  (no       (unknown)  (unknown)  Lymph % (Auto)  (units    

(unknown)



                    date)                         6.4 L   (25-40)  % unknown)  

 

           (unknown)  (no       (unknown)  (unknown)  MCH     (26-34)  (units   

 (unknown)



                    date)                         PG        unknown)  

 

           (unknown)  (no       (unknown)  (unknown)  MCH   (26-34)  PG  (units 

   (unknown)



                    date)                                   unknown)  

 

           (unknown)  (no       (unknown)  (unknown)  MCH   26.9  (units    (unk

nown)



                    date)                         (26-34)  PG unknown)  

 

           (unknown)  (no       (unknown)  (unknown)  MCHC     (30-36)  (units  

  (unknown)



                    date)                         %         unknown)  

 

           (unknown)  (no       (unknown)  (unknown)  MCHC   (30-36)  %  (units 

   (unknown)



                    date)                                   unknown)  

 

           (unknown)  (no       (unknown)  (unknown)  MCHC   34.0  (units    (un

known)



                    date)                         (30-36)  % unknown)  

 

           (unknown)  (no       (unknown)  (unknown)  MCV     ()  (units  

  (unknown)



                    date)                         fL        unknown)  

 

           (unknown)  (no       (unknown)  (unknown)  MCV   ()  fL  (units

    (unknown)



                    date)                                   unknown)  

 

           (unknown)  (no       (unknown)  (unknown)  MCV   79.1 L  (units    (u

nknown)



                    date)                         ()  fL unknown)  

 

           (unknown)  (no       (unknown)  (unknown)  MDM - Weakness  (units    

(unknown)



                    date)                                   unknown)  

 

           (unknown)  (no       (unknown)  (unknown)  MDM Narrative  (units    (

unknown)



                    date)                                   unknown)  

 

           (unknown)  (no       (unknown)  (unknown)  MSK: denies new  (units   

 (unknown)



                    date)                         joint pain, muscle unknown)  



                                                  weakness or           



                                                  swelling            

 

           (unknown)  (no       (unknown)  (unknown) MSK: moves all  (units    (

unknown)



                    date)                         extremities, unknown)  



                                                  neurovascularly           



                                                  intact, generalized           



                                                  weakness, normal           

 

           (unknown)  (no       (unknown)  (unknown)  Magnesium  (units    (unkn

own)



                    date)                         (1.6-2.3)  mg/dL unknown)  

 

           (unknown)  (no       (unknown)  (unknown)  Magnesium    1.0 L  (units

    (unknown)



                    date)                          (1.6-2.3)  mg/dL unknown)  

 

           (unknown)  (no       (unknown)  (unknown)  Magnesium  (units    (unkn

own)



                    date)                         (1.6-2.3)  mg/dL unknown)  

 

           (unknown)  (no       (unknown)  (unknown)  Magnesium Sulfate  (units 

   (unknown)



                    date)                         (Magnesium Sulfate) unknown)  



                                                   2 gm in 50 mls @           



                                                  50 mls/hr IV NOW           



                                                  ONE                 

 

           (unknown)  (no       (unknown)  (unknown)  Mediastinum:?  (units    (

unknown)



                    date)                         Mediastinal unknown)  



                                                  contours appear           



                                                  normal.? Heart size           



                                                  is normal.?           

 

           (unknown)  (no       (unknown)  (unknown)  Medical History  (units   

 (unknown)



                    date)                         (Reviewed 22 unknown)  



                                                  @ 19:57 by KittyEBONI Deshpande)           

 

           (unknown)  (no       (unknown)  (unknown)  Medical decision  (units  

  (unknown)



                    date)                         making narrative: unknown)  

 

           (unknown)  (no       (unknown)  (unknown)  [employer] since this  (units

    (unknown)



                    date)                         happened. unknown)  

 

           (unknown)  (no       (unknown)  (unknown)  Mode of arrival:  (units  

  (unknown)



                    date)                         Wheelchair unknown)  

 

           (unknown)  (no       (unknown)  (unknown)  Mono # (Auto)  (units    (

unknown)



                    date)                         (0-900)  /uL unknown)  

 

           (unknown)  (no       (unknown)  (unknown)  Mono # (Auto)  (units    (

unknown)



                    date)                         (0-900)  /uL unknown)  

 

           (unknown)  (no       (unknown)  (unknown)  Mono # (Auto)  (units    (

unknown)



                    date)                         600   (0-900)  /uL unknown)  

 

           (unknown)  (no       (unknown)  (unknown)  Mono % (Auto)  (units    (

unknown)



                    date)                         (3-14)  % unknown)  

 

           (unknown)  (no       (unknown)  (unknown)  Mono % (Auto)  (units    (

unknown)



                    date)                         (3-14)  % unknown)  

 

           (unknown)  (no       (unknown)  (unknown)  Mono % (Auto)  (units    (

unknown)



                    date)                         7.9   (3-14)  % unknown)  

 

           (unknown)  (no       (unknown)  (unknown)  Mother     (units 

   (unknown)



                    date)                          Cancer   unknown)  

 

           (unknown)  (no       (unknown)  (unknown)  Mouth/Throat:  (units    (

unknown)



                    date)                         moist mucus unknown)  



                                                  membranes           

 

           (unknown)  (no       (unknown)  (unknown)  Narrative  (units    (unkn

own)



                    date)                                   unknown)  

 

           (unknown)  (no       (unknown)  (unknown)  Narrative:  (units    (unk

nown)



                    date)                                   unknown)  

 

           (unknown)  (no       (unknown)  (unknown)  Neck: supple  (units    (u

nknown)



                    date)                                   unknown)  

 

           (unknown)  (no       (unknown)  (unknown)  Neuro: denies  (units    (

unknown)



                    date)                         numbness, tingling, unknown)  



                                                  dizziness           

 

           (unknown)  (no       (unknown)  (unknown)  Neuro: normal  (units    (

unknown)



                    date)                         speech and unknown)  



                                                  cognition, A+O x3           

 

           (unknown)  (no       (unknown)  (unknown)  Neut # (Auto)  (units    (

unknown)



                    date)                         (5777-2810)  /uL unknown)  

 

           (unknown)  (no       (unknown)  (unknown)  Neut # (Auto)  (units    (

unknown)



                    date)                         (3275-6880)  /uL unknown)  

 

           (unknown)  (no       (unknown)  (unknown)  Neut # (Auto)  (units    (

unknown)



                    date)                         5900   (1368-1435) unknown)  



                                                  /uL                 

 

           (unknown)  (no       (unknown)  (unknown)  Neut % (Auto)  (units    (

unknown)



                    date)                         (50-75)  % unknown)  

 

           (unknown)  (no       (unknown)  (unknown)  Neut % (Auto)  (units    (

unknown)



                    date)                         (50-75)  % unknown)  

 

           (unknown)  (no       (unknown)  (unknown)  Neut % (Auto)  (units    (

unknown)



                    date)                         84.7 H   (50-75)  % unknown)  

 

           (unknown)  (no       (unknown)  (unknown)  New       (units    (unkno

wn)



                    date)                                   unknown)  

 

           (unknown)  (no       (unknown)  (unknown)  No Action  (units    (unkn

own)



                    date)                                   unknown)  

 

           (unknown)  (no       (unknown)  (unknown)  No Known Drug  (units    (

unknown)



                    date)                         Allergies Allergy unknown)  



                                                  Verified 22           



                                                  17:38               

 

           (unknown)  (no       (unknown)  (unknown)  Nose: nares  (units    (un

known)



                    date)                         patent, no unknown)  



                                                  rhinorrhea           

 

           (unknown)  (no       (unknown)  (unknown)  Notable on  (units    (unk

nown)



                    date)                         patient's prior unknown)  



                                                  urine cultures,           



                                                  urine from           



                                                  2022 grew out           

 

           (unknown)  (no       (unknown)  (unknown)  Notable on  (units    (unk

nown)



                    date)                         patient's prior unknown)  



                                                  urine cultures,           



                                                  urine from           



                                                  2022 grew out           



                                                  Klebs               

 

           (unknown)  (no       (unknown)  (unknown)  Ondansetron HCl  (units   

 (unknown)



                    date)                         (Ondansetron 4 Mg/2 unknown)  



                                                  Ml Inj)  4 mg IV           



                                                  NOW ONE             

 

           (unknown)  (no       (unknown)  (unknown)  Ordered:  (units    (unkno

wn)



                    date)                                   unknown)  

 

           (unknown)  (no       (unknown)  (unknown)  Orders    (units    (unkno

wn)



                    date)                                   unknown)  

 

           (unknown)  (no       (unknown)  (unknown)  Osteoarthritis  (units    

(unknown)



                    date)                                   unknown)  

 

           (unknown)  (no       (unknown)  (unknown)  Oxygen Delivery  (units   

 (unknown)



                    date)                         Method    22 unknown)  



                                                  17:34               

 

           (unknown)  (no       (unknown)  (unknown)  Oxygen Delivery  (units   

 (unknown)



                    date)                         Method Room Air unknown)  

 

           (unknown)  (no       (unknown)  (unknown)  PROCEDURE:? XR  (units    

(unknown)



                    date)                         CHEST 1V  unknown)  

 

           (unknown)  (no       (unknown)  (unknown)  Patient   (units    (unkno

wn)



                    date)                         Disposition: Home unknown)  

 

           (unknown)  (no       (unknown)  (unknown)  Patient History  (units   

 (unknown)



                    date)                                   unknown)  

 

           (unknown)  (no       (unknown)  (unknown)  Patient and wife  (units  

  (unknown)



                    date)                         were offered again unknown)  



                                                  admission but once           



                                                  again defer.           



                                                  Received a           

 

           (unknown)  (no       (unknown)  (unknown)  Patient is  (units    (unk

nown)



                    date)                         currently unknown)  



                                                  anticoagulated on           



                                                  Plavix 37.5 mg           



                                                  daily.  Dr. Gómez           

 

           (unknown)  (no       (unknown)  (unknown) Patient reports  (units    

(unknown)



                    date)                         that when he has a unknown)  



                                                  bowel movement, he           



                                                  typically has hard           



                                                  pellets             

 

           (unknown)  (no       (unknown)  (unknown)  Patient self caths  (units

    (unknown)



                    date)                         for urine output, unknown)  



                                                  reports that he has           



                                                  not had much urine           

 

           (unknown)  (no       (unknown)  (unknown)  Patient was seen  (units  

  (unknown)



                    date)                         independently unknown)  



                                                  evaluated by           



                                                  myself, physical           



                                                  exam was repeated           

 

           (unknown)  (no       (unknown)  (unknown)  Patient was  (units    (un

known)



                    date)                         treated in the unknown)  



                                                  emergency           



                                                  department with 1 g           



                                                  of ceftriaxone.           

 

           (unknown)  (no       (unknown)  (unknown) Patient's  (units    (unkno

wn)



                    date)                         colorectal cancer unknown)  



                                                  oncologist was Dr. Jett, patient was           



                                                  referred to him           

 

           (unknown)  (no       (unknown)  (unknown)  Patient's lactate  (units 

   (unknown)



                    date)                         improved with unknown)  



                                                  fluids.  He had           



                                                  difficulty           



                                                  ambulating patient           

 

           (unknown)  (no       (unknown)  (unknown)  Patient's preop  (units   

 (unknown)



                    date)                         diagnosis was unknown)  



                                                  nausea and vomiting           



                                                  from hematemesis in           



                                                  January             

 

           (unknown)  (no       (unknown)  (unknown)  Patient:  (units    (unkno

wn)



                    date)                         Bret Esquivel R unknown)  



                                                  MR#: [id number]            

 

           (unknown)  (no       (unknown)  (unknown)  Please leave her  (units  

  (unknown)



                    date)                         catheter in for the unknown)  



                                                  next several days           



                                                  until you are ready           



                                                  to                  

 

           (unknown)  (no       (unknown)  (unknown)  Please return for  (units 

   (unknown)



                    date)                         fevers, worsening unknown)  



                                                  weakness, passing           



                                                  out, persistent           



                                                  vomiting,           

 

           (unknown)  (no       (unknown)  (unknown)  Postlaminectomy  (units   

 (unknown)



                    date)                         syndrome  unknown)  

 

           (unknown)  (no       (unknown)  (unknown)  Potassium  (units    (unkn

own)



                    date)                         (3.4-5.1)  mmol/L unknown)  

 

           (unknown)  (no       (unknown)  (unknown)  Potassium    3.7  (units  

  (unknown)



                    date)                         (3.4-5.1)  mmol/L unknown)  

 

           (unknown)  (no       (unknown)  (unknown)  Potassium  (units    (unkn

own)



                    date)                         (3.4-5.1)  mmol/L unknown)  

 

           (unknown)  (no       (unknown)  (unknown)  Prescription sent  (units 

   (unknown)



                    date)                         to [facility] pharmacy unknown)  



                                                  in [place].           

 

           (unknown)  (no       (unknown)  (unknown)  Prescriptions:  (units    

(unknown)



                    date)                                   unknown)  

 

           (unknown)  (no       (unknown)  (unknown)  Procalcitonin  (units    (

unknown)



                    date)                         (<0.5)  ng/mL unknown)  

 

           (unknown)  (no       (unknown)  (unknown)  Procalcitonin  (units    (

unknown)



                    date)                         (<0.5)  ng/mL unknown)  

 

           (unknown)  (no       (unknown)  (unknown)  Procalcitonin  (units    (

unknown)



                    date)                         0.72 H    (<0.5) unknown)  



                                                  ng/mL               

 

           (unknown)  (no       (unknown)  (unknown)  Psych: mental  (units    (

unknown)



                    date)                         status is grossly unknown)  



                                                  normal, congruent           



                                                  mood, normal           



                                                  affect, pleasant           

 

           (unknown)  (no       (unknown)  (unknown)  Pulse Oximetry  98  (units

    (unknown)



                    date)                           22 17:34 unknown)  

 

           (unknown)  (no       (unknown)  (unknown)  Pulse Oximetry 99  (units 

   (unknown)



                    date)                                   unknown)  

 

           (unknown)  (no       (unknown)  (unknown)  Pulse Rate  77  (units    

(unknown)



                    date)                         22 17:34 unknown)  

 

           (unknown)  (no       (unknown)  (unknown)  Pulse Rate 77  (units    (

unknown)



                    date)                                   unknown)  

 

           (unknown)  (no       (unknown)  (unknown)  RDW       (units    (unkno

wn)



                    date)                         (11.6-14.8)  % unknown)  

 

           (unknown)  (no       (unknown)  (unknown)  RDW   (11.6-14.8)  (units 

   (unknown)



                    date)                         %         unknown)  

 

           (unknown)  (no       (unknown)  (unknown)  RDW   16.1 H  (units    (u

nknown)



                    date)                         (11.6-14.8)  % unknown)  

 

           (unknown)  (no       (unknown)  (unknown)  Reevaluation #1:  (units  

  (unknown)



                    date)                                   unknown)  

 

           (unknown)  (no       (unknown)  (unknown)  Reevaluation #2:  (units  

  (unknown)



                    date)                                   unknown)  

 

           (unknown)  (no       (unknown)  (unknown)  Reevaluation(s)  (units   

 (unknown)



                    date)                                   unknown)  

 

           (unknown)  (no       (unknown)  (unknown)  Referrals:  (units    (unk

nown)



                    date)                                   unknown)  

 

           (unknown)  (no       (unknown)  (unknown)  Related Data  (units    (u

nknown)



                    date)                                   unknown)  

 

           (unknown)  (no       (unknown)  (unknown)  Respiratory Rate  (units  

  (unknown)



                    date)                         18   22 17:34 unknown)  

 

           (unknown)  (no       (unknown)  (unknown)  Respiratory Rate  (units  

  (unknown)



                    date)                         18        unknown)  

 

           (unknown)  (no       (unknown)  (unknown)  Respiratory:  (units    (u

nknown)



                    date)                         denies shortness of unknown)  



                                                  breath, or new           



                                                  cough               

 

           (unknown)  (no       (unknown)  (unknown)  Respiratory:  (units    (u

nknown)



                    date)                         normal effort, able unknown)  



                                                  to speak in           



                                                  complete sentences,           



                                                  no audible           

 

           (unknown)  (no       (unknown)  (unknown)  Result diagrams:  (units  

  (unknown)



                    date)                                   unknown)  

 

           (unknown)  (no       (unknown)  (unknown)  Review of Systems  (units 

   (unknown)



                    date)                                   unknown)  

 

           (unknown)  (no       (unknown)  (unknown)  SARS-CoV-2 (PCR)  (units  

  (unknown)



                    date)                           (Negative) unknown)  

 

           (unknown)  (no       (unknown)  (unknown)  SARS-CoV-2 (PCR)  (units  

  (unknown)



                    date)                         (Negative) unknown)  

 

           (unknown)  (no       (unknown)  (unknown)  SARS-CoV-2 (PCR)  (units  

  (unknown)



                    date)                         Negative  unknown)  



                                                  (Negative)           

 

           (unknown)  (no       (unknown)  (unknown)  Sciatica  (units    (unkno

wn)



                    date)                                   unknown)  

 

           (unknown)  (no       (unknown)  (unknown)  Signed By:  (units    (unk

nown)



                    date)                                   unknown)  

 

           (unknown)  (no       (unknown)  (unknown)  Skin: brisk  (units    (un

known)



                    date)                         capillary refill, unknown)  



                                                  no rash, no           



                                                  erythema            

 

           (unknown)  (no       (unknown)  (unknown)  Skin: denies rash,  (units

    (unknown)



                    date)                         itching or wound unknown)  

 

           (unknown)  (no       (unknown)  (unknown)  Smoking Status:  (units   

 (unknown)



                    date)                         Never smoker unknown)  

 

           (unknown)  (no       (unknown)  (unknown)  Smoking Status:  (units   

 (unknown)



                    date)                         Never smoker unknown)  

 

           (unknown)  (no       (unknown)  (unknown)  Social History  (units    

(unknown)



                    date)                         (Reviewed 22 unknown)  



                                                  @ 19:57 by ZAIRA Amezcua)           

 

           (unknown)  (no       (unknown)  (unknown)  Sodium    (units    (unkno

wn)



                    date)                         (137-145)  mmol/L unknown)  

 

           (unknown)  (no       (unknown)  (unknown)  Sodium    135 L  (units   

 (unknown)



                    date)                         (137-145)  mmol/L unknown)  

 

           (unknown)  (no       (unknown)  (unknown)  Sodium   (137-145)  (units

    (unknown)



                    date)                          mmol/L   unknown)  

 

           (unknown)  (no       (unknown)  (unknown)  Sodium Chloride  (units   

 (unknown)



                    date)                         (Normal Saline unknown)  



                                                  0.9%)  1,000 mls @           



                                                  1,000 mls/hr IV           



                                                  BOLUS ONE           

 

           (unknown)  (no       (unknown)  (unknown)  Source: patient  (units   

 (unknown)



                    date)                         and family unknown)  

 

           (unknown)  (no       (unknown)  (unknown)  Spinal stenosis  (units   

 (unknown)



                    date)                                   unknown)  

 

           (unknown)  (no       (unknown)  (unknown)  Stated complaint:  (units 

   (unknown)



                    date)                         WEAKNESS UNABLE TO unknown)  



                                                  HOLD ANYTHING DOWN           

 

           (unknown)  (no       (unknown)  (unknown)  Substance Use  (units    (

unknown)



                    date)                         Type: does not use unknown)  

 

           (unknown)  (no       (unknown)  (unknown)  Surgical History  (units  

  (unknown)



                    date)                         (Reviewed 22 unknown)  



                                                  @ 19:57 by ZAIRA Amezcua)           

 

           (unknown)  (no       (unknown)  (unknown)  Surgical changes  (units  

  (unknown)



                    date)                         and devices:? unknown)  



                                                  None.?              

 

           (unknown)  (no       (unknown)  (unknown)  TECHNIQUE:? One  (units   

 (unknown)



                    date)                         view of the chest unknown)  



                                                  was acquired.?           

 

           (unknown)  (no       (unknown)  (unknown)  Take antibiotics  (units  

  (unknown)



                    date)                         until completely unknown)  



                                                  gone, start your           



                                                  antibiotic tomorrow           



                                                  morning.            

 

           (unknown)  (no       (unknown)  (unknown)  Temperature  98.3  (units 

   (unknown)



                    date)                         F   22 17:34 unknown)  

 

           (unknown)  (no       (unknown)  (unknown)  This is a  (units    (unkn

own)



                    date)                         62-year-old male unknown)  



                                                  with history of           



                                                  multiple sclerosis,           



                                                  CVA in 2019,           

 

           (unknown)  (no       (unknown)  (unknown)  Time Seen by  (units    (u

nknown)



                    date)                         Provider: 22 unknown)  



                                                  19:11               

 

           (unknown)  (no       (unknown)  (unknown)  Time: 23:07  (units    (un

known)



                    date)                                   unknown)  

 

           (unknown)  (no       (unknown)  (unknown)  Total Bilirubin  (units   

 (unknown)



                    date)                          (0.2-1.3)  mg/dL unknown)  

 

           (unknown)  (no       (unknown)  (unknown)  Total Bilirubin  (units   

 (unknown)



                    date)                         0.6  (0.2-1.3) unknown)  



                                                  mg/dL               

 

           (unknown)  (no       (unknown)  (unknown)  Total Bilirubin  (units   

 (unknown)



                    date)                         (0.2-1.3)  mg/dL unknown)  

 

           (unknown)  (no       (unknown)  (unknown)  Total Creatine  (units    

(unknown)



                    date)                         Kinase     () unknown)  



                                                   U/L                

 

           (unknown)  (no       (unknown)  (unknown)  Total Creatine  (units    

(unknown)



                    date)                         Kinase    54 L unknown)  



                                                  ()  U/L           

 

           (unknown)  (no       (unknown)  (unknown)  Total Creatine  (units    

(unknown)



                    date)                         Kinase   () unknown)  



                                                  U/L                 

 

           (unknown)  (no       (unknown)  (unknown)  Total Protein  (units    (

unknown)



                    date)                         (6.3-8.2)  g/dL unknown)  

 

           (unknown)  (no       (unknown)  (unknown)  Total Protein  (units    (

unknown)



                    date)                         7.5  (6.3-8.2) unknown)  



                                                  g/dL                

 

           (unknown)  (no       (unknown)  (unknown)  Total Protein  (units    (

unknown)



                    date)                         (6.3-8.2)  g/dL unknown)  

 

           (unknown)  (no       (unknown)  (unknown)  Troponin I  (units    (unk

nown)



                    date)                         (0.01-0.034)  ng/mL unknown)  

 

           (unknown)  (no       (unknown)  (unknown)  Troponin I    <  (units   

 (unknown)



                    date)                         0.012  (0.01-0.034) unknown)  



                                                   ng/mL              

 

           (unknown)  (no       (unknown)  (unknown)  Troponin I  (units    (unk

nown)



                    date)                         (0.01-0.034)  ng/mL unknown)  

 

           (unknown)  (no       (unknown)  (unknown)  Ur Culture  (units    (unk

nown)



                    date)                         Indicated? unknown)  

 

           (unknown)  (no       (unknown)  (unknown)  Ur Culture  (units    (unk

nown)



                    date)                         Indicated? unknown)  

 

           (unknown)  (no       (unknown)  (unknown)  Ur Culture  (units    (unk

nown)



                    date)                         Indicated? unknown)  



                                                  Specimen cultured           

 

           (unknown)  (no       (unknown)  (unknown)  Urine Bacteria  (units    

(unknown)



                    date)                         (None)    unknown)  

 

           (unknown)  (no       (unknown)  (unknown)  Urine Bacteria  (units    

(unknown)



                    date)                         Few (2-10) H unknown)  



                                                  (None)              

 

           (unknown)  (no       (unknown)  (unknown)  Urine Bacteria  (units    

(unknown)



                    date)                         (None)    unknown)  

 

           (unknown)  (no       (unknown)  (unknown)  Urine Culture Stat  (units

    (unknown)



                    date)                                   unknown)  

 

           (unknown)  (no       (unknown)  (unknown)  Urine Microscopic  (units 

   (unknown)



                    date)                         Stat      unknown)  

 

           (unknown)  (no       (unknown)  (unknown)  Urine RBC  (units    (unkn

own)



                    date)                         (0-5/HPF) unknown)  

 

           (unknown)  (no       (unknown)  (unknown)  Urine RBC  (units    (unkn

own)



                    date)                         1-5/hpf  (0-5/HPF) unknown)  

 

           (unknown)  (no       (unknown)  (unknown)  Urine RBC  (units    (unkn

own)



                    date)                         (0-5/HPF) unknown)  

 

           (unknown)  (no       (unknown)  (unknown)  Urine Specific  (units    

(unknown)



                    date)                         Gravity    1.015 unknown)  

 

           (unknown)  (no       (unknown)  (unknown)  Urine WBC  (units    (unkn

own)



                    date)                         (0-5/HPF) unknown)  

 

           (unknown)  (no       (unknown)  (unknown)  Urine WBC  (units    (unkn

own)



                    date)                         5-10/hpf H unknown)  



                                                  (0-5/HPF)           

 

           (unknown)  (no       (unknown)  (unknown)  Urine WBC  (units    (unkn

own)



                    date)                         (0-5/HPF) unknown)  

 

           (unknown)  (no       (unknown)  (unknown)  Visit Report  (units    (u

nknown)



                    date)                         Forms:  Patient unknown)  



                                                  Portal/API           

 

           (unknown)  (no       (unknown)  (unknown)  Vital Signs  (units    (un

known)



                    date)                                   unknown)  

 

           (unknown)  (no       (unknown)  (unknown)  Vital signs:  (units    (u

nknown)



                    date)                                   unknown)  

 

           (unknown)  (no       (unknown)  (unknown)  Linette Jett MD  (units  

  (unknown)



                    date)                         [Physician] - As unknown)  



                                                  soon as possible           

 

           (unknown)  (no       (unknown)  (unknown)  [Embedded Image  (units   

 (unknown)



                    date)                         Not Available] unknown)  

 

           (unknown)  (no       (unknown)  (unknown)  acute ST elevation  (units

    (unknown)



                    date)                         depression noted. unknown)  

 

           (unknown)  (no       (unknown)  (unknown)  acute ischemic  (units    

(unknown)



                    date)                         changes.  unknown)  

 

           (unknown)  (no       (unknown)  (unknown)  alcohol intake  (units    

(unknown)



                    date)                         frequency: a few unknown)  



                                                  times a month           

 

           (unknown)  (no       (unknown)  (unknown)  alcohol intake:  (units   

 (unknown)



                    date)                         current   unknown)  

 

           (unknown)  (no       (unknown)  (unknown)  ambulation trial  (units  

  (unknown)



                    date)                         patient fails plan unknown)  



                                                  for admission.           

 

           (unknown)  (no       (unknown)  (unknown)  and cooperative  (units   

 (unknown)



                    date)                                   unknown)  

 

           (unknown)  (no       (unknown)  (unknown)  and generalized  (units   

 (unknown)



                    date)                         weakness.  Patient unknown)  



                                                  self catheterizes           



                                                  his bladder at           



                                                  baseline,           

 

           (unknown)  (no       (unknown)  (unknown)  appear    (units    (unkno

wn)



                    date)                                   unknown)  

 

           (unknown)  (no       (unknown)  (unknown)  as well as HPI.  (units   

 (unknown)



                    date)                         Patient's lactate unknown)  



                                                  was elevated at           



                                                  2.6, blood cultures           



                                                  were                

 

           (unknown)  (no       (unknown)  (unknown)  ascorbic acid  (units    (

unknown)



                    date)                         (vitamin C) 500 mg unknown)  



                                                  500 mg PO BID #60           



                                                  tabs 22           

 

           (unknown)  (no       (unknown)  (unknown)  atorvastatin 80 mg  (units

    (unknown)



                    date)                         tablet 40 mg PO QPM unknown)  



                                                  20           

 

           (unknown)  (no       (unknown)  (unknown)  be causing a rash  (units 

   (unknown)



                    date)                                   unknown)  

 

           (unknown)  (no       (unknown)  (unknown) bleeding.  Guaiac  (units  

  (unknown)



                    date)                         stool in the unknown)  



                                                  emergency           



                                                  department was           



                                                  negative.           



                                                  Patient's lab           

 

           (unknown)  (no       (unknown)  (unknown)  blood in his  (units    (u

nknown)



                    date)                         emesis or in his unknown)  



                                                  stool.  He reports           



                                                  that he did a stool           



                                                  test one            

 

           (unknown)  (no       (unknown)  (unknown)  buspirone 5 mg  (units    

(unknown)



                    date)                         tablet 5 mg PO BID unknown)  



                                                  #60 tabs 22           

 

           (unknown)  (no       (unknown)  (unknown)  cancer.  Postop  (units   

 (unknown)



                    date)                         diagnosis from this unknown)  



                                                  upper endoscopy was           



                                                  the same.  His           

 

           (unknown)  (no       (unknown)  (unknown)  cath through the  (units  

  (unknown)



                    date)                         weekend and unknown)  



                                                  therefore did not           



                                                  drain his bladder           



                                                  since Friday.           

 

           (unknown)  (no       (unknown)  (unknown)  caths at baseline  (units 

   (unknown)



                    date)                                   unknown)  

 

           (unknown)  (no       (unknown)  (unknown)  ciprofloxacin HCl  (units 

   (unknown)



                    date)                         500 mg tablet 500 unknown)  



                                                  mg PO BID #20 tabs           



                                                  22            

 

           (unknown)  (no       (unknown)  (unknown)  clopidogrel 75 mg  (units 

   (unknown)



                    date)                         tablet 75 mg PO unknown)  



                                                  DAILY 22            

 

           (unknown)  (no       (unknown)  (unknown)  colonic   (units    (unkno

wn)



                    date)                         anastomosis, and a unknown)  



                                                  suboptimal bowel           



                                                  prep.               

 

           (unknown)  (no       (unknown)  (unknown)  concerning  (units    (unk

nown)



                    date)                         symptoms. unknown)  

 

           (unknown)  (no       (unknown)  (unknown)  cultures were  (units    (

unknown)



                    date)                         drawn prior to unknown)  



                                                  receiving           



                                                  antibiotics Urine           



                                                  microscopy shows           

 

           (unknown)  (no       (unknown)  (unknown)  cyanocobalamin  (units    

(unknown)



                    date)                         (vitamin B-12) 500 unknown)  



                                                  1,000 mcg PO DAILY           



                                                  #60 tabs 22           

 

           (unknown)  (no       (unknown)  (unknown)  decrease in urine  (units 

   (unknown)



                    date)                         output, new unknown)  



                                                  abdominal back or           



                                                  flank pain or other           



                                                  new or              

 

           (unknown)  (no       (unknown)  (unknown)  diabetes,  (units    (unkn

own)



                    date)                         hypertension, unknown)  



                                                  colorectal cancer           



                                                  in 2013 with a           



                                                  reverse colectomy           



                                                  who                 

 

           (unknown)  (no       (unknown)  (unknown)  diabetes,  (units    (unkn

own)



                    date)                         hypertension, unknown)  



                                                  colorectal cancer           



                                                  in  with a           



                                                  reverse colectomy,           



                                                  He                  

 

           (unknown)  (no       (unknown)  (unknown)  does not drink  (units    

(unknown)



                    date)                         alcohol.? Patient unknown)  



                                                  and his wife state           



                                                  that he was seen in           



                                                  the                 

 

           (unknown)  (no       (unknown)  (unknown)  dose of Rocephin,  (units 

   (unknown)



                    date)                         a L of fluids, plan unknown)  



                                                  to leave catheter           



                                                  in place until he           



                                                  is                  

 

           (unknown)  (no       (unknown)  (unknown)  drawn after  (units    (un

known)



                    date)                         Rocephin.  Reviewed unknown)  



                                                  patient's CT, his           



                                                  urine findings and           



                                                  plan for            

 

           (unknown)  (no       (unknown)  (unknown)  elevated lactate.  (units 

   (unknown)



                    date)                         His heart rate and unknown)  



                                                  blood pressure are           



                                                  within normal           



                                                  ranges.             

 

           (unknown)  (no       (unknown)  (unknown)  emergency  (units    (unkn

own)



                    date)                         department in unknown)  



                                                  January and           



                                                  diagnosed with a GI           



                                                  bleed, he was           



                                                  admitted,           

 

           (unknown)  (no       (unknown)  (unknown)  endorses fatigue  (units  

  (unknown)



                    date)                                   unknown)  

 

           (unknown)  (no       (unknown)  (unknown) endoscopic  (units    (unkn

own)



                    date)                         diagnosis includes unknown)  



                                                  mild gastropathy,           



                                                  esophagitis, patent           



                                                  retrosigmoid           

 

           (unknown)  (no       (unknown)  (unknown)  equivocal at this  (units 

   (unknown)



                    date)                         time.  Plan to unknown)  



                                                  repeat lactate is           



                                                  improving will           



                                                  attempt             

 

           (unknown)  (no       (unknown)  (unknown)  feeling more able  (units 

   (unknown)



                    date)                         to self cath. unknown)  



                                                  Short-term           



                                                  follow-up in the           



                                                  next several days           

 

           (unknown)  (no       (unknown)  (unknown)  ferrous sulfate  (units   

 (unknown)



                    date)                         325 mg (65 mg 325 unknown)  



                                                  mg PO BIDWM #60           



                                                  tabs 22           

 

           (unknown)  (no       (unknown)  (unknown)  followed by loose  (units 

   (unknown)



                    date)                         stool.    unknown)  

 

           (unknown)  (no       (unknown)  (unknown)  gabapentin 300 mg  (units 

   (unknown)



                    date)                         capsule 300 mg PO unknown)  



                                                  BID 18            

 

           (unknown)  (no       (unknown)  (unknown)  generalized  (units    (un

known)



                    date)                         weakness and unknown)  



                                                  fatigue             

 

           (unknown)  (no       (unknown)  (unknown)  glipizide 10 mg  (units   

 (unknown)



                    date)                         tablet 10 mg PO BID unknown)  



                                                  18           

 

           (unknown)  (no       (unknown)  (unknown)  had a recent  (units    (u

nknown)



                    date)                         endoscopy that did unknown)  



                                                  not show any           



                                                  bleeding polyps or           



                                                  acute GI            

 

           (unknown)  (no       (unknown)  (unknown)  hospital and on  (units   

 (unknown)



                    date)                         chart review it unknown)  



                                                  appears that it was           



                                                  completed on           



                                                  2022.           

 

           (unknown)  (no       (unknown)  (unknown)  household members:  (units

    (unknown)



                    date)                          spouse   unknown)  

 

           (unknown)  (no       (unknown)  (unknown)  iella pneumoniae  (units  

  (unknown)



                    date)                         which was resistant unknown)  



                                                  only to ampicillin           



                                                  and nitrofurantoin.           

 

           (unknown)  (no       (unknown)  (unknown)  ingrown, no penile  (units

    (unknown)



                    date)                         discharge, no unknown)  



                                                  scrotal edema           

 

           (unknown)  (no       (unknown)  (unknown)  iron) tablet  (units    (u

nknown)



                    date)                                   unknown)  

 

           (unknown)  (no       (unknown)  (unknown)  lives     (units    (unkno

wn)



                    date)                         independently:  Yes unknown)  

 

           (unknown)  (no       (unknown)  (unknown)  losartan 50 mg  (units    

(unknown)



                    date)                         tablet 25 mg PO QPM unknown)  



                                                  20           

 

           (unknown)  (no       (unknown)  (unknown)  lung      (units    (unkno

wn)



                    date)                                   unknown)  

 

           (unknown)  (no       (unknown)  (unknown)  magnesium was  (units    (

unknown)



                    date)                         replaced with 2 g, unknown)  



                                                  normal saline 1000           



                                                  mL was given for           



                                                  his                 

 

           (unknown)  (no       (unknown)  (unknown)  mcg tablet  (units    (unk

nown)



                    date)                                   unknown)  

 

           (unknown)  (no       (unknown)  (unknown)  mcg tablet  (units    (unk

nown)



                    date)                         (Tab-A-Lucy) unknown)  

 

           (unknown)  (no       (unknown)  (unknown)  metformin 500 mg  (units  

  (unknown)



                    date)                         tablet,extended 500 unknown)  



                                                  mg PO BID 18            

 

           (unknown)  (no       (unknown)  (unknown)  metoprolol  (units    (unk

nown)



                    date)                         succinate 25 mg 25 unknown)  



                                                  mg PO DAILY           



                                                  21           

 

           (unknown)  (no       (unknown)  (unknown)  moving forward.  (units   

 (unknown)



                    date)                         Patient reports unknown)  



                                                  that he has been           



                                                  taking fiber but           



                                                  not any             

 

           (unknown)  (no       (unknown)  (unknown)  multivitamin with  (units 

   (unknown)



                    date)                         folic acid 400 1 unknown)  



                                                  tab PO DAILY #90           



                                                  tabs 22           

 

           (unknown)  (no       (unknown)  (unknown)  negative.  I  (units    (u

nknown)



                    date)                         completed his unknown)  



                                                  urinary             



                                                  catheterization           



                                                  with a coude           



                                                  catheter, urine           

 

           (unknown)  (no       (unknown)  (unknown) nondistended, no  (units   

 (unknown)



                    date)                         masses, no unknown)  



                                                  exquisite           



                                                  tenderness with           



                                                  exam, no rebound           



                                                  tendernes           

 

           (unknown)  (no       (unknown)  (unknown)  not heard it was  (units  

  (unknown)



                    date)                         positive or not. unknown)  



                                                  Patient reports           



                                                  that his primary           



                                                  care                

 

           (unknown)  (no       (unknown)  (unknown)  not walk very far  (units 

   (unknown)



                    date)                         and did have unknown)  



                                                  difficulty.  He           



                                                  does appear to have           

 

           (unknown)  (no       (unknown)  (unknown)  ondansetron HCl 4  (units 

   (unknown)



                    date)                         mg tablet 4 mg PO unknown)  



                                                  Q8H PRN nausea and           



                                                  21            

 

           (unknown)  (no       (unknown)  (unknown)  oral powder packet  (units

    (unknown)



                    date)                                   unknown)  

 

           (unknown)  (no       (unknown)  (unknown)  output for three  (units  

  (unknown)



                    date)                         days, three days unknown)  



                                                  ago he had nausea           



                                                  and vomiting,           



                                                  denies any           

 

           (unknown)  (no       (unknown)  (unknown)  polyethylene  (units    (u

nknown)



                    date)                         glycol 3350 17 gram unknown)  



                                                  17 gm PO DAILY #90           



                                                  ea 22           

 

           (unknown)  (no       (unknown)  (unknown)  presents to the  (units   

 (unknown)



                    date)                         emergency unknown)  



                                                  department           



                                                  complaining of           



                                                  ongoing fatigue,           



                                                  oliguria,           

 

           (unknown)  (no       (unknown)  (unknown)  provider has left,  (units

    (unknown)



                    date)                         he reports that he unknown)  



                                                  has seen Dr. Macias           



                                                  recently as well.           

 

           (unknown)  (no       (unknown)  (unknown)  pyelonephritis and  (units

    (unknown)



                    date)                         Toro catheter was unknown)  



                                                  left in place as he           



                                                  was too weak to           



                                                  self                

 

           (unknown)  (no       (unknown)  (unknown)  quadrant with a  (units   

 (unknown)



                    date)                         complicated unknown)  



                                                  surgical history of           



                                                  his abdomen.  This           



                                                  shows               

 

           (unknown)  (no       (unknown)  (unknown)  recently for his  (units  

  (unknown)



                    date)                         anemia.   unknown)  

 

           (unknown)  (no       (unknown)  (unknown)  related diarrhea  (units  

  (unknown)



                    date)                         from fecal loading unknown)  



                                                  and obstipation.           



                                                  Recommended MiraLax           



                                                  17 g                

 

           (unknown)  (no       (unknown)  (unknown)  release 24 hr  (units    (

unknown)



                    date)                                   unknown)  

 

           (unknown)  (no       (unknown)  (unknown)  repeat lactate at  (units 

   (unknown)



                    date)                         this time. unknown)  



                                                  Discussed admission           



                                                  versus home patient           



                                                  has                 

 

           (unknown)  (no       (unknown)  (unknown)  reported that  (units    (

unknown)



                    date)                         patient's altered unknown)  



                                                  bowel habit is           



                                                  likely a function           



                                                  of overflow           

 

           (unknown)  (no       (unknown)  (unknown)  s         (units    (unkno

wn)



                    date)                                   unknown)  

 

           (unknown)  (no       (unknown)  (unknown)  self cath  (units    (unkn

own)



                    date)                         regularly. unknown)  

 

           (unknown)  (no       (unknown)  (unknown)  sennosides 8.6 mg  (units 

   (unknown)



                    date)                         tablet (senna) 17.2 unknown)  



                                                  mg PO BEDTIME #60           



                                                  tabs 22           

 

           (unknown)  (no       (unknown)  (unknown)  substance use  (units    (

unknown)



                    date)                         type:  does not use unknown)  

 

           (unknown)  (no       (unknown)  (unknown)  tablet (Vitamin C)  (units

    (unknown)



                    date)                                   unknown)  

 

           (unknown)  (no       (unknown)  (unknown)  tablet,extended  (units   

 (unknown)



                    date)                         release 24 hr unknown)  

 

           (unknown)  (no       (unknown)  (unknown)  there.  He reports  (units

    (unknown)



                    date)                         that he had an unknown)  



                                                  upper endoscopy           



                                                  completed here at           



                                                  this                

 

           (unknown)  (no       (unknown)  (unknown)  to recheck renal  (units  

  (unknown)



                    date)                         function patient unknown)  



                                                  encouraged to           



                                                  return if not           



                                                  improving.           

 

           (unknown)  (no       (unknown)  (unknown)  tone      (units    (unkno

wn)



                    date)                                   unknown)  

 

           (unknown)  (no       (unknown)  (unknown)  trospium 20 mg  (units    

(unknown)



                    date)                         tablet 20 mg PO BID unknown)  



                                                  urinary retention           



                                                  22           

 

           (unknown)  (no       (unknown)  (unknown)  unremarkable.?  (units    

(unknown)



                    date)                                   unknown)  

 

           (unknown)  (no       (unknown)  (unknown)  urine dip showed  (units  

  (unknown)



                    date)                         leukocytes, unknown)  



                                                  ketones, wbc's and           



                                                  did not show           



                                                  nitrites.  Blood           

 

           (unknown)  (no       (unknown)  (unknown)  urine is cloudy,  (units  

  (unknown)



                    date)                         yellow,   unknown)  



                                                  approximately 600           



                                                  mL in bladder and           



                                                  drained, no rash           

 

           (unknown)  (no       (unknown)  (unknown)  volumes accentuate  (units

    (unknown)



                    date)                         pulmonary unknown)  



                                                  interstitium and           



                                                  heart size.           

 

           (unknown)  (no       (unknown)  (unknown)  was cloudy yellow  (units 

   (unknown)



                    date)                         urine and 600 mL unknown)  



                                                  was drained.           



                                                  Patient tolerated           



                                                  well.  His           

 

           (unknown)  (no       (unknown)  (unknown) was offered  (units    (unk

nown)



                    date)                         admission.  Both he unknown)  



                                                  and his wife prefer           



                                                  to return home.           



                                                  Patient did           

 

           (unknown)  (no       (unknown)  (unknown)  week ago and  (units    (u

nknown)



                    date)                         dropped it off at unknown)  



                                                  [facility] which was           



                                                  testing for blood           



                                                  but he has           

 

           (unknown)  (no       (unknown)  (unknown)  went to [facility]  (units 

   (unknown)



                    date)                         for rehab following unknown)  



                                                  his admission and           



                                                  has followed up           



                                                  with                

 

           (unknown)  (no       (unknown)  (unknown)  wheezing, stridor,  (units

    (unknown)



                    date)                         or rales. No unknown)  



                                                  retractions or           



                                                  tachypnea.           









                                         Result panel 29









           (unknown)  (no       (unknown)  (unknown)  (no value)  (units    (unk

nown)



                    date)                                   unknown)  

 

           (unknown)  (no       (unknown)  (unknown)  Radiologist  (units    (un

known)



                    date)                         Impression: unknown)  

 

           (unknown)  (no       (unknown)  (unknown)  Date of Service:  (units  

  (unknown)



                    date)                         22  unknown)  

 

           (unknown)  (no       (unknown)  (unknown)  (no value)  (units    (unk

nown)



                    date)                                   unknown)  

 

           (unknown)  (no       (unknown)  (unknown)  <Electronically  (units   

 (unknown)



                    date)                         signed by Allegra MENDES unknown)  



                                                  ANAYELI Montiel>           

 

           (unknown)  (no       (unknown)  (unknown)  22 17:45  (units    

(unknown)



                    date)                                   unknown)  

 

           (unknown)  (no       (unknown)  (unknown)  22 0740  (units    (

unknown)



                    date)                                   unknown)  

 

           (unknown)  (no       (unknown)  (unknown)  1 tab PO DAILY  (units    

(unknown)



                    date)                         Qty: 90 0RF unknown)  

 

           (unknown)  (no       (unknown)  (unknown)  1,000 mcg PO DAILY  (units

    (unknown)



                    date)                         Qty: 60 0RF unknown)  

 

           (unknown)  (no       (unknown)  (unknown)  10 mg PO BID  (units    (u

nknown)



                    date)                                   unknown)  

 

           (unknown)  (no       (unknown)  (unknown)  17 gm PO DAILY  (units    

(unknown)



                    date)                         Qty: 90 0RF unknown)  

 

           (unknown)  (no       (unknown)  (unknown)  17.2 mg PO BEDTIME  (units

    (unknown)



                    date)                         Qty: 60 0RF unknown)  

 

           (unknown)  (no       (unknown)  (unknown)  20 mg PO BID  (units    (u

nknown)



                    date)                                   unknown)  

 

           (unknown)  (no       (unknown)  (unknown)  25 mg PO DAILY  (units    

(unknown)



                    date)                                   unknown)  

 

           (unknown)  (no       (unknown)  (unknown)  25 mg PO QPM  (units    (u

nknown)



                    date)                                   unknown)  

 

           (unknown)  (no       (unknown)  (unknown)  300 mg PO BID  (units    (

unknown)



                    date)                                   unknown)  

 

           (unknown)  (no       (unknown)  (unknown)  325 mg PO BIDWM  (units   

 (unknown)



                    date)                         Qty: 60 0RF unknown)  

 

           (unknown)  (no       (unknown)  (unknown)  4 mg PO Q8H PRN  (units   

 (unknown)



                    date)                         (Reason: nausea and unknown)  



                                                  vomiting) Qty: 14           



                                                  0RF                 

 

           (unknown)  (no       (unknown)  (unknown)  40 mg PO QPM  (units    (u

nknown)



                    date)                                   unknown)  

 

           (unknown)  (no       (unknown)  (unknown)  5 mg PO BID Qty:  (units  

  (unknown)



                    date)                         60 0RF    unknown)  

 

           (unknown)  (no       (unknown)  (unknown)  500 mg PO BID  (units    (

unknown)



                    date)                                   unknown)  

 

           (unknown)  (no       (unknown)  (unknown)  500 mg PO BID Qty:  (units

    (unknown)



                    date)                         20 0RF    unknown)  

 

           (unknown)  (no       (unknown)  (unknown)  500 mg PO BID Qty:  (units

    (unknown)



                    date)                         60 0RF    unknown)  

 

           (unknown)  (no       (unknown)  (unknown)  75 mg PO DAILY  (units    

(unknown)



                    date)                                   unknown)  

 

           (unknown)  (no       (unknown)  (unknown)  Admin: 22  (units   

 (unknown)



                    date)                         20:18  Dose: 1,000 unknown)  



                                                  mls/hr              

 

           (unknown)  (no       (unknown)  (unknown)  Admin: 22  (units   

 (unknown)



                    date)                         20:19  Dose: 50 unknown)  



                                                  mls/hr              

 

           (unknown)  (no       (unknown)  (unknown)  Admin: 22  (units   

 (unknown)



                    date)                         21:06  Dose: 200 unknown)  



                                                  mls/hr              

 

           (unknown)  (no       (unknown)  (unknown)  Allergies  (units    (unkn

own)



                    date)                                   unknown)  

 

           (unknown)  (no       (unknown)  (unknown)  Documented By: [facility]  (units

    (unknown)



                    date)                                   unknown)  

 

           (unknown)  (no       (unknown)  (unknown)  Documented By: AT  (units 

   (unknown)



                    date)                          Co-signed By: MLSTEVE unknown)  

 

           (unknown)  (no       (unknown)  (unknown)  Documented By: AT  (units 

   (unknown)



                    date)                                   unknown)  

 

           (unknown)  (no       (unknown)  (unknown)  Documented By: MLM  (units

    (unknown)



                    date)                           Co-signed By: [facility])  

 

           (unknown)  (no       (unknown)  (unknown)  Documented By: MLM  (units

    (unknown)



                    date)                                   unknown)  

 

           (unknown)  (no       (unknown)  (unknown)  Emergency Report  (units  

  (unknown)



                    date)                                   unknown)  

 

           (unknown)  (no       (unknown)  (unknown)  Home Medications  (units  

  (unknown)



                    date)                                   unknown)  

 

           (unknown)  (no       (unknown)  (unknown)  [facility]  (units   

 (unknown)



                    date)                         [address] unknown)  



                                                  [place] [address]           

 

           (unknown)  (no       (unknown)  (unknown)  Lab Results  (units    (un

known)



                    date)                                   unknown)  

 

           (unknown)  (no       (unknown)  (unknown)  Label Comments:  (units   

 (unknown)



                    date)                                   unknown)  

 

           (unknown)  (no       (unknown)  (unknown)  Last Admin:  (units    (un

known)



                    date)                         22 18:30 unknown)  



                                                  Dose: 4 mg           

 

           (unknown)  (no       (unknown)  (unknown)  Last Infusion:  (units    

(unknown)



                    date)                         22 21:10 unknown)  



                                                  Dose: 0 mls/hr           

 

           (unknown)  (no       (unknown)  (unknown)  Last Infusion:  (units    

(unknown)



                    date)                         22 21:28 unknown)  



                                                  Dose: 0 mls/hr           

 

           (unknown)  (no       (unknown)  (unknown)  Last Infusion:  (units    

(unknown)



                    date)                         22 23:15 unknown)  



                                                  Dose: 0 mls/hr           

 

           (unknown)  (no       (unknown)  (unknown)  Previous Rx's  (units    (

unknown)



                    date)                                   unknown)  

 

           (unknown)  (no       (unknown)  (unknown)  Stop: 22  (units    

(unknown)



                    date)                         18:16     unknown)  

 

           (unknown)  (no       (unknown)  (unknown)  Stop: 22  (units    

(unknown)



                    date)                         20:15     unknown)  

 

           (unknown)  (no       (unknown)  (unknown)  Stop: 22  (units    

(unknown)



                    date)                         20:25     unknown)  

 

           (unknown)  (no       (unknown)  (unknown)  Stop: 22  (units    

(unknown)



                    date)                         20:27     unknown)  

 

           (unknown)  (no       (unknown)  (unknown)  Urine Dip  (units    (unkn

own)



                    date)                                   unknown)  

 

           (unknown)  (no       (unknown)  (unknown)  Vital Signs - 8 hr  (units

    (unknown)



                    date)                                   unknown)  

 

           (unknown)  (no       (unknown)  (unknown)  has not been  (units    (u

nknown)



                    date)                         eating so hasn't unknown)  



                                                  taken recently.           



                                                  spouse states           



                                                  thinks it might           

 

           (unknown)  (no       (unknown)  (unknown)  stopped taking  (units    

(unknown)



                    date)                         prior to back unknown)  



                                                  surgery and did not           



                                                  restart             

 

           (unknown)  (no       (unknown)  (unknown)  (no value)  (units    (unk

nown)



                    date)                                   unknown)  

 

           (unknown)  (no       (unknown)  (unknown)  22  (units 

   (unknown)



                    date)                         22  unknown)  



                                                  Range/Units           

 

           (unknown)  (no       (unknown)  (unknown)  22  (units    (unkno

wn)



                    date)                         Range/Units unknown)  

 

           (unknown)  (no       (unknown)  (unknown)  17:40 17:45 17:45  (units 

   (unknown)



                    date)                                   unknown)  

 

           (unknown)  (no       (unknown)  (unknown)  17:45 17:45 20:09  (units 

   (unknown)



                    date)                                   unknown)  

 

           (unknown)  (no       (unknown)  (unknown)  22:00     (units    (unkno

wn)



                    date)                                   unknown)  

 

           (unknown)  (no       (unknown)  (unknown)  ascorbic acid  (units    (

unknown)



                    date)                         (vitamin C) unknown)  



                                                  [Vitamin C] 500 mg           



                                                  Tablet              

 

           (unknown)  (no       (unknown)  (unknown)  atorvastatin 80 mg  (units

    (unknown)



                    date)                         tablet    unknown)  

 

           (unknown)  (no       (unknown)  (unknown)  buspirone 5 mg  (units    

(unknown)



                    date)                         Tablet    unknown)  

 

           (unknown)  (no       (unknown)  (unknown)  ciprofloxacin HCl  (units 

   (unknown)



                    date)                         500 mg tablet unknown)  

 

           (unknown)  (no       (unknown)  (unknown)  clopidogrel 75 mg  (units 

   (unknown)



                    date)                         tablet    unknown)  

 

           (unknown)  (no       (unknown)  (unknown)  cyanocobalamin  (units    

(unknown)



                    date)                         (vitamin B-12) 500 unknown)  



                                                  mcg Tablet           

 

           (unknown)  (no       (unknown)  (unknown)  ferrous sulfate  (units   

 (unknown)



                    date)                         325 mg (65 mg iron) unknown)  



                                                  Tablet              

 

           (unknown)  (no       (unknown)  (unknown)  gabapentin 300 mg  (units 

   (unknown)



                    date)                         capsule   unknown)  

 

           (unknown)  (no       (unknown)  (unknown)  glipizide 10 mg  (units   

 (unknown)



                    date)                         tablet    unknown)  

 

           (unknown)  (no       (unknown)  (unknown)  losartan 50 mg  (units    

(unknown)



                    date)                         tablet    unknown)  

 

           (unknown)  (no       (unknown)  (unknown)  metformin 500 mg  (units  

  (unknown)



                    date)                         tablet extended unknown)  



                                                  release 24 hr           

 

           (unknown)  (no       (unknown)  (unknown)  metoprolol  (units    (unk

nown)



                    date)                         succinate 25 mg unknown)  



                                                  tablet extended           



                                                  release 24 hr           

 

           (unknown)  (no       (unknown)  (unknown)  multivitamin with  (units 

   (unknown)



                    date)                         folic acid unknown)  



                                                  [Tab-A-Lucy] 400           



                                                  mcg Tablet           

 

           (unknown)  (no       (unknown)  (unknown)  ondansetron HCl  (units   

 (unknown)



                    date)                         [Zofran] 4 mg unknown)  



                                                  tablet              

 

           (unknown)  (no       (unknown)  (unknown)  polyethylene  (units    (u

nknown)



                    date)                         glycol 3350 17 gram unknown)  



                                                  Powder In Packet           

 

           (unknown)  (no       (unknown)  (unknown)  sennosides [senna]  (units

    (unknown)



                    date)                         8.6 mg Tablet unknown)  

 

           (unknown)  (no       (unknown)  (unknown)  trospium 20 mg  (units    

(unknown)



                    date)                         tablet    unknown)  

 

           (unknown)  (no       (unknown)  (unknown)  22  (units    (unkno

wn)



                    date)                                   unknown)  

 

           (unknown)  (no       (unknown)  (unknown)  1.07 in January.  (units  

  (unknown)



                    date)                         No elevation in his unknown)  



                                                  troponin,           



                                                  procalcitonin is           



                                                  elevated at           

 

           (unknown)  (no       (unknown)  (unknown)  Anemia,   (units    (unkno

wn)



                    date)                         Hypomagnesemia, unknown)  



                                                  Pyelonephritis           

 

           (unknown)  (no       (unknown)  (unknown)  Medication  (units    (unk

nown)



                    date)                         Instructions unknown)  



                                                  Recorded            

 

           (unknown)  (no       (unknown)  (unknown)  Medication  (units    (unk

nown)



                    date)                         Instructions unknown)  



                                                  Recorded  Confirmed           

 

           (unknown)  (no       (unknown)  (unknown)  sodium 135,  (units    (un

known)



                    date)                         potassium 3.7, unknown)  



                                                  creatinine 1.42           



                                                  which is increased           



                                                  from his prior of           

 

           (unknown)  (no       (unknown)  (unknown)  with regular axis  (units 

   (unknown)



                    date)                         and intervals.  No unknown)  



                                                  STEMI, ST segment           



                                                  changes,            



                                                  arrhythmia, or           

 

           (unknown)  (no       (unknown)  (unknown)  work is   (units    (unkno

wn)



                    date)                         significant for unknown)  



                                                  anemia, hemoglobin           



                                                  is 9.1 today, down           



                                                  from 9.6 in           

 

           (unknown)  (no       (unknown)  (unknown)  <Kitty Costello,  (units 

   (unknown)



                    date)                         [facility] - Last Filed: unknown)  



                                                  22 20:33>           

 

           (unknown)  (no       (unknown)  (unknown)  <Allegra Montiel DO  (units

    (unknown)



                    date)                         - Last Filed: unknown)  



                                                  22 07:39>           

 

           (unknown)  (no       (unknown)  (unknown)  (Zofran) vomiting  (units 

   (unknown)



                    date)                         #14 tabs  unknown)  

 

           (unknown)  (no       (unknown)  (unknown)  0.72, his lactate  (units 

   (unknown)



                    date)                         is also elevated at unknown)  



                                                  2.6, magnesium is           



                                                  low at 1.0.  His           

 

           (unknown)  (no       (unknown)  (unknown)  [id number]   (units    (unkno

wn)



                    date)                                   unknown)  

 

           (unknown)  (no       (unknown)  (unknown)  with iron  (units    (

unknown)



                    date)                         deficiency anemia, unknown)  



                                                  altered bowel           



                                                  habit, personal           



                                                  history of colon           

 

           (unknown)  (no       (unknown)  (unknown)  22:52     (units    (unkno

wn)



                    date)                                   unknown)  

 

           (unknown)  (no       (unknown)  (unknown)  62-year-old  (units    (un

known)



                    date)                         gentleman with a unknown)  



                                                  history of multiple           



                                                  sclerosis, CVA in           



                                                  2019,               

 

           (unknown)  (no       (unknown)  (unknown)  ?         (units    (unkno

wn)



                    date)                                   unknown)  

 

           (unknown)  (no       (unknown)  (unknown)  ALT     (<50)  (units    (

unknown)



                    date)                         IU/L      unknown)  

 

           (unknown)  (no       (unknown)  (unknown)  ALT    16  (<50)  (units  

  (unknown)



                    date)                         IU/L      unknown)  

 

           (unknown)  (no       (unknown)  (unknown)  ALT   (<50)  IU/L  (units 

   (unknown)



                    date)                                   unknown)  

 

           (unknown)  (no       (unknown)  (unknown)  AST     (17-59)  (units   

 (unknown)



                    date)                         IU/L      unknown)  

 

           (unknown)  (no       (unknown)  (unknown)  AST    19  (17-59)  (units

    (unknown)



                    date)                          IU/L     unknown)  

 

           (unknown)  (no       (unknown)  (unknown)  AST   (17-59)  (units    (

unknown)



                    date)                         IU/L      unknown)  

 

           (unknown)  (no       (unknown)  (unknown)  Activity  (units    (unkno

wn)



                    date)                         Restrictions/Additi unknown)  



                                                  onal Instructions:           

 

           (unknown)  (no       (unknown)  (unknown)  Age/Sex: 62 / M  (units   

 (unknown)



                    date)                                   unknown)  

 

           (unknown)  (no       (unknown)  (unknown)  Albumin   (units    (unkno

wn)



                    date)                         (3.5-5.0)  g/dL unknown)  

 

           (unknown)  (no       (unknown)  (unknown)  Albumin    3.9  (units    

(unknown)



                    date)                         (3.5-5.0)  g/dL unknown)  

 

           (unknown)  (no       (unknown)  (unknown)  Albumin   (units    (unkno

wn)



                    date)                         (3.5-5.0)  g/dL unknown)  

 

           (unknown)  (no       (unknown)  (unknown)  Albumin/Globulin  (units  

  (unknown)



                    date)                         Ratio     (1.0-2.8) unknown)  

 

           (unknown)  (no       (unknown)  (unknown)  Albumin/Globulin  (units  

  (unknown)



                    date)                         Ratio    1.1 unknown)  



                                                  (1.0-2.8)           

 

           (unknown)  (no       (unknown)  (unknown)  Albumin/Globulin  (units  

  (unknown)



                    date)                         Ratio   (1.0-2.8) unknown)  

 

           (unknown)  (no       (unknown)  (unknown)  Alkaline  (units    (unkno

wn)



                    date)                         Phosphatase unknown)  



                                                  ()  U/L           

 

           (unknown)  (no       (unknown)  (unknown)  Alkaline  (units    (unkno

wn)



                    date)                         Phosphatase    105 unknown)  



                                                  ()  U/L           

 

           (unknown)  (no       (unknown)  (unknown)  Alkaline  (units    (unkno

wn)



                    date)                         Phosphatase unknown)  



                                                  ()  U/L           

 

           (unknown)  (no       (unknown)  (unknown)  Allergy/AdvReac  (units   

 (unknown)



                    date)                         Type Severity unknown)  



                                                  Reaction Status           



                                                  Date / Time           

 

           (unknown)  (no       (unknown)  (unknown)  Amorphous Sediment  (units

    (unknown)



                    date)                                   unknown)  

 

           (unknown)  (no       (unknown)  (unknown)  Amorphous Sediment  (units

    (unknown)



                    date)                                   unknown)  

 

           (unknown)  (no       (unknown)  (unknown)  Amorphous Sediment  (units

    (unknown)



                    date)                            2+     unknown)  

 

           (unknown)  (no       (unknown)  (unknown)  Approved by: Garfield Mcdanielsunits 

   (unknown)



                    dateIrena Lopez M.D. on unknown)  



                                                  2022 at 18:02?           

 

           (unknown)  (no       (unknown)  (unknown)  BUN     (9-20)  (units    

(unknown)



                    date)                         mg/dL     unknown)  

 

           (unknown)  (no       (unknown)  (unknown)  BUN    21 H  (units    (un

known)



                    date)                         (9-20)  mg/dL unknown)  

 

           (unknown)  (no       (unknown)  (unknown)  BUN   (9-20)  (units    (u

nknown)



                    date)                         mg/dL     unknown)  

 

           (unknown)  (no       (unknown)  (unknown)  BUN/Creatinine  (units    

(unknown)



                    date)                         Ratio     (6-22) unknown)  

 

           (unknown)  (no       (unknown)  (unknown)  BUN/Creatinine  (units    

(unknown)



                    date)                         Ratio    14.8 unknown)  



                                                  (6-22)              

 

           (unknown)  (no       (unknown)  (unknown)  BUN/Creatinine  (units    

(unknown)



                    date)                         Ratio   (6-22) unknown)  

 

           (unknown)  (no       (unknown)  (unknown)  Baso # (Auto)  (units    (

unknown)



                    date)                         (0-100)  /uL unknown)  

 

           (unknown)  (no       (unknown)  (unknown)  Baso # (Auto)  (units    (

unknown)



                    date)                         (0-100)  /uL unknown)  

 

           (unknown)  (no       (unknown)  (unknown)  Baso # (Auto)   0  (units 

   (unknown)



                    date)                          (0-100)  /uL unknown)  

 

           (unknown)  (no       (unknown)  (unknown)  Baso % (Auto)  (units    (

unknown)



                    date)                         (0-2)  %  unknown)  

 

           (unknown)  (no       (unknown)  (unknown)  Baso % (Auto)  (units    (

unknown)



                    date)                         (0-2)  %  unknown)  

 

           (unknown)  (no       (unknown)  (unknown)  Baso % (Auto)  (units    (

unknown)



                    date)                         0.2   (0-2)  % unknown)  

 

           (unknown)  (no       (unknown)  (unknown)  Bedside Urine  (units    (

unknown)



                    date)                         Bilirubin        - unknown)  



                                                  Negative            

 

           (unknown)  (no       (unknown)  (unknown)  Bedside Urine  (units    (

unknown)



                    date)                         Glucose    Negative unknown)  

 

           (unknown)  (no       (unknown)  (unknown)  Bedside Urine  (units    (

unknown)



                    date)                         Ketone    - unknown)  



                                                  Negative            

 

           (unknown)  (no       (unknown)  (unknown)  Bedside Urine  (units    (

unknown)



                    date)                         Leukocytes       + unknown)  



                                                  70                  

 

           (unknown)  (no       (unknown)  (unknown)  Bedside Urine  (units    (

unknown)



                    date)                         Nitrite    - unknown)  



                                                  Negative            

 

           (unknown)  (no       (unknown)  (unknown)  Bedside Urine  (units    (

unknown)



                    date)                         Occult Blood     ++ unknown)  

 

           (unknown)  (no       (unknown)  (unknown)  Bedside Urine  (units    (

unknown)



                    date)                         Protein    + 30 unknown)  

 

           (unknown)  (no       (unknown)  (unknown)  Bedside Urine  (units    (

unknown)



                    date)                         Urobilinogen     - unknown)  



                                                  Negative            

 

           (unknown)  (no       (unknown)  (unknown)  Bedside Urine pH  (units  

  (unknown)



                    date)                          6.0      unknown)  

 

           (unknown)  (no       (unknown)  (unknown)  Blood Pressure  (units    

(unknown)



                    date)                         124/66   18/ unknown)  



                                                  17:34               

 

           (unknown)  (no       (unknown)  (unknown)  Blood Pressure  (units    

(unknown)



                    date)                         124/58 L  unknown)  

 

           (unknown)  (no       (unknown)  (unknown)  Bones and chest  (units   

 (unknown)



                    date)                         wall:? No unknown)  



                                                  suspicious bony           



                                                  lesions.? Overlying           



                                                  soft tissues           

 

           (unknown)  (no       (unknown)  (unknown)  CK-MB (CK-2)  (units    (u

nknown)



                    date)                                   unknown)  

 

           (unknown)  (no       (unknown)  (unknown)  CK-MB (CK-2)  (units    (u

nknown)



                    date)                                   unknown)  

 

           (unknown)  (no       (unknown)  (unknown)  CK-MB (CK-2)  (units    (u

nknown)



                    date)                         TNP       unknown)  

 

           (unknown)  (no       (unknown)  (unknown)  CK-MB (CK-2) Rel  (units  

  (unknown)



                    date)                         Index     unknown)  

 

           (unknown)  (no       (unknown)  (unknown)  CK-MB (CK-2) Rel  (units  

  (unknown)



                    date)                         Index     unknown)  

 

           (unknown)  (no       (unknown)  (unknown)  CK-MB (CK-2) Rel  (units  

  (unknown)



                    date)                         Index    TNP unknown)  

 

           (unknown)  (no       (unknown)  (unknown)  COMPARISON:?  (units    (u

nknown)



                    date)                         [facility], unknown)  



                                                  CR, XR CHEST 2V,           



                                                  3/18/2021, 10:19.           

 

           (unknown)  (no       (unknown)  (unknown)  CT abdomen pelvis  (units 

   (unknown)



                    date)                         was ordered for unknown)  



                                                  patient has           



                                                  tenderness to his           



                                                  right lower           

 

           (unknown)  (no       (unknown)  (unknown)  CVA (cerebral  (units    (

unknown)



                    date)                         vascular accident) unknown)  



                                                  (2018)           

 

           (unknown)  (no       (unknown)  (unknown)  Calcium   (units    (unkno

wn)



                    date)                         (8.4-10.2)  mg/dL unknown)  

 

           (unknown)  (no       (unknown)  (unknown)  Calcium    9.3  (units    

(unknown)



                    date)                         (8.4-10.2)  mg/dL unknown)  

 

           (unknown)  (no       (unknown)  (unknown)  Calcium   (units    (unkno

wn)



                    date)                         (8.4-10.2)  mg/dL unknown)  

 

           (unknown)  (no       (unknown)  (unknown)  Carbon Dioxide  (units    

(unknown)



                    date)                         (22-32)  mmol/L unknown)  

 

           (unknown)  (no       (unknown)  (unknown)  Carbon Dioxide  (units    

(unknown)



                    date)                         29  (22-32)  mmol/L unknown)  

 

           (unknown)  (no       (unknown)  (unknown)  Carbon Dioxide  (units    

(unknown)



                    date)                         (22-32)  mmol/L unknown)  

 

           (unknown)  (no       (unknown)  (unknown)  Cardio: denies  (units    

(unknown)



                    date)                         chest pain, unknown)  



                                                  palpitations           

 

           (unknown)  (no       (unknown)  (unknown)  Cardiovascular:  (units   

 (unknown)



                    date)                         regular rate and unknown)  



                                                  rhythm, no           



                                                  peripheral edema,           



                                                  warm extremities           

 

           (unknown)  (no       (unknown)  (unknown)  Ceftriaxone Sodium  (units

    (unknown)



                    date)                         1,000 mg/ (Sodium unknown)  



                                                  Chloride)  100 mls           



                                                  @ 200 mls/hr IV NOW           



                                                  ONE                 

 

           (unknown)  (no       (unknown)  (unknown)  Chest x-ray:  (units    (u

nknown)



                    date)                                   unknown)  

 

           (unknown)  (no       (unknown)  (unknown)  Chief complaint:  (units  

  (unknown)



                    date)                         Weakness  unknown)  

 

           (unknown)  (no       (unknown)  (unknown)  Chloride  (units    (

wn)



                    date)                         ()  mmol/L unknown)  

 

           (unknown)  (no       (unknown)  (unknown)  Chloride    96 L  (units  

  (unknown)



                    date)                         ()  mmol/L unknown)  

 

           (unknown)  (no       (unknown)  (unknown)  Chloride  (units    (o

wn)



                    date)                         ()  mmol/L unknown)  

 

           (unknown)  (no       (unknown)  (unknown)  Clinical  (units    (

wn)



                    date)                         Impression: unknown)  

 

           (unknown)  (no       (unknown)  (unknown)  Colon cancer  (units    (u

)



                    date)                         ()    unknown)  

 

           (unknown)  (no       (unknown)  (unknown)  Course    (units    (o

wn)



                    date)                                   unknown)  

 

           (unknown)  (no       (unknown)  (unknown)  Creatinine  (units    ()



                    date)                         (0.66-1.25)  mg/dL unknown)  

 

           (unknown)  (no       (unknown)  (unknown)  Creatinine    1.42  (units

    (unknown)



                    date)                         H  (0.66-1.25) unknown)  



                                                  mg/dL               

 

           (unknown)  (no       (unknown)  (unknown)  Creatinine  (units    (un)



                    date)                         (0.66-1.25)  mg/dL unknown)  

 

           (unknown)  (no       (unknown)  (unknown)  : 1960  (units   

 (unknown)



                    date)                         Acct:[id number] unknown)  

 

           (unknown)  (no       (unknown)  (unknown)  Departure  (units    (

own)



                    date)                                   unknown)  

 

           (unknown)  (no       (unknown)  (unknown)  Depression  (units    (unk

nown)



                    date)                                   unknown)  

 

           (unknown)  (no       (unknown)  (unknown)  Diabetes  (units    (o

wn)



                    date)                                   unknown)  

 

           (unknown)  (no       (unknown)  (unknown)  Diabetic  (units    (o

)



                    date)                         neuropathy unknown)  

 

           (unknown)  (no       (unknown)  (unknown)  Discharge Plan  (units    

(unknown)



                    date)                                   unknown)  

 

           (unknown)  (no       (unknown)  (unknown)  Discontinued  (units    (u

nknown)



                    date)                         Medications unknown)  

 

           (unknown)  (no       (unknown)  (unknown)  Gustavo Macias MD  (units   

 (unknown)



                    date)                         [[facility] unknown)  



                                                  Provider] -           

 

           (unknown)  (no       (unknown)  (unknown)  ECG Data  (units    (unkno

wn)



                    date)                                   unknown)  

 

           (unknown)  (no       (unknown)  (unknown) EKG independently  (units  

  (unknown)



                    date)                         reviewed by Dr. mejia)  



                                                  Dinesh and reveals           



                                                  normal sinus rhythm           



                                                  at 72 bpm           

 

           (unknown)  (no       (unknown)  (unknown)  EKG shows a normal  (units

    (unknown)



                    date)                         sinus rhythm rate unknown)  



                                                  of 70 2p are 172           



                                                  QRS 86 and .           



                                                   No                 

 

           (unknown)  (no       (unknown)  (unknown)  ER Physician:  (units    (

unknown)



                    date)                         Allegra Montiel D.O. unknown)  

 

           (unknown)  (no       (unknown)  (unknown)  Eos # (Auto)  (units    (u

nknown)



                    date)                         (0-450)  /uL unknown)  

 

           (unknown)  (no       (unknown)  (unknown)  Eos # (Auto)  (units    (u

nknown)



                    date)                         (0-450)  /uL unknown)  

 

           (unknown)  (no       (unknown)  (unknown)  Eos # (Auto)   100  (units

    (unknown)



                    date)                           (0-450)  /uL unknown)  

 

           (unknown)  (no       (unknown)  (unknown)  Eos % (Auto)  (units    (u

nknown)



                    date)                         (2-4)  %  unknown)  

 

           (unknown)  (no       (unknown)  (unknown)  Eos % (Auto)  (units    (u

nknown)



                    date)                         (2-4)  %  unknown)  

 

           (unknown)  (no       (unknown)  (unknown)  Eos % (Auto)   0.8  (units

    (unknown)



                    date)                         L   (2-4)  % unknown)  

 

           (unknown)  (no       (unknown)  (unknown)  Esterase  (units    (unkno

wn)



                    date)                                   unknown)  

 

           (unknown)  (no       (unknown)  (unknown)  Estimated GFR  (units    (

unknown)



                    date)                         (>60)  mL/min unknown)  

 

           (unknown)  (no       (unknown)  (unknown)  Estimated GFR  (units    (

unknown)



                    date)                         55 L  (>60)  mL/min unknown)  

 

           (unknown)  (no       (unknown)  (unknown)  Estimated GFR  (units    (

unknown)



                    date)                         (>60)  mL/min unknown)  

 

           (unknown)  (no       (unknown)  (unknown)  Exam      (units    (unkno

wn)



                    date)                                   unknown)  

 

           (unknown)  (no       (unknown)  (unknown)  Exam Narrative:  (units   

 (unknown)



                    date)                                   unknown)  

 

           (unknown)  (no       (unknown)  (unknown)  Eyes: denies  (units    (u

nknown)



                    date)                         visual changes, eye unknown)  



                                                  pain                

 

           (unknown)  (no       (unknown)  (unknown)  Eyes: equal round  (units 

   (unknown)



                    date)                         and reactive, EOMI, unknown)  



                                                  conjunctiva normal           

 

           (unknown)  (no       (unknown)  (unknown)  FINDINGS:?  (units    (unk

nown)



                    date)                                   unknown)  

 

           (unknown)  (no       (unknown)  (unknown)  Family History  (units    

(unknown)



                    date)                         (Reviewed 22 unknown)  



                                                  @ 19:57 by Kitty Costello [facility])           

 

           (unknown)  (no       (unknown)  (unknown)  Father     (units 

   (unknown)



                    date)                          Cancer   unknown)  

 

           (unknown)  (no       (unknown)  (unknown)  Follow-up for  (units    (

unknown)



                    date)                         recheck in the next unknown)  



                                                  2-3 days.           

 

           (unknown)  (no       (unknown)  (unknown)  GERD      (units    (unkno

wn)



                    date)                         (gastroesophageal unknown)  



                                                  reflux disease)           

 

           (unknown)  (no       (unknown)  (unknown)  GI:  Right lower  (units  

  (unknown)



                    date)                         quadrant tenderness unknown)  



                                                  to palpation           

 

           (unknown)  (no       (unknown)  (unknown)  GI: abdomen mildly  (units

    (unknown)



                    date)                         guarded, tender to unknown)  



                                                  palpation in the           



                                                  right lower           



                                                  quadrant,           

 

           (unknown)  (no       (unknown)  (unknown)  :  Straight cath  (units

    (unknown)



                    date)                         for urine completed unknown)  



                                                  by myself with 16           



                                                  [place] coude           



                                                  catheter,           

 

           (unknown)  (no       (unknown)  (unknown)  : denies  (units    (unk

nown)



                    date)                         dysuria, hematuria unknown)  



                                                  or flank pain,           



                                                  reports oliguria,           



                                                  patient self           

 

           (unknown)  (no       (unknown)  (unknown)  Gastroenterology  (units  

  (unknown)



                    date)                         from [place], unknown)  



                                                  patient reports           



                                                  that he sees Yasmine Rocha PA-C           

 

           (unknown)  (no       (unknown)  (unknown)  General   (units    (unkno

wn)



                    date)                                   unknown)  

 

           (unknown)  (no       (unknown)  (unknown)  General:  (units    (unkno

wn)



                    date)                         cooperative, unknown)  



                                                  comfortable, in no           



                                                  acute distress,           



                                                  well groomed,           

 

           (unknown)  (no       (unknown)  (unknown)  General: denies  (units   

 (unknown)



                    date)                         fever, chills, unknown)  



                                                  endorses weakness,           



                                                  right lower           



                                                  quadrant pain,           

 

           (unknown)  (no       (unknown)  (unknown)  GenericComposite[P  (units

    (unknown)



                    date)                         lt Count  unknown)  



                                                  (150-400)  X10^3/uL           



                                                   ]                  

 

           (unknown)  (no       (unknown)  (unknown)  GenericComposite[P  (units

    (unknown)



                    date)                         lt Count  unknown)  



                                                  (150-400)  X10^3/uL           



                                                   ]                  

 

           (unknown)  (no       (unknown)  (unknown)  GenericComposite[P  (units

    (unknown)



                    date)                         lt Count   219 unknown)  



                                                  (150-400)  X10^3/uL           



                                                   ]                  

 

           (unknown)  (no       (unknown)  (unknown)  GenericComposite[R  (units

    (unknown)



                    date)                         BC     (4.5-5.9) unknown)  



                                                  X10^6/uL  ]           

 

           (unknown)  (no       (unknown)  (unknown)  GenericComposite[R  (units

    (unknown)



                    date)                         BC   (4.5-5.9) unknown)  



                                                  X10^6/uL  ]           

 

           (unknown)  (no       (unknown)  (unknown)  GenericComposite[R  (units

    (unknown)



                    date)                         BC   3.37 L unknown)  



                                                  (4.5-5.9)  X10^6/uL           



                                                   ]                  

 

           (unknown)  (no       (unknown)  (unknown)  GenericComposite[W  (units

    (unknown)



                    date)                         BC     (4.5-11.0) unknown)  



                                                  X10^3/uL  ]           

 

           (unknown)  (no       (unknown)  (unknown)  GenericComposite[W  (units

    (unknown)



                    date)                         BC   (4.5-11.0) unknown)  



                                                  X10^3/uL  ]           

 

           (unknown)  (no       (unknown)  (unknown)  GenericComposite[W  (units

    (unknown)



                    date)                         BC   7.0  unknown)  



                                                  (4.5-11.0)           



                                                  X10^3/uL  ]           

 

           (unknown)  (no       (unknown)  (unknown)  Globulin  (units    (unkno

wn)



                    date)                         (1.7-4.1)  g/dL unknown)  

 

           (unknown)  (no       (unknown)  (unknown)  Globulin    3.6  (units   

 (unknown)



                    date)                         (1.7-4.1)  g/dL unknown)  

 

           (unknown)  (no       (unknown)  (unknown)  Globulin  (units    (unkno

wn)



                    date)                         (1.7-4.1)  g/dL unknown)  

 

           (unknown)  (no       (unknown)  (unknown)  Glucose   (units    (unkno

wn)



                    date)                         ()  mg/dL unknown)  

 

           (unknown)  (no       (unknown)  (unknown)  Glucose    262 H  (units  

  (unknown)



                    date)                         ()  mg/dL unknown)  

 

           (unknown)  (no       (unknown)  (unknown)  Glucose   ()  (units

    (unknown)



                    date)                          mg/dL    unknown)  

 

           (unknown)  (no       (unknown)  (unknown)  Guaiac stool:  (units    (

unknown)



                    date)                         Negative, good unknown)  



                                                  stool result           

 

           (unknown)  (no       (unknown)  (unknown)  H/O colectomy  (units    (

unknown)



                    date)                                   unknown)  

 

           (unknown)  (no       (unknown)  (unknown)  HPI - Weakness  (units    

(unknown)



                    date)                                   unknown)  

 

           (unknown)  (no       (unknown)  (unknown)  HPI Narrative:  (units    

(unknown)



                    date)                                   unknown)  

 

           (unknown)  (no       (unknown)  (unknown)  Hct     (41-53)  %  (units

    (unknown)



                    date)                                   unknown)  

 

           (unknown)  (no       (unknown)  (unknown)  Hct   (41-53)  %  (units  

  (unknown)



                    date)                                   unknown)  

 

           (unknown)  (no       (unknown)  (unknown)  Hct   26.7 L  (units    (u

nknown)



                    date)                         (41-53)  % unknown)  

 

           (unknown)  (no       (unknown)  (unknown)  He does not have  (units  

  (unknown)



                    date)                         any mental status unknown)  



                                                  changes, GCS is 15.           



                                                   Guaiac stool is           

 

           (unknown)  (no       (unknown)  (unknown)  Head/Neck:  (units    (unk

nown)



                    date)                         Endorses having a unknown)  



                                                  headache, denies           



                                                  any neck pain           

 

           (unknown)  (no       (unknown)  (unknown)  Head: atraumatic,  (units 

   (unknown)



                    date)                         symmetrical facial unknown)  



                                                  expressions           

 

           (unknown)  (no       (unknown)  (unknown)  Hgb       (units    (unkno

wn)



                    date)                         (13.5-17.5)  g/dL unknown)  

 

           (unknown)  (no       (unknown)  (unknown)  Hgb   (13.5-17.5)  (units 

   (unknown)



                    date)                         g/dL      unknown)  

 

           (unknown)  (no       (unknown)  (unknown)  Hgb   9.1 L  (units    (un

known)



                    date)                         (13.5-17.5)  g/dL unknown)  

 

           (unknown)  (no       (unknown)  (unknown)  History of Present  (units

    (unknown)



                    date)                         Illness   unknown)  

 

           (unknown)  (no       (unknown)  (unknown)  History of lumbar  (units 

   (unknown)



                    date)                         surgery (2011) unknown)  

 

           (unknown)  (no       (unknown)  (unknown)  Hx of foot surgery  (units

    (unknown)



                    date)                         (2017)    unknown)  

 

           (unknown)  (no       (unknown)  (unknown)  Hx of shoulder  (units    

(unknown)



                    date)                         surgery (2010) unknown)  

 

           (unknown)  (no       (unknown)  (unknown)  Hypertension  (units    (u

nknown)



                    date)                                   unknown)  

 

           (unknown)  (no       (unknown)  (unknown)  IMPRESSION:? No  (units   

 (unknown)



                    date)                         acute     unknown)  



                                                  cardiopulmonary           



                                                  findings            

 

           (unknown)  (no       (unknown)  (unknown)  INDICATIONS:?  (units    (

unknown)



                    date)                         chest pain unknown)  

 

           (unknown)  (no       (unknown)  (unknown)  Imaging Data  (units    (u

nknown)



                    date)                                   unknown)  

 

           (unknown)  (no       (unknown)  (unknown)  Independently  (units    (

unknown)



                    date)                         reviewed vitals unknown)  



                                                  signs and nursing           



                                                  notes.              

 

           (unknown)  (no       (unknown)  (unknown)  Initial Vital  (units    (

unknown)



                    date)                         Signs     unknown)  

 

           (unknown)  (no       (unknown)  (unknown)  Initial Vital  (units    (

unknown)



                    date)                         Signs:    unknown)  

 

           (unknown)  (no       (unknown)  (unknown)  Instructions:  (units    (

unknown)



                    date)                         Acute Cystitis, unknown)  



                                                  Anemia              

 

           (unknown)  (no       (unknown)  (unknown)  Interpretation:  (units   

 (unknown)



                    date)                                   unknown)  

 

           (unknown)  (no       (unknown)  (unknown) January, hematocrit  (units

    (unknown)



                    date)                         is 26.7, down from unknown)  



                                                  27.8 also in           



                                                  January, platelet           



                                                  count 219,           

 

           (unknown)  (no       (unknown)  (unknown) Klebsiella  (units    (unkn

own)



                    date)                         pneumoniae which unknown)  



                                                  was resistant only           



                                                  to ampicillin and           



                                                  nitrofurantoin.           

 

           (unknown)  (no       (unknown)  (unknown)  Lab Data  (units    (unkno

wn)



                    date)                                   unknown)  

 

           (unknown)  (no       (unknown)  (unknown)  Labs:     (units    (unkno

wn)



                    date)                                   unknown)  

 

           (unknown)  (no       (unknown)  (unknown)  Lactate   (units    (unkno

wn)



                    date)                         (0.7-2.1)  mmol/L unknown)  

 

           (unknown)  (no       (unknown)  (unknown)  Lactate   2.6 H  (units   

 (unknown)



                    date)                         (0.7-2.1)  mmol/L unknown)  

 

           (unknown)  (no       (unknown)  (unknown)  Lactate  1.1  (units    (u

nknown)



                    date)                         (0.7-2.1)  mmol/L unknown)  

 

           (unknown)  (no       (unknown)  (unknown)  Lipase    (units    (unkno

wn)



                    date)                         ()  U/L unknown)  

 

           (unknown)  (no       (unknown)  (unknown)  Lipase    32  (units    (u

nknown)



                    date)                         ()  U/L unknown)  

 

           (unknown)  (no       (unknown)  (unknown)  Lipase   ()  (units 

   (unknown)



                    date)                         U/L       unknown)  

 

           (unknown)  (no       (unknown)  (unknown)  Lungs and pleura:?  (units

    (unknown)



                    date)                         Lungs are clear.? unknown)  



                                                  No pleural           



                                                  effusions or           



                                                  pneumothorax.? Low           

 

           (unknown)  (no       (unknown)  (unknown)  Lymph # (Auto)  (units    

(unknown)



                    date)                         (0649-3886)  /uL unknown)  

 

           (unknown)  (no       (unknown)  (unknown)  Lymph # (Auto)  (units    

(unknown)



                    date)                         (4581-8901)  /uL unknown)  

 

           (unknown)  (no       (unknown)  (unknown)  Lymph # (Auto)  (units    

(unknown)



                    date)                         400 L   (9507-4299) unknown)  



                                                   /uL                

 

           (unknown)  (no       (unknown)  (unknown)  Lymph % (Auto)  (units    

(unknown)



                    date)                         (25-40)  % unknown)  

 

           (unknown)  (no       (unknown)  (unknown)  Lymph % (Auto)  (units    

(unknown)



                    date)                         (25-40)  % unknown)  

 

           (unknown)  (no       (unknown)  (unknown)  Lymph % (Auto)  (units    

(unknown)



                    date)                         6.4 L   (25-40)  % unknown)  

 

           (unknown)  (no       (unknown)  (unknown)  MCH     (26-34)  (units   

 (unknown)



                    date)                         PG        unknown)  

 

           (unknown)  (no       (unknown)  (unknown)  MCH   (26-34)  PG  (units 

   (unknown)



                    date)                                   unknown)  

 

           (unknown)  (no       (unknown)  (unknown)  MCH   26.9  (units    (unk

nown)



                    date)                         (26-34)  PG unknown)  

 

           (unknown)  (no       (unknown)  (unknown)  MCHC     (30-36)  (units  

  (unknown)



                    date)                         %         unknown)  

 

           (unknown)  (no       (unknown)  (unknown)  MCHC   (30-36)  %  (units 

   (unknown)



                    date)                                   unknown)  

 

           (unknown)  (no       (unknown)  (unknown)  MCHC   34.0  (units    (un

known)



                    date)                         (30-36)  % unknown)  

 

           (unknown)  (no       (unknown)  (unknown)  MCV     ()  (units  

  (unknown)



                    date)                         fL        unknown)  

 

           (unknown)  (no       (unknown)  (unknown)  MCV   ()  fL  (units

    (unknown)



                    date)                                   unknown)  

 

           (unknown)  (no       (unknown)  (unknown)  MCV   79.1 L  (units    (u

nknown)



                    date)                         ()  fL unknown)  

 

           (unknown)  (no       (unknown)  (unknown)  MDM - Weakness  (units    

(unknown)



                    date)                                   unknown)  

 

           (unknown)  (no       (unknown)  (unknown)  MDM Narrative  (units    (

unknown)



                    date)                                   unknown)  

 

           (unknown)  (no       (unknown)  (unknown)  MSK: denies new  (units   

 (unknown)



                    date)                         joint pain, muscle unknown)  



                                                  weakness or           



                                                  swelling            

 

           (unknown)  (no       (unknown)  (unknown) MSK: moves all  (units    (

unknown)



                    date)                         extremities, unknown)  



                                                  neurovascularly           



                                                  intact, generalized           



                                                  weakness, normal           

 

           (unknown)  (no       (unknown)  (unknown)  Magnesium  (units    (unkn

own)



                    date)                         (1.6-2.3)  mg/dL unknown)  

 

           (unknown)  (no       (unknown)  (unknown)  Magnesium    1.0 L  (units

    (unknown)



                    date)                          (1.6-2.3)  mg/dL unknown)  

 

           (unknown)  (no       (unknown)  (unknown)  Magnesium  (units    (unkn

own)



                    date)                         (1.6-2.3)  mg/dL unknown)  

 

           (unknown)  (no       (unknown)  (unknown)  Magnesium Sulfate  (units 

   (unknown)



                    date)                         (Magnesium Sulfate) unknown)  



                                                   2 gm in 50 mls @           



                                                  50 mls/hr IV NOW           



                                                  ONE                 

 

           (unknown)  (no       (unknown)  (unknown)  Mank: Patient was  (units 

   (unknown)



                    date)                         seen independently unknown)  



                                                  evaluated by           



                                                  myself, physical           



                                                  exam was            

 

           (unknown)  (no       (unknown)  (unknown)  Mank: Patient's  (units   

 (unknown)



                    date)                         lactate improved unknown)  



                                                  with fluids.  He           



                                                  had difficulty           



                                                  ambulating           

 

           (unknown)  (no       (unknown)  (unknown)  Mediastinum:?  (units    (

unknown)



                    date)                         Mediastinal unknown)  



                                                  contours appear           



                                                  normal.? Heart size           



                                                  is normal.?           

 

           (unknown)  (no       (unknown)  (unknown)  Medical History  (units   

 (unknown)



                    date)                         (Reviewed 22 unknown)  



                                                  @ 19:57 by Kitty Costello [facility])           

 

           (unknown)  (no       (unknown)  (unknown)  Medical decision  (units  

  (unknown)



                    date)                         making narrative: unknown)  

 

           (unknown)  (no       (unknown)  (unknown)  MiraL[employer] since this  (units

    (unknown)



                    date)                         happened. unknown)  

 

           (unknown)  (no       (unknown)  (unknown)  Mode of arrival:  (units  

  (unknown)



                    date)                         Wheelchair unknown)  

 

           (unknown)  (no       (unknown)  (unknown)  Mono # (Auto)  (units    (

unknown)



                    date)                         (0-900)  /uL unknown)  

 

           (unknown)  (no       (unknown)  (unknown)  Mono # (Auto)  (units    (

unknown)



                    date)                         (0-900)  /uL unknown)  

 

           (unknown)  (no       (unknown)  (unknown)  Mono # (Auto)  (units    (

unknown)



                    date)                         600   (0-900)  /uL unknown)  

 

           (unknown)  (no       (unknown)  (unknown)  Mono % (Auto)  (units    (

unknown)



                    date)                         (3-14)  % unknown)  

 

           (unknown)  (no       (unknown)  (unknown)  Mono % (Auto)  (units    (

unknown)



                    date)                         (3-14)  % unknown)  

 

           (unknown)  (no       (unknown)  (unknown)  Mono % (Auto)  (units    (

unknown)



                    date)                         7.9   (3-14)  % unknown)  

 

           (unknown)  (no       (unknown)  (unknown)  Mother     (units 

   (unknown)



                    date)                          Cancer   unknown)  

 

           (unknown)  (no       (unknown)  (unknown)  Mouth/Throat:  (units    (

unknown)



                    date)                         moist mucus unknown)  



                                                  membranes           

 

           (unknown)  (no       (unknown)  (unknown)  Narrative  (units    (unkn

own)



                    date)                                   unknown)  

 

           (unknown)  (no       (unknown)  (unknown)  Narrative:  (units    (unk

nown)



                    date)                                   unknown)  

 

           (unknown)  (no       (unknown)  (unknown)  Neck: supple  (units    (u

nknown)



                    date)                                   unknown)  

 

           (unknown)  (no       (unknown)  (unknown)  Neuro: denies  (units    (

unknown)



                    date)                         numbness, tingling, unknown)  



                                                  dizziness           

 

           (unknown)  (no       (unknown)  (unknown)  Neuro: normal  (units    (

unknown)



                    date)                         speech and unknown)  



                                                  cognition, A+O x3           

 

           (unknown)  (no       (unknown)  (unknown)  Neut # (Auto)  (units    (

unknown)



                    date)                         (3579-6730)  /uL unknown)  

 

           (unknown)  (no       (unknown)  (unknown)  Neut # (Auto)  (units    (

unknown)



                    date)                         (8111-1984)  /uL unknown)  

 

           (unknown)  (no       (unknown)  (unknown)  Neut # (Auto)  (units    (

unknown)



                    date)                         5900   (7413-8109) unknown)  



                                                  /uL                 

 

           (unknown)  (no       (unknown)  (unknown)  Neut % (Auto)  (units    (

unknown)



                    date)                         (50-75)  % unknown)  

 

           (unknown)  (no       (unknown)  (unknown)  Neut % (Auto)  (units    (

unknown)



                    date)                         (50-75)  % unknown)  

 

           (unknown)  (no       (unknown)  (unknown)  Neut % (Auto)  (units    (

unknown)



                    date)                         84.7 H   (50-75)  % unknown)  

 

           (unknown)  (no       (unknown)  (unknown)  New       (units    (unkno

wn)



                    date)                                   unknown)  

 

           (unknown)  (no       (unknown)  (unknown)  No Action  (units    (unkn

own)



                    date)                                   unknown)  

 

           (unknown)  (no       (unknown)  (unknown)  No Known Drug  (units    (

unknown)



                    date)                         Allergies Allergy unknown)  



                                                  Verified 22           



                                                  17:38               

 

           (unknown)  (no       (unknown)  (unknown)  Nose: nares  (units    (un

known)



                    date)                         patent, no unknown)  



                                                  rhinorrhea           

 

           (unknown)  (no       (unknown)  (unknown)  Notable on  (units    (unk

nown)



                    date)                         patient's prior unknown)  



                                                  urine cultures,           



                                                  urine from           



                                                  2022 grew out           

 

           (unknown)  (no       (unknown)  (unknown)  Ondansetron HCl  (units   

 (unknown)



                    date)                         (Ondansetron 4 Mg/2 unknown)  



                                                  Ml Inj)  4 mg IV           



                                                  NOW ONE             

 

           (unknown)  (no       (unknown)  (unknown)  Ordered:  (units    (unkno

wn)



                    date)                                   unknown)  

 

           (unknown)  (no       (unknown)  (unknown)  Orders    (units    (unkno

wn)



                    date)                                   unknown)  

 

           (unknown)  (no       (unknown)  (unknown)  Osteoarthritis  (units    

(unknown)



                    date)                                   unknown)  

 

           (unknown)  (no       (unknown)  (unknown)  Oxygen Delivery  (units   

 (unknown)



                    date)                         Method    22 unknown)  



                                                  17:34               

 

           (unknown)  (no       (unknown)  (unknown)  Oxygen Delivery  (units   

 (unknown)



                    date)                         Method Room Air unknown)  

 

           (unknown)  (no       (unknown)  (unknown)  PROCEDURE:? XR  (units    

(unknown)



                    date)                         CHEST 1V  unknown)  

 

           (unknown)  (no       (unknown)  (unknown)  Patient   (units    (unkno

wn)



                    date)                         Disposition: Home unknown)  

 

           (unknown)  (no       (unknown)  (unknown)  Patient History  (units   

 (unknown)



                    date)                                   unknown)  

 

           (unknown)  (no       (unknown)  (unknown)  Patient and wife  (units  

  (unknown)



                    date)                         were offered again unknown)  



                                                  admission but once           



                                                  again defer.           



                                                  Received a           

 

           (unknown)  (no       (unknown)  (unknown)  Patient did not  (units   

 (unknown)



                    date)                         walk very far and unknown)  



                                                  did have            



                                                  difficulty.  He           



                                                  does appear to have           

 

           (unknown)  (no       (unknown)  (unknown)  Patient is  (units    (unk

nown)



                    date)                         currently unknown)  



                                                  anticoagulated on           



                                                  Plavix 37.5 mg           



                                                  daily.  Dr. Gómez           

 

           (unknown)  (no       (unknown)  (unknown) Patient reports  (units    

(unknown)



                    date)                         that when he has a unknown)  



                                                  bowel movement, he           



                                                  typically has hard           



                                                  pellets             

 

           (unknown)  (no       (unknown)  (unknown)  Patient self caths  (units

    (unknown)



                    date)                         for urine output, unknown)  



                                                  reports that he has           



                                                  not had much urine           

 

           (unknown)  (no       (unknown)  (unknown)  Patient was  (units    (un

known)



                    date)                         treated in the unknown)  



                                                  emergency           



                                                  department with 1 g           



                                                  of ceftriaxone.           

 

           (unknown)  (no       (unknown)  (unknown) Patient's  (units    (unkno

wn)



                    date)                         colorectal cancer unknown)  



                                                  oncologist was Dr. Jett, patient was           



                                                  referred to him           

 

           (unknown)  (no       (unknown)  (unknown)  Patient's preop  (units   

 (unknown)



                    date)                         diagnosis was unknown)  



                                                  nausea and vomiting           



                                                  from hematemesis in           



                                                  January             

 

           (unknown)  (no       (unknown)  (unknown)  Patient:  (units    (unkno

wn)



                    date)                         Bret Esquivel R unknown)  



                                                  MR#: [id number]            

 

           (unknown)  (no       (unknown)  (unknown)  Please leave her  (units  

  (unknown)



                    date)                         catheter in for the unknown)  



                                                  next several days           



                                                  until you are ready           



                                                  to                  

 

           (unknown)  (no       (unknown)  (unknown)  Please return for  (units 

   (unknown)



                    date)                         fevers, worsening unknown)  



                                                  weakness, passing           



                                                  out, persistent           



                                                  vomiting,           

 

           (unknown)  (no       (unknown)  (unknown)  Postlaminectomy  (units   

 (unknown)



                    date)                         syndrome  unknown)  

 

           (unknown)  (no       (unknown)  (unknown)  Potassium  (units    (unkn

own)



                    date)                         (3.4-5.1)  mmol/L unknown)  

 

           (unknown)  (no       (unknown)  (unknown)  Potassium    3.7  (units  

  (unknown)



                    date)                         (3.4-5.1)  mmol/L unknown)  

 

           (unknown)  (no       (unknown)  (unknown)  Potassium  (units    (unkn

own)



                    date)                         (3.4-5.1)  mmol/L unknown)  

 

           (unknown)  (no       (unknown)  (unknown)  Prescription sent  (units 

   (unknown)



                    date)                         to [facility] pharmacy unknown)  



                                                  in [place].           

 

           (unknown)  (no       (unknown)  (unknown)  Prescriptions:  (units    

(unknown)



                    date)                                   unknown)  

 

           (unknown)  (no       (unknown)  (unknown)  Procalcitonin  (units    (

unknown)



                    date)                         (<0.5)  ng/mL unknown)  

 

           (unknown)  (no       (unknown)  (unknown)  Procalcitonin  (units    (

unknown)



                    date)                         (<0.5)  ng/mL unknown)  

 

           (unknown)  (no       (unknown)  (unknown)  Procalcitonin  (units    (

unknown)



                    date)                         0.72 H    (<0.5) unknown)  



                                                  ng/mL               

 

           (unknown)  (no       (unknown)  (unknown)  Psych: mental  (units    (

unknown)



                    date)                         status is grossly unknown)  



                                                  normal, congruent           



                                                  mood, normal           



                                                  affect, pleasant           

 

           (unknown)  (no       (unknown)  (unknown)  Pulse Oximetry  98  (units

    (unknown)



                    date)                           22 17:34 unknown)  

 

           (unknown)  (no       (unknown)  (unknown)  Pulse Oximetry 99  (units 

   (unknown)



                    date)                                   unknown)  

 

           (unknown)  (no       (unknown)  (unknown)  Pulse Rate  77  (units    

(unknown)



                    date)                         22 17:34 unknown)  

 

           (unknown)  (no       (unknown)  (unknown)  Pulse Rate 77  (units    (

unknown)



                    date)                                   unknown)  

 

           (unknown)  (no       (unknown)  (unknown)  RDW       (units    (unkno

wn)



                    date)                         (11.6-14.8)  % unknown)  

 

           (unknown)  (no       (unknown)  (unknown)  RDW   (11.6-14.8)  (units 

   (unknown)



                    date)                         %         unknown)  

 

           (unknown)  (no       (unknown)  (unknown)  RDW   16.1 H  (units    (u

nknown)



                    date)                         (11.6-14.8)  % unknown)  

 

           (unknown)  (no       (unknown)  (unknown)  Reevaluation #1:  (units  

  (unknown)



                    date)                                   unknown)  

 

           (unknown)  (no       (unknown)  (unknown)  Reevaluation #2:  (units  

  (unknown)



                    date)                                   unknown)  

 

           (unknown)  (no       (unknown)  (unknown)  Reevaluation(s)  (units   

 (unknown)



                    date)                                   unknown)  

 

           (unknown)  (no       (unknown)  (unknown)  Referrals:  (units    (unk

nown)



                    date)                                   unknown)  

 

           (unknown)  (no       (unknown)  (unknown)  Related Data  (units    (u

nknown)



                    date)                                   unknown)  

 

           (unknown)  (no       (unknown)  (unknown)  Respiratory Rate  (units  

  (unknown)



                    date)                         18   22 17:34 unknown)  

 

           (unknown)  (no       (unknown)  (unknown)  Respiratory Rate  (units  

  (unknown)



                    date)                         18        unknown)  

 

           (unknown)  (no       (unknown)  (unknown)  Respiratory:  (units    (u

nknown)



                    date)                         denies shortness of unknown)  



                                                  breath, or new           



                                                  cough               

 

           (unknown)  (no       (unknown)  (unknown)  Respiratory:  (units    (u

nknown)



                    date)                         normal effort, able unknown)  



                                                  to speak in           



                                                  complete sentences,           



                                                  no audible           

 

           (unknown)  (no       (unknown)  (unknown)  Result diagrams:  (units  

  (unknown)



                    date)                                   unknown)  

 

           (unknown)  (no       (unknown)  (unknown)  Review of Systems  (units 

   (unknown)



                    date)                                   unknown)  

 

           (unknown)  (no       (unknown)  (unknown)  SARS-CoV-2 (PCR)  (units  

  (unknown)



                    date)                           (Negative) unknown)  

 

           (unknown)  (no       (unknown)  (unknown)  SARS-CoV-2 (PCR)  (units  

  (unknown)



                    date)                         (Negative) unknown)  

 

           (unknown)  (no       (unknown)  (unknown)  SARS-CoV-2 (PCR)  (units  

  (unknown)



                    date)                         Negative  unknown)  



                                                  (Negative)           

 

           (unknown)  (no       (unknown)  (unknown)  Sciatica  (units    (unkno

wn)



                    date)                                   unknown)  

 

           (unknown)  (no       (unknown)  (unknown)  Signed By:  (units    (unk

nown)



                    date)                                   unknown)  

 

           (unknown)  (no       (unknown)  (unknown)  Skin: brisk  (units    (un

known)



                    date)                         capillary refill, unknown)  



                                                  no rash, no           



                                                  erythema            

 

           (unknown)  (no       (unknown)  (unknown)  Skin: denies rash,  (units

    (unknown)



                    date)                         itching or wound unknown)  

 

           (unknown)  (no       (unknown)  (unknown)  Smoking Status:  (units   

 (unknown)



                    date)                         Never smoker unknown)  

 

           (unknown)  (no       (unknown)  (unknown)  Smoking Status:  (units   

 (unknown)



                    date)                         Never smoker unknown)  

 

           (unknown)  (no       (unknown)  (unknown)  Social History  (units    

(unknown)



                    date)                         (Reviewed 22 unknown)  



                                                  @ 19:57 by Kitty Costello [facility])           

 

           (unknown)  (no       (unknown)  (unknown)  Sodium    (units    (unkno

wn)



                    date)                         (137-145)  mmol/L unknown)  

 

           (unknown)  (no       (unknown)  (unknown)  Sodium    135 L  (units   

 (unknown)



                    date)                         (137-145)  mmol/L unknown)  

 

           (unknown)  (no       (unknown)  (unknown)  Sodium   (137-145)  (units

    (unknown)



                    date)                          mmol/L   unknown)  

 

           (unknown)  (no       (unknown)  (unknown)  Sodium Chloride  (units   

 (unknown)



                    date)                         (Normal Saline unknown)  



                                                  0.9%)  1,000 mls @           



                                                  1,000 mls/hr IV           



                                                  BOLUS ONE           

 

           (unknown)  (no       (unknown)  (unknown)  Source: patient  (units   

 (unknown)



                    date)                         and family unknown)  

 

           (unknown)  (no       (unknown)  (unknown)  Spinal stenosis  (units   

 (unknown)



                    date)                                   unknown)  

 

           (unknown)  (no       (unknown)  (unknown)  Stated complaint:  (units 

   (unknown)



                    date)                         WEAKNESS UNABLE TO unknown)  



                                                  HOLD ANYTHING DOWN           

 

           (unknown)  (no       (unknown)  (unknown)  Substance Use  (units    (

unknown)



                    date)                         Type: does not use unknown)  

 

           (unknown)  (no       (unknown)  (unknown)  Surgical History  (units  

  (unknown)



                    date)                         (Reviewed 22 unknown)  



                                                  @ 19:57 by Kitty Costello [facility])           

 

           (unknown)  (no       (unknown)  (unknown)  Surgical changes  (units  

  (unknown)



                    date)                         and devices:? unknown)  



                                                  None.?              

 

           (unknown)  (no       (unknown)  (unknown)  TECHNIQUE:? One  (units   

 (unknown)



                    date)                         view of the chest unknown)  



                                                  was acquired.?           

 

           (unknown)  (no       (unknown)  (unknown)  Take antibiotics  (units  

  (unknown)



                    date)                         until completely unknown)  



                                                  gone, start your           



                                                  antibiotic tomorrow           



                                                  morning.            

 

           (unknown)  (no       (unknown)  (unknown)  Temperature  98.3  (units 

   (unknown)



                    date)                         F   22 17:34 unknown)  

 

           (unknown)  (no       (unknown)  (unknown)  This is a  (units    (unkn

own)



                    date)                         62-year-old male unknown)  



                                                  with history of           



                                                  multiple sclerosis,           



                                                  CVA in 2019,           

 

           (unknown)  (no       (unknown)  (unknown)  Time Seen by  (units    (u

nknown)



                    date)                         Provider: 22 unknown)  



                                                  19:11               

 

           (unknown)  (no       (unknown)  (unknown)  Time: 23:07  (units    (un

known)



                    date)                                   unknown)  

 

           (unknown)  (no       (unknown)  (unknown)  Total Bilirubin  (units   

 (unknown)



                    date)                          (0.2-1.3)  mg/dL unknown)  

 

           (unknown)  (no       (unknown)  (unknown)  Total Bilirubin  (units   

 (unknown)



                    date)                         0.6  (0.2-1.3) unknown)  



                                                  mg/dL               

 

           (unknown)  (no       (unknown)  (unknown)  Total Bilirubin  (units   

 (unknown)



                    date)                         (0.2-1.3)  mg/dL unknown)  

 

           (unknown)  (no       (unknown)  (unknown)  Total Creatine  (units    

(unknown)



                    date)                         Kinase     () unknown)  



                                                   U/L                

 

           (unknown)  (no       (unknown)  (unknown)  Total Creatine  (units    

(unknown)



                    date)                         Kinase    54 L unknown)  



                                                  ()  U/L           

 

           (unknown)  (no       (unknown)  (unknown)  Total Creatine  (units    

(unknown)



                    date)                         Kinase   () unknown)  



                                                  U/L                 

 

           (unknown)  (no       (unknown)  (unknown)  Total Protein  (units    (

unknown)



                    date)                         (6.3-8.2)  g/dL unknown)  

 

           (unknown)  (no       (unknown)  (unknown)  Total Protein  (units    (

unknown)



                    date)                         7.5  (6.3-8.2) unknown)  



                                                  g/dL                

 

           (unknown)  (no       (unknown)  (unknown)  Total Protein  (units    (

unknown)



                    date)                         (6.3-8.2)  g/dL unknown)  

 

           (unknown)  (no       (unknown)  (unknown)  Troponin I  (units    (unk

nown)



                    date)                         (0.01-0.034)  ng/mL unknown)  

 

           (unknown)  (no       (unknown)  (unknown)  Troponin I    <  (units   

 (unknown)



                    date)                         0.012  (0.01-0.034) unknown)  



                                                   ng/mL              

 

           (unknown)  (no       (unknown)  (unknown)  Troponin I  (units    (unk

nown)



                    date)                         (0.01-0.034)  ng/mL unknown)  

 

           (unknown)  (no       (unknown)  (unknown)  Ur Culture  (units    (unk

nown)



                    date)                         Indicated? unknown)  

 

           (unknown)  (no       (unknown)  (unknown)  Ur Culture  (units    (unk

nown)



                    date)                         Indicated? unknown)  

 

           (unknown)  (no       (unknown)  (unknown)  Ur Culture  (units    (unk

nown)



                    date)                         Indicated? unknown)  



                                                  Specimen cultured           

 

           (unknown)  (no       (unknown)  (unknown)  Urine Bacteria  (units    

(unknown)



                    date)                         (None)    unknown)  

 

           (unknown)  (no       (unknown)  (unknown)  Urine Bacteria  (units    

(unknown)



                    date)                         Few (2-10) H unknown)  



                                                  (None)              

 

           (unknown)  (no       (unknown)  (unknown)  Urine Bacteria  (units    

(unknown)



                    date)                         (None)    unknown)  

 

           (unknown)  (no       (unknown)  (unknown)  Urine RBC  (units    (unkn

own)



                    date)                         (0-5/HPF) unknown)  

 

           (unknown)  (no       (unknown)  (unknown)  Urine RBC  (units    (unkn

own)



                    date)                         1-5/hpf  (0-5/HPF) unknown)  

 

           (unknown)  (no       (unknown)  (unknown)  Urine RBC  (units    (unkn

own)



                    date)                         (0-5/HPF) unknown)  

 

           (unknown)  (no       (unknown)  (unknown)  Urine Specific  (units    

(unknown)



                    date)                         Gravity    1.015 unknown)  

 

           (unknown)  (no       (unknown)  (unknown)  Urine WBC  (units    (unkn

own)



                    date)                         (0-5/HPF) unknown)  

 

           (unknown)  (no       (unknown)  (unknown)  Urine WBC  (units    (unkn

own)



                    date)                         5-10/hpf H unknown)  



                                                  (0-5/HPF)           

 

           (unknown)  (no       (unknown)  (unknown)  Urine WBC  (units    (unkn

own)



                    date)                         (0-5/HPF) unknown)  

 

           (unknown)  (no       (unknown)  (unknown)  Visit Report  (units    (u

nknown)



                    date)                         Forms:  Patient unknown)  



                                                  Portal/API           

 

           (unknown)  (no       (unknown)  (unknown)  Vital Signs  (units    (un

known)



                    date)                                   unknown)  

 

           (unknown)  (no       (unknown)  (unknown)  Vital signs:  (units    (u

nknown)



                    date)                                   unknown)  

 

           (unknown)  (no       (unknown)  (unknown)  Linette Jett MD  (units  

  (unknown)



                    date)                         [Physician] - As unknown)  



                                                  soon as possible           

 

           (unknown)  (no       (unknown)  (unknown)  [Embedded Image  (units   

 (unknown)



                    date)                         Not Available] unknown)  

 

           (unknown)  (no       (unknown)  (unknown)  acute ST elevation  (units

    (unknown)



                    date)                         depression noted. unknown)  

 

           (unknown)  (no       (unknown)  (unknown)  acute ischemic  (units    

(unknown)



                    date)                         changes.  unknown)  

 

           (unknown)  (no       (unknown)  (unknown)  alcohol intake  (units    

(unknown)



                    date)                         frequency: a few unknown)  



                                                  times a month           

 

           (unknown)  (no       (unknown)  (unknown)  alcohol intake:  (units   

 (unknown)



                    date)                         current   unknown)  

 

           (unknown)  (no       (unknown)  (unknown)  ambulation trial  (units  

  (unknown)



                    date)                         patient fails plan unknown)  



                                                  for admission.           

 

           (unknown)  (no       (unknown)  (unknown)  and cooperative  (units   

 (unknown)



                    date)                                   unknown)  

 

           (unknown)  (no       (unknown)  (unknown)  and generalized  (units   

 (unknown)



                    date)                         weakness.  Patient unknown)  



                                                  self catheterizes           



                                                  his bladder at           



                                                  baseline,           

 

           (unknown)  (no       (unknown)  (unknown)  appear    (units    (unkno

wn)



                    date)                                   unknown)  

 

           (unknown)  (no       (unknown)  (unknown)  ascorbic acid  (units    (

unknown)



                    date)                         (vitamin C) 500 mg unknown)  



                                                  500 mg PO BID #60           



                                                  tabs 22           

 

           (unknown)  (no       (unknown)  (unknown)  atorvastatin 80 mg  (units

    (unknown)



                    date)                         tablet 40 mg PO QPM unknown)  



                                                  20           

 

           (unknown)  (no       (unknown)  (unknown)  be causing a rash  (units 

   (unknown)



                    date)                                   unknown)  

 

           (unknown)  (no       (unknown)  (unknown) bleeding.  Guaiac  (units  

  (unknown)



                    date)                         stool in the unknown)  



                                                  emergency           



                                                  department was           



                                                  negative.           



                                                  Patient's lab           

 

           (unknown)  (no       (unknown)  (unknown)  blood in his  (units    (u

nknown)



                    date)                         emesis or in his unknown)  



                                                  stool.  He reports           



                                                  that he did a stool           



                                                  test one            

 

           (unknown)  (no       (unknown)  (unknown)  buspirone 5 mg  (units    

(unknown)



                    date)                         tablet 5 mg PO BID unknown)  



                                                  #60 tabs 22           

 

           (unknown)  (no       (unknown)  (unknown)  cancer.  Postop  (units   

 (unknown)



                    date)                         diagnosis from this unknown)  



                                                  upper endoscopy was           



                                                  the same.  His           

 

           (unknown)  (no       (unknown)  (unknown)  cath through the  (units  

  (unknown)



                    date)                         weekend and unknown)  



                                                  therefore did not           



                                                  drain his bladder           



                                                  since Friday.           

 

           (unknown)  (no       (unknown)  (unknown)  caths at baseline  (units 

   (unknown)



                    date)                                   unknown)  

 

           (unknown)  (no       (unknown)  (unknown)  ciprofloxacin HCl  (units 

   (unknown)



                    date)                         500 mg tablet 500 unknown)  



                                                  mg PO BID #20 tabs           



                                                  22            

 

           (unknown)  (no       (unknown)  (unknown)  clopidogrel 75 mg  (units 

   (unknown)



                    date)                         tablet 75 mg PO unknown)  



                                                  DAILY 22            

 

           (unknown)  (no       (unknown)  (unknown)  colonic   (units    (unkno

wn)



                    date)                         anastomosis, and a unknown)  



                                                  suboptimal bowel           



                                                  prep.               

 

           (unknown)  (no       (unknown)  (unknown)  concerning  (units    (unk

nown)



                    date)                         symptoms. unknown)  

 

           (unknown)  (no       (unknown)  (unknown)  cultures were  (units    (

unknown)



                    date)                         drawn prior to unknown)  



                                                  receiving           



                                                  antibiotics Urine           



                                                  microscopy shows           

 

           (unknown)  (no       (unknown)  (unknown)  cyanocobalamin  (units    

(unknown)



                    date)                         (vitamin B-12) 500 unknown)  



                                                  1,000 mcg PO DAILY           



                                                  #60 tabs 22           

 

           (unknown)  (no       (unknown)  (unknown)  decrease in urine  (units 

   (unknown)



                    date)                         output, new unknown)  



                                                  abdominal back or           



                                                  flank pain or other           



                                                  new or              

 

           (unknown)  (no       (unknown)  (unknown)  diabetes,  (units    (unkn

own)



                    date)                         hypertension, unknown)  



                                                  colorectal cancer           



                                                  in  with a           



                                                  reverse colectomy           



                                                  who                 

 

           (unknown)  (no       (unknown)  (unknown)  diabetes,  (units    (unkn

own)



                    date)                         hypertension, unknown)  



                                                  colorectal cancer           



                                                  in  with a           



                                                  reverse colectomy,           



                                                  He                  

 

           (unknown)  (no       (unknown)  (unknown)  does not drink  (units    

(unknown)



                    date)                         alcohol.? Patient unknown)  



                                                  and his wife state           



                                                  that he was seen in           



                                                  the                 

 

           (unknown)  (no       (unknown)  (unknown)  dose of Rocephin,  (units 

   (unknown)



                    date)                         a L of fluids, plan unknown)  



                                                  to leave catheter           



                                                  in place until he           



                                                  is                  

 

           (unknown)  (no       (unknown)  (unknown)  elevated lactate.  (units 

   (unknown)



                    date)                         His heart rate and unknown)  



                                                  blood pressure are           



                                                  within normal           



                                                  ranges.             

 

           (unknown)  (no       (unknown)  (unknown)  emergency  (units    (unkn

own)



                    date)                         department in unknown)  



                                                  January and           



                                                  diagnosed with a GI           



                                                  bleed, he was           



                                                  admitted,           

 

           (unknown)  (no       (unknown)  (unknown)  endorses fatigue  (units  

  (unknown)



                    date)                                   unknown)  

 

           (unknown)  (no       (unknown)  (unknown) endoscopic  (units    (unkn

own)



                    date)                         diagnosis includes unknown)  



                                                  mild gastropathy,           



                                                  esophagitis, patent           



                                                  retrosigmoid           

 

           (unknown)  (no       (unknown)  (unknown)  equivocal at this  (units 

   (unknown)



                    date)                         time.  Plan to unknown)  



                                                  repeat lactate is           



                                                  improving will           



                                                  attempt             

 

           (unknown)  (no       (unknown)  (unknown)  feeling more able  (units 

   (unknown)



                    date)                         to self cath. unknown)  



                                                  Short-term           



                                                  follow-up in the           



                                                  next several days           

 

           (unknown)  (no       (unknown)  (unknown)  ferrous sulfate  (units   

 (unknown)



                    date)                         325 mg (65 mg 325 unknown)  



                                                  mg PO BIDWM #60           



                                                  tabs 22           

 

           (unknown)  (no       (unknown)  (unknown)  followed by loose  (units 

   (unknown)



                    date)                         stool.    unknown)  

 

           (unknown)  (no       (unknown)  (unknown)  for repeat lactate  (units

    (unknown)



                    date)                         at this time. unknown)  



                                                  Discussed admission           



                                                  versus home patient           



                                                  has                 

 

           (unknown)  (no       (unknown)  (unknown)  gabapentin 300 mg  (units 

   (unknown)



                    date)                         capsule 300 mg PO unknown)  



                                                  BID 18            

 

           (unknown)  (no       (unknown)  (unknown)  generalized  (units    (un

known)



                    date)                         weakness and unknown)  



                                                  fatigue             

 

           (unknown)  (no       (unknown)  (unknown)  glipizide 10 mg  (units   

 (unknown)



                    date)                         tablet 10 mg PO BID unknown)  



                                                  18           

 

           (unknown)  (no       (unknown)  (unknown)  had a recent  (units    (u

nknown)



                    date)                         endoscopy that did unknown)  



                                                  not show any           



                                                  bleeding polyps or           



                                                  acute GI            

 

           (unknown)  (no       (unknown)  (unknown)  hospital and on  (units   

 (unknown)



                    date)                         chart review it unknown)  



                                                  appears that it was           



                                                  completed on           



                                                  2022.           

 

           (unknown)  (no       (unknown)  (unknown)  household members:  (units

    (unknown)



                    date)                          spouse   unknown)  

 

           (unknown)  (no       (unknown)  (unknown)  ingrown, no penile  (units

    (unknown)



                    date)                         discharge, no unknown)  



                                                  scrotal edema           

 

           (unknown)  (no       (unknown)  (unknown)  iron) tablet  (units    (u

nknown)



                    date)                                   unknown)  

 

           (unknown)  (no       (unknown)  (unknown)  lives     (units    (unkno

wn)



                    date)                         independently:  Yes unknown)  

 

           (unknown)  (no       (unknown)  (unknown)  losartan 50 mg  (units    

(unknown)



                    date)                         tablet 25 mg PO QPM unknown)  



                                                  20           

 

           (unknown)  (no       (unknown)  (unknown)  lung      (units    (unkno

wn)



                    date)                                   unknown)  

 

           (unknown)  (no       (unknown)  (unknown)  magnesium was  (units    (

unknown)



                    date)                         replaced with 2 g, unknown)  



                                                  normal saline 1000           



                                                  mL was given for           



                                                  his                 

 

           (unknown)  (no       (unknown)  (unknown)  mcg tablet  (units    (unk

nown)



                    date)                                   unknown)  

 

           (unknown)  (no       (unknown)  (unknown)  mcg tablet  (units    (unk

nown)



                    date)                         (Tab-A-Lucy) unknown)  

 

           (unknown)  (no       (unknown)  (unknown)  metformin 500 mg  (units  

  (unknown)



                    date)                         tablet,extended 500 unknown)  



                                                  mg PO BID 18            

 

           (unknown)  (no       (unknown)  (unknown)  metoprolol  (units    (unk

nown)



                    date)                         succinate 25 mg 25 unknown)  



                                                  mg PO DAILY           



                                                  21           

 

           (unknown)  (no       (unknown)  (unknown)  moving forward.  (units   

 (unknown)



                    date)                         Patient reports unknown)  



                                                  that he has been           



                                                  taking fiber but           



                                                  not any             

 

           (unknown)  (no       (unknown)  (unknown)  multivitamin with  (units 

   (unknown)



                    date)                         folic acid 400 1 unknown)  



                                                  tab PO DAILY #90           



                                                  tabs 22           

 

           (unknown)  (no       (unknown)  (unknown)  negative.  I  (units    (u

nknown)



                    date)                         completed his unknown)  



                                                  urinary             



                                                  catheterization           



                                                  with a coude           



                                                  catheter, urine           

 

           (unknown)  (no       (unknown)  (unknown) nondistended, no  (units   

 (unknown)



                    date)                         masses, no unknown)  



                                                  exquisite           



                                                  tenderness with           



                                                  exam, no rebound           



                                                  tendernes           

 

           (unknown)  (no       (unknown)  (unknown)  not heard it was  (units  

  (unknown)



                    date)                         positive or not. unknown)  



                                                  Patient reports           



                                                  that his primary           



                                                  care                

 

           (unknown)  (no       (unknown)  (unknown)  ondansetron HCl 4  (units 

   (unknown)



                    date)                         mg tablet 4 mg PO unknown)  



                                                  Q8H PRN nausea and           



                                                  21            

 

           (unknown)  (no       (unknown)  (unknown)  oral powder packet  (units

    (unknown)



                    date)                                   unknown)  

 

           (unknown)  (no       (unknown)  (unknown)  output for three  (units  

  (unknown)



                    date)                         days, three days unknown)  



                                                  ago he had nausea           



                                                  and vomiting,           



                                                  denies any           

 

           (unknown)  (no       (unknown)  (unknown)  patient was  (units    (un

known)



                    date)                         offered admission. unknown)  



                                                  Both he and his           



                                                  wife prefer to           



                                                  return home.           

 

           (unknown)  (no       (unknown)  (unknown)  polyethylene  (units    (u

nknown)



                    date)                         glycol 3350 17 gram unknown)  



                                                  17 gm PO DAILY #90           



                                                  ea 22           

 

           (unknown)  (no       (unknown)  (unknown)  presents to the  (units   

 (unknown)



                    date)                         emergency unknown)  



                                                  department           



                                                  complaining of           



                                                  ongoing fatigue,           



                                                  oliguria,           

 

           (unknown)  (no       (unknown)  (unknown)  provider has left,  (units

    (unknown)



                    date)                         he reports that he unknown)  



                                                  has seen Dr. Macias           



                                                  recently as well.           

 

           (unknown)  (no       (unknown)  (unknown)  pyelonephritis and  (units

    (unknown)



                    date)                         Toro catheter was unknown)  



                                                  left in place as he           



                                                  was too weak to           



                                                  self                

 

           (unknown)  (no       (unknown)  (unknown)  quadrant with a  (units   

 (unknown)



                    date)                         complicated unknown)  



                                                  surgical history of           



                                                  his abdomen.  This           



                                                  shows               

 

           (unknown)  (no       (unknown)  (unknown)  recently for his  (units  

  (unknown)



                    date)                         anemia.   unknown)  

 

           (unknown)  (no       (unknown)  (unknown)  related diarrhea  (units  

  (unknown)



                    date)                         from fecal loading unknown)  



                                                  and obstipation.           



                                                  Recommended MiraLax           



                                                  17 g                

 

           (unknown)  (no       (unknown)  (unknown)  release 24 hr  (units    (

unknown)



                    date)                                   unknown)  

 

           (unknown)  (no       (unknown)  (unknown)  repeated as well  (units  

  (unknown)



                    date)                         as HPI.  Patient's unknown)  



                                                  lactate was           



                                                  elevated at 2.6,           



                                                  blood cultures           

 

           (unknown)  (no       (unknown)  (unknown)  reported that  (units    (

unknown)



                    date)                         patient's altered unknown)  



                                                  bowel habit is           



                                                  likely a function           



                                                  of overflow           

 

           (unknown)  (no       (unknown)  (unknown)  s         (units    (unkno

wn)



                    date)                                   unknown)  

 

           (unknown)  (no       (unknown)  (unknown)  self cath  (units    (unkn

own)



                    date)                         regularly. unknown)  

 

           (unknown)  (no       (unknown)  (unknown)  sennosides 8.6 mg  (units 

   (unknown)



                    date)                         tablet (senna) 17.2 unknown)  



                                                  mg PO BEDTIME #60           



                                                  tabs 22           

 

           (unknown)  (no       (unknown)  (unknown)  substance use  (units    (

unknown)



                    date)                         type:  does not use unknown)  

 

           (unknown)  (no       (unknown)  (unknown)  tablet (Vitamin C)  (units

    (unknown)



                    date)                                   unknown)  

 

           (unknown)  (no       (unknown)  (unknown)  tablet,extended  (units   

 (unknown)



                    date)                         release 24 hr unknown)  

 

           (unknown)  (no       (unknown)  (unknown)  there.  He reports  (units

    (unknown)



                    date)                         that he had an unknown)  



                                                  upper endoscopy           



                                                  completed here at           



                                                  this                

 

           (unknown)  (no       (unknown)  (unknown)  to recheck renal  (units  

  (unknown)



                    date)                         function patient unknown)  



                                                  encouraged to           



                                                  return if not           



                                                  improving.           

 

           (unknown)  (no       (unknown)  (unknown)  tone      (units    (unkno

wn)



                    date)                                   unknown)  

 

           (unknown)  (no       (unknown)  (unknown)  trospium 20 mg  (units    

(unknown)



                    date)                         tablet 20 mg PO BID unknown)  



                                                  urinary retention           



                                                  22           

 

           (unknown)  (no       (unknown)  (unknown)  unremarkable.?  (units    

(unknown)



                    date)                                   unknown)  

 

           (unknown)  (no       (unknown)  (unknown)  urine dip showed  (units  

  (unknown)



                    date)                         leukocytes, unknown)  



                                                  ketones, wbc's and           



                                                  did not show           



                                                  nitrites.  Blood           

 

           (unknown)  (no       (unknown)  (unknown)  urine is cloudy,  (units  

  (unknown)



                    date)                         yellow,   unknown)  



                                                  approximately 600           



                                                  mL in bladder and           



                                                  drained, no rash           

 

           (unknown)  (no       (unknown)  (unknown)  volumes accentuate  (units

    (unknown)



                    date)                         pulmonary unknown)  



                                                  interstitium and           



                                                  heart size.           

 

           (unknown)  (no       (unknown)  (unknown)  was cloudy yellow  (units 

   (unknown)



                    date)                         urine and 600 mL unknown)  



                                                  was drained.           



                                                  Patient tolerated           



                                                  well.  His           

 

           (unknown)  (no       (unknown)  (unknown)  week ago and  (units    (u

nknown)



                    date)                         dropped it off at unknown)  



                                                  [facility] which was           



                                                  testing for blood           



                                                  but he has           

 

           (unknown)  (no       (unknown)  (unknown)  went to [facility]  (units 

   (unknown)



                    date)                         for rehab following unknown)  



                                                  his admission and           



                                                  has followed up           



                                                  with                

 

           (unknown)  (no       (unknown)  (unknown)  were drawn after  (units  

  (unknown)



                    date)                         [facility].  Reviewed unknown)  



                                                  patient's CT, his           



                                                  urine findings and           



                                                  plan                

 

           (unknown)  (no       (unknown)  (unknown)  wheezing, stridor,  (units

    (unknown)



                    date)                         or rales. No unknown)  



                                                  retractions or           



                                                  tachypnea.           









                                         Result panel 30









           (unknown)  (no       (unknown)  (unknown)  >100,000  CFU/ml    (unkno

wn)



                    date)                                             

 

           (unknown)  (no       (unknown)  (unknown)  GenericComposite[  (units 

   (unknown)



                    date)                         GNB^Gram negative unknown)  



                                                  bacilli]            

 

           (unknown)  (no       (unknown)  (unknown)  Identification  (units    

(unknown)



                    date)                         and Sensitivity to unknown)  



                                                  Follow              









                                         Result panel 31









           (unknown)  (no date)  (unknown)  (unknown)  (no value)  (units    (un

known)



                                                            unknown)  

 

           (unknown)  (no date)  (unknown)  (unknown)  NO GROWTH  (units    (unk

nown)



                                                  AFTER 24  unknown)  



                                                  HOURS               









                                         Result panel 32









           (unknown)  (no date)  (unknown)  (unknown)  >100,000  CFU/ml    (unkn

own)

 

           (unknown)  (no date)  (unknown)  (unknown)  >=32      (units    (unkn

own)



                                                            unknown)  

 

           (unknown)  (no date)  (unknown)  (unknown)  <=0.12    (units    (unkn

own)



                                                            unknown)  

 

           (unknown)  (no date)  (unknown)  (unknown)  <=0.25    (units    (unkn

own)



                                                            unknown)  

 

           (unknown)  (no date)  (unknown)  (unknown)  <=0.5     (units    (unkn

own)



                                                            unknown)  

 

           (unknown)  (no date)  (unknown)  (unknown)  <=1       (units    (unkn

own)



                                                            unknown)  

 

           (unknown)  (no date)  (unknown)  (unknown)  <=2       (units    (unkn

own)



                                                            unknown)  

 

           (unknown)  (no date)  (unknown)  (unknown)  <=20      (units    (unkn

own)



                                                            unknown)  

 

           (unknown)  (no date)  (unknown)  (unknown)  <=4       (units    (unkn

own)



                                                            unknown)  

 

           (unknown)  (no date)  (unknown)  (unknown)  4         (units    (unkn

own)



                                                            unknown)  

 

           (unknown)  (no date)  (unknown)  (unknown)  64        (units    (unkn

own)



                                                            unknown)  

 

           (unknown)  (no date)  (unknown)  (unknown)  GenericCompos  (units    

(unknown)



                                                  ite[KLEPNE^Kle unknown)  



                                                  bsiella             



                                                  pneumoniae]           

 

           (unknown)  (no date)  (unknown)  (unknown)  No Further  (units    (un

known)



                                                  Workup    unknown)  









                                         Result panel 33









           (unknown)  (no       (unknown)  (unknown)  (no value)  (units    (unk

nown)



                    date)                                   unknown)  

 

           (unknown)  (no       (unknown)  (unknown)  Radiologist  (units    (un

known)



                    date)                         Impression: unknown)  

 

           (unknown)  (no       (unknown)  (unknown)  Date of Service:  (units  

  (unknown)



                    date)                         22  unknown)  

 

           (unknown)  (no       (unknown)  (unknown)  (no value)  (units    (unk

nown)



                    date)                                   unknown)  

 

           (unknown)  (no       (unknown)  (unknown)  <Electronically  (units   

 (unknown)



                    date)                         signed by Kitty LYN Crew>           

 

           (unknown)  (no       (unknown)  (unknown)  <Electronically  (units   

 (unknown)



                    date)                         signed by Allegra MENDES unknown)  



                                                  ANAYELI Montiel>           

 

           (unknown)  (no       (unknown)  (unknown)  22 17:45  (units    

(unknown)



                    date)                                   unknown)  

 

           (unknown)  (no       (unknown)  (unknown)  22 0740  (units    (

unknown)



                    date)                                   unknown)  

 

           (unknown)  (no       (unknown)  (unknown)  22 1222  (units    (

unknown)



                    date)                                   unknown)  

 

           (unknown)  (no       (unknown)  (unknown)  1 tab PO DAILY  (units    

(unknown)



                    date)                         Qty: 90 0RF unknown)  

 

           (unknown)  (no       (unknown)  (unknown)  1,000 mcg PO DAILY  (units

    (unknown)



                    date)                         Qty: 60 0RF unknown)  

 

           (unknown)  (no       (unknown)  (unknown)  10 mg PO BID  (units    (u

nknown)



                    date)                                   unknown)  

 

           (unknown)  (no       (unknown)  (unknown)  17 gm PO DAILY  (units    

(unknown)



                    date)                         Qty: 90 0RF unknown)  

 

           (unknown)  (no       (unknown)  (unknown)  17.2 mg PO BEDTIME  (units

    (unknown)



                    date)                         Qty: 60 0RF unknown)  

 

           (unknown)  (no       (unknown)  (unknown)  20 mg PO BID  (units    (u

nknown)



                    date)                                   unknown)  

 

           (unknown)  (no       (unknown)  (unknown)  25 mg PO DAILY  (units    

(unknown)



                    date)                                   unknown)  

 

           (unknown)  (no       (unknown)  (unknown)  25 mg PO QPM  (units    (u

nknown)



                    date)                                   unknown)  

 

           (unknown)  (no       (unknown)  (unknown)  300 mg PO BID  (units    (

unknown)



                    date)                                   unknown)  

 

           (unknown)  (no       (unknown)  (unknown)  325 mg PO BIDWM  (units   

 (unknown)



                    date)                         Qty: 60 0RF unknown)  

 

           (unknown)  (no       (unknown)  (unknown)  4 mg PO Q8H PRN  (units   

 (unknown)



                    date)                         (Reason: nausea and unknown)  



                                                  vomiting) Qty: 14           



                                                  0RF                 

 

           (unknown)  (no       (unknown)  (unknown)  40 mg PO QPM  (units    (u

nknown)



                    date)                                   unknown)  

 

           (unknown)  (no       (unknown)  (unknown)  5 mg PO BID Qty:  (units  

  (unknown)



                    date)                         60 0RF    unknown)  

 

           (unknown)  (no       (unknown)  (unknown)  500 mg PO BID  (units    (

unknown)



                    date)                                   unknown)  

 

           (unknown)  (no       (unknown)  (unknown)  500 mg PO BID Qty:  (units

    (unknown)



                    date)                         20 0RF    unknown)  

 

           (unknown)  (no       (unknown)  (unknown)  500 mg PO BID Qty:  (units

    (unknown)



                    date)                         60 0RF    unknown)  

 

           (unknown)  (no       (unknown)  (unknown)  75 mg PO DAILY  (units    

(unknown)



                    date)                                   unknown)  

 

           (unknown)  (no       (unknown)  (unknown)  Admin: 22  (units   

 (unknown)



                    date)                         20:18  Dose: 1,000 unknown)  



                                                  mls/hr              

 

           (unknown)  (no       (unknown)  (unknown)  Admin: 22  (units   

 (unknown)



                    date)                         20:19  Dose: 50 unknown)  



                                                  mls/hr              

 

           (unknown)  (no       (unknown)  (unknown)  Admin: 22  (units   

 (unknown)



                    date)                         21:06  Dose: 200 unknown)  



                                                  mls/hr              

 

           (unknown)  (no       (unknown)  (unknown)  Allergies  (units    (unkn

own)



                    date)                                   unknown)  

 

           (unknown)  (no       (unknown)  (unknown)  Documented By: [facility]  (units

    (unknown)



                    date)                                   unknown)  

 

           (unknown)  (no       (unknown)  (unknown)  Documented By: AT  (units 

   (unknown)



                    date)                          Co-signed By: MLM unknown)  

 

           (unknown)  (no       (unknown)  (unknown)  Documented By: AT  (units 

   (unknown)



                    date)                                   unknown)  

 

           (unknown)  (no       (unknown)  (unknown)  Documented By: MLM  (units

    (unknown)



                    date)                           Co-signed By: [facility])  

 

           (unknown)  (no       (unknown)  (unknown)  Documented By: MLM  (units

    (unknown)



                    date)                                   unknown)  

 

           (unknown)  (no       (unknown)  (unknown)  Emergency Report  (units  

  (unknown)



                    date)                                   unknown)  

 

           (unknown)  (no       (unknown)  (unknown)  Home Medications  (units  

  (unknown)



                    date)                                   unknown)  

 

           (unknown)  (no       (unknown)  (unknown)  [facility]  (units   

 (unknown)



                    date)                         [address] [place] [address] Street unknown)  



                                                  [place] [address]           

 

           (unknown)  (no       (unknown)  (unknown)  Lab Results  (units    (un

known)



                    date)                                   unknown)  

 

           (unknown)  (no       (unknown)  (unknown)  Label Comments:  (units   

 (unknown)



                    date)                                   unknown)  

 

           (unknown)  (no       (unknown)  (unknown)  Last Admin:  (units    (un

known)



                    date)                         22 18:30 unknown)  



                                                  Dose: 4 mg           

 

           (unknown)  (no       (unknown)  (unknown)  Last Infusion:  (units    

(unknown)



                    date)                         22 21:10 unknown)  



                                                  Dose: 0 mls/hr           

 

           (unknown)  (no       (unknown)  (unknown)  Last Infusion:  (units    

(unknown)



                    date)                         22 21:28 unknown)  



                                                  Dose: 0 mls/hr           

 

           (unknown)  (no       (unknown)  (unknown)  Last Infusion:  (units    

(unknown)



                    date)                         22 23:15 unknown)  



                                                  Dose: 0 mls/hr           

 

           (unknown)  (no       (unknown)  (unknown)  Previous Rx's  (units    (

unknown)



                    date)                                   unknown)  

 

           (unknown)  (no       (unknown)  (unknown)  Stop: 22  (units    

(unknown)



                    date)                         18:16     unknown)  

 

           (unknown)  (no       (unknown)  (unknown)  Stop: 22  (units    

(unknown)



                    date)                         20:15     unknown)  

 

           (unknown)  (no       (unknown)  (unknown)  Stop: 22  (units    

(unknown)



                    date)                         20:25     unknown)  

 

           (unknown)  (no       (unknown)  (unknown)  Stop: 22  (units    

(unknown)



                    date)                         20:27     unknown)  

 

           (unknown)  (no       (unknown)  (unknown)  Urine Dip  (units    (unkn

own)



                    date)                                   unknown)  

 

           (unknown)  (no       (unknown)  (unknown)  Vital Signs - 8 hr  (units

    (unknown)



                    date)                                   unknown)  

 

           (unknown)  (no       (unknown)  (unknown)  has not been  (units    (u

nknown)



                    date)                         eating so hasn't unknown)  



                                                  taken recently.           



                                                  spouse states           



                                                  thinks it might           

 

           (unknown)  (no       (unknown)  (unknown)  stopped taking  (units    

(unknown)



                    date)                         prior to back unknown)  



                                                  surgery and did not           



                                                  restart             

 

           (unknown)  (no       (unknown)  (unknown)  (no value)  (units    (unk

nown)



                    date)                                   unknown)  

 

           (unknown)  (no       (unknown)  (unknown)  22  (units 

   (unknown)



                    date)                         22  unknown)  



                                                  Range/Units           

 

           (unknown)  (no       (unknown)  (unknown)  22  (units    (unkno

wn)



                    date)                         Range/Units unknown)  

 

           (unknown)  (no       (unknown)  (unknown)  17:40 17:45 17:45  (units 

   (unknown)



                    date)                                   unknown)  

 

           (unknown)  (no       (unknown)  (unknown)  17:45 17:45 20:09  (units 

   (unknown)



                    date)                                   unknown)  

 

           (unknown)  (no       (unknown)  (unknown)  22:00     (units    (unkno

wn)



                    date)                                   unknown)  

 

           (unknown)  (no       (unknown)  (unknown)  ascorbic acid  (units    (

unknown)



                    date)                         (vitamin C) unknown)  



                                                  [Vitamin C] 500 mg           



                                                  Tablet              

 

           (unknown)  (no       (unknown)  (unknown)  atorvastatin 80 mg  (units

    (unknown)



                    date)                         tablet    unknown)  

 

           (unknown)  (no       (unknown)  (unknown)  buspirone 5 mg  (units    

(unknown)



                    date)                         Tablet    unknown)  

 

           (unknown)  (no       (unknown)  (unknown)  ciprofloxacin HCl  (units 

   (unknown)



                    date)                         500 mg tablet unknown)  

 

           (unknown)  (no       (unknown)  (unknown)  clopidogrel 75 mg  (units 

   (unknown)



                    date)                         tablet    unknown)  

 

           (unknown)  (no       (unknown)  (unknown)  cyanocobalamin  (units    

(unknown)



                    date)                         (vitamin B-12) 500 unknown)  



                                                  mcg Tablet           

 

           (unknown)  (no       (unknown)  (unknown)  ferrous sulfate  (units   

 (unknown)



                    date)                         325 mg (65 mg iron) unknown)  



                                                  Tablet              

 

           (unknown)  (no       (unknown)  (unknown)  gabapentin 300 mg  (units 

   (unknown)



                    date)                         capsule   unknown)  

 

           (unknown)  (no       (unknown)  (unknown)  glipizide 10 mg  (units   

 (unknown)



                    date)                         tablet    unknown)  

 

           (unknown)  (no       (unknown)  (unknown)  losartan 50 mg  (units    

(unknown)



                    date)                         tablet    unknown)  

 

           (unknown)  (no       (unknown)  (unknown)  metformin 500 mg  (units  

  (unknown)



                    date)                         tablet extended unknown)  



                                                  release 24 hr           

 

           (unknown)  (no       (unknown)  (unknown)  metoprolol  (units    (unk

nown)



                    date)                         succinate 25 mg unknown)  



                                                  tablet extended           



                                                  release 24 hr           

 

           (unknown)  (no       (unknown)  (unknown)  multivitamin with  (units 

   (unknown)



                    date)                         folic acid unknown)  



                                                  [Tab-A-Lucy] 400           



                                                  mcg Tablet           

 

           (unknown)  (no       (unknown)  (unknown)  ondansetron HCl  (units   

 (unknown)



                    date)                         [Zofran] 4 mg unknown)  



                                                  tablet              

 

           (unknown)  (no       (unknown)  (unknown)  polyethylene  (units    (u

nknown)



                    date)                         glycol 3350 17 gram unknown)  



                                                  Powder In Packet           

 

           (unknown)  (no       (unknown)  (unknown)  sennosides [senna]  (units

    (unknown)



                    date)                         8.6 mg Tablet unknown)  

 

           (unknown)  (no       (unknown)  (unknown)  trospium 20 mg  (units    

(unknown)



                    date)                         tablet    unknown)  

 

           (unknown)  (no       (unknown)  (unknown)  22  (units    (unkno

wn)



                    date)                                   unknown)  

 

           (unknown)  (no       (unknown)  (unknown)  1.07 in January.  (units  

  (unknown)



                    date)                         No elevation in his unknown)  



                                                  troponin,           



                                                  procalcitonin is           



                                                  elevated at           

 

           (unknown)  (no       (unknown)  (unknown)  Anemia,   (units    (unkno

wn)



                    date)                         Hypomagnesemia, unknown)  



                                                  Pyelonephritis           

 

           (unknown)  (no       (unknown)  (unknown)  Medication  (units    (unk

nown)



                    date)                         Instructions unknown)  



                                                  Recorded            

 

           (unknown)  (no       (unknown)  (unknown)  Medication  (units    (unk

nown)



                    date)                         Instructions unknown)  



                                                  Recorded  Confirmed           

 

           (unknown)  (no       (unknown)  (unknown)  sodium 135,  (units    (un

known)



                    date)                         potassium 3.7, unknown)  



                                                  creatinine 1.42           



                                                  which is increased           



                                                  from his prior of           

 

           (unknown)  (no       (unknown)  (unknown)  with regular axis  (units 

   (unknown)



                    date)                         and intervals.  No unknown)  



                                                  STEMI, ST segment           



                                                  changes,            



                                                  arrhythmia, or           

 

           (unknown)  (no       (unknown)  (unknown)  work is   (units    (o

wn)



                    date)                         significant for unknown)  



                                                  anemia, hemoglobin           



                                                  is 9.1 today, down           



                                                  from 9.6 in           

 

           (unknown)  (no       (unknown)  (unknown)  <Kitty Costello,  (units 

   (unknown)



                    date)                         [facility] - Last Filed: unknown)  



                                                  22 12:22>           

 

           (unknown)  (no       (unknown)  (unknown)  <Allegra Montiel, DO  (units

    (unknown)



                    date)                         - Last Filed: unknown)  



                                                  22 07:39>           

 

           (unknown)  (no       (unknown)  (unknown)  (Zofran) vomiting  (units 

   (unknown)



                    date)                         #14 tabs  unknown)  

 

           (unknown)  (no       (unknown)  (unknown)  0.72, his lactate  (units 

   (unknown)



                    date)                         is also elevated at unknown)  



                                                  2.6, magnesium is           



                                                  low at 1.0.  His           

 

           (unknown)  (no       (unknown)  (unknown)  [id number]   (units    (unkno

wn)



                    date)                                   unknown)  

 

           (unknown)  (no       (unknown)  (unknown)  with iron  (units    (

unknown)



                    date)                         deficiency anemia, unknown)  



                                                  altered bowel           



                                                  habit, personal           



                                                  history of colon           

 

           (unknown)  (no       (unknown)  (unknown)  22:52     (units    (unkno

wn)



                    date)                                   unknown)  

 

           (unknown)  (no       (unknown)  (unknown)  62-year-old  (units    (un

known)



                    date)                         gentleman with a unknown)  



                                                  history of multiple           



                                                  sclerosis, CVA in           



                                                  2019,               

 

           (unknown)  (no       (unknown)  (unknown)  ?         (units    (unkno

wn)



                    date)                                   unknown)  

 

           (unknown)  (no       (unknown)  (unknown)  ALT     (<50)  (units    (

unknown)



                    date)                         IU/L      unknown)  

 

           (unknown)  (no       (unknown)  (unknown)  ALT    16  (<50)  (units  

  (unknown)



                    date)                         IU/L      unknown)  

 

           (unknown)  (no       (unknown)  (unknown)  ALT   (<50)  IU/L  (units 

   (unknown)



                    date)                                   unknown)  

 

           (unknown)  (no       (unknown)  (unknown)  AST     (17-59)  (units   

 (unknown)



                    date)                         IU/L      unknown)  

 

           (unknown)  (no       (unknown)  (unknown)  AST    19  (17-59)  (units

    (unknown)



                    date)                          IU/L     unknown)  

 

           (unknown)  (no       (unknown)  (unknown)  AST   (17-59)  (units    (

unknown)



                    date)                         IU/L      unknown)  

 

           (unknown)  (no       (unknown)  (unknown)  Activity  (units    (unkno

wn)



                    date)                         Restrictions/Additi unknown)  



                                                  onal Instructions:           

 

           (unknown)  (no       (unknown)  (unknown)  Age/Sex: 62 / M  (units   

 (unknown)



                    date)                                   unknown)  

 

           (unknown)  (no       (unknown)  (unknown)  Albumin   (units    (unkno

wn)



                    date)                         (3.5-5.0)  g/dL unknown)  

 

           (unknown)  (no       (unknown)  (unknown)  Albumin    3.9  (units    

(unknown)



                    date)                         (3.5-5.0)  g/dL unknown)  

 

           (unknown)  (no       (unknown)  (unknown)  Albumin   (units    (unkno

wn)



                    date)                         (3.5-5.0)  g/dL unknown)  

 

           (unknown)  (no       (unknown)  (unknown)  Albumin/Globulin  (units  

  (unknown)



                    date)                         Ratio     (1.0-2.8) unknown)  

 

           (unknown)  (no       (unknown)  (unknown)  Albumin/Globulin  (units  

  (unknown)



                    date)                         Ratio    1.1 unknown)  



                                                  (1.0-2.8)           

 

           (unknown)  (no       (unknown)  (unknown)  Albumin/Globulin  (units  

  (unknown)



                    date)                         Ratio   (1.0-2.8) unknown)  

 

           (unknown)  (no       (unknown)  (unknown)  Alkaline  (units    (unkno

wn)



                    date)                         Phosphatase unknown)  



                                                  ()  U/L           

 

           (unknown)  (no       (unknown)  (unknown)  Alkaline  (units    (unkno

wn)



                    date)                         Phosphatase    105 unknown)  



                                                  ()  U/L           

 

           (unknown)  (no       (unknown)  (unknown)  Alkaline  (units    (unkno

wn)



                    date)                         Phosphatase unknown)  



                                                  ()  U/L           

 

           (unknown)  (no       (unknown)  (unknown)  Allergy/AdvReac  (units   

 (unknown)



                    date)                         Type Severity unknown)  



                                                  Reaction Status           



                                                  Date / Time           

 

           (unknown)  (no       (unknown)  (unknown)  Amorphous Sediment  (units

    (unknown)



                    date)                                   unknown)  

 

           (unknown)  (no       (unknown)  (unknown)  Amorphous Sediment  (units

    (unknown)



                    date)                                   unknown)  

 

           (unknown)  (no       (unknown)  (unknown)  Amorphous Sediment  (units

    (unknown)



                    date)                            2+     unknown)  

 

           (unknown)  (no       (unknown)  (unknown)  Approved by: Garfield Mcdanielsunits 

   (unknown)



                    date)                         SHANNAN Lopez on unknown)  



                                                  2022 at 18:02?           

 

           (unknown)  (no       (unknown)  (unknown)  BUN     (9-20)  (units    

(unknown)



                    date)                         mg/dL     unknown)  

 

           (unknown)  (no       (unknown)  (unknown)  BUN    21 H  (units    (un

known)



                    date)                         (9-20)  mg/dL unknown)  

 

           (unknown)  (no       (unknown)  (unknown)  BUN   (9-20)  (units    (u

nknown)



                    date)                         mg/dL     unknown)  

 

           (unknown)  (no       (unknown)  (unknown)  BUN/Creatinine  (units    

(unknown)



                    date)                         Ratio     (6-22) unknown)  

 

           (unknown)  (no       (unknown)  (unknown)  BUN/Creatinine  (units    

(unknown)



                    date)                         Ratio    14.8 unknown)  



                                                  (6-22)              

 

           (unknown)  (no       (unknown)  (unknown)  BUN/Creatinine  (units    

(unknown)



                    date)                         Ratio   (6-22) unknown)  

 

           (unknown)  (no       (unknown)  (unknown)  Baso # (Auto)  (units    (

unknown)



                    date)                         (0-100)  /uL unknown)  

 

           (unknown)  (no       (unknown)  (unknown)  Baso # (Auto)  (units    (

unknown)



                    date)                         (0-100)  /uL unknown)  

 

           (unknown)  (no       (unknown)  (unknown)  Baso # (Auto)   0  (units 

   (unknown)



                    date)                          (0-100)  /uL unknown)  

 

           (unknown)  (no       (unknown)  (unknown)  Baso % (Auto)  (units    (

unknown)



                    date)                         (0-2)  %  unknown)  

 

           (unknown)  (no       (unknown)  (unknown)  Baso % (Auto)  (units    (

unknown)



                    date)                         (0-2)  %  unknown)  

 

           (unknown)  (no       (unknown)  (unknown)  Baso % (Auto)  (units    (

unknown)



                    date)                         0.2   (0-2)  % unknown)  

 

           (unknown)  (no       (unknown)  (unknown)  Bedside Urine  (units    (

unknown)



                    date)                         Bilirubin        - unknown)  



                                                  Negative            

 

           (unknown)  (no       (unknown)  (unknown)  Bedside Urine  (units    (

unknown)



                    date)                         Glucose    Negative unknown)  

 

           (unknown)  (no       (unknown)  (unknown)  Bedside Urine  (units    (

unknown)



                    date)                         Ketone    - unknown)  



                                                  Negative            

 

           (unknown)  (no       (unknown)  (unknown)  Bedside Urine  (units    (

unknown)



                    date)                         Leukocytes       + unknown)  



                                                  70                  

 

           (unknown)  (no       (unknown)  (unknown)  Bedside Urine  (units    (

unknown)



                    date)                         Nitrite    - unknown)  



                                                  Negative            

 

           (unknown)  (no       (unknown)  (unknown)  Bedside Urine  (units    (

unknown)



                    date)                         Occult Blood     ++ unknown)  

 

           (unknown)  (no       (unknown)  (unknown)  Bedside Urine  (units    (

unknown)



                    date)                         Protein    + 30 unknown)  

 

           (unknown)  (no       (unknown)  (unknown)  Bedside Urine  (units    (

unknown)



                    date)                         Urobilinogen     - unknown)  



                                                  Negative            

 

           (unknown)  (no       (unknown)  (unknown)  Bedside Urine pH  (units  

  (unknown)



                    date)                          6.0      unknown)  

 

           (unknown)  (no       (unknown)  (unknown)  Blood Pressure  (units    

(unknown)



                    date)                         124/66   07/18/22 unknown)  



                                                  17:34               

 

           (unknown)  (no       (unknown)  (unknown)  Blood Pressure  (units    

(unknown)



                    date)                         124/58 L  unknown)  

 

           (unknown)  (no       (unknown)  (unknown)  Bones and chest  (units   

 (unknown)



                    date)                         wall:? No unknown)  



                                                  suspicious bony           



                                                  lesions.? Overlying           



                                                  soft tissues           

 

           (unknown)  (no       (unknown)  (unknown)  CK-MB (CK-2)  (units    (u

nknown)



                    date)                                   unknown)  

 

           (unknown)  (no       (unknown)  (unknown)  CK-MB (CK-2)  (units    (u

nknown)



                    date)                                   unknown)  

 

           (unknown)  (no       (unknown)  (unknown)  CK-MB (CK-2)  (units    (u

nknown)



                    date)                         TNP       unknown)  

 

           (unknown)  (no       (unknown)  (unknown)  CK-MB (CK-2) Rel  (units  

  (unknown)



                    date)                         Index     unknown)  

 

           (unknown)  (no       (unknown)  (unknown)  CK-MB (CK-2) Rel  (units  

  (unknown)



                    date)                         Index     unknown)  

 

           (unknown)  (no       (unknown)  (unknown)  CK-MB (CK-2) Rel  (units  

  (unknown)



                    date)                         Index    TNP unknown)  

 

           (unknown)  (no       (unknown)  (unknown)  COMPARISON:?  (units    (u

nknown)



                    date)                         [facility], unknown)  



                                                  CR, XR CHEST 2V,           



                                                  3/18/2021, 10:19.           

 

           (unknown)  (no       (unknown)  (unknown)  CT abdomen pelvis  (units 

   (unknown)



                    date)                         was ordered for unknown)  



                                                  patient has           



                                                  tenderness to his           



                                                  right lower           

 

           (unknown)  (no       (unknown)  (unknown)  CVA (cerebral  (units    (

unknown)



                    date)                         vascular accident) unknown)  



                                                  (2018)           

 

           (unknown)  (no       (unknown)  (unknown)  Calcium   (units    (unkno

wn)



                    date)                         (8.4-10.2)  mg/dL unknown)  

 

           (unknown)  (no       (unknown)  (unknown)  Calcium    9.3  (units    

(unknown)



                    date)                         (8.4-10.2)  mg/dL unknown)  

 

           (unknown)  (no       (unknown)  (unknown)  Calcium   (units    (unkno

wn)



                    date)                         (8.4-10.2)  mg/dL unknown)  

 

           (unknown)  (no       (unknown)  (unknown)  Carbon Dioxide  (units    

(unknown)



                    date)                         (22-32)  mmol/L unknown)  

 

           (unknown)  (no       (unknown)  (unknown)  Carbon Dioxide  (units    

(unknown)



                    date)                         29  (22-32)  mmol/L unknown)  

 

           (unknown)  (no       (unknown)  (unknown)  Carbon Dioxide  (units    

(unknown)



                    date)                         (22-32)  mmol/L unknown)  

 

           (unknown)  (no       (unknown)  (unknown)  Cardio: denies  (units    

(unknown)



                    date)                         chest pain, unknown)  



                                                  palpitations           

 

           (unknown)  (no       (unknown)  (unknown)  Cardiovascular:  (units   

 (unknown)



                    date)                         regular rate and unknown)  



                                                  rhythm, no           



                                                  peripheral edema,           



                                                  warm extremities           

 

           (unknown)  (no       (unknown)  (unknown)  Ceftriaxone Sodium  (units

    (unknown)



                    date)                         1,000 mg/ (Sodium unknown)  



                                                  Chloride)  100 mls           



                                                  @ 200 mls/hr IV NOW           



                                                  ONE                 

 

           (unknown)  (no       (unknown)  (unknown)  Chest x-ray:  (units    (u

nknown)



                    date)                                   unknown)  

 

           (unknown)  (no       (unknown)  (unknown)  Chief complaint:  (units  

  (unknown)



                    date)                         Weakness  unknown)  

 

           (unknown)  (no       (unknown)  (unknown)  Chloride  (units    (unkno

wn)



                    date)                         ()  mmol/L unknown)  

 

           (unknown)  (no       (unknown)  (unknown)  Chloride    96 L  (units  

  (unknown)



                    date)                         ()  mmol/L unknown)  

 

           (unknown)  (no       (unknown)  (unknown)  Chloride  (units    (unkno

wn)



                    date)                         ()  mmol/L unknown)  

 

           (unknown)  (no       (unknown)  (unknown)  Clinical  (units    (unkno

wn)



                    date)                         Impression: unknown)  

 

           (unknown)  (no       (unknown)  (unknown)  Colon cancer  (units    (u

nknown)



                    date)                         ()    unknown)  

 

           (unknown)  (no       (unknown)  (unknown)  Course    (units    (unkno

wn)



                    date)                                   unknown)  

 

           (unknown)  (no       (unknown)  (unknown)  Creatinine  (units    (unk

nown)



                    date)                         (0.66-1.25)  mg/dL unknown)  

 

           (unknown)  (no       (unknown)  (unknown)  Creatinine    1.42  (units

    (unknown)



                    date)                         H  (0.66-1.25) unknown)  



                                                  mg/dL               

 

           (unknown)  (no       (unknown)  (unknown)  Creatinine  (units    (unk

nown)



                    date)                         (0.66-1.25)  mg/dL unknown)  

 

           (unknown)  (no       (unknown)  (unknown)  : 1960  (units   

 (unknown)



                    date)                         Acct:[id number] unknown)  

 

           (unknown)  (no       (unknown)  (unknown)  Departure  (units    (unkn

own)



                    date)                                   unknown)  

 

           (unknown)  (no       (unknown)  (unknown)  Depression  (units    (unk

nown)



                    date)                                   unknown)  

 

           (unknown)  (no       (unknown)  (unknown)  Diabetes  (units    (unkno

wn)



                    date)                                   unknown)  

 

           (unknown)  (no       (unknown)  (unknown)  Diabetic  (units    (unkno

wn)



                    date)                         neuropathy unknown)  

 

           (unknown)  (no       (unknown)  (unknown)  Discharge Plan  (units    

(unknown)



                    date)                                   unknown)  

 

           (unknown)  (no       (unknown)  (unknown)  Discontinued  (units    (u

nknown)



                    date)                         Medications unknown)  

 

           (unknown)  (no       (unknown)  (unknown)  Gustavo Macias MD  (units   

 (unknown)



                    date)                         [[facility] unknown)  



                                                  Provider] -           

 

           (unknown)  (no       (unknown)  (unknown)  ECG Data  (units    (unkno

wn)



                    date)                                   unknown)  

 

           (unknown)  (no       (unknown)  (unknown) EKG independently  (units  

  (unknown)



                    date)                         reviewed by Dr. mejia)  



                                                  Dinesh and reveals           



                                                  normal sinus rhythm           



                                                  at 72 bpm           

 

           (unknown)  (no       (unknown)  (unknown)  EKG shows a normal  (units

    (unknown)



                    date)                         sinus rhythm rate unknown)  



                                                  of 70 2p are 172           



                                                  QRS 86 and .           



                                                   No                 

 

           (unknown)  (no       (unknown)  (unknown)  ER Physician:  (units    (

unknown)



                    date)                         Allegra Montiel D.O. unknown)  

 

           (unknown)  (no       (unknown)  (unknown)  Eos # (Auto)  (units    (u

nknown)



                    date)                         (0-450)  /uL unknown)  

 

           (unknown)  (no       (unknown)  (unknown)  Eos # (Auto)  (units    (u

nknown)



                    date)                         (0-450)  /uL unknown)  

 

           (unknown)  (no       (unknown)  (unknown)  Eos # (Auto)   100  (units

    (unknown)



                    date)                           (0-450)  /uL unknown)  

 

           (unknown)  (no       (unknown)  (unknown)  Eos % (Auto)  (units    (u

nknown)



                    date)                         (2-4)  %  unknown)  

 

           (unknown)  (no       (unknown)  (unknown)  Eos % (Auto)  (units    (u

nknown)



                    date)                         (2-4)  %  unknown)  

 

           (unknown)  (no       (unknown)  (unknown)  Eos % (Auto)   0.8  (units

    (unknown)



                    date)                         L   (2-4)  % unknown)  

 

           (unknown)  (no       (unknown)  (unknown)  Esterase  (units    (unkno

wn)



                    date)                                   unknown)  

 

           (unknown)  (no       (unknown)  (unknown)  Estimated GFR  (units    (

unknown)



                    date)                         (>60)  mL/min unknown)  

 

           (unknown)  (no       (unknown)  (unknown)  Estimated GFR  (units    (

unknown)



                    date)                         55 L  (>60)  mL/min unknown)  

 

           (unknown)  (no       (unknown)  (unknown)  Estimated GFR  (units    (

unknown)



                    date)                         (>60)  mL/min unknown)  

 

           (unknown)  (no       (unknown)  (unknown)  Exam      (units    (unkno

wn)



                    date)                                   unknown)  

 

           (unknown)  (no       (unknown)  (unknown)  Exam Narrative:  (units   

 (unknown)



                    date)                                   unknown)  

 

           (unknown)  (no       (unknown)  (unknown)  Eyes: denies  (units    (u

nknown)



                    date)                         visual changes, eye unknown)  



                                                  pain                

 

           (unknown)  (no       (unknown)  (unknown)  Eyes: equal round  (units 

   (unknown)



                    date)                         and reactive, EOMI, unknown)  



                                                  conjunctiva normal           

 

           (unknown)  (no       (unknown)  (unknown)  FINDINGS:?  (units    (unk

nown)



                    date)                                   unknown)  

 

           (unknown)  (no       (unknown)  (unknown)  Family History  (units    

(unknown)



                    date)                         (Reviewed 22 unknown)  



                                                  @ 19:57 by Kitty Costello [facility])           

 

           (unknown)  (no       (unknown)  (unknown)  Father     (units 

   (unknown)



                    date)                          Cancer   unknown)  

 

           (unknown)  (no       (unknown)  (unknown)  Follow-up for  (units    (

unknown)



                    date)                         recheck in the next unknown)  



                                                  2-3 days.           

 

           (unknown)  (no       (unknown)  (unknown)  GERD      (units    (unkno

wn)



                    date)                         (gastroesophageal unknown)  



                                                  reflux disease)           

 

           (unknown)  (no       (unknown)  (unknown)  GI:  Right lower  (units  

  (unknown)



                    date)                         quadrant tenderness unknown)  



                                                  to palpation           

 

           (unknown)  (no       (unknown)  (unknown)  GI: abdomen mildly  (units

    (unknown)



                    date)                         guarded, tender to unknown)  



                                                  palpation in the           



                                                  right lower           



                                                  quadrant,           

 

           (unknown)  (no       (unknown)  (unknown)  :  Straight cath  (units

    (unknown)



                    date)                         for urine completed unknown)  



                                                  by myself with 16           



                                                  [place] coude           



                                                  catheter,           

 

           (unknown)  (no       (unknown)  (unknown)  : denies  (units    (unk

nown)



                    date)                         dysuria, hematuria unknown)  



                                                  or flank pain,           



                                                  reports oliguria,           



                                                  patient self           

 

           (unknown)  (no       (unknown)  (unknown)  Gastroenterology  (units  

  (unknown)



                    date)                         from [place], unknown)  



                                                  patient reports           



                                                  that he sees Yasmine Rocha PA-C           

 

           (unknown)  (no       (unknown)  (unknown)  General   (units    (unkno

wn)



                    date)                                   unknown)  

 

           (unknown)  (no       (unknown)  (unknown)  General:  (units    (unkno

wn)



                    date)                         cooperative, unknown)  



                                                  comfortable, in no           



                                                  acute distress,           



                                                  well groomed,           

 

           (unknown)  (no       (unknown)  (unknown)  General: denies  (units   

 (unknown)



                    date)                         fever, chills, unknown)  



                                                  endorses weakness,           



                                                  right lower           



                                                  quadrant pain,           

 

           (unknown)  (no       (unknown)  (unknown)  GenericComposite[P  (units

    (unknown)



                    date)                         lt Count  unknown)  



                                                  (150-400)  X10^3/uL           



                                                   ]                  

 

           (unknown)  (no       (unknown)  (unknown)  GenericComposite[P  (units

    (unknown)



                    date)                         lt Count  unknown)  



                                                  (150-400)  X10^3/uL           



                                                   ]                  

 

           (unknown)  (no       (unknown)  (unknown)  GenericComposite[P  (units

    (unknown)



                    date)                         lt Count   219 unknown)  



                                                  (150-400)  X10^3/uL           



                                                   ]                  

 

           (unknown)  (no       (unknown)  (unknown)  GenericComposite[R  (units

    (unknown)



                    date)                         BC     (4.5-5.9) unknown)  



                                                  X10^6/uL  ]           

 

           (unknown)  (no       (unknown)  (unknown)  GenericComposite[R  (units

    (unknown)



                    date)                         BC   (4.5-5.9) unknown)  



                                                  X10^6/uL  ]           

 

           (unknown)  (no       (unknown)  (unknown)  GenericComposite[R  (units

    (unknown)



                    date)                         BC   3.37 L unknown)  



                                                  (4.5-5.9)  X10^6/uL           



                                                   ]                  

 

           (unknown)  (no       (unknown)  (unknown)  GenericComposite[W  (units

    (unknown)



                    date)                         BC     (4.5-11.0) unknown)  



                                                  X10^3/uL  ]           

 

           (unknown)  (no       (unknown)  (unknown)  GenericComposite[W  (units

    (unknown)



                    date)                         BC   (4.5-11.0) unknown)  



                                                  X10^3/uL  ]           

 

           (unknown)  (no       (unknown)  (unknown)  GenericComposite[W  (units

    (unknown)



                    date)                         BC   7.0  unknown)  



                                                  (4.5-11.0)           



                                                  X10^3/uL  ]           

 

           (unknown)  (no       (unknown)  (unknown)  Globulin  (units    (unkno

wn)



                    date)                         (1.7-4.1)  g/dL unknown)  

 

           (unknown)  (no       (unknown)  (unknown)  Globulin    3.6  (units   

 (unknown)



                    date)                         (1.7-4.1)  g/dL unknown)  

 

           (unknown)  (no       (unknown)  (unknown)  Globulin  (units    (unkno

wn)



                    date)                         (1.7-4.1)  g/dL unknown)  

 

           (unknown)  (no       (unknown)  (unknown)  Glucose   (units    (unkno

wn)



                    date)                         ()  mg/dL unknown)  

 

           (unknown)  (no       (unknown)  (unknown)  Glucose    262 H  (units  

  (unknown)



                    date)                         ()  mg/dL unknown)  

 

           (unknown)  (no       (unknown)  (unknown)  Glucose   ()  (units

    (unknown)



                    date)                          mg/dL    unknown)  

 

           (unknown)  (no       (unknown)  (unknown)  Guaiac stool:  (units    (

unknown)



                    date)                         Negative, good unknown)  



                                                  stool result           

 

           (unknown)  (no       (unknown)  (unknown)  H/O colectomy  (units    (

unknown)



                    date)                                   unknown)  

 

           (unknown)  (no       (unknown)  (unknown)  HPI - Weakness  (units    

(unknown)



                    date)                                   unknown)  

 

           (unknown)  (no       (unknown)  (unknown)  HPI Narrative:  (units    

(unknown)



                    date)                                   unknown)  

 

           (unknown)  (no       (unknown)  (unknown)  Hct     (41-53)  %  (units

    (unknown)



                    date)                                   unknown)  

 

           (unknown)  (no       (unknown)  (unknown)  Hct   (41-53)  %  (units  

  (unknown)



                    date)                                   unknown)  

 

           (unknown)  (no       (unknown)  (unknown)  Hct   26.7 L  (units    (u

nknown)



                    date)                         (41-53)  % unknown)  

 

           (unknown)  (no       (unknown)  (unknown)  He does not have  (units  

  (unknown)



                    date)                         any mental status unknown)  



                                                  changes, GCS is 15.           



                                                   Guaiac stool is           

 

           (unknown)  (no       (unknown)  (unknown)  Head/Neck:  (units    (unk

nown)



                    date)                         Endorses having a unknown)  



                                                  headache, denies           



                                                  any neck pain           

 

           (unknown)  (no       (unknown)  (unknown)  Head: atraumatic,  (units 

   (unknown)



                    date)                         symmetrical facial unknown)  



                                                  expressions           

 

           (unknown)  (no       (unknown)  (unknown)  Hgb       (units    (unkno

wn)



                    date)                         (13.5-17.5)  g/dL unknown)  

 

           (unknown)  (no       (unknown)  (unknown)  Hgb   (13.5-17.5)  (units 

   (unknown)



                    date)                         g/dL      unknown)  

 

           (unknown)  (no       (unknown)  (unknown)  Hgb   9.1 L  (units    (un

known)



                    date)                         (13.5-17.5)  g/dL unknown)  

 

           (unknown)  (no       (unknown)  (unknown)  History of Present  (units

    (unknown)



                    date)                         Illness   unknown)  

 

           (unknown)  (no       (unknown)  (unknown)  History of lumbar  (units 

   (unknown)



                    date)                         surgery (2011) unknown)  

 

           (unknown)  (no       (unknown)  (unknown)  Hx of foot surgery  (units

    (unknown)



                    date)                         (2017)    unknown)  

 

           (unknown)  (no       (unknown)  (unknown)  Hx of shoulder  (units    

(unknown)



                    date)                         surgery (2010) unknown)  

 

           (unknown)  (no       (unknown)  (unknown)  Hypertension  (units    (u

nknown)



                    date)                                   unknown)  

 

           (unknown)  (no       (unknown)  (unknown)  IMPRESSION:? No  (units   

 (unknown)



                    date)                         acute     unknown)  



                                                  cardiopulmonary           



                                                  findings            

 

           (unknown)  (no       (unknown)  (unknown)  INDICATIONS:?  (units    (

unknown)



                    date)                         chest pain unknown)  

 

           (unknown)  (no       (unknown)  (unknown)  Imaging Data  (units    (u

nknown)



                    date)                                   unknown)  

 

           (unknown)  (no       (unknown)  (unknown)  Independently  (units    (

unknown)



                    date)                         reviewed vitals unknown)  



                                                  signs and nursing           



                                                  notes.              

 

           (unknown)  (no       (unknown)  (unknown)  Initial Vital  (units    (

unknown)



                    date)                         Signs     unknown)  

 

           (unknown)  (no       (unknown)  (unknown)  Initial Vital  (units    (

unknown)



                    date)                         Signs:    unknown)  

 

           (unknown)  (no       (unknown)  (unknown)  Instructions:  (units    (

unknown)



                    date)                         Acute Cystitis, unknown)  



                                                  Anemia              

 

           (unknown)  (no       (unknown)  (unknown)  Interpretation:  (units   

 (unknown)



                    date)                                   unknown)  

 

           (unknown)  (no       (unknown)  (unknown) January, hematocrit  (units

    (unknown)



                    date)                         is 26.7, down from unknown)  



                                                  27.8 also in           



                                                  January, platelet           



                                                  count 219,           

 

           (unknown)  (no       (unknown)  (unknown) Klebsiella  (units    (unkn

own)



                    date)                         pneumoniae which unknown)  



                                                  was resistant only           



                                                  to ampicillin and           



                                                  nitrofurantoin.           

 

           (unknown)  (no       (unknown)  (unknown)  Lab Data  (units    (unkno

wn)



                    date)                                   unknown)  

 

           (unknown)  (no       (unknown)  (unknown)  Labs:     (units    (unkno

wn)



                    date)                                   unknown)  

 

           (unknown)  (no       (unknown)  (unknown)  Lactate   (units    (unkno

wn)



                    date)                         (0.7-2.1)  mmol/L unknown)  

 

           (unknown)  (no       (unknown)  (unknown)  Lactate   2.6 H  (units   

 (unknown)



                    date)                         (0.7-2.1)  mmol/L unknown)  

 

           (unknown)  (no       (unknown)  (unknown)  Lactate  1.1  (units    (u

nknown)



                    date)                         (0.7-2.1)  mmol/L unknown)  

 

           (unknown)  (no       (unknown)  (unknown)  Lipase    (units    (unkno

wn)



                    date)                         ()  U/L unknown)  

 

           (unknown)  (no       (unknown)  (unknown)  Lipase    32  (units    (u

nknown)



                    date)                         ()  U/L unknown)  

 

           (unknown)  (no       (unknown)  (unknown)  Lipase   ()  (units 

   (unknown)



                    date)                         U/L       unknown)  

 

           (unknown)  (no       (unknown)  (unknown)  Lungs and pleura:?  (units

    (unknown)



                    date)                         Lungs are clear.? unknown)  



                                                  No pleural           



                                                  effusions or           



                                                  pneumothorax.? Low           

 

           (unknown)  (no       (unknown)  (unknown)  Lymph # (Auto)  (units    

(unknown)



                    date)                         (1012-3404)  /uL unknown)  

 

           (unknown)  (no       (unknown)  (unknown)  Lymph # (Auto)  (units    

(unknown)



                    date)                         (5843-2148)  /uL unknown)  

 

           (unknown)  (no       (unknown)  (unknown)  Lymph # (Auto)  (units    

(unknown)



                    date)                         400 L   (3655-4346) unknown)  



                                                   /uL                

 

           (unknown)  (no       (unknown)  (unknown)  Lymph % (Auto)  (units    

(unknown)



                    date)                         (25-40)  % unknown)  

 

           (unknown)  (no       (unknown)  (unknown)  Lymph % (Auto)  (units    

(unknown)



                    date)                         (25-40)  % unknown)  

 

           (unknown)  (no       (unknown)  (unknown)  Lymph % (Auto)  (units    

(unknown)



                    date)                         6.4 L   (25-40)  % unknown)  

 

           (unknown)  (no       (unknown)  (unknown)  MCH     (26-34)  (units   

 (unknown)



                    date)                         PG        unknown)  

 

           (unknown)  (no       (unknown)  (unknown)  MCH   (26-34)  PG  (units 

   (unknown)



                    date)                                   unknown)  

 

           (unknown)  (no       (unknown)  (unknown)  MCH   26.9  (units    (unk

nown)



                    date)                         (26-34)  PG unknown)  

 

           (unknown)  (no       (unknown)  (unknown)  MCHC     (30-36)  (units  

  (unknown)



                    date)                         %         unknown)  

 

           (unknown)  (no       (unknown)  (unknown)  MCHC   (30-36)  %  (units 

   (unknown)



                    date)                                   unknown)  

 

           (unknown)  (no       (unknown)  (unknown)  MCHC   34.0  (units    (un

known)



                    date)                         (30-36)  % unknown)  

 

           (unknown)  (no       (unknown)  (unknown)  MCV     ()  (units  

  (unknown)



                    date)                         fL        unknown)  

 

           (unknown)  (no       (unknown)  (unknown)  MCV   ()  fL  (units

    (unknown)



                    date)                                   unknown)  

 

           (unknown)  (no       (unknown)  (unknown)  MCV   79.1 L  (units    (u

nknown)



                    date)                         ()  fL unknown)  

 

           (unknown)  (no       (unknown)  (unknown)  MDM - Weakness  (units    

(unknown)



                    date)                                   unknown)  

 

           (unknown)  (no       (unknown)  (unknown)  MDM Narrative  (units    (

unknown)



                    date)                                   unknown)  

 

           (unknown)  (no       (unknown)  (unknown)  MSK: denies new  (units   

 (unknown)



                    date)                         joint pain, muscle unknown)  



                                                  weakness or           



                                                  swelling            

 

           (unknown)  (no       (unknown)  (unknown) MSK: moves all  (units    (

unknown)



                    date)                         extremities, unknown)  



                                                  neurovascularly           



                                                  intact, generalized           



                                                  weakness, normal           

 

           (unknown)  (no       (unknown)  (unknown)  Magnesium  (units    (unkn

own)



                    date)                         (1.6-2.3)  mg/dL unknown)  

 

           (unknown)  (no       (unknown)  (unknown)  Magnesium    1.0 L  (units

    (unknown)



                    date)                          (1.6-2.3)  mg/dL unknown)  

 

           (unknown)  (no       (unknown)  (unknown)  Magnesium  (units    (unkn

own)



                    date)                         (1.6-2.3)  mg/dL unknown)  

 

           (unknown)  (no       (unknown)  (unknown)  Magnesium Sulfate  (units 

   (unknown)



                    date)                         (Magnesium Sulfate) unknown)  



                                                   2 gm in 50 mls @           



                                                  50 mls/hr IV NOW           



                                                  ONE                 

 

           (unknown)  (no       (unknown)  (unknown)  Mank: Patient was  (units 

   (unknown)



                    date)                         seen independently unknown)  



                                                  evaluated by           



                                                  myself, physical           



                                                  exam was            

 

           (unknown)  (no       (unknown)  (unknown)  Mank: Patient's  (units   

 (unknown)



                    date)                         lactate improved unknown)  



                                                  with fluids.  He           



                                                  had difficulty           



                                                  ambulating           

 

           (unknown)  (no       (unknown)  (unknown)  Mediastinum:?  (units    (

unknown)



                    date)                         Mediastinal unknown)  



                                                  contours appear           



                                                  normal.? Heart size           



                                                  is normal.?           

 

           (unknown)  (no       (unknown)  (unknown)  Medical History  (units   

 (unknown)



                    date)                         (Reviewed 22 unknown)  



                                                  @ 19:57 by Kitty Costello [facility])           

 

           (unknown)  (no       (unknown)  (unknown)  Medical decision  (units  

  (unknown)



                    date)                         making narrative: unknown)  

 

           (unknown)  (no       (unknown)  (unknown)  [employer] since this  (units

    (unknown)



                    date)                         happened. unknown)  

 

           (unknown)  (no       (unknown)  (unknown)  Mode of arrival:  (units  

  (unknown)



                    date)                         Wheelchair unknown)  

 

           (unknown)  (no       (unknown)  (unknown)  Mono # (Auto)  (units    (

unknown)



                    date)                         (0-900)  /uL unknown)  

 

           (unknown)  (no       (unknown)  (unknown)  Mono # (Auto)  (units    (

unknown)



                    date)                         (0-900)  /uL unknown)  

 

           (unknown)  (no       (unknown)  (unknown)  Mono # (Auto)  (units    (

unknown)



                    date)                         600   (0-900)  /uL unknown)  

 

           (unknown)  (no       (unknown)  (unknown)  Mono % (Auto)  (units    (

unknown)



                    date)                         (3-14)  % unknown)  

 

           (unknown)  (no       (unknown)  (unknown)  Mono % (Auto)  (units    (

unknown)



                    date)                         (3-14)  % unknown)  

 

           (unknown)  (no       (unknown)  (unknown)  Mono % (Auto)  (units    (

unknown)



                    date)                         7.9   (3-14)  % unknown)  

 

           (unknown)  (no       (unknown)  (unknown)  Mother     (units 

   (unknown)



                    date)                          Cancer   unknown)  

 

           (unknown)  (no       (unknown)  (unknown)  Mouth/Throat:  (units    (

unknown)



                    date)                         moist mucus unknown)  



                                                  membranes           

 

           (unknown)  (no       (unknown)  (unknown)  Narrative  (units    (unkn

own)



                    date)                                   unknown)  

 

           (unknown)  (no       (unknown)  (unknown)  Narrative:  (units    (unk

nown)



                    date)                                   unknown)  

 

           (unknown)  (no       (unknown)  (unknown)  Neck: supple  (units    (u

nknown)



                    date)                                   unknown)  

 

           (unknown)  (no       (unknown)  (unknown)  Neuro: denies  (units    (

unknown)



                    date)                         numbness, tingling, unknown)  



                                                  dizziness           

 

           (unknown)  (no       (unknown)  (unknown)  Neuro: normal  (units    (

unknown)



                    date)                         speech and unknown)  



                                                  cognition, A+O x3           

 

           (unknown)  (no       (unknown)  (unknown)  Neut # (Auto)  (units    (

unknown)



                    date)                         (2775-0635)  /uL unknown)  

 

           (unknown)  (no       (unknown)  (unknown)  Neut # (Auto)  (units    (

unknown)



                    date)                         (9519-4687)  /uL unknown)  

 

           (unknown)  (no       (unknown)  (unknown)  Neut # (Auto)  (units    (

unknown)



                    date)                         5900   (9659-7483) unknown)  



                                                  /uL                 

 

           (unknown)  (no       (unknown)  (unknown)  Neut % (Auto)  (units    (

unknown)



                    date)                         (50-75)  % unknown)  

 

           (unknown)  (no       (unknown)  (unknown)  Neut % (Auto)  (units    (

unknown)



                    date)                         (50-75)  % unknown)  

 

           (unknown)  (no       (unknown)  (unknown)  Neut % (Auto)  (units    (

unknown)



                    date)                         84.7 H   (50-75)  % unknown)  

 

           (unknown)  (no       (unknown)  (unknown)  New       (units    (unkno

wn)



                    date)                                   unknown)  

 

           (unknown)  (no       (unknown)  (unknown)  No Action  (units    (unkn

own)



                    date)                                   unknown)  

 

           (unknown)  (no       (unknown)  (unknown)  No Known Drug  (units    (

unknown)



                    date)                         Allergies Allergy unknown)  



                                                  Verified 22           



                                                  17:38               

 

           (unknown)  (no       (unknown)  (unknown)  Nose: nares  (units    (un

known)



                    date)                         patent, no unknown)  



                                                  rhinorrhea           

 

           (unknown)  (no       (unknown)  (unknown)  Notable on  (units    (unk

nown)



                    date)                         patient's prior unknown)  



                                                  urine cultures,           



                                                  urine from           



                                                  2022 grew out           

 

           (unknown)  (no       (unknown)  (unknown)  Ondansetron HCl  (units   

 (unknown)



                    date)                         (Ondansetron 4 Mg/2 unknown)  



                                                  Ml Inj)  4 mg IV           



                                                  NOW ONE             

 

           (unknown)  (no       (unknown)  (unknown)  Ordered:  (units    (unkno

wn)



                    date)                                   unknown)  

 

           (unknown)  (no       (unknown)  (unknown)  Orders    (units    (unkno

wn)



                    date)                                   unknown)  

 

           (unknown)  (no       (unknown)  (unknown)  Osteoarthritis  (units    

(unknown)



                    date)                                   unknown)  

 

           (unknown)  (no       (unknown)  (unknown)  Oxygen Delivery  (units   

 (unknown)



                    date)                         Method    22 unknown)  



                                                  17:34               

 

           (unknown)  (no       (unknown)  (unknown)  Oxygen Delivery  (units   

 (unknown)



                    date)                         Method Room Air unknown)  

 

           (unknown)  (no       (unknown)  (unknown)  PROCEDURE:? XR  (units    

(unknown)



                    date)                         CHEST 1V  unknown)  

 

           (unknown)  (no       (unknown)  (unknown)  Patient   (units    (unkno

wn)



                    date)                         Disposition: Home unknown)  

 

           (unknown)  (no       (unknown)  (unknown)  Patient History  (units   

 (unknown)



                    date)                                   unknown)  

 

           (unknown)  (no       (unknown)  (unknown)  Patient and wife  (units  

  (unknown)



                    date)                         were offered again unknown)  



                                                  admission but once           



                                                  again defer.           



                                                  Received a           

 

           (unknown)  (no       (unknown)  (unknown)  Patient did not  (units   

 (unknown)



                    date)                         walk very far and unknown)  



                                                  did have            



                                                  difficulty.  He           



                                                  does appear to have           

 

           (unknown)  (no       (unknown)  (unknown)  Patient is  (units    (unk

nown)



                    date)                         currently unknown)  



                                                  anticoagulated on           



                                                  Plavix 37.5 mg           



                                                  daily.  Dr. Gómez           

 

           (unknown)  (no       (unknown)  (unknown) Patient reports  (units    

(unknown)



                    date)                         that when he has a unknown)  



                                                  bowel movement, he           



                                                  typically has hard           



                                                  pellets             

 

           (unknown)  (no       (unknown)  (unknown)  Patient self caths  (units

    (unknown)



                    date)                         for urine output, unknown)  



                                                  reports that he has           



                                                  not had much urine           

 

           (unknown)  (no       (unknown)  (unknown)  Patient was  (units    (un

known)



                    date)                         treated in the unknown)  



                                                  emergency           



                                                  department with 1 g           



                                                  of ceftriaxone.           

 

           (unknown)  (no       (unknown)  (unknown) Patient's  (units    (unkno

wn)



                    date)                         colorectal cancer unknown)  



                                                  oncologist was Dr. Jett, patient was           



                                                  referred to him           

 

           (unknown)  (no       (unknown)  (unknown)  Patient's preop  (units   

 (unknown)



                    date)                         diagnosis was unknown)  



                                                  nausea and vomiting           



                                                  from hematemesis in           



                                                  January             

 

           (unknown)  (no       (unknown)  (unknown)  Patient:  (units    (unkno

wn)



                    date)                         Bret Esquivel R unknown)  



                                                  MR#: [id number]            

 

           (unknown)  (no       (unknown)  (unknown)  Please leave her  (units  

  (unknown)



                    date)                         catheter in for the unknown)  



                                                  next several days           



                                                  until you are ready           



                                                  to                  

 

           (unknown)  (no       (unknown)  (unknown)  Please return for  (units 

   (unknown)



                    date)                         fevers, worsening unknown)  



                                                  weakness, passing           



                                                  out, persistent           



                                                  vomiting,           

 

           (unknown)  (no       (unknown)  (unknown)  Postlaminectomy  (units   

 (unknown)



                    date)                         syndrome  unknown)  

 

           (unknown)  (no       (unknown)  (unknown)  Potassium  (units    (unkn

own)



                    date)                         (3.4-5.1)  mmol/L unknown)  

 

           (unknown)  (no       (unknown)  (unknown)  Potassium    3.7  (units  

  (unknown)



                    date)                         (3.4-5.1)  mmol/L unknown)  

 

           (unknown)  (no       (unknown)  (unknown)  Potassium  (units    (unkn

own)



                    date)                         (3.4-5.1)  mmol/L unknown)  

 

           (unknown)  (no       (unknown)  (unknown)  Prescription sent  (units 

   (unknown)



                    date)                         to [facility] pharmacy unknown)  



                                                  in [place].           

 

           (unknown)  (no       (unknown)  (unknown)  Prescriptions:  (units    

(unknown)



                    date)                                   unknown)  

 

           (unknown)  (no       (unknown)  (unknown)  Procalcitonin  (units    (

unknown)



                    date)                         (<0.5)  ng/mL unknown)  

 

           (unknown)  (no       (unknown)  (unknown)  Procalcitonin  (units    (

unknown)



                    date)                         (<0.5)  ng/mL unknown)  

 

           (unknown)  (no       (unknown)  (unknown)  Procalcitonin  (units    (

unknown)



                    date)                         0.72 H    (<0.5) unknown)  



                                                  ng/mL               

 

           (unknown)  (no       (unknown)  (unknown)  Psych: mental  (units    (

unknown)



                    date)                         status is grossly unknown)  



                                                  normal, congruent           



                                                  mood, normal           



                                                  affect, pleasant           

 

           (unknown)  (no       (unknown)  (unknown)  Pulse Oximetry  98  (units

    (unknown)



                    date)                           22 17:34 unknown)  

 

           (unknown)  (no       (unknown)  (unknown)  Pulse Oximetry 99  (units 

   (unknown)



                    date)                                   unknown)  

 

           (unknown)  (no       (unknown)  (unknown)  Pulse Rate  77  (units    

(unknown)



                    date)                         22 17:34 unknown)  

 

           (unknown)  (no       (unknown)  (unknown)  Pulse Rate 77  (units    (

unknown)



                    date)                                   unknown)  

 

           (unknown)  (no       (unknown)  (unknown)  RDW       (units    (unkno

wn)



                    date)                         (11.6-14.8)  % unknown)  

 

           (unknown)  (no       (unknown)  (unknown)  RDW   (11.6-14.8)  (units 

   (unknown)



                    date)                         %         unknown)  

 

           (unknown)  (no       (unknown)  (unknown)  RDW   16.1 H  (units    (u

nknown)



                    date)                         (11.6-14.8)  % unknown)  

 

           (unknown)  (no       (unknown)  (unknown)  Reevaluation #1:  (units  

  (unknown)



                    date)                                   unknown)  

 

           (unknown)  (no       (unknown)  (unknown)  Reevaluation #2:  (units  

  (unknown)



                    date)                                   unknown)  

 

           (unknown)  (no       (unknown)  (unknown)  Reevaluation(s)  (units   

 (unknown)



                    date)                                   unknown)  

 

           (unknown)  (no       (unknown)  (unknown)  Referrals:  (units    (unk

nown)



                    date)                                   unknown)  

 

           (unknown)  (no       (unknown)  (unknown)  Related Data  (units    (u

nknown)



                    date)                                   unknown)  

 

           (unknown)  (no       (unknown)  (unknown)  Respiratory Rate  (units  

  (unknown)



                    date)                         18   22 17:34 unknown)  

 

           (unknown)  (no       (unknown)  (unknown)  Respiratory Rate  (units  

  (unknown)



                    date)                         18        unknown)  

 

           (unknown)  (no       (unknown)  (unknown)  Respiratory:  (units    (u

nknown)



                    date)                         denies shortness of unknown)  



                                                  breath, or new           



                                                  cough               

 

           (unknown)  (no       (unknown)  (unknown)  Respiratory:  (units    (u

nknown)



                    date)                         normal effort, able unknown)  



                                                  to speak in           



                                                  complete sentences,           



                                                  no audible           

 

           (unknown)  (no       (unknown)  (unknown)  Result diagrams:  (units  

  (unknown)



                    date)                                   unknown)  

 

           (unknown)  (no       (unknown)  (unknown)  Review of Systems  (units 

   (unknown)



                    date)                                   unknown)  

 

           (unknown)  (no       (unknown)  (unknown)  SARS-CoV-2 (PCR)  (units  

  (unknown)



                    date)                           (Negative) unknown)  

 

           (unknown)  (no       (unknown)  (unknown)  SARS-CoV-2 (PCR)  (units  

  (unknown)



                    date)                         (Negative) unknown)  

 

           (unknown)  (no       (unknown)  (unknown)  SARS-CoV-2 (PCR)  (units  

  (unknown)



                    date)                         Negative  unknown)  



                                                  (Negative)           

 

           (unknown)  (no       (unknown)  (unknown)  Sciatica  (units    (unkno

wn)



                    date)                                   unknown)  

 

           (unknown)  (no       (unknown)  (unknown)  Signed By:  (units    (unk

nown)



                    date)                                   unknown)  

 

           (unknown)  (no       (unknown)  (unknown)  Skin: brisk  (units    (un

known)



                    date)                         capillary refill, unknown)  



                                                  no rash, no           



                                                  erythema            

 

           (unknown)  (no       (unknown)  (unknown)  Skin: denies rash,  (units

    (unknown)



                    date)                         itching or wound unknown)  

 

           (unknown)  (no       (unknown)  (unknown)  Smoking Status:  (units   

 (unknown)



                    date)                         Never smoker unknown)  

 

           (unknown)  (no       (unknown)  (unknown)  Smoking Status:  (units   

 (unknown)



                    date)                         Never smoker unknown)  

 

           (unknown)  (no       (unknown)  (unknown)  Social History  (units    

(unknown)



                    date)                         (Reviewed 22 unknown)  



                                                  @ 19:57 by ZAIRA Amezcua)           

 

           (unknown)  (no       (unknown)  (unknown)  Sodium    (units    (unkno

wn)



                    date)                         (137-145)  mmol/L unknown)  

 

           (unknown)  (no       (unknown)  (unknown)  Sodium    135 L  (units   

 (unknown)



                    date)                         (137-145)  mmol/L unknown)  

 

           (unknown)  (no       (unknown)  (unknown)  Sodium   (137-145)  (units

    (unknown)



                    date)                          mmol/L   unknown)  

 

           (unknown)  (no       (unknown)  (unknown)  Sodium Chloride  (units   

 (unknown)



                    date)                         (Normal Saline unknown)  



                                                  0.9%)  1,000 mls @           



                                                  1,000 mls/hr IV           



                                                  BOLUS ONE           

 

           (unknown)  (no       (unknown)  (unknown)  Source: patient  (units   

 (unknown)



                    date)                         and family unknown)  

 

           (unknown)  (no       (unknown)  (unknown)  Spinal stenosis  (units   

 (unknown)



                    date)                                   unknown)  

 

           (unknown)  (no       (unknown)  (unknown)  Stated complaint:  (units 

   (unknown)



                    date)                         WEAKNESS UNABLE TO unknown)  



                                                  HOLD ANYTHING DOWN           

 

           (unknown)  (no       (unknown)  (unknown)  Substance Use  (units    (

unknown)



                    date)                         Type: does not use unknown)  

 

           (unknown)  (no       (unknown)  (unknown)  Surgical History  (units  

  (unknown)



                    date)                         (Reviewed 22 unknown)  



                                                  @ 19:57 by ZAIRA Amezcua)           

 

           (unknown)  (no       (unknown)  (unknown)  Surgical changes  (units  

  (unknown)



                    date)                         and devices:? unknown)  



                                                  None.?              

 

           (unknown)  (no       (unknown)  (unknown)  TECHNIQUE:? One  (units   

 (unknown)



                    date)                         view of the chest unknown)  



                                                  was acquired.?           

 

           (unknown)  (no       (unknown)  (unknown)  Take antibiotics  (units  

  (unknown)



                    date)                         until completely unknown)  



                                                  gone, start your           



                                                  antibiotic tomorrow           



                                                  morning.            

 

           (unknown)  (no       (unknown)  (unknown)  Temperature  98.3  (units 

   (unknown)



                    date)                         F   22 17:34 unknown)  

 

           (unknown)  (no       (unknown)  (unknown)  This is a  (units    (unkn

own)



                    date)                         62-year-old male unknown)  



                                                  with history of           



                                                  multiple sclerosis,           



                                                  CVA in 2019,           

 

           (unknown)  (no       (unknown)  (unknown)  Time Seen by  (units    (u

nknown)



                    date)                         Provider: 22 unknown)  



                                                  19:11               

 

           (unknown)  (no       (unknown)  (unknown)  Time: 23:07  (units    (un

known)



                    date)                                   unknown)  

 

           (unknown)  (no       (unknown)  (unknown)  Total Bilirubin  (units   

 (unknown)



                    date)                          (0.2-1.3)  mg/dL unknown)  

 

           (unknown)  (no       (unknown)  (unknown)  Total Bilirubin  (units   

 (unknown)



                    date)                         0.6  (0.2-1.3) unknown)  



                                                  mg/dL               

 

           (unknown)  (no       (unknown)  (unknown)  Total Bilirubin  (units   

 (unknown)



                    date)                         (0.2-1.3)  mg/dL unknown)  

 

           (unknown)  (no       (unknown)  (unknown)  Total Creatine  (units    

(unknown)



                    date)                         Kinase     () unknown)  



                                                   U/L                

 

           (unknown)  (no       (unknown)  (unknown)  Total Creatine  (units    

(unknown)



                    date)                         Kinase    52 L unknown)  



                                                  ()  U/L           

 

           (unknown)  (no       (unknown)  (unknown)  Total Creatine  (units    

(unknown)



                    date)                         Kinase   () unknown)  



                                                  U/L                 

 

           (unknown)  (no       (unknown)  (unknown)  Total Protein  (units    (

unknown)



                    date)                         (6.3-8.2)  g/dL unknown)  

 

           (unknown)  (no       (unknown)  (unknown)  Total Protein  (units    (

unknown)



                    date)                         7.5  (6.3-8.2) unknown)  



                                                  g/dL                

 

           (unknown)  (no       (unknown)  (unknown)  Total Protein  (units    (

unknown)



                    date)                         (6.3-8.2)  g/dL unknown)  

 

           (unknown)  (no       (unknown)  (unknown)  Troponin I  (units    (unk

nown)



                    date)                         (0.01-0.034)  ng/mL unknown)  

 

           (unknown)  (no       (unknown)  (unknown)  Troponin I    <  (units   

 (unknown)



                    date)                         0.012  (0.01-0.034) unknown)  



                                                   ng/mL              

 

           (unknown)  (no       (unknown)  (unknown)  Troponin I  (units    (unk

nown)



                    date)                         (0.01-0.034)  ng/mL unknown)  

 

           (unknown)  (no       (unknown)  (unknown)  Ur Culture  (units    (unk

nown)



                    date)                         Indicated? unknown)  

 

           (unknown)  (no       (unknown)  (unknown)  Ur Culture  (units    (unk

nown)



                    date)                         Indicated? unknown)  

 

           (unknown)  (no       (unknown)  (unknown)  Ur Culture  (units    (unk

nown)



                    date)                         Indicated? unknown)  



                                                  Specimen cultured           

 

           (unknown)  (no       (unknown)  (unknown)  Urine Bacteria  (units    

(unknown)



                    date)                         (None)    unknown)  

 

           (unknown)  (no       (unknown)  (unknown)  Urine Bacteria  (units    

(unknown)



                    date)                         Few (2-10) H unknown)  



                                                  (None)              

 

           (unknown)  (no       (unknown)  (unknown)  Urine Bacteria  (units    

(unknown)



                    date)                         (None)    unknown)  

 

           (unknown)  (no       (unknown)  (unknown)  Urine RBC  (units    (unkn

own)



                    date)                         (0-5/HPF) unknown)  

 

           (unknown)  (no       (unknown)  (unknown)  Urine RBC  (units    (unkn

own)



                    date)                         1-5/hpf  (0-5/HPF) unknown)  

 

           (unknown)  (no       (unknown)  (unknown)  Urine RBC  (units    (unkn

own)



                    date)                         (0-5/HPF) unknown)  

 

           (unknown)  (no       (unknown)  (unknown)  Urine Specific  (units    

(unknown)



                    date)                         Gravity    1.015 unknown)  

 

           (unknown)  (no       (unknown)  (unknown)  Urine WBC  (units    (unkn

own)



                    date)                         (0-5/HPF) unknown)  

 

           (unknown)  (no       (unknown)  (unknown)  Urine WBC  (units    (unkn

own)



                    date)                         5-10/hpf H unknown)  



                                                  (0-5/HPF)           

 

           (unknown)  (no       (unknown)  (unknown)  Urine WBC  (units    (unkn

own)



                    date)                         (0-5/HPF) unknown)  

 

           (unknown)  (no       (unknown)  (unknown)  Visit Report  (units    (u

nknown)



                    date)                         Forms:  Patient unknown)  



                                                  Portal/API           

 

           (unknown)  (no       (unknown)  (unknown)  Vital Signs  (units    (un

known)



                    date)                                   unknown)  

 

           (unknown)  (no       (unknown)  (unknown)  Vital signs:  (units    (u

nknown)



                    date)                                   unknown)  

 

           (unknown)  (no       (unknown)  (unknown)  Linette Jett MD  (units  

  (unknown)



                    date)                         [Physician] - As unknown)  



                                                  soon as possible           

 

           (unknown)  (no       (unknown)  (unknown)  [Embedded Image  (units   

 (unknown)



                    date)                         Not Available] unknown)  

 

           (unknown)  (no       (unknown)  (unknown)  acute ST elevation  (units

    (unknown)



                    date)                         depression noted. unknown)  

 

           (unknown)  (no       (unknown)  (unknown)  acute ischemic  (units    

(unknown)



                    date)                         changes.  unknown)  

 

           (unknown)  (no       (unknown)  (unknown)  alcohol intake  (units    

(unknown)



                    date)                         frequency: a few unknown)  



                                                  times a month           

 

           (unknown)  (no       (unknown)  (unknown)  alcohol intake:  (units   

 (unknown)



                    date)                         current   unknown)  

 

           (unknown)  (no       (unknown)  (unknown)  ambulation trial  (units  

  (unknown)



                    date)                         patient fails plan unknown)  



                                                  for admission.           

 

           (unknown)  (no       (unknown)  (unknown)  and cooperative  (units   

 (unknown)



                    date)                                   unknown)  

 

           (unknown)  (no       (unknown)  (unknown)  and generalized  (units   

 (unknown)



                    date)                         weakness.  Patient unknown)  



                                                  self catheterizes           



                                                  his bladder at           



                                                  baseline,           

 

           (unknown)  (no       (unknown)  (unknown)  appear    (units    (unkno

wn)



                    date)                                   unknown)  

 

           (unknown)  (no       (unknown)  (unknown)  ascorbic acid  (units    (

unknown)



                    date)                         (vitamin C) 500 mg unknown)  



                                                  500 mg PO BID #60           



                                                  tabs 22           

 

           (unknown)  (no       (unknown)  (unknown)  atorvastatin 80 mg  (units

    (unknown)



                    date)                         tablet 40 mg PO QPM unknown)  



                                                  20           

 

           (unknown)  (no       (unknown)  (unknown)  be causing a rash  (units 

   (unknown)



                    date)                                   unknown)  

 

           (unknown)  (no       (unknown)  (unknown) bleeding.  Guaiac  (units  

  (unknown)



                    date)                         stool in the unknown)  



                                                  emergency           



                                                  department was           



                                                  negative.           



                                                  Patient's lab           

 

           (unknown)  (no       (unknown)  (unknown)  blood in his  (units    (u

nknown)



                    date)                         emesis or in his unknown)  



                                                  stool.  He reports           



                                                  that he did a stool           



                                                  test one            

 

           (unknown)  (no       (unknown)  (unknown)  buspirone 5 mg  (units    

(unknown)



                    date)                         tablet 5 mg PO BID unknown)  



                                                  #60 tabs 22           

 

           (unknown)  (no       (unknown)  (unknown)  cancer.  Postop  (units   

 (unknown)



                    date)                         diagnosis from this unknown)  



                                                  upper endoscopy was           



                                                  the same.  His           

 

           (unknown)  (no       (unknown)  (unknown)  cath through the  (units  

  (unknown)



                    date)                         weekend and unknown)  



                                                  therefore did not           



                                                  drain his bladder           



                                                  since Friday.           

 

           (unknown)  (no       (unknown)  (unknown)  caths at baseline  (units 

   (unknown)



                    date)                                   unknown)  

 

           (unknown)  (no       (unknown)  (unknown)  ciprofloxacin HCl  (units 

   (unknown)



                    date)                         500 mg tablet 500 unknown)  



                                                  mg PO BID #20 tabs           



                                                  22            

 

           (unknown)  (no       (unknown)  (unknown)  clopidogrel 75 mg  (units 

   (unknown)



                    date)                         tablet 75 mg PO unknown)  



                                                  DAILY 22            

 

           (unknown)  (no       (unknown)  (unknown)  colonic   (units    (unkno

wn)



                    date)                         anastomosis, and a unknown)  



                                                  suboptimal bowel           



                                                  prep.               

 

           (unknown)  (no       (unknown)  (unknown)  concerning  (units    (unk

nown)



                    date)                         symptoms. unknown)  

 

           (unknown)  (no       (unknown)  (unknown)  cultures were  (units    (

unknown)



                    date)                         drawn prior to unknown)  



                                                  receiving           



                                                  antibiotics Urine           



                                                  microscopy shows           

 

           (unknown)  (no       (unknown)  (unknown)  cyanocobalamin  (units    

(unknown)



                    date)                         (vitamin B-12) 500 unknown)  



                                                  1,000 mcg PO DAILY           



                                                  #60 tabs 22           

 

           (unknown)  (no       (unknown)  (unknown)  decrease in urine  (units 

   (unknown)



                    date)                         output, new unknown)  



                                                  abdominal back or           



                                                  flank pain or other           



                                                  new or              

 

           (unknown)  (no       (unknown)  (unknown)  diabetes,  (units    (unkn

own)



                    date)                         hypertension, unknown)  



                                                  colorectal cancer           



                                                  in  with a           



                                                  reverse colectomy           



                                                  who                 

 

           (unknown)  (no       (unknown)  (unknown)  diabetes,  (units    (unkn

own)



                    date)                         hypertension, unknown)  



                                                  colorectal cancer           



                                                  in  with a           



                                                  reverse colectomy,           



                                                  He                  

 

           (unknown)  (no       (unknown)  (unknown)  does not drink  (units    

(unknown)



                    date)                         alcohol.? Patient unknown)  



                                                  and his wife state           



                                                  that he was seen in           



                                                  the                 

 

           (unknown)  (no       (unknown)  (unknown)  dose of Rocephin,  (units 

   (unknown)



                    date)                         a L of fluids, plan unknown)  



                                                  to leave catheter           



                                                  in place until he           



                                                  is                  

 

           (unknown)  (no       (unknown)  (unknown)  elevated lactate.  (units 

   (unknown)



                    date)                         His heart rate and unknown)  



                                                  blood pressure are           



                                                  within normal           



                                                  ranges.             

 

           (unknown)  (no       (unknown)  (unknown)  emergency  (units    (unkn

own)



                    date)                         department in unknown)  



                                                  January and           



                                                  diagnosed with a GI           



                                                  bleed, he was           



                                                  admitted,           

 

           (unknown)  (no       (unknown)  (unknown)  endorses fatigue  (units  

  (unknown)



                    date)                                   unknown)  

 

           (unknown)  (no       (unknown)  (unknown) endoscopic  (units    (unkn

own)



                    date)                         diagnosis includes unknown)  



                                                  mild gastropathy,           



                                                  esophagitis, patent           



                                                  retrosigmoid           

 

           (unknown)  (no       (unknown)  (unknown)  equivocal at this  (units 

   (unknown)



                    date)                         time.  Plan to unknown)  



                                                  repeat lactate is           



                                                  improving will           



                                                  attempt             

 

           (unknown)  (no       (unknown)  (unknown)  feeling more able  (units 

   (unknown)



                    date)                         to self cath. unknown)  



                                                  Short-term           



                                                  follow-up in the           



                                                  next several days           

 

           (unknown)  (no       (unknown)  (unknown)  ferrous sulfate  (units   

 (unknown)



                    date)                         325 mg (65 mg 325 unknown)  



                                                  mg PO BIDWM #60           



                                                  tabs 22           

 

           (unknown)  (no       (unknown)  (unknown)  followed by loose  (units 

   (unknown)



                    date)                         stool.    unknown)  

 

           (unknown)  (no       (unknown)  (unknown)  for repeat lactate  (units

    (unknown)



                    date)                         at this time. unknown)  



                                                  Discussed admission           



                                                  versus home patient           



                                                  has                 

 

           (unknown)  (no       (unknown)  (unknown)  gabapentin 300 mg  (units 

   (unknown)



                    date)                         capsule 300 mg PO unknown)  



                                                  BID 18            

 

           (unknown)  (no       (unknown)  (unknown)  generalized  (units    (un

known)



                    date)                         weakness and unknown)  



                                                  fatigue             

 

           (unknown)  (no       (unknown)  (unknown)  glipizide 10 mg  (units   

 (unknown)



                    date)                         tablet 10 mg PO BID unknown)  



                                                  18           

 

           (unknown)  (no       (unknown)  (unknown)  had a recent  (units    (u

nknown)



                    date)                         endoscopy that did unknown)  



                                                  not show any           



                                                  bleeding polyps or           



                                                  acute GI            

 

           (unknown)  (no       (unknown)  (unknown)  hospital and on  (units   

 (unknown)



                    date)                         chart review it unknown)  



                                                  appears that it was           



                                                  completed on           



                                                  2022.           

 

           (unknown)  (no       (unknown)  (unknown)  household members:  (units

    (unknown)



                    date)                          spouse   unknown)  

 

           (unknown)  (no       (unknown)  (unknown)  ingrown, no penile  (units

    (unknown)



                    date)                         discharge, no unknown)  



                                                  scrotal edema           

 

           (unknown)  (no       (unknown)  (unknown)  iron) tablet  (units    (u

nknown)



                    date)                                   unknown)  

 

           (unknown)  (no       (unknown)  (unknown)  lives     (units    (unkno

wn)



                    date)                         independently:  Yes unknown)  

 

           (unknown)  (no       (unknown)  (unknown)  losartan 50 mg  (units    

(unknown)



                    date)                         tablet 25 mg PO QPM unknown)  



                                                  20           

 

           (unknown)  (no       (unknown)  (unknown)  lung      (units    (o

wn)



                    date)                                   unknown)  

 

           (unknown)  (no       (unknown)  (unknown)  magnesium was  (units    (

unknown)



                    date)                         replaced with 2 g, unknown)  



                                                  normal saline 1000           



                                                  mL was given for           



                                                  his                 

 

           (unknown)  (no       (unknown)  (unknown)  mcg tablet  (units    (unk

nown)



                    date)                                   unknown)  

 

           (unknown)  (no       (unknown)  (unknown)  mcg tablet  (units    (unk

nown)



                    date)                         (Tab-A-Lucy) unknown)  

 

           (unknown)  (no       (unknown)  (unknown)  metformin 500 mg  (units  

  (unknown)



                    date)                         tablet,extended 500 unknown)  



                                                  mg PO BID 18            

 

           (unknown)  (no       (unknown)  (unknown)  metoprolol  (units    (unk

nown)



                    date)                         succinate 25 mg 25 unknown)  



                                                  mg PO DAILY           



                                                  21           

 

           (unknown)  (no       (unknown)  (unknown)  moving forward.  (units   

 (unknown)



                    date)                         Patient reports unknown)  



                                                  that he has been           



                                                  taking fiber but           



                                                  not any             

 

           (unknown)  (no       (unknown)  (unknown)  multivitamin with  (units 

   (unknown)



                    date)                         folic acid 400 1 unknown)  



                                                  tab PO DAILY #90           



                                                  tabs 22           

 

           (unknown)  (no       (unknown)  (unknown)  negative.  I  (units    (u

nknown)



                    date)                         completed his unknown)  



                                                  urinary             



                                                  catheterization           



                                                  with a coude           



                                                  catheter, urine           

 

           (unknown)  (no       (unknown)  (unknown) nondistended, no  (units   

 (unknown)



                    date)                         masses, no unknown)  



                                                  exquisite           



                                                  tenderness with           



                                                  exam, no rebound           



                                                  tendernes           

 

           (unknown)  (no       (unknown)  (unknown)  not heard it was  (units  

  (unknown)



                    date)                         positive or not. unknown)  



                                                  Patient reports           



                                                  that his primary           



                                                  care                

 

           (unknown)  (no       (unknown)  (unknown)  ondansetron HCl 4  (units 

   (unknown)



                    date)                         mg tablet 4 mg PO unknown)  



                                                  Q8H PRN nausea and           



                                                  21            

 

           (unknown)  (no       (unknown)  (unknown)  oral powder packet  (units

    (unknown)



                    date)                                   unknown)  

 

           (unknown)  (no       (unknown)  (unknown)  output for three  (units  

  (unknown)



                    date)                         days, three days unknown)  



                                                  ago he had nausea           



                                                  and vomiting,           



                                                  denies any           

 

           (unknown)  (no       (unknown)  (unknown)  patient was  (units    (un

known)



                    date)                         offered admission. unknown)  



                                                  Both he and his           



                                                  wife prefer to           



                                                  return home.           

 

           (unknown)  (no       (unknown)  (unknown)  polyethylene  (units    (u

nknown)



                    date)                         glycol 3350 17 gram unknown)  



                                                  17 gm PO DAILY #90           



                                                  ea 22           

 

           (unknown)  (no       (unknown)  (unknown)  presents to the  (units   

 (unknown)



                    date)                         emergency unknown)  



                                                  department           



                                                  complaining of           



                                                  ongoing fatigue,           



                                                  oliguria,           

 

           (unknown)  (no       (unknown)  (unknown)  provider has left,  (units

    (unknown)



                    date)                         he reports that he unknown)  



                                                  has seen Dr. Macias           



                                                  recently as well.           

 

           (unknown)  (no       (unknown)  (unknown)  pyelonephritis and  (units

    (unknown)



                    date)                         Toro catheter was unknown)  



                                                  left in place as he           



                                                  was too weak to           



                                                  self                

 

           (unknown)  (no       (unknown)  (unknown)  quadrant with a  (units   

 (unknown)



                    date)                         complicated unknown)  



                                                  surgical history of           



                                                  his abdomen.  This           



                                                  shows               

 

           (unknown)  (no       (unknown)  (unknown)  recently for his  (units  

  (unknown)



                    date)                         anemia.   unknown)  

 

           (unknown)  (no       (unknown)  (unknown)  related diarrhea  (units  

  (unknown)



                    date)                         from fecal loading unknown)  



                                                  and obstipation.           



                                                  Recommended MiraLax           



                                                  17 g                

 

           (unknown)  (no       (unknown)  (unknown)  release 24 hr  (units    (

unknown)



                    date)                                   unknown)  

 

           (unknown)  (no       (unknown)  (unknown)  repeated as well  (units  

  (unknown)



                    date)                         as HPI.  Patient's unknown)  



                                                  lactate was           



                                                  elevated at 2.6,           



                                                  blood cultures           

 

           (unknown)  (no       (unknown)  (unknown)  reported that  (units    (

unknown)



                    date)                         patient's altered unknown)  



                                                  bowel habit is           



                                                  likely a function           



                                                  of overflow           

 

           (unknown)  (no       (unknown)  (unknown)  s         (units    (unkno

wn)



                    date)                                   unknown)  

 

           (unknown)  (no       (unknown)  (unknown)  self cath  (units    (unkn

own)



                    date)                         regularly. unknown)  

 

           (unknown)  (no       (unknown)  (unknown)  sennosides 8.6 mg  (units 

   (unknown)



                    date)                         tablet (senna) 17.2 unknown)  



                                                  mg PO BEDTIME #60           



                                                  tabs 22           

 

           (unknown)  (no       (unknown)  (unknown)  substance use  (units    (

unknown)



                    date)                         type:  does not use unknown)  

 

           (unknown)  (no       (unknown)  (unknown)  tablet (Vitamin C)  (units

    (unknown)



                    date)                                   unknown)  

 

           (unknown)  (no       (unknown)  (unknown)  tablet,extended  (units   

 (unknown)



                    date)                         release 24 hr unknown)  

 

           (unknown)  (no       (unknown)  (unknown)  there.  He reports  (units

    (unknown)



                    date)                         that he had an unknown)  



                                                  upper endoscopy           



                                                  completed here at           



                                                  this                

 

           (unknown)  (no       (unknown)  (unknown)  to recheck renal  (units  

  (unknown)



                    date)                         function patient unknown)  



                                                  encouraged to           



                                                  return if not           



                                                  improving.           

 

           (unknown)  (no       (unknown)  (unknown)  tone      (units    (unkno

wn)



                    date)                                   unknown)  

 

           (unknown)  (no       (unknown)  (unknown)  trospium 20 mg  (units    

(unknown)



                    date)                         tablet 20 mg PO BID unknown)  



                                                  urinary retention           



                                                  22           

 

           (unknown)  (no       (unknown)  (unknown)  unremarkable.?  (units    

(unknown)



                    date)                                   unknown)  

 

           (unknown)  (no       (unknown)  (unknown)  urine dip showed  (units  

  (unknown)



                    date)                         leukocytes, unknown)  



                                                  ketones, wbc's and           



                                                  did not show           



                                                  nitrites.  Blood           

 

           (unknown)  (no       (unknown)  (unknown)  urine is cloudy,  (units  

  (unknown)



                    date)                         yellow,   unknown)  



                                                  approximately 600           



                                                  mL in bladder and           



                                                  drained, no rash           

 

           (unknown)  (no       (unknown)  (unknown)  volumes accentuate  (units

    (unknown)



                    date)                         pulmonary unknown)  



                                                  interstitium and           



                                                  heart size.           

 

           (unknown)  (no       (unknown)  (unknown)  was cloudy yellow  (units 

   (unknown)



                    date)                         urine and 600 mL unknown)  



                                                  was drained.           



                                                  Patient tolerated           



                                                  well.  His           

 

           (unknown)  (no       (unknown)  (unknown)  week ago and  (units    (u

nknown)



                    date)                         dropped it off at unknown)  



                                                  [facility] which was           



                                                  testing for blood           



                                                  but he has           

 

           (unknown)  (no       (unknown)  (unknown)  went to [facility]  (units 

   (unknown)



                    date)                         for rehab following unknown)  



                                                  his admission and           



                                                  has followed up           



                                                  with                

 

           (unknown)  (no       (unknown)  (unknown)  were drawn after  (units  

  (unknown)



                    date)                         Rocephin.  Reviewed unknown)  



                                                  patient's CT, his           



                                                  urine findings and           



                                                  plan                

 

           (unknown)  (no       (unknown)  (unknown)  wheezing, stridor,  (units

    (unknown)



                    date)                         or rales. No unknown)  



                                                  retractions or           



                                                  tachypnea.           









                                         Result panel 34









           (unknown)  (no date)  (unknown)  (unknown)  NO GROWTH  (units    (unk

nown)



                                                  AFTER 46  unknown)  



                                                  HOURS               

 

           (unknown)  (no date)  (unknown)  (unknown)  (no value)  (units    (un

known)



                                                            unknown)  









                                         Result panel 35









           (unknown)  (no date)  (unknown)  (unknown)  (no value)  (units    (un

known)



                                                            unknown)  

 

           (unknown)  (no date)  (unknown)  (unknown)  NO GROWTH  (units    (unk

nown)



                                                  AFTER 70  unknown)  



                                                  HOURS               









                                         Result panel 36









           (unknown)  (no date)  (unknown)  (unknown)  (no value)  (units    (un

known)



                                                            unknown)  

 

           (unknown)  (no date)  (unknown)  (unknown)  NO GROWTH  (units    (unk

nown)



                                                  AFTER 4 DAYS unknown)  









                                         Result panel 37









           (unknown)  (no date)  (unknown)  (unknown)  (no value)  (units    (un

known)



                                                            unknown)  

 

           (unknown)  (no date)  (unknown)  (unknown)  NO GROWTH  (units    (unk

nown)



                                                  AFTER 5 DAYS unknown)  







Social History







                     date                description         facility

 

                     (no date)           Never smoked tobacco (finding)  [facility]







Vital Signs







                 date            measurement     value           units









              [id number]+0000  BP_diastolic  BP_diastolic  84           mm[H

g]

 

              [id number]+0000  BP_systolic  BP_systolic  124          mm[Hg]

 

              [id number]+0000  heart_rate   heart_rate   64           /min

 

              +0000  respiration_rate  respiration_rate  14       

    /min

 

              [id number]+0000  temperature_metric  temperature_metric  36.78

        C

 

              [id number]+0000  temperature_standard  temperature_standard  9

8.2         F

 

              [id number]+0000  BMI          BMI          23.0         kg/m2

 

              [id number]+0000  BP_diastolic  BP_diastolic  58           mm[H

g]

 

              +0000  BP_systolic  BP_systolic  124          mm[Hg]

 

              17922681990605+0000  heart_rate   heart_rate   77           /min

 

              [id number]+0000  height_metric  height_metric  182.88       cm

 

              [id number]+0000  height_standard  height_standard  72         

  in

 

              [id number]+0000  respiration_rate  respiration_rate  18       

    /min

 

              [id number]+0000  temperature_metric  temperature_metric  36.83

        C

 

              [id number]+0000  temperature_standard  temperature_standard  9

8.3         F

 

              [id number]+0000  weight_metric  weight_metric  34.98        kg

 

              [id number]+0000  weight_standard  weight_standard  77.11      

  lb

## 2022-09-04 NOTE — CT REPORT
PROCEDURE:  Abdomen/Pelvis WO

 

INDICATIONS:  vomiting, RUQ abd pain; catheter

 

TECHNIQUE:  

Noncontrast 5 mm thick sections acquired from the diaphragms to the symphysis.  5 mm coronal and sagi
ttal reformats were then performed.  For radiation dose reduction, the following was used:  automated
 exposure control, adjustment of mA and/or kV according to patient size.

 

COMPARISON:  CT Abd/Pelvis 9/1/22

 

FINDINGS:  

Image quality:  Excellent.  

 

ABDOMEN:  

Lung bases:  Lung bases are clear.  Heart size is normal.  

 

Solid organs:  Liver and spleen are normal in size.  Gallbladder is distended with faint hyperdensity
 in the dependent lumen.  Pancreas is normal in contours.  No adrenal nodules.  Kidneys are normal in
 size, without hydronephrosis or nephrolithiasis.  

 

Peritoneum and bowel:  Unenhanced bowel loops demonstrate normal wall thickness and caliber. Post ronna
gical bowel changes are noted.  No free fluid or air.  

 

Nodes and vessels:  No retroperitoneal or mesenteric adenopathy by size criteria.  Aorta and inferior
 vena cava are normal in caliber.  

 

Miscellaneous:  No ventral hernias.  

 

 

PELVIS:  

Genitourinary:  Bladder is incompletely distended with a thickened wall.

 

Miscellaneous:  Bilateral fat containing inguinal hernias.  

 

Bones:  No suspicious bony lesions.  No vertebral body compression fractures.  

 

IMPRESSION:  

1.  Faint gallbladder luminal hyperdensity which may represent sludge.  No wall thickening.

 

2.  No acute intra-abdominal or pelvic process.

 

3. Thickened urinary wall which may be secondary to incomplete distension.  Other etiologies such as 
infection/inflammation cannot be excluded.

 

Reviewed by: Klaudia Gaines MD on 9/4/2022 2:09 PM PDT

Approved by: Klaudia Gaines MD on 9/4/2022 2:09 PM PDT

 

 

Station ID:  [id number]

## 2022-09-04 NOTE — ED PHYSICIAN DOCUMENTATION
History of Present Illness





- Stated complaint


Stated Complaint: RLQ ABD PX





- Additonal information


Additional information: 


62-year-old male is return to the emergency department for evaluation of 

vomiting and right upper quadrant abdominal pain.  Patient began vomiting 2 

nights ago and was reporting right upper quadrant abdominal pain starting 

yesterday.  He has vomited 3-4 times over the last 48 hours.





This gentleman does have a history of diabetes and was seen in the emergency 

department on 1 September with worsening generalized weakness over the last 

month.  His partner had reported that he is no longer able to get out of bed on 

his own even with assistance or use a walker and he had fallen multiple times.





Due to the worsening weakness he did have a indwelling Toro catheter placed by 

his providers.  At the time of the recent ER visit a CT of the abdomen suggested

cystitis and the urine was consistent with infection but patient had no urinary 

or abdominal symptoms.  Subsequent culture grew Klebsiella but given lack of 

symptoms no treatment was rendered.





Today in the emergency department patient remains generally weak though alert.  

He is able to answer all my questions and moves all 4 extremities though weakly.

 I am able to elicit suprapubic and right flank pain which is in contrast to the

recent ER visit 3 days ago.





Patient's wife reports that he had been started on Trulicity and was getting 

weekly injections.  She feels that his abrupt weakness started with those 

injections.  He has not received it now for about 1 week.  He is weak to the 

point that he is unable to get out of bed on his own anymore he needs at least 1

or 2 people to assist him to the wheelchair.  He is also eating very little.








Review of Systems


Constitutional: denies: Fever, Chills


Cardiac: reports: Reviewed and negative


Respiratory: reports: Reviewed and negative


GI: reports: Abdominal Pain, Nausea, Vomiting


: reports: Other (Chronic indwelling Toro catheter)


Skin: denies: Rash, Lesions


Musculoskeletal: reports: Reviewed and negative


Neurologic: reports: Generalized weakness, Focal weakness (Baseline unchanged 

weakness in the right arm)





PD PAST MEDICAL HISTORY





- Past Medical History


Cardiovascular: Hypertension, High cholesterol, Coronary artery disease


Respiratory: None


Neuro: CVA, Peripheral neuropathy


Endocrine/Autoimmune: Type 2 diabetes, Other


GI: None, Other


: Indwelling catheter


HEENT: None


Psych: Depression


Musculoskeletal: None


Derm: None





- Past Surgical History


Past Surgical History: Yes


General: Bowel surgery


Ortho: Rotator cuff repair, Spine surgery





- Present Medications


Home Medications: 


                                Ambulatory Orders











 Medication  Instructions  Recorded  Confirmed


 


Multivitamin [Multi-Vitamin Daily] 1 tab ORAL DAILY 02/24/15 09/04/22


 


Gabapentin 300 mg PO BID 11/24/15 09/04/22


 


metFORMIN [Glucophage] 1,000 mg PO BID 10/20/19 09/04/22


 


Atorvastatin [Lipitor] 40 mg PO DAILY 01/16/21 09/04/22


 


Clopidogrel [Plavix] 0.5 tab PO DAILY 01/16/21 09/04/22


 


Losartan [Cozaar] 0.5 tab PO DAILY 01/16/21 09/04/22


 


Trospium Chloride 1 tab PO BID 01/16/21 09/04/22


 


Cefpodoxime Proxetil [Vantin] 100 mg PO Q12H #20 tablet 09/04/22 


 


Esomeprazole Magnesium [Nexium] 40 mg PO DAILY 09/04/22 09/04/22


 


glyBURIDE [Glyburide] 5 mg PO DAILY #30 tablet 09/04/22 


 


ursodioL [Ursodiol] 200 mg PO DAILY #30 cap 09/04/22 














- Allergies


Allergies/Adverse Reactions: 


                                    Allergies











Allergy/AdvReac Type Severity Reaction Status Date / Time


 


No Known Drug Allergies Allergy   Verified 09/01/22 18:50














- Social History


Does the pt smoke?: No


Smoking Status: Never smoker


Does the pt drink ETOH?: Yes


Does the pt have substance abuse?: No





- Immunizations


Immunizations are current?: No


Immunizations: Other immun not current





- POLST


Patient has POLST: No


POLST Status: Full Code





PD ED PE EXPANDED





- General


General: No acute distress, Lethargic





- Cardiac


Cardiac: Regular Rate, Radial strong equal, Pedal strong equal, Cap refill < 2 

sec.  No: Murmur Present





- Respiratory


Respiratory: Clear to ausultation diana.  No: Distress, Labored





- Abdomen


Abdomen: Normal Bowel sounds, Tender to palpation (Right flank and suprapubic 

region)





- Derm


Derm: Normal color, Warm and dry





- Extremities


Extremities: Normal, Other (Mild contracture deformity of the right hand and 

wrist.  Right arm mildly weak at baseline)





- Neuro


Neuro: Lethargic, CNII-XII intact





- GCS


Eye Opening: To Voice


Motor: Obeys Commands


Verbal: Oriented


Total: 14





Results





- Vitals


Vitals: 


                               Vital Signs - 24 hr











  09/04/22 09/04/22





  11:59 14:03


 


Temperature 37.7 C 


 


Heart Rate 77 78


 


Respiratory 19 20





Rate  


 


Blood Pressure 126/66 136/74 H


 


O2 Saturation 98 99








                                     Oxygen











O2 Source                      Room air

















- Labs


Labs: 


                                Laboratory Tests











  09/04/22 09/04/22 09/04/22





  11:57 11:58 12:08


 


WBC    11.6 H


 


RBC    3.63 L


 


Hgb    10.2 L


 


Hct    30.4 L


 


MCV    83.7


 


MCH    28.1


 


MCHC    33.6


 


RDW    15.3 H


 


Plt Count    128 L


 


MPV    10.3


 


Neut # (Auto)    10.2 H


 


Lymph # (Auto)    0.4 L


 


Mono # (Auto)    0.9


 


Eos # (Auto)    0.0


 


Baso # (Auto)    0.0


 


Absolute Nucleated RBC    0.00


 


Nucleated RBC %    0.0


 


Sodium  136  


 


Potassium  3.3 L  


 


Chloride  101  


 


Carbon Dioxide  24  


 


Anion Gap  11.0  


 


BUN  23 H  


 


Creatinine  1.7 H  


 


Estimated GFR (MDRD)  41 L  


 


Glucose  351 H  


 


Lactic Acid   


 


Calcium  8.7  


 


Total Bilirubin  1.6 H  


 


AST  51 H  


 


ALT  52  


 


Alkaline Phosphatase  90  


 


Total Protein  7.3  


 


Albumin  3.5  


 


Globulin  3.8  


 


Albumin/Globulin Ratio  0.9 L  


 


Lipase  25  


 


Urine Color   YELLOW 


 


Urine Clarity   CLEAR 


 


Urine pH   6.0 


 


Ur Specific Gravity   1.015 


 


Urine Protein   TRACE 


 


Urine Glucose (UA)   >=1000 H 


 


Urine Ketones   NEGATIVE 


 


Urine Occult Blood   SMALL H 


 


Urine Nitrite   NEGATIVE 


 


Urine Bilirubin   NEGATIVE 


 


Urine Urobilinogen   0.2 (NORMAL) 


 


Ur Leukocyte Esterase   TRACE H 


 


Urine RBC   6-10 H 


 


Urine WBC   11-25 H 


 


Ur Squamous Epith Cells   RARE Squamous 


 


Urine Crystals   >50 Uric Acid 


 


Urine Bacteria   Few 


 


Urine Casts   0-2 Hyaline Casts 


 


Urine Yeast   PRESENT 


 


Ur Microscopic Review   INDICATED 


 


Urine Culture Comments   INDICATED 


 


Serum Ketones   














  09/04/22 09/04/22





  12:08 12:36


 


WBC  


 


RBC  


 


Hgb  


 


Hct  


 


MCV  


 


MCH  


 


MCHC  


 


RDW  


 


Plt Count  


 


MPV  


 


Neut # (Auto)  


 


Lymph # (Auto)  


 


Mono # (Auto)  


 


Eos # (Auto)  


 


Baso # (Auto)  


 


Absolute Nucleated RBC  


 


Nucleated RBC %  


 


Sodium  


 


Potassium  


 


Chloride  


 


Carbon Dioxide  


 


Anion Gap  


 


BUN  


 


Creatinine  


 


Estimated GFR (MDRD)  


 


Glucose  


 


Lactic Acid   1.7


 


Calcium  


 


Total Bilirubin  


 


AST  


 


ALT  


 


Alkaline Phosphatase  


 


Total Protein  


 


Albumin  


 


Globulin  


 


Albumin/Globulin Ratio  


 


Lipase  


 


Urine Color  


 


Urine Clarity  


 


Urine pH  


 


Ur Specific Gravity  


 


Urine Protein  


 


Urine Glucose (UA)  


 


Urine Ketones  


 


Urine Occult Blood  


 


Urine Nitrite  


 


Urine Bilirubin  


 


Urine Urobilinogen  


 


Ur Leukocyte Esterase  


 


Urine RBC  


 


Urine WBC  


 


Ur Squamous Epith Cells  


 


Urine Crystals  


 


Urine Bacteria  


 


Urine Casts  


 


Urine Yeast  


 


Ur Microscopic Review  


 


Urine Culture Comments  


 


Serum Ketones  NEGATIVE 














- Rads (name of study)


  ** CT abd


Radiology: Final report received (Thin gallbladder luminal hyperdensity which 

may represent sludge.  No wall thickening.  No acute intra-abdominal or pelvic 

process.  Thickened urinary wall may be secondary to incomplete distention.  

Other etiologies such as infection inflammation cannot be excluded)





  ** abd US


Radiology: Final report received (Adherent sludge versus polyps in the 

gallbladder)





PD MEDICAL DECISION MAKING





- ED course


Complexity details: reviewed results, re-evaluated patient, considered 

differential, d/w patient


ED course: 





62-year-old male comes to the emergency department for evaluation of 2 days 

right upper quadrant abdominal pain and vomiting.  Seen recently on 1 September 

for worsening weakness over the last month and inability to get out of bed.  

Work-up in the emergency department was negative.  He does have chronic 

indwelling Toro catheter.





Over the last 72 hours we do note some changes in his lab tests.  Most notably a

 mild rise in his white count to 11.9.  In addition to this we do have an AST 

that has also doubled as well as a bilirubin that has gone from 0.9-1.6.  Given 

these findings an abdominal ultrasound was completed.  We do see a lot of sludge

 in the gallbladder but no Pericholecystic fluid or gallbladder wall thickening 

this does not likely indicate acute cholecystitis.  I did briefly discussed this

 patient's presentation with surgeon on-call Dr. mann' office.  He would 

make the recommendation for improved glucose management as well as starting 

ursodiol.  He would like the patient to be followed up in his office once his 

blood sugars are better managed.





The urine today continues to show infection.  The CT does show bladder wall 

thickening.  Given the rising white count we have elected to start treatment of 

the infection today.  Recent culture grew a pansensitive Klebsiella.  Patient 

was given a gram of ceftriaxone here in the emergency department and an hour 

later his Toro catheter was exchanged.  He will be started on Vantin.





Patient's wife is struggling with his worsening generalized weakness.  He is 

however scheduled to see his primary care provider on Tuesday.  I have made the 

recommendation for home health and PT.  Unfortunately the patient does not meet 

clinical criteria for admission to the hospital.  I did discuss with the 

patient's wife emergent return precautions for worsening symptoms.





Departure





- Departure


Disposition: 01 Home, Self Care


Clinical Impression: 


 Abnormal LFTs, Biliary sludge, Generalized weakness





Acute cystitis


Qualifiers:


 Hematuria presence: without hematuria Qualified Code(s): N30.00 - Acute 

cystitis without hematuria





Hyperglycemia due to type 2 diabetes mellitus


Qualifiers:


 Diabetes mellitus long term insulin use: without long term use Qualified 

Code(s): E11.65 - Type 2 diabetes mellitus with hyperglycemia





Condition: Stable


Record reviewed to determine appropriate education?: Yes


Prescriptions: 


glyBURIDE [Glyburide] 5 mg PO DAILY #30 tablet


ursodioL [Ursodiol] 200 mg PO DAILY #30 cap


Cefpodoxime Proxetil [Vantin] 100 mg PO Q12H #20 tablet


Comments: 


Bret was seen today in the ER for right upper quadrant abdominal pain.  He has

 been vomiting for 2 days.  He has been getting progressively weak over the last

 month.  This followed starting the new medication Trulicity.





He does have a urinary tract infection.  A prescription for medication called 

Vantin has been sent to the pharmacy.  His Toro was exchanged today in the ER. 

 Please give this medication to him twice daily for the next 10 days.





He does have a mild rise in his liver function tests.  An ultrasound today shows

 a sludge in his gallbladder.  This can be a side effect to Trulicity.  I do not

 recommend he take it anymore.  Because his blood sugars are higher than they 

should be resume taking the glyburide.  5 mg once daily.  This has also been 

sent to the [facility] in [place]. In order to treat the sludge in his 

gallbladder I have started a medication called ursodiol.  He should take it once

 daily.





He may also be getting generally weaker because he is eating very little.  I 

encourage you to have him drink at least 2 Glucerna shakes daily.





When you see his primary care provider on Tuesday you should request referral 

for home health as well as home health physical therapy.





Do not hesitate to return Bret here for any worsening symptoms.

## 2023-04-01 ENCOUNTER — HOSPITAL ENCOUNTER (EMERGENCY)
Dept: HOSPITAL 76 - ED | Age: 63
Discharge: HOME | End: 2023-04-01
Payer: COMMERCIAL

## 2023-04-01 ENCOUNTER — HOSPITAL ENCOUNTER (OUTPATIENT)
Dept: HOSPITAL 76 - EMS | Age: 63
Discharge: ANOTHER HOSPITAL | End: 2023-04-01
Payer: MEDICARE

## 2023-04-01 VITALS — SYSTOLIC BLOOD PRESSURE: 152 MMHG | DIASTOLIC BLOOD PRESSURE: 91 MMHG

## 2023-04-01 DIAGNOSIS — R40.4: ICD-10-CM
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
## 2023-04-01 DIAGNOSIS — T17.428A: Primary | ICD-10-CM

## 2023-04-01 DIAGNOSIS — T18.128A: Primary | ICD-10-CM
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
## 2023-04-01 PROCEDURE — 99283 EMERGENCY DEPT VISIT LOW MDM: CPT

## 2023-04-01 PROCEDURE — 99282 EMERGENCY DEPT VISIT SF MDM: CPT

## 2023-04-01 NOTE — ED PHYSICIAN DOCUMENTATION
History of Present Illness





- Stated complaint


Stated Complaint: CHOKING EPISODE





- Chief complaint


Chief Complaint: General





- History obtained from


History obtained from: Patient, EMS





- History of Present Illness


Pain level max: 0


Pain level now: 0





- Additonal information


Additional information: 





63-year-old male with a history of a right-sided stroke presents to the 

emergency department after suffering a choking episode today.  He states that he

was eating a hamburger with friends and it became lodged in his trachea, he had 

difficulty breathing, Heimlich maneuver was performed, the obstruction cleared. 

He currently is asymptomatic.  He states he feels a slight scratching in his 

throat.  No chest pain.  No shortness of breath.  No nausea or vomiting.  No 

abdominal pain.  Eating and drinking without difficulty now.





Review of Systems


Constitutional: denies: Fever, Chills


GI: denies: Vomiting


Skin: denies: Rash


Musculoskeletal: denies: Neck pain, Back pain


Neurologic: denies: Headache





PD PAST MEDICAL HISTORY





- Past Medical History


Cardiovascular: Hypertension, High cholesterol, Coronary artery disease


Respiratory: None


Neuro: CVA, Peripheral neuropathy


Endocrine/Autoimmune: Type 2 diabetes, Other


GI: None, Other


: Indwelling catheter


HEENT: None


Psych: Depression


Musculoskeletal: None


Derm: None





- Past Surgical History


Past Surgical History: Yes


General: Bowel surgery


Ortho: Rotator cuff repair, Spine surgery





- Present Medications


Home Medications: 


                                Ambulatory Orders











 Medication  Instructions  Recorded  Confirmed


 


Multivitamin [Multi-Vitamin Daily] 1 tab ORAL DAILY 02/24/15 09/04/22


 


Gabapentin 300 mg PO BID 11/24/15 09/04/22


 


metFORMIN [Glucophage] 1,000 mg PO BID 10/20/19 09/04/22


 


Atorvastatin [Lipitor] 40 mg PO DAILY 01/16/21 09/04/22


 


Clopidogrel [Plavix] 0.5 tab PO DAILY 01/16/21 09/04/22


 


Losartan [Cozaar] 0.5 tab PO DAILY 01/16/21 09/04/22


 


Trospium Chloride 1 tab PO BID 01/16/21 09/04/22


 


Cefpodoxime Proxetil [Vantin] 100 mg PO Q12H #20 tablet 09/04/22 


 


Esomeprazole Magnesium [Nexium] 40 mg PO DAILY 09/04/22 09/04/22


 


glyBURIDE [Glyburide] 5 mg PO DAILY #30 tablet 09/04/22 


 


ursodioL [Ursodiol] 200 mg PO DAILY #30 cap 09/04/22 














- Allergies


Allergies/Adverse Reactions: 


                                    Allergies











Allergy/AdvReac Type Severity Reaction Status Date / Time


 


No Known Drug Allergies Allergy   Verified 09/01/22 18:50














- Social History


Does the pt smoke?: No


Smoking Status: Never smoker


Does the pt drink ETOH?: Yes


Does the pt have substance abuse?: No





- Immunizations


Immunizations are current?: No


Immunizations: Other immun not current





- POLST


Patient has POLST: No


POLST Status: Full Code





PD ED PE NORMAL





- Vitals


Vital signs reviewed: Yes





- General


General: Alert and oriented X 3, No acute distress





- HEENT


HEENT: Moist mucous membranes, Pharynx benign





- Neck


Neck: Supple, no meningeal sign, Other (No stridor.  No wheezing.)





- Cardiac


Cardiac: RRR, Strong equal pulses





- Respiratory


Respiratory: No respiratory distress, Clear bilaterally





- Abdomen


Abdomen: Soft, Non tender, Non distended





- Derm


Derm: Warm and dry





- Neuro


Neuro: Alert and oriented X 3





- Psych


Psych: Normal mood, Normal affect





Results





- Vitals


Vitals: 


                               Vital Signs - 24 hr











  04/01/23





  19:10


 


Temperature 36.9 C


 


Heart Rate 80


 


Respiratory 18





Rate 


 


Blood Pressure 166/99 H


 


O2 Saturation 96








                                     Oxygen











O2 Source                      Room air

















PD Medical Decision Making





- ED course


Complexity details: considered differential, d/w patient


ED course: 





63-year-old male with a choking episode today at home.  Currently fully 

asymptomatic.  No cough.  No vomiting.  Eating and drinking without difficulty. 

 Normal oropharyngeal exam.  No stridor.  No wheezing.  No indication for 

emergent x-ray.  Patient counseled regarding signs and symptoms for which I 

believe and urgent re-evaluation would be necessary. Patient with good 

understanding of and agreement to plan and is comfortable going home at this 

time





This document was made in part using voice recognition software. While efforts 

are made to proofread this document, sound alike and grammatical errors may 

occur.





Departure





- Departure


Disposition: 01 Home, Self Care


Clinical Impression: 


 Choking episode





Condition: Good


Instructions:  ED Choking Spell


Follow-Up: 


your,doctor in 1 week [Other]


Comments: 


Please follow-up with your doctor for further care.  Please return if you 

worsen.  As we discussed, x-rays do not show any abnormalities usually after a 

brief choking episode. If you start to develop a significant cough, thick 

sputum, fevers or other new or worrisome symptoms, an x-ray would be appropriate

 at that time to see if there is any evidence of pneumonia.

## 2023-10-02 ENCOUNTER — HOSPITAL ENCOUNTER (OUTPATIENT)
Dept: HOSPITAL 76 - EMS | Age: 63
Discharge: ANOTHER HOSPITAL | End: 2023-10-02
Payer: MEDICARE

## 2023-10-02 DIAGNOSIS — R10.9: ICD-10-CM

## 2023-10-02 DIAGNOSIS — R53.1: ICD-10-CM

## 2023-10-02 DIAGNOSIS — R42: ICD-10-CM

## 2023-10-02 DIAGNOSIS — R11.2: Primary | ICD-10-CM

## 2024-07-02 ENCOUNTER — HOSPITAL ENCOUNTER (OUTPATIENT)
Dept: HOSPITAL 76 - EMS | Age: 64
End: 2024-07-02
Payer: COMMERCIAL

## 2024-07-02 DIAGNOSIS — Z03.89: Primary | ICD-10-CM

## 2024-07-23 ENCOUNTER — HOSPITAL ENCOUNTER (OUTPATIENT)
Dept: HOSPITAL 76 - EMS | Age: 64
End: 2024-07-23
Payer: MEDICARE

## 2024-07-23 DIAGNOSIS — Z91.81: ICD-10-CM

## 2024-07-23 DIAGNOSIS — Z03.89: Primary | ICD-10-CM

## 2024-07-23 DIAGNOSIS — Z79.02: ICD-10-CM
